# Patient Record
Sex: FEMALE | Race: WHITE | NOT HISPANIC OR LATINO | URBAN - METROPOLITAN AREA
[De-identification: names, ages, dates, MRNs, and addresses within clinical notes are randomized per-mention and may not be internally consistent; named-entity substitution may affect disease eponyms.]

---

## 2018-10-19 ENCOUNTER — INPATIENT (INPATIENT)
Facility: HOSPITAL | Age: 83
LOS: 6 days | Discharge: NURSING, REHAB OR LONG-TERM CARE | DRG: 394 | End: 2018-10-26
Attending: SURGERY | Admitting: SURGERY
Payer: MEDICARE

## 2018-10-19 VITALS
OXYGEN SATURATION: 99 % | TEMPERATURE: 98 F | DIASTOLIC BLOOD PRESSURE: 81 MMHG | RESPIRATION RATE: 16 BRPM | HEART RATE: 109 BPM | SYSTOLIC BLOOD PRESSURE: 145 MMHG

## 2018-10-19 DIAGNOSIS — Z98.890 OTHER SPECIFIED POSTPROCEDURAL STATES: Chronic | ICD-10-CM

## 2018-10-19 LAB
ALBUMIN SERPL ELPH-MCNC: 2.6 G/DL — LOW (ref 3.4–5)
ALP SERPL-CCNC: 111 U/L — SIGNIFICANT CHANGE UP (ref 40–120)
ALT FLD-CCNC: 18 U/L — SIGNIFICANT CHANGE UP (ref 12–42)
ANION GAP SERPL CALC-SCNC: 11 MMOL/L — SIGNIFICANT CHANGE UP (ref 9–16)
APPEARANCE UR: CLEAR — SIGNIFICANT CHANGE UP
AST SERPL-CCNC: 18 U/L — SIGNIFICANT CHANGE UP (ref 15–37)
BILIRUB SERPL-MCNC: 0.4 MG/DL — SIGNIFICANT CHANGE UP (ref 0.2–1.2)
BILIRUB UR-MCNC: ABNORMAL
BUN SERPL-MCNC: 39 MG/DL — HIGH (ref 7–23)
CALCIUM SERPL-MCNC: 9.6 MG/DL — SIGNIFICANT CHANGE UP (ref 8.5–10.5)
CHLORIDE SERPL-SCNC: 93 MMOL/L — LOW (ref 96–108)
CO2 SERPL-SCNC: 29 MMOL/L — SIGNIFICANT CHANGE UP (ref 22–31)
COLOR SPEC: YELLOW — SIGNIFICANT CHANGE UP
CREAT SERPL-MCNC: 1.73 MG/DL — HIGH (ref 0.5–1.3)
DIFF PNL FLD: NEGATIVE — SIGNIFICANT CHANGE UP
GLUCOSE SERPL-MCNC: 265 MG/DL — HIGH (ref 70–99)
GLUCOSE UR QL: NEGATIVE — SIGNIFICANT CHANGE UP
HCT VFR BLD CALC: 43.7 % — SIGNIFICANT CHANGE UP (ref 34.5–45)
HGB BLD-MCNC: 14.7 G/DL — SIGNIFICANT CHANGE UP (ref 11.5–15.5)
KETONES UR-MCNC: NEGATIVE — SIGNIFICANT CHANGE UP
LACTATE SERPL-SCNC: 1.9 MMOL/L — SIGNIFICANT CHANGE UP (ref 0.4–2)
LEUKOCYTE ESTERASE UR-ACNC: NEGATIVE — SIGNIFICANT CHANGE UP
LIDOCAIN IGE QN: 97 U/L — SIGNIFICANT CHANGE UP (ref 73–393)
LYMPHOCYTES # BLD AUTO: 3 % — LOW (ref 13–44)
MCHC RBC-ENTMCNC: 31.7 PG — SIGNIFICANT CHANGE UP (ref 27–34)
MCHC RBC-ENTMCNC: 33.6 G/DL — SIGNIFICANT CHANGE UP (ref 32–36)
MCV RBC AUTO: 94.2 FL — SIGNIFICANT CHANGE UP (ref 80–100)
MONOCYTES NFR BLD AUTO: 3 % — SIGNIFICANT CHANGE UP (ref 2–14)
NEUTROPHILS NFR BLD AUTO: 92 % — HIGH (ref 43–77)
NITRITE UR-MCNC: NEGATIVE — SIGNIFICANT CHANGE UP
NT-PROBNP SERPL-SCNC: 4535 PG/ML — HIGH
PCO2 BLDV: 38 MMHG — LOW (ref 41–51)
PH BLDV: 7.51 — HIGH (ref 7.32–7.43)
PH UR: 5 — SIGNIFICANT CHANGE UP (ref 5–8)
PLATELET # BLD AUTO: 706 K/UL — HIGH (ref 150–400)
PO2 BLDV: 85 MMHG — HIGH (ref 35–40)
POTASSIUM SERPL-MCNC: 4.2 MMOL/L — SIGNIFICANT CHANGE UP (ref 3.5–5.3)
POTASSIUM SERPL-SCNC: 4.2 MMOL/L — SIGNIFICANT CHANGE UP (ref 3.5–5.3)
PROT SERPL-MCNC: 8.2 G/DL — SIGNIFICANT CHANGE UP (ref 6.4–8.2)
PROT UR-MCNC: ABNORMAL MG/DL
RBC # BLD: 4.64 M/UL — SIGNIFICANT CHANGE UP (ref 3.8–5.2)
RBC # FLD: 11.8 % — SIGNIFICANT CHANGE UP (ref 10.3–14.5)
SAO2 % BLDV: 97 % — SIGNIFICANT CHANGE UP
SODIUM SERPL-SCNC: 133 MMOL/L — SIGNIFICANT CHANGE UP (ref 132–145)
SP GR SPEC: >=1.03 — SIGNIFICANT CHANGE UP (ref 1–1.03)
TROPONIN I SERPL-MCNC: <0.017 NG/ML — LOW (ref 0.02–0.06)
UROBILINOGEN FLD QL: 0.2 E.U./DL — SIGNIFICANT CHANGE UP
WBC # BLD: 28.7 K/UL — HIGH (ref 3.8–10.5)
WBC # FLD AUTO: 28.7 K/UL — HIGH (ref 3.8–10.5)

## 2018-10-19 PROCEDURE — 74176 CT ABD & PELVIS W/O CONTRAST: CPT | Mod: 26

## 2018-10-19 PROCEDURE — 43753 TX GASTRO INTUB W/ASP: CPT

## 2018-10-19 PROCEDURE — 93010 ELECTROCARDIOGRAM REPORT: CPT | Mod: 59

## 2018-10-19 PROCEDURE — 71045 X-RAY EXAM CHEST 1 VIEW: CPT | Mod: 26

## 2018-10-19 PROCEDURE — 71250 CT THORAX DX C-: CPT | Mod: 26

## 2018-10-19 PROCEDURE — 71045 X-RAY EXAM CHEST 1 VIEW: CPT | Mod: 26,77

## 2018-10-19 PROCEDURE — 99285 EMERGENCY DEPT VISIT HI MDM: CPT | Mod: 25

## 2018-10-19 RX ORDER — SODIUM CHLORIDE 9 MG/ML
1000 INJECTION INTRAMUSCULAR; INTRAVENOUS; SUBCUTANEOUS
Qty: 0 | Refills: 0 | Status: DISCONTINUED | OUTPATIENT
Start: 2018-10-19 | End: 2018-10-25

## 2018-10-19 RX ORDER — KETOROLAC TROMETHAMINE 30 MG/ML
30 SYRINGE (ML) INJECTION ONCE
Qty: 0 | Refills: 0 | Status: DISCONTINUED | OUTPATIENT
Start: 2018-10-19 | End: 2018-10-19

## 2018-10-19 RX ORDER — SODIUM CHLORIDE 9 MG/ML
1000 INJECTION INTRAMUSCULAR; INTRAVENOUS; SUBCUTANEOUS ONCE
Qty: 0 | Refills: 0 | Status: COMPLETED | OUTPATIENT
Start: 2018-10-19 | End: 2018-10-19

## 2018-10-19 RX ORDER — PIPERACILLIN AND TAZOBACTAM 4; .5 G/20ML; G/20ML
2.25 INJECTION, POWDER, LYOPHILIZED, FOR SOLUTION INTRAVENOUS EVERY 6 HOURS
Qty: 0 | Refills: 0 | Status: DISCONTINUED | OUTPATIENT
Start: 2018-10-19 | End: 2018-10-26

## 2018-10-19 RX ORDER — ACETAMINOPHEN 500 MG
650 TABLET ORAL ONCE
Qty: 0 | Refills: 0 | Status: COMPLETED | OUTPATIENT
Start: 2018-10-19 | End: 2018-10-19

## 2018-10-19 RX ORDER — VANCOMYCIN HCL 1 G
1000 VIAL (EA) INTRAVENOUS ONCE
Qty: 0 | Refills: 0 | Status: COMPLETED | OUTPATIENT
Start: 2018-10-19 | End: 2018-10-19

## 2018-10-19 RX ORDER — PIPERACILLIN AND TAZOBACTAM 4; .5 G/20ML; G/20ML
3.38 INJECTION, POWDER, LYOPHILIZED, FOR SOLUTION INTRAVENOUS ONCE
Qty: 0 | Refills: 0 | Status: COMPLETED | OUTPATIENT
Start: 2018-10-19 | End: 2018-10-19

## 2018-10-19 RX ORDER — ONDANSETRON 8 MG/1
4 TABLET, FILM COATED ORAL ONCE
Qty: 0 | Refills: 0 | Status: COMPLETED | OUTPATIENT
Start: 2018-10-19 | End: 2018-10-19

## 2018-10-19 RX ORDER — IPRATROPIUM/ALBUTEROL SULFATE 18-103MCG
3 AEROSOL WITH ADAPTER (GRAM) INHALATION ONCE
Qty: 0 | Refills: 0 | Status: COMPLETED | OUTPATIENT
Start: 2018-10-19 | End: 2018-10-19

## 2018-10-19 RX ADMIN — Medication 30 MILLIGRAM(S): at 12:17

## 2018-10-19 RX ADMIN — PIPERACILLIN AND TAZOBACTAM 3.38 GRAM(S): 4; .5 INJECTION, POWDER, LYOPHILIZED, FOR SOLUTION INTRAVENOUS at 12:40

## 2018-10-19 RX ADMIN — ONDANSETRON 4 MILLIGRAM(S): 8 TABLET, FILM COATED ORAL at 11:04

## 2018-10-19 RX ADMIN — SODIUM CHLORIDE 1000 MILLILITER(S): 9 INJECTION INTRAMUSCULAR; INTRAVENOUS; SUBCUTANEOUS at 12:17

## 2018-10-19 RX ADMIN — Medication 650 MILLIGRAM(S): at 11:04

## 2018-10-19 RX ADMIN — PIPERACILLIN AND TAZOBACTAM 200 GRAM(S): 4; .5 INJECTION, POWDER, LYOPHILIZED, FOR SOLUTION INTRAVENOUS at 11:35

## 2018-10-19 RX ADMIN — Medication 250 MILLIGRAM(S): at 13:02

## 2018-10-19 RX ADMIN — Medication 3 MILLILITER(S): at 10:43

## 2018-10-19 RX ADMIN — Medication 30 MILLIGRAM(S): at 11:35

## 2018-10-19 RX ADMIN — SODIUM CHLORIDE 2000 MILLILITER(S): 9 INJECTION INTRAMUSCULAR; INTRAVENOUS; SUBCUTANEOUS at 10:51

## 2018-10-19 RX ADMIN — Medication 650 MILLIGRAM(S): at 10:53

## 2018-10-19 RX ADMIN — PIPERACILLIN AND TAZOBACTAM 200 GRAM(S): 4; .5 INJECTION, POWDER, LYOPHILIZED, FOR SOLUTION INTRAVENOUS at 21:46

## 2018-10-19 NOTE — ED PROVIDER NOTE - OBJECTIVE STATEMENT
85 yo F with no known PMHx, lives alone at home, BIBA for cough, body aches, fever x 10d.  Pt reports having cough productive of clear sputum with associated body aches and decreased energy/appetite in the past 10 days.  Noted fever of 102 last night.  Had 3 episodes of NBNB emesis as well with associated chest pressure sensation since yesterday night.  Denies sore throat, SOB, palpitations, wheezing, abdominal pain, D/C, change in urinary/bowel function, dysuria, hematuria, flank pain, rash, HA, and dizziness.  No recent travel or sick contact noted. 87 yo F with no known PMHx, lives alone at home, BIBA for cough, body aches, fever x 10d.  Pt reports having cough productive of clear sputum with associated body aches and decreased energy/appetite in the past 10 days.  Noted fever of 102 last night.  Had 3 episodes of NBNB emesis as well with associated chest pressure sensation and abdominal bloating since yesterday night.  Denies sore throat, SOB, palpitations, wheezing, abdominal pain, D/C, change in urinary/bowel function, dysuria, hematuria, flank pain, rash, HA, and dizziness.  No recent travel or sick contact noted.

## 2018-10-19 NOTE — H&P ADULT - NSHPSOCIALHISTORY_GEN_ALL_CORE
Drinks 2 glasses of scotch/day  30 pack year smoking history, quit 20 years ago  Denies recreational drugs  Sexually active with men, no barrier contraception  Lives in apartment

## 2018-10-19 NOTE — ED PROVIDER NOTE - PHYSICAL EXAMINATION
Vital Signs - nursing notes reviewed and confirmed  Gen - WDWN, NAD, comfortable and non-toxic appearing, speaking in full sentences   Skin - warm, dry, intact  HEENT - AT/NC, PERRL, EOMI, no conjunctival injection, moist oral mucosa, TM intact b/l with good cone of lights, o/p clear with no erythema, edema, or exudate, uvula midline, airway patent, neck supple and NT, FROM, no JVD or carotid bruits b/l, no palpable nodes  CV - S1S2, R/R/R  Resp - respiration non-labored, diminished bs b/l with no focal r/r/w, no tripoding or accessory muscle   GI - NABS, soft, ND, NT, no rebound or guarding, no CVAT b/l   MS - w/w/p, no c/c/e, calves supple and NT, distal pulses symmetric b/l, brisk cap refills, +SILT  Neuro - AxOx3, no focal neuro deficits, CN II-XII grossly intact, cerebellar function intact, negative pronator drift, negative nystagmus, ambulatory without gait disturbance Vital Signs - nursing notes reviewed and confirmed  Gen - WDWN, NAD, comfortable and non-toxic appearing, speaking in full sentences   Skin - warm, dry, intact  HEENT - AT/NC, PERRL, EOMI, no conjunctival injection, moist oral mucosa, TM intact b/l with good cone of lights, o/p clear with no erythema, edema, or exudate, uvula midline, airway patent, neck supple and NT, FROM, no JVD or carotid bruits b/l, no palpable nodes  CV - S1S2, R/R/R  Resp - respiration non-labored, diminished bs b/l with no focal r/r/w, no tripoding or accessory muscle   GI - NABS, soft, slightly protuberant, minimal tenderness in the periumbilical and RLQ region, no rebound or guarding, no CVAT b/l   MS - w/w/p, no c/c/e, calves supple and NT, distal pulses symmetric b/l, brisk cap refills, +SILT  Neuro - AxOx3, no focal neuro deficits, ambulatory without gait disturbance

## 2018-10-19 NOTE — ED PROVIDER NOTE - ATTENDING CONTRIBUTION TO CARE
Pt evaluated for fever/sepsis work up, fever at home, nausea, vomiting several times, feels weak and tired. on exam lungs CTA, heart RRR, abd soft, mildly tender in lower abd. plan: sepsis work up, cultures, broad spectrum abxs/ Pt evaluated for fever/sepsis work up, fever at home, nausea, vomiting several times, feels weak and tired. on exam lungs CTA, heart RRR, abd soft, mildly tender in lower abd. plan: sepsis work up, cultures, broad spectrum abxs/    CT - sbo probably due to mass. Admit to Sx. NGT placed by PA.

## 2018-10-19 NOTE — ED PROVIDER NOTE - PROGRESS NOTE DETAILS
NGT readjusted and advanced further after CXR.  started on LWS.  1500cc of brownish gastric content output noted GYN paged 2 times ([phone]) with no call back so far. Will consult GYN/ONC when pt gets to [facility]

## 2018-10-19 NOTE — ED PROVIDER NOTE - RADIOLOGY FINDINGS
2021              [address] [place] [id number]         To Whom It May Concern,    I certify that Homero Katherine  79. Is under my medical care and may not return to work until 21.  If you h
6/3/2021      To Whom It May Concern:    Tamara Cooper is currently under my medical care . She  may return to work  on 6/8/21. Activity is restricted as follows: n/a    If you require additional information please contact our office.       Sincere
reviewed by me

## 2018-10-19 NOTE — H&P ADULT - NSHPLABSRESULTS_GEN_ALL_CORE
Labs:                        14.7   28.7  )-----------( 706      ( 19 Oct 2018 10:52 )             43.7     10-19    133  |  93<L>  |  39<H>  ----------------------------<  265<H>  4.2   |  29  |  1.73<H>    Ca    9.6      19 Oct 2018 10:52    TPro  8.2  /  Alb  2.6<L>  /  TBili  0.4  /  DBili  x   /  AST  18  /  ALT  18  /  AlkPhos  111  10-19      Urinalysis Basic - ( 19 Oct 2018 14:10 )    Color: Yellow / Appearance: Clear / SG: >=1.030 / pH: x  Gluc: x / Ketone: NEGATIVE  / Bili: Small / Urobili: 0.2 E.U./dL   Blood: x / Protein: Trace mg/dL / Nitrite: NEGATIVE   Leuk Esterase: NEGATIVE / RBC: x / WBC Many /HPF   Sq Epi: x / Non Sq Epi: 5-10 /HPF / Bacteria: Many /HPF      CAPILLARY BLOOD GLUCOSE        LIVER FUNCTIONS - ( 19 Oct 2018 10:52 )  Alb: 2.6 g/dL / Pro: 8.2 g/dL / ALK PHOS: 111 U/L / ALT: 18 U/L / AST: 18 U/L / GGT: x             Radiology & Additional Studies:    CT A/P w/o contrast  IMPRESSION:  1.  Large heterogeneous multilocular masslike structure with internal gas   density within the vesicouterine pouch, with involvement of the adjacent   small bowel. There is resultant proximal small bowel obstruction and a   small amount of regional extraluminal gas density. Findings may   represents a neoplastic process versus an abscess. If there are no   contraindications, repeat CT or MR imaging of the pelvis with IV contrast.    2.  Distended stomach with fluid column throughout the esophagus, most   indicative of reflux.    3.  Intrathoracic findings are indicative of bilateral small airways   disease. An inflammatory etiology or possible aspiration is also   considered. No focal airspace consolidation or pleural effusions.    4.  Extensive atherosclerotic coronary artery disease.

## 2018-10-19 NOTE — ED PROVIDER NOTE - CARE PLAN
Principal Discharge DX:	Intraabdominal mass  Secondary Diagnosis:	SBO (small bowel obstruction)  Secondary Diagnosis:	Abscess

## 2018-10-19 NOTE — H&P ADULT - NSHPPHYSICALEXAM_GEN_ALL_CORE
Physical Exam:  General: NAD, NGT in place  Pulmonary: Nonlabored breathing, no respiratory distress  Cardiovascular: No heaves/thrills  Abdominal: Soft, distended, tympanitic, nontender in all quadrants  Extremities: WWP, no clubbing/cyanosis/edema  Neuro: AAO x3, no focal deficits

## 2018-10-19 NOTE — ED PROVIDER NOTE - DIAGNOSTIC INTERPRETATION
Xray (wet reads) interpreted by LG KIRKPATRICK   CXR - Cardiac silhouette, aortic knob, mediastinal and hilar contours appear wnl, no acute consolidation, infiltrate, effusion, or PTX. No bony abnormalities noted

## 2018-10-19 NOTE — H&P ADULT - ASSESSMENT
A/P: 86F presents with partial SBO 2/2 to possible uterine mass invading small bowel    - NPO/IVF  - NGT LIWS  - Serial abdominal exams  - Zosyn  - OYOVANA/SCDs  - Gyn consult A/P: 86F presents with partial SBO 2/2 to possible uterine mass invading small bowel    - NPO/IVF  - NGT LIWS  - Serial abdominal exams  - Zosyn  - OYOVANA/SCDs  - Gyn consult  - Case discussed with chief resident and attending

## 2018-10-19 NOTE — ED PROVIDER NOTE - MEDICAL DECISION MAKING DETAILS
pt p/w 10d of cough and generalized malaise and decreased appetite, noted fever in the past 2d with N/V x 3 episodes last night, 2 episodes in the ED, labs remarkable for leukocytosis to 28K, thrombocytosis, elevated Cr of 1.74, CXR with no acute consolidation noted, dry scans performed for chest and A/P due to elevated cr and concerns for intra-abd/chest infectious process, empirically covered with dose of IV vanco/zosyn, CT with large heterogeneous multilocular masslike structure with internal gas   density within the vesicouterine pouch, with involvement of the adjacent small bowel. There is resultant proximal small bowel obstruction and a small amount of regional extraluminal gas density. +b/l airway dz vs aspiration PNA (cough with N/V), NGT placed at bedside with 1L of brown gastric content output, case discussed with ACS Dr. Hartley, will admit to ACS tele/stepdown for further management

## 2018-10-19 NOTE — H&P ADULT - HISTORY OF PRESENT ILLNESS
86F presents with NB/NB vomiting x3 last night, accompanied by lightheadedness.  The patient felt subjectively worse this morning, but did not describe any specific symptoms.  She continued to pass flatus and bowel movements this morning.  She denies any similar prior episodes, as well as any headache, change in vision, diarrhea, chest pain, shortness of breath, or palpatations.  Of note, she endorses an unintentional 20lb weight loss over the last year.    In the [facility] ED, she was afebrile and hemodynamically stable before transfer to [facility].

## 2018-10-20 LAB
ANION GAP SERPL CALC-SCNC: 14 MMOL/L — SIGNIFICANT CHANGE UP (ref 5–17)
BUN SERPL-MCNC: 41 MG/DL — HIGH (ref 7–23)
CALCIUM SERPL-MCNC: 8.2 MG/DL — LOW (ref 8.4–10.5)
CHLORIDE SERPL-SCNC: 96 MMOL/L — SIGNIFICANT CHANGE UP (ref 96–108)
CO2 SERPL-SCNC: 33 MMOL/L — HIGH (ref 22–31)
CREAT SERPL-MCNC: 1.93 MG/DL — HIGH (ref 0.5–1.3)
CULTURE RESULTS: SIGNIFICANT CHANGE UP
GLUCOSE SERPL-MCNC: 126 MG/DL — HIGH (ref 70–99)
HCT VFR BLD CALC: 37.6 % — SIGNIFICANT CHANGE UP (ref 34.5–45)
HGB BLD-MCNC: 12.1 G/DL — SIGNIFICANT CHANGE UP (ref 11.5–15.5)
MAGNESIUM SERPL-MCNC: 2.4 MG/DL — SIGNIFICANT CHANGE UP (ref 1.6–2.6)
MCHC RBC-ENTMCNC: 31.2 PG — SIGNIFICANT CHANGE UP (ref 27–34)
MCHC RBC-ENTMCNC: 32.2 G/DL — SIGNIFICANT CHANGE UP (ref 32–36)
MCV RBC AUTO: 96.9 FL — SIGNIFICANT CHANGE UP (ref 80–100)
PHOSPHATE SERPL-MCNC: 5.5 MG/DL — HIGH (ref 2.5–4.5)
PLATELET # BLD AUTO: 601 K/UL — HIGH (ref 150–400)
POTASSIUM SERPL-MCNC: 3.5 MMOL/L — SIGNIFICANT CHANGE UP (ref 3.5–5.3)
POTASSIUM SERPL-SCNC: 3.5 MMOL/L — SIGNIFICANT CHANGE UP (ref 3.5–5.3)
RBC # BLD: 3.88 M/UL — SIGNIFICANT CHANGE UP (ref 3.8–5.2)
RBC # FLD: 12.3 % — SIGNIFICANT CHANGE UP (ref 10.3–16.9)
SODIUM SERPL-SCNC: 143 MMOL/L — SIGNIFICANT CHANGE UP (ref 135–145)
SPECIMEN SOURCE: SIGNIFICANT CHANGE UP
WBC # BLD: 16.1 K/UL — HIGH (ref 3.8–10.5)
WBC # FLD AUTO: 16.1 K/UL — HIGH (ref 3.8–10.5)

## 2018-10-20 PROCEDURE — 99223 1ST HOSP IP/OBS HIGH 75: CPT

## 2018-10-20 PROCEDURE — 76830 TRANSVAGINAL US NON-OB: CPT | Mod: 26

## 2018-10-20 RX ORDER — BENZOCAINE AND MENTHOL 5; 1 G/100ML; G/100ML
1 LIQUID ORAL
Qty: 0 | Refills: 0 | Status: DISCONTINUED | OUTPATIENT
Start: 2018-10-20 | End: 2018-10-26

## 2018-10-20 RX ORDER — SODIUM CHLORIDE 9 MG/ML
1000 INJECTION INTRAMUSCULAR; INTRAVENOUS; SUBCUTANEOUS ONCE
Qty: 0 | Refills: 0 | Status: COMPLETED | OUTPATIENT
Start: 2018-10-20 | End: 2018-10-20

## 2018-10-20 RX ORDER — PANTOPRAZOLE SODIUM 20 MG/1
40 TABLET, DELAYED RELEASE ORAL DAILY
Qty: 0 | Refills: 0 | Status: DISCONTINUED | OUTPATIENT
Start: 2018-10-20 | End: 2018-10-26

## 2018-10-20 RX ORDER — POTASSIUM CHLORIDE 20 MEQ
10 PACKET (EA) ORAL
Qty: 0 | Refills: 0 | Status: COMPLETED | OUTPATIENT
Start: 2018-10-20 | End: 2018-10-20

## 2018-10-20 RX ORDER — POTASSIUM CHLORIDE 20 MEQ
20 PACKET (EA) ORAL
Qty: 0 | Refills: 0 | Status: DISCONTINUED | OUTPATIENT
Start: 2018-10-20 | End: 2018-10-20

## 2018-10-20 RX ADMIN — SODIUM CHLORIDE 4000 MILLILITER(S): 9 INJECTION INTRAMUSCULAR; INTRAVENOUS; SUBCUTANEOUS at 05:58

## 2018-10-20 RX ADMIN — PANTOPRAZOLE SODIUM 40 MILLIGRAM(S): 20 TABLET, DELAYED RELEASE ORAL at 09:10

## 2018-10-20 RX ADMIN — BENZOCAINE AND MENTHOL 1 LOZENGE: 5; 1 LIQUID ORAL at 17:46

## 2018-10-20 RX ADMIN — PIPERACILLIN AND TAZOBACTAM 200 GRAM(S): 4; .5 INJECTION, POWDER, LYOPHILIZED, FOR SOLUTION INTRAVENOUS at 03:16

## 2018-10-20 RX ADMIN — BENZOCAINE AND MENTHOL 1 LOZENGE: 5; 1 LIQUID ORAL at 12:32

## 2018-10-20 RX ADMIN — Medication 100 MILLIEQUIVALENT(S): at 22:48

## 2018-10-20 RX ADMIN — PIPERACILLIN AND TAZOBACTAM 200 GRAM(S): 4; .5 INJECTION, POWDER, LYOPHILIZED, FOR SOLUTION INTRAVENOUS at 15:24

## 2018-10-20 RX ADMIN — PIPERACILLIN AND TAZOBACTAM 200 GRAM(S): 4; .5 INJECTION, POWDER, LYOPHILIZED, FOR SOLUTION INTRAVENOUS at 09:09

## 2018-10-20 RX ADMIN — PIPERACILLIN AND TAZOBACTAM 200 GRAM(S): 4; .5 INJECTION, POWDER, LYOPHILIZED, FOR SOLUTION INTRAVENOUS at 20:51

## 2018-10-20 RX ADMIN — Medication 100 MILLIEQUIVALENT(S): at 21:31

## 2018-10-20 NOTE — PROGRESS NOTE ADULT - ASSESSMENT
86F presents with partial SBO 2/2 to possible uterine mass invading small bowel    - NPO   - IVF  - NGT LIWS  - Serial abdominal exams  - Zosyn  - OOBA/YAELs  - F/U gyn recs

## 2018-10-20 NOTE — PROGRESS NOTE ADULT - SUBJECTIVE AND OBJECTIVE BOX
S: Patient examined bedside. States she is feeling better. States pain is improving. Admits to passing flatus and BMs.     O:     Vital Signs Last 24 Hrs  T(C): 36.3 (20 Oct 2018 09:37), Max: 36.9 (19 Oct 2018 21:08)  T(F): 97.4 (20 Oct 2018 09:37), Max: 98.5 (19 Oct 2018 21:08)  HR: 76 (20 Oct 2018 09:16) (76 - 97)  BP: 134/58 (20 Oct 2018 09:16) (115/65 - 159/70)  BP(mean): 84 (20 Oct 2018 09:16) (83 - 102)  RR: 18 (20 Oct 2018 09:16) (16 - 18)  SpO2: 95% (20 Oct 2018 09:16) (94% - 97%)  I&O's Summary    19 Oct 2018 07:01  -  20 Oct 2018 07:00  --------------------------------------------------------  IN: 1300 mL / OUT: 1700 mL / NET: -400 mL    20 Oct 2018 07:01  -  20 Oct 2018 11:41  --------------------------------------------------------  IN: 0 mL / OUT: 100 mL / NET: -100 mL    General: NAD, NGT in place  Pulmonary: Nonlabored breathing, no respiratory distress  Abdominal: Soft, mildly distended, nontender  Extremities: WWP, no clubbing/cyanosis/edema  Neuro: AAO x3, no focal deficits                        14.7   28.7  )-----------( 706      ( 19 Oct 2018 10:52 )             43.7   10-19    133  |  93<L>  |  39<H>  ----------------------------<  265<H>  4.2   |  29  |  1.73<H>    Ca    9.6      19 Oct 2018 10:52    TPro  8.2  /  Alb  2.6<L>  /  TBili  0.4  /  DBili  x   /  AST  18  /  ALT  18  /  AlkPhos  111  10-19  LIVER FUNCTIONS - ( 19 Oct 2018 10:52 )  Alb: 2.6 g/dL / Pro: 8.2 g/dL / ALK PHOS: 111 U/L / ALT: 18 U/L / AST: 18 U/L / GGT: x         CARDIAC MARKERS ( 19 Oct 2018 10:52 )  <0.017 ng/mL / x     / x     / x     / x

## 2018-10-20 NOTE — PROGRESS NOTE ADULT - ASSESSMENT
87yo F with SBO secondary to a pelvic mass vs abscess, unclear origin on imaging non contrast CT.  - bimanual exam revealed enlarged mobile uterus that could be consistent w/ fibroids, however patient denies any history of fibroids and in setting of possible vaginal bleeding, must rule out uterine mass   -TVUS performed, f/u results   - initially recommended CT w/ contrast, however given elevated Creatine plan to hold off with contrast at this time  - if TVUS results do not provide clearer etiology of mass than will consider MRI pelvis   - recommend tumor markers, including , C 19-9, CEA   - GYN ONC will continue to follow  - contacted CT technology services to help with uploading CT images

## 2018-10-20 NOTE — CONSULT NOTE ADULT - ASSESSMENT
87yo F with SBO secondary to a pelvic abscess, unclear origin on imaging given non contrast CT.  Recommend CT w/ contrast and TVUS. Patient informed of this. Deferring internal exam for now. Discussed with patient. D/w Dr. Hernandez. GYN ONC will follow up results. 87yo F with SBO secondary to a pelvic abscess, unclear origin on imaging given non contrast CT. Ultrasound imaging reviewed in detail. 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 Etiology still unclear. Pelvic exam with no firm masses. Patient nontender, which is unexpected for diagnosis of bowel fistula/perforation and "pelvic abscess." Of note, patient does report vaginal spotting recently, which may be indicative of a uterine etiology.    Request pelvis MRI to better visualize the mass and surrounding structures. Tumor markers requested yesterday. Will follow up results.     Gyn oncology will continue to follow. 85yo F with SBO secondary to a pelvic abscess, unclear origin on imaging given non contrast CT. Ultrasound imaging reviewed in detail. 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 Etiology still unclear. Pelvic exam with no firm masses. 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  normal.    Request pelvis MRI to better visualize the mass and surrounding structures.     Gyn oncology will continue to follow.

## 2018-10-20 NOTE — CONSULT NOTE ADULT - SUBJECTIVE AND OBJECTIVE BOX
87yo F presented to [facility] on Friday morning with an episode of vomiting. She was previously healthy and feeling well. She was transferred to [facility] after a diagnosis of SBO. She has an NGT and is on IV Zosyn. CT w/o contrast was done which showed a non-specific pelvic mass unsure if of pelvic origin therefore GYN consulted. The patient was seen at the bedside. She has no complaints. She reports being healthy before today. No significant medical or surgical history. Para 0. Menopause age 50 no PMB. Reports no history of abnormal pap smears. Lives alone on [address] [place] [address]. Meds: ASA. NKDA.    Vital Signs Last 24 Hrs  T(C): 35.9 (20 Oct 2018 01:25), Max: 36.9 (19 Oct 2018 21:08)  T(F): 96.6 (20 Oct 2018 01:25), Max: 98.5 (19 Oct 2018 21:08)  HR: 86 (20 Oct 2018 00:26) (85 - 109)  BP: 130/60 (20 Oct 2018 00:26) (115/65 - 174/77)  BP(mean): 87 (20 Oct 2018 00:26) (87 - 102)  RR: 18 (20 Oct 2018 00:26) (16 - 18)  SpO2: 94% (20 Oct 2018 00:26) (94% - 99%)    GEN: NAD, NGT in place  ABD: soft, non tender, appears distended/bloated (pt reports this is normal)  EXT: non tender  : deferred    MEDICATIONS  (STANDING):  piperacillin/tazobactam IVPB. 2.25 Gram(s) IV Intermittent every 6 hours  sodium chloride 0.9%. 1000 milliLiter(s) (100 mL/Hr) IV Continuous <Continuous>    Complete Blood Count + Automated Diff (10.19.18 @ 10:52)    WBC Count: 28.7: CBC REPEATED. K/uL    RBC Count: 4.64 M/uL    Hemoglobin: 14.7 g/dL    Hematocrit: 43.7 %    Mean Cell Volume: 94.2 fL    10-19    133  |  93<L>  |  39<H>  ----------------------------<  265<H>  4.2   |  29  |  1.73<H>    Ca    9.6      19 Oct 2018 10:52    TPro  8.2  /  Alb  2.6<L>  /  TBili  0.4  /  DBili  x   /  AST  18  /  ALT  18  /  AlkPhos  111  10-19      EXAM:  CT CHEST                        EXAM:  CT ABDOMEN AND PELVIS                           PROCEDURE DATE:  10/19/2018          INTERPRETATION:    EXAM: CT of the CHEST, ABDOMEN and PELVIS without IV contrast dated   10/19/2018 12:29 PM    INDICATION: 86-year-old female with nausea and vomiting.    TECHNIQUE: CT of the chest, abdomen and pelvis was performed without the   administration of oral and intravenous contrast material. Axial,   sagittal, and coronal images were produced and reviewed.    PRIOR STUDIES: No prior exams are available for comparison at this time.    FINDINGS:   CHEST:  The heart is normal in size.  No pericardial effusion is seen. Extensive   atherosclerotic coronary artery calcifications are noted. There is also   extensive mural arthroscopic disease throughout the descending thoracic   aorta. 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  No   mediastinal, hilar or axillary lymphadenopathy is seen. Visualized   thyroid gland has an unremarkable nonenhanced appearance.    Evaluation of the pulmonary parenchyma demonstrates scattered vague   centrilobular groundglass opacities throughout the left upper and   bilateral lower lobes, greater on the left; and small cluster of distal   tree-in-bud nodularity in the peripheral right upper lobe indicative of   small airways disease. No focal airspace consolidation, pleural effusion   or pneumothorax is seen. No parenchymal mass is identified. Central   airways are patent bilaterally.    ABDOMEN/PELVIS:  There is a 1.6 x 1.2 cm adenoma in the left gland. The liver, spleen,   gallbladder, pancreas, right adrenal gland and both kidneys have a   grossly unremarkable nonenhanced appearance.    Extensive atherosclerotic mural disease throughout the aortobiiliac   system, without aneurysmal dilatation. No retroperitoneal mass or   lymphadenopathy.    Enteric contrast was not administered, limiting complete evaluation of   the gastrointestinal tract. Marked distention and fluid of the stomach   with alarge column of nearly the entire esophagus. There is a long   segment of distended jejunum, extending to the left adnexal region, with   unremarkable upper caliber change at the level of the multilocular pelvic   mass, described below. The distal small bowel is decompressed. There are   small additional locules of gas in the right adnexal region. There is   extensive colonic diverticulosis. The visualized appendix is   unremarkable. No large amount of free fluid in the abdomen and pelvis.    There is an approximate 9 x 8 x 4 cm heterogeneous multilocular masslike   structure with internal gas density situated within the vesicouterine   pouch, with loss of normal soft tissue planes between the adjacent uterus   and urinary bladder; and involvement of the distal portion of the jejunum   in the left adnexal region. Partially calcified fibroids in the   visualized uterus are noted.    Evaluation of the osseous structures demonstrates views with   demineralization with a a mild chronic appearing inferior to the   compression deformity of L1, with mild retropulsion. No aggressive   osseous lesions throughout the visualized bones.       IMPRESSION:  1.  Large heterogeneous multilocular masslike structure with internal gas   density within the vesicouterine pouch, with involvement of the adjacent   small bowel. There is resultant proximal small bowel obstruction and a   small amount of regional extraluminal gas density. Findings may   represents a neoplastic process versus an abscess. Ifthere are no   contraindications, repeat CT or MR imaging of the pelvis with IV contrast.    2.  Distended stomach with fluid column throughout the esophagus, most   indicative of reflux.    3.  Intrathoracic findings are indicative of bilateral small airways   disease. An inflammatory etiology or possible aspiration is also   considered. No focal airspace consolidation or pleural effusions.    4.  Extensive atherosclerotic coronary artery disease. 85yo F presented to [facility] on Friday morning with an episode of vomiting. She was previously healthy and feeling well. She was transferred to [facility] after a diagnosis of SBO. She has an NGT and is on IV Zosyn. CT w/o contrast was done which showed a non-specific pelvic mass of unclear etiology. The patient was seen at the bedside. She has no complaints. She reports being healthy before today. No significant medical or surgical history. Para 0. Menopause age 50 no PMB. Reports no history of abnormal pap smears. Lives alone on [address] [place] [address]. Meds: ASA. NKDA.    Vital Signs Last 24 Hrs  T(C): 35.9 (20 Oct 2018 01:25), Max: 36.9 (19 Oct 2018 21:08)  T(F): 96.6 (20 Oct 2018 01:25), Max: 98.5 (19 Oct 2018 21:08)  HR: 86 (20 Oct 2018 00:26) (85 - 109)  BP: 130/60 (20 Oct 2018 00:26) (115/65 - 174/77)  BP(mean): 87 (20 Oct 2018 00:26) (87 - 102)  RR: 18 (20 Oct 2018 00:26) (16 - 18)  SpO2: 94% (20 Oct 2018 00:26) (94% - 99%)    GEN: NAD, NGT in place  ABD: soft, non tender, appears distended/bloated (pt reports this is normal)  EXT: non tender  : normal external genitalia, normal cervix. uterus anteverted, with firm lower uterine segment consistent with uterine fibroid. Unable to individually identify ovaries. Pelvic fluid appreciated, however no firm masses noted. No nodularity on rectovaginal exam.     MEDICATIONS  (STANDING):  piperacillin/tazobactam IVPB. 2.25 Gram(s) IV Intermittent every 6 hours  sodium chloride 0.9%. 1000 milliLiter(s) (100 mL/Hr) IV Continuous <Continuous>    Complete Blood Count + Automated Diff (10.19.18 @ 10:52)    WBC Count: 28.7: CBC REPEATED. K/uL    RBC Count: 4.64 M/uL    Hemoglobin: 14.7 g/dL    Hematocrit: 43.7 %    Mean Cell Volume: 94.2 fL    10-19    133  |  93<L>  |  39<H>  ----------------------------<  265<H>  4.2   |  29  |  1.73<H>    Ca    9.6      19 Oct 2018 10:52    TPro  8.2  /  Alb  2.6<L>  /  TBili  0.4  /  DBili  x   /  AST  18  /  ALT  18  /  AlkPhos  111  10-19      EXAM:  CT CHEST                        EXAM:  CT ABDOMEN AND PELVIS                           PROCEDURE DATE:  10/19/2018          INTERPRETATION:    EXAM: CT of the CHEST, ABDOMEN and PELVIS without IV contrast dated   10/19/2018 12:29 PM    INDICATION: 86-year-old female with nausea and vomiting.    TECHNIQUE: CT of the chest, abdomen and pelvis was performed without the   administration of oral and intravenous contrast material. Axial,   sagittal, and coronal images were produced and reviewed.    PRIOR STUDIES: No prior exams are available for comparison at this time.    FINDINGS:   CHEST:  The heart is normal in size.  No pericardial effusion is seen. Extensive   atherosclerotic coronary artery calcifications are noted. There is also   extensive mural arthroscopic disease throughout the descending thoracic   aorta. 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  No   mediastinal, hilar or axillary lymphadenopathy is seen. Visualized   thyroid gland has an unremarkable nonenhanced appearance.    Evaluation of the pulmonary parenchyma demonstrates scattered vague   centrilobular groundglass opacities throughout the left upper and   bilateral lower lobes, greater on the left; and small cluster of distal   tree-in-bud nodularity in the peripheral right upper lobe indicative of   small airways disease. No focal airspace consolidation, pleural effusion   or pneumothorax is seen. No parenchymal mass is identified. Central   airways are patent bilaterally.    ABDOMEN/PELVIS:  There is a 1.6 x 1.2 cm adenoma in the left gland. The liver, spleen,   gallbladder, pancreas, right adrenal gland and both kidneys have a   grossly unremarkable nonenhanced appearance.    Extensive atherosclerotic mural disease throughout the aortobiiliac   system, without aneurysmal dilatation. No retroperitoneal mass or   lymphadenopathy.    Enteric contrast was not administered, limiting complete evaluation of   the gastrointestinal tract. Marked distention and fluid of the stomach   with alarge column of nearly the entire esophagus. There is a long   segment of distended jejunum, extending to the left adnexal region, with   unremarkable upper caliber change at the level of the multilocular pelvic   mass, described below. The distal small bowel is decompressed. There are   small additional locules of gas in the right adnexal region. There is   extensive colonic diverticulosis. The visualized appendix is   unremarkable. No large amount of free fluid in the abdomen and pelvis.    There is an approximate 9 x 8 x 4 cm heterogeneous multilocular masslike   structure with internal gas density situated within the vesicouterine   pouch, with loss of normal soft tissue planes between the adjacent uterus   and urinary bladder; and involvement of the distal portion of the jejunum   in the left adnexal region. Partially calcified fibroids in the   visualized uterus are noted.    Evaluation of the osseous structures demonstrates views with   demineralization with a a mild chronic appearing inferior to the   compression deformity of L1, with mild retropulsion. No aggressive   osseous lesions throughout the visualized bones.       IMPRESSION:  1.  Large heterogeneous multilocular masslike structure with internal gas   density within the vesicouterine pouch, with involvement of the adjacent   small bowel. There is resultant proximal small bowel obstruction and a   small amount of regional extraluminal gas density. Findings may   represents a neoplastic process versus an abscess. Ifthere are no   contraindications, repeat CT or MR imaging of the pelvis with IV contrast.    2.  Distended stomach with fluid column throughout the esophagus, most   indicative of reflux.    3.  Intrathoracic findings are indicative of bilateral small airways   disease. An inflammatory etiology or possible aspiration is also   considered. No focal airspace consolidation or pleural effusions.    4.  Extensive atherosclerotic coronary artery disease.

## 2018-10-20 NOTE — PROGRESS NOTE ADULT - SUBJECTIVE AND OBJECTIVE BOX
GYN Progress Note    Patient seen in ultrasound room. Evaluated with Dr. Ruiz and Dr. Monteiro. Patients history reviewed. States that she has noticed some vaginal spotting when she wipes ocassionally after urinating. Denies any blood in urine or toilet or in BM. States she is feeling better than on admission and that nausea has resolved. Reports she is passing a very small amount of flatus.  Denies any pain at this time. Denies CP, palpitations, SOB, fever, chills, vomiting.    Vital Signs Last 24 Hrs  T(C): 36.3 (20 Oct 2018 09:37), Max: 36.9 (19 Oct 2018 21:08)  T(F): 97.4 (20 Oct 2018 09:37), Max: 98.5 (19 Oct 2018 21:08)  HR: 80 (20 Oct 2018 12:36) (76 - 92)  BP: 129/59 (20 Oct 2018 12:36) (125/58 - 151/71)  BP(mean): 85 (20 Oct 2018 12:36) (83 - 102)  RR: 18 (20 Oct 2018 12:36) (17 - 18)  SpO2: 94% (20 Oct 2018 12:36) (94% - 95%)    Physical Exam:  Gen: No Acute Distress, resting comfortable in bed, frail cachectic elderly women   Pulm: Normal work of breathing, no wheezes/rhonchi/rales   GI: soft, appropriately tender, distended, normoactive BS, no rebound, no guarding  : performed by Dr. Ruiz enlarged mobile uterus, no adnexal masses, no nodularity,  rectovaginal exam WNL    I&O's Summary    19 Oct 2018 07:01  -  20 Oct 2018 07:00  --------------------------------------------------------  IN: 1300 mL / OUT: 1700 mL / NET: -400 mL    20 Oct 2018 07:01  -  20 Oct 2018 15:43  --------------------------------------------------------  IN: 0 mL / OUT: 100 mL / NET: -100 mL      MEDICATIONS  (STANDING):  pantoprazole  Injectable 40 milliGRAM(s) IV Push daily  piperacillin/tazobactam IVPB. 2.25 Gram(s) IV Intermittent every 6 hours  sodium chloride 0.9%. 1000 milliLiter(s) (100 mL/Hr) IV Continuous <Continuous>    MEDICATIONS  (PRN):  benzocaine 15 mG/menthol 3.6 mG Lozenge 1 Lozenge Oral four times a day PRN Sore Throat      LABS:                        12.1   16.1  )-----------( 601      ( 20 Oct 2018 11:26 )             37.6     10-20    143  |  96  |  41<H>  ----------------------------<  126<H>  3.5   |  33<H>  |  1.93<H>    Ca    8.2<L>      20 Oct 2018 11:26  Phos  5.5     10-20  Mg     2.4     10-20    TPro  8.2  /  Alb  2.6<L>  /  TBili  0.4  /  DBili  x   /  AST  18  /  ALT  18  /  AlkPhos  111  10-19      Urinalysis Basic - ( 19 Oct 2018 14:10 )    Color: Yellow / Appearance: Clear / SG: >=1.030 / pH: x  Gluc: x / Ketone: NEGATIVE  / Bili: Small / Urobili: 0.2 E.U./dL   Blood: x / Protein: Trace mg/dL / Nitrite: NEGATIVE   Leuk Esterase: NEGATIVE / RBC: x / WBC Many /HPF   Sq Epi: x / Non Sq Epi: 5-10 /HPF / Bacteria: Many /HPF GYN Progress Note    Patient seen in ultrasound room. Evaluated with Dr. Ruiz and Dr. Monteiro. Patients history reviewed. States that she has noticed some vaginal spotting when she wipes ocassionally after urinating. Denies any blood in urine or toilet or in BM. States she is feeling better than on admission and that nausea has resolved. Reports she is passing a very small amount of flatus.  Denies any pain at this time. Denies CP, palpitations, SOB, fever, chills, vomiting.    Vital Signs Last 24 Hrs  T(C): 36.3 (20 Oct 2018 09:37), Max: 36.9 (19 Oct 2018 21:08)  T(F): 97.4 (20 Oct 2018 09:37), Max: 98.5 (19 Oct 2018 21:08)  HR: 80 (20 Oct 2018 12:36) (76 - 92)  BP: 129/59 (20 Oct 2018 12:36) (125/58 - 151/71)  BP(mean): 85 (20 Oct 2018 12:36) (83 - 102)  RR: 18 (20 Oct 2018 12:36) (17 - 18)  SpO2: 94% (20 Oct 2018 12:36) (94% - 95%)    Physical Exam:  Gen: No Acute Distress, resting comfortable in bed, frail cachectic elderly women   Pulm: Normal work of breathing, no wheezes/rhonchi/rales   GI: soft, nontender, moderately distended, normoactive BS, no rebound, no guarding  : performed by Dr. Ruiz enlarged mobile uterus, no adnexal masses, no nodularity, rectovaginal exam WNL    I&O's Summary    19 Oct 2018 07:01  -  20 Oct 2018 07:00  --------------------------------------------------------  IN: 1300 mL / OUT: 1700 mL / NET: -400 mL    20 Oct 2018 07:01  -  20 Oct 2018 15:43  --------------------------------------------------------  IN: 0 mL / OUT: 100 mL / NET: -100 mL      MEDICATIONS  (STANDING):  pantoprazole  Injectable 40 milliGRAM(s) IV Push daily  piperacillin/tazobactam IVPB. 2.25 Gram(s) IV Intermittent every 6 hours  sodium chloride 0.9%. 1000 milliLiter(s) (100 mL/Hr) IV Continuous <Continuous>    MEDICATIONS  (PRN):  benzocaine 15 mG/menthol 3.6 mG Lozenge 1 Lozenge Oral four times a day PRN Sore Throat      LABS:                        12.1   16.1  )-----------( 601      ( 20 Oct 2018 11:26 )             37.6     10-20    143  |  96  |  41<H>  ----------------------------<  126<H>  3.5   |  33<H>  |  1.93<H>    Ca    8.2<L>      20 Oct 2018 11:26  Phos  5.5     10-20  Mg     2.4     10-20    TPro  8.2  /  Alb  2.6<L>  /  TBili  0.4  /  DBili  x   /  AST  18  /  ALT  18  /  AlkPhos  111  10-19      Urinalysis Basic - ( 19 Oct 2018 14:10 )    Color: Yellow / Appearance: Clear / SG: >=1.030 / pH: x  Gluc: x / Ketone: NEGATIVE  / Bili: Small / Urobili: 0.2 E.U./dL   Blood: x / Protein: Trace mg/dL / Nitrite: NEGATIVE   Leuk Esterase: NEGATIVE / RBC: x / WBC Many /HPF   Sq Epi: x / Non Sq Epi: 5-10 /HPF / Bacteria: Many /HPF

## 2018-10-21 LAB
ANION GAP SERPL CALC-SCNC: 12 MMOL/L — SIGNIFICANT CHANGE UP (ref 5–17)
BUN SERPL-MCNC: 33 MG/DL — HIGH (ref 7–23)
CALCIUM SERPL-MCNC: 8.2 MG/DL — LOW (ref 8.4–10.5)
CANCER AG125 SERPL-ACNC: 37 U/ML — HIGH
CEA SERPL-MCNC: 1.4 NG/ML — SIGNIFICANT CHANGE UP (ref 0–3.8)
CHLORIDE SERPL-SCNC: 104 MMOL/L — SIGNIFICANT CHANGE UP (ref 96–108)
CO2 SERPL-SCNC: 28 MMOL/L — SIGNIFICANT CHANGE UP (ref 22–31)
CREAT SERPL-MCNC: 1.3 MG/DL — SIGNIFICANT CHANGE UP (ref 0.5–1.3)
GLUCOSE SERPL-MCNC: 86 MG/DL — SIGNIFICANT CHANGE UP (ref 70–99)
HCT VFR BLD CALC: 35.9 % — SIGNIFICANT CHANGE UP (ref 34.5–45)
HGB BLD-MCNC: 11.2 G/DL — LOW (ref 11.5–15.5)
MAGNESIUM SERPL-MCNC: 2.4 MG/DL — SIGNIFICANT CHANGE UP (ref 1.6–2.6)
MCHC RBC-ENTMCNC: 30.9 PG — SIGNIFICANT CHANGE UP (ref 27–34)
MCHC RBC-ENTMCNC: 31.2 G/DL — LOW (ref 32–36)
MCV RBC AUTO: 99.2 FL — SIGNIFICANT CHANGE UP (ref 80–100)
PHOSPHATE SERPL-MCNC: 2.6 MG/DL — SIGNIFICANT CHANGE UP (ref 2.5–4.5)
PLATELET # BLD AUTO: 521 K/UL — HIGH (ref 150–400)
POTASSIUM SERPL-MCNC: 3.7 MMOL/L — SIGNIFICANT CHANGE UP (ref 3.5–5.3)
POTASSIUM SERPL-SCNC: 3.7 MMOL/L — SIGNIFICANT CHANGE UP (ref 3.5–5.3)
RBC # BLD: 3.62 M/UL — LOW (ref 3.8–5.2)
RBC # FLD: 12.8 % — SIGNIFICANT CHANGE UP (ref 10.3–16.9)
SODIUM SERPL-SCNC: 144 MMOL/L — SIGNIFICANT CHANGE UP (ref 135–145)
WBC # BLD: 13.6 K/UL — HIGH (ref 3.8–10.5)
WBC # FLD AUTO: 13.6 K/UL — HIGH (ref 3.8–10.5)

## 2018-10-21 RX ORDER — POTASSIUM CHLORIDE 20 MEQ
40 PACKET (EA) ORAL ONCE
Qty: 0 | Refills: 0 | Status: COMPLETED | OUTPATIENT
Start: 2018-10-21 | End: 2018-10-21

## 2018-10-21 RX ORDER — POTASSIUM PHOSPHATE, MONOBASIC POTASSIUM PHOSPHATE, DIBASIC 236; 224 MG/ML; MG/ML
15 INJECTION, SOLUTION INTRAVENOUS ONCE
Qty: 0 | Refills: 0 | Status: COMPLETED | OUTPATIENT
Start: 2018-10-21 | End: 2018-10-21

## 2018-10-21 RX ADMIN — PIPERACILLIN AND TAZOBACTAM 200 GRAM(S): 4; .5 INJECTION, POWDER, LYOPHILIZED, FOR SOLUTION INTRAVENOUS at 09:11

## 2018-10-21 RX ADMIN — Medication 40 MILLIEQUIVALENT(S): at 18:16

## 2018-10-21 RX ADMIN — Medication 100 MILLIEQUIVALENT(S): at 00:16

## 2018-10-21 RX ADMIN — PIPERACILLIN AND TAZOBACTAM 200 GRAM(S): 4; .5 INJECTION, POWDER, LYOPHILIZED, FOR SOLUTION INTRAVENOUS at 21:46

## 2018-10-21 RX ADMIN — PIPERACILLIN AND TAZOBACTAM 200 GRAM(S): 4; .5 INJECTION, POWDER, LYOPHILIZED, FOR SOLUTION INTRAVENOUS at 02:18

## 2018-10-21 RX ADMIN — PANTOPRAZOLE SODIUM 40 MILLIGRAM(S): 20 TABLET, DELAYED RELEASE ORAL at 11:17

## 2018-10-21 RX ADMIN — PIPERACILLIN AND TAZOBACTAM 200 GRAM(S): 4; .5 INJECTION, POWDER, LYOPHILIZED, FOR SOLUTION INTRAVENOUS at 14:58

## 2018-10-21 RX ADMIN — POTASSIUM PHOSPHATE, MONOBASIC POTASSIUM PHOSPHATE, DIBASIC 62.5 MILLIMOLE(S): 236; 224 INJECTION, SOLUTION INTRAVENOUS at 18:16

## 2018-10-21 NOTE — PROGRESS NOTE ADULT - SUBJECTIVE AND OBJECTIVE BOX
SUBJECTIVE: No acute events overnight. +flatus/BM. Denies n/v.     Vital Signs Last 24 Hrs  T(C): 36.4 (21 Oct 2018 05:52), Max: 36.6 (20 Oct 2018 22:54)  T(F): 97.6 (21 Oct 2018 05:52), Max: 97.9 (20 Oct 2018 22:54)  HR: 80 (21 Oct 2018 08:30) (74 - 84)  BP: 134/63 (21 Oct 2018 08:30) (96/53 - 148/64)  BP(mean): 90 (21 Oct 2018 08:30) (71 - 92)  RR: 18 (21 Oct 2018 08:30) (16 - 18)  SpO2: 95% (21 Oct 2018 08:30) (94% - 96%)    General: NAD, resting comfortably in bed  Pulm: Nonlabored breathing, no respiratory distress  Abd: soft, NT/ND.    LABS:                        11.2   13.6  )-----------( 521      ( 21 Oct 2018 07:55 )             35.9     10-21    144  |  104  |  33<H>  ----------------------------<  86  3.7   |  28  |  1.30    Ca    8.2<L>      21 Oct 2018 07:55  Phos  2.6     10-21  Mg     2.4     10-21    TPro  8.2  /  Alb  2.6<L>  /  TBili  0.4  /  DBili  x   /  AST  18  /  ALT  18  /  AlkPhos  111  10-19

## 2018-10-21 NOTE — PROGRESS NOTE ADULT - SUBJECTIVE AND OBJECTIVE BOX
GYN Progress Note    Pt seen and examined at bedside. Pt report feeling much better today and has had her NG tube removed.  She denies any nausea, vomiting.  Has been tolerating  sips and chips.      T(F): 98.2 (10-21-18 @ 14:08), Max: 98.2 (10-21-18 @ 14:08)  HR: 76 (10-21-18 @ 11:17) (74 - 84)  BP: 139/63 (10-21-18 @ 11:17) (96/53 - 148/64)  RR: 16 (10-21-18 @ 11:17) (16 - 18)  SpO2: 95% (10-21-18 @ 11:17) (94% - 96%)  Wt(kg): --  I&O's Summary    20 Oct 2018 07:01  -  21 Oct 2018 07:00  --------------------------------------------------------  IN: 2500 mL / OUT: 1350 mL / NET: 1150 mL    21 Oct 2018 07:01  -  21 Oct 2018 15:23  --------------------------------------------------------  IN: 900 mL / OUT: 300 mL / NET: 600 mL        MEDICATIONS  (STANDING):  pantoprazole  Injectable 40 milliGRAM(s) IV Push daily  piperacillin/tazobactam IVPB. 2.25 Gram(s) IV Intermittent every 6 hours  sodium chloride 0.9%. 1000 milliLiter(s) (100 mL/Hr) IV Continuous <Continuous>    MEDICATIONS  (PRN):  benzocaine 15 mG/menthol 3.6 mG Lozenge 1 Lozenge Oral four times a day PRN Sore Throat      Physical Exam:  Constitutional: NAD  Pulmonary: clear to auscultation bilaterally   Cardiovascular: Regular rate and rhythm   Abdomen: soft, moderately distended, nontender to palpation, no rebound or guarding  Extremities: no lower extremity edema or calve tenderness. SCDs in place     LABS:                        11.2   13.6  )-----------( 521      ( 21 Oct 2018 07:55 )             35.9     10-21    144  |  104  |  33<H>  ----------------------------<  86  3.7   |  28  |  1.30    Ca    8.2<L>      21 Oct 2018 07:55  Phos  2.6     10-21  Mg     2.4     10-21            RADIOLOGY & ADDITIONAL TESTS: GYN Progress Note    Pt seen and examined at bedside. Pt report feeling much better today and has had her NG tube removed.  She denies any nausea, vomiting.  Has been tolerating  sips and chips.      T(F): 98.2 (10-21-18 @ 14:08), Max: 98.2 (10-21-18 @ 14:08)  HR: 76 (10-21-18 @ 11:17) (74 - 84)  BP: 139/63 (10-21-18 @ 11:17) (96/53 - 148/64)  RR: 16 (10-21-18 @ 11:17) (16 - 18)  SpO2: 95% (10-21-18 @ 11:17) (94% - 96%)  Wt(kg): --  I&O's Summary    20 Oct 2018 07:01  -  21 Oct 2018 07:00  --------------------------------------------------------  IN: 2500 mL / OUT: 1350 mL / NET: 1150 mL    21 Oct 2018 07:01  -  21 Oct 2018 15:23  --------------------------------------------------------  IN: 900 mL / OUT: 300 mL / NET: 600 mL        MEDICATIONS  (STANDING):  pantoprazole  Injectable 40 milliGRAM(s) IV Push daily  piperacillin/tazobactam IVPB. 2.25 Gram(s) IV Intermittent every 6 hours  sodium chloride 0.9%. 1000 milliLiter(s) (100 mL/Hr) IV Continuous <Continuous>    MEDICATIONS  (PRN):  benzocaine 15 mG/menthol 3.6 mG Lozenge 1 Lozenge Oral four times a day PRN Sore Throat      Physical Exam:  Constitutional: NAD  Pulmonary: clear to auscultation bilaterally   Cardiovascular: Regular rate and rhythm   Abdomen: soft, moderately distended, nontender to palpation, no rebound or guarding, hyperactive BS      LABS:                        11.2   13.6  )-----------( 521      ( 21 Oct 2018 07:55 )             35.9     10-21    144  |  104  |  33<H>  ----------------------------<  86  3.7   |  28  |  1.30    Ca    8.2<L>      21 Oct 2018 07:55  Phos  2.6     10-21  Mg     2.4     10-21            RADIOLOGY & ADDITIONAL TESTS:

## 2018-10-21 NOTE — PROGRESS NOTE ADULT - ASSESSMENT
86F presents with partial SBO 2/2 to possible uterine mass invading small bowel    - NPO   - IVF  - NGT LIWS  - Serial abdominal exams  - Zosyn  - DARINEL/SCDs  - Gyn consult

## 2018-10-21 NOTE — PROGRESS NOTE ADULT - ASSESSMENT
85yo F with SBO secondary to a pelvic mass vs abscess, unclear origin on imaging non contrast CT.  -TVUS performed -- per attending radiologist, unclear etiology of pelvic/abdominal mass -- recommending MRI to further evaluate  -  elevated, CEA wnl;  pending  - GYN ONC will continue to follow

## 2018-10-22 LAB
ANION GAP SERPL CALC-SCNC: 17 MMOL/L — SIGNIFICANT CHANGE UP (ref 5–17)
BUN SERPL-MCNC: 21 MG/DL — SIGNIFICANT CHANGE UP (ref 7–23)
CALCIUM SERPL-MCNC: 8 MG/DL — LOW (ref 8.4–10.5)
CANCER AG19-9 SERPL-ACNC: 15.4 U/ML — SIGNIFICANT CHANGE UP
CHLORIDE SERPL-SCNC: 103 MMOL/L — SIGNIFICANT CHANGE UP (ref 96–108)
CO2 SERPL-SCNC: 22 MMOL/L — SIGNIFICANT CHANGE UP (ref 22–31)
CREAT SERPL-MCNC: 0.99 MG/DL — SIGNIFICANT CHANGE UP (ref 0.5–1.3)
GLUCOSE SERPL-MCNC: 70 MG/DL — SIGNIFICANT CHANGE UP (ref 70–99)
HCT VFR BLD CALC: 37.1 % — SIGNIFICANT CHANGE UP (ref 34.5–45)
HGB BLD-MCNC: 11.7 G/DL — SIGNIFICANT CHANGE UP (ref 11.5–15.5)
MAGNESIUM SERPL-MCNC: 2.3 MG/DL — SIGNIFICANT CHANGE UP (ref 1.6–2.6)
MCHC RBC-ENTMCNC: 31.5 G/DL — LOW (ref 32–36)
MCHC RBC-ENTMCNC: 31.5 PG — SIGNIFICANT CHANGE UP (ref 27–34)
MCV RBC AUTO: 100 FL — SIGNIFICANT CHANGE UP (ref 80–100)
PHOSPHATE SERPL-MCNC: 2.1 MG/DL — LOW (ref 2.5–4.5)
PLATELET # BLD AUTO: 565 K/UL — HIGH (ref 150–400)
POTASSIUM SERPL-MCNC: 3.9 MMOL/L — SIGNIFICANT CHANGE UP (ref 3.5–5.3)
POTASSIUM SERPL-SCNC: 3.9 MMOL/L — SIGNIFICANT CHANGE UP (ref 3.5–5.3)
RBC # BLD: 3.71 M/UL — LOW (ref 3.8–5.2)
RBC # FLD: 12.6 % — SIGNIFICANT CHANGE UP (ref 10.3–16.9)
SODIUM SERPL-SCNC: 142 MMOL/L — SIGNIFICANT CHANGE UP (ref 135–145)
WBC # BLD: 13.6 K/UL — HIGH (ref 3.8–10.5)
WBC # FLD AUTO: 13.6 K/UL — HIGH (ref 3.8–10.5)

## 2018-10-22 RX ORDER — POTASSIUM PHOSPHATE, MONOBASIC POTASSIUM PHOSPHATE, DIBASIC 236; 224 MG/ML; MG/ML
15 INJECTION, SOLUTION INTRAVENOUS ONCE
Qty: 0 | Refills: 0 | Status: COMPLETED | OUTPATIENT
Start: 2018-10-22 | End: 2018-10-22

## 2018-10-22 RX ADMIN — SODIUM CHLORIDE 100 MILLILITER(S): 9 INJECTION INTRAMUSCULAR; INTRAVENOUS; SUBCUTANEOUS at 20:32

## 2018-10-22 RX ADMIN — PIPERACILLIN AND TAZOBACTAM 200 GRAM(S): 4; .5 INJECTION, POWDER, LYOPHILIZED, FOR SOLUTION INTRAVENOUS at 03:32

## 2018-10-22 RX ADMIN — PIPERACILLIN AND TAZOBACTAM 200 GRAM(S): 4; .5 INJECTION, POWDER, LYOPHILIZED, FOR SOLUTION INTRAVENOUS at 09:34

## 2018-10-22 RX ADMIN — PIPERACILLIN AND TAZOBACTAM 200 GRAM(S): 4; .5 INJECTION, POWDER, LYOPHILIZED, FOR SOLUTION INTRAVENOUS at 20:32

## 2018-10-22 RX ADMIN — POTASSIUM PHOSPHATE, MONOBASIC POTASSIUM PHOSPHATE, DIBASIC 62.5 MILLIMOLE(S): 236; 224 INJECTION, SOLUTION INTRAVENOUS at 12:44

## 2018-10-22 RX ADMIN — PIPERACILLIN AND TAZOBACTAM 200 GRAM(S): 4; .5 INJECTION, POWDER, LYOPHILIZED, FOR SOLUTION INTRAVENOUS at 15:55

## 2018-10-22 RX ADMIN — PANTOPRAZOLE SODIUM 40 MILLIGRAM(S): 20 TABLET, DELAYED RELEASE ORAL at 09:34

## 2018-10-22 NOTE — PROGRESS NOTE ADULT - SUBJECTIVE AND OBJECTIVE BOX
24 hr events:  O/N: PIETRO, MRI not done  10/21: dc NGT, npo sips/chips, gyn refused transfer, mri ordered    SUBJECTIVE:  No acute complaints. Tolerating sips/chips. +F/+BM. -N/V.    Vital Signs Last 24 Hrs  T(C): 36.1 (22 Oct 2018 04:44), Max: 36.8 (21 Oct 2018 14:08)  T(F): 97 (22 Oct 2018 04:44), Max: 98.2 (21 Oct 2018 14:08)  HR: 70 (22 Oct 2018 04:04) (64 - 80)  BP: 126/58 (22 Oct 2018 04:04) (123/60 - 164/69)  BP(mean): 84 (22 Oct 2018 04:04) (84 - 111)  RR: 16 (22 Oct 2018 04:04) (16 - 18)  SpO2: 96% (22 Oct 2018 04:04) (95% - 97%)    Physical Exam:  General: NAD  Pulmonary: Nonlabored breathing, no respiratory distress  Abdominal: soft, NT/ND  Neuro: A/O x3    I&O's Summary  21 Oct 2018 07:01  -  22 Oct 2018 07:00  --------------------------------------------------------  IN: 2587.5 mL / OUT: 550 mL / NET: 2037.5 mL    LABS:                   11.7   13.6  )-----------( 565      ( 22 Oct 2018 06:08 )             37.1     10-22  142  |  103  |  21  ----------------------------<  70  3.9   |  22  |  0.99    Ca    8.0<L>      22 Oct 2018 06:08  Phos  2.1     10-22  Mg     2.3     10-22

## 2018-10-22 NOTE — PROGRESS NOTE ADULT - SUBJECTIVE AND OBJECTIVE BOX
GYN Progress Note    Patient seen at bedside.  Patient denies pain, endorses some nausea, no episodes of emesis. No flatus today.  Patient OOB to the chair, NPO. Patient states that overall patient feels unwell.  Patient also states that she had noted rectal bleeding when she wiped, denied hematuria of vaginal spotting.     Vital Signs Last 24 Hrs  T(C): 36.2 (22 Oct 2018 13:53), Max: 36.6 (22 Oct 2018 09:46)  T(F): 97.2 (22 Oct 2018 13:53), Max: 97.9 (22 Oct 2018 09:46)  HR: 82 (22 Oct 2018 12:42) (64 - 82)  BP: 137/63 (22 Oct 2018 12:42) (123/60 - 164/69)  BP(mean): 91 (22 Oct 2018 12:42) (84 - 111)  RR: 19 (22 Oct 2018 12:42) (16 - 19)  SpO2: 96% (22 Oct 2018 12:42) (95% - 97%)    Physical Exam:  Gen: No Acute Distress  Pulm: Normal work of breathing  GI: soft, nontender, very nondistended, hypoactive BS, no rebound, no guarding.  Ext: SCDs in place, wnl    I&O's Summary    21 Oct 2018 07:01  -  22 Oct 2018 07:00  --------------------------------------------------------  IN: 2587.5 mL / OUT: 550 mL / NET: 2037.5 mL    22 Oct 2018 07:01  -  22 Oct 2018 15:06  --------------------------------------------------------  IN: 420 mL / OUT: 101 mL / NET: 319 mL      MEDICATIONS  (STANDING):  pantoprazole  Injectable 40 milliGRAM(s) IV Push daily  piperacillin/tazobactam IVPB. 2.25 Gram(s) IV Intermittent every 6 hours  sodium chloride 0.9%. 1000 milliLiter(s) (100 mL/Hr) IV Continuous <Continuous>    MEDICATIONS  (PRN):  benzocaine 15 mG/menthol 3.6 mG Lozenge 1 Lozenge Oral four times a day PRN Sore Throat      LABS:                        11.7   13.6  )-----------( 565      ( 22 Oct 2018 06:08 )             37.1     10-22    142  |  103  |  21  ----------------------------<  70  3.9   |  22  |  0.99    Ca    8.0<L>      22 Oct 2018 06:08  Phos  2.1     10-22  Mg     2.3     10-22

## 2018-10-22 NOTE — PROGRESS NOTE ADULT - ASSESSMENT
86F presents with partial SBO 2/2 to possible uterine mass invading small bowel    - CLD, heplock if tolerating clears  - Serial abdominal exams  - MRI today, f/u results  - Zosyn  - OOBA/Catracho  - Consults: Gyn, appreciate recs

## 2018-10-22 NOTE — PROGRESS NOTE ADULT - ASSESSMENT
87yo F with SBO secondary to a pelvic mass vs abscess, unclear origin GYN vs GI on non contrast CT.  Pending Pelvic MRI to finalize our recommendation.      -TVUS performed -- per attending radiologist, unclear etiology of pelvic/abdominal mass -- recommending pelvic MRI to further evaluate  -  > 35, but given SBO and possible infectious abdominal process, this is unsurprising.  - GYN ONC will continue to follow

## 2018-10-23 ENCOUNTER — TRANSCRIPTION ENCOUNTER (OUTPATIENT)
Age: 83
End: 2018-10-23

## 2018-10-23 LAB
ANION GAP SERPL CALC-SCNC: 17 MMOL/L — SIGNIFICANT CHANGE UP (ref 5–17)
BUN SERPL-MCNC: 10 MG/DL — SIGNIFICANT CHANGE UP (ref 7–23)
CALCIUM SERPL-MCNC: 7.5 MG/DL — LOW (ref 8.4–10.5)
CHLORIDE SERPL-SCNC: 102 MMOL/L — SIGNIFICANT CHANGE UP (ref 96–108)
CO2 SERPL-SCNC: 21 MMOL/L — LOW (ref 22–31)
CREAT SERPL-MCNC: 0.76 MG/DL — SIGNIFICANT CHANGE UP (ref 0.5–1.3)
GLUCOSE SERPL-MCNC: 116 MG/DL — HIGH (ref 70–99)
HCT VFR BLD CALC: 37.8 % — SIGNIFICANT CHANGE UP (ref 34.5–45)
HGB BLD-MCNC: 12.1 G/DL — SIGNIFICANT CHANGE UP (ref 11.5–15.5)
MAGNESIUM SERPL-MCNC: 2.1 MG/DL — SIGNIFICANT CHANGE UP (ref 1.6–2.6)
MCHC RBC-ENTMCNC: 31.2 PG — SIGNIFICANT CHANGE UP (ref 27–34)
MCHC RBC-ENTMCNC: 32 G/DL — SIGNIFICANT CHANGE UP (ref 32–36)
MCV RBC AUTO: 97.4 FL — SIGNIFICANT CHANGE UP (ref 80–100)
PHOSPHATE SERPL-MCNC: 1.4 MG/DL — LOW (ref 2.5–4.5)
PLATELET # BLD AUTO: 616 K/UL — HIGH (ref 150–400)
POTASSIUM SERPL-MCNC: 3.3 MMOL/L — LOW (ref 3.5–5.3)
POTASSIUM SERPL-SCNC: 3.3 MMOL/L — LOW (ref 3.5–5.3)
RBC # BLD: 3.88 M/UL — SIGNIFICANT CHANGE UP (ref 3.8–5.2)
RBC # FLD: 12.5 % — SIGNIFICANT CHANGE UP (ref 10.3–16.9)
SODIUM SERPL-SCNC: 140 MMOL/L — SIGNIFICANT CHANGE UP (ref 135–145)
WBC # BLD: 15 K/UL — HIGH (ref 3.8–10.5)
WBC # FLD AUTO: 15 K/UL — HIGH (ref 3.8–10.5)

## 2018-10-23 PROCEDURE — 99231 SBSQ HOSP IP/OBS SF/LOW 25: CPT

## 2018-10-23 PROCEDURE — 72196 MRI PELVIS W/DYE: CPT | Mod: 26

## 2018-10-23 RX ORDER — IOHEXOL 300 MG/ML
30 INJECTION, SOLUTION INTRAVENOUS ONCE
Qty: 0 | Refills: 0 | Status: COMPLETED | OUTPATIENT
Start: 2018-10-23 | End: 2018-10-23

## 2018-10-23 RX ORDER — SENNA PLUS 8.6 MG/1
2 TABLET ORAL AT BEDTIME
Qty: 0 | Refills: 0 | Status: DISCONTINUED | OUTPATIENT
Start: 2018-10-23 | End: 2018-10-26

## 2018-10-23 RX ORDER — POTASSIUM CHLORIDE 20 MEQ
40 PACKET (EA) ORAL ONCE
Qty: 0 | Refills: 0 | Status: COMPLETED | OUTPATIENT
Start: 2018-10-23 | End: 2018-10-23

## 2018-10-23 RX ORDER — POTASSIUM PHOSPHATE, MONOBASIC POTASSIUM PHOSPHATE, DIBASIC 236; 224 MG/ML; MG/ML
30 INJECTION, SOLUTION INTRAVENOUS ONCE
Qty: 0 | Refills: 0 | Status: COMPLETED | OUTPATIENT
Start: 2018-10-23 | End: 2018-10-23

## 2018-10-23 RX ORDER — LABETALOL HCL 100 MG
10 TABLET ORAL ONCE
Qty: 0 | Refills: 0 | Status: COMPLETED | OUTPATIENT
Start: 2018-10-23 | End: 2018-10-23

## 2018-10-23 RX ORDER — DOCUSATE SODIUM 100 MG
100 CAPSULE ORAL
Qty: 0 | Refills: 0 | Status: DISCONTINUED | OUTPATIENT
Start: 2018-10-23 | End: 2018-10-26

## 2018-10-23 RX ADMIN — PIPERACILLIN AND TAZOBACTAM 200 GRAM(S): 4; .5 INJECTION, POWDER, LYOPHILIZED, FOR SOLUTION INTRAVENOUS at 03:36

## 2018-10-23 RX ADMIN — IOHEXOL 30 MILLILITER(S): 300 INJECTION, SOLUTION INTRAVENOUS at 18:44

## 2018-10-23 RX ADMIN — Medication 10 MILLIGRAM(S): at 18:44

## 2018-10-23 RX ADMIN — PIPERACILLIN AND TAZOBACTAM 200 GRAM(S): 4; .5 INJECTION, POWDER, LYOPHILIZED, FOR SOLUTION INTRAVENOUS at 11:13

## 2018-10-23 RX ADMIN — POTASSIUM PHOSPHATE, MONOBASIC POTASSIUM PHOSPHATE, DIBASIC 85 MILLIMOLE(S): 236; 224 INJECTION, SOLUTION INTRAVENOUS at 13:44

## 2018-10-23 RX ADMIN — SODIUM CHLORIDE 100 MILLILITER(S): 9 INJECTION INTRAMUSCULAR; INTRAVENOUS; SUBCUTANEOUS at 21:13

## 2018-10-23 RX ADMIN — PANTOPRAZOLE SODIUM 40 MILLIGRAM(S): 20 TABLET, DELAYED RELEASE ORAL at 11:13

## 2018-10-23 RX ADMIN — PIPERACILLIN AND TAZOBACTAM 200 GRAM(S): 4; .5 INJECTION, POWDER, LYOPHILIZED, FOR SOLUTION INTRAVENOUS at 22:13

## 2018-10-23 RX ADMIN — Medication 40 MILLIEQUIVALENT(S): at 11:13

## 2018-10-23 RX ADMIN — PIPERACILLIN AND TAZOBACTAM 200 GRAM(S): 4; .5 INJECTION, POWDER, LYOPHILIZED, FOR SOLUTION INTRAVENOUS at 18:13

## 2018-10-23 NOTE — DISCHARGE NOTE ADULT - PATIENT PORTAL LINK FT
You can access the [facility] Patient Portal, offered by [facility], by registering with the following website: [email or web address]

## 2018-10-23 NOTE — PROGRESS NOTE ADULT - SUBJECTIVE AND OBJECTIVE BOX
24 hr events:  O/N: Tolerating CLD (<50% tray), -F/+BM, ambulating. Blood cx x2 and urine cx from 10/19 and 10/20 NGTD  10/22: Advanced to CLD, tolerating diet,     SUBJECTIVE:  No acute complaints. Pain improving. -F/+BM. Tolerating CLD, but did not have much.    Vital Signs Last 24 Hrs  T(C): 37.1 (23 Oct 2018 04:30), Max: 37.1 (23 Oct 2018 04:30)  T(F): 98.7 (23 Oct 2018 04:30), Max: 98.7 (23 Oct 2018 04:30)  HR: 72 (23 Oct 2018 04:53) (72 - 82)  BP: 138/63 (23 Oct 2018 04:53) (137/63 - 160/71)  BP(mean): 91 (23 Oct 2018 04:53) (91 - 102)  RR: 18 (23 Oct 2018 04:53) (18 - 19)  SpO2: 95% (23 Oct 2018 04:53) (95% - 98%)    Physical Exam:  General: NAD  Pulmonary: Nonlabored breathing, no respiratory distress  Abdominal: soft, NT/ND  Neuro: A/O x3    I&O's Summary  22 Oct 2018 07:01  -  23 Oct 2018 07:00  --------------------------------------------------------  IN: 1720 mL / OUT: 301 mL / NET: 1419 mL    LABS:                 11.7   13.6  )-----------( 565      ( 22 Oct 2018 06:08 )             37.1     10-22  142  |  103  |  21  ----------------------------<  70  3.9   |  22  |  0.99    Ca    8.0<L>      22 Oct 2018 06:08  Phos  2.1     10-22  Mg     2.3     10-22

## 2018-10-23 NOTE — DISCHARGE NOTE ADULT - CARE PLAN
Principal Discharge DX:	Pelvic mass  Goal:	Diagnosis and Recovery  Assessment and plan of treatment:	Please follow up with Dr. Loving next week. Call her office to schedule an appointment:  [phone]. Principal Discharge DX:	Pelvic mass  Goal:	Diagnosis and Recovery  Assessment and plan of treatment:	Please follow up with Dr. Loving next week. Call her office to schedule an appointment:  [phone]. At follow up appointment you will discuss scheduling a repeat colonoscopy.      Please follow up with your primary care provider within the next 2 weeks regarding your hospital stay and elevated blood pressure  Goal:	Medications at discharge  Assessment and plan of treatment:	You are being sent home with prescriptions for 2 new medications.   You are being sent home with a prescription for antibiotics (Augmentin 1 tablet by mouth twice a day), please take antibiotics for 10 days as prescribed.    You are being sent home with a prescription for amlodipine 2.5mg by mouth once a day. This is a blood pressure medication. It is very important that you follow up with your doctor regarding you high blood pressure Principal Discharge DX:	Pelvic mass  Goal:	Diagnosis and Recovery  Assessment and plan of treatment:	Please follow up with Dr. Loving next week. Call her office to schedule an appointment:  [phone]. At follow up appointment you will discuss scheduling a repeat colonoscopy.      Please follow up with your primary care provider within the next 2 weeks regarding your hospital stay and elevated blood pressure  Goal:	Medications at discharge  Assessment and plan of treatment:	You are being sent to rehab with prescriptions for 2 new medications.   You are being sent to rehab with a prescription for antibiotics (Augmentin 1 tablet by mouth twice a day), please take antibiotics for 10 days as prescribed.    You are being sent to rehab with a prescription for amlodipine 2.5mg by mouth once a day. This is a blood pressure medication. It is very important that you follow up with your doctor regarding you high blood pressure

## 2018-10-23 NOTE — PROGRESS NOTE ADULT - SUBJECTIVE AND OBJECTIVE BOX
Dr. Ruiz at bedside.  Images and labs reviewed with the patient.  Given history of rectal bleeding and pelvic MRI demonstrating suspected colonic diverticulitis/abscess involving the L adnexa, we defer management to the primary team.  As the patient has findings of a thickened endometrium on MRI, patient can follow up outpatient with Dr. Ruiz for an endometrial biopsy, particularly if the patient notes vaginal bleeding, which would be abnormal in a postmenopausal woman.  If conservative management fails and the patient requires surgery, please let gyn oncology know as we can facilitate concurrent biopsy during the procedure and assist to remove adnexa as necessary.  Gyn signing off at this time, please reconsult as necessary.

## 2018-10-23 NOTE — DISCHARGE NOTE ADULT - PLAN OF CARE
Diagnosis and Recovery Please follow up with Dr. Loving next week. Call her office to schedule an appointment:  [phone]. Please follow up with Dr. Loving next week. Call her office to schedule an appointment:  [phone]. At follow up appointment you will discuss scheduling a repeat colonoscopy.      Please follow up with your primary care provider within the next 2 weeks regarding your hospital stay and elevated blood pressure Medications at discharge You are being sent home with prescriptions for 2 new medications.   You are being sent home with a prescription for antibiotics (Augmentin 1 tablet by mouth twice a day), please take antibiotics for 10 days as prescribed.    You are being sent home with a prescription for amlodipine 2.5mg by mouth once a day. This is a blood pressure medication. It is very important that you follow up with your doctor regarding you high blood pressure You are being sent to rehab with prescriptions for 2 new medications.   You are being sent to rehab with a prescription for antibiotics (Augmentin 1 tablet by mouth twice a day), please take antibiotics for 10 days as prescribed.    You are being sent to rehab with a prescription for amlodipine 2.5mg by mouth once a day. This is a blood pressure medication. It is very important that you follow up with your doctor regarding you high blood pressure

## 2018-10-23 NOTE — DISCHARGE NOTE ADULT - HOSPITAL COURSE
Ms. Triana is a 86 F presented on the night of 10/19/18 with NB/NB vomiting x3 the prior night, accompanied by lightheadedness.  The patient felt subjectively worse the morning prior to presentation, but did not describe any specific symptoms.  She continued to pass flatus and bowel movement.  She denied any similar prior episodes, as well as any headache, change in vision, diarrhea, chest pain, shortness of breath, or palpitations.  Of note, she endorsed an unintentional 20lb weight loss over the last year. In the [facility] ED, she was afebrile and hemodynamically stable before transfer to [facility]. She was admitted for partial malignant SBO, An NGT was placed with 600 output. CT scan showed a large heterogeneous multilocular masslike structure with internal gas density within the vesicouterine pouch, with involvement of the adjacent small bowel. There is resultant proximal small bowel obstruction and a small amount of regional extraluminal gas density. Findings may represents a neoplastic process versus an abscess. Transvaginal ultrasound was performed and showed Normal uterus or adnexa were not visualized. Pelvic mass with possible fistulous communication to the bowel? Gynecological neoplasm. Her bowel obstruction resolved and on 10/21/18 her NGT was removed. Her bowel function returned and her diet was advanced. On             O/N: Tolerating CLD (<50% tray), -F/+BM, ambulating. Blood cx x2 and urine cx from 10/19 and 10/20 NGTD  10/22: Advanced to CLD, tolerating diet,   O/N: PIETRO, MRI not done  10/21: dc NGT, npo sips/chips, gyn refused transfer, mri ordered  O/N: NGT output 150, + minial flatus, + BM X2  10/20: started on PPI,  output, small amount of flatus, - BM  1L bolus for 50cc UOP at [facility], 1,200 Ms. Triana is a 86 F presented on the night of 10/19/18 with NB/NB vomiting x3 the prior night, accompanied by lightheadedness.  The patient felt subjectively worse the morning prior to presentation, but did not describe any specific symptoms.  She continued to pass flatus and bowel movement.  She denied any similar prior episodes, as well as any headache, change in vision, diarrhea, chest pain, shortness of breath, or palpitations.  Of note, she endorsed an unintentional 20lb weight loss over the last year. In the [facility] ED, she was afebrile and hemodynamically stable before transfer to [facility]. She was admitted for partial malignant SBO, An NGT was placed with 600 output. CT scan showed a large heterogeneous multilocular masslike structure with internal gas density within the vesicouterine pouch, with involvement of the adjacent small bowel. There is resultant proximal small bowel obstruction and a small amount of regional extraluminal gas density. Findings may represents a neoplastic process versus an abscess. Transvaginal ultrasound was performed and showed Normal uterus or adnexa were not visualized. Pelvic mass with possible fistulous communication to the bowel? Gynecological neoplasm. Her bowel obstruction resolved and on 10/21/18 her NGT was removed. Her bowel function returned and her diet was advanced. On 10/23/18 MRI of the pelvis was performed. Ms. Triana is a 86 F presented on the night of 10/19/18 with NB/NB vomiting x3 the prior night, accompanied by lightheadedness.  The patient felt subjectively worse the morning prior to presentation, but did not describe any specific symptoms.  She continued to pass flatus and bowel movement.  She denied any similar prior episodes, as well as any headache, change in vision, diarrhea, chest pain, shortness of breath, or palpitations.  Of note, she endorsed an unintentional 20lb weight loss over the last year. In the [facility] ED, she was afebrile and hemodynamically stable before transfer to [facility]. She was admitted for partial malignant SBO, An NGT was placed with 600 output. CT scan showed a large heterogeneous multilocular masslike structure with internal gas density within the vesicouterine pouch, with involvement of the adjacent small bowel. There is resultant proximal small bowel obstruction and a small amount of regional extraluminal gas density. Findings may represents a neoplastic process versus an abscess. Transvaginal ultrasound was performed and showed Normal uterus or adnexa were not visualized. Pelvic mass with possible fistulous communication to the bowel? Gynecological neoplasm. Her bowel obstruction resolved and on 10/21/18 her NGT was removed. Her bowel function returned and her diet was advanced. On 10/23/18 MRI of the pelvis was performed who showed diverticulitis with contained perforation. The patient was advanced to a clear liquid diet which was tolerated and the patient continued to have bowel function. The patient was then advanced to a low fiber diet which was tolerated. On day of discharge that patient is being sent home on Augmentin for 10 days and a new prescription for Amlodipine 2.5 mg daily. The patient is to follow up with Dr. Loving in 1-2 weeks and to follow up with her PCP

## 2018-10-23 NOTE — DISCHARGE NOTE ADULT - CARE PROVIDER_API CALL
Jaja Loving), Surgery; Surgical Oncology  [address]  [address]  [place] [address]  Phone: [phone]  Fax: [phone]

## 2018-10-23 NOTE — PROGRESS NOTE ADULT - ASSESSMENT
86F presents with partial SBO 2/2 to possible uterine mass invading small bowel    - CLD   - IVF, heplock once tolerating clears  - Serial abdominal exams  - Zosyn  - OYOVANA/SCDs  - Gyn consult, appreciate recs  - f/u MRI 86F presents with partial SBO 2/2 to possible uterine mass invading small bowel    - CLD   - IVF, heplock once tolerating clears  - Serial abdominal exams  - Zosyn  - DARINEL/SCDs  - Gyn consult, appreciate recs  - f/u MRI, CT triple phase, abdomen, pelvis

## 2018-10-23 NOTE — DISCHARGE NOTE ADULT - MEDICATION SUMMARY - MEDICATIONS TO TAKE
I will START or STAY ON the medications listed below when I get home from the hospital:  None I will START or STAY ON the medications listed below when I get home from the hospital:    amLODIPine 2.5 mg oral tablet  -- 1 tab(s) by mouth once a day   -- It is very important that you take or use this exactly as directed.  Do not skip doses or discontinue unless directed by your doctor.  Some non-prescription drugs may aggravate your condition.  Read all labels carefully.  If a warning appears, check with your doctor before taking.    -- Indication: For hypertension    amoxicillin-clavulanate 875 mg-125 mg oral tablet  -- 1 tab(s) by mouth 2 times a day   -- Finish all this medication unless otherwise directed by prescriber.  Take with food or milk.    -- Indication: For antibiotics

## 2018-10-24 LAB
ANION GAP SERPL CALC-SCNC: 13 MMOL/L — SIGNIFICANT CHANGE UP (ref 5–17)
BUN SERPL-MCNC: 5 MG/DL — LOW (ref 7–23)
CALCIUM SERPL-MCNC: 7.6 MG/DL — LOW (ref 8.4–10.5)
CHLORIDE SERPL-SCNC: 105 MMOL/L — SIGNIFICANT CHANGE UP (ref 96–108)
CO2 SERPL-SCNC: 20 MMOL/L — LOW (ref 22–31)
CREAT SERPL-MCNC: 0.71 MG/DL — SIGNIFICANT CHANGE UP (ref 0.5–1.3)
CULTURE RESULTS: SIGNIFICANT CHANGE UP
CULTURE RESULTS: SIGNIFICANT CHANGE UP
GLUCOSE SERPL-MCNC: 107 MG/DL — HIGH (ref 70–99)
HCT VFR BLD CALC: 35.7 % — SIGNIFICANT CHANGE UP (ref 34.5–45)
HGB BLD-MCNC: 11.5 G/DL — SIGNIFICANT CHANGE UP (ref 11.5–15.5)
MAGNESIUM SERPL-MCNC: 1.9 MG/DL — SIGNIFICANT CHANGE UP (ref 1.6–2.6)
MCHC RBC-ENTMCNC: 30.9 PG — SIGNIFICANT CHANGE UP (ref 27–34)
MCHC RBC-ENTMCNC: 32.2 G/DL — SIGNIFICANT CHANGE UP (ref 32–36)
MCV RBC AUTO: 96 FL — SIGNIFICANT CHANGE UP (ref 80–100)
PHOSPHATE SERPL-MCNC: 2 MG/DL — LOW (ref 2.5–4.5)
PLATELET # BLD AUTO: 506 K/UL — HIGH (ref 150–400)
POTASSIUM SERPL-MCNC: 3.3 MMOL/L — LOW (ref 3.5–5.3)
POTASSIUM SERPL-SCNC: 3.3 MMOL/L — LOW (ref 3.5–5.3)
RBC # BLD: 3.72 M/UL — LOW (ref 3.8–5.2)
RBC # FLD: 12.8 % — SIGNIFICANT CHANGE UP (ref 10.3–16.9)
SODIUM SERPL-SCNC: 138 MMOL/L — SIGNIFICANT CHANGE UP (ref 135–145)
SPECIMEN SOURCE: SIGNIFICANT CHANGE UP
SPECIMEN SOURCE: SIGNIFICANT CHANGE UP
WBC # BLD: 10.7 K/UL — HIGH (ref 3.8–10.5)
WBC # FLD AUTO: 10.7 K/UL — HIGH (ref 3.8–10.5)

## 2018-10-24 PROCEDURE — 99223 1ST HOSP IP/OBS HIGH 75: CPT | Mod: GC

## 2018-10-24 RX ORDER — AMLODIPINE BESYLATE 2.5 MG/1
2.5 TABLET ORAL DAILY
Qty: 0 | Refills: 0 | Status: DISCONTINUED | OUTPATIENT
Start: 2018-10-24 | End: 2018-10-26

## 2018-10-24 RX ORDER — POTASSIUM PHOSPHATE, MONOBASIC POTASSIUM PHOSPHATE, DIBASIC 236; 224 MG/ML; MG/ML
21 INJECTION, SOLUTION INTRAVENOUS ONCE
Qty: 0 | Refills: 0 | Status: COMPLETED | OUTPATIENT
Start: 2018-10-24 | End: 2018-10-24

## 2018-10-24 RX ADMIN — PIPERACILLIN AND TAZOBACTAM 200 GRAM(S): 4; .5 INJECTION, POWDER, LYOPHILIZED, FOR SOLUTION INTRAVENOUS at 04:51

## 2018-10-24 RX ADMIN — POTASSIUM PHOSPHATE, MONOBASIC POTASSIUM PHOSPHATE, DIBASIC 64.25 MILLIMOLE(S): 236; 224 INJECTION, SOLUTION INTRAVENOUS at 11:34

## 2018-10-24 RX ADMIN — PANTOPRAZOLE SODIUM 40 MILLIGRAM(S): 20 TABLET, DELAYED RELEASE ORAL at 11:09

## 2018-10-24 RX ADMIN — PIPERACILLIN AND TAZOBACTAM 200 GRAM(S): 4; .5 INJECTION, POWDER, LYOPHILIZED, FOR SOLUTION INTRAVENOUS at 10:22

## 2018-10-24 RX ADMIN — AMLODIPINE BESYLATE 2.5 MILLIGRAM(S): 2.5 TABLET ORAL at 12:22

## 2018-10-24 RX ADMIN — PIPERACILLIN AND TAZOBACTAM 200 GRAM(S): 4; .5 INJECTION, POWDER, LYOPHILIZED, FOR SOLUTION INTRAVENOUS at 18:13

## 2018-10-24 NOTE — PROGRESS NOTE ADULT - SUBJECTIVE AND OBJECTIVE BOX
SUBJECTIVE: Patient seen and examined bedside. Patient reports that her pain is controlled. Patient is having bowel movements.    piperacillin/tazobactam IVPB. 2.25 Gram(s) IV Intermittent every 6 hours      Vital Signs Last 24 Hrs  T(C): 37.4 (24 Oct 2018 05:05), Max: 37.4 (24 Oct 2018 05:05)  T(F): 99.3 (24 Oct 2018 05:05), Max: 99.3 (24 Oct 2018 05:05)  HR: 78 (24 Oct 2018 03:40) (72 - 84)  BP: 148/74 (24 Oct 2018 03:40) (133/82 - 197/84)  BP(mean): 105 (24 Oct 2018 03:40) (87 - 120)  RR: 20 (24 Oct 2018 03:40) (17 - 20)  SpO2: 96% (24 Oct 2018 03:40) (96% - 99%)  I&O's Detail    23 Oct 2018 07:01  -  24 Oct 2018 07:00  --------------------------------------------------------  IN:    IV PiggyBack: 100 mL    sodium chloride 0.9%.: 1300 mL  Total IN: 1400 mL    OUT:    Voided: 1650 mL  Total OUT: 1650 mL    Total NET: -250 mL          General: NAD, resting comfortably in bed  C/V: NSR  Pulm: Nonlabored breathing, no respiratory distress  Abd: soft, minimally distended, non-tender  Extrem: WWP, no edema, SCDs in place        LABS:                        11.5   10.7  )-----------( 506      ( 24 Oct 2018 07:31 )             35.7     10-24    138  |  105  |  5<L>  ----------------------------<  107<H>  3.3<L>   |  20<L>  |  0.71    Ca    7.6<L>      24 Oct 2018 07:31  Phos  2.0     10-24  Mg     1.9     10-24            RADIOLOGY & ADDITIONAL STUDIES:

## 2018-10-24 NOTE — PHYSICAL THERAPY INITIAL EVALUATION ADULT - ADDITIONAL COMMENTS
Patient reports that she lives in an elevator building with no steps to enter. Lives alone. Uses a Rollator for community ambulation and no assistive device in the apartment. Reports that she goes outside at least 1x daily.

## 2018-10-24 NOTE — PHYSICAL THERAPY INITIAL EVALUATION ADULT - CRITERIA FOR SKILLED THERAPEUTIC INTERVENTIONS
rehab potential/anticipated discharge recommendation/impairments found/therapy frequency/anticipated equipment needs at discharge

## 2018-10-24 NOTE — DIETITIAN INITIAL EVALUATION ADULT. - ENERGY NEEDS
Height: 5'2" Weight: 120 lbs,  lbs,  lbs+/-10%, %-109%, BMI 21.9,   UBW used to calculate EER as per pt's current body weight >120% of IBW  Estimated nutrient needs based on [facility] SOC for maintenance in older adults.

## 2018-10-24 NOTE — PROGRESS NOTE ADULT - ASSESSMENT
86F presents with partial SBO 2/2 to possible uterine mass invading small bowel    - NPO  - IVF  - Serial abdominal exams  - Zosyn  - DARINEL/SCDs  - Gyn consult

## 2018-10-24 NOTE — CONSULT NOTE ADULT - SUBJECTIVE AND OBJECTIVE BOX
HPI: 85 yo F w/ PMHx of likely HTN (not on antihypertensives) presented to [facility] pm 10/19/18 with NB/NB vomiting x3 and abdominal pain.  Pt also endorsed an unintentional 20lb weight loss over the last year. She was admitted for partial malignant SBO after a CT scan showed a large heterogeneous multilocular masslike structure with internal gas density within the vesicouterine pouch, with involvement of the adjacent small bowel. Subsequently, a transvaginal ultrasound was performed and showed a pelvic mass with possible fistulous communication to the bowel or gynecological neoplasm. Her bowel obstruction resolved and on 10/21/18. Her bowel function returned and her diet was advanced. Pt has remained HD stable.  Internal medicine was consulted as pt had  asymptomatic HTN w/ SBP in 160's-190's.  Pt currently reports feeling well.  Denies HA, changes in vision, or motor/sensory deficits.  Pt thinks that she may have had HTN in the past which did not require medication.  Last BM was yesterday.      12 point ROS otherwise negative.     PMHx: As above  PSHx: Denies  Meds: ASA 81 mg  Allergies: NKDA  Family Hx: Denies  Social: Lives by her self, previously worked in [employer] industry; smoked 2 ppd x 15 yrs - quit 25-30 yrs ago; denies illicit drug use, drinks 2-3 oz of scotch every night - no hx of withdrawal    [OBJECTIVE]:    Vital Signs:  T(F): 97.9 (10-24-18 @ 13:52), Max: 99.3 (10-24-18 @ 05:05)  HR: 86 (10-24-18 @ 09:10) (72 - 86)  BP: 170/76 (10-24-18 @ 09:10) (133/82 - 197/84)  BP(mean): 109 (10-24-18 @ 09:10) (87 - 120)  RR: 18 (10-24-18 @ 09:02) (17 - 20)  SpO2: 96% (10-24-18 @ 09:02) (96% - 98%)  Wt(kg): --  CVP(cm H2O): --      10-23 @ 07:01  -  10-24 @ 07:00  --------------------------------------------------------  IN: 1400 mL / OUT: 1650 mL / NET: -250 mL    10-24 @ 07:01  -  10-24 @ 15:16  --------------------------------------------------------  IN: 0 mL / OUT: 303 mL / NET: -303 mL      CAPILLARY BLOOD GLUCOSE          Physical Exam:    General: NAD, comfortable, pleasant  HEENT: PERRL/ EOMI, no scleral icterus, no ptosis, MMM, no JVD w/ negative hepatojugular reflex, no thyromegaly  Respiratory: Trace bibasilar ralesl, no wheezes or rhonchi  Cardiovascular: Regular, +S1 + S2  Abdomen: Soft, NTND, normoactive bowel sounds, no rebound, no guarding, no suprapubic tenderness  Extremities: No cyanosis, no clubbing, no edema, pulses equal, no calf tenderness  Skin: No rashes  Lymphatic: No cervical/supraclavicular LAD  Neurological: AO x 3, CN II-XII grossly intact, follows commands, moves all extremities    Medications:  MEDICATIONS  (STANDING):  amLODIPine   Tablet 2.5 milliGRAM(s) Oral daily  docusate sodium 100 milliGRAM(s) Oral two times a day  pantoprazole  Injectable 40 milliGRAM(s) IV Push daily  piperacillin/tazobactam IVPB. 2.25 Gram(s) IV Intermittent every 6 hours  senna 2 Tablet(s) Oral at bedtime  sodium chloride 0.9%. 1000 milliLiter(s) (50 mL/Hr) IV Continuous <Continuous>    MEDICATIONS  (PRN):  benzocaine 15 mG/menthol 3.6 mG Lozenge 1 Lozenge Oral four times a day PRN Sore Throat      Allergies    No Known Allergies    Intolerances        Labs:                        11.5   10.7  )-----------( 506      ( 24 Oct 2018 07:31 )             35.7     10-24    138  |  105  |  5<L>  ----------------------------<  107<H>  3.3<L>   |  20<L>  |  0.71    Ca    7.6<L>      24 Oct 2018 07:31  Phos  2.0     10-24  Mg     1.9     10-24      Radiology and other tests:    MR pelvis:     IMPRESSION:    9.1 cm multilobular pelvic abscess in the vesicouterine pouch   demonstrating possible fistulous connection to the distal jejunum and   sigmoid colon.     5.4 x 4.8 cm multiloculated complex collection in the left adnexa   contiguous with the sigmoid colon.    Abnormally thickened endometrium. Unclear if coexistent primary neoplasm   or secondary to endometritis.    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 Secondary   small    Small bowel obstruction secondary to pelvic inflammatory process.      EKG: NSR - no LVH or left axis deviation

## 2018-10-24 NOTE — CONSULT NOTE ADULT - ASSESSMENT
87 yo F w/ PMHx of likely HTN (not on antihypertensives) admitted for partial SBO 2/2 suspected malignant process.     #HTN: Likely iatrogenic as pt has been on standing fluids since admission, has crackles on exam. No evidence of end organ damage. Pt asymptomatic.   - Will start Norvasc 2.5 mg daily and monitor  - Can use IV 5 mg labetalol PRN for persistent SBP > 170's  - Can decrease NS from 100 to 50 cc/hr as pt tolerating PO    #Partial SBO 2/2 uterine mass.  Gynecology saw pt - recommend outpt f/u.   mildly elevated. Has been on zosyn since admission for suspected intrabdominal process as pt meeting SIRS criteria on presentation w/ leukocytosis and tachycardia.  Leukocytosis downtrending.  No fevers. Blood and Urine cx NGTD.  - As per primary    #Hypophosphatemia and Hypokalemia:   - S/p repletion  - Repeat in AM    #Thrombocytosis:   - Continue to monitor    DVT prophylaxis: SCDs

## 2018-10-24 NOTE — DIETITIAN INITIAL EVALUATION ADULT. - OTHER INFO
86 y.o M from home with no significant PMHx. Pt admitted for SBO. Pt cooks at home, eats 2 meals daily, endorses weight loss of 10 lbs x 1 year,  lbs. Weights recorded 120 lbs? Denies any recent change in appetite, NKFA. Pt is currently tolerating CLD well, drinks ginger ale and juices. Denies N/V/C or pain. +Loose BMs 10/23. (Colace, Senna). +Flatus. Skin intact. Lytes being repleted. CLD, ADAT. RD will f/u per protocol. Nutrition edu provided to pt on generally healthy eating to maintain weight and prevent weight loss if any.

## 2018-10-25 LAB
ANION GAP SERPL CALC-SCNC: 14 MMOL/L — SIGNIFICANT CHANGE UP (ref 5–17)
APTT BLD: 26.6 SEC — LOW (ref 27.5–37.4)
BUN SERPL-MCNC: 4 MG/DL — LOW (ref 7–23)
CALCIUM SERPL-MCNC: 7.9 MG/DL — LOW (ref 8.4–10.5)
CHLORIDE SERPL-SCNC: 107 MMOL/L — SIGNIFICANT CHANGE UP (ref 96–108)
CO2 SERPL-SCNC: 21 MMOL/L — LOW (ref 22–31)
CREAT SERPL-MCNC: 0.64 MG/DL — SIGNIFICANT CHANGE UP (ref 0.5–1.3)
GLUCOSE SERPL-MCNC: 95 MG/DL — SIGNIFICANT CHANGE UP (ref 70–99)
HCT VFR BLD CALC: 35.7 % — SIGNIFICANT CHANGE UP (ref 34.5–45)
HGB BLD-MCNC: 11.8 G/DL — SIGNIFICANT CHANGE UP (ref 11.5–15.5)
INR BLD: 1.25 — HIGH (ref 0.88–1.16)
MAGNESIUM SERPL-MCNC: 1.9 MG/DL — SIGNIFICANT CHANGE UP (ref 1.6–2.6)
MCHC RBC-ENTMCNC: 31.6 PG — SIGNIFICANT CHANGE UP (ref 27–34)
MCHC RBC-ENTMCNC: 33.1 G/DL — SIGNIFICANT CHANGE UP (ref 32–36)
MCV RBC AUTO: 95.5 FL — SIGNIFICANT CHANGE UP (ref 80–100)
PHOSPHATE SERPL-MCNC: 2.2 MG/DL — LOW (ref 2.5–4.5)
PLATELET # BLD AUTO: 556 K/UL — HIGH (ref 150–400)
POTASSIUM SERPL-MCNC: 3.2 MMOL/L — LOW (ref 3.5–5.3)
POTASSIUM SERPL-SCNC: 3.2 MMOL/L — LOW (ref 3.5–5.3)
PROTHROM AB SERPL-ACNC: 13.9 SEC — HIGH (ref 9.8–12.7)
RBC # BLD: 3.74 M/UL — LOW (ref 3.8–5.2)
RBC # FLD: 12.6 % — SIGNIFICANT CHANGE UP (ref 10.3–16.9)
SODIUM SERPL-SCNC: 142 MMOL/L — SIGNIFICANT CHANGE UP (ref 135–145)
WBC # BLD: 8.9 K/UL — SIGNIFICANT CHANGE UP (ref 3.8–10.5)
WBC # FLD AUTO: 8.9 K/UL — SIGNIFICANT CHANGE UP (ref 3.8–10.5)

## 2018-10-25 PROCEDURE — 99233 SBSQ HOSP IP/OBS HIGH 50: CPT | Mod: GC

## 2018-10-25 RX ORDER — SCOPALAMINE 1 MG/3D
1 PATCH, EXTENDED RELEASE TRANSDERMAL
Qty: 0 | Refills: 0 | Status: DISCONTINUED | OUTPATIENT
Start: 2018-10-25 | End: 2018-10-25

## 2018-10-25 RX ORDER — ACETAMINOPHEN 500 MG
1000 TABLET ORAL ONCE
Qty: 0 | Refills: 0 | Status: DISCONTINUED | OUTPATIENT
Start: 2018-10-25 | End: 2018-10-26

## 2018-10-25 RX ORDER — POTASSIUM CHLORIDE 20 MEQ
40 PACKET (EA) ORAL ONCE
Qty: 0 | Refills: 0 | Status: COMPLETED | OUTPATIENT
Start: 2018-10-25 | End: 2018-10-25

## 2018-10-25 RX ORDER — HEPARIN SODIUM 5000 [USP'U]/ML
5000 INJECTION INTRAVENOUS; SUBCUTANEOUS EVERY 8 HOURS
Qty: 0 | Refills: 0 | Status: DISCONTINUED | OUTPATIENT
Start: 2018-10-25 | End: 2018-10-26

## 2018-10-25 RX ORDER — METOCLOPRAMIDE HCL 10 MG
10 TABLET ORAL EVERY 6 HOURS
Qty: 0 | Refills: 0 | Status: DISCONTINUED | OUTPATIENT
Start: 2018-10-25 | End: 2018-10-25

## 2018-10-25 RX ORDER — SODIUM CHLORIDE 9 MG/ML
1000 INJECTION, SOLUTION INTRAVENOUS
Qty: 0 | Refills: 0 | Status: DISCONTINUED | OUTPATIENT
Start: 2018-10-25 | End: 2018-10-26

## 2018-10-25 RX ORDER — POTASSIUM PHOSPHATE, MONOBASIC POTASSIUM PHOSPHATE, DIBASIC 236; 224 MG/ML; MG/ML
15 INJECTION, SOLUTION INTRAVENOUS ONCE
Qty: 0 | Refills: 0 | Status: COMPLETED | OUTPATIENT
Start: 2018-10-25 | End: 2018-10-25

## 2018-10-25 RX ORDER — IPRATROPIUM/ALBUTEROL SULFATE 18-103MCG
3 AEROSOL WITH ADAPTER (GRAM) INHALATION ONCE
Qty: 0 | Refills: 0 | Status: COMPLETED | OUTPATIENT
Start: 2018-10-25 | End: 2018-10-25

## 2018-10-25 RX ADMIN — AMLODIPINE BESYLATE 2.5 MILLIGRAM(S): 2.5 TABLET ORAL at 06:25

## 2018-10-25 RX ADMIN — PANTOPRAZOLE SODIUM 40 MILLIGRAM(S): 20 TABLET, DELAYED RELEASE ORAL at 11:00

## 2018-10-25 RX ADMIN — Medication 3 MILLILITER(S): at 14:53

## 2018-10-25 RX ADMIN — PIPERACILLIN AND TAZOBACTAM 200 GRAM(S): 4; .5 INJECTION, POWDER, LYOPHILIZED, FOR SOLUTION INTRAVENOUS at 00:21

## 2018-10-25 RX ADMIN — HEPARIN SODIUM 5000 UNIT(S): 5000 INJECTION INTRAVENOUS; SUBCUTANEOUS at 13:00

## 2018-10-25 RX ADMIN — POTASSIUM PHOSPHATE, MONOBASIC POTASSIUM PHOSPHATE, DIBASIC 62.5 MILLIMOLE(S): 236; 224 INJECTION, SOLUTION INTRAVENOUS at 12:57

## 2018-10-25 RX ADMIN — PIPERACILLIN AND TAZOBACTAM 200 GRAM(S): 4; .5 INJECTION, POWDER, LYOPHILIZED, FOR SOLUTION INTRAVENOUS at 23:49

## 2018-10-25 RX ADMIN — SODIUM CHLORIDE 50 MILLILITER(S): 9 INJECTION, SOLUTION INTRAVENOUS at 23:50

## 2018-10-25 RX ADMIN — Medication 40 MILLIEQUIVALENT(S): at 12:53

## 2018-10-25 RX ADMIN — PIPERACILLIN AND TAZOBACTAM 200 GRAM(S): 4; .5 INJECTION, POWDER, LYOPHILIZED, FOR SOLUTION INTRAVENOUS at 06:25

## 2018-10-25 RX ADMIN — PIPERACILLIN AND TAZOBACTAM 200 GRAM(S): 4; .5 INJECTION, POWDER, LYOPHILIZED, FOR SOLUTION INTRAVENOUS at 17:57

## 2018-10-25 RX ADMIN — HEPARIN SODIUM 5000 UNIT(S): 5000 INJECTION INTRAVENOUS; SUBCUTANEOUS at 21:08

## 2018-10-25 RX ADMIN — PIPERACILLIN AND TAZOBACTAM 200 GRAM(S): 4; .5 INJECTION, POWDER, LYOPHILIZED, FOR SOLUTION INTRAVENOUS at 11:00

## 2018-10-25 RX ADMIN — Medication 100 MILLIGRAM(S): at 06:25

## 2018-10-25 RX ADMIN — SENNA PLUS 2 TABLET(S): 8.6 TABLET ORAL at 21:08

## 2018-10-25 RX ADMIN — SODIUM CHLORIDE 50 MILLILITER(S): 9 INJECTION, SOLUTION INTRAVENOUS at 10:59

## 2018-10-25 RX ADMIN — Medication 100 MILLIGRAM(S): at 17:57

## 2018-10-25 NOTE — PROGRESS NOTE ADULT - SUBJECTIVE AND OBJECTIVE BOX
Overnight Events: PIETRO.     Subjective: Patient seen and examined at bedside.  Pt has no complaints this morning, states that she overall feels better.  Had BM this morning, reports some pink stains when wiping, no melena.  Denies HA or CP.     12 point ROS otherwise negative.     [OBJECTIVE]:    Vital Signs:  T(F): 98.8 (10-25-18 @ 09:10), Max: 99 (10-24-18 @ 21:53)  HR: 76 (10-25-18 @ 08:09) (72 - 82)  BP: 164/75 (10-25-18 @ 08:09) (149/65 - 179/77)  BP(mean): 108 (10-25-18 @ 08:09) (94 - 111)  RR: 18 (10-25-18 @ 08:09) (16 - 18)  SpO2: 98% (10-25-18 @ 08:09) (95% - 98%)  Wt(kg): --  CVP(cm H2O): --      10-24 @ 07:01  -  10-25 @ 07:00  --------------------------------------------------------  IN: 600 mL / OUT: 2153 mL / NET: -1553 mL    10-25 @ 07:01  -  10-25 @ 11:31  --------------------------------------------------------  IN: 50 mL / OUT: 200 mL / NET: -150 mL    Physical Exam:  General: NAD, comfortable, pleasant  HEENT: PERRL/ EOMI, no scleral icterus, no ptosis, MMM, no JVD w/ negative hepatojugular reflex, no thyromegaly  Respiratory: Bibasilar rales, no wheezes or rhonchi  Cardiovascular: Regular, +S1 + S2  Abdomen: Soft, NTND, normoactive bowel sounds, no rebound, no guarding, no suprapubic tenderness  Extremities: No cyanosis, no clubbing, no edema, pulses equal, no calf tenderness  Skin: No rashes  Lymphatic: No cervical/supraclavicular LAD  Neurological: AO x 3, CN II-XII grossly intact, follows commands, moves all extremities    Medications:  MEDICATIONS  (STANDING):  acetaminophen  IVPB .. 1000 milliGRAM(s) IV Intermittent once  amLODIPine   Tablet 2.5 milliGRAM(s) Oral daily  dextrose 5% + sodium chloride 0.45%. 1000 milliLiter(s) (50 mL/Hr) IV Continuous <Continuous>  docusate sodium 100 milliGRAM(s) Oral two times a day  heparin  Injectable 5000 Unit(s) SubCutaneous every 8 hours  pantoprazole  Injectable 40 milliGRAM(s) IV Push daily  piperacillin/tazobactam IVPB. 2.25 Gram(s) IV Intermittent every 6 hours  potassium chloride    Tablet ER 40 milliEquivalent(s) Oral once  senna 2 Tablet(s) Oral at bedtime    MEDICATIONS  (PRN):  benzocaine 15 mG/menthol 3.6 mG Lozenge 1 Lozenge Oral four times a day PRN Sore Throat      Allergies    No Known Allergies    Intolerances        Labs:                        11.8   8.9   )-----------( 556      ( 25 Oct 2018 05:38 )             35.7     10-25    142  |  107  |  4<L>  ----------------------------<  95  3.2<L>   |  21<L>  |  0.64    Ca    7.9<L>      25 Oct 2018 05:38  Phos  2.2     10-25  Mg     1.9     10-25      PT/INR - ( 25 Oct 2018 05:38 )   PT: 13.9 sec;   INR: 1.25          PTT - ( 25 Oct 2018 05:38 )  PTT:26.6 sec

## 2018-10-25 NOTE — PROGRESS NOTE ADULT - ATTENDING COMMENTS
C/w amlodipine 2.5mg for HTN-will need to be continued upon discharge, rest as above C/w amlodipine 2.5mg for HTN-will need to be continued upon discharge  Can dc IVF's if tolerating PO  Rest as above

## 2018-10-25 NOTE — PROGRESS NOTE ADULT - ASSESSMENT
86F presents with partial SBO 2/2 to possible uterine mass invading small bowel    - LFD/IVF  - Serial abdominal exams  - Zosyn  - DARINEL/SCDs  - Gyn consult, appreciate recs  - Dispo: Home w/ Home PT, as per PT recs 86F presents with partial SBO 2/2 to possible uterine mass invading small bowel    - LRD, maintenance fluids  - Serial abdominal exams  - Zosyn  - OOBA/SCDs  - Gyn consult, appreciate recs  - Dispo: Home w/ Home PT, as per PT recs

## 2018-10-25 NOTE — PROVIDER CONTACT NOTE (OTHER) - ASSESSMENT
pt denies feeling SOB. pt v/s 96.9F 82 pulse 144/78 BP  17 Resp 99% room air. pt has audible expiratory wheeze.

## 2018-10-25 NOTE — PROGRESS NOTE ADULT - ASSESSMENT
85 yo F w/ PMHx of likely HTN (not on antihypertensives) admitted for partial SBO 2/2 suspected malignant process.     #HTN: Likely iatrogenic as pt has been on standing fluids since admission, has crackles on exam. No evidence of end organ damage. Pt asymptomatic.   - C/w Norvasc 2.5 mg daily and monitor, no need to increase dose for now  - Can use IV 5 mg labetalol PRN for persistent SBP > 170's    #Partial SBO 2/2 uterine mass.  Gynecology saw pt - recommend outpt f/u.   mildly elevated. Has been on zosyn since admission for suspected intrabdominal process as pt meeting SIRS criteria on presentation w/ leukocytosis and tachycardia.  Leukocytosis downtrending.  No fevers. Blood and Urine cx NGTD.  - As per primary    #Hypophosphatemia and Hypokalemia:   - S/p repletion  - Repeat in AM    #Thrombocytosis:   - Continue to monitor    DVT prophylaxis: SCDs

## 2018-10-25 NOTE — PROGRESS NOTE ADULT - SUBJECTIVE AND OBJECTIVE BOX
24 hr events:  O/N: Tolerating CLD, -F/+BM, ambulating. LFD in AM  10/24: no IR per Dr. Loving, advanced to CLD, medicine consult for HTN; started Amlodipine 2.5mg qd as per Medicine; decreased fluids; advanced to LRD    SUBJECTIVE:  No acute complaints. Denies pain. -F/+BM. Has been OOB.     Physical Exam:  General: NAD  Pulmonary: Nonlabored breathing, no respiratory distress  Abdominal: soft, NT/ND  Neuro: A/O x3    I&O's Summary  24 Oct 2018 07:01  -  25 Oct 2018 07:00  --------------------------------------------------------  IN: 500 mL / OUT: 2153 mL / NET: -1653 mL    LABS:                   11.8   8.9   )-----------( 556      ( 25 Oct 2018 05:38 )             35.7     10-25  142  |  107  |  4<L>  ----------------------------<  95  3.2<L>   |  21<L>  |  0.64    Ca    7.9<L>      25 Oct 2018 05:38  Phos  2.2     10-25  Mg     1.9     10-25    PT/INR - ( 25 Oct 2018 05:38 )   PT: 13.9 sec;   INR: 1.25     PTT - ( 25 Oct 2018 05:38 )  PTT:26.6 sec

## 2018-10-26 VITALS
HEART RATE: 86 BPM | DIASTOLIC BLOOD PRESSURE: 73 MMHG | RESPIRATION RATE: 17 BRPM | SYSTOLIC BLOOD PRESSURE: 137 MMHG | TEMPERATURE: 97 F | OXYGEN SATURATION: 97 %

## 2018-10-26 LAB
ANION GAP SERPL CALC-SCNC: 11 MMOL/L — SIGNIFICANT CHANGE UP (ref 5–17)
BUN SERPL-MCNC: 4 MG/DL — LOW (ref 7–23)
CALCIUM SERPL-MCNC: 7.8 MG/DL — LOW (ref 8.4–10.5)
CHLORIDE SERPL-SCNC: 106 MMOL/L — SIGNIFICANT CHANGE UP (ref 96–108)
CO2 SERPL-SCNC: 23 MMOL/L — SIGNIFICANT CHANGE UP (ref 22–31)
CREAT SERPL-MCNC: 0.72 MG/DL — SIGNIFICANT CHANGE UP (ref 0.5–1.3)
GLUCOSE SERPL-MCNC: 116 MG/DL — HIGH (ref 70–99)
HCT VFR BLD CALC: 37.7 % — SIGNIFICANT CHANGE UP (ref 34.5–45)
HGB BLD-MCNC: 12.6 G/DL — SIGNIFICANT CHANGE UP (ref 11.5–15.5)
MAGNESIUM SERPL-MCNC: 1.9 MG/DL — SIGNIFICANT CHANGE UP (ref 1.6–2.6)
MCHC RBC-ENTMCNC: 32.2 PG — SIGNIFICANT CHANGE UP (ref 27–34)
MCHC RBC-ENTMCNC: 33.4 G/DL — SIGNIFICANT CHANGE UP (ref 32–36)
MCV RBC AUTO: 96.4 FL — SIGNIFICANT CHANGE UP (ref 80–100)
PHOSPHATE SERPL-MCNC: 2.2 MG/DL — LOW (ref 2.5–4.5)
PLATELET # BLD AUTO: 557 K/UL — HIGH (ref 150–400)
POTASSIUM SERPL-MCNC: 3.4 MMOL/L — LOW (ref 3.5–5.3)
POTASSIUM SERPL-SCNC: 3.4 MMOL/L — LOW (ref 3.5–5.3)
RBC # BLD: 3.91 M/UL — SIGNIFICANT CHANGE UP (ref 3.8–5.2)
RBC # FLD: 13.1 % — SIGNIFICANT CHANGE UP (ref 10.3–16.9)
SODIUM SERPL-SCNC: 140 MMOL/L — SIGNIFICANT CHANGE UP (ref 135–145)
WBC # BLD: 12.3 K/UL — HIGH (ref 3.8–10.5)
WBC # FLD AUTO: 12.3 K/UL — HIGH (ref 3.8–10.5)

## 2018-10-26 PROCEDURE — 99233 SBSQ HOSP IP/OBS HIGH 50: CPT | Mod: GC

## 2018-10-26 RX ORDER — IPRATROPIUM/ALBUTEROL SULFATE 18-103MCG
3 AEROSOL WITH ADAPTER (GRAM) INHALATION EVERY 6 HOURS
Qty: 0 | Refills: 0 | Status: DISCONTINUED | OUTPATIENT
Start: 2018-10-26 | End: 2018-10-26

## 2018-10-26 RX ORDER — POTASSIUM PHOSPHATE, MONOBASIC POTASSIUM PHOSPHATE, DIBASIC 236; 224 MG/ML; MG/ML
15 INJECTION, SOLUTION INTRAVENOUS ONCE
Qty: 0 | Refills: 0 | Status: COMPLETED | OUTPATIENT
Start: 2018-10-26 | End: 2018-10-26

## 2018-10-26 RX ORDER — PANTOPRAZOLE SODIUM 20 MG/1
40 TABLET, DELAYED RELEASE ORAL DAILY
Qty: 0 | Refills: 0 | Status: DISCONTINUED | OUTPATIENT
Start: 2018-10-26 | End: 2018-10-26

## 2018-10-26 RX ORDER — POTASSIUM CHLORIDE 20 MEQ
20 PACKET (EA) ORAL ONCE
Qty: 0 | Refills: 0 | Status: COMPLETED | OUTPATIENT
Start: 2018-10-26 | End: 2018-10-26

## 2018-10-26 RX ORDER — AMLODIPINE BESYLATE 2.5 MG/1
1 TABLET ORAL
Qty: 30 | Refills: 0
Start: 2018-10-26 | End: 2018-11-24

## 2018-10-26 RX ADMIN — HEPARIN SODIUM 5000 UNIT(S): 5000 INJECTION INTRAVENOUS; SUBCUTANEOUS at 05:27

## 2018-10-26 RX ADMIN — AMLODIPINE BESYLATE 2.5 MILLIGRAM(S): 2.5 TABLET ORAL at 05:27

## 2018-10-26 RX ADMIN — HEPARIN SODIUM 5000 UNIT(S): 5000 INJECTION INTRAVENOUS; SUBCUTANEOUS at 13:59

## 2018-10-26 RX ADMIN — PIPERACILLIN AND TAZOBACTAM 200 GRAM(S): 4; .5 INJECTION, POWDER, LYOPHILIZED, FOR SOLUTION INTRAVENOUS at 05:27

## 2018-10-26 RX ADMIN — POTASSIUM PHOSPHATE, MONOBASIC POTASSIUM PHOSPHATE, DIBASIC 62.5 MILLIMOLE(S): 236; 224 INJECTION, SOLUTION INTRAVENOUS at 10:22

## 2018-10-26 RX ADMIN — PANTOPRAZOLE SODIUM 40 MILLIGRAM(S): 20 TABLET, DELAYED RELEASE ORAL at 12:38

## 2018-10-26 RX ADMIN — Medication 100 MILLIGRAM(S): at 05:27

## 2018-10-26 RX ADMIN — Medication 3 MILLILITER(S): at 05:27

## 2018-10-26 RX ADMIN — PIPERACILLIN AND TAZOBACTAM 200 GRAM(S): 4; .5 INJECTION, POWDER, LYOPHILIZED, FOR SOLUTION INTRAVENOUS at 12:35

## 2018-10-26 RX ADMIN — Medication 20 MILLIEQUIVALENT(S): at 08:03

## 2018-10-26 NOTE — PROGRESS NOTE ADULT - SUBJECTIVE AND OBJECTIVE BOX
Overnight Events: PIETRO.     Subjective: Patient seen and examined at bedside. Pt has no complaints.  Had BM this morning, denies melena/hematochezia.  No fevers, chills, abdominal pain, nausea.  Pt worried about ambulating by herself, would like to be re-evaluated by PT.     12 point ROS otherwise negative.     [OBJECTIVE]:    Vital Signs:  T(F): 97.5 (10-26-18 @ 08:37), Max: 98 (10-25-18 @ 17:09)  HR: 87 (10-26-18 @ 08:37) (77 - 87)  BP: 156/82 (10-26-18 @ 08:37) (138/73 - 156/82)  BP(mean): --  RR: 16 (10-26-18 @ 08:37) (16 - 17)  SpO2: 97% (10-26-18 @ 08:37) (96% - 99%)  Wt(kg): --  CVP(cm H2O): --      10-25 @ 07:01  -  10-26 @ 07:00  --------------------------------------------------------  IN: 873 mL / OUT: 1150 mL / NET: -277 mL    10-26 @ 07:01  -  10-26 @ 11:20  --------------------------------------------------------  IN: 465 mL / OUT: 300 mL / NET: 165 mL    Physical Exam:  General: NAD, comfortable, pleasant  HEENT: PERRL/ EOMI, no scleral icterus, no ptosis, MMM, no JVD w/ negative hepatojugular reflex, no thyromegaly  Respiratory: no rales, no wheezes or rhonchi; no respiratory distress  Cardiovascular: Regular, +S1 + S2  Abdomen: Soft, NTND, normoactive bowel sounds, no rebound, no guarding, no suprapubic tenderness  Extremities: No cyanosis, no clubbing, no edema, pulses equal, no calf tenderness  Skin: No rashes  Lymphatic: No cervical/supraclavicular LAD  Neurological: AO x 3, CN II-XII grossly intact, follows commands, moves all extremities    Medications:  MEDICATIONS  (STANDING):  acetaminophen  IVPB .. 1000 milliGRAM(s) IV Intermittent once  amLODIPine   Tablet 2.5 milliGRAM(s) Oral daily  dextrose 5% + sodium chloride 0.45%. 1000 milliLiter(s) (50 mL/Hr) IV Continuous <Continuous>  docusate sodium 100 milliGRAM(s) Oral two times a day  heparin  Injectable 5000 Unit(s) SubCutaneous every 8 hours  pantoprazole  Injectable 40 milliGRAM(s) IV Push daily  piperacillin/tazobactam IVPB. 2.25 Gram(s) IV Intermittent every 6 hours  senna 2 Tablet(s) Oral at bedtime    MEDICATIONS  (PRN):  ALBUTerol/ipratropium for Nebulization 3 milliLiter(s) Nebulizer every 6 hours PRN Wheezing  benzocaine 15 mG/menthol 3.6 mG Lozenge 1 Lozenge Oral four times a day PRN Sore Throat      Allergies    No Known Allergies    Intolerances        Labs:                        12.6   12.3  )-----------( 557      ( 26 Oct 2018 06:06 )             37.7     10-26    140  |  106  |  4<L>  ----------------------------<  116<H>  3.4<L>   |  23  |  0.72    Ca    7.8<L>      26 Oct 2018 06:06  Phos  2.2     10-26  Mg     1.9     10-26      PT/INR - ( 25 Oct 2018 05:38 )   PT: 13.9 sec;   INR: 1.25          PTT - ( 25 Oct 2018 05:38 )  PTT:26.6 sec

## 2018-10-26 NOTE — PROGRESS NOTE ADULT - SUBJECTIVE AND OBJECTIVE BOX
24 hr events:  O/N: Tolerating LFD, -F/+BM, ambulating. Duonebs PRN  10/25: Started HSQ, advanced to LRD and tolerating, fluids to D5 1/2NS, SDU. Duonebs x1.    SUBJECTIVE:  No acute complaints. Denies pain. -F/+BM. Tolerating diet. Ambulating.    Vital Signs Last 24 Hrs  T(C): 36.4 (26 Oct 2018 08:37), Max: 37.1 (25 Oct 2018 09:10)  T(F): 97.5 (26 Oct 2018 08:37), Max: 98.8 (25 Oct 2018 09:10)  HR: 87 (26 Oct 2018 08:37) (77 - 87)  BP: 156/82 (26 Oct 2018 08:37) (138/73 - 156/82)  BP(mean): --  RR: 16 (26 Oct 2018 08:37) (16 - 17)  SpO2: 97% (26 Oct 2018 08:37) (96% - 99%)    Physical Exam:  General: NAD  Pulmonary: Nonlabored breathing, no respiratory distress  Abdominal: soft, NT/ND  Neuro: A/O x3    I&O's Summary  25 Oct 2018 07:01  -  26 Oct 2018 07:00  --------------------------------------------------------  IN: 873 mL / OUT: 1150 mL / NET: -277 mL    LABS:                    12.6   12.3  )-----------( 557      ( 26 Oct 2018 06:06 )             37.7     10-26  140  |  106  |  4<L>  ----------------------------<  116<H>  3.4<L>   |  23  |  0.72    Ca    7.8<L>      26 Oct 2018 06:06  Phos  2.2     10-26  Mg     1.9     10-26      PT/INR - ( 25 Oct 2018 05:38 )   PT: 13.9 sec;   INR: 1.25     PTT - ( 25 Oct 2018 05:38 )  PTT:26.6 sec

## 2018-10-26 NOTE — PROGRESS NOTE ADULT - ASSESSMENT
87 yo F w/ PMHx of likely HTN (not on antihypertensives) admitted for partial SBO 2/2 suspected malignant process.     #HTN: Likely iatrogenic as pt has been on standing fluids since admission, has crackles on exam. No evidence of end organ damage. Pt asymptomatic. SBP in 140's-150's.   - C/w Norvasc 2.5 mg daily and monitor, no need to increase dose for now  - Can use IV 5 mg labetalol PRN for persistent SBP > 170's    #Partial SBO 2/2 uterine mass.  Gynecology saw pt - recommend outpt f/u.   mildly elevated. Has been on zosyn since admission for suspected intrabdominal process as pt meeting SIRS criteria on presentation w/ leukocytosis and tachycardia.  Leukocytosis downtrending.  No fevers. Blood and Urine cx NGTD.  - As per primary    #Leukocytosis: No fevers, pt already on abx.   - Continue to monitor    #Hypophosphatemia and Hypokalemia:   - S/p repletion  - Repeat in AM    #Thrombocytosis:   - Continue to monitor    DVT prophylaxis: SCDs 87 yo F w/ PMHx of likely HTN (not on antihypertensives) admitted for partial SBO 2/2 suspected malignant process.     #HTN: Likely iatrogenic as pt has been on standing fluids since admission, has crackles on exam. No evidence of end organ damage. Pt asymptomatic. SBP in 140's-150's.   - C/w Norvasc 2.5 mg daily and monitor, no need to increase dose for now  - Can use IV 5 mg labetalol PRN for persistent SBP > 170's    #Partial SBO 2/2 uterine mass.  Gynecology saw pt - recommend outpt f/u.   mildly elevated. Has been on zosyn since admission for suspected intrabdominal process as pt meeting SIRS criteria on presentation w/ leukocytosis and tachycardia.  Leukocytosis downtrending.  No fevers. Blood and Urine cx NGTD.  - As per primary    #Leukocytosis: No fevers, pt already on abx.   - Continue to monitor    #Hypophosphatemia and Hypokalemia:   - S/p repletion  - Repeat in AM    #Thrombocytosis:   - Continue to monitor    DVT prophylaxis: SCDs    PT re-evaluation

## 2018-10-26 NOTE — PROGRESS NOTE ADULT - ASSESSMENT
86F presents with partial SBO 2/2 to diverticulitis    - LFD  - IVF  - Serial abdominal exams  - Zosyn  - OYOVANA/SCDs  - Gyn consult  - Dietician consult  - Dispo: PT recs Home w/ Home PT

## 2018-11-01 DIAGNOSIS — E83.39 OTHER DISORDERS OF PHOSPHORUS METABOLISM: ICD-10-CM

## 2018-11-01 DIAGNOSIS — K63.2 FISTULA OF INTESTINE: ICD-10-CM

## 2018-11-01 DIAGNOSIS — K57.40 DIVERTICULITIS OF BOTH SMALL AND LARGE INTESTINE WITH PERFORATION AND ABSCESS WITHOUT BLEEDING: ICD-10-CM

## 2018-11-01 DIAGNOSIS — I10 ESSENTIAL (PRIMARY) HYPERTENSION: ICD-10-CM

## 2018-11-01 DIAGNOSIS — Z87.891 PERSONAL HISTORY OF NICOTINE DEPENDENCE: ICD-10-CM

## 2018-11-01 DIAGNOSIS — E87.6 HYPOKALEMIA: ICD-10-CM

## 2018-11-01 DIAGNOSIS — E44.0 MODERATE PROTEIN-CALORIE MALNUTRITION: ICD-10-CM

## 2018-11-01 DIAGNOSIS — D47.3 ESSENTIAL (HEMORRHAGIC) THROMBOCYTHEMIA: ICD-10-CM

## 2018-11-01 DIAGNOSIS — N73.9 FEMALE PELVIC INFLAMMATORY DISEASE, UNSPECIFIED: ICD-10-CM

## 2018-11-01 DIAGNOSIS — K56.600 PARTIAL INTESTINAL OBSTRUCTION, UNSPECIFIED AS TO CAUSE: ICD-10-CM

## 2018-11-01 DIAGNOSIS — K56.609 UNSPECIFIED INTESTINAL OBSTRUCTION, UNSPECIFIED AS TO PARTIAL VERSUS COMPLETE OBSTRUCTION: ICD-10-CM

## 2018-11-11 PROCEDURE — 87086 URINE CULTURE/COLONY COUNT: CPT

## 2018-11-11 PROCEDURE — 72196 MRI PELVIS W/DYE: CPT

## 2018-11-11 PROCEDURE — 76830 TRANSVAGINAL US NON-OB: CPT

## 2018-11-11 PROCEDURE — 82803 BLOOD GASES ANY COMBINATION: CPT

## 2018-11-11 PROCEDURE — 83605 ASSAY OF LACTIC ACID: CPT

## 2018-11-11 PROCEDURE — 74176 CT ABD & PELVIS W/O CONTRAST: CPT

## 2018-11-11 PROCEDURE — 84100 ASSAY OF PHOSPHORUS: CPT

## 2018-11-11 PROCEDURE — 83735 ASSAY OF MAGNESIUM: CPT

## 2018-11-11 PROCEDURE — 83690 ASSAY OF LIPASE: CPT

## 2018-11-11 PROCEDURE — 43753 TX GASTRO INTUB W/ASP: CPT

## 2018-11-11 PROCEDURE — A9585: CPT

## 2018-11-11 PROCEDURE — 84484 ASSAY OF TROPONIN QUANT: CPT

## 2018-11-11 PROCEDURE — 71250 CT THORAX DX C-: CPT

## 2018-11-11 PROCEDURE — 81001 URINALYSIS AUTO W/SCOPE: CPT

## 2018-11-11 PROCEDURE — 82378 CARCINOEMBRYONIC ANTIGEN: CPT

## 2018-11-11 PROCEDURE — 85730 THROMBOPLASTIN TIME PARTIAL: CPT

## 2018-11-11 PROCEDURE — 86301 IMMUNOASSAY TUMOR CA 19-9: CPT

## 2018-11-11 PROCEDURE — 97161 PT EVAL LOW COMPLEX 20 MIN: CPT

## 2018-11-11 PROCEDURE — 86304 IMMUNOASSAY TUMOR CA 125: CPT

## 2018-11-11 PROCEDURE — 85025 COMPLETE CBC W/AUTO DIFF WBC: CPT

## 2018-11-11 PROCEDURE — 87040 BLOOD CULTURE FOR BACTERIA: CPT

## 2018-11-11 PROCEDURE — 94640 AIRWAY INHALATION TREATMENT: CPT

## 2018-11-11 PROCEDURE — 80053 COMPREHEN METABOLIC PANEL: CPT

## 2018-11-11 PROCEDURE — 83880 ASSAY OF NATRIURETIC PEPTIDE: CPT

## 2018-11-11 PROCEDURE — 85027 COMPLETE CBC AUTOMATED: CPT

## 2018-11-11 PROCEDURE — 71045 X-RAY EXAM CHEST 1 VIEW: CPT

## 2018-11-11 PROCEDURE — 93005 ELECTROCARDIOGRAM TRACING: CPT | Mod: XU

## 2018-11-11 PROCEDURE — 97116 GAIT TRAINING THERAPY: CPT

## 2018-11-11 PROCEDURE — 80048 BASIC METABOLIC PNL TOTAL CA: CPT

## 2018-11-11 PROCEDURE — 85610 PROTHROMBIN TIME: CPT

## 2018-11-11 PROCEDURE — 96375 TX/PRO/DX INJ NEW DRUG ADDON: CPT | Mod: XU

## 2018-11-11 PROCEDURE — 36415 COLL VENOUS BLD VENIPUNCTURE: CPT

## 2018-11-11 PROCEDURE — 99285 EMERGENCY DEPT VISIT HI MDM: CPT | Mod: 25

## 2018-11-11 PROCEDURE — 96361 HYDRATE IV INFUSION ADD-ON: CPT

## 2018-11-11 PROCEDURE — 96365 THER/PROPH/DIAG IV INF INIT: CPT | Mod: XU

## 2018-12-07 NOTE — PATIENT PROFILE ADULT - INFLUENZA IMMUNIZATION DATE (APPROXIMATE)
Alena Farrell, palliative care nurse practitioner from [facility] in [facility] is calling regarding patient's care plan with Dr. Cally Deshpande. Alena Farrell states that she would like to know if pain management is in patient's care plan because patient and her family are calling asking for help with pain management here at the clinic in [facility]. Please call Alena Farrell to discuss. 09-Oct-2018

## 2018-12-21 NOTE — DISCHARGE NOTE ADULT - NSFTFSTATUSABRD_GEN_ALL_CORE
Addended by: KERRI CM on: 12/21/2018 01:13 PM     Modules accepted: Orders    
PATIENT NEEDS ASSISTANCE TO LEAVE RESIDENCE:

## 2019-06-30 ENCOUNTER — EMERGENCY (EMERGENCY)
Facility: HOSPITAL | Age: 84
LOS: 1 days | Discharge: HOME | End: 2019-06-30
Attending: EMERGENCY MEDICINE | Admitting: EMERGENCY MEDICINE
Payer: MEDICARE

## 2019-06-30 VITALS
RESPIRATION RATE: 16 BRPM | DIASTOLIC BLOOD PRESSURE: 65 MMHG | SYSTOLIC BLOOD PRESSURE: 144 MMHG | OXYGEN SATURATION: 97 % | TEMPERATURE: 98 F | HEART RATE: 65 BPM

## 2019-06-30 VITALS
RESPIRATION RATE: 18 BRPM | HEART RATE: 76 BPM | DIASTOLIC BLOOD PRESSURE: 69 MMHG | SYSTOLIC BLOOD PRESSURE: 129 MMHG | OXYGEN SATURATION: 95 % | TEMPERATURE: 97 F

## 2019-06-30 DIAGNOSIS — M54.5 LOW BACK PAIN: ICD-10-CM

## 2019-06-30 DIAGNOSIS — Z98.890 OTHER SPECIFIED POSTPROCEDURAL STATES: Chronic | ICD-10-CM

## 2019-06-30 LAB
ALBUMIN SERPL ELPH-MCNC: 3 G/DL — LOW (ref 3.4–5)
ALP SERPL-CCNC: 82 U/L — SIGNIFICANT CHANGE UP (ref 40–120)
ALT FLD-CCNC: 14 U/L — SIGNIFICANT CHANGE UP (ref 12–42)
ANION GAP SERPL CALC-SCNC: 8 MMOL/L — LOW (ref 9–16)
APPEARANCE UR: CLEAR — SIGNIFICANT CHANGE UP
AST SERPL-CCNC: 15 U/L — SIGNIFICANT CHANGE UP (ref 15–37)
BASOPHILS NFR BLD AUTO: 0.2 % — SIGNIFICANT CHANGE UP (ref 0–2)
BILIRUB SERPL-MCNC: 0.4 MG/DL — SIGNIFICANT CHANGE UP (ref 0.2–1.2)
BILIRUB UR-MCNC: NEGATIVE — SIGNIFICANT CHANGE UP
BUN SERPL-MCNC: 23 MG/DL — SIGNIFICANT CHANGE UP (ref 7–23)
CALCIUM SERPL-MCNC: 8.9 MG/DL — SIGNIFICANT CHANGE UP (ref 8.5–10.5)
CHLORIDE SERPL-SCNC: 108 MMOL/L — SIGNIFICANT CHANGE UP (ref 96–108)
CO2 SERPL-SCNC: 29 MMOL/L — SIGNIFICANT CHANGE UP (ref 22–31)
COLOR SPEC: YELLOW — SIGNIFICANT CHANGE UP
CREAT SERPL-MCNC: 0.98 MG/DL — SIGNIFICANT CHANGE UP (ref 0.5–1.3)
DIFF PNL FLD: NEGATIVE — SIGNIFICANT CHANGE UP
EOSINOPHIL NFR BLD AUTO: 1.5 % — SIGNIFICANT CHANGE UP (ref 0–6)
GLUCOSE SERPL-MCNC: 109 MG/DL — HIGH (ref 70–99)
GLUCOSE UR QL: NEGATIVE — SIGNIFICANT CHANGE UP
HCT VFR BLD CALC: 42.8 % — SIGNIFICANT CHANGE UP (ref 34.5–45)
HGB BLD-MCNC: 14.2 G/DL — SIGNIFICANT CHANGE UP (ref 11.5–15.5)
IMM GRANULOCYTES NFR BLD AUTO: 0.7 % — SIGNIFICANT CHANGE UP (ref 0–1.5)
KETONES UR-MCNC: ABNORMAL MG/DL
LEUKOCYTE ESTERASE UR-ACNC: NEGATIVE — SIGNIFICANT CHANGE UP
LYMPHOCYTES # BLD AUTO: 21.4 % — SIGNIFICANT CHANGE UP (ref 13–44)
MCHC RBC-ENTMCNC: 32.9 PG — SIGNIFICANT CHANGE UP (ref 27–34)
MCHC RBC-ENTMCNC: 33.2 G/DL — SIGNIFICANT CHANGE UP (ref 32–36)
MCV RBC AUTO: 99.3 FL — SIGNIFICANT CHANGE UP (ref 80–100)
MONOCYTES NFR BLD AUTO: 7.2 % — SIGNIFICANT CHANGE UP (ref 2–14)
NEUTROPHILS NFR BLD AUTO: 69 % — SIGNIFICANT CHANGE UP (ref 43–77)
NITRITE UR-MCNC: NEGATIVE — SIGNIFICANT CHANGE UP
PH UR: 6 — SIGNIFICANT CHANGE UP (ref 5–8)
PLATELET # BLD AUTO: 316 K/UL — SIGNIFICANT CHANGE UP (ref 150–400)
POTASSIUM SERPL-MCNC: 3.8 MMOL/L — SIGNIFICANT CHANGE UP (ref 3.5–5.3)
POTASSIUM SERPL-SCNC: 3.8 MMOL/L — SIGNIFICANT CHANGE UP (ref 3.5–5.3)
PROT SERPL-MCNC: 6.7 G/DL — SIGNIFICANT CHANGE UP (ref 6.4–8.2)
PROT UR-MCNC: NEGATIVE MG/DL — SIGNIFICANT CHANGE UP
RBC # BLD: 4.31 M/UL — SIGNIFICANT CHANGE UP (ref 3.8–5.2)
RBC # FLD: 11.6 % — SIGNIFICANT CHANGE UP (ref 10.3–14.5)
SODIUM SERPL-SCNC: 145 MMOL/L — SIGNIFICANT CHANGE UP (ref 132–145)
SP GR SPEC: 1.02 — SIGNIFICANT CHANGE UP (ref 1–1.03)
UROBILINOGEN FLD QL: 1 E.U./DL — SIGNIFICANT CHANGE UP
WBC # BLD: 8.4 K/UL — SIGNIFICANT CHANGE UP (ref 3.8–10.5)
WBC # FLD AUTO: 8.4 K/UL — SIGNIFICANT CHANGE UP (ref 3.8–10.5)

## 2019-06-30 PROCEDURE — 99284 EMERGENCY DEPT VISIT MOD MDM: CPT

## 2019-06-30 RX ORDER — KETOROLAC TROMETHAMINE 30 MG/ML
15 SYRINGE (ML) INJECTION ONCE
Refills: 0 | Status: DISCONTINUED | OUTPATIENT
Start: 2019-06-30 | End: 2019-06-30

## 2019-06-30 RX ORDER — IBUPROFEN 200 MG
1 TABLET ORAL
Qty: 20 | Refills: 0
Start: 2019-06-30 | End: 2019-07-04

## 2019-06-30 RX ADMIN — Medication 15 MILLIGRAM(S): at 13:08

## 2019-06-30 NOTE — ED PROVIDER NOTE - OBJECTIVE STATEMENT
88 yo woman presents with non progressive lower back pain x 2 days. no trauma or known injury. pt using walker to ambulate.     no weakness or numbness to lower extremities. no change in sensation to groin area. no bowel bladder incontinence.   no cp no sob no abd pain. no fever

## 2019-06-30 NOTE — ED PROVIDER NOTE - CLINICAL SUMMARY MEDICAL DECISION MAKING FREE TEXT BOX
The patient's symptoms progressively improved throughout the ED stay.  ED evaluation and management discussed with the patient and family (if available) in detail.  Close PMD and/or specialist follow up encouraged.  Strict ED return instructions discussed in detail for any worsening or new symptoms. The patient was given the opportunity to ask any questions about their discharge diagnosis and discharge instructions.  The patient verbalized understanding of these instructions and need to return to the ED for any worsening of illness or for any concern. The patient understands Emergency Department diagnosis is a preliminary diagnosis often based on limited information and that the patient must adhere to the follow-up plan as discussed. The patient tolerated PO fluids.  At the time of discharge from the Emergency Department, the patient is alert with fluent appropriate speech and ambulatory without difficulty.  A medical screening examination was performed and no emergency medical condition was identified.     uncomplicated lower musculoskeletal back pain

## 2019-06-30 NOTE — ED PROVIDER NOTE - NSFOLLOWUPINSTRUCTIONS_ED_ALL_ED_FT
ibuprofen for pain     take all other medications as directed    follow up with your doctor as needed return to the ER if your symptoms worsen or for any other concern

## 2019-06-30 NOTE — ED PROVIDER NOTE - MUSCULOSKELETAL, MLM
No cervical, thoracic ot lumbar spine tenderness to percussion or palpation. Flexion/extension of spine without pain.  + right lower soft tissue tenderness no erythema no rash no swelling

## 2019-08-19 ENCOUNTER — EMERGENCY (EMERGENCY)
Facility: HOSPITAL | Age: 84
LOS: 1 days | Discharge: HOME | End: 2019-08-19
Attending: EMERGENCY MEDICINE | Admitting: EMERGENCY MEDICINE
Payer: MEDICARE

## 2019-08-19 VITALS
SYSTOLIC BLOOD PRESSURE: 126 MMHG | HEART RATE: 89 BPM | OXYGEN SATURATION: 95 % | WEIGHT: 100.09 LBS | DIASTOLIC BLOOD PRESSURE: 69 MMHG | RESPIRATION RATE: 16 BRPM | TEMPERATURE: 98 F

## 2019-08-19 VITALS
DIASTOLIC BLOOD PRESSURE: 69 MMHG | HEART RATE: 84 BPM | SYSTOLIC BLOOD PRESSURE: 116 MMHG | OXYGEN SATURATION: 99 % | RESPIRATION RATE: 17 BRPM

## 2019-08-19 DIAGNOSIS — Z98.890 OTHER SPECIFIED POSTPROCEDURAL STATES: Chronic | ICD-10-CM

## 2019-08-19 LAB
ALBUMIN SERPL ELPH-MCNC: 3.5 G/DL — SIGNIFICANT CHANGE UP (ref 3.4–5)
ALP SERPL-CCNC: 95 U/L — SIGNIFICANT CHANGE UP (ref 40–120)
ALT FLD-CCNC: 17 U/L — SIGNIFICANT CHANGE UP (ref 12–42)
ANION GAP SERPL CALC-SCNC: 12 MMOL/L — SIGNIFICANT CHANGE UP (ref 9–16)
APPEARANCE UR: CLEAR — SIGNIFICANT CHANGE UP
AST SERPL-CCNC: 33 U/L — SIGNIFICANT CHANGE UP (ref 15–37)
BASOPHILS NFR BLD AUTO: 0.2 % — SIGNIFICANT CHANGE UP (ref 0–2)
BILIRUB SERPL-MCNC: 0.9 MG/DL — SIGNIFICANT CHANGE UP (ref 0.2–1.2)
BILIRUB UR-MCNC: NEGATIVE — SIGNIFICANT CHANGE UP
BUN SERPL-MCNC: 25 MG/DL — HIGH (ref 7–23)
CALCIUM SERPL-MCNC: 9.4 MG/DL — SIGNIFICANT CHANGE UP (ref 8.5–10.5)
CHLORIDE SERPL-SCNC: 105 MMOL/L — SIGNIFICANT CHANGE UP (ref 96–108)
CK MB BLD-MCNC: 1.8 % — SIGNIFICANT CHANGE UP
CK MB CFR SERPL CALC: 6.8 NG/ML — HIGH (ref 0.5–3.6)
CO2 SERPL-SCNC: 26 MMOL/L — SIGNIFICANT CHANGE UP (ref 22–31)
COLOR SPEC: YELLOW — SIGNIFICANT CHANGE UP
CREAT SERPL-MCNC: 1 MG/DL — SIGNIFICANT CHANGE UP (ref 0.5–1.3)
DIFF PNL FLD: ABNORMAL
EOSINOPHIL NFR BLD AUTO: 0.1 % — SIGNIFICANT CHANGE UP (ref 0–6)
GLUCOSE SERPL-MCNC: 117 MG/DL — HIGH (ref 70–99)
GLUCOSE UR QL: NEGATIVE — SIGNIFICANT CHANGE UP
HCT VFR BLD CALC: 46.5 % — HIGH (ref 34.5–45)
HGB BLD-MCNC: 15.7 G/DL — HIGH (ref 11.5–15.5)
IMM GRANULOCYTES NFR BLD AUTO: 0.4 % — SIGNIFICANT CHANGE UP (ref 0–1.5)
KETONES UR-MCNC: 40 MG/DL
LEUKOCYTE ESTERASE UR-ACNC: NEGATIVE — SIGNIFICANT CHANGE UP
LYMPHOCYTES # BLD AUTO: 9.6 % — LOW (ref 13–44)
MCHC RBC-ENTMCNC: 32 PG — SIGNIFICANT CHANGE UP (ref 27–34)
MCHC RBC-ENTMCNC: 33.8 G/DL — SIGNIFICANT CHANGE UP (ref 32–36)
MCV RBC AUTO: 94.9 FL — SIGNIFICANT CHANGE UP (ref 80–100)
MONOCYTES NFR BLD AUTO: 5.6 % — SIGNIFICANT CHANGE UP (ref 2–14)
NEUTROPHILS NFR BLD AUTO: 84.1 % — HIGH (ref 43–77)
NITRITE UR-MCNC: NEGATIVE — SIGNIFICANT CHANGE UP
PH UR: 6.5 — SIGNIFICANT CHANGE UP (ref 5–8)
PLATELET # BLD AUTO: 386 K/UL — SIGNIFICANT CHANGE UP (ref 150–400)
POTASSIUM SERPL-MCNC: 4.4 MMOL/L — SIGNIFICANT CHANGE UP (ref 3.5–5.3)
POTASSIUM SERPL-SCNC: 4.4 MMOL/L — SIGNIFICANT CHANGE UP (ref 3.5–5.3)
PROT SERPL-MCNC: 7.7 G/DL — SIGNIFICANT CHANGE UP (ref 6.4–8.2)
PROT UR-MCNC: ABNORMAL MG/DL
RBC # BLD: 4.9 M/UL — SIGNIFICANT CHANGE UP (ref 3.8–5.2)
RBC # FLD: 11.9 % — SIGNIFICANT CHANGE UP (ref 10.3–14.5)
SODIUM SERPL-SCNC: 143 MMOL/L — SIGNIFICANT CHANGE UP (ref 132–145)
SP GR SPEC: 1.01 — SIGNIFICANT CHANGE UP (ref 1–1.03)
TROPONIN I SERPL-MCNC: 0.07 NG/ML — HIGH (ref 0.02–0.06)
UROBILINOGEN FLD QL: 0.2 E.U./DL — SIGNIFICANT CHANGE UP
WBC # BLD: 13.5 K/UL — HIGH (ref 3.8–10.5)
WBC # FLD AUTO: 13.5 K/UL — HIGH (ref 3.8–10.5)

## 2019-08-19 PROCEDURE — 71045 X-RAY EXAM CHEST 1 VIEW: CPT | Mod: 26

## 2019-08-19 PROCEDURE — 72128 CT CHEST SPINE W/O DYE: CPT | Mod: 26

## 2019-08-19 PROCEDURE — 93010 ELECTROCARDIOGRAM REPORT: CPT

## 2019-08-19 PROCEDURE — 70450 CT HEAD/BRAIN W/O DYE: CPT | Mod: 26

## 2019-08-19 PROCEDURE — 99284 EMERGENCY DEPT VISIT MOD MDM: CPT

## 2019-08-19 PROCEDURE — 72131 CT LUMBAR SPINE W/O DYE: CPT | Mod: 26

## 2019-08-19 RX ORDER — ASPIRIN/CALCIUM CARB/MAGNESIUM 324 MG
325 TABLET ORAL ONCE
Refills: 0 | Status: COMPLETED | OUTPATIENT
Start: 2019-08-19 | End: 2019-08-19

## 2019-08-19 RX ORDER — IBUPROFEN 200 MG
600 TABLET ORAL ONCE
Refills: 0 | Status: COMPLETED | OUTPATIENT
Start: 2019-08-19 | End: 2019-08-19

## 2019-08-19 RX ORDER — MORPHINE SULFATE 50 MG/1
4 CAPSULE, EXTENDED RELEASE ORAL ONCE
Refills: 0 | Status: DISCONTINUED | OUTPATIENT
Start: 2019-08-19 | End: 2019-08-19

## 2019-08-19 RX ORDER — ASPIRIN/CALCIUM CARB/MAGNESIUM 324 MG
325 TABLET ORAL ONCE
Refills: 0 | Status: DISCONTINUED | OUTPATIENT
Start: 2019-08-19 | End: 2019-08-19

## 2019-08-19 RX ORDER — IBUPROFEN 200 MG
600 TABLET ORAL ONCE
Refills: 0 | Status: DISCONTINUED | OUTPATIENT
Start: 2019-08-19 | End: 2019-08-19

## 2019-08-19 RX ADMIN — Medication 325 MILLIGRAM(S): at 15:45

## 2019-08-19 RX ADMIN — MORPHINE SULFATE 4 MILLIGRAM(S): 50 CAPSULE, EXTENDED RELEASE ORAL at 15:45

## 2019-08-19 RX ADMIN — Medication 600 MILLIGRAM(S): at 14:57

## 2019-08-19 NOTE — ED PROVIDER NOTE - CLINICAL SUMMARY MEDICAL DECISION MAKING FREE TEXT BOX
Patient PMHX chronic lower back pain uses rolling walker BIB EMS found on the floor after falling over 12 hours ago, unable to get up due to pain and weakness.  no paresthesias or focal weakness, no paresthesias.  found to have acute L3fx 25% loss of height and L4 compression fx (60% loss of height) rhabdomyelosis, elevated troponin in setting of normal ekg. will require admission/transfer

## 2019-08-19 NOTE — ED PROVIDER NOTE - PHYSICAL EXAMINATION
point spinal tenderness over T7, L3-L5, no step off no ecchymosis    skin intact ecchymosis over the L arm and L leg

## 2019-08-19 NOTE — ED PROVIDER NOTE - OBJECTIVE STATEMENT
PMHX chronic back pain uses rolling walker BIB EMS for fall. patient had lost her balance and fallen next to her bed last night at 10p. denies head trauma. was not able to get out of bed due to weakness and pain over the back. her building super found her this morning awake and alert on the floor next to her bed. admits to increased pain over the lower back, denies paresthesias or focal weakness.

## 2019-08-19 NOTE — ED ADULT NURSE NOTE - NSIMPLEMENTINTERV_GEN_ALL_ED
Implemented All Fall with Harm Risk Interventions:  [place] to call system. Call bell, personal items and telephone within reach. Instruct patient to call for assistance. Room bathroom lighting operational. Non-slip footwear when patient is off stretcher. Physically safe environment: no spills, clutter or unnecessary equipment. Stretcher in lowest position, wheels locked, appropriate side rails in place. Provide visual cue, wrist band, yellow gown, etc. Monitor gait and stability. Monitor for mental status changes and reorient to person, place, and time. Review medications for side effects contributing to fall risk. Reinforce activity limits and safety measures with patient and family. Provide visual clues: red socks.

## 2019-08-19 NOTE — ED PROVIDER NOTE - ATTENDING CONTRIBUTION TO CARE
pt S/P fall at home and laying on the ground. work up showed several levels of lumbar fractures w some retropulsion, mild rhabdo and mild leak of trop. Since there were some traumatic findings Tr to [facility] for trauma evaluation.

## 2019-08-24 DIAGNOSIS — S32.048A OTHER FRACTURE OF FOURTH LUMBAR VERTEBRA, INITIAL ENCOUNTER FOR CLOSED FRACTURE: ICD-10-CM
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
## 2019-08-24 DIAGNOSIS — M54.5 LOW BACK PAIN: ICD-10-CM

## 2019-08-24 DIAGNOSIS — T79.6XXA TRAUMATIC ISCHEMIA OF MUSCLE, INITIAL ENCOUNTER: ICD-10-CM
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
## 2019-11-06 ENCOUNTER — INPATIENT (INPATIENT)
Facility: HOSPITAL | Age: 84
LOS: 4 days | Discharge: HOME | DRG: 543 | End: 2019-11-11
Attending: STUDENT IN AN ORGANIZED HEALTH CARE EDUCATION/TRAINING PROGRAM | Admitting: STUDENT IN AN ORGANIZED HEALTH CARE EDUCATION/TRAINING PROGRAM
Payer: MEDICARE

## 2019-11-06 VITALS
WEIGHT: 95.02 LBS | HEART RATE: 81 BPM | DIASTOLIC BLOOD PRESSURE: 60 MMHG | OXYGEN SATURATION: 95 % | RESPIRATION RATE: 18 BRPM | TEMPERATURE: 98 F | HEIGHT: 60 IN | SYSTOLIC BLOOD PRESSURE: 101 MMHG

## 2019-11-06 DIAGNOSIS — Z98.890 OTHER SPECIFIED POSTPROCEDURAL STATES: Chronic | ICD-10-CM

## 2019-11-06 LAB
ALBUMIN SERPL ELPH-MCNC: 3.1 G/DL — LOW (ref 3.4–5)
ALP SERPL-CCNC: 125 U/L — HIGH (ref 40–120)
ALT FLD-CCNC: 12 U/L — SIGNIFICANT CHANGE UP (ref 12–42)
ANION GAP SERPL CALC-SCNC: 10 MMOL/L — SIGNIFICANT CHANGE UP (ref 9–16)
APPEARANCE UR: CLEAR — SIGNIFICANT CHANGE UP
AST SERPL-CCNC: 17 U/L — SIGNIFICANT CHANGE UP (ref 15–37)
BILIRUB SERPL-MCNC: 0.4 MG/DL — SIGNIFICANT CHANGE UP (ref 0.2–1.2)
BILIRUB UR-MCNC: NEGATIVE — SIGNIFICANT CHANGE UP
BUN SERPL-MCNC: 22 MG/DL — SIGNIFICANT CHANGE UP (ref 7–23)
CALCIUM SERPL-MCNC: 8.7 MG/DL — SIGNIFICANT CHANGE UP (ref 8.5–10.5)
CHLORIDE SERPL-SCNC: 104 MMOL/L — SIGNIFICANT CHANGE UP (ref 96–108)
CO2 SERPL-SCNC: 25 MMOL/L — SIGNIFICANT CHANGE UP (ref 22–31)
COLOR SPEC: YELLOW — SIGNIFICANT CHANGE UP
CREAT SERPL-MCNC: 0.83 MG/DL — SIGNIFICANT CHANGE UP (ref 0.5–1.3)
DIFF PNL FLD: NEGATIVE — SIGNIFICANT CHANGE UP
GLUCOSE SERPL-MCNC: 135 MG/DL — HIGH (ref 70–99)
GLUCOSE UR QL: NEGATIVE — SIGNIFICANT CHANGE UP
HCT VFR BLD CALC: 40.3 % — SIGNIFICANT CHANGE UP (ref 34.5–45)
HGB BLD-MCNC: 13.4 G/DL — SIGNIFICANT CHANGE UP (ref 11.5–15.5)
KETONES UR-MCNC: NEGATIVE — SIGNIFICANT CHANGE UP
LEUKOCYTE ESTERASE UR-ACNC: NEGATIVE — SIGNIFICANT CHANGE UP
MCHC RBC-ENTMCNC: 32.1 PG — SIGNIFICANT CHANGE UP (ref 27–34)
MCHC RBC-ENTMCNC: 33.3 GM/DL — SIGNIFICANT CHANGE UP (ref 32–36)
MCV RBC AUTO: 96.6 FL — SIGNIFICANT CHANGE UP (ref 80–100)
NITRITE UR-MCNC: NEGATIVE — SIGNIFICANT CHANGE UP
NRBC # BLD: 0 /100 WBCS — SIGNIFICANT CHANGE UP (ref 0–0)
PH UR: 8 — SIGNIFICANT CHANGE UP (ref 5–8)
PLATELET # BLD AUTO: 341 K/UL — SIGNIFICANT CHANGE UP (ref 150–400)
POTASSIUM SERPL-MCNC: 4 MMOL/L — SIGNIFICANT CHANGE UP (ref 3.5–5.3)
POTASSIUM SERPL-SCNC: 4 MMOL/L — SIGNIFICANT CHANGE UP (ref 3.5–5.3)
PROT SERPL-MCNC: 6.9 G/DL — SIGNIFICANT CHANGE UP (ref 6.4–8.2)
PROT UR-MCNC: NEGATIVE MG/DL — SIGNIFICANT CHANGE UP
RBC # BLD: 4.17 M/UL — SIGNIFICANT CHANGE UP (ref 3.8–5.2)
RBC # FLD: 13 % — SIGNIFICANT CHANGE UP (ref 10.3–14.5)
SODIUM SERPL-SCNC: 139 MMOL/L — SIGNIFICANT CHANGE UP (ref 132–145)
SP GR SPEC: 1.01 — SIGNIFICANT CHANGE UP (ref 1–1.03)
UROBILINOGEN FLD QL: 0.2 E.U./DL — SIGNIFICANT CHANGE UP
WBC # BLD: 10.12 K/UL — SIGNIFICANT CHANGE UP (ref 3.8–10.5)
WBC # FLD AUTO: 10.12 K/UL — SIGNIFICANT CHANGE UP (ref 3.8–10.5)

## 2019-11-06 PROCEDURE — 99285 EMERGENCY DEPT VISIT HI MDM: CPT

## 2019-11-06 PROCEDURE — 72131 CT LUMBAR SPINE W/O DYE: CPT | Mod: 26

## 2019-11-06 RX ORDER — TRAMADOL HYDROCHLORIDE 50 MG/1
25 TABLET ORAL ONCE
Refills: 0 | Status: DISCONTINUED | OUTPATIENT
Start: 2019-11-06 | End: 2019-11-06

## 2019-11-06 RX ADMIN — TRAMADOL HYDROCHLORIDE 25 MILLIGRAM(S): 50 TABLET ORAL at 13:26

## 2019-11-06 NOTE — ED PROVIDER NOTE - MUSCULOSKELETAL, MLM
Spine appears normal, range of motion is not limited, no muscle or joint tenderness +L2L3 ttp no redness/warmth

## 2019-11-06 NOTE — ED ADULT NURSE NOTE - NSIMPLEMENTINTERV_GEN_ALL_ED
Implemented All Fall with Harm Risk Interventions:  [place] to call system. Call bell, personal items and telephone within reach. Instruct patient to call for assistance. Room bathroom lighting operational. Non-slip footwear when patient is off stretcher. Physically safe environment: no spills, clutter or unnecessary equipment. Stretcher in lowest position, wheels locked, appropriate side rails in place. Provide visual cue, wrist band, yellow gown, etc. Monitor gait and stability. Monitor for mental status changes and reorient to person, place, and time. Review medications for side effects contributing to fall risk. Reinforce activity limits and safety measures with patient and family. Provide visual clues: red socks.

## 2019-11-06 NOTE — ED PROVIDER NOTE - CLINICAL SUMMARY MEDICAL DECISION MAKING FREE TEXT BOX
px nonambulatory 2' to chronic pain- labs, re CT ro acute changes, try to control pain, CM unable to transfer to rehab without a 3 day hospital admission/PT eval

## 2019-11-06 NOTE — ED PROVIDER NOTE - OBJECTIVE STATEMENT
87 F co back pain- acute on chronic back pain- no f/c no falls- hx of lumbar compression fx- s/p glue/cement injection about 3 weeks ago- went to [facility] rehab for 2 weeks - has been home for one week- essentially lives alone-  comes around once a day to check on her   today- did not fall- was able to get up and get dressed, took her about 1 hr- then sat down- and was unable to get up  no bowel/bladder incont  no f/c  back is worse w movement  taking ibu with minimal relief

## 2019-11-06 NOTE — ED ADULT NURSE NOTE - CHIEF COMPLAINT QUOTE
BIBA for mid back pain sine 7AM. denies recent injury/trauma/fall. took 400mg ibuprofen PTA without relief. pt has a walker.

## 2019-11-06 NOTE — ED ADULT TRIAGE NOTE - CHIEF COMPLAINT QUOTE
BIBA for mid back pain sine 7AM. denies recent injury/trauma/fall. took 400mg ibuprofen TA without relief. BIBA for mid back pain sine 7AM. denies recent injury/trauma/fall. took 400mg ibuprofen TA without relief. pt has a walker. BIBA for mid back pain sine 7AM. denies recent injury/trauma/fall. took 400mg ibuprofen PTA without relief. pt has a walker.

## 2019-11-07 ENCOUNTER — TRANSCRIPTION ENCOUNTER (OUTPATIENT)
Age: 84
End: 2019-11-07

## 2019-11-07 DIAGNOSIS — R63.8 OTHER SYMPTOMS AND SIGNS CONCERNING FOOD AND FLUID INTAKE: ICD-10-CM

## 2019-11-07 DIAGNOSIS — S22.080A WEDGE COMPRESSION FRACTURE OF T11-T12 VERTEBRA, INITIAL ENCOUNTER FOR CLOSED FRACTURE: ICD-10-CM

## 2019-11-07 DIAGNOSIS — Z91.89 OTHER SPECIFIED PERSONAL RISK FACTORS, NOT ELSEWHERE CLASSIFIED: ICD-10-CM

## 2019-11-07 DIAGNOSIS — I10 ESSENTIAL (PRIMARY) HYPERTENSION: ICD-10-CM

## 2019-11-07 LAB
ANION GAP SERPL CALC-SCNC: 14 MMOL/L — SIGNIFICANT CHANGE UP (ref 5–17)
BUN SERPL-MCNC: 12 MG/DL — SIGNIFICANT CHANGE UP (ref 7–23)
CALCIUM SERPL-MCNC: 8.8 MG/DL — SIGNIFICANT CHANGE UP (ref 8.4–10.5)
CHLORIDE SERPL-SCNC: 103 MMOL/L — SIGNIFICANT CHANGE UP (ref 96–108)
CO2 SERPL-SCNC: 20 MMOL/L — LOW (ref 22–31)
CREAT SERPL-MCNC: 0.67 MG/DL — SIGNIFICANT CHANGE UP (ref 0.5–1.3)
GLUCOSE SERPL-MCNC: 94 MG/DL — SIGNIFICANT CHANGE UP (ref 70–99)
HCT VFR BLD CALC: 40.1 % — SIGNIFICANT CHANGE UP (ref 34.5–45)
HGB BLD-MCNC: 12.9 G/DL — SIGNIFICANT CHANGE UP (ref 11.5–15.5)
MCHC RBC-ENTMCNC: 32.2 GM/DL — SIGNIFICANT CHANGE UP (ref 32–36)
MCHC RBC-ENTMCNC: 32.3 PG — SIGNIFICANT CHANGE UP (ref 27–34)
MCV RBC AUTO: 100.5 FL — HIGH (ref 80–100)
NRBC # BLD: 0 /100 WBCS — SIGNIFICANT CHANGE UP (ref 0–0)
PLATELET # BLD AUTO: 334 K/UL — SIGNIFICANT CHANGE UP (ref 150–400)
POTASSIUM SERPL-MCNC: 4.1 MMOL/L — SIGNIFICANT CHANGE UP (ref 3.5–5.3)
POTASSIUM SERPL-SCNC: 4.1 MMOL/L — SIGNIFICANT CHANGE UP (ref 3.5–5.3)
RBC # BLD: 3.99 M/UL — SIGNIFICANT CHANGE UP (ref 3.8–5.2)
RBC # FLD: 13 % — SIGNIFICANT CHANGE UP (ref 10.3–14.5)
SODIUM SERPL-SCNC: 137 MMOL/L — SIGNIFICANT CHANGE UP (ref 135–145)
WBC # BLD: 8.66 K/UL — SIGNIFICANT CHANGE UP (ref 3.8–10.5)
WBC # FLD AUTO: 8.66 K/UL — SIGNIFICANT CHANGE UP (ref 3.8–10.5)

## 2019-11-07 PROCEDURE — 99222 1ST HOSP IP/OBS MODERATE 55: CPT | Mod: GC,AI

## 2019-11-07 PROCEDURE — 99223 1ST HOSP IP/OBS HIGH 75: CPT

## 2019-11-07 RX ORDER — TRAMADOL HYDROCHLORIDE 50 MG/1
50 TABLET ORAL ONCE
Refills: 0 | Status: DISCONTINUED | OUTPATIENT
Start: 2019-11-07 | End: 2019-11-07

## 2019-11-07 RX ORDER — POLYETHYLENE GLYCOL 3350 17 G/17G
17 POWDER, FOR SOLUTION ORAL DAILY
Refills: 0 | Status: DISCONTINUED | OUTPATIENT
Start: 2019-11-07 | End: 2019-11-11

## 2019-11-07 RX ORDER — OXYCODONE AND ACETAMINOPHEN 5; 325 MG/1; MG/1
1 TABLET ORAL EVERY 4 HOURS
Refills: 0 | Status: DISCONTINUED | OUTPATIENT
Start: 2019-11-07 | End: 2019-11-11

## 2019-11-07 RX ORDER — ENOXAPARIN SODIUM 100 MG/ML
40 INJECTION SUBCUTANEOUS EVERY 24 HOURS
Refills: 0 | Status: DISCONTINUED | OUTPATIENT
Start: 2019-11-07 | End: 2019-11-11

## 2019-11-07 RX ORDER — SENNA PLUS 8.6 MG/1
2 TABLET ORAL AT BEDTIME
Refills: 0 | Status: DISCONTINUED | OUTPATIENT
Start: 2019-11-07 | End: 2019-11-11

## 2019-11-07 RX ORDER — ASPIRIN/CALCIUM CARB/MAGNESIUM 324 MG
81 TABLET ORAL DAILY
Refills: 0 | Status: DISCONTINUED | OUTPATIENT
Start: 2019-11-07 | End: 2019-11-11

## 2019-11-07 RX ADMIN — SENNA PLUS 2 TABLET(S): 8.6 TABLET ORAL at 21:05

## 2019-11-07 RX ADMIN — Medication 81 MILLIGRAM(S): at 11:24

## 2019-11-07 RX ADMIN — ENOXAPARIN SODIUM 40 MILLIGRAM(S): 100 INJECTION SUBCUTANEOUS at 09:33

## 2019-11-07 RX ADMIN — TRAMADOL HYDROCHLORIDE 50 MILLIGRAM(S): 50 TABLET ORAL at 01:30

## 2019-11-07 NOTE — DISCHARGE NOTE PROVIDER - HOSPITAL COURSE
88 yo F with pmh HTN and recent compression fracture s/p kyphoplasty presenting with 1 day of acute back pain in the setting of chronic back pain.        # Compression fracture: CT showed new compression fracture at T12 and L5. Pain managed accordingly with oxycodone 5 mg/acetaminophen 325 mg PO and bowel regimen . Patient followed by PT        #HTN: Likely worse in the setting of pain. Home medications held. 88 yo F with pmh HTN and recent compression fracture (L3/L4) s/p kyphoplasty presenting with 1 day of acute back pain in the setting of chronic back pain.        #Compression fracture: CT with new compression fracture at T12 and L5. Pain well-managed by oxycodone 5 mg/acetaminophen 325 mg PO with bowel regimen. Patient seen and evaluated by physical therapy. Patient seen and evaluated by neurosurgery spine service.         #HTN: Patient not on home anti-hypertensives. Likely worse in the setting of pain. Resumed _____.        New medications upon discharge: 88 yo F with pmh HTN and recent compression fracture (L3/L4) s/p kyphoplasty presenting with 1 day of acute back pain in the setting of chronic back pain.        #Compression fracture: CT with new compression fracture at T12 and L5. Pain well-managed by oxycodone 5 mg/acetaminophen 325 mg PO with bowel regimen. Patient seen and evaluated by neurosurgery spine service with recommendations for use of TLSO brace. Patient seen and evaluated by physical therapy and occupational therapy, with recommendations for ______.        #HTN: Patient not on home anti-hypertensives. Likely worse in the setting of pain. Started lisinopril 10mg qd. Patient to follow with PCP in outpatient setting. 86 yo F with pmh HTN and recent compression fracture (L3/L4) s/p kyphoplasty presenting with 1 day of acute back pain in the setting of chronic back pain.        #Compression fracture: CT with new compression fracture at T12 and L5. Pain well-managed by oxycodone 5 mg/acetaminophen 325 mg PO with bowel regimen. Patient seen and evaluated by neurosurgery spine service with recommendations for use of TLSO brace. Patient seen and evaluated by physical therapy and occupational therapy.        #HTN: Patient not on home anti-hypertensives, however with documented history of HTN. Likely worse on this admission in setting of pain. Started lisinopril 10mg qd. Patient to follow with PCP in outpatient setting. 88 yo F with pmh HTN and recent compression fracture (L3/L4) s/p kyphoplasty presenting with 1 day of acute back pain in the setting of chronic back pain.        #Compression fracture: CT with new compression fracture at T12 and L5. Pain well-managed by oxycodone 5 mg/acetaminophen 325 mg PO with bowel regimen. Patient seen and evaluated by neurosurgery spine service with recommendations for use of TLSO brace. Patient seen and evaluated by physical therapy and occupational therapy. Recommendations for continued therapy in [facility] setting.        #HTN: Patient not on home anti-hypertensives, however with documented history of HTN. Likely worse on this admission in setting of pain. Started lisinopril 10mg qd. Patient to follow with PCP in outpatient setting.

## 2019-11-07 NOTE — DIETITIAN INITIAL EVALUATION ADULT. - PROBLEM SELECTOR PLAN 5
1) PCP Contacted on Admission: N --> Dr. Moss [phone]  2) Date of Contact with PCP:  3) PCP Contacted at Discharge: (Y/N)  4) Summary of Handoff Given to PCP:   5) Post-Discharge Appointment Date and Location:

## 2019-11-07 NOTE — DIETITIAN INITIAL EVALUATION ADULT. - OTHER INFO
Pt seen for initial assessment. Pt seen . Pt is on _ diet with _ PO intake. Reports _ appetite PTA, . NKFA. GI. Not reporting pain at this time. RD to follow up per protocol. Pt seen for initial assessment. Pt seen resting in bed, awake, alert. Pt is on DASH/TLC diet with 50% consumption of meal tray this AM for breakfast. Reports decreased appetite x few weeks PTA, however was with good appetite prior to this period, no dietary restrictions, consumes Ensure Enlive x1/day PTA (350kcal and 20gms protein, would like to continue during hospital stay). NKFA. No apparent N/V/C/D with last BM unclear, continue to monitor, bowel regimen per team discretion. Not reporting pain at this time. Denies difficulty chewing/swallowing. RD to follow up per protocol. Pt seen for initial assessment. 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 Skin: no edema, intact. Pt seen resting in bed, awake, alert. Pt is on DASH/TLC diet with 50% consumption of meal tray this AM for breakfast. Reports decreased appetite x few weeks PTA, however was with good appetite prior to this period, no dietary restrictions, consumes Ensure Enlive x1/day PTA (350kcal and 20gms protein, would like to continue during hospital stay). NKFA. No apparent N/V/C/D with last BM unclear, continue to monitor, bowel regimen per team discretion. Not reporting pain at this time. Denies difficulty chewing/swallowing. Wt hx unclear, pt reports old wt of 110lbs, however is unable to recall when she was last 110lbs. RD to follow up per protocol.

## 2019-11-07 NOTE — H&P ADULT - ASSESSMENT
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

## 2019-11-07 NOTE — DISCHARGE NOTE PROVIDER - NSDCCPCAREPLAN_GEN_ALL_CORE_FT
PRINCIPAL DISCHARGE DIAGNOSIS  Diagnosis: Compression fracture of L1 vertebra, initial encounter  Assessment and Plan of Treatment: Based on imaging findings and clinical presentation, you were diagnosed with compression fractures of the L5 and T12 vertebrae. A compression fracture occurs when part of a vertebra, or bone in the spine, collapses. Compression fractures may cause symptoms including back pain and numbness/weakness in the arms and legs. You were seen and evaluated by our neurosurgical spine service. Surgery was not recommended, as the recommended treatment for uncomplicated compression fractures is often non-surgical supportive care with pain management. The most common cause of weakening is osteoporosis, a condition that causes weak and brittle bones. Osteoporosis is most common in elderly women, and it is for this reason that elderly women are the group most affected by compression fracture. Please follow-up with your primary care provider in the outpatient setting for further evaluation. Please also continue to take calcium/vitamin D supplements as discussed. PRINCIPAL DISCHARGE DIAGNOSIS  Diagnosis: Spinal compression fracture  Assessment and Plan of Treatment: Based on imaging findings and clinical presentation, you were diagnosed with compression fractures of the L5 and T12 vertebrae. A compression fracture occurs when part of a vertebra, or bone in the spine, collapses. Compression fractures may cause symptoms including back pain and numbness/weakness in the arms and legs. You were seen and evaluated by our neurosurgical spine service. Surgery was not recommended, as the recommended treatment for uncomplicated compression fractures is often non-surgical supportive care with pain management. The most common cause of weakening is osteoporosis, a condition that causes weak and brittle bones. Osteoporosis is most common in elderly women, and it is for this reason that elderly women are the group most affected by compression fracture. Please follow-up with your primary care provider in the outpatient setting for further evaluation. Please also continue to take calcium/vitamin D supplements as discussed.      SECONDARY DISCHARGE DIAGNOSES  Diagnosis: Hypertension  Assessment and Plan of Treatment: You have a reported history of hypertension. Please follow-up with your primary care provider in the outpatient setting.

## 2019-11-07 NOTE — DISCHARGE NOTE PROVIDER - NSDCCAREPROVSEEN_GEN_ALL_CORE_FT
Gordo, JoegAdama Duke, Aida Kramer Gordo, iang-[facility] SARITA Silva, Lynne Vidal, Adama Ortiz, Aida Kramer

## 2019-11-07 NOTE — DIETITIAN INITIAL EVALUATION ADULT. - ADD RECOMMEND
Encouraged PO intake, encouraged consumption of protein rich, energy dense foods, encouraged consumption of ONS in between meals, encouraged small frequent meals when appetite is lacking.

## 2019-11-07 NOTE — H&P ADULT - NSHPPHYSICALEXAM_GEN_ALL_CORE
Vital Signs Last 12 Hrs  T(F): 98.4 (11-07-19 @ 02:30), Max: 98.7 (11-06-19 @ 17:57)  HR: 82 (11-07-19 @ 02:30) (80 - 87)  BP: 177/77 (11-07-19 @ 02:30) (158/73 - 182/76)  BP(mean): --  RR: 17 (11-07-19 @ 02:30) (16 - 18)  SpO2: 93% (11-07-19 @ 02:30) (93% - 96%)    PHYSICAL EXAM:  Constitutional: Elderly female lying flat in bed due to back pain, NAD.  HEENT: NC/AT, PERRLA, EOMI, no conjunctival pallor or scleral icterus, MMM  Neck: Supple, no JVD  Respiratory: CTA B/L. No w/r/r.   Cardiovascular: RRR, normal S1 and S2, no m/r/g.   Gastrointestinal: +BS, soft NTND, no guarding or rebound tenderness, no palpable masses   Extremities: wwp; no cyanosis, clubbing or edema.   MSK: Central back tenderness with pain exacerbated by movement  Vascular: Pulses equal and strong throughout.   Neurological: AAOx3, no CN deficits, strength and sensation intact throughout.   Skin: No gross skin abnormalities or rashes

## 2019-11-07 NOTE — H&P ADULT - HISTORY OF PRESENT ILLNESS
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  Patient reports that she had kyphoplasty about 3 weeks ago for prior compression fractures but noted worsening of back pain today to the point where she could stand up due to pain.  She describes her pain at admission as 10/10 low back pain worst on the L that does not radiate. She denies any fall, bowel or bladder incontinence, lower extremity weakness.  She was recently at [facility] rehab for 2 weeks s/p kyphoplasty but has not followed up with physicians in the interim.     In the ED, VS were TMax: 98.7, HR 80 - 87, /73 - 182/76, RR: 16 - 18, SpO2: 93% - 96%.    Labs largely wnl with elevated alk phos to 125.  CT lumbar spine demonstrated new compression fractures at T12, L5.  She was given Tramadol and admitted for further workup

## 2019-11-07 NOTE — DISCHARGE NOTE PROVIDER - CARE PROVIDER_API CALL
--,   Phone: (   )    -  Fax: (   )    -  Follow Up Time: 1,   Phone: (   )    -  Fax: (   )    -  Follow Up Time: Isa,   Phone: [phone]  Fax: (   )    -  Established Patient  Follow Up Time:

## 2019-11-07 NOTE — H&P ADULT - ATTENDING COMMENTS
Pt seen and examined by me at bedside earlier in AM. Agree with above with additions,   ortho-spine for evaluation of new compression fx. no neuro deficit.

## 2019-11-07 NOTE — DIETITIAN INITIAL EVALUATION ADULT. - ENERGY NEEDS
Ht: 5'2", Wt: 42.7kg, IBW: 50kg, 85.4%IBW.  Needs calculated based on [facility] standards of care. Needs adjusted for age, to promote wt gain. Upper end of kcal and protein range should be used.

## 2019-11-07 NOTE — H&P ADULT - PROBLEM SELECTOR PLAN 2
Patient hypertensive on admission with pressures ranging from normotensive to 180s  - likely in the setting of pain, pain control as above  - patient not on any antihypertensives at home although was started on Amlodipine 2.5mg on previous admission in 2018.  Can consider if HTN persists after pain control

## 2019-11-07 NOTE — H&P ADULT - PROBLEM SELECTOR PLAN 1
Patient with severe low back pain found to have compression fracture of T12 and L5.  Previous fracture of L3, L4 s/p kyphoplasty of L2, L3, L4 3 weeks ago.  No signs and sxs of spinal cord compression on exam  - PT evaluation  - pain control with Percocet PRN  - Bowel regimen while on pain medications

## 2019-11-07 NOTE — H&P ADULT - PROBLEM SELECTOR PLAN 4
F: none  E: replete electrolytes K< 4, Mg <2  N: DASH/TLC diet  DVT PPx: Lovenox  Code status: Full Code

## 2019-11-07 NOTE — DIETITIAN INITIAL EVALUATION ADULT. - DIET TYPE
Recommend liberalize diet to regular (remove DASH) given advanced age. Recommend add Ensure Enlive x1/day (350kcal and 20gms protein)- pt amenable. Recommend liberalize diet to regular (remove DASH) given advanced age. Recommend add Ensure Enlive x1/day (350kcal and 20gms protein)- pt amenable. paged team.

## 2019-11-07 NOTE — H&P ADULT - PROBLEM SELECTOR PLAN 5
1) PCP Contacted on Admission: N --> Dr. Moss [phone]  2) Date of Contact with PCP:  3) PCP Contacted at Discharge: (Y/N)  4) Summary of Handoff Given to PCP:   5) Post-Discharge Appointment Date and Location:

## 2019-11-07 NOTE — H&P ADULT - NSHPLABSRESULTS_GEN_ALL_CORE
LABS:                         13.4   10.12 )-----------( 341      ( 2019 13:28 )             40.3         139  |  104  |  22  ----------------------------<  135<H>  4.0   |  25  |  0.83    Ca    8.7      2019 13:28    TPro  6.9  /  Alb  3.1<L>  /  TBili  0.4  /  DBili  x   /  AST  17  /  ALT  12  /  AlkPhos  125<H>        Urinalysis Basic - ( 2019 14:59 )    Color: Yellow / Appearance: Clear / S.010 / pH: x  Gluc: x / Ketone: NEGATIVE  / Bili: NEGATIVE / Urobili: 0.2 E.U./dL   Blood: x / Protein: NEGATIVE mg/dL / Nitrite: NEGATIVE   Leuk Esterase: NEGATIVE / RBC: x / WBC x   Sq Epi: x / Non Sq Epi: x / Bacteria: x                RADIOLOGY, EKG & ADDITIONAL TESTS:   < from: CT Lumbar Spine No Cont (19 @ 14:19) >    IMPRESSION: New compression fracture deformities involving T12 and L5.   Patient status post interval kyphoplasty at L2, L3 and L4. Degenerative   disc disease as described above.    < end of copied text >

## 2019-11-07 NOTE — DISCHARGE NOTE PROVIDER - PROVIDER TOKENS
FREE:[LAST:[--],PHONE:[(   )    -],FAX:[(   )    -]] FREE:[LAST:[1],PHONE:[(   )    -],FAX:[(   )    -]] FREE:[LAST:[Isa],PHONE:[phone]],FAX:[(   )    -],ESTABLISHEDPATIENT:[T]]

## 2019-11-07 NOTE — CONSULT NOTE ADULT - SUBJECTIVE AND OBJECTIVE BOX
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  Patient reports that she had kyphoplasty about 3 weeks ago for prior compression fractures but noted worsening of back pain today to the point where she could stand up due to pain.  She describes her pain at admission as 10/10 low back pain worst on the L that does not radiate. She denies any fall, bowel or bladder incontinence, lower extremity weakness.  She was recently at [facility] rehab for 2 weeks s/p kyphoplasty but has not followed up with physicians in the interim.     In the ED, VS were TMax: 98.7, HR 80 - 87, /73 - 182/76, RR: 16 - 18, SpO2: 93% - 96%.    Labs largely wnl with elevated alk phos to 125.  CT lumbar spine demonstrated new compression fractures at T12, L5.  She was given Tramadol and admitted for further workup (2019 03:54)    OVERNIGHT EVENTS:  Vital Signs Last 24 Hrs  T(C): 36.7 (2019 15:44), Max: 37.1 (2019 17:57)  T(F): 98.1 (2019 15:44), Max: 98.7 (2019 17:57)  HR: 75 (2019 15:44) (71 - 87)  BP: 135/75 (2019 15:44) (127/70 - 182/76)  BP(mean): --  RR: 18 (2019 15:44) (16 - 18)  SpO2: 94% (2019 15:44) (93% - 96%)    I&O's Summary    2019 07:01  -  2019 07:00  --------------------------------------------------------  IN: 0 mL / OUT: 1 mL / NET: -1 mL        PHYSICAL EXAM:  Neurological:  A&OX3 Cranial nerves intact  CAI  c/o mid pain to lower back pain    Cardiovascular:RRR  Respiratory:Lungs CTAB  Gastrointestinal:  +BS  Abdomen round soft  No difficulty moving bowels  Genitourinary:  Voiding without complicatins  Extremities:  warm and dry    DIET:  Regular  LABS:                        12.9   8.66  )-----------( 334      ( 2019 06:41 )             40.1         137  |  103  |  12  ----------------------------<  94  4.1   |  20<L>  |  0.67    Ca    8.8      2019 06:41    TPro  6.9  /  Alb  3.1<L>  /  TBili  0.4  /  DBili  x   /  AST  17  /  ALT  12  /  AlkPhos  125<H>        Urinalysis Basic - ( 2019 14:59 )    Color: Yellow / Appearance: Clear / S.010 / pH: x  Gluc: x / Ketone: NEGATIVE  / Bili: NEGATIVE / Urobili: 0.2 E.U./dL   Blood: x / Protein: NEGATIVE mg/dL / Nitrite: NEGATIVE   Leuk Esterase: NEGATIVE / RBC: x / WBC x   Sq Epi: x / Non Sq Epi: x / Bacteria: x      CAPILLARY BLOOD GLUCOSE      Drug Levels: [] N/A    CSF Analysis: [] N/A      Allergies    No Known Allergies    Intolerances      MEDICATIONS:  Antibiotics:    Neuro:  oxycodone    5 mG/acetaminophen 325 mG 1 Tablet(s) Oral every 4 hours PRN    Anticoagulation:  aspirin  chewable 81 milliGRAM(s) Oral daily  enoxaparin Injectable 40 milliGRAM(s) SubCutaneous every 24 hours    OTHER:  polyethylene glycol 3350 17 Gram(s) Oral daily PRN  senna 2 Tablet(s) Oral at bedtime    IVF:  calcium carbonate 1250 mG  + Vitamin D (OsCal 500 + D) 1 Tablet(s) Oral daily    CULTURES:    RADIOLOGY & ADDITIONAL TESTS:      ASSESSMENT:  87y Female   < from: CT Lumbar Spine No Cont (19 @ 14:19) >  IMPRESSION: New compression fracture deformities involving T12 and L5.   Patient status post interval kyphoplasty at L2, L3 and L4. Degenerative   disc disease as described above.            PLAN:  NEURO:    Monitor neuro status  OT/PT  TLSO Brace  Upright T-L films  Pain Managment  Continue current medical regime as per Primary team    DispO: Discussed with attending         DVT PROPHYLAXIS:  [x] Venodynes                                [] Heparin/Lovenox    FALL RISK:  [] Low Risk                                    [] Impulsive  Assessment:  Present when checked    []  GCS  E   V  M     Heart Failure: []Acute, [] acute on chronic , []chronic  Heart Failure:  [] Diastolic (HFpEF), [] Systolic (HFrEF), []Combined (HFpEF and HFrEF), [] RHF, [] Pulm HTN, [] Other    [] PAULINA, [] ATN, [] AIN, [] other  [] CKD1, [] CKD2, [] CKD 3, [] CKD 4, [] CKD 5, []ESRD    Encephalopathy: [] Metabolic, [] Hepatic, [] toxic, [] Neurological, [] Other    Abnormal Nurtitional Status: [] malnurtition (see nutrition note), [ ]underweight: BMI < 19, [] morbid obesity: BMI >40, [] Cachexia    [] Sepsis  [] hypovolemic shock,[] cardiogenic shock, [] hemorrhagic shock, [] neuogenic shock  [] Acute Respiratory Failure  []Cerebral edema, [] Brain compression/ herniation,   [] Functional quadriplegia  [] Acute blood loss anemia

## 2019-11-07 NOTE — CHART NOTE - NSCHARTNOTEFT_GEN_A_CORE
Upon Nutritional Assessment by the Registered Dietitian your patient was determined to meet criteria / has evidence of the following diagnosis/diagnoses:          [ ]  Mild Protein Calorie Malnutrition        [ ]  Moderate Protein Calorie Malnutrition        [ ] Severe Protein Calorie Malnutrition        [ ] Unspecified Protein Calorie Malnutrition        [ x ] Underweight / BMI <19        [ ] Morbid Obesity / BMI > 40      Findings as based on:  •  Comprehensive nutrition assessment and consultation  BMI 17.2kg/m2     Treatment:    Recommend liberalize diet to regular (remove DASH) given advanced age.   Recommend add Ensure Enlive x1/day (350kcal and 20gms protein)- pt amenable.  Diet edu: Encouraged PO intake, encouraged consumption of protein rich, energy dense foods, encouraged consumption of ONS in between meals, encouraged small frequent meals when appetite is lacking.        PROVIDER Section:     By signing this assessment you are acknowledging and agree with the diagnosis/diagnoses assigned by the Registered Dietitian    Comments:

## 2019-11-07 NOTE — H&P ADULT - PROBLEM SELECTOR PLAN 3
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  Patient reports that she does not recall this on admission  - collateral in AM as mets could potentially be cause of repeat compression fractures

## 2019-11-07 NOTE — H&P ADULT - NSHPSOCIALHISTORY_GEN_ALL_CORE
Lives in apartment and reports she has visiting nurse who comes 3 hours per day.  Walks with rollator.  Former smoker 1.5ppd for 10 years quit 20 years ago. Drinks 2 glasses scotch per day has never withdrawn with periods of discontinuing.

## 2019-11-07 NOTE — DISCHARGE NOTE PROVIDER - NSDCMRMEDTOKEN_GEN_ALL_CORE_FT
aspirin 81 mg oral tablet, chewable: 1 tab(s) orally once a day   mg oral tablet: 1 tab(s) orally every 6 hours aspirin 81 mg oral tablet, chewable: 1 tab(s) orally once a day  calcium-vitamin D 500 mg-200 intl units oral tablet: 1 tab(s) orally once a day   mg oral tablet: 1 tab(s) orally every 6 hours   lisinopril 10 mg oral tablet: 1 tab(s) orally once a day  oxycodone-acetaminophen 5 mg-325 mg oral tablet: 1 tab(s) orally every 4 hours, As needed, Severe Pain (7 - 10) aspirin 81 mg oral tablet, chewable: 1 tab(s) orally once a day  calcium-vitamin D 500 mg-200 intl units oral tablet: 1 tab(s) orally once a day   mg oral tablet: 1 tab(s) orally every 6 hours   lisinopril 10 mg oral tablet: 1 tab(s) orally once a day  oxycodone-acetaminophen 5 mg-325 mg oral tablet: 1 tab(s) orally every 4 hours, As needed, Severe Pain (7 - 10)  TLSO brace with shoulder straps: Apply topically to affected area once a day

## 2019-11-08 LAB
ALBUMIN SERPL ELPH-MCNC: 3.7 G/DL — SIGNIFICANT CHANGE UP (ref 3.3–5)
ALP SERPL-CCNC: 103 U/L — SIGNIFICANT CHANGE UP (ref 40–120)
ALT FLD-CCNC: 8 U/L — LOW (ref 10–45)
ANION GAP SERPL CALC-SCNC: 9 MMOL/L — SIGNIFICANT CHANGE UP (ref 5–17)
AST SERPL-CCNC: 14 U/L — SIGNIFICANT CHANGE UP (ref 10–40)
BILIRUB SERPL-MCNC: 0.4 MG/DL — SIGNIFICANT CHANGE UP (ref 0.2–1.2)
BUN SERPL-MCNC: 10 MG/DL — SIGNIFICANT CHANGE UP (ref 7–23)
CALCIUM SERPL-MCNC: 9.2 MG/DL — SIGNIFICANT CHANGE UP (ref 8.4–10.5)
CHLORIDE SERPL-SCNC: 103 MMOL/L — SIGNIFICANT CHANGE UP (ref 96–108)
CO2 SERPL-SCNC: 27 MMOL/L — SIGNIFICANT CHANGE UP (ref 22–31)
CREAT SERPL-MCNC: 0.85 MG/DL — SIGNIFICANT CHANGE UP (ref 0.5–1.3)
GLUCOSE SERPL-MCNC: 96 MG/DL — SIGNIFICANT CHANGE UP (ref 70–99)
HCT VFR BLD CALC: 43.4 % — SIGNIFICANT CHANGE UP (ref 34.5–45)
HGB BLD-MCNC: 13.8 G/DL — SIGNIFICANT CHANGE UP (ref 11.5–15.5)
MCHC RBC-ENTMCNC: 31.8 GM/DL — LOW (ref 32–36)
MCHC RBC-ENTMCNC: 32.5 PG — SIGNIFICANT CHANGE UP (ref 27–34)
MCV RBC AUTO: 102.1 FL — HIGH (ref 80–100)
NRBC # BLD: 0 /100 WBCS — SIGNIFICANT CHANGE UP (ref 0–0)
PLATELET # BLD AUTO: 351 K/UL — SIGNIFICANT CHANGE UP (ref 150–400)
POTASSIUM SERPL-MCNC: 4.8 MMOL/L — SIGNIFICANT CHANGE UP (ref 3.5–5.3)
POTASSIUM SERPL-SCNC: 4.8 MMOL/L — SIGNIFICANT CHANGE UP (ref 3.5–5.3)
PROT SERPL-MCNC: 6.5 G/DL — SIGNIFICANT CHANGE UP (ref 6–8.3)
RBC # BLD: 4.25 M/UL — SIGNIFICANT CHANGE UP (ref 3.8–5.2)
RBC # FLD: 12.7 % — SIGNIFICANT CHANGE UP (ref 10.3–14.5)
SODIUM SERPL-SCNC: 139 MMOL/L — SIGNIFICANT CHANGE UP (ref 135–145)
WBC # BLD: 6.28 K/UL — SIGNIFICANT CHANGE UP (ref 3.8–10.5)
WBC # FLD AUTO: 6.28 K/UL — SIGNIFICANT CHANGE UP (ref 3.8–10.5)

## 2019-11-08 PROCEDURE — 99283 EMERGENCY DEPT VISIT LOW MDM: CPT

## 2019-11-08 PROCEDURE — 72082 X-RAY EXAM ENTIRE SPI 2/3 VW: CPT | Mod: 26

## 2019-11-08 RX ORDER — LISINOPRIL 2.5 MG/1
10 TABLET ORAL DAILY
Refills: 0 | Status: DISCONTINUED | OUTPATIENT
Start: 2019-11-08 | End: 2019-11-08

## 2019-11-08 RX ORDER — LISINOPRIL 2.5 MG/1
10 TABLET ORAL DAILY
Refills: 0 | Status: DISCONTINUED | OUTPATIENT
Start: 2019-11-08 | End: 2019-11-11

## 2019-11-08 RX ORDER — LISINOPRIL 2.5 MG/1
1 TABLET ORAL
Qty: 0 | Refills: 0 | DISCHARGE
Start: 2019-11-08

## 2019-11-08 RX ADMIN — OXYCODONE AND ACETAMINOPHEN 1 TABLET(S): 5; 325 TABLET ORAL at 14:38

## 2019-11-08 RX ADMIN — OXYCODONE AND ACETAMINOPHEN 1 TABLET(S): 5; 325 TABLET ORAL at 15:08

## 2019-11-08 RX ADMIN — POLYETHYLENE GLYCOL 3350 17 GRAM(S): 17 POWDER, FOR SOLUTION ORAL at 17:37

## 2019-11-08 RX ADMIN — OXYCODONE AND ACETAMINOPHEN 1 TABLET(S): 5; 325 TABLET ORAL at 09:57

## 2019-11-08 RX ADMIN — ENOXAPARIN SODIUM 40 MILLIGRAM(S): 100 INJECTION SUBCUTANEOUS at 09:24

## 2019-11-08 RX ADMIN — LISINOPRIL 10 MILLIGRAM(S): 2.5 TABLET ORAL at 09:24

## 2019-11-08 RX ADMIN — Medication 1 TABLET(S): at 11:02

## 2019-11-08 RX ADMIN — SENNA PLUS 2 TABLET(S): 8.6 TABLET ORAL at 22:00

## 2019-11-08 RX ADMIN — OXYCODONE AND ACETAMINOPHEN 1 TABLET(S): 5; 325 TABLET ORAL at 19:23

## 2019-11-08 RX ADMIN — Medication 81 MILLIGRAM(S): at 11:02

## 2019-11-08 RX ADMIN — OXYCODONE AND ACETAMINOPHEN 1 TABLET(S): 5; 325 TABLET ORAL at 18:53

## 2019-11-08 RX ADMIN — OXYCODONE AND ACETAMINOPHEN 1 TABLET(S): 5; 325 TABLET ORAL at 09:27

## 2019-11-08 NOTE — PROGRESS NOTE ADULT - PROBLEM SELECTOR PLAN 3
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 Patient reports that she does not recall this on admission

## 2019-11-08 NOTE — PROGRESS NOTE ADULT - ASSESSMENT
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  Pain improving; however, pt does not ask for Percocet, likely contributing to her poorly controlled pain at present.    #Acute T12/L5 Compression Fracture  -- c/w Percocet PRN  -- c/w bowel regimen   -- patient requires frequent teaching on how to use PRN  -- t/b with Spine, RE: x-rays from this AM AND obtaining TSLO --> PLEASE PRIORITIZE THIS!  -- needs PT eval once brace is obtained    #HTN  -- Lisinopril re-started today     DVT PPx - Lovenox  Dispo - likely ALIS but needs brace before she can work with PT and SHANI initiated for ALIS    Lynne Silva  [phone]

## 2019-11-08 NOTE — PROGRESS NOTE ADULT - PROBLEM SELECTOR PLAN 5
1) PCP Contacted on Admission: N --> Dr. Moss [phone]  2) Date of Contact with PCP:  3) PCP Contacted at Discharge: (Y/N)  4) Summary of Handoff Given to PCP:   5) Post-Discharge Appointment Date and Location:

## 2019-11-08 NOTE — PROGRESS NOTE ADULT - PROBLEM SELECTOR PLAN 1
Patient with severe low back pain found to have compression fracture of T12 and L5. Previous fracture of L3, L4 s/p kyphoplasty of L2, L3, L4 3 weeks ago.  No signs and sxs of spinal cord compression on exam.  -per NSG recs- TLSO brace ordered  -PT evaluation  -OT evaluation  -pain control with Percocet PRN  -Bowel regimen while on pain medications

## 2019-11-08 NOTE — PROGRESS NOTE ADULT - ASSESSMENT
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

## 2019-11-08 NOTE — PROGRESS NOTE ADULT - SUBJECTIVE AND OBJECTIVE BOX
INTERVAL EVENTS:  -- NAEO  -- Spine recommending a TSLO brace; awaiting PT    SUBJECTIVE:  -- c/o pain but slept well; notes pain is midline and mid-back  -- denies changes in LE mobility and sensation; notes "my legs aren't the problem"  -- no change in urinary or bowel habits    MEDICATIONS:  MEDICATIONS  (STANDING):  aspirin  chewable 81 milliGRAM(s) Oral daily  calcium carbonate 1250 mG  + Vitamin D (OsCal 500 + D) 1 Tablet(s) Oral daily  enoxaparin Injectable 40 milliGRAM(s) SubCutaneous every 24 hours  lisinopril 10 milliGRAM(s) Oral daily  senna 2 Tablet(s) Oral at bedtime    MEDICATIONS  (PRN):  oxycodone    5 mG/acetaminophen 325 mG 1 Tablet(s) Oral every 4 hours PRN Severe Pain (7 - 10)  polyethylene glycol 3350 17 Gram(s) Oral daily PRN Constipation    Allergies: No Known Allergies    OBJECTIVE:  Vital Signs Last 24 Hrs  T(C): 36.8 (2019 08:30), Max: 36.9 (2019 05:04)  T(F): 98.3 (2019 08:30), Max: 98.5 (2019 05:04)  HR: 80 (2019 12:15) (75 - 80)  BP: 111/72 (2019 12:15) (111/72 - 183/74)  BP(mean): --  RR: 17 (2019 08:30) (17 - 18)  SpO2: 96% (2019 08:30) (94% - 96%)  I&O's Summary    2019 07:01  -  2019 07:00  --------------------------------------------------------  IN: 0 mL / OUT: 4 mL / NET: -4 mL    PHYSICAL EXAM:  Gen: NAD, lying at 15 deg  HEENT: NCAT, MMM, clear OP  CV: RRR, no m/g/r appreciated  Pulm: CTA B, no w/r/r; no increase in WOB  Abd: normoactive BS, soft, NTND  Ext: WWP, 2+ pulses x4; no c/e/e  Neuro: AOx3, nonfocal; BLE 5/5 and with intact sensation  Psych: pleasant, conversant and appropriate    LABS:                        13.8   6.28  )-----------( 351      ( 2019 08:14 )             43.4         139  |  103  |  10  ----------------------------<  96  4.8   |  27  |  0.85    Ca    9.2      2019 06:22    TPro  6.5  /  Alb  3.7  /  TBili  0.4  /  DBili  x   /  AST  14  /  ALT  8<L>  /  AlkPhos  103      LIVER FUNCTIONS - ( 2019 06:22 )  Alb: 3.7 g/dL / Pro: 6.5 g/dL / ALK PHOS: 103 U/L / ALT: 8 U/L / AST: 14 U/L / GGT: x           Urinalysis Basic - ( 2019 14:59 )  Color: Yellow / Appearance: Clear / S.010 / pH: x  Gluc: x / Ketone: NEGATIVE  / Bili: NEGATIVE / Urobili: 0.2 E.U./dL   Blood: x / Protein: NEGATIVE mg/dL / Nitrite: NEGATIVE   Leuk Esterase: NEGATIVE / RBC: x / WBC x   Sq Epi: x / Non Sq Epi: x / Bacteria: x    MICRODATA:  -- No new labs.    RADIOLOGY/OTHER STUDIES:  -- Thoracolumbar X-rays  Mild compression deformity of T12 appears slightly worse since 2019.   Stable moderate compression deformities of L1 and L5.   L2-L4 vertebral augmentation with stable moderate vertebral compression   deformities.    If symptoms persist, correlate with neurologic evaluation to determine if   further evaluation with MRI is warranted, if there are no   contraindications.

## 2019-11-08 NOTE — PROGRESS NOTE ADULT - SUBJECTIVE AND OBJECTIVE BOX
SUBJECTIVE / INTERVAL HPI: Patient seen and examined at bedside. C/O 8/10 back pain this AM which is improved with pain meds. Denies LE pain or weakness.    VITAL SIGNS:  Vital Signs Last 24 Hrs  T(C): 36.9 (2019 05:04), Max: 37 (2019 09:07)  T(F): 98.5 (2019 05:04), Max: 98.6 (2019 09:07)  HR: 79 (2019 05:04) (75 - 79)  BP: 147/79 (2019 05:04) (135/75 - 160/75)  BP(mean): --  RR: 18 (2019 05:04) (18 - 18)  SpO2: 96% (2019 05:04) (94% - 96%)    19 @ 07:01  -  19 @ 07:00  --------------------------------------------------------  IN: 0 mL / OUT: 4 mL / NET: -4 mL        PHYSICAL EXAM:    Constitutional: Elderly female lying flat in bed due to back pain, NAD.  HEENT: NC/AT, PERRLA, EOMI, no conjunctival pallor or scleral icterus, MMM  Neck: Supple, no JVD  Respiratory: CTA B/L. No w/r/r.   Cardiovascular: RRR, normal S1 and S2, no m/r/g.   Gastrointestinal: +BS, soft NTND, no guarding or rebound tenderness, no palpable masses   Extremities: wwp; no cyanosis, clubbing or edema.   MSK: Central back tenderness with pain exacerbated by movement. negative straight leg test.  Vascular: Pulses equal and strong throughout.   Neurological: AAOx3, no CN deficits, strength and sensation intact throughout.   Skin: No gross skin abnormalities or rashes    MEDICATIONS:  MEDICATIONS  (STANDING):  aspirin  chewable 81 milliGRAM(s) Oral daily  calcium carbonate 1250 mG  + Vitamin D (OsCal 500 + D) 1 Tablet(s) Oral daily  enoxaparin Injectable 40 milliGRAM(s) SubCutaneous every 24 hours  senna 2 Tablet(s) Oral at bedtime    MEDICATIONS  (PRN):  oxycodone    5 mG/acetaminophen 325 mG 1 Tablet(s) Oral every 4 hours PRN Severe Pain (7 - 10)  polyethylene glycol 3350 17 Gram(s) Oral daily PRN Constipation      ALLERGIES:  Allergies    No Known Allergies    Intolerances        LABS:                        13.8   6.28  )-----------( 351      ( 2019 08:14 )             43.4         139  |  103  |  10  ----------------------------<  96  4.8   |  27  |  0.85    Ca    9.2      2019 06:22    TPro  6.5  /  Alb  3.7  /  TBili  0.4  /  DBili  x   /  AST  14  /  ALT  8<L>  /  AlkPhos  103  1108      Urinalysis Basic - ( 2019 14:59 )    Color: Yellow / Appearance: Clear / S.010 / pH: x  Gluc: x / Ketone: NEGATIVE  / Bili: NEGATIVE / Urobili: 0.2 E.U./dL   Blood: x / Protein: NEGATIVE mg/dL / Nitrite: NEGATIVE   Leuk Esterase: NEGATIVE / RBC: x / WBC x   Sq Epi: x / Non Sq Epi: x / Bacteria: x      CAPILLARY BLOOD GLUCOSE            RADIOLOGY & ADDITIONAL TESTS: Reviewed.    ASSESSMENT:    PLAN: SUBJECTIVE / INTERVAL HPI: Patient seen and examined at bedside. C/O 8/10 back pain this AM which is improved with pain meds. Denies LE pain or weakness.    VITAL SIGNS:  Vital Signs Last 24 Hrs  T(C): 36.9 (2019 05:04), Max: 37 (2019 09:07)  T(F): 98.5 (2019 05:04), Max: 98.6 (2019 09:07)  HR: 79 (2019 05:04) (75 - 79)  BP: 147/79 (2019 05:04) (135/75 - 160/75)  BP(mean): --  RR: 18 (2019 05:04) (18 - 18)  SpO2: 96% (2019 05:04) (94% - 96%)    19 @ 07:01  -  19 @ 07:00  --------------------------------------------------------  IN: 0 mL / OUT: 4 mL / NET: -4 mL        PHYSICAL EXAM:    Constitutional: Elderly female lying flat in bed due to back pain, NAD.  HEENT: NC/AT, PERRLA, EOMI, no conjunctival pallor or scleral icterus, MMM  Neck: Supple, no JVD  Respiratory: CTA B/L. No w/r/r.   Cardiovascular: RRR, normal S1 and S2, no m/r/g.   Gastrointestinal: +BS, soft NTND, no guarding or rebound tenderness, no palpable masses   Extremities: wwp; no cyanosis, clubbing or edema.   MSK: Central back tenderness with pain exacerbated by movement. negative straight leg test.  Vascular: Pulses equal and strong throughout.   Neurological: AAOx3, no CN deficits, strength and sensation intact throughout.   Skin: No gross skin abnormalities or rashes    MEDICATIONS:  MEDICATIONS  (STANDING):  aspirin  chewable 81 milliGRAM(s) Oral daily  calcium carbonate 1250 mG  + Vitamin D (OsCal 500 + D) 1 Tablet(s) Oral daily  enoxaparin Injectable 40 milliGRAM(s) SubCutaneous every 24 hours  senna 2 Tablet(s) Oral at bedtime    MEDICATIONS  (PRN):  oxycodone    5 mG/acetaminophen 325 mG 1 Tablet(s) Oral every 4 hours PRN Severe Pain (7 - 10)  polyethylene glycol 3350 17 Gram(s) Oral daily PRN Constipation      ALLERGIES:  Allergies    No Known Allergies    Intolerances        LABS:                        13.8   6.28  )-----------( 351      ( 2019 08:14 )             43.4         139  |  103  |  10  ----------------------------<  96  4.8   |  27  |  0.85    Ca    9.2      2019 06:22    TPro  6.5  /  Alb  3.7  /  TBili  0.4  /  DBili  x   /  AST  14  /  ALT  8<L>  /  AlkPhos  103        Urinalysis Basic - ( 2019 14:59 )    Color: Yellow / Appearance: Clear / S.010 / pH: x  Gluc: x / Ketone: NEGATIVE  / Bili: NEGATIVE / Urobili: 0.2 E.U./dL   Blood: x / Protein: NEGATIVE mg/dL / Nitrite: NEGATIVE   Leuk Esterase: NEGATIVE / RBC: x / WBC x   Sq Epi: x / Non Sq Epi: x / Bacteria: x      CAPILLARY BLOOD GLUCOSE            RADIOLOGY & ADDITIONAL TESTS: Reviewed.    < from: Xray Spine Entire Thoracolumbar 2 or 3 Views (19 @ 10:06) >  EXAM:  XR SPINE ENTIRE THOR LUM 2-3V                          PROCEDURE DATE:  2019          INTERPRETATION:  CLINICAL INFORMATION: T12 and L5 compression fractures   with history of L3-L4 compression fractures status post kyphoplasty.    TECHNIQUE: AP and lateral views of the thoracic and lumbar spine.    COMPARISON: Lumbar spine CT 2019, CT thoracic and lumbar spine   2019.    FINDINGS:  Generalized osteopenia.  Lumbar dextrocurvature.  Mild compression deformity of T12 appears slightly worse since 2019.  Moderate anterior wedge compression deformity of L1 appears intervally   unchanged.  L2-L4 vertebral augmentation cement with stable moderate compression   deformities.  Moderate L5 compression deformity is intervally unchanged.  No new compression deformities in the remainder of the thoracic spine.  Extensive aortoiliac atherosclerotic calcifications.  The remaining visualized soft tissues are within normal limits.      IMPRESSION:  Mild compression deformity ofT12 appears slightly worse since 2019.   Stable moderate compression deformities of L1 and L5.   L2-L4 vertebral augmentation with stable moderate vertebral compression   deformities.        ASSESSMENT:    PLAN:

## 2019-11-09 PROCEDURE — 99231 SBSQ HOSP IP/OBS SF/LOW 25: CPT

## 2019-11-09 RX ADMIN — OXYCODONE AND ACETAMINOPHEN 1 TABLET(S): 5; 325 TABLET ORAL at 16:07

## 2019-11-09 RX ADMIN — Medication 1 TABLET(S): at 12:44

## 2019-11-09 RX ADMIN — LISINOPRIL 10 MILLIGRAM(S): 2.5 TABLET ORAL at 06:15

## 2019-11-09 RX ADMIN — Medication 81 MILLIGRAM(S): at 12:44

## 2019-11-09 RX ADMIN — ENOXAPARIN SODIUM 40 MILLIGRAM(S): 100 INJECTION SUBCUTANEOUS at 09:44

## 2019-11-09 RX ADMIN — POLYETHYLENE GLYCOL 3350 17 GRAM(S): 17 POWDER, FOR SOLUTION ORAL at 16:07

## 2019-11-09 RX ADMIN — OXYCODONE AND ACETAMINOPHEN 1 TABLET(S): 5; 325 TABLET ORAL at 17:00

## 2019-11-09 RX ADMIN — SENNA PLUS 2 TABLET(S): 8.6 TABLET ORAL at 21:44

## 2019-11-09 NOTE — PROGRESS NOTE ADULT - ASSESSMENT
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 Now waiting PT prior to [facility].    #Acute T12/L5 Compression Fracture  -- c/w Percocet PRN  -- c/w bowel regimen   -- patient requires frequent teaching on how to use PRN  -- needs PT eval    #HTN  -- c/w Lisinopril      DVT PPx - Lovenox  Dispo - likely ALIS    Lynne Silva  [phone]

## 2019-11-09 NOTE — PROGRESS NOTE ADULT - SUBJECTIVE AND OBJECTIVE BOX
to follow consult note: NEUROSURGERY:  TLSO brace at bedside.  Patient admits that back pain improving.  T&L spine xr performed without TLSO brace: IMPRESSION:  Mild compression deformity ofT12 appears slightly worse since 11/6/2019.   Stable moderate compression deformities of L1 and L5.   L2-L4 vertebral augmentation with stable moderate vertebral compression   deformities.  Exam is stable No focal motor deficit. 5//5x 4  Plan: No neurosurgical intervention  TLSO brace for OOB.  Patient should follow up with surgeon who  performed kyphoplasty.  Case discussed with Dr. Dorian RAY.

## 2019-11-09 NOTE — PROGRESS NOTE ADULT - SUBJECTIVE AND OBJECTIVE BOX
INTERVAL EVENTS:  -- NAEO  -- brace delivered; now awaiting PT    SUBJECTIVE:  -- c/o improving LBP with Percocet  -- good appetite; no BM but passing flatus  -- states no changes in bowel or urinary habits  -- no changed in LE strength or sensation    MEDICATIONS:  MEDICATIONS  (STANDING):  aspirin  chewable 81 milliGRAM(s) Oral daily  calcium carbonate 1250 mG  + Vitamin D (OsCal 500 + D) 1 Tablet(s) Oral daily  enoxaparin Injectable 40 milliGRAM(s) SubCutaneous every 24 hours  lisinopril 10 milliGRAM(s) Oral daily  senna 2 Tablet(s) Oral at bedtime    MEDICATIONS  (PRN):  oxycodone    5 mG/acetaminophen 325 mG 1 Tablet(s) Oral every 4 hours PRN Severe Pain (7 - 10)  polyethylene glycol 3350 17 Gram(s) Oral daily PRN Constipation    Allergies: No Known Allergies    OBJECTIVE:  Vital Signs Last 24 Hrs  T(C): 36.5 (09 Nov 2019 09:26), Max: 36.9 (08 Nov 2019 15:40)  T(F): 97.7 (09 Nov 2019 09:26), Max: 98.4 (08 Nov 2019 15:40)  HR: 97 (09 Nov 2019 09:26) (76 - 97)  BP: 124/72 (09 Nov 2019 09:26) (97/58 - 131/62)  BP(mean): --  RR: 18 (09 Nov 2019 09:26) (18 - 20)  SpO2: 95% (09 Nov 2019 09:26) (95% - 96%)  I&O's Summary    PHYSICAL EXAM:  Gen: NAD, lying at 15 deg  HEENT: NCAT, MMM, clear OP  CV: RRR, no m/g/r appreciated  Pulm: CTA B, no w/r/r; no increase in WOB  Abd: normoactive BS, soft, NTND  Ext: WWP, 2+ pulses x4; no c/e/e  Neuro: AOx3, nonfocal; BLE 5/5 and with intact sensation  Psych: pleasant, conversant and appropriate    LABS:                        13.8   6.28  )-----------( 351      ( 08 Nov 2019 08:14 )             43.4     11-08    139  |  103  |  10  ----------------------------<  96  4.8   |  27  |  0.85    Ca    9.2      08 Nov 2019 06:22    TPro  6.5  /  Alb  3.7  /  TBili  0.4  /  DBili  x   /  AST  14  /  ALT  8<L>  /  AlkPhos  103  11-08    MICRODATA:  -- No new data.    RADIOLOGY/OTHER STUDIES:  -- No new imaging.

## 2019-11-10 PROCEDURE — 99231 SBSQ HOSP IP/OBS SF/LOW 25: CPT

## 2019-11-10 RX ADMIN — OXYCODONE AND ACETAMINOPHEN 1 TABLET(S): 5; 325 TABLET ORAL at 10:22

## 2019-11-10 RX ADMIN — LISINOPRIL 10 MILLIGRAM(S): 2.5 TABLET ORAL at 06:32

## 2019-11-10 RX ADMIN — ENOXAPARIN SODIUM 40 MILLIGRAM(S): 100 INJECTION SUBCUTANEOUS at 10:22

## 2019-11-10 RX ADMIN — Medication 1 TABLET(S): at 13:05

## 2019-11-10 RX ADMIN — OXYCODONE AND ACETAMINOPHEN 1 TABLET(S): 5; 325 TABLET ORAL at 11:10

## 2019-11-10 RX ADMIN — Medication 81 MILLIGRAM(S): at 13:05

## 2019-11-10 NOTE — PROGRESS NOTE ADULT - REASON FOR ADMISSION
Back pain 2/2 compression fracture

## 2019-11-10 NOTE — PHYSICAL THERAPY INITIAL EVALUATION ADULT - LEVEL OF INDEPENDENCE: SIT/SUPINE, REHAB EVAL
moderate assist (50% patients effort)/maximum assist (25% patients effort)/at BLE to sidelying; modA at trunk to supinee

## 2019-11-10 NOTE — PHYSICAL THERAPY INITIAL EVALUATION ADULT - PLANNED THERAPY INTERVENTIONS, PT EVAL
balance training/gait training/postural re-education/bed mobility training/neuromuscular re-education/transfer training

## 2019-11-10 NOTE — PROGRESS NOTE ADULT - PROBLEM SELECTOR PLAN 1
Patient with severe low back pain found to have compression fracture of T12 and L5. Previous fracture of L3, L4 s/p kyphoplasty of L2, L3, L4 3 weeks ago.  No signs and sxs of spinal cord compression on exam.  -per NSG recs- TLSO brace ordered; at bedside  -PT evaluation  -OT evaluation  -pain control with Percocet PRN  -Bowel regimen while on pain medications

## 2019-11-10 NOTE — PROGRESS NOTE ADULT - SUBJECTIVE AND OBJECTIVE BOX
OVERNIGHT EVENTS: No acute events overnight.  SUBJECTIVE: Patient seen and examined at the bedside. Patient endorses pain is well-managed on current regimen. No acute complaints at this time. 12-point ROS reviewed and is negative.  TLSO brace at bedside; patient eager to work with PT today.    Vital Signs Last 12 Hrs  T(F): 98 (11-10-19 @ 08:43), Max: 98 (11-09-19 @ 21:27)  HR: 86 (11-10-19 @ 08:43) (80 - 90)  BP: 132/74 (11-10-19 @ 08:43) (121/69 - 132/74)  BP(mean): --  RR: 18 (11-10-19 @ 08:43) (17 - 19)  SpO2: 94% (11-10-19 @ 08:43) (94% - 96%)      PHYSICAL EXAM:  Constitutional: NAD, AAOx3  HEENT: NC/AT, PERRLA, EOMI, no conjunctival pallor or scleral icterus, MMM  Neck: Supple, no JVD  Respiratory: CTA B/L. No w/r/r.   Cardiovascular: RRR, normal S1 and S2, no m/r/g.   Gastrointestinal: +BS, soft NTND, no guarding or rebound tenderness, no palpable masses   Extremities: wwp; no cyanosis, clubbing or edema.   MSK: midline back pain exacerbated by movement  Vascular: 2+ radial/dp pulses b/l  Neurological: AAOx3, no CN deficits, strength and sensation intact throughout.     LABS:      RADIOLOGY & ADDITIONAL TESTS: Reviewed    MEDICATIONS  (STANDING):  aspirin  chewable 81 milliGRAM(s) Oral daily  calcium carbonate 1250 mG  + Vitamin D (OsCal 500 + D) 1 Tablet(s) Oral daily  enoxaparin Injectable 40 milliGRAM(s) SubCutaneous every 24 hours  lisinopril 10 milliGRAM(s) Oral daily  senna 2 Tablet(s) Oral at bedtime    MEDICATIONS  (PRN):  oxycodone    5 mG/acetaminophen 325 mG 1 Tablet(s) Oral every 4 hours PRN Severe Pain (7 - 10)  polyethylene glycol 3350 17 Gram(s) Oral daily PRN Constipation

## 2019-11-10 NOTE — PROGRESS NOTE ADULT - PROBLEM SELECTOR PLAN 5
1) PCP Contacted on Admission: N --> Dr. Moss [phone]  2) Date of Contact with PCP:  3) PCP Contacted at Discharge: (Y/N)  4) Summary of Handoff Given to PCP:   5) Post-Discharge Appointment Date and Location:

## 2019-11-10 NOTE — PHYSICAL THERAPY INITIAL EVALUATION ADULT - PERTINENT HX OF CURRENT PROBLEM, REHAB EVAL
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  Previous fracture of L3, L4 s/p kyphoplasty of L2, L3, L4 3 weeks ago.  No signs and sxs of spinal cord compression on exam.

## 2019-11-11 ENCOUNTER — TRANSCRIPTION ENCOUNTER (OUTPATIENT)
Age: 84
End: 2019-11-11

## 2019-11-11 VITALS
DIASTOLIC BLOOD PRESSURE: 70 MMHG | RESPIRATION RATE: 13 BRPM | TEMPERATURE: 98 F | HEART RATE: 76 BPM | SYSTOLIC BLOOD PRESSURE: 116 MMHG | OXYGEN SATURATION: 98 %

## 2019-11-11 PROCEDURE — 99285 EMERGENCY DEPT VISIT HI MDM: CPT

## 2019-11-11 PROCEDURE — 97162 PT EVAL MOD COMPLEX 30 MIN: CPT

## 2019-11-11 PROCEDURE — 99239 HOSP IP/OBS DSCHRG MGMT >30: CPT

## 2019-11-11 PROCEDURE — 81003 URINALYSIS AUTO W/O SCOPE: CPT

## 2019-11-11 PROCEDURE — 85027 COMPLETE CBC AUTOMATED: CPT

## 2019-11-11 PROCEDURE — 72082 X-RAY EXAM ENTIRE SPI 2/3 VW: CPT

## 2019-11-11 PROCEDURE — 80053 COMPREHEN METABOLIC PANEL: CPT

## 2019-11-11 PROCEDURE — 36415 COLL VENOUS BLD VENIPUNCTURE: CPT

## 2019-11-11 PROCEDURE — 97161 PT EVAL LOW COMPLEX 20 MIN: CPT

## 2019-11-11 PROCEDURE — 80048 BASIC METABOLIC PNL TOTAL CA: CPT

## 2019-11-11 PROCEDURE — 72131 CT LUMBAR SPINE W/O DYE: CPT

## 2019-11-11 RX ADMIN — OXYCODONE AND ACETAMINOPHEN 1 TABLET(S): 5; 325 TABLET ORAL at 07:24

## 2019-11-11 RX ADMIN — LISINOPRIL 10 MILLIGRAM(S): 2.5 TABLET ORAL at 06:36

## 2019-11-11 RX ADMIN — OXYCODONE AND ACETAMINOPHEN 1 TABLET(S): 5; 325 TABLET ORAL at 06:56

## 2019-11-11 RX ADMIN — ENOXAPARIN SODIUM 40 MILLIGRAM(S): 100 INJECTION SUBCUTANEOUS at 10:34

## 2019-11-11 RX ADMIN — Medication 81 MILLIGRAM(S): at 10:34

## 2019-11-11 RX ADMIN — Medication 1 TABLET(S): at 10:34

## 2019-11-11 NOTE — OCCUPATIONAL THERAPY INITIAL EVALUATION ADULT - IMPAIRED TRANSFERS: TOILET, REHAB EVAL
impaired coordination/decreased strength/impaired balance/impaired postural control/decreased flexibility

## 2019-11-11 NOTE — OCCUPATIONAL THERAPY INITIAL EVALUATION ADULT - MD ORDER
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  Patient reports that she had kyphoplasty about 3 weeks ago for prior compression fractures but noted worsening of back pain.

## 2019-11-11 NOTE — OCCUPATIONAL THERAPY INITIAL EVALUATION ADULT - GENERAL OBSERVATIONS, REHAB EVAL
Pt is right hand dominant. Pt's ANT Lopez aware of intent to eval/tx; Cleared Pt. Pt received in semi martin - +heplock, bed alarm. Pt agreeable to OT.

## 2019-11-11 NOTE — OCCUPATIONAL THERAPY INITIAL EVALUATION ADULT - IMPAIRMENTS CONTRIBUTING IMPAIRED BED MOBILITY, REHAB EVAL
impaired coordination/impaired balance/impaired postural control/decreased flexibility/decreased strength

## 2019-11-11 NOTE — OCCUPATIONAL THERAPY INITIAL EVALUATION ADULT - PLANNED THERAPY INTERVENTIONS, OT EVAL
strengthening/ADL retraining/balance training/bed mobility training/fine motor coordination training/motor coordination training/transfer training/neuromuscular re-education/orthotic fitting/training

## 2019-11-11 NOTE — CHART NOTE - NSCHARTNOTEFT_GEN_A_CORE
Admitting Diagnosis:   Patient is a 87y old  Female who presents with a chief complaint of Back pain 2/2 compression fracture (10 Nov 2019 09:05)      PAST MEDICAL & SURGICAL HISTORY:  SBO (small bowel obstruction)  S/P wrist surgery      Current Nutrition Order: regular, Ensure Enlive x1/day (350kcal and 20gms protein)     PO Intake: Good (%) [   ]  Fair (50-75%) [  x ] Poor (<25%) [   ]    GI Issues: Denies N/V/C/D with last BM 11/11    Pain: Not reporting pain at this time     Skin Integrity: No edema, no pressure injury    Labs:         CAPILLARY BLOOD GLUCOSE          Medications:  MEDICATIONS  (STANDING):  aspirin  chewable 81 milliGRAM(s) Oral daily  calcium carbonate 1250 mG  + Vitamin D (OsCal 500 + D) 1 Tablet(s) Oral daily  enoxaparin Injectable 40 milliGRAM(s) SubCutaneous every 24 hours  lisinopril 10 milliGRAM(s) Oral daily  senna 2 Tablet(s) Oral at bedtime    MEDICATIONS  (PRN):  oxycodone    5 mG/acetaminophen 325 mG 1 Tablet(s) Oral every 4 hours PRN Severe Pain (7 - 10)  polyethylene glycol 3350 17 Gram(s) Oral daily PRN Constipation      Weight:  Admit wt- 42.7kg    Weight Change: No new wts to assess    Nutrition Focused Physical Exam: Completed [ x, 11/7  ]  Not Pertinent [   ] - age appropriate findings    Estimated energy needs:   Ht: 5'2", Wt: 42.7kg, IBW: 50kg, 85.4%IBW.  Needs calculated based on [facility] standards of care. Needs adjusted for age, to promote wt gain. Upper end of kcal and protein range should be used.  20-25kcal/kg= 854-1067kcal  1.0-1.2gms pro/kg= 42-52gms pro  30-35ml/kg= 1281-1494ml water    Subjective:   Pt seen for nutrition follow up. 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 Pt seen resting in bed, sleeping but easily aroused. Pt is on regular diet, reporting improved PO intake (previously reported 50% consumption of meal tray), also consuming 100% of Ensure Enlive (requesting to increase to BID). Diet education provided. Nutrition recs below. RD to follow up per protocol.    Previous Nutrition Diagnosis:  Inadequate Oral Intake RT lack of appetite AEB pt reports, consuming 50% of meal trays.    Active [ x  ]  Resolved [   ] - improved    Goal:  Pt to consume >75% EER consistently    Recommendations:  1. Recommend c/w regular diet  2. Recommend increase Ensure Enlive to BID (700kcal and 40gms protein)- per pt request  3. Pain and bowel regimen per team  4. Diet edu provided    Education: Reinforced previous diet education: Encouraged PO intake, encouraged consumption of protein rich, energy dense foods, encouraged consumption of ONS in between meals, encouraged small frequent meals when appetite is lacking.    Risk Level: High [   ] Moderate [ x  ] Low [   ]

## 2019-11-11 NOTE — DISCHARGE NOTE NURSING/CASE MANAGEMENT/SOCIAL WORK - PATIENT PORTAL LINK FT
You can access the FollowMyHealth Patient Portal offered by [facility] by registering at the following website: [email or web address]. By joining [facility]’s FollowMyHealth portal, you will also be able to view your health information using other applications (apps) compatible with our system.

## 2019-11-11 NOTE — OCCUPATIONAL THERAPY INITIAL EVALUATION ADULT - MODIFIED CLINICAL TEST OF SENSORY INTEGRATION IN BALANCE TEST
Pt performed simulated household mobility/safety activities - room<>hallway ~28ftx2 with RW and Min A w/ cues for  - +decreased endurance intermittent standing rest periods given

## 2019-11-11 NOTE — OCCUPATIONAL THERAPY INITIAL EVALUATION ADULT - LEVEL OF INDEPENDENCE:TOILET, OT EVAL
To perform clothing management and arden-care hygiene at toilet level/maximum assist (25% patients effort)

## 2019-11-11 NOTE — OCCUPATIONAL THERAPY INITIAL EVALUATION ADULT - ADDITIONAL COMMENTS
Pt lives alone in apt complex with elevator and ramp access. Pt reports that she was independent in her ADLs and IADLs prior to admission. Pt also reports that she has a rollator for household and outdoor mobility. Only used [facility] services when she has a need/not feeling well.

## 2019-11-13 DIAGNOSIS — Z87.891 PERSONAL HISTORY OF NICOTINE DEPENDENCE: ICD-10-CM

## 2019-11-13 DIAGNOSIS — M48.56XA COLLAPSED VERTEBRA, NOT ELSEWHERE CLASSIFIED, LUMBAR REGION, INITIAL ENCOUNTER FOR FRACTURE: ICD-10-CM

## 2019-11-13 DIAGNOSIS — M48.54XA COLLAPSED VERTEBRA, NOT ELSEWHERE CLASSIFIED, THORACIC REGION, INITIAL ENCOUNTER FOR FRACTURE: ICD-10-CM

## 2019-11-13 DIAGNOSIS — R63.6 UNDERWEIGHT: ICD-10-CM

## 2019-11-13 DIAGNOSIS — I10 ESSENTIAL (PRIMARY) HYPERTENSION: ICD-10-CM

## 2019-11-13 DIAGNOSIS — Z98.890 OTHER SPECIFIED POSTPROCEDURAL STATES: ICD-10-CM

## 2019-11-13 DIAGNOSIS — Z87.19 PERSONAL HISTORY OF OTHER DISEASES OF THE DIGESTIVE SYSTEM: ICD-10-CM

## 2020-05-10 ENCOUNTER — EMERGENCY (EMERGENCY)
Facility: HOSPITAL | Age: 85
LOS: 1 days | Discharge: HOME | End: 2020-05-10
Attending: EMERGENCY MEDICINE | Admitting: EMERGENCY MEDICINE
Payer: MEDICARE

## 2020-05-10 VITALS
HEIGHT: 61.5 IN | TEMPERATURE: 98 F | WEIGHT: 89.95 LBS | OXYGEN SATURATION: 99 % | SYSTOLIC BLOOD PRESSURE: 154 MMHG | RESPIRATION RATE: 16 BRPM | DIASTOLIC BLOOD PRESSURE: 80 MMHG | HEART RATE: 86 BPM

## 2020-05-10 DIAGNOSIS — S09.90XA UNSPECIFIED INJURY OF HEAD, INITIAL ENCOUNTER: ICD-10-CM
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
## 2020-05-10 DIAGNOSIS — Z98.890 OTHER SPECIFIED POSTPROCEDURAL STATES: Chronic | ICD-10-CM
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
## 2020-05-10 DIAGNOSIS — S00.03XA CONTUSION OF SCALP, INITIAL ENCOUNTER: ICD-10-CM
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
## 2020-05-10 DIAGNOSIS — S60.512A ABRASION OF LEFT HAND, INITIAL ENCOUNTER: ICD-10-CM

## 2020-05-10 DIAGNOSIS — M25.552 PAIN IN LEFT HIP: ICD-10-CM
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
## 2020-05-10 PROBLEM — K56.609 UNSPECIFIED INTESTINAL OBSTRUCTION, UNSPECIFIED AS TO PARTIAL VERSUS COMPLETE OBSTRUCTION: Chronic | Status: ACTIVE | Noted: 2019-11-07

## 2020-05-10 PROCEDURE — 72192 CT PELVIS W/O DYE: CPT | Mod: 26

## 2020-05-10 PROCEDURE — 72131 CT LUMBAR SPINE W/O DYE: CPT | Mod: 26

## 2020-05-10 PROCEDURE — 70450 CT HEAD/BRAIN W/O DYE: CPT | Mod: 26

## 2020-05-10 PROCEDURE — 99284 EMERGENCY DEPT VISIT MOD MDM: CPT

## 2020-05-10 RX ORDER — BACITRACIN ZINC 500 UNIT/G
1 OINTMENT IN PACKET (EA) TOPICAL ONCE
Refills: 0 | Status: COMPLETED | OUTPATIENT
Start: 2020-05-10 | End: 2020-05-10

## 2020-05-10 RX ORDER — ACETAMINOPHEN 500 MG
650 TABLET ORAL ONCE
Refills: 0 | Status: COMPLETED | OUTPATIENT
Start: 2020-05-10 | End: 2020-05-10

## 2020-05-10 RX ORDER — IBUPROFEN 200 MG
600 TABLET ORAL ONCE
Refills: 0 | Status: COMPLETED | OUTPATIENT
Start: 2020-05-10 | End: 2020-05-10

## 2020-05-10 RX ADMIN — Medication 650 MILLIGRAM(S): at 15:22

## 2020-05-10 RX ADMIN — Medication 600 MILLIGRAM(S): at 15:22

## 2020-05-10 RX ADMIN — Medication 1 APPLICATION(S): at 13:45

## 2020-05-10 NOTE — ED PROVIDER NOTE - OBJECTIVE STATEMENT
86 y/o F, fully functional, with no reported PMHx or daily medication use, presents to the ED via EMS s/p fall. Pt reports she ambulates with her walker at baseline. Today, she had a large number of bags hanging from her walker as she was exiting the elevator in her building. Pt states the wheel got stuck while exiting and she subsequently fell backwards with head strike against the floor and an abrasion to the L hand. Pt denies having any LOC at the time and is able to remember all the events that occurred. After falling, she was able to crawl to the neighbors door who called EMS to bring her to the ED. On interview, Pt endorses a mild HA and mild L hip pain. She denies visual symptoms, N/V, focal numbness, weakness and tingling. Of note, Tetanus is up to date. Not requiring pain medications at this time.

## 2020-05-10 NOTE — ED PROVIDER NOTE - CLINICAL SUMMARY MEDICAL DECISION MAKING FREE TEXT BOX
86 y/o F presenting with a mild HA and mild L hip pain s/p fall. On exam, Pt has an occipital hematoma that is without bleeding and pain to the L hip with ROM testing. Pt declining offer for pain medications at this time. Will treat wound with Bacitracin. Plan for CT imaging of the head,  L-spine and pelvis. Will reassess afterwards. 86 y/o F presenting with a mild HA and mild L hip pain s/p fall. On exam, Pt has an occipital hematoma that is without bleeding and pain to the L hip with ROM testing. Pt declining offer for pain medications at this time. Will treat wound with Bacitracin. Plan for CT imaging of the head,  L-spine and pelvis.     No acute findings on imaging, pain well controlled, neuro intact. d/c home.

## 2020-05-10 NOTE — ED ADULT NURSE NOTE - OBJECTIVE STATEMENT
pt brought in by ems s/p fall while exiting the elevator. pt complains of left arm and left pelvic pain s/p fall. hit head, denies blood thinner use. pt is alert and oriented x4. normally ambulates with a walker. noted with abrasion to left hand. bacitracin applied.

## 2020-05-10 NOTE — ED ADULT TRIAGE NOTE - CHIEF COMPLAINT QUOTE
BIBA from home s/p trip and fall while getting off the elevator with walker. +hit head; with hematoma to posterior scalp and mild HA, no LOC, no blood thinners, no neck pain , also c/o diffuse lower back pain "but that always hurts" + CAI.

## 2020-05-10 NOTE — ED PROVIDER NOTE - NSFOLLOWUPINSTRUCTIONS_ED_ALL_ED_FT
Log Out.    [employer] CareNotes®     :  [facility]             HEAD INJURY - AfterCare(R) Instructions(ER/ED)     Head Injury    WHAT YOU NEED TO KNOW:    A head injury can include your scalp, face, skull, or brain and range from mild to severe. Effects can appear immediately after the injury or develop later. The effects may last a short time or be permanent. Healthcare providers may want to check your recovery over time. Treatment may change as you recover or develop new health problems from the head injury.    DISCHARGE INSTRUCTIONS:    Call your local emergency number (911 in the [place]), or have someone else call if:     You cannot be woken.      You have a seizure.      You stop responding to others or you faint.      You have blurry or double vision.      Your speech becomes slurred or confused.      You have arm or leg weakness, loss of feeling, or new problems with coordination.      Your pupils are larger than usual, or one pupil is a different size than the other.      You have blood or clear fluid coming out of your ears or nose.    Return to the emergency department if:     You have repeated or forceful vomiting.      You feel confused.      Your headache gets worse or becomes severe.      You or someone caring for you notices that you are harder to wake than usual.    Call your doctor if:     Your symptoms last longer than 6 weeks after the injury.      You have questions or concerns about your condition or care.    Medicines:     Acetaminophen decreases pain and fever. It is available without a doctor's order. Ask how much to take and how often to take it. Follow directions. Read the labels of all other medicines you are using to see if they also contain acetaminophen, or ask your doctor or pharmacist. Acetaminophen can cause liver damage if not taken correctly. Do not use more than 4 grams (4,000 milligrams) total of acetaminophen in one day.       Take your medicine as directed. Contact your healthcare provider if you think your medicine is not helping or if you have side effects. Tell him or her if you are allergic to any medicine. Keep a list of the medicines, vitamins, and herbs you take. Include the amounts, and when and why you take them. Bring the list or the pill bottles to follow-up visits. Carry your medicine list with you in case of an emergency.    Self-care:     Rest or do quiet activities. Limit your time watching TV, using the computer, or doing tasks that require a lot of thinking. Slowly return to your normal activities as directed. Do not play sports or do activities that may cause you to get hit in the head. Ask your healthcare provider when you can return to sports.      Apply ice on your head for 15 to 20 minutes every hour or as directed. Use an ice pack, or put crushed ice in a plastic bag. Cover it with a towel before you apply it to your skin. Ice helps prevent tissue damage and decreases swelling and pain.      Have someone stay with you for 24 hours , or as directed. This person can monitor you for problems and call for help if needed. When you are awake, the person should ask you a few questions every few hours to see if you are thinking clearly. An example is to ask your name or address.    Prevent another head injury:     Wear a helmet that fits properly. Do this when you play sports, or ride a bike, scooter, or skateboard. Helmets help decrease your risk for a serious head injury. Talk to your healthcare provider about other ways you can protect yourself if you play sports.      Wear your seatbelt every time you are in a car. This helps lower your risk for a head injury if you are in a car accident.    Follow up with your doctor as directed: Write down your questions so you remember to ask them during your visits.

## 2020-05-10 NOTE — ED PROVIDER NOTE - PHYSICAL EXAMINATION
VITAL SIGNS: I have reviewed nursing notes and confirm.  CONSTITUTIONAL: Well-developed; well-nourished; in no acute distress.  SKIN: Abrasion to the dorsum of the L hand.   HEAD: Occipital hematoma with no bleeding or skeletal deformity.   EYES: PERRL, EOM intact; conjunctiva and sclera clear.  ENT: No nasal discharge; airway clear.  NECK: Supple; non tender.  CARD: S1, S2 normal; no murmurs, gallops, or rubs. Regular rate and rhythm.  RESP: Unlabored. No wheezes, rales or rhonchi.  ABD: soft; non-distended; non-tender  EXT: Pain to the L hip with ROM testing. No shortening or rotation of the leg.   LYMPH: No acute cervical adenopathy.  NEURO: Alert, oriented. Grossly unremarkable.  PSYCH: Cooperative, appropriate.

## 2020-05-10 NOTE — ED PROVIDER NOTE - PATIENT PORTAL LINK FT
You can access the FollowMyHealth Patient Portal offered by [facility] by registering at the following website: [email or web address]. By joining [employer]’s FollowMyHealth portal, you will also be able to view your health information using other applications (apps) compatible with our system.

## 2020-09-28 ENCOUNTER — INPATIENT (INPATIENT)
Facility: HOSPITAL | Age: 85
LOS: 1 days | Discharge: HOME HEALTH | DRG: 602 | End: 2020-09-30
Attending: HOSPITALIST | Admitting: INTERNAL MEDICINE
Payer: MEDICARE

## 2020-09-28 VITALS
HEART RATE: 84 BPM | WEIGHT: 89.95 LBS | TEMPERATURE: 98 F | RESPIRATION RATE: 18 BRPM | OXYGEN SATURATION: 98 % | SYSTOLIC BLOOD PRESSURE: 159 MMHG | DIASTOLIC BLOOD PRESSURE: 81 MMHG | HEIGHT: 61.5 IN

## 2020-09-28 DIAGNOSIS — L03.115 CELLULITIS OF RIGHT LOWER LIMB: ICD-10-CM

## 2020-09-28 DIAGNOSIS — R63.8 OTHER SYMPTOMS AND SIGNS CONCERNING FOOD AND FLUID INTAKE: ICD-10-CM

## 2020-09-28 DIAGNOSIS — Z86.79 PERSONAL HISTORY OF OTHER DISEASES OF THE CIRCULATORY SYSTEM: ICD-10-CM

## 2020-09-28 DIAGNOSIS — Z87.19 PERSONAL HISTORY OF OTHER DISEASES OF THE DIGESTIVE SYSTEM: ICD-10-CM

## 2020-09-28 DIAGNOSIS — Z29.9 ENCOUNTER FOR PROPHYLACTIC MEASURES, UNSPECIFIED: ICD-10-CM

## 2020-09-28 DIAGNOSIS — M54.5 LOW BACK PAIN: ICD-10-CM

## 2020-09-28 DIAGNOSIS — Z98.890 OTHER SPECIFIED POSTPROCEDURAL STATES: Chronic | ICD-10-CM

## 2020-09-28 DIAGNOSIS — D64.9 ANEMIA, UNSPECIFIED: ICD-10-CM

## 2020-09-28 LAB
ALBUMIN SERPL ELPH-MCNC: 3.2 G/DL — LOW (ref 3.3–5)
ALBUMIN SERPL ELPH-MCNC: 3.5 G/DL — SIGNIFICANT CHANGE UP (ref 3.3–5)
ALP SERPL-CCNC: 75 U/L — SIGNIFICANT CHANGE UP (ref 40–120)
ALP SERPL-CCNC: SIGNIFICANT CHANGE UP U/L (ref 40–120)
ALT FLD-CCNC: <5 U/L — LOW (ref 10–45)
ALT FLD-CCNC: SIGNIFICANT CHANGE UP U/L (ref 10–45)
ANION GAP SERPL CALC-SCNC: 12 MMOL/L — SIGNIFICANT CHANGE UP (ref 5–17)
ANION GAP SERPL CALC-SCNC: 12 MMOL/L — SIGNIFICANT CHANGE UP (ref 5–17)
AST SERPL-CCNC: 11 U/L — SIGNIFICANT CHANGE UP (ref 10–40)
AST SERPL-CCNC: SIGNIFICANT CHANGE UP U/L (ref 10–40)
BASOPHILS # BLD AUTO: 0.03 K/UL — SIGNIFICANT CHANGE UP (ref 0–0.2)
BASOPHILS NFR BLD AUTO: 0.3 % — SIGNIFICANT CHANGE UP (ref 0–2)
BILIRUB SERPL-MCNC: 0.2 MG/DL — SIGNIFICANT CHANGE UP (ref 0.2–1.2)
BILIRUB SERPL-MCNC: 0.3 MG/DL — SIGNIFICANT CHANGE UP (ref 0.2–1.2)
BUN SERPL-MCNC: 23 MG/DL — SIGNIFICANT CHANGE UP (ref 7–23)
BUN SERPL-MCNC: 23 MG/DL — SIGNIFICANT CHANGE UP (ref 7–23)
CALCIUM SERPL-MCNC: 9 MG/DL — SIGNIFICANT CHANGE UP (ref 8.4–10.5)
CALCIUM SERPL-MCNC: 9.1 MG/DL — SIGNIFICANT CHANGE UP (ref 8.4–10.5)
CHLORIDE SERPL-SCNC: 102 MMOL/L — SIGNIFICANT CHANGE UP (ref 96–108)
CHLORIDE SERPL-SCNC: 103 MMOL/L — SIGNIFICANT CHANGE UP (ref 96–108)
CO2 SERPL-SCNC: 25 MMOL/L — SIGNIFICANT CHANGE UP (ref 22–31)
CO2 SERPL-SCNC: 25 MMOL/L — SIGNIFICANT CHANGE UP (ref 22–31)
CREAT SERPL-MCNC: 0.96 MG/DL — SIGNIFICANT CHANGE UP (ref 0.5–1.3)
CREAT SERPL-MCNC: 0.98 MG/DL — SIGNIFICANT CHANGE UP (ref 0.5–1.3)
EOSINOPHIL # BLD AUTO: 0.07 K/UL — SIGNIFICANT CHANGE UP (ref 0–0.5)
EOSINOPHIL NFR BLD AUTO: 0.7 % — SIGNIFICANT CHANGE UP (ref 0–6)
GLUCOSE SERPL-MCNC: 93 MG/DL — SIGNIFICANT CHANGE UP (ref 70–99)
GLUCOSE SERPL-MCNC: 96 MG/DL — SIGNIFICANT CHANGE UP (ref 70–99)
GRAM STN FLD: SIGNIFICANT CHANGE UP
HCT VFR BLD CALC: 36.2 % — SIGNIFICANT CHANGE UP (ref 34.5–45)
HGB BLD-MCNC: 11.3 G/DL — LOW (ref 11.5–15.5)
IMM GRANULOCYTES NFR BLD AUTO: 0.4 % — SIGNIFICANT CHANGE UP (ref 0–1.5)
LYMPHOCYTES # BLD AUTO: 3.06 K/UL — SIGNIFICANT CHANGE UP (ref 1–3.3)
LYMPHOCYTES # BLD AUTO: 30.8 % — SIGNIFICANT CHANGE UP (ref 13–44)
MCHC RBC-ENTMCNC: 30.5 PG — SIGNIFICANT CHANGE UP (ref 27–34)
MCHC RBC-ENTMCNC: 31.2 GM/DL — LOW (ref 32–36)
MCV RBC AUTO: 97.8 FL — SIGNIFICANT CHANGE UP (ref 80–100)
MONOCYTES # BLD AUTO: 0.68 K/UL — SIGNIFICANT CHANGE UP (ref 0–0.9)
MONOCYTES NFR BLD AUTO: 6.9 % — SIGNIFICANT CHANGE UP (ref 2–14)
NEUTROPHILS # BLD AUTO: 6.04 K/UL — SIGNIFICANT CHANGE UP (ref 1.8–7.4)
NEUTROPHILS NFR BLD AUTO: 60.9 % — SIGNIFICANT CHANGE UP (ref 43–77)
NRBC # BLD: 0 /100 WBCS — SIGNIFICANT CHANGE UP (ref 0–0)
PLATELET # BLD AUTO: 576 K/UL — HIGH (ref 150–400)
POTASSIUM SERPL-MCNC: 4 MMOL/L — SIGNIFICANT CHANGE UP (ref 3.5–5.3)
POTASSIUM SERPL-MCNC: SIGNIFICANT CHANGE UP MMOL/L (ref 3.5–5.3)
POTASSIUM SERPL-SCNC: 4 MMOL/L — SIGNIFICANT CHANGE UP (ref 3.5–5.3)
POTASSIUM SERPL-SCNC: SIGNIFICANT CHANGE UP MMOL/L (ref 3.5–5.3)
PROT SERPL-MCNC: 6.9 G/DL — SIGNIFICANT CHANGE UP (ref 6–8.3)
PROT SERPL-MCNC: 7.4 G/DL — SIGNIFICANT CHANGE UP (ref 6–8.3)
RBC # BLD: 3.7 M/UL — LOW (ref 3.8–5.2)
RBC # FLD: 13.2 % — SIGNIFICANT CHANGE UP (ref 10.3–14.5)
SARS-COV-2 RNA SPEC QL NAA+PROBE: SIGNIFICANT CHANGE UP
SODIUM SERPL-SCNC: 139 MMOL/L — SIGNIFICANT CHANGE UP (ref 135–145)
SODIUM SERPL-SCNC: 140 MMOL/L — SIGNIFICANT CHANGE UP (ref 135–145)
SPECIMEN SOURCE: SIGNIFICANT CHANGE UP
WBC # BLD: 9.92 K/UL — SIGNIFICANT CHANGE UP (ref 3.8–10.5)
WBC # FLD AUTO: 9.92 K/UL — SIGNIFICANT CHANGE UP (ref 3.8–10.5)

## 2020-09-28 PROCEDURE — 99285 EMERGENCY DEPT VISIT HI MDM: CPT | Mod: CS

## 2020-09-28 PROCEDURE — 99222 1ST HOSP IP/OBS MODERATE 55: CPT | Mod: GC

## 2020-09-28 RX ORDER — VANCOMYCIN HCL 1 G
750 VIAL (EA) INTRAVENOUS EVERY 12 HOURS
Refills: 0 | Status: DISCONTINUED | OUTPATIENT
Start: 2020-09-28 | End: 2020-09-29

## 2020-09-28 RX ORDER — ACETAMINOPHEN 500 MG
650 TABLET ORAL EVERY 6 HOURS
Refills: 0 | Status: DISCONTINUED | OUTPATIENT
Start: 2020-09-28 | End: 2020-09-30

## 2020-09-28 RX ORDER — TETANUS TOXOID, REDUCED DIPHTHERIA TOXOID AND ACELLULAR PERTUSSIS VACCINE, ADSORBED 5; 2.5; 8; 8; 2.5 [IU]/.5ML; [IU]/.5ML; UG/.5ML; UG/.5ML; UG/.5ML
0.5 SUSPENSION INTRAMUSCULAR ONCE
Refills: 0 | Status: COMPLETED | OUTPATIENT
Start: 2020-09-28 | End: 2020-09-28

## 2020-09-28 RX ORDER — AMPICILLIN SODIUM AND SULBACTAM SODIUM 250; 125 MG/ML; MG/ML
3 INJECTION, POWDER, FOR SUSPENSION INTRAMUSCULAR; INTRAVENOUS EVERY 6 HOURS
Refills: 0 | Status: DISCONTINUED | OUTPATIENT
Start: 2020-09-28 | End: 2020-09-29

## 2020-09-28 RX ORDER — AMPICILLIN SODIUM AND SULBACTAM SODIUM 250; 125 MG/ML; MG/ML
3 INJECTION, POWDER, FOR SUSPENSION INTRAMUSCULAR; INTRAVENOUS EVERY 6 HOURS
Refills: 0 | Status: DISCONTINUED | OUTPATIENT
Start: 2020-09-28 | End: 2020-09-28

## 2020-09-28 RX ORDER — ASPIRIN/CALCIUM CARB/MAGNESIUM 324 MG
1 TABLET ORAL
Qty: 0 | Refills: 0 | DISCHARGE

## 2020-09-28 RX ORDER — AMPICILLIN SODIUM AND SULBACTAM SODIUM 250; 125 MG/ML; MG/ML
3 INJECTION, POWDER, FOR SUSPENSION INTRAMUSCULAR; INTRAVENOUS ONCE
Refills: 0 | Status: COMPLETED | OUTPATIENT
Start: 2020-09-28 | End: 2020-09-28

## 2020-09-28 RX ORDER — ENOXAPARIN SODIUM 100 MG/ML
30 INJECTION SUBCUTANEOUS EVERY 24 HOURS
Refills: 0 | Status: DISCONTINUED | OUTPATIENT
Start: 2020-09-28 | End: 2020-09-30

## 2020-09-28 RX ORDER — SODIUM CHLORIDE 9 MG/ML
1000 INJECTION INTRAMUSCULAR; INTRAVENOUS; SUBCUTANEOUS ONCE
Refills: 0 | Status: COMPLETED | OUTPATIENT
Start: 2020-09-28 | End: 2020-09-28

## 2020-09-28 RX ADMIN — AMPICILLIN SODIUM AND SULBACTAM SODIUM 200 GRAM(S): 250; 125 INJECTION, POWDER, FOR SUSPENSION INTRAMUSCULAR; INTRAVENOUS at 13:41

## 2020-09-28 RX ADMIN — TETANUS TOXOID, REDUCED DIPHTHERIA TOXOID AND ACELLULAR PERTUSSIS VACCINE, ADSORBED 0.5 MILLILITER(S): 5; 2.5; 8; 8; 2.5 SUSPENSION INTRAMUSCULAR at 13:41

## 2020-09-28 RX ADMIN — SODIUM CHLORIDE 16.67 MILLILITER(S): 9 INJECTION INTRAMUSCULAR; INTRAVENOUS; SUBCUTANEOUS at 15:24

## 2020-09-28 RX ADMIN — AMPICILLIN SODIUM AND SULBACTAM SODIUM 200 GRAM(S): 250; 125 INJECTION, POWDER, FOR SUSPENSION INTRAMUSCULAR; INTRAVENOUS at 19:36

## 2020-09-28 RX ADMIN — Medication 250 MILLIGRAM(S): at 18:19

## 2020-09-28 RX ADMIN — ENOXAPARIN SODIUM 30 MILLIGRAM(S): 100 INJECTION SUBCUTANEOUS at 18:20

## 2020-09-28 RX ADMIN — Medication 650 MILLIGRAM(S): at 23:02

## 2020-09-28 RX ADMIN — Medication 1 TABLET(S): at 19:00

## 2020-09-28 NOTE — H&P ADULT - ASSESSMENT
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 Patient states that 10 days ago, patient cat woke patient up from bed and pounced on patient's left medial ankle. S/p unasyn in ED, but will continue treatment for moderate purulent SSTI w/ vancomycin

## 2020-09-28 NOTE — H&P ADULT - NSHPSOCIALHISTORY_GEN_ALL_CORE
Prior smoker from 20s to 50s, 1 pack per day. Drinks 1 glass of scotch a night, denies illicit drug use.

## 2020-09-28 NOTE — H&P ADULT - PROBLEM SELECTOR PLAN 4
prior history of SBO due to suspected gyn malignancy, patient passing BM  - continue to monitor clinically history of HTN, was on lisinopril 10 mg daily  - continue to monitor clinically

## 2020-09-28 NOTE — ED PROVIDER NOTE - OBJECTIVE STATEMENT
89 y/o F PMHx prior SBO presents to the ED c/o L lower leg redness, swelling and open wound s/p cat scratch x 10 D ago. Pt says her house cat who is UTD w/ vaccines and does not stray outdoors scratched her skin when playing around. In the last 2 D wound worsened. Denies cleaning the wound w/ any antiseptic solutions, fevers, chills, CP, SOB, abdominal pain, nausea, vomiting. Pt says it hurts more with walking. 89 y/o F PMHx prior HTN, SBO presents to the ED c/o L lower leg redness, swelling and open wound s/p cat scratch x 10 D ago. Pt says her house cat who is UTD w/ vaccines and does not stray outdoors scratched her skin when playing around. In the last 2 D wound worsened. Denies cleaning the wound w/ any antiseptic solutions, fevers, chills, CP, SOB, abdominal pain, nausea, vomiting. Pt says it hurts more with walking.

## 2020-09-28 NOTE — H&P ADULT - PROBLEM SELECTOR PLAN 5
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

## 2020-09-28 NOTE — ED ADULT NURSE NOTE - OBJECTIVE STATEMENT
Patient is an 87yo female reporting wound to left lower extremity s/p cat bite 10 days ago. Patient states she owns the cat. Denies fevers, chills. Unknown last tetanus.

## 2020-09-28 NOTE — H&P ADULT - NSHPLABSRESULTS_GEN_ALL_CORE
LABS:                         11.3   9.92  )-----------( 576      ( 28 Sep 2020 12:56 )             36.2     09-28    140  |  103  |  23  ----------------------------<  96  4.0   |  25  |  0.96    Ca    9.0      28 Sep 2020 13:46    TPro  6.9  /  Alb  3.2<L>  /  TBili  0.2  /  DBili  x   /  AST  11  /  ALT  <5<L>  /  AlkPhos  75  09-28      RADIOLOGY, EKG & ADDITIONAL TESTS:   No radiologic studies

## 2020-09-28 NOTE — ED PROVIDER NOTE - CHPI ED SYMPTOMS NEG
no vomiting/Denies cleaning the wound w/ any antiseptic solutions, fevers, chills, CP, SOB, abdominal pain, nausea,

## 2020-09-28 NOTE — H&P ADULT - NSHPPHYSICALEXAM_GEN_ALL_CORE
VITAL SIGNS:  T(C): 36.4 (09-28-20 @ 15:44), Max: 36.7 (09-28-20 @ 15:20)  T(F): 97.6 (09-28-20 @ 15:44), Max: 98 (09-28-20 @ 15:20)  HR: 81 (09-28-20 @ 15:44) (79 - 84)  BP: 177/78 (09-28-20 @ 15:44) (158/78 - 177/78)  BP(mean): --  RR: 16 (09-28-20 @ 15:44) (16 - 18)  SpO2: 99% (09-28-20 @ 15:44) (98% - 99%)  Wt(kg): --    PHYSICAL EXAM:    Constitutional: WDWN, sitting in chair, NAD  Head: Nc/At  Eyes: PERRL, EOMI, clear conjunctiva  ENT: no nasal discharge; MMM  Neck: supple; no JVD  Respiratory: CTA b/l, no wheezes, no crackles  Cardiac: +S1/S2, +RRR, no murmurs  Gastrointestinal: soft, non-tender, non-distended, normoactive bowel sounds x4  Extremities: WWP, no clubbing or cyanosis, L medial ankle 3x3 cm annular purulent lesion, not draining but surface appears purulent, with surrounding erythema and edema, ~7-10 cm from wound, from mid shin to ankle. tender to touch.  Vascular: 2+ radial and R DP pulse, L DP is diminished  Dermatologic: skin warm, dry and intact, wound as noted above  Neurologic: AAOx3, CNII-XII grossly intact, no focal deficits, 5/5 strength b/l UE and LE, walks w/ walker

## 2020-09-28 NOTE — H&P ADULT - PROBLEM SELECTOR PLAN 3
history of HTN, was on lisinopril 10 mg daily  - continue to monitor clinically pt w/ normocytic anemia, no s/s, no complaints, no melena, BRBPR, concern for hemolysis, underproduction  - collateral on last colonoscopy, though pt is outside screening age  - f/u iron studies, ferritin, folate, B12, and retic

## 2020-09-28 NOTE — ED PROVIDER NOTE - PHYSICAL EXAMINATION
VITAL SIGNS: I have reviewed nursing notes and confirm.  CONSTITUTIONAL: Well-developed; well-nourished; in no acute distress.   SKIN:  warm and dry, no acute rash.   HEAD:  normocephalic, atraumatic.  EYES: EOM intact; conjunctiva and sclera clear.  ENT: No nasal discharge; airway clear.   NECK: Supple; non tender.  CARD: S1, S2 normal; no murmurs, gallops, or rubs. Regular rate and rhythm.   RESP:  Clear to auscultation b/l, no wheezes, rales or rhonchi.  ABD: Normal bowel sounds; soft; non-distended; non-tender; no guarding/ rebound.  EXT: LLE w/ 3cm open wound w/ yellow purulent d/c w/ surrounding cellulitis that extends over her foot. Not circumferential. Distal pulses intact. No signs of lymphangitis. No calf pain.   NEURO: Alert, oriented, grossly unremarkable  PSYCH: Cooperative, mood and affect appropriate.

## 2020-09-28 NOTE — H&P ADULT - PROBLEM SELECTOR PLAN 1
from cat scratch. patient states cat has been vaccinated, no signs/symptoms concerning for rabies as cat "playfully pounced" onto patient's leg as part of morning routine to wake pt up. s/p tetanus vaccine. from cat scratch. patient states cat has been vaccinated, no signs/symptoms concerning for rabies as cat "playfully pounced" onto patient's leg as part of morning routine to wake pt up. s/p tetanus vaccine.  - c/w abx with ampicillin (high suspicion for pasteurella infection)  - c/w abx with vancomycin (broad coverage, ineffective for pasteurella)  - monitor SSTI site, demarcated by pen  - f/u wound culture  - f/u blood cultures x2 from cat scratch. patient states cat has been vaccinated, no signs/symptoms concerning for rabies as cat "playfully pounced" onto patient's leg as part of morning routine to wake pt up. s/p tetanus vaccine.  - c/w abx with ampicillin-sulbactam 3g IV q6h (high suspicion for pasteurella infection)  - start vancomycin 750 mg IV q12h for 3 doses, trough prior to 4th dose (broad coverage, ineffective for pasteurella)  - monitor SSTI site, demarcated by pen  - f/u wound culture  - f/u blood cultures x2  - f/u CT left ankle for concern for abscess, tenosynovitis

## 2020-09-28 NOTE — PATIENT PROFILE ADULT - NSTRANSFERBELONGINGSRESP_GEN_A_NUR
yes Discharge Medication Information for Patients and Families Pocket Guide/Vaccinations/[place]  Immunization Record/Back To Sleep Handout/Guide to Postpartum Care/Shaken Baby Prevention Handout/[facility]  Screening Program

## 2020-09-28 NOTE — H&P ADULT - HISTORY OF PRESENT ILLNESS
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 Patient states that 10 days ago, patient cat woke patient up from bed and pounced on patient's medial ankle. 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 Patient states that 10 days ago, patient cat woke patient up from bed and pounced on patient's left medial ankle. The wound was initially bloody but over the course of the week, patient noticed pus-like drainage, swelling, difficulty putting on her shoe, and worsening pain during ambulation. Patient normally walks w/ walker and mobility has been limited secondary to pain. Otherwise, patient denies any other complaints. ROS is positive for chronic lower back pain. Patient denies any fever, chills, n/v/, abd pain, chest pain, SOB, lightheadedness/dizziness.    ED vitals: T 97.9, HR 84, /81, RR 18, O2 sat 98% on Room air  ED labs: Hb 11.3, Plt 576, Alb 3.2  ED studies: none  ED course: unsasyn 3g IV, adacell vaccine, and 1L NS bolus

## 2020-09-28 NOTE — PATIENT PROFILE ADULT - STATED REASON FOR ADMISSION
pt's cat scratched her on her left inner ankle 10 days ago and ankle edematous and redden and pain. pt came to ER

## 2020-09-28 NOTE — H&P ADULT - ATTENDING COMMENTS
88 year old woman with left leg wound after being scratched by her cat 10 days ago  # Purulent Cellulitis  # Probable Tenosynovitis  Has pain behind her ankle with passive extension of her ankle.   Has pain to palpation of achilles tendon  Range of motion is preserved; Reasonable to manage medically with abx for now; Empiric coverage for tenosynovitis, pastuerella;  Consult ortho If no improvement in symptoms on abx, or if imaging indicates need for debridement.    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

## 2020-09-28 NOTE — ED PROVIDER NOTE - CLINICAL SUMMARY MEDICAL DECISION MAKING FREE TEXT BOX
Patient admitted for left lower leg cellulitis with open wound s/p cat scratch. Afebrile with normal labs. Cultures done and IV abx started in ED. Td up dated. Admitted for IV abx.

## 2020-09-28 NOTE — ED ADULT NURSE NOTE - INTERVENTIONS DEFINITIONS
Provide visual cue, wrist band, yellow gown, etc./Monitor gait and stability/Physically safe environment: no spills, clutter or unnecessary equipment/Monitor for mental status changes and reorient to person, place, and time/Instruct patient to call for assistance/Stretcher in lowest position, wheels locked, appropriate side rails in place

## 2020-09-28 NOTE — CHART NOTE - NSCHARTNOTEFT_GEN_A_CORE
Confidential Drug Utilization Report  Search Terms: malou rogers, 06/25/1932Search Date: 09/28/2020 15:08:55 PM  Searching on behalf of: Myself  The Drug Utilization Report below displays all of the controlled substance prescriptions, if any, that your patient has filled in the last twelve months. The information displayed on this report is compiled from pharmacy submissions to the Department, and accurately reflects the information as submitted by the pharmacies.    This report was requested by: Veda Rodriguez | Reference #: [id number]    Others' Prescriptions  Patient Name: Malou RogersBirth Date: 06/25/1932  Address: [address] [place] [id number]: Female  Rx Written	Rx Dispensed	Drug	Quantity	Days Supply	Prescriber Name	Payment Method	Dispenser  10/20/2019	10/20/2019	tramadol hcl 50 mg tablet	56	14	Sonia Shaw	[place]	[facility]  * - Drugs marked with an asterisk are compound drugs. If the compound drug is made up of more than one controlled substance, then each controlled substance will be a separate row in the table.

## 2020-09-28 NOTE — H&P ADULT - PROBLEM SELECTOR PLAN 2
patient w/ history of chronic lower back pain from prior L3, L4 compression fracture  - c/w tylenol BID PRN for pain control  - physical therapy patient w/ history of chronic lower back pain from prior L3, L4 compression fracture  - c/w tylenol PRN for pain control  - physical therapy

## 2020-09-28 NOTE — ED PROVIDER NOTE - ATTENDING CONTRIBUTION TO CARE
89yo F hx of sbo, here for eval for L lower leg cellulitis after cat scratch 10 days ago. worsening in last 2 days. house cat, vaccinated, was playing and accidentally scratched it. hadnt really done any wound care at home, but yesterday  noted it was getting swollen. lives alone but very functional. Area marked, no streaking. tdap updated, iv abx given, culture sent. DC home in NAD with strict return precautions given.

## 2020-09-28 NOTE — H&P ADULT - PROBLEM SELECTOR PLAN 6
Ppx: SCDs, lovenox  D: RMF  FULL CODE F: no IVF  E: K>4 Mg>2, monitor and replete as needed  N: regular diet

## 2020-09-29 ENCOUNTER — TRANSCRIPTION ENCOUNTER (OUTPATIENT)
Age: 85
End: 2020-09-29

## 2020-09-29 DIAGNOSIS — L03.116 CELLULITIS OF LEFT LOWER LIMB: ICD-10-CM

## 2020-09-29 LAB
ANION GAP SERPL CALC-SCNC: 10 MMOL/L — SIGNIFICANT CHANGE UP (ref 5–17)
BUN SERPL-MCNC: 15 MG/DL — SIGNIFICANT CHANGE UP (ref 7–23)
CALCIUM SERPL-MCNC: 8.5 MG/DL — SIGNIFICANT CHANGE UP (ref 8.4–10.5)
CHLORIDE SERPL-SCNC: 107 MMOL/L — SIGNIFICANT CHANGE UP (ref 96–108)
CO2 SERPL-SCNC: 24 MMOL/L — SIGNIFICANT CHANGE UP (ref 22–31)
CREAT SERPL-MCNC: 0.86 MG/DL — SIGNIFICANT CHANGE UP (ref 0.5–1.3)
FERRITIN SERPL-MCNC: 76 NG/ML — SIGNIFICANT CHANGE UP (ref 15–150)
FOLATE SERPL-MCNC: 16.2 NG/ML — SIGNIFICANT CHANGE UP
GLUCOSE SERPL-MCNC: 90 MG/DL — SIGNIFICANT CHANGE UP (ref 70–99)
HCT VFR BLD CALC: 33 % — LOW (ref 34.5–45)
HGB BLD-MCNC: 10.5 G/DL — LOW (ref 11.5–15.5)
IRON SATN MFR SERPL: 30 % — SIGNIFICANT CHANGE UP (ref 14–50)
IRON SATN MFR SERPL: 61 UG/DL — SIGNIFICANT CHANGE UP (ref 30–160)
MAGNESIUM SERPL-MCNC: 2.2 MG/DL — SIGNIFICANT CHANGE UP (ref 1.6–2.6)
MCHC RBC-ENTMCNC: 31 PG — SIGNIFICANT CHANGE UP (ref 27–34)
MCHC RBC-ENTMCNC: 31.8 GM/DL — LOW (ref 32–36)
MCV RBC AUTO: 97.3 FL — SIGNIFICANT CHANGE UP (ref 80–100)
NRBC # BLD: 0 /100 WBCS — SIGNIFICANT CHANGE UP (ref 0–0)
PLATELET # BLD AUTO: 511 K/UL — HIGH (ref 150–400)
POTASSIUM SERPL-MCNC: 3.3 MMOL/L — LOW (ref 3.5–5.3)
POTASSIUM SERPL-SCNC: 3.3 MMOL/L — LOW (ref 3.5–5.3)
RBC # BLD: 3.39 M/UL — LOW (ref 3.8–5.2)
RBC # BLD: 3.39 M/UL — LOW (ref 3.8–5.2)
RBC # FLD: 13.2 % — SIGNIFICANT CHANGE UP (ref 10.3–14.5)
RETICS #: 55.3 K/UL — SIGNIFICANT CHANGE UP (ref 25–125)
RETICS/RBC NFR: 1.6 % — SIGNIFICANT CHANGE UP (ref 0.5–2.5)
SODIUM SERPL-SCNC: 141 MMOL/L — SIGNIFICANT CHANGE UP (ref 135–145)
TIBC SERPL-MCNC: 204 UG/DL — LOW (ref 220–430)
UIBC SERPL-MCNC: 143 UG/DL — SIGNIFICANT CHANGE UP (ref 110–370)
VIT B12 SERPL-MCNC: 509 PG/ML — SIGNIFICANT CHANGE UP (ref 232–1245)
WBC # BLD: 7.88 K/UL — SIGNIFICANT CHANGE UP (ref 3.8–10.5)
WBC # FLD AUTO: 7.88 K/UL — SIGNIFICANT CHANGE UP (ref 3.8–10.5)

## 2020-09-29 PROCEDURE — 99233 SBSQ HOSP IP/OBS HIGH 50: CPT | Mod: GC

## 2020-09-29 PROCEDURE — 73700 CT LOWER EXTREMITY W/O DYE: CPT | Mod: 26,LT

## 2020-09-29 RX ORDER — POTASSIUM CHLORIDE 20 MEQ
40 PACKET (EA) ORAL EVERY 4 HOURS
Refills: 0 | Status: COMPLETED | OUTPATIENT
Start: 2020-09-29 | End: 2020-09-29

## 2020-09-29 RX ORDER — AMLODIPINE BESYLATE 2.5 MG/1
5 TABLET ORAL EVERY 24 HOURS
Refills: 0 | Status: DISCONTINUED | OUTPATIENT
Start: 2020-09-29 | End: 2020-09-30

## 2020-09-29 RX ORDER — AMLODIPINE BESYLATE 2.5 MG/1
1 TABLET ORAL
Qty: 30 | Refills: 0
Start: 2020-09-29 | End: 2020-10-28

## 2020-09-29 RX ORDER — ACETAMINOPHEN 500 MG
2 TABLET ORAL
Qty: 0 | Refills: 0 | DISCHARGE
Start: 2020-09-29

## 2020-09-29 RX ADMIN — Medication 650 MILLIGRAM(S): at 07:36

## 2020-09-29 RX ADMIN — ENOXAPARIN SODIUM 30 MILLIGRAM(S): 100 INJECTION SUBCUTANEOUS at 17:46

## 2020-09-29 RX ADMIN — Medication 1 TABLET(S): at 12:32

## 2020-09-29 RX ADMIN — AMPICILLIN SODIUM AND SULBACTAM SODIUM 200 GRAM(S): 250; 125 INJECTION, POWDER, FOR SUSPENSION INTRAMUSCULAR; INTRAVENOUS at 02:38

## 2020-09-29 RX ADMIN — Medication 1 TABLET(S): at 14:54

## 2020-09-29 RX ADMIN — Medication 40 MILLIEQUIVALENT(S): at 10:04

## 2020-09-29 RX ADMIN — AMLODIPINE BESYLATE 5 MILLIGRAM(S): 2.5 TABLET ORAL at 14:54

## 2020-09-29 RX ADMIN — Medication 40 MILLIEQUIVALENT(S): at 12:32

## 2020-09-29 RX ADMIN — Medication 250 MILLIGRAM(S): at 06:51

## 2020-09-29 RX ADMIN — AMPICILLIN SODIUM AND SULBACTAM SODIUM 200 GRAM(S): 250; 125 INJECTION, POWDER, FOR SUSPENSION INTRAMUSCULAR; INTRAVENOUS at 08:24

## 2020-09-29 NOTE — DISCHARGE NOTE PROVIDER - CARE PROVIDER_API CALL
ROSALBA LOVELL  [id number]  [address] [place] [id number]  Phone: [phone]  Fax: ([phone]  Follow Up Time:

## 2020-09-29 NOTE — ADVANCED PRACTICE NURSE CONSULT - REASON FOR CONSULT
[facility] nurse consult to assess left lower extremity wound. 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 Patient stated her cat pounced on patient's left medial ankle. The wound was initially bloody but over the course of the week, patient noticed pus-like drainage, swelling, difficulty putting on her shoe, and worsening pain during ambulation.

## 2020-09-29 NOTE — PROGRESS NOTE ADULT - PROBLEM SELECTOR PLAN 5
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

## 2020-09-29 NOTE — PHYSICAL THERAPY INITIAL EVALUATION ADULT - ADDITIONAL COMMENTS
Pt reports living alone in an elevator access apartment with ramp to enter. Pt reports being independent with functional mobility using a rollator. Pt is able to perform most ADL's independently however has a cleaning service that comes once every two weeks, and has meals on wheels.

## 2020-09-29 NOTE — DIETITIAN INITIAL EVALUATION ADULT. - PROBLEM SELECTOR PLAN 5
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

## 2020-09-29 NOTE — PROGRESS NOTE ADULT - PROBLEM SELECTOR PLAN 1
from cat scratch. patient states cat has been vaccinated, no signs/symptoms concerning for rabies as cat "playfully pounced" onto patient's leg as part of morning routine to wake pt up. s/p tetanus vaccine.   -C/w with Augmentin 500mg bid for 7 days

## 2020-09-29 NOTE — DISCHARGE NOTE PROVIDER - NSDCFUADDINST_GEN_ALL_CORE_FT
Left medial malleolus (ankle) wound - cleanse with normal saline, applied Xeroform gauze, 2x3 gauze, wrap with garfield every other day.

## 2020-09-29 NOTE — PROGRESS NOTE ADULT - PROBLEM SELECTOR PLAN 3
pt w/ normocytic anemia, no s/s, no complaints, no melena, BRBPR, concern for hemolysis, underproduction  - collateral on last colonoscopy, though pt is outside screening age  - f/u iron studies, ferritin, folate, B12, and retic wnl

## 2020-09-29 NOTE — ADVANCED PRACTICE NURSE CONSULT - RECOMMEDATIONS
Left medial malleolus wound - cleanse with normal saline, applied Xeroform gauze, 2x3 gauze, wrap with garfield every other day.   WOCN discussed assessment and recommendations with the patient, ANT Griffiths and house staff Dr Armenta.

## 2020-09-29 NOTE — CHART NOTE - TREATMENT: THE FOLLOWING DIET HAS BEEN RECOMMENDED
Diet, Regular (09-28-20 @ 16:55) [Active]    1. Continue regular diet, encourage intake   2. Add Ensure Enlive BID (each 350kcal/20gpro)   3. Trend wts 2-3x per week   4. Monitor lytes and replete   5. RD to remain available prn

## 2020-09-29 NOTE — DIETITIAN INITIAL EVALUATION ADULT. - PROBLEM SELECTOR PLAN 1
from cat scratch. patient states cat has been vaccinated, no signs/symptoms concerning for rabies as cat "playfully pounced" onto patient's leg as part of morning routine to wake pt up. s/p tetanus vaccine.

## 2020-09-29 NOTE — DIETITIAN INITIAL EVALUATION ADULT. - OTHER INFO
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 Patient states that 10 days ago, patient cat woke patient up from bed and pounced on patient's left medial ankle. S/p unasyn in ED, but will continue treatment for moderate purulent SSTI w/ vancomycin. Pt seen in room, reports fair/poor PO intake PTA, reports eating ~2meals/day PTA, likes foods such as sandwiches and pastries. Pt currently on regular diet, reports eating tuna fish, vanilla pudding and milk for lunch. Pt report UBW 110lbs 6 months ago, reports wt loss to ABW 90lbs (reflects 20lb/18% significant wt loss). Note per chart review pt wt 94lbs in Nov 2019. Per visual assessment, pt appears thin mild temporal muscle loss noted. 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 Note BMI 16.7. Pt reports liking Ensure. RD encouraged optimal PO intake, 3meals/day. Per flowsheet, GI wdl last BM 9/28. 2+edema to left ankle. No pain noted. Skin with cellulitis to left malleolus. Please see recs below. Will continue to follow per RD protocol.

## 2020-09-29 NOTE — DISCHARGE NOTE PROVIDER - HOSPITAL COURSE
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   Problem List/Main Diagnoses (system-based):   Cellulitis of left foot, Chronic lower back pain, Normocytic anemia, hypertension  Inpatient treatment course: Patient was started on vancomycin and ampicillin/sulbactam in the ED and continued in [facility].   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 Patient is medically optimized and ready for discharge to home.     Problem List/Main Diagnoses (system-based):     Cellulitis of left foot.   From cat scratch, patient states cat has been vaccinated, no signs/symptoms concerning for rabies as cat "playfully pounced" onto patient's leg as part of morning routine to wake patient up. S/p tetanus vaccine. Given a dose of Unasyn 3mg, transitioned to Augmentin 875mg BID for 7 day course.      Chronic lower back pain.  Patient w/ history of chronic lower back pain from prior L3, L4 compression fracture. She was given tylenol PRN for pain control    Normocytic anemia.   Patient w/ normocytic anemia, no s/s, no complaints, no melena, BRBPR, concern for hemolysis, underproduction. Iron studies, ferritin, folate, B12 wnl. Patient will follow up with PCP as outpatient for further work up.      Hypertension  History of HTN, she was previously on lisinopril 10 mg daily. Started her on Norvasc 5mg daily.      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 Patient is medically optimized and ready for discharge to home.     Problem List/Main Diagnoses (system-based):     Cellulitis of left foot.   From cat scratch, patient states cat has been vaccinated, no signs/symptoms concerning for rabies as cat "playfully pounced" onto patient's leg as part of morning routine to wake patient up. S/p tetanus vaccine. Given a dose of Unasyn 3mg, transitioned to Augmentin 500mg BID for 7 day course.      Chronic lower back pain.  Patient w/ history of chronic lower back pain from prior L3, L4 compression fracture. She was given tylenol PRN for pain control    Normocytic anemia.   Patient w/ normocytic anemia, no s/s, no complaints, no melena, BRBPR, concern for hemolysis, underproduction. Iron studies, ferritin, folate, B12 wnl. Patient will follow up with PCP as outpatient for further work up.      Hypertension  History of HTN, she was previously on lisinopril 10 mg daily. Started her on Norvasc 5mg daily.      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 Patient is medically optimized and ready for discharge to home.     Problem List/Main Diagnoses (system-based):     Cellulitis of left foot.   From cat scratch, patient states cat has been vaccinated, no signs/symptoms concerning for rabies as cat "playfully pounced" onto patient's leg as part of morning routine to wake patient up. S/p tetanus vaccine. Given a dose of Unasyn 3mg, transitioned to Augmentin 500mg BID for 7 day course. Wound care was consulted, home wound dressing changes include cleaning with saline, Xeroform dressing, and Kerlix wrap every other day.     Chronic lower back pain.  Patient w/ history of chronic lower back pain from prior L3, L4 compression fracture. She was given tylenol PRN for pain control    Normocytic anemia.   Patient w/ normocytic anemia, no s/s, no complaints, no melena, BRBPR, concern for hemolysis, underproduction. Iron studies, ferritin, folate, B12 wnl. Patient will follow up with PCP as outpatient for further work up.      Hypertension  History of HTN, she was previously on lisinopril 10 mg daily. Started her on Norvasc 5mg daily.      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 Patient is medically optimized and ready for discharge to home.     Problem List/Main Diagnoses (system-based):     Cellulitis of left foot.   From cat scratch, patient states cat has been vaccinated, no signs/symptoms concerning for rabies as cat "playfully pounced" onto patient's leg as part of morning routine to wake patient up. S/p tetanus vaccine. Given a dose of Unasyn 3mg, transitioned to Augmentin 500mg BID for 7 day course. Completed one day before discharge. Upon discharge, patient was switched to doxycycline oral 100 mg BID for 8 day course. Wound care was consulted, home wound dressing changes include cleaning with saline, Xeroform dressing, and Kerlix wrap every other day.     Chronic lower back pain.  Patient w/ history of chronic lower back pain from prior L3, L4 compression fracture. She was given Tylenol PRN for pain control. Physical therapy evaluation stated full independence with ADLs/iADLs and suggests no rehab potential.     Normocytic anemia.   Patient w/ normocytic anemia, no s/s, no complaints, no melena, BRBPR, concern for hemolysis, underproduction. Iron studies, ferritin, folate, B12 wnl. Patient will follow up with PCP as outpatient for further work up.      Hypertension  History of HTN, she was previously on lisinopril 10 mg daily. Started her on Norvasc 5mg daily.      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 Patient is medically optimized and ready for discharge to home.     Problem List/Main Diagnoses (system-based):     Cellulitis of left foot.   From cat scratch, patient states cat has been vaccinated, no signs/symptoms concerning for rabies as cat "playfully pounced" onto patient's leg as part of morning routine to wake patient up. S/p tetanus vaccine. Given a dose of Unasyn 3mg, transitioned to Augmentin 500mg BID for 7 day course. Completed one day before discharge. Upon discharge, patient was switched to doxycycline oral 100 mg BID and ciprofloxacin for 8 day course. Wound care was consulted, home wound dressing changes include cleaning with saline, Xeroform dressing, and Kerlix wrap every other day.     Chronic lower back pain.  Patient w/ history of chronic lower back pain from prior L3, L4 compression fracture. She was given Tylenol PRN for pain control. Physical therapy evaluation stated full independence with ADLs/iADLs and suggests no rehab potential.     Normocytic anemia.   Patient w/ normocytic anemia, no s/s, no complaints, no melena, BRBPR, concern for hemolysis, underproduction. Iron studies, ferritin, folate, B12 wnl. Patient will follow up with PCP as outpatient for further work up.      Hypertension  History of HTN, she was previously on lisinopril 10 mg daily. Started her on Norvasc 5mg daily.      Inpatient treatment course: See as above     New medications: Doxycycline 100 mg BID 8 days, Ciprofloxacin 500mg BID, Norvasc 5mg daily    Labs to be followed outpatient: None  Exam to be followed outpatient: None

## 2020-09-29 NOTE — DISCHARGE NOTE NURSING/CASE MANAGEMENT/SOCIAL WORK - NSSCNAMETXT_GEN_ALL_CORE
[facility] at home Please call agency upon arrival home between 10am and 3pm to confirm time of Nurse visit. Bedside RN please provide extra wound care supplies for first home visit.

## 2020-09-29 NOTE — DIETITIAN INITIAL EVALUATION ADULT. - ENERGY NEEDS
Height: 62" Weight: 90lbs, IBW 110lbs+/-10%, %IBW 81%, BMI 16.7,  ABW used for calculations as pt between % of IBW, adjusted for malnutrition, age

## 2020-09-29 NOTE — PROGRESS NOTE ADULT - NUTRITIONAL ASSESSMENT
This patient has been assessed with a concern for Malnutrition and has been determined to have a diagnosis/diagnoses of Severe protein-calorie malnutrition and Underweight/BMI < 19.    This patient is being managed with:   Diet Regular-  Supplement Feeding Modality:  Oral  Ensure Enlive Cans or Servings Per Day:  1       Frequency:  Two Times a day  Entered: Sep 29 2020  3:39PM    Diet Regular-  Entered: Sep 28 2020  4:54PM    The following pending diet order is being considered for treatment of Severe protein-calorie malnutrition and Underweight/BMI < 19:null

## 2020-09-29 NOTE — PROGRESS NOTE ADULT - PROBLEM SELECTOR PLAN 6
F: None  E: K>4 Mg>2, monitor and replete as needed  N: regular diet Ppx: SCDs, lovenox  D: Home tomorrow   FULL CODE

## 2020-09-29 NOTE — PROGRESS NOTE ADULT - SUBJECTIVE AND OBJECTIVE BOX
OVERNIGHT EVENTS: PIETRO    SUBJECTIVE / INTERVAL HPI: Patient seen and examined at bedside. Patient reports pain at the wound site. Otherwise reminder of ROS negative.      VITAL SIGNS:  Vital Signs Last 24 Hrs  T(C): 36.6 (29 Sep 2020 15:47), Max: 36.9 (29 Sep 2020 05:38)  T(F): 97.9 (29 Sep 2020 15:47), Max: 98.4 (29 Sep 2020 05:38)  HR: 79 (29 Sep 2020 15:47) (71 - 85)  BP: 146/75 (29 Sep 2020 15:47) (135/69 - 174/72)  BP(mean): --  RR: 18 (29 Sep 2020 15:47) (18 - 18)  SpO2: 96% (29 Sep 2020 15:47) (95% - 96%)    PHYSICAL EXAM:    General: WDWN  HEENT: NC/AT; PERRL, anicteric sclera; MMM  Neck: supple  Cardiovascular: +S1/S2; RRR  Respiratory: CTA B/L; no W/R/R  Gastrointestinal: soft, NT/ND; +BSx4  Extremities:WWP, no clubbing or cyanosis, left medial ankle lesion, not draining but surface appears dark red, with surrounding erythema and edema, tender and warm to touch  Vascular: 2+ radial, DP/PT pulses B/L  Neurological: AAOx3; no focal deficits    MEDICATIONS:  MEDICATIONS  (STANDING):  amLODIPine   Tablet 5 milliGRAM(s) Oral every 24 hours  amoxicillin  500 milliGRAM(s)/clavulanate 1 Tablet(s) Oral every 12 hours  calcium carbonate 1250 mG  + Vitamin D (OsCal 500 + D) 1 Tablet(s) Oral daily  enoxaparin Injectable 30 milliGRAM(s) SubCutaneous every 24 hours    MEDICATIONS  (PRN):  acetaminophen   Tablet .. 650 milliGRAM(s) Oral every 6 hours PRN Temp greater or equal to 38C (100.4F), Moderate Pain (4 - 6)      ALLERGIES:  Allergies    No Known Allergies    Intolerances        LABS:                        10.5   7.88  )-----------( 511      ( 29 Sep 2020 06:34 )             33.0     09-29    141  |  107  |  15  ----------------------------<  90  3.3<L>   |  24  |  0.86    Ca    8.5      29 Sep 2020 06:34  Mg     2.2     09-29    TPro  6.9  /  Alb  3.2<L>  /  TBili  0.2  /  DBili  x   /  AST  11  /  ALT  <5<L>  /  AlkPhos  75  09-28        CAPILLARY BLOOD GLUCOSE          RADIOLOGY & ADDITIONAL TESTS: Reviewed.    ASSESSMENT:    PLAN:

## 2020-09-29 NOTE — DIETITIAN INITIAL EVALUATION ADULT. - PROBLEM SELECTOR PLAN 2
patient w/ history of chronic lower back pain from prior L3, L4 compression fracture  - c/w tylenol PRN for pain control  - physical therapy

## 2020-09-29 NOTE — DISCHARGE NOTE PROVIDER - NSDCCPCAREPLAN_GEN_ALL_CORE_FT
PRINCIPAL DISCHARGE DIAGNOSIS  Diagnosis: Cellulitis  Assessment and Plan of Treatment: Cellulitis is an inflammatory infection of your skin and tissue. It causes redness, swelling, and pain. It can happen from bacteria that enters the skin when there is a break in the skin, or new bacteria introduced by something else. Your wound is most likely due to your cat scratch which compromised your skin barrier. During your hospital stay, you received intravenous antibiotics that kill the bacteria to get rid of the infection. We will be giving you oral antibiotics for you to take after you leave the hospital. If the wound looks worse in redness, pain, and swelling, contact your primary care doctor.      SECONDARY DISCHARGE DIAGNOSES  Diagnosis: Cat scratch  Assessment and Plan of Treatment: Cat scratch disease is an infection caused by a type of bacteria that is found in many cats. These bacteria don't make the cat sick. But you can get the disease if you are scratched or bitten by an infected cat or flea. You can also get it if an infected cat licks your eyes, mouth, or an open wound on your skin. Young cats are more likely than older cats to infect you. The infection causes redness, swelling, and small round bumps near the bite or scratch. It also causes swollen "lymph nodes." Lymph nodes are bean-shaped organs found all over the body. They make and store cells that fight infections. You were given a tetanus shot in the hospital. Tetanus is a serious, potentially life-threatening infection that can be transmitted by an animal or human bite.     PRINCIPAL DISCHARGE DIAGNOSIS  Diagnosis: Cellulitis  Assessment and Plan of Treatment: Cellulitis is an inflammatory infection of your skin and tissue. It causes redness, swelling, and pain. It can happen from bacteria that enters the skin when there is a break in the skin, or new bacteria introduced by something else. Your wound is most likely due to your cat scratch which compromised your skin barrier. During your hospital stay, you received intravenous antibiotics that kill the bacteria to get rid of the infection. We will be giving you oral antibiotics for you to take after you leave the hospital. Please take the medication twice a day for a total of 7day. If the wound looks worse in redness, pain, and swelling, contact your primary care doctor.      SECONDARY DISCHARGE DIAGNOSES  Diagnosis: Cat scratch  Assessment and Plan of Treatment: Cat scratch disease is an infection caused by a type of bacteria that is found in many cats. These bacteria don't make the cat sick. But you can get the disease if you are scratched or bitten by an infected cat or flea. You can also get it if an infected cat licks your eyes, mouth, or an open wound on your skin. Young cats are more likely than older cats to infect you. The infection causes redness, swelling, and small round bumps near the bite or scratch. It also causes swollen "lymph nodes." Lymph nodes are bean-shaped organs found all over the body. They make and store cells that fight infections. You were given a tetanus shot in the hospital. Tetanus is a serious, potentially life-threatening infection that can be transmitted by an animal or human bite.     PRINCIPAL DISCHARGE DIAGNOSIS  Diagnosis: Cellulitis  Assessment and Plan of Treatment: Cellulitis is an inflammatory infection of your skin and tissue. It causes redness, swelling, and pain. It can happen from bacteria that enters the skin when there is a break in the skin, or new bacteria introduced by something else. Your wound is most likely due to your cat scratch which compromised your skin barrier. During your hospital stay, you received intravenous antibiotics that kill the bacteria to get rid of the infection. We will be giving you oral antibiotics for you to take after you leave the hospital. Please take the medication twice a day for a total of 7day. If the wound looks worse in redness, pain, and swelling, contact your primary care doctor.      SECONDARY DISCHARGE DIAGNOSES  Diagnosis: HTN (hypertension)  Assessment and Plan of Treatment: Hypertension is the medical term for high blood pressure. Blood pressure refers to the pressure that blood applies to the inner walls of the arteries. Arteries carry blood from the heart to other organs and parts of the body. Untreated high blood pressure increases the strain on the heart and arteries, eventually causing organ damage. High blood pressure increases the risk of heart failure, heart attack (myocardial infarction), stroke, and kidney failure. High blood pressure does not usually cause any symptoms. Treatment of hypertension usually begins with lifestyle changes. Making these lifestyle changes involves little or no risk. Recommended changes often include reducing the amount of salt in your diet, losing weight if you are overweight or obese, avoiding drinking too much alcohol, stopping smoking and exercising at least 30 minutes per day most days of the week. If you are prescribed medication for your hypertension it is important to take these as prescribed to prevent the possible complications of uncontrolled hypertension. We started you on a blood pressure medication called Norvasc. Please take 1 tab everyday and follow up with your primary care provider to further manage your high blood presssure.    Diagnosis: Cat scratch  Assessment and Plan of Treatment: Cat scratch disease is an infection caused by a type of bacteria that is found in many cats. These bacteria don't make the cat sick. But you can get the disease if you are scratched or bitten by an infected cat or flea. You can also get it if an infected cat licks your eyes, mouth, or an open wound on your skin. Young cats are more likely than older cats to infect you. The infection causes redness, swelling, and small round bumps near the bite or scratch. It also causes swollen "lymph nodes." Lymph nodes are bean-shaped organs found all over the body. They make and store cells that fight infections. You were given a tetanus shot in the hospital. Tetanus is a serious, potentially life-threatening infection that can be transmitted by an animal or human bite.     PRINCIPAL DISCHARGE DIAGNOSIS  Diagnosis: Cellulitis  Assessment and Plan of Treatment: Cellulitis is an inflammatory infection of your skin and tissue. It causes redness, swelling, and pain. It can happen from bacteria that enters the skin when there is a break in the skin, or new bacteria introduced by something else. Your wound is most likely due to your cat scratch which compromised your skin barrier. During your hospital stay, you received intravenous antibiotics that kill the bacteria to get rid of the infection. We will be giving you oral antibiotics for you to take after you leave the hospital. Please take the medication twice a day for a total of 7day. We will also be giving you a specific bandage for you to dress your wound with. The wound is to be cleaned with saline before applying the bandage, and then it should be wrapped with the gauze bandage roll. This dressing process is to be done every other day. If the wound looks worse in redness, pain, and swelling, contact your primary care doctor.      SECONDARY DISCHARGE DIAGNOSES  Diagnosis: HTN (hypertension)  Assessment and Plan of Treatment: Hypertension is the medical term for high blood pressure. Blood pressure refers to the pressure that blood applies to the inner walls of the arteries. Arteries carry blood from the heart to other organs and parts of the body. Untreated high blood pressure increases the strain on the heart and arteries, eventually causing organ damage. High blood pressure increases the risk of heart failure, heart attack (myocardial infarction), stroke, and kidney failure. High blood pressure does not usually cause any symptoms. Treatment of hypertension usually begins with lifestyle changes. Making these lifestyle changes involves little or no risk. Recommended changes often include reducing the amount of salt in your diet, losing weight if you are overweight or obese, avoiding drinking too much alcohol, stopping smoking and exercising at least 30 minutes per day most days of the week. If you are prescribed medication for your hypertension it is important to take these as prescribed to prevent the possible complications of uncontrolled hypertension. We started you on a blood pressure medication called Norvasc. Please take 1 tab everyday and follow up with your primary care provider to further manage your high blood presssure.    Diagnosis: Cat scratch  Assessment and Plan of Treatment: Cat scratch disease is an infection caused by a type of bacteria that is found in many cats. These bacteria don't make the cat sick. But you can get the disease if you are scratched or bitten by an infected cat or flea. You can also get it if an infected cat licks your eyes, mouth, or an open wound on your skin. Young cats are more likely than older cats to infect you. The infection causes redness, swelling, and small round bumps near the bite or scratch. It also causes swollen "lymph nodes." Lymph nodes are bean-shaped organs found all over the body. They make and store cells that fight infections. You were given a tetanus shot in the hospital. Tetanus is a serious, potentially life-threatening infection that can be transmitted by an animal or human bite.     PRINCIPAL DISCHARGE DIAGNOSIS  Diagnosis: Cellulitis  Assessment and Plan of Treatment: Cellulitis is an inflammatory infection of your skin and tissue. It causes redness, swelling, and pain. It can happen from bacteria that enters the skin when there is a break in the skin, or new bacteria introduced by something else. Your wound is most likely due to your cat scratch which compromised your skin barrier. During your hospital stay, you received intravenous antibiotics that kill the bacteria to get rid of the infection. We will be giving you oral antibiotics for you to take after you leave the hospital. Please take the medication twice a day for a total of 8 days. We will also be giving you a specific bandage for you to dress your wound with. The wound is to be cleaned with saline before applying the bandage, and then it should be wrapped with the gauze bandage roll. This dressing process is to be done every other day. If the wound looks worse in redness, pain, and swelling, contact your primary care doctor.      SECONDARY DISCHARGE DIAGNOSES  Diagnosis: HTN (hypertension)  Assessment and Plan of Treatment: Hypertension is the medical term for high blood pressure. Blood pressure refers to the pressure that blood applies to the inner walls of the arteries. Arteries carry blood from the heart to other organs and parts of the body. Untreated high blood pressure increases the strain on the heart and arteries, eventually causing organ damage. High blood pressure increases the risk of heart failure, heart attack (myocardial infarction), stroke, and kidney failure. High blood pressure does not usually cause any symptoms. Treatment of hypertension usually begins with lifestyle changes. Making these lifestyle changes involves little or no risk. Recommended changes often include reducing the amount of salt in your diet, losing weight if you are overweight or obese, avoiding drinking too much alcohol, stopping smoking and exercising at least 30 minutes per day most days of the week. If you are prescribed medication for your hypertension it is important to take these as prescribed to prevent the possible complications of uncontrolled hypertension. We started you on a blood pressure medication called Norvasc. Please take 1 tab everyday and follow up with your primary care provider to further manage your high blood presssure.    Diagnosis: Cat scratch  Assessment and Plan of Treatment: Cat scratch disease is an infection caused by a type of bacteria that is found in many cats. These bacteria don't make the cat sick. But you can get the disease if you are scratched or bitten by an infected cat or flea. You can also get it if an infected cat licks your eyes, mouth, or an open wound on your skin. Young cats are more likely than older cats to infect you. The infection causes redness, swelling, and small round bumps near the bite or scratch. It also causes swollen "lymph nodes." Lymph nodes are bean-shaped organs found all over the body. They make and store cells that fight infections. You were given a tetanus shot in the hospital. Tetanus is a serious, potentially life-threatening infection that can be transmitted by an animal or human bite.     PRINCIPAL DISCHARGE DIAGNOSIS  Diagnosis: Cellulitis  Assessment and Plan of Treatment: Cellulitis is an inflammatory infection of your skin and tissue. It causes redness, swelling, and pain. It can happen from bacteria that enters the skin when there is a break in the skin, or new bacteria introduced by something else. Your wound is most likely due to your cat scratch which compromised your skin barrier. During your hospital stay, you received intravenous antibiotics that kill the bacteria to get rid of the infection. We will be giving you oral antibiotics for you to take after you leave the hospital. Please take the medication twice a day for a total of 8 days. We will also be giving you a specific bandage for you to dress your wound with. The wound is to be cleaned with saline before applying the bandage, and then it should be wrapped with the gauze bandage roll. This dressing process is to be done every other day. If the wound looks worse in redness, pain, and swelling, contact your primary care doctor.  Please take the following antibiotic:  Take doxycycline by mouth 2 times a day for 8 days      SECONDARY DISCHARGE DIAGNOSES  Diagnosis: HTN (hypertension)  Assessment and Plan of Treatment: Hypertension is the medical term for high blood pressure. Blood pressure refers to the pressure that blood applies to the inner walls of the arteries. Arteries carry blood from the heart to other organs and parts of the body. Untreated high blood pressure increases the strain on the heart and arteries, eventually causing organ damage. High blood pressure increases the risk of heart failure, heart attack (myocardial infarction), stroke, and kidney failure. High blood pressure does not usually cause any symptoms. Treatment of hypertension usually begins with lifestyle changes. Making these lifestyle changes involves little or no risk. Recommended changes often include reducing the amount of salt in your diet, losing weight if you are overweight or obese, avoiding drinking too much alcohol, stopping smoking and exercising at least 30 minutes per day most days of the week. If you are prescribed medication for your hypertension it is important to take these as prescribed to prevent the possible complications of uncontrolled hypertension. We started you on a blood pressure medication called Norvasc. Please take 1 tab everyday and follow up with your primary care provider to further manage your high blood presssure.    Diagnosis: Cat scratch  Assessment and Plan of Treatment: Cat scratch disease is an infection caused by a type of bacteria that is found in many cats. These bacteria don't make the cat sick. But you can get the disease if you are scratched or bitten by an infected cat or flea. You can also get it if an infected cat licks your eyes, mouth, or an open wound on your skin. Young cats are more likely than older cats to infect you. The infection causes redness, swelling, and small round bumps near the bite or scratch. It also causes swollen "lymph nodes." Lymph nodes are bean-shaped organs found all over the body. They make and store cells that fight infections. You were given a tetanus shot in the hospital. Tetanus is a serious, potentially life-threatening infection that can be transmitted by an animal or human bite.     PRINCIPAL DISCHARGE DIAGNOSIS  Diagnosis: Cellulitis  Assessment and Plan of Treatment: Cellulitis is an inflammatory infection of your skin and tissue. It causes redness, swelling, and pain. It can happen from bacteria that enters the skin when there is a break in the skin, or new bacteria introduced by something else. Your wound is most likely due to your cat scratch which compromised your skin barrier. During your hospital stay, you received intravenous antibiotics that kill the bacteria to get rid of the infection. We will be giving you oral antibiotics for you to take after you leave the hospital. Please take the medication twice a day for a total of 8 days. We will also be giving you a specific bandage for you to dress your wound with. The wound is to be cleaned with saline before applying the bandage, and then it should be wrapped with the gauze bandage roll. This dressing process is to be done every other day. If the wound looks worse in redness, pain, and swelling, contact your primary care doctor.  Please take the following antibiotic:  Take doxycycline and ciprofloxacin by mouth 2 times a day for 8 days      SECONDARY DISCHARGE DIAGNOSES  Diagnosis: HTN (hypertension)  Assessment and Plan of Treatment: Hypertension is the medical term for high blood pressure. Blood pressure refers to the pressure that blood applies to the inner walls of the arteries. Arteries carry blood from the heart to other organs and parts of the body. Untreated high blood pressure increases the strain on the heart and arteries, eventually causing organ damage. High blood pressure increases the risk of heart failure, heart attack (myocardial infarction), stroke, and kidney failure. High blood pressure does not usually cause any symptoms. Treatment of hypertension usually begins with lifestyle changes. Making these lifestyle changes involves little or no risk. Recommended changes often include reducing the amount of salt in your diet, losing weight if you are overweight or obese, avoiding drinking too much alcohol, stopping smoking and exercising at least 30 minutes per day most days of the week. If you are prescribed medication for your hypertension it is important to take these as prescribed to prevent the possible complications of uncontrolled hypertension. We started you on a blood pressure medication called Norvasc. Please take 1 tab everyday and follow up with your primary care provider to further manage your high blood presssure.    Diagnosis: Cat scratch  Assessment and Plan of Treatment: Cat scratch disease is an infection caused by a type of bacteria that is found in many cats. These bacteria don't make the cat sick. But you can get the disease if you are scratched or bitten by an infected cat or flea. You can also get it if an infected cat licks your eyes, mouth, or an open wound on your skin. Young cats are more likely than older cats to infect you. The infection causes redness, swelling, and small round bumps near the bite or scratch. It also causes swollen "lymph nodes." Lymph nodes are bean-shaped organs found all over the body. They make and store cells that fight infections. You were given a tetanus shot in the hospital. Tetanus is a serious, potentially life-threatening infection that can be transmitted by an animal or human bite.

## 2020-09-29 NOTE — DISCHARGE NOTE PROVIDER - NSDCFUADDAPPT_GEN_ALL_CORE_FT
Please follow up with Dr. Moss as outpatient. Please follow up with your appointment with Dr. Moss as outpatient on 10/12/20 at 11:00am Please follow up with your appointment with Dr. Moss as outpatient next Monday on 10/5/20 at 11:00am

## 2020-09-29 NOTE — ADVANCED PRACTICE NURSE CONSULT - ASSESSMENT
The patient presented with a wound on left medial malleolus with soft, slightly fluctuant soft brown scab measuring 2 cm x 2 cm. Wound painful on palpation. Periwound with resolving erythema. WOCN cleansed wound with normal saline, applied Xeroform gauze, 2x3 gauze, wrapped with garfield. WOCN instructed the patient in the same. The patient verbalized understanding but is unable to reach wound to change the dressing. Provided the patient with wound care supplies pending ordering by home care nurse.

## 2020-09-29 NOTE — DISCHARGE NOTE NURSING/CASE MANAGEMENT/SOCIAL WORK - PATIENT PORTAL LINK FT
You can access the FollowMyHealth Patient Portal offered by [facility] by registering at the following website: [email or web address]. By joining [facility]’s FollowMyHealth portal, you will also be able to view your health information using other applications (apps) compatible with our system.

## 2020-09-29 NOTE — DISCHARGE NOTE PROVIDER - NSDCMRMEDTOKEN_GEN_ALL_CORE_FT
acetaminophen 325 mg oral tablet: 2 tab(s) orally every 6 hours, As needed, Temp greater or equal to 38C (100.4F), Moderate Pain (4 - 6)  calcium-vitamin D 500 mg-200 intl units oral tablet: 1 tab(s) orally once a day   amLODIPine 5 mg oral tablet: 1 tab(s) orally every 24 hours  amoxicillin-clavulanate 875 mg-125 mg oral tablet: 1 tab(s) orally every 12 hours  calcium-vitamin D 500 mg-200 intl units oral tablet: 1 tab(s) orally once a day   amLODIPine 5 mg oral tablet: 1 tab(s) orally every 24 hours  amoxicillin-clavulanate 500 mg-125 mg oral tablet: 1 tab(s) orally 2 times a day   calcium-vitamin D 500 mg-200 intl units oral tablet: 1 tab(s) orally once a day   amLODIPine 5 mg oral tablet: 1 tab(s) orally every 24 hours  calcium-vitamin D 500 mg-200 intl units oral tablet: 1 tab(s) orally once a day  doxycycline hyclate 100 mg oral capsule: 1 cap(s) by mouth 2 times a day   amLODIPine 5 mg oral tablet: 1 tab(s) orally every 24 hours  calcium-vitamin D 500 mg-200 intl units oral tablet: 1 tab(s) orally once a day  Cipro 500 mg oral tablet: 1 tab(s) orally 2 times a day   doxycycline hyclate 100 mg oral capsule: 1 cap(s) by mouth 2 times a day

## 2020-09-29 NOTE — DIETITIAN INITIAL EVALUATION ADULT. - ADD RECOMMEND
1. Continue regular diet, encourage intake 2. Add Ensure Enlive BID (each 350kcal/20gpro) 3. Trend wts 2-3x per week 4. Monitor lytes and replete 5. RD to remain available prn

## 2020-09-29 NOTE — DIETITIAN INITIAL EVALUATION ADULT. - PROBLEM SELECTOR PLAN 3
pt w/ normocytic anemia, no s/s, no complaints, no melena, BRBPR, concern for hemolysis, underproduction  - collateral on last colonoscopy, though pt is outside screening age  - f/u iron studies, ferritin, folate, B12, and retic

## 2020-09-29 NOTE — PHYSICAL THERAPY INITIAL EVALUATION ADULT - PERTINENT HX OF CURRENT PROBLEM, REHAB EVAL
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 Patient states that 10 days ago, patient cat woke patient up from bed and pounced on patient's left medial ankle.

## 2020-09-29 NOTE — PHYSICAL THERAPY INITIAL EVALUATION ADULT - GENERAL OBSERVATIONS, REHAB EVAL
Pt found semi supine in bed in NAD, +hep lock, +R ankle wound C/D/I, agreeable to PT Eval and cleared by RN Jessica.

## 2020-09-30 VITALS
TEMPERATURE: 97 F | HEART RATE: 83 BPM | OXYGEN SATURATION: 96 % | DIASTOLIC BLOOD PRESSURE: 71 MMHG | RESPIRATION RATE: 17 BRPM | SYSTOLIC BLOOD PRESSURE: 173 MMHG

## 2020-09-30 LAB
ANION GAP SERPL CALC-SCNC: 11 MMOL/L — SIGNIFICANT CHANGE UP (ref 5–17)
BUN SERPL-MCNC: 12 MG/DL — SIGNIFICANT CHANGE UP (ref 7–23)
CALCIUM SERPL-MCNC: 8.7 MG/DL — SIGNIFICANT CHANGE UP (ref 8.4–10.5)
CHLORIDE SERPL-SCNC: 106 MMOL/L — SIGNIFICANT CHANGE UP (ref 96–108)
CO2 SERPL-SCNC: 24 MMOL/L — SIGNIFICANT CHANGE UP (ref 22–31)
CREAT SERPL-MCNC: 0.9 MG/DL — SIGNIFICANT CHANGE UP (ref 0.5–1.3)
GLUCOSE SERPL-MCNC: 95 MG/DL — SIGNIFICANT CHANGE UP (ref 70–99)
HCT VFR BLD CALC: 34.6 % — SIGNIFICANT CHANGE UP (ref 34.5–45)
HGB BLD-MCNC: 11.1 G/DL — LOW (ref 11.5–15.5)
MAGNESIUM SERPL-MCNC: 2 MG/DL — SIGNIFICANT CHANGE UP (ref 1.6–2.6)
MCHC RBC-ENTMCNC: 30.7 PG — SIGNIFICANT CHANGE UP (ref 27–34)
MCHC RBC-ENTMCNC: 32.1 GM/DL — SIGNIFICANT CHANGE UP (ref 32–36)
MCV RBC AUTO: 95.8 FL — SIGNIFICANT CHANGE UP (ref 80–100)
NRBC # BLD: 0 /100 WBCS — SIGNIFICANT CHANGE UP (ref 0–0)
PHOSPHATE SERPL-MCNC: 3 MG/DL — SIGNIFICANT CHANGE UP (ref 2.5–4.5)
PLATELET # BLD AUTO: 478 K/UL — HIGH (ref 150–400)
POTASSIUM SERPL-MCNC: 3.8 MMOL/L — SIGNIFICANT CHANGE UP (ref 3.5–5.3)
POTASSIUM SERPL-SCNC: 3.8 MMOL/L — SIGNIFICANT CHANGE UP (ref 3.5–5.3)
RBC # BLD: 3.61 M/UL — LOW (ref 3.8–5.2)
RBC # FLD: 13.1 % — SIGNIFICANT CHANGE UP (ref 10.3–14.5)
SODIUM SERPL-SCNC: 141 MMOL/L — SIGNIFICANT CHANGE UP (ref 135–145)
WBC # BLD: 8.65 K/UL — SIGNIFICANT CHANGE UP (ref 3.8–10.5)
WBC # FLD AUTO: 8.65 K/UL — SIGNIFICANT CHANGE UP (ref 3.8–10.5)

## 2020-09-30 PROCEDURE — 85045 AUTOMATED RETICULOCYTE COUNT: CPT

## 2020-09-30 PROCEDURE — 84100 ASSAY OF PHOSPHORUS: CPT

## 2020-09-30 PROCEDURE — 87070 CULTURE OTHR SPECIMN AEROBIC: CPT

## 2020-09-30 PROCEDURE — 99239 HOSP IP/OBS DSCHRG MGMT >30: CPT

## 2020-09-30 PROCEDURE — 97161 PT EVAL LOW COMPLEX 20 MIN: CPT

## 2020-09-30 PROCEDURE — 83550 IRON BINDING TEST: CPT

## 2020-09-30 PROCEDURE — 85025 COMPLETE CBC W/AUTO DIFF WBC: CPT

## 2020-09-30 PROCEDURE — 99285 EMERGENCY DEPT VISIT HI MDM: CPT | Mod: 25

## 2020-09-30 PROCEDURE — 85027 COMPLETE CBC AUTOMATED: CPT

## 2020-09-30 PROCEDURE — U0003: CPT

## 2020-09-30 PROCEDURE — 73700 CT LOWER EXTREMITY W/O DYE: CPT

## 2020-09-30 PROCEDURE — 87040 BLOOD CULTURE FOR BACTERIA: CPT

## 2020-09-30 PROCEDURE — 82746 ASSAY OF FOLIC ACID SERUM: CPT

## 2020-09-30 PROCEDURE — 90471 IMMUNIZATION ADMIN: CPT

## 2020-09-30 PROCEDURE — 80048 BASIC METABOLIC PNL TOTAL CA: CPT

## 2020-09-30 PROCEDURE — 82728 ASSAY OF FERRITIN: CPT

## 2020-09-30 PROCEDURE — 82607 VITAMIN B-12: CPT

## 2020-09-30 PROCEDURE — 90715 TDAP VACCINE 7 YRS/> IM: CPT

## 2020-09-30 PROCEDURE — 36415 COLL VENOUS BLD VENIPUNCTURE: CPT

## 2020-09-30 PROCEDURE — 80053 COMPREHEN METABOLIC PANEL: CPT

## 2020-09-30 PROCEDURE — 96374 THER/PROPH/DIAG INJ IV PUSH: CPT

## 2020-09-30 PROCEDURE — 83735 ASSAY OF MAGNESIUM: CPT

## 2020-09-30 PROCEDURE — 87186 SC STD MICRODIL/AGAR DIL: CPT

## 2020-09-30 PROCEDURE — 83540 ASSAY OF IRON: CPT

## 2020-09-30 RX ORDER — MOXIFLOXACIN HYDROCHLORIDE TABLETS, 400 MG 400 MG/1
1 TABLET, FILM COATED ORAL
Qty: 16 | Refills: 0
Start: 2020-09-30 | End: 2020-10-07

## 2020-09-30 RX ADMIN — Medication 1 TABLET(S): at 10:18

## 2020-09-30 RX ADMIN — Medication 1 TABLET(S): at 12:07

## 2020-10-01 LAB
-  AMPICILLIN/SULBACTAM: SIGNIFICANT CHANGE UP
-  AMPICILLIN: SIGNIFICANT CHANGE UP
-  AZTREONAM: SIGNIFICANT CHANGE UP
-  CEFAZOLIN: SIGNIFICANT CHANGE UP
-  CEFEPIME: SIGNIFICANT CHANGE UP
-  CEFTRIAXONE: SIGNIFICANT CHANGE UP
-  CIPROFLOXACIN: SIGNIFICANT CHANGE UP
-  CIPROFLOXACIN: SIGNIFICANT CHANGE UP
-  GENTAMICIN: SIGNIFICANT CHANGE UP
-  GENTAMICIN: SIGNIFICANT CHANGE UP
-  MEROPENEM: SIGNIFICANT CHANGE UP
-  MEROPENEM: SIGNIFICANT CHANGE UP
-  PIPERACILLIN/TAZOBACTAM: SIGNIFICANT CHANGE UP
-  PIPERACILLIN/TAZOBACTAM: SIGNIFICANT CHANGE UP
-  TOBRAMYCIN: SIGNIFICANT CHANGE UP
-  TOBRAMYCIN: SIGNIFICANT CHANGE UP
-  TRIMETHOPRIM/SULFAMETHOXAZOLE: SIGNIFICANT CHANGE UP
CULTURE RESULTS: SIGNIFICANT CHANGE UP
METHOD TYPE: SIGNIFICANT CHANGE UP
METHOD TYPE: SIGNIFICANT CHANGE UP
ORGANISM # SPEC MICROSCOPIC CNT: SIGNIFICANT CHANGE UP
SPECIMEN SOURCE: SIGNIFICANT CHANGE UP

## 2020-10-03 LAB
CULTURE RESULTS: SIGNIFICANT CHANGE UP
CULTURE RESULTS: SIGNIFICANT CHANGE UP
SPECIMEN SOURCE: SIGNIFICANT CHANGE UP
SPECIMEN SOURCE: SIGNIFICANT CHANGE UP

## 2020-10-13 DIAGNOSIS — A28.1 CAT-SCRATCH DISEASE: ICD-10-CM

## 2020-10-13 DIAGNOSIS — Z79.82 LONG TERM (CURRENT) USE OF ASPIRIN: ICD-10-CM

## 2020-10-13 DIAGNOSIS — D64.9 ANEMIA, UNSPECIFIED: ICD-10-CM

## 2020-10-13 DIAGNOSIS — L03.116 CELLULITIS OF LEFT LOWER LIMB: ICD-10-CM

## 2020-10-13 DIAGNOSIS — Z87.891 PERSONAL HISTORY OF NICOTINE DEPENDENCE: ICD-10-CM

## 2020-10-13 DIAGNOSIS — G89.29 OTHER CHRONIC PAIN: ICD-10-CM

## 2020-10-13 DIAGNOSIS — M54.5 LOW BACK PAIN: ICD-10-CM

## 2020-10-13 DIAGNOSIS — I10 ESSENTIAL (PRIMARY) HYPERTENSION: ICD-10-CM

## 2020-10-13 DIAGNOSIS — S90.812A ABRASION, LEFT FOOT, INITIAL ENCOUNTER: ICD-10-CM

## 2020-10-13 DIAGNOSIS — M65.862 OTHER SYNOVITIS AND TENOSYNOVITIS, LEFT LOWER LEG: ICD-10-CM
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
## 2020-10-13 DIAGNOSIS — E43 UNSPECIFIED SEVERE PROTEIN-CALORIE MALNUTRITION: ICD-10-CM

## 2021-03-28 VITALS
DIASTOLIC BLOOD PRESSURE: 101 MMHG | HEART RATE: 90 BPM | WEIGHT: 89.95 LBS | HEIGHT: 61.5 IN | SYSTOLIC BLOOD PRESSURE: 191 MMHG | RESPIRATION RATE: 18 BRPM | TEMPERATURE: 98 F | OXYGEN SATURATION: 97 %

## 2021-03-28 LAB
ALBUMIN SERPL ELPH-MCNC: 3.3 G/DL — LOW (ref 3.4–5)
ALP SERPL-CCNC: 83 U/L — SIGNIFICANT CHANGE UP (ref 40–120)
ALT FLD-CCNC: 13 U/L — SIGNIFICANT CHANGE UP (ref 12–42)
ANION GAP SERPL CALC-SCNC: 18 MMOL/L — HIGH (ref 9–16)
APPEARANCE UR: CLEAR — SIGNIFICANT CHANGE UP
APTT BLD: 31.8 SEC — SIGNIFICANT CHANGE UP (ref 27.5–35.5)
AST SERPL-CCNC: 16 U/L — SIGNIFICANT CHANGE UP (ref 15–37)
BACTERIA # UR AUTO: PRESENT /HPF
BASOPHILS # BLD AUTO: 0.07 K/UL — SIGNIFICANT CHANGE UP (ref 0–0.2)
BASOPHILS NFR BLD AUTO: 0.4 % — SIGNIFICANT CHANGE UP (ref 0–2)
BILIRUB SERPL-MCNC: 0.2 MG/DL — SIGNIFICANT CHANGE UP (ref 0.2–1.2)
BILIRUB UR-MCNC: NEGATIVE — SIGNIFICANT CHANGE UP
BUN SERPL-MCNC: 21 MG/DL — SIGNIFICANT CHANGE UP (ref 7–23)
CALCIUM SERPL-MCNC: 9.4 MG/DL — SIGNIFICANT CHANGE UP (ref 8.5–10.5)
CHLORIDE SERPL-SCNC: 98 MMOL/L — SIGNIFICANT CHANGE UP (ref 96–108)
CO2 SERPL-SCNC: 24 MMOL/L — SIGNIFICANT CHANGE UP (ref 22–31)
COLOR SPEC: YELLOW — SIGNIFICANT CHANGE UP
COMMENT - URINE: SIGNIFICANT CHANGE UP
CREAT SERPL-MCNC: 0.84 MG/DL — SIGNIFICANT CHANGE UP (ref 0.5–1.3)
DIFF PNL FLD: ABNORMAL
EOSINOPHIL # BLD AUTO: 0.09 K/UL — SIGNIFICANT CHANGE UP (ref 0–0.5)
EOSINOPHIL NFR BLD AUTO: 0.5 % — SIGNIFICANT CHANGE UP (ref 0–6)
EPI CELLS # UR: SIGNIFICANT CHANGE UP /HPF (ref 0–5)
ETHANOL SERPL-MCNC: <3 MG/DL — SIGNIFICANT CHANGE UP
GLUCOSE BLDC GLUCOMTR-MCNC: 103 MG/DL — HIGH (ref 70–99)
GLUCOSE SERPL-MCNC: 134 MG/DL — HIGH (ref 70–99)
GLUCOSE UR QL: NEGATIVE — SIGNIFICANT CHANGE UP
HCG UR QL: NEGATIVE — SIGNIFICANT CHANGE UP
HCT VFR BLD CALC: 42.9 % — SIGNIFICANT CHANGE UP (ref 34.5–45)
HGB BLD-MCNC: 14.1 G/DL — SIGNIFICANT CHANGE UP (ref 11.5–15.5)
IMM GRANULOCYTES NFR BLD AUTO: 0.7 % — SIGNIFICANT CHANGE UP (ref 0–1.5)
INR BLD: 0.93 — SIGNIFICANT CHANGE UP (ref 0.88–1.16)
KETONES UR-MCNC: ABNORMAL MG/DL
LACTATE SERPL-SCNC: 0.8 MMOL/L — SIGNIFICANT CHANGE UP (ref 0.4–2)
LEUKOCYTE ESTERASE UR-ACNC: ABNORMAL
LIDOCAIN IGE QN: 139 U/L — SIGNIFICANT CHANGE UP (ref 73–393)
LYMPHOCYTES # BLD AUTO: 1.83 K/UL — SIGNIFICANT CHANGE UP (ref 1–3.3)
LYMPHOCYTES # BLD AUTO: 11 % — LOW (ref 13–44)
MAGNESIUM SERPL-MCNC: 2 MG/DL — SIGNIFICANT CHANGE UP (ref 1.6–2.6)
MCHC RBC-ENTMCNC: 32.9 GM/DL — SIGNIFICANT CHANGE UP (ref 32–36)
MCHC RBC-ENTMCNC: 33.2 PG — SIGNIFICANT CHANGE UP (ref 27–34)
MCV RBC AUTO: 100.9 FL — HIGH (ref 80–100)
MONOCYTES # BLD AUTO: 0.43 K/UL — SIGNIFICANT CHANGE UP (ref 0–0.9)
MONOCYTES NFR BLD AUTO: 2.6 % — SIGNIFICANT CHANGE UP (ref 2–14)
NEUTROPHILS # BLD AUTO: 14.12 K/UL — HIGH (ref 1.8–7.4)
NEUTROPHILS NFR BLD AUTO: 84.8 % — HIGH (ref 43–77)
NITRITE UR-MCNC: NEGATIVE — SIGNIFICANT CHANGE UP
NRBC # BLD: 0 /100 WBCS — SIGNIFICANT CHANGE UP (ref 0–0)
NT-PROBNP SERPL-SCNC: 2035 PG/ML — HIGH
OSMOLALITY SERPL: 294 MOSMOL/KG — SIGNIFICANT CHANGE UP (ref 280–301)
PH UR: 6.5 — SIGNIFICANT CHANGE UP (ref 5–8)
PLATELET # BLD AUTO: 447 K/UL — HIGH (ref 150–400)
POTASSIUM SERPL-MCNC: 4 MMOL/L — SIGNIFICANT CHANGE UP (ref 3.5–5.3)
POTASSIUM SERPL-SCNC: 4 MMOL/L — SIGNIFICANT CHANGE UP (ref 3.5–5.3)
PROT SERPL-MCNC: 7.6 G/DL — SIGNIFICANT CHANGE UP (ref 6.4–8.2)
PROT UR-MCNC: NEGATIVE MG/DL — SIGNIFICANT CHANGE UP
PROTHROM AB SERPL-ACNC: 11.2 SEC — SIGNIFICANT CHANGE UP (ref 10.6–13.6)
RBC # BLD: 4.25 M/UL — SIGNIFICANT CHANGE UP (ref 3.8–5.2)
RBC # FLD: 11.9 % — SIGNIFICANT CHANGE UP (ref 10.3–14.5)
RBC CASTS # UR COMP ASSIST: ABNORMAL /HPF
SARS-COV-2 RNA SPEC QL NAA+PROBE: SIGNIFICANT CHANGE UP
SODIUM SERPL-SCNC: 140 MMOL/L — SIGNIFICANT CHANGE UP (ref 132–145)
SP GR SPEC: 1.01 — SIGNIFICANT CHANGE UP (ref 1–1.03)
TROPONIN I SERPL-MCNC: 0.03 NG/ML — SIGNIFICANT CHANGE UP (ref 0.02–0.06)
TROPONIN I SERPL-MCNC: 0.08 NG/ML — HIGH (ref 0.02–0.06)
UROBILINOGEN FLD QL: 0.2 E.U./DL — SIGNIFICANT CHANGE UP
WBC # BLD: 16.65 K/UL — HIGH (ref 3.8–10.5)
WBC # FLD AUTO: 16.65 K/UL — HIGH (ref 3.8–10.5)
WBC UR QL: > 10 /HPF

## 2021-03-28 PROCEDURE — 99285 EMERGENCY DEPT VISIT HI MDM: CPT | Mod: CS,25

## 2021-03-28 PROCEDURE — 70450 CT HEAD/BRAIN W/O DYE: CPT | Mod: 26,MH

## 2021-03-28 PROCEDURE — 93010 ELECTROCARDIOGRAM REPORT: CPT

## 2021-03-28 PROCEDURE — 71045 X-RAY EXAM CHEST 1 VIEW: CPT | Mod: 26

## 2021-03-28 RX ORDER — SODIUM CHLORIDE 9 MG/ML
1000 INJECTION INTRAMUSCULAR; INTRAVENOUS; SUBCUTANEOUS ONCE
Refills: 0 | Status: COMPLETED | OUTPATIENT
Start: 2021-03-28 | End: 2021-03-28

## 2021-03-28 RX ORDER — ONDANSETRON 8 MG/1
4 TABLET, FILM COATED ORAL ONCE
Refills: 0 | Status: COMPLETED | OUTPATIENT
Start: 2021-03-28 | End: 2021-03-28

## 2021-03-28 RX ORDER — MECLIZINE HCL 12.5 MG
25 TABLET ORAL ONCE
Refills: 0 | Status: COMPLETED | OUTPATIENT
Start: 2021-03-28 | End: 2021-03-28

## 2021-03-28 RX ORDER — CEFTRIAXONE 500 MG/1
1000 INJECTION, POWDER, FOR SOLUTION INTRAMUSCULAR; INTRAVENOUS ONCE
Refills: 0 | Status: COMPLETED | OUTPATIENT
Start: 2021-03-28 | End: 2021-03-28

## 2021-03-28 RX ORDER — ACETAMINOPHEN 500 MG
650 TABLET ORAL ONCE
Refills: 0 | Status: COMPLETED | OUTPATIENT
Start: 2021-03-28 | End: 2021-03-28

## 2021-03-28 RX ADMIN — Medication 650 MILLIGRAM(S): at 17:49

## 2021-03-28 RX ADMIN — Medication 25 MILLIGRAM(S): at 17:49

## 2021-03-28 RX ADMIN — CEFTRIAXONE 100 MILLIGRAM(S): 500 INJECTION, POWDER, FOR SOLUTION INTRAMUSCULAR; INTRAVENOUS at 19:45

## 2021-03-28 RX ADMIN — ONDANSETRON 4 MILLIGRAM(S): 8 TABLET, FILM COATED ORAL at 17:49

## 2021-03-28 RX ADMIN — SODIUM CHLORIDE 1000 MILLILITER(S): 9 INJECTION INTRAMUSCULAR; INTRAVENOUS; SUBCUTANEOUS at 17:49

## 2021-03-28 NOTE — ED PROVIDER NOTE - PROGRESS NOTE DETAILS
Case was discussed with Dr. Valverde ([facility] Hospitalist) and has been accepted to [facility] for UTI with weakness and dizziness/lightheadedness. Pt consents to admission.

## 2021-03-28 NOTE — ED PROVIDER NOTE - CLINICAL SUMMARY MEDICAL DECISION MAKING FREE TEXT BOX
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

## 2021-03-28 NOTE — ED PROVIDER NOTE - ATTENDING CONTRIBUTION TO CARE
Pt is an 87yo F with a h/o HTN, chronic LBP, SBPO, possible GYN malignancy, and p/w dizziness.  Dizziness is described as light headed sensation.  Started while at rest.    ROS - Denies fever, chills, hematuria, vaginal bleeding, d/c, abdominal pain, change in bowel function, flank pain, rash, HA, dizziness, SOB, CP, palpitations, diaphoresis, cough, and malaise.  PE - agree with PA exam as above.   A/P - Dizziness in elderly female.  Worked up by day team and dx with UTI and mild trop elevation.  Signed out to night team to f/u serial trop.  Given +UTI we will admit to medicine for IV antibiotics and monitoring of dizziness.

## 2021-03-28 NOTE — ED ADULT NURSE REASSESSMENT NOTE - NS ED NURSE REASSESS COMMENT FT1
Bed pan removed. Repositioned Pt for comfort.
Pt resting in bed at this time. PT states that she is starting to feel better at this time.
Received Pt from previous RN lying on stretcher awake and alert, breathing without issue and NAD. IV site is patent. Awaiting dispo.
Pt requested a bedpan, call bell at the bedside. Awaiting call.

## 2021-03-28 NOTE — ED PROVIDER NOTE - CHPI ED SYMPTOMS NEG
no fever/no nausea/no numbness/no pain/no tingling/no vomiting/no weakness/no chills/no decreased eating/drinking

## 2021-03-28 NOTE — ED PROVIDER NOTE - CARE PLAN
Principal Discharge DX:	UTI (urinary tract infection)  Secondary Diagnosis:	Generalized weakness  Secondary Diagnosis:	Dizziness

## 2021-03-28 NOTE — ED PROVIDER NOTE - OBJECTIVE STATEMENT
89 yo F with past medical history of hypertension, prior SBO 2/2 suspected gyn malignancy, chronic back pain (2/2 prior compression L3 L4 fractures), cellulitis now here with lightheadedness from home. Patient states she was sitting at home watching the TV and began to feel lightheaded. Patient was able to stand up and walk around. Patient then told friend to call ambulance. Asymptomatic in the ED. Denies fever, chills, hematuria, vaginal bleeding, d/c, abdominal pain, change in bowel function, flank pain, rash, HA, dizziness, SOB, CP, palpitations, diaphoresis, cough, and malaise.  Patient states the symptoms lasted for about an hour. Denies syncope or exertional component. Appears well NAD stable.

## 2021-03-29 ENCOUNTER — INPATIENT (INPATIENT)
Facility: HOSPITAL | Age: 86
LOS: 8 days | Discharge: NURSING, REHAB OR LONG-TERM CARE | DRG: 854 | End: 2021-04-07
Attending: STUDENT IN AN ORGANIZED HEALTH CARE EDUCATION/TRAINING PROGRAM | Admitting: STUDENT IN AN ORGANIZED HEALTH CARE EDUCATION/TRAINING PROGRAM
Payer: MEDICARE

## 2021-03-29 DIAGNOSIS — K56.609 UNSPECIFIED INTESTINAL OBSTRUCTION, UNSPECIFIED AS TO PARTIAL VERSUS COMPLETE OBSTRUCTION: ICD-10-CM

## 2021-03-29 DIAGNOSIS — Z51.5 ENCOUNTER FOR PALLIATIVE CARE: ICD-10-CM

## 2021-03-29 DIAGNOSIS — Z98.890 OTHER SPECIFIED POSTPROCEDURAL STATES: Chronic | ICD-10-CM

## 2021-03-29 DIAGNOSIS — M54.9 DORSALGIA, UNSPECIFIED: ICD-10-CM

## 2021-03-29 DIAGNOSIS — N39.0 URINARY TRACT INFECTION, SITE NOT SPECIFIED: ICD-10-CM

## 2021-03-29 DIAGNOSIS — A41.9 SEPSIS, UNSPECIFIED ORGANISM: ICD-10-CM

## 2021-03-29 DIAGNOSIS — I10 ESSENTIAL (PRIMARY) HYPERTENSION: ICD-10-CM

## 2021-03-29 DIAGNOSIS — D72.829 ELEVATED WHITE BLOOD CELL COUNT, UNSPECIFIED: ICD-10-CM

## 2021-03-29 DIAGNOSIS — Z29.9 ENCOUNTER FOR PROPHYLACTIC MEASURES, UNSPECIFIED: ICD-10-CM

## 2021-03-29 LAB
ALBUMIN SERPL ELPH-MCNC: 3.1 G/DL — LOW (ref 3.3–5)
ALP SERPL-CCNC: 69 U/L — SIGNIFICANT CHANGE UP (ref 40–120)
ALT FLD-CCNC: 7 U/L — LOW (ref 10–45)
ANION GAP SERPL CALC-SCNC: 8 MMOL/L — SIGNIFICANT CHANGE UP (ref 5–17)
AST SERPL-CCNC: 16 U/L — SIGNIFICANT CHANGE UP (ref 10–40)
BASOPHILS # BLD AUTO: 0.03 K/UL — SIGNIFICANT CHANGE UP (ref 0–0.2)
BASOPHILS NFR BLD AUTO: 0.4 % — SIGNIFICANT CHANGE UP (ref 0–2)
BILIRUB SERPL-MCNC: 0.3 MG/DL — SIGNIFICANT CHANGE UP (ref 0.2–1.2)
BUN SERPL-MCNC: 15 MG/DL — SIGNIFICANT CHANGE UP (ref 7–23)
CALCIUM SERPL-MCNC: 8.6 MG/DL — SIGNIFICANT CHANGE UP (ref 8.4–10.5)
CHLORIDE SERPL-SCNC: 109 MMOL/L — HIGH (ref 96–108)
CO2 SERPL-SCNC: 25 MMOL/L — SIGNIFICANT CHANGE UP (ref 22–31)
CREAT SERPL-MCNC: 0.77 MG/DL — SIGNIFICANT CHANGE UP (ref 0.5–1.3)
CULTURE RESULTS: SIGNIFICANT CHANGE UP
EOSINOPHIL # BLD AUTO: 0.19 K/UL — SIGNIFICANT CHANGE UP (ref 0–0.5)
EOSINOPHIL NFR BLD AUTO: 2.3 % — SIGNIFICANT CHANGE UP (ref 0–6)
GLUCOSE SERPL-MCNC: 103 MG/DL — HIGH (ref 70–99)
HCT VFR BLD CALC: 40.9 % — SIGNIFICANT CHANGE UP (ref 34.5–45)
HGB BLD-MCNC: 12.5 G/DL — SIGNIFICANT CHANGE UP (ref 11.5–15.5)
IMM GRANULOCYTES NFR BLD AUTO: 0.4 % — SIGNIFICANT CHANGE UP (ref 0–1.5)
LYMPHOCYTES # BLD AUTO: 2.85 K/UL — SIGNIFICANT CHANGE UP (ref 1–3.3)
LYMPHOCYTES # BLD AUTO: 35 % — SIGNIFICANT CHANGE UP (ref 13–44)
MAGNESIUM SERPL-MCNC: 2.1 MG/DL — SIGNIFICANT CHANGE UP (ref 1.6–2.6)
MCHC RBC-ENTMCNC: 30.6 GM/DL — LOW (ref 32–36)
MCHC RBC-ENTMCNC: 31.9 PG — SIGNIFICANT CHANGE UP (ref 27–34)
MCV RBC AUTO: 104.3 FL — HIGH (ref 80–100)
MONOCYTES # BLD AUTO: 0.49 K/UL — SIGNIFICANT CHANGE UP (ref 0–0.9)
MONOCYTES NFR BLD AUTO: 6 % — SIGNIFICANT CHANGE UP (ref 2–14)
NEUTROPHILS # BLD AUTO: 4.55 K/UL — SIGNIFICANT CHANGE UP (ref 1.8–7.4)
NEUTROPHILS NFR BLD AUTO: 55.9 % — SIGNIFICANT CHANGE UP (ref 43–77)
NRBC # BLD: 0 /100 WBCS — SIGNIFICANT CHANGE UP (ref 0–0)
PHOSPHATE SERPL-MCNC: 2.8 MG/DL — SIGNIFICANT CHANGE UP (ref 2.5–4.5)
PLATELET # BLD AUTO: 417 K/UL — HIGH (ref 150–400)
POTASSIUM SERPL-MCNC: 4.4 MMOL/L — SIGNIFICANT CHANGE UP (ref 3.5–5.3)
POTASSIUM SERPL-SCNC: 4.4 MMOL/L — SIGNIFICANT CHANGE UP (ref 3.5–5.3)
PROT SERPL-MCNC: 6.5 G/DL — SIGNIFICANT CHANGE UP (ref 6–8.3)
RBC # BLD: 3.92 M/UL — SIGNIFICANT CHANGE UP (ref 3.8–5.2)
RBC # FLD: 11.9 % — SIGNIFICANT CHANGE UP (ref 10.3–14.5)
SODIUM SERPL-SCNC: 142 MMOL/L — SIGNIFICANT CHANGE UP (ref 135–145)
SPECIMEN SOURCE: SIGNIFICANT CHANGE UP
TROPONIN I SERPL-MCNC: 0.05 NG/ML — SIGNIFICANT CHANGE UP (ref 0.02–0.06)
WBC # BLD: 8.14 K/UL — SIGNIFICANT CHANGE UP (ref 3.8–10.5)
WBC # FLD AUTO: 8.14 K/UL — SIGNIFICANT CHANGE UP (ref 3.8–10.5)

## 2021-03-29 PROCEDURE — 99223 1ST HOSP IP/OBS HIGH 75: CPT | Mod: GC,AI

## 2021-03-29 PROCEDURE — 70551 MRI BRAIN STEM W/O DYE: CPT | Mod: 26

## 2021-03-29 PROCEDURE — 70498 CT ANGIOGRAPHY NECK: CPT | Mod: 26

## 2021-03-29 PROCEDURE — 70496 CT ANGIOGRAPHY HEAD: CPT | Mod: 26

## 2021-03-29 RX ORDER — ACETAMINOPHEN 500 MG
650 TABLET ORAL EVERY 6 HOURS
Refills: 0 | Status: DISCONTINUED | OUTPATIENT
Start: 2021-03-29 | End: 2021-04-05

## 2021-03-29 RX ORDER — ATORVASTATIN CALCIUM 80 MG/1
80 TABLET, FILM COATED ORAL AT BEDTIME
Refills: 0 | Status: DISCONTINUED | OUTPATIENT
Start: 2021-03-29 | End: 2021-04-05

## 2021-03-29 RX ORDER — LISINOPRIL 2.5 MG/1
10 TABLET ORAL DAILY
Refills: 0 | Status: DISCONTINUED | OUTPATIENT
Start: 2021-03-29 | End: 2021-04-04

## 2021-03-29 RX ORDER — ATORVASTATIN CALCIUM 80 MG/1
80 TABLET, FILM COATED ORAL AT BEDTIME
Refills: 0 | Status: DISCONTINUED | OUTPATIENT
Start: 2021-03-29 | End: 2021-03-29

## 2021-03-29 RX ORDER — CEFTRIAXONE 500 MG/1
1000 INJECTION, POWDER, FOR SOLUTION INTRAMUSCULAR; INTRAVENOUS EVERY 24 HOURS
Refills: 0 | Status: DISCONTINUED | OUTPATIENT
Start: 2021-03-29 | End: 2021-04-01

## 2021-03-29 RX ORDER — ATORVASTATIN CALCIUM 80 MG/1
40 TABLET, FILM COATED ORAL AT BEDTIME
Refills: 0 | Status: DISCONTINUED | OUTPATIENT
Start: 2021-03-29 | End: 2021-03-29

## 2021-03-29 RX ORDER — ASPIRIN/CALCIUM CARB/MAGNESIUM 324 MG
81 TABLET ORAL DAILY
Refills: 0 | Status: DISCONTINUED | OUTPATIENT
Start: 2021-03-29 | End: 2021-04-05

## 2021-03-29 RX ORDER — ENOXAPARIN SODIUM 100 MG/ML
40 INJECTION SUBCUTANEOUS EVERY 24 HOURS
Refills: 0 | Status: DISCONTINUED | OUTPATIENT
Start: 2021-03-29 | End: 2021-03-29

## 2021-03-29 RX ORDER — ENOXAPARIN SODIUM 100 MG/ML
30 INJECTION SUBCUTANEOUS EVERY 24 HOURS
Refills: 0 | Status: DISCONTINUED | OUTPATIENT
Start: 2021-03-29 | End: 2021-03-31

## 2021-03-29 RX ORDER — CLOPIDOGREL BISULFATE 75 MG/1
75 TABLET, FILM COATED ORAL DAILY
Refills: 0 | Status: DISCONTINUED | OUTPATIENT
Start: 2021-03-29 | End: 2021-04-05

## 2021-03-29 RX ADMIN — Medication 1 TABLET(S): at 11:32

## 2021-03-29 RX ADMIN — CEFTRIAXONE 100 MILLIGRAM(S): 500 INJECTION, POWDER, FOR SOLUTION INTRAMUSCULAR; INTRAVENOUS at 19:33

## 2021-03-29 RX ADMIN — CLOPIDOGREL BISULFATE 75 MILLIGRAM(S): 75 TABLET, FILM COATED ORAL at 20:20

## 2021-03-29 RX ADMIN — Medication 81 MILLIGRAM(S): at 20:20

## 2021-03-29 RX ADMIN — ATORVASTATIN CALCIUM 80 MILLIGRAM(S): 80 TABLET, FILM COATED ORAL at 21:22

## 2021-03-29 RX ADMIN — ENOXAPARIN SODIUM 30 MILLIGRAM(S): 100 INJECTION SUBCUTANEOUS at 10:33

## 2021-03-29 RX ADMIN — LISINOPRIL 10 MILLIGRAM(S): 2.5 TABLET ORAL at 11:32

## 2021-03-29 NOTE — PHYSICAL THERAPY INITIAL EVALUATION ADULT - ADDITIONAL COMMENTS
Pt lives alone, denied BERNARDO, and uses a rollator in PLOF. She reports having a cleaning lady to assist with home but otherwise accomplishes functional tasks independently.

## 2021-03-29 NOTE — H&P ADULT - PROBLEM SELECTOR PLAN 3
patient w/ history of chronic lower back pain from prior L3, L4 compression fracture  - c/w tylenol PRN for pain control  - PT consult patient w/ history of chronic lower back pain from prior L3, L4 compression fracture. Takes tylenol every day at 8a and 4pm.  - c/w tylenol PRN for pain control  - PT consult Likely in setting of UTI.   - mgmt as above Met sepsis criteria on admission. Likely in setting of UT as CXR clear and no other sources of infection readily identifiable.  - mgmt as above

## 2021-03-29 NOTE — H&P ADULT - NSHPSOCIALHISTORY_GEN_ALL_CORE
Lives alone. Has a cleaning lady that comes weekly. Uses walker at home and outside. Denies tobacco or recreational drug use. Drinks 1 drink per night.

## 2021-03-29 NOTE — H&P ADULT - HISTORY OF PRESENT ILLNESS
88 y.o, F PMH HTN, prior SBO 2/2 suspected gyn malignancy, chronic back pain (2/2 prior compression L3 L4 fractures), cellulitis BIBEMS from home with chief complaint of lightheadedness which started ___.  Patient states she was sitting at home watching the TV and began to feel lightheaded. Patient was able to stand up and walk around. Patient then told friend to call ambulance. Asymptomatic in the ED. Denies fever, chills, hematuria, vaginal bleeding, d/c, abdominal pain, change in bowel function, flank pain, rash, HA, dizziness, SOB, CP, palpitations, diaphoresis, cough, and malaise. Patient states the symptoms lasted for about an hour. Denies syncope or exertional component. Appears well NAD stable    In the ED   vitals T 98.7, HR 90, /101-> 146/66, O2 sat 97% on RA  labs WBC 16.65 (neutrophil predominance 84.8%), BUN/Cr 18/0.84, albumin 3.3, BNP 2035, trop negative   Ua positive: trace ketones, moderate leukocyte esterase, trace blood, >10 wbc, 5-10 rbc, + bacteria  blood alcohol <3   ekg    Imaging    CXR negative - No pneumothorax, No opacities, No free air.     CT head  IMPRESSION:  No acute intracranial hemorrhage or transcortical infarction.    Medications given  Tylenol, ctx 1g, meclizine, Zofran, 1 L NS   88 y.o, F PMH HTN, prior SBO (2018), chronic back pain (2/2 prior compression L3 L4 fractures), cellulitis BIBEMS from home with chief complaint 1 hour of blurry vision which started on afternoon of admission.  Patient states she was lying in bed reading a book when she began to feel "dizzy" - no room spinning just that blurry vision and lightheadedness. Patient was able to stand up and walk around but felt unsteady- uses a walker at all times. Patient called next door neighbor/ friend who told her to call ambulance. Per patient, symptoms resolved by the time she got to the ED.  Denies headache, f/c, chest pain, palpitations, SOB, cough, abdominal pain, N/V, diarrhea/constipation, hematuria, vaginal bleeding, d/c, abdominal pain, flank pain, dysura, hematuria, change in urinary frequency. Received both COVID vaccines. ugh, and malaise.     In the ED   vitals T 98.7, HR 90, /101-> 146/66, O2 sat 97% on RA  labs WBC 16.65 (neutrophil predominance 84.8%), BUN/Cr 18/0.84, albumin 3.3, BNP 2035, trop negative   Ua positive: trace ketones, moderate leukocyte esterase, trace blood, >10 wbc, 5-10 rbc, + bacteria  blood alcohol <3   ekg    Imaging    CXR negative - No pneumothorax, No opacities, No free air.     CT head  IMPRESSION:  No acute intracranial hemorrhage or transcortical infarction.    Medications given  Tylenol, ctx 1g, meclizine, Zofran, 1 L NS   88 y.o, F PMH HTN, prior SBO (2018), chronic back pain (2/2 prior compression L3 L4 fractures), cellulitis BIBEMS from home with chief complaint 1 hour of blurry vision which started on afternoon of admission.  Patient states she was lying in bed reading a book when she began to feel "dizzy" - no room spinning just that blurry vision and lightheadedness. Patient was able to stand up and walk around but felt unsteady- uses a walker at all times. Patient called next door neighbor/ friend who told her to call ambulance. Per patient, symptoms resolved by the time she got to the ED.  Denies headache, f/c, chest pain, palpitations, SOB, cough, abdominal pain, N/V, diarrhea/constipation, hematuria, vaginal bleeding, d/c, abdominal pain, flank pain, dysura, hematuria, change in urinary frequency. No bladder/bowel incontinence. Received both COVID vaccines.     In the ED   vitals T 98.7, HR 90, /101-> 146/66, O2 sat 97% on RA  labs WBC 16.65 (neutrophil predominance 84.8%), BUN/Cr 18/0.84, albumin 3.3, BNP 2035, trop negative   Ua positive: trace ketones, moderate leukocyte esterase, trace blood, >10 wbc, 5-10 rbc, + bacteria  blood alcohol <3   ekg    Imaging  CXR negative - No pneumothorax, No opacities, No free air.     CT head  IMPRESSION:  No acute intracranial hemorrhage or transcortical infarction.    Medications given  Tylenol, ctx 1g, meclizine, Zofran, 1 L NS   88 y.o, F PMH HTN, prior SBO (2018), chronic back pain (2/2 prior compression L3 L4 fractures), cellulitis BIBEMS from home with chief complaint 1 hour of blurry vision which started on afternoon of admission.  Patient states she was lying in bed reading a book when she began to feel "dizzy" - no room spinning just that blurry vision and lightheadedness. Patient was able to stand up and walk around but felt unsteady- uses a walker at all times. Patient called next door neighbor/ friend who told her to call ambulance. Per patient, symptoms resolved by the time she got to the ED.  Denies headache, f/c, chest pain, palpitations, SOB, cough, abdominal pain, N/V, diarrhea/constipation, hematuria, vaginal bleeding, d/c, abdominal pain, flank pain, dysura, hematuria, change in urinary frequency. No bladder/bowel incontinence. Received both COVID vaccines.     In the ED   vitals T 98.7, HR 90, /101-> 146/66, O2 sat 97% on RA  labs WBC 16.65 (neutrophil predominance 84.8%), BUN/Cr 18/0.84, albumin 3.3, BNP 2035, trop negative   Ua positive: trace ketones, moderate leukocyte esterase, trace blood, >10 wbc, 5-10 rbc, + bacteria  blood alcohol <3   ekg NSR, no acute ischemic changes    Imaging  CXR negative - No pneumothorax, No opacities, No free air.     CT head  IMPRESSION:  No acute intracranial hemorrhage or transcortical infarction.    Medications given  Tylenol, ctx 1g, meclizine, Zofran, 1 L NS   88 y.o, F PMH HTN, prior SBO (2018), chronic back pain (2/2 prior compression L3 L4 fractures), cellulitis BIBEMS from home with chief complaint 1 hour of blurry vision which started on afternoon of admission.  Patient states she was lying in bed reading a book when she began to feel "dizzy" - no room spinning just that blurry vision and lightheadedness. Patient was able to stand up and walk around but felt unsteady- uses a walker at all times. Patient called next door neighbor/ friend who told her to call ambulance. Per patient, symptoms resolved by the time she got to the ED.  Denies headache, f/c, chest pain, palpitations, SOB, cough, abdominal pain, N/V, flank pain, diarrhea/constipation, hematuria, vaginal bleeding, dysuria, hematuria, change in urinary frequency. No bladder/bowel incontinence. Received both COVID vaccines.     In the ED   vitals T 98.7, HR 90, /101-> 146/66, O2 sat 97% on RA  labs WBC 16.65 (neutrophil predominance 84.8%), BUN/Cr 18/0.84, albumin 3.3, BNP 2035, trop negative   Ua positive: trace ketones, moderate leukocyte esterase, trace blood, >10 wbc, 5-10 rbc, + bacteria  blood alcohol <3   ekg NSR, no acute ischemic changes    Imaging  CXR negative - No pneumothorax, No opacities, No free air.     CT head  IMPRESSION:  No acute intracranial hemorrhage or transcortical infarction.    Medications given  Tylenol, ctx 1g, meclizine, Zofran, 1 L NS   88 y.o, F PMH HTN, prior SBO (2018), chronic back pain (2/2 prior compression L3 L4 fractures), cellulitis BIBEMS from home with chief complaint 1 hour of blurry vision/lightheadedness which started on afternoon of admission.  Patient states she was lying in bed reading a book when she began to feel "dizzy" - no room spinning just that blurry vision and lightheadedness. Patient was able to stand up and walk around but felt unsteady- uses a walker at all times. Patient called next door neighbor/ friend who told her to call ambulance. Per patient, symptoms resolved by the time she got to the ED. Denies prior episodes. Denies headache, f/c, chest pain, palpitations, SOB, cough, abdominal pain, N/V, flank pain, diarrhea/constipation, hematuria, vaginal bleeding, dysuria, hematuria, change in urinary frequency. No bladder/bowel incontinence. Received both COVID vaccines.     In the ED   vitals T 98.7, HR 90, /101-> 146/66, O2 sat 97% on RA  labs WBC 16.65 (neutrophil predominance 84.8%), BUN/Cr 18/0.84, albumin 3.3, BNP 2035, trop negative   Ua positive: trace ketones, moderate leukocyte esterase, trace blood, >10 wbc, 5-10 rbc, + bacteria  blood alcohol <3   ekg NSR, no acute ischemic changes    Imaging  CXR negative - No pneumothorax, No opacities, No free air.     CT head  IMPRESSION:  No acute intracranial hemorrhage or transcortical infarction.    Medications given  Tylenol, ctx 1g, meclizine, Zofran, 1 L NS

## 2021-03-29 NOTE — PHYSICAL THERAPY INITIAL EVALUATION ADULT - GAIT DEVIATIONS NOTED, PT EVAL
Pt appeared steady and did not exhibit any LOB. Pt reported feeling steady and is walking similar to PLOF.

## 2021-03-29 NOTE — DIETITIAN INITIAL EVALUATION ADULT. - PROBLEM SELECTOR PLAN 8
Per patient, she has not had GOC discussion with family or friends.  -Consider palliative care consult to discuss GOC

## 2021-03-29 NOTE — DIETITIAN INITIAL EVALUATION ADULT. - PROBLEM SELECTOR PLAN 3
Met sepsis criteria on admission. Likely in setting of UT as CXR clear and no other sources of infection readily identifiable.  - mgmt as above

## 2021-03-29 NOTE — H&P ADULT - PROBLEM SELECTOR PLAN 2
Met sepsis criteria on admission. Likely in setting of UT as CXR clear and no other sources of infection readily identifiable.  - mgmt as above Presented with lightheadedness and blurry vision x1 hour which resolved by the time she got to the emergency room. CTH negative. LÓPEZ level wnl. No FND on exam. Sx likely in setting of UTI. s/p ctx in the ED.  - c/w ctx (3/28- )  - f/u U cx  - f/u blood cx  - on contact for esbl ecoli found in cat scratch wound in sept 2020

## 2021-03-29 NOTE — H&P ADULT - ASSESSMENT
88 y.o, F PMH HTN, prior SBO (2018), chronic back pain (2/2 prior compression L3 L4 fractures), cellulitis BIBEMS from home with chief complaint 1 hour of blurry vision which started on afternoon of admission found to have sepsis 2/2 UTI.  88 y.o, F PMH HTN, prior SBO (2018), chronic back pain (2/2 prior compression L3 L4 fractures), cellulitis BIBEMS from home with chief complaint 1 hour of blurry vision/lightheadedness which started on afternoon of admission found to have sepsis 2/2 UTI, and possible TIA (no acute intracranial hemorrhage or infarct).

## 2021-03-29 NOTE — DIETITIAN INITIAL EVALUATION ADULT. - OTHER INFO
89y/o female with history of HTN, SBO(2018),chronic back pain Prior compression of L3L4 fractures, Cellulitis admitted with UTI. Patient eating about 60%.Denied N/V/D/C .BM 3/29,Skin intact PU wise .Per weight history UBW("Years ago"):110lbs, Present weight:90lbs,Weight  Sept 2020:90lbs.November 2019:94lbs.86% of IBW ,BMI:16.7.NFPE noted mild wasting in clavicle region and loss of fat and muscle in arms(some age appropriate). patient identified at moderate malnutrition due to suspected weight loss/fair po intake and NFPE findings. Patient requesting Ensure Enlive"I love Ensure" RD encouraged nutrient dense sources.

## 2021-03-29 NOTE — PHYSICAL THERAPY INITIAL EVALUATION ADULT - PERTINENT HX OF CURRENT PROBLEM, REHAB EVAL
88 y.o, F PMH HTN, prior SBO (2018), chronic back pain (2/2 prior compression L3 L4 fractures), cellulitis BIBEMS from home with chief complaint 1 hour of blurry vision/lightheadedness which started on afternoon of admission.  Patient states she was lying in bed reading a book when she began to feel "dizzy" - no room spinning just that blurry vision and lightheadedness. Patient was able to stand up and walk around but felt unsteady- uses a walker at all times. See H&P for full HPI.

## 2021-03-29 NOTE — H&P ADULT - PROBLEM SELECTOR PLAN 7
F: tolerating PO, no IVF  E: replete K<4, Mg<2  N: Dash/TLC    VTE Prophylaxis: Lovenox 40 Q24H  GI: not needed  C: Full Code  D: RMF F: tolerating PO, no IVF  E: replete K<4, Mg<2  N: Dash/TLC    VTE Prophylaxis: Lovenox 30 Q24H  GI: not needed  C: Full Code  D: RMF Per patient, she has not had GOC discussion with family or friends.  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.  SBO resolved and gyn recommend outpt f/u.  - continue to monitor clinically.

## 2021-03-29 NOTE — H&P ADULT - PROBLEM SELECTOR PLAN 1
Presented with lightheadedness and dizziness. CTH negative. LÓPEZ level wnl. No FND on exam. Sx likely in setting of UTI. s/p ctx in the ED.  - c/w ctx  - f/u U cx  - f/u blood cx Presented with lightheadedness and blurry vision x1 hour which resolved by the time she got to the emergency room. CTH negative. LÓPEZ level wnl. No FND on exam. Sx likely in setting of UTI. s/p ctx in the ED.  - c/w ctx  - f/u U cx  - f/u blood cx  - on contact for esbl ecoli found in cat scratch wound in sept 2020 Presented with lightheadedness and blurry vision x1 hour which resolved by the time she got to the emergency room. CTH negative. LÓPEZ level wnl. No FND on exam. Sx likely in setting of UTI. s/p ctx in the ED.  - c/w ctx (3/28- )  - f/u U cx  - f/u blood cx  - on contact for esbl ecoli found in cat scratch wound in sept 2020 Patient with sudden onset of sx at rest that resolved after 1 hour. No acute intracranial hemorrhage or infarct seen on CT.  No trigger for sx, occurred without exertion. Hypertensive urgency on arrival to ED.   - f/u CTA head and neck  - f/u echo  - stroke team consult Patient with sudden onset of sx at rest that resolved after 1 hour. No acute intracranial hemorrhage or infarct seen on CT.  No trigger for sx, occurred without exertion. Elevated BP on arrival to ED. Sx resolution in ED. Concern for possible TIA.  - f/u CTA head and neck  - f/u echo  - stroke team consulted, appreciate recs

## 2021-03-29 NOTE — H&P ADULT - NSHPPHYSICALEXAM_GEN_ALL_CORE
PHYSICAL EXAM:  GENERAL: NAD, speaks in full sentences, no signs of respiratory distress  HEAD:  Atraumatic, Normocephalic  EYES: EOMI, PERRLA, conjunctiva and sclera clear  NECK: Supple, No JVD  CHEST/LUNG: Clear to auscultation bilaterally; No wheeze; No crackles; No accessory muscles used  HEART: Regular rate and rhythm; No murmurs;   ABDOMEN: Soft, Nontender, Nondistended; Bowel sounds present; No guarding  EXTREMITIES:  2+ Peripheral Pulses, No cyanosis or edema  PSYCH: AAOx3  NEUROLOGY: non-focal  SKIN: No rashes or lesions PHYSICAL EXAM:  GENERAL: NAD, speaks in full sentences, no signs of respiratory distress  HEAD:  Atraumatic, Normocephalic  EYES: conjunctiva and sclera clear  NECK: Supple, No JVD  CHEST/LUNG: Clear to auscultation bilaterally; No wheeze; No crackles; No accessory muscles used  HEART: Regular rate and rhythm; No murmurs;   ABDOMEN: Soft, Nontender, slightly distended; Bowel sounds present; No guarding; no suprapubic tenderness  EXTREMITIES:  2+ Peripheral Pulses, No cyanosis or edema  PSYCH: AAOx3  NEUROLOGY: non-focal  SKIN: No rashes or lesions

## 2021-03-29 NOTE — H&P ADULT - PROBLEM SELECTOR PLAN 5
Prior history of SBO due to suspected gyn malignancy (evaluted in 2018).  - continue to monitor clinically. Prior history of SBO. Evaluated for suspected gyn malignancy causing SBO in 2018 at [facility].   - continue to monitor clinically. Prior history of SBO 2/2 uterine mass, was evaluated for suspected gyn malignancy causing SBO in 2018 at [facility].  SBO resolved and gyn recommend outpt f/u.  - continue to monitor clinically. history of HTN, was on lisinopril 10 mg daily. No longer taking antihypertensive medications  - continue to monitor clinically. patient w/ history of chronic lower back pain from prior L3, L4 compression fracture. Takes tylenol every day at 8a and 4pm.  - c/w tylenol PRN for pain control  - PT consult

## 2021-03-29 NOTE — H&P ADULT - NSHPLABSRESULTS_GEN_ALL_CORE
LABS:                        14.1   16.65 )-----------( 447      ( 28 Mar 2021 18:00 )             42.9         140  |  98  |  21  ----------------------------<  134<H>  4.0   |  24  |  0.84    Ca    9.4      28 Mar 2021 18:00  Mg     2.0         TPro  7.6  /  Alb  3.3<L>  /  TBili  0.2  /  DBili  x   /  AST  16  /  ALT  13  /  AlkPhos  83        PT/INR - ( 28 Mar 2021 18:00 )   PT: 11.2 sec;   INR: 0.93          PTT - ( 28 Mar 2021 18:00 )  PTT:31.8 sec  CAPILLARY BLOOD GLUCOSE      POCT Blood Glucose.: 103 mg/dL (28 Mar 2021 16:48)      CARDIAC MARKERS ( 29 Mar 2021 02:42 )  0.051 ng/mL / x     / x     / x     / x      CARDIAC MARKERS ( 28 Mar 2021 22:51 )  0.079 ng/mL / x     / x     / x     / x      CARDIAC MARKERS ( 28 Mar 2021 18:00 )  0.027 ng/mL / x     / x     / x     / x              Urinalysis Basic - ( 28 Mar 2021 18:52 )    Color: Yellow / Appearance: Clear / S.015 / pH: x  Gluc: x / Ketone: Trace mg/dL  / Bili: NEGATIVE / Urobili: 0.2 E.U./dL   Blood: x / Protein: NEGATIVE mg/dL / Nitrite: NEGATIVE   Leuk Esterase: Moderate / RBC: 5-10 /HPF / WBC > 10 /HPF   Sq Epi: x / Non Sq Epi: 0-5 /HPF / Bacteria: Present /HPF          LIVER FUNCTIONS - ( 28 Mar 2021 18:00 )  Alb: 3.3 g/dL / Pro: 7.6 g/dL / ALK PHOS: 83 U/L / ALT: 13 U/L / AST: 16 U/L / GGT: x                     I & O Summary:    21 @ 07:01  -  21 @ 07:00  --------------------------------------------------------  IN: 0 mL / OUT: 230 mL / NET: -230 mL        Microbiology:        RADIOLOGY, EKG AND ADDITIONAL TESTS: Reviewed.

## 2021-03-29 NOTE — DIETITIAN INITIAL EVALUATION ADULT. - PROBLEM SELECTOR PLAN 7
Prior history of SBO 2/2 uterine mass, was evaluated for suspected gyn malignancy causing SBO in 2018 at [facility].  SBO resolved and gyn recommend outpt f/u.  - continue to monitor clinically.

## 2021-03-29 NOTE — H&P ADULT - NSICDXPASTMEDICALHX_GEN_ALL_CORE_FT
PAST MEDICAL HISTORY:  Chronic back pain     HTN (hypertension)     SBO (small bowel obstruction)

## 2021-03-29 NOTE — CONSULT NOTE ADULT - ASSESSMENT
1)Secondary stroke prevention  -CTA head and neck   -ASA 81mg PO daily  -Atorvastatin 40mg PO daily    2) Stroke risk factors  - Obtain A1C, LDL  - Monitor HTN- follow up with outpt cardiology     DVT prophylaxis   -Lovenox SQ and SCDs Pt is a 89 yo F w/ PMH HTN (not on antihypertensives), prior SBO secondary to suspected gyn malignancy, chronic back pain, and cellulitis, BIBEMS to [facility] for lightheadedness and b/l blurry vision. Pt was transferred to [facility] for further management and treatment. Pt admitted to 7 uris for further management and treatment of UTI. Pt states she was sitting down reading yesterday afternoon around 2 PM when she noticed blurry vision that she described as white spots throughout her vision and lightheadedness. Pt is unsure how long these symptoms lasted for but when she arrived to the ED she was asymptomatic. Pt states she also had associated nausea and vomiting, having 4 episodes of vomiting between home and ED arrival. Pt states she has an opthalmology appointment at the end of May and has never experienced anything like this before. Pt denies acute onset of numbness, weakness, tingling, slurred speech, blurry vision, double vision, dizziness, headache, SOB, chest pain, fever, or chills.     1)Secondary stroke prevention  -CTA w/ contrast head and neck  -ASA 81mg PO daily  -Atorvastatin 40mg PO daily    2) Stroke risk factors  - Obtain A1C, LDL, TSH,   - Monitor HTN- follow up with outpt cardiology     DVT prophylaxis   -Lovenox SQ and SCDs Pt is a 87 yo F w/ PMH HTN (not on antihypertensives), prior SBO secondary to suspected gyn malignancy, chronic back pain, and cellulitis, BIBEMS to [facility] for lightheadedness and b/l blurry vision which has now resolved. Pt admitted to 7 uris for further management and treatment of UTI. DDx for blurry vision is TIA vs hypertensive urgency.     1)Secondary stroke prevention for chronic lacunar stroke.   -Recommend obtaining CTA w/ contrast head and neck  Recommend starting ASA 81mg PO daily  -Recommend starting Atorvastatin 40mg PO daily    2) Stroke risk factors  - Obtain A1C, LDL, TSH,   - Monitor HTN- follow up with outpt cardiology     DVT prophylaxis   -Lovenox SQ and SCDs Pt is a 89 yo F w/ PMH HTN (not on antihypertensives), prior SBO secondary to suspected gyn malignancy, chronic back pain, and cellulitis, BIBEMS to [facility] for lightheadedness and b/l blurry vision which has now resolved. Pt admitted to 7 uris for further management and treatment of UTI. DDx for blurry vision is TIA vs malignant hypertension.     1)Secondary stroke prevention   -Recommend starting ASA 81mg PO daily and Plavix 75mg PO daily  -Recommend starting Atorvastatin 80mg PO daily  -Recommend obtaining CTA w/ contrast head and neck  -Recommend MRI head w/o contrast   -Recommend TTE     2) Stroke risk factors  - Obtain A1C, LDL, TSH,   - Monitor HTN- follow up with outpt cardiology     DVT prophylaxis   -Lovenox SQ and SCDs

## 2021-03-29 NOTE — H&P ADULT - PROBLEM SELECTOR PLAN 8
F: tolerating PO, no IVF  E: replete K<4, Mg<2  N: Dash/TLC    VTE Prophylaxis: Lovenox 30 Q24H  GI: not needed  C: Full Code  D: RMF Per patient, she has not had GOC discussion with family or friends.  -Consider palliative care consult to discuss GOC

## 2021-03-29 NOTE — DIETITIAN INITIAL EVALUATION ADULT. - PHYSCIAL ASSESSMENT
Per physical assessment: Muscle Wasting- Temporal [   ]  Clavicle/Pectoral [ x  ]  Shoulder/Deltoid [   ]  Scapula [  x ]  Interosseous [   ]  Quadriceps [x   ]  Gastrocnemius [   ]; Fat Wasting- Orbital [   ]  Buccal [   ]  Triceps [ x  ]  Rib [   ]--> Suspect moderate [PCM] 2/2 to physical assessment, [poor intake], and [wt loss]; please see malnutrition chart note./debilitated

## 2021-03-29 NOTE — H&P ADULT - PROBLEM SELECTOR PLAN 9
F: tolerating PO, no IVF  E: replete K<4, Mg<2  N: Dash/TLC    VTE Prophylaxis: Lovenox 30 Q24H  GI: not needed  C: Full Code  D: RMF

## 2021-03-29 NOTE — H&P ADULT - PROBLEM SELECTOR PLAN 4
history of HTN, was on lisinopril 10 mg daily  - continue to monitor clinically. history of HTN, was on lisinopril 10 mg daily. No longer taking antihypertensive medications  - continue to monitor clinically. patient w/ history of chronic lower back pain from prior L3, L4 compression fracture. Takes tylenol every day at 8a and 4pm.  - c/w tylenol PRN for pain control  - PT consult Likely in setting of UTI.   - mgmt as above Likely in setting of UTI or possible TIA.   - mgmt as above

## 2021-03-29 NOTE — DIETITIAN INITIAL EVALUATION ADULT. - PROBLEM SELECTOR PLAN 6
history of HTN, was on lisinopril 10 mg daily. No longer taking antihypertensive medications. However, patient hypertensive in ED and on floors.   - restart lisinopril 10 mg daily

## 2021-03-29 NOTE — DIETITIAN INITIAL EVALUATION ADULT. - PROBLEM SELECTOR PLAN 1
Patient with sudden onset of sx at rest that resolved after 1 hour. No acute intracranial hemorrhage or infarct seen on CT.  No trigger for sx, occurred without exertion. Elevated BP on arrival to ED. Sx resolution in ED. Concern for possible TIA.  - f/u CTA head and neck  - f/u echo  - stroke team consulted, appreciate recs

## 2021-03-29 NOTE — DIETITIAN INITIAL EVALUATION ADULT. - PROBLEM SELECTOR PLAN 2
Presented with lightheadedness and blurry vision x1 hour which resolved by the time she got to the emergency room. CTH negative. LÓPEZ level wnl. No FND on exam. Sx likely in setting of UTI. s/p ctx in the ED.  - c/w ctx (3/28- )  - f/u U cx  - f/u blood cx  - on contact for esbl ecoli found in cat scratch wound in sept 2020

## 2021-03-29 NOTE — CONSULT NOTE ADULT - SUBJECTIVE AND OBJECTIVE BOX
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 Pt was transferred to [facility] for further management and treatment. Pt states she was sitting down reading when she noticed blurry vision that she described as white spots throughout her vision. Pt is not sure how long these symptoms lasted for but when she arrived to the ED she was asymptomatic. Pt states she also had associated nausea and vomiting, having 4 episodes of vomiting between home and ED arrival. Pt states she has an opthalmology appointment at the end of May and has never experienced anything like this before. Pt denies acute onset of numbness, weakness, tingling, slurred speech, blurry vision, double vision, dizziness, headache, SOB, chest pain.     PAST MEDICAL & SURGICAL HISTORY:  Chronic back pain    HTN (hypertension)    SBO (small bowel obstruction)    S/P wrist surgery        FAMILY HISTORY:      SOCIAL HISTORY: Denies tobacco use       ROS:  As above    MEDICATIONS  (STANDING):  calcium carbonate 1250 mG  + Vitamin D (OsCal 500 + D) 1 Tablet(s) Oral daily  cefTRIAXone   IVPB 1000 milliGRAM(s) IV Intermittent every 24 hours  enoxaparin Injectable 30 milliGRAM(s) SubCutaneous every 24 hours  lisinopril 10 milliGRAM(s) Oral daily    MEDICATIONS  (PRN):  acetaminophen   Tablet .. 650 milliGRAM(s) Oral every 6 hours PRN Temp greater or equal to 38C (100.4F), Mild Pain (1 - 3), Moderate Pain (4 - 6)      Allergies    No Known Allergies    Intolerances        Vital Signs Last 24 Hrs  T(C): 36.2 (29 Mar 2021 12:28), Max: 37.1 (29 Mar 2021 05:39)  T(F): 97.2 (29 Mar 2021 12:28), Max: 98.7 (29 Mar 2021 05:39)  HR: 82 (29 Mar 2021 12:28) (75 - 90)  BP: 154/78 (29 Mar 2021 12:28) (122/62 - 191/101)  BP(mean): --  RR: 17 (29 Mar 2021 12:28) (17 - 18)  SpO2: 97% (29 Mar 2021 12:28) (96% - 98%)    Physical exam:  General: No acute distress, awake and alert    Neurologic:  -Mental status: Awake, alert, oriented to person, place, and time. Speech is fluent with intact naming, repetition, and comprehension, no dysarthria. Recent and remote memory intact. Follows commands. Attention/concentration intact. Fund of knowledge appropriate.  -Cranial nerves:   II: Visual fields are full to confrontation.  III, IV, VI: Extraocular movements are intact without nystagmus. Pupils equally round and reactive to light  V:  Facial sensation V1-V3 equal and intact   VII: Face is symmetric with normal eye closure and smile  VIII: Hearing is bilaterally intact to finger rub  XI: Head turning and shoulder shrug are intact.  Motor: Normal bulk and tone. No pronator drift. Strength bilateral upper extremity 5/5, bilateral lower extremities 5/5.  Sensation: Intact to light touch bilaterally. No neglect or extinction on double simultaneous testing.  Coordination: No dysmetria on finger-to-nose     NIHSS: 0    LABS:                        12.5   8.14  )-----------( 417      ( 29 Mar 2021 12:54 )             40.9     03-    142  |  109<H>  |  15  ----------------------------<  103<H>  4.4   |  25  |  0.77    Ca    8.6      29 Mar 2021 12:54  Phos  2.8       Mg     2.1         TPro  6.5  /  Alb  3.1<L>  /  TBili  0.3  /  DBili  x   /  AST  16  /  ALT  7<L>  /  AlkPhos  69  03-29    PT/INR - ( 28 Mar 2021 18:00 )   PT: 11.2 sec;   INR: 0.93          PTT - ( 28 Mar 2021 18:00 )  PTT:31.8 sec  Urinalysis Basic - ( 28 Mar 2021 18:52 )    Color: Yellow / Appearance: Clear / S.015 / pH: x  Gluc: x / Ketone: Trace mg/dL  / Bili: NEGATIVE / Urobili: 0.2 E.U./dL   Blood: x / Protein: NEGATIVE mg/dL / Nitrite: NEGATIVE   Leuk Esterase: Moderate / RBC: 5-10 /HPF / WBC > 10 /HPF   Sq Epi: x / Non Sq Epi: 0-5 /HPF / Bacteria: Present /HPF        RADIOLOGY & ADDITIONAL TESTS:  < from: CT Head No Cont (21 @ 18:08) >  IMPRESSION:    No acute intracranial hemorrhage or transcortical infarction.  Unchanged left internal capsule lacunar infarct.       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 Pt was transferred to [facility] for further management and treatment. Pt states she was sitting down reading when she noticed blurry vision that she described as white spots throughout her vision. Pt is unsure how long these symptoms lasted for but when she arrived to the ED she was asymptomatic. Pt states she also had associated nausea and vomiting, having 4 episodes of vomiting between home and ED arrival. Pt states she has an opthalmology appointment at the end of May and has never experienced anything like this before. Pt was admitted to [facility] for medical management and treatment of UTI. Pt denies acute onset of numbness, weakness, tingling, slurred speech, blurry vision, double vision, dizziness, headache, SOB, chest pain.     PAST MEDICAL & SURGICAL HISTORY:  Chronic back pain    HTN (hypertension)    SBO (small bowel obstruction)    S/P wrist surgery        FAMILY HISTORY:      SOCIAL HISTORY: Denies tobacco use       ROS:  As above    MEDICATIONS  (STANDING):  calcium carbonate 1250 mG  + Vitamin D (OsCal 500 + D) 1 Tablet(s) Oral daily  cefTRIAXone   IVPB 1000 milliGRAM(s) IV Intermittent every 24 hours  enoxaparin Injectable 30 milliGRAM(s) SubCutaneous every 24 hours  lisinopril 10 milliGRAM(s) Oral daily    MEDICATIONS  (PRN):  acetaminophen   Tablet .. 650 milliGRAM(s) Oral every 6 hours PRN Temp greater or equal to 38C (100.4F), Mild Pain (1 - 3), Moderate Pain (4 - 6)      Allergies    No Known Allergies    Intolerances        Vital Signs Last 24 Hrs  T(C): 36.2 (29 Mar 2021 12:28), Max: 37.1 (29 Mar 2021 05:39)  T(F): 97.2 (29 Mar 2021 12:28), Max: 98.7 (29 Mar 2021 05:39)  HR: 82 (29 Mar 2021 12:28) (75 - 90)  BP: 154/78 (29 Mar 2021 12:28) (122/62 - 191/101)  BP(mean): --  RR: 17 (29 Mar 2021 12:28) (17 - 18)  SpO2: 97% (29 Mar 2021 12:28) (96% - 98%)    Physical exam:  General: No acute distress, awake and alert    Neurologic:  -Mental status: Awake, alert, oriented to person, place, and time. Speech is fluent with intact naming, repetition, and comprehension, no dysarthria. Recent and remote memory intact. Follows commands. Attention/concentration intact. Fund of knowledge appropriate.  -Cranial nerves:   II: Visual fields are full to confrontation.  III, IV, VI: Extraocular movements are intact without nystagmus. Pupils equally round and reactive to light  V:  Facial sensation V1-V3 equal and intact   VII: Face is symmetric with normal eye closure and smile  VIII: Hearing is bilaterally intact to finger rub  XI: Head turning and shoulder shrug are intact.  Motor: Normal bulk and tone. No pronator drift. Strength bilateral upper extremity 5/5, bilateral lower extremities 5/5.  Sensation: Intact to light touch bilaterally. No neglect or extinction on double simultaneous testing.  Coordination: No dysmetria on finger-to-nose     NIHSS: 0    LABS:                        12.5   8.14  )-----------( 417      ( 29 Mar 2021 12:54 )             40.9     -    142  |  109<H>  |  15  ----------------------------<  103<H>  4.4   |  25  |  0.77    Ca    8.6      29 Mar 2021 12:54  Phos  2.8       Mg     2.1         TPro  6.5  /  Alb  3.1<L>  /  TBili  0.3  /  DBili  x   /  AST  16  /  ALT  7<L>  /  AlkPhos  69  03-29    PT/INR - ( 28 Mar 2021 18:00 )   PT: 11.2 sec;   INR: 0.93          PTT - ( 28 Mar 2021 18:00 )  PTT:31.8 sec  Urinalysis Basic - ( 28 Mar 2021 18:52 )    Color: Yellow / Appearance: Clear / S.015 / pH: x  Gluc: x / Ketone: Trace mg/dL  / Bili: NEGATIVE / Urobili: 0.2 E.U./dL   Blood: x / Protein: NEGATIVE mg/dL / Nitrite: NEGATIVE   Leuk Esterase: Moderate / RBC: 5-10 /HPF / WBC > 10 /HPF   Sq Epi: x / Non Sq Epi: 0-5 /HPF / Bacteria: Present /HPF        RADIOLOGY & ADDITIONAL TESTS:  < from: CT Head No Cont (21 @ 18:08) >  IMPRESSION:    No acute intracranial hemorrhage or transcortical infarction.  Unchanged left internal capsule lacunar infarct.

## 2021-03-29 NOTE — DIETITIAN INITIAL EVALUATION ADULT. - ORAL INTAKE PTA/DIET HISTORY
Per patient, she is able to shop and cook for self. Claims she eats oatmeal .sandwiches ,soup, chicken, mashed potatoes.?% consumed daily.Question consistency of po intake

## 2021-03-29 NOTE — H&P ADULT - PROBLEM SELECTOR PLAN 6
Consider palliative care consult to discuss GOC. Per patient, she has not had GOC discussion with family or friends.  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.  SBO resolved and gyn recommend outpt f/u.  - continue to monitor clinically. history of HTN, was on lisinopril 10 mg daily. No longer taking antihypertensive medications  - continue to monitor clinically. history of HTN, was on lisinopril 10 mg daily. No longer taking antihypertensive medications. However, patient hypertensive in ED and on floors.   - restart lisinopril 10 mg daily

## 2021-03-30 LAB
A1C WITH ESTIMATED AVERAGE GLUCOSE RESULT: 5 % — SIGNIFICANT CHANGE UP (ref 4–5.6)
ANION GAP SERPL CALC-SCNC: 11 MMOL/L — SIGNIFICANT CHANGE UP (ref 5–17)
BUN SERPL-MCNC: 14 MG/DL — SIGNIFICANT CHANGE UP (ref 7–23)
CALCIUM SERPL-MCNC: 8.6 MG/DL — SIGNIFICANT CHANGE UP (ref 8.4–10.5)
CHLORIDE SERPL-SCNC: 102 MMOL/L — SIGNIFICANT CHANGE UP (ref 96–108)
CO2 SERPL-SCNC: 29 MMOL/L — SIGNIFICANT CHANGE UP (ref 22–31)
COVID-19 SPIKE DOMAIN AB INTERP: POSITIVE
COVID-19 SPIKE DOMAIN ANTIBODY RESULT: >250 U/ML — HIGH
CREAT SERPL-MCNC: 0.83 MG/DL — SIGNIFICANT CHANGE UP (ref 0.5–1.3)
ESTIMATED AVERAGE GLUCOSE: 97 MG/DL — SIGNIFICANT CHANGE UP (ref 68–114)
GLUCOSE SERPL-MCNC: 141 MG/DL — HIGH (ref 70–99)
HCT VFR BLD CALC: 39.5 % — SIGNIFICANT CHANGE UP (ref 34.5–45)
HGB BLD-MCNC: 12.6 G/DL — SIGNIFICANT CHANGE UP (ref 11.5–15.5)
LDLC SERPL DIRECT ASSAY-MCNC: 91 MG/DL — SIGNIFICANT CHANGE UP
MAGNESIUM SERPL-MCNC: 2.1 MG/DL — SIGNIFICANT CHANGE UP (ref 1.6–2.6)
MCHC RBC-ENTMCNC: 31.9 GM/DL — LOW (ref 32–36)
MCHC RBC-ENTMCNC: 32.2 PG — SIGNIFICANT CHANGE UP (ref 27–34)
MCV RBC AUTO: 101 FL — HIGH (ref 80–100)
NRBC # BLD: 0 /100 WBCS — SIGNIFICANT CHANGE UP (ref 0–0)
PHOSPHATE SERPL-MCNC: 2.4 MG/DL — LOW (ref 2.5–4.5)
PLATELET # BLD AUTO: 392 K/UL — SIGNIFICANT CHANGE UP (ref 150–400)
POTASSIUM SERPL-MCNC: 3.1 MMOL/L — LOW (ref 3.5–5.3)
POTASSIUM SERPL-SCNC: 3.1 MMOL/L — LOW (ref 3.5–5.3)
RBC # BLD: 3.91 M/UL — SIGNIFICANT CHANGE UP (ref 3.8–5.2)
RBC # FLD: 11.9 % — SIGNIFICANT CHANGE UP (ref 10.3–14.5)
SARS-COV-2 IGG+IGM SERPL QL IA: >250 U/ML — HIGH
SARS-COV-2 IGG+IGM SERPL QL IA: POSITIVE
SODIUM SERPL-SCNC: 142 MMOL/L — SIGNIFICANT CHANGE UP (ref 135–145)
WBC # BLD: 7.87 K/UL — SIGNIFICANT CHANGE UP (ref 3.8–10.5)
WBC # FLD AUTO: 7.87 K/UL — SIGNIFICANT CHANGE UP (ref 3.8–10.5)

## 2021-03-30 PROCEDURE — 99233 SBSQ HOSP IP/OBS HIGH 50: CPT | Mod: GC

## 2021-03-30 PROCEDURE — 93880 EXTRACRANIAL BILAT STUDY: CPT | Mod: 26

## 2021-03-30 PROCEDURE — 93306 TTE W/DOPPLER COMPLETE: CPT | Mod: 26

## 2021-03-30 RX ORDER — SODIUM,POTASSIUM PHOSPHATES 278-250MG
1 POWDER IN PACKET (EA) ORAL ONCE
Refills: 0 | Status: COMPLETED | OUTPATIENT
Start: 2021-03-30 | End: 2021-03-30

## 2021-03-30 RX ORDER — POTASSIUM CHLORIDE 20 MEQ
40 PACKET (EA) ORAL ONCE
Refills: 0 | Status: COMPLETED | OUTPATIENT
Start: 2021-03-30 | End: 2021-03-30

## 2021-03-30 RX ORDER — POTASSIUM CHLORIDE 20 MEQ
40 PACKET (EA) ORAL EVERY 4 HOURS
Refills: 0 | Status: DISCONTINUED | OUTPATIENT
Start: 2021-03-30 | End: 2021-03-30

## 2021-03-30 RX ADMIN — Medication 40 MILLIEQUIVALENT(S): at 13:50

## 2021-03-30 RX ADMIN — CEFTRIAXONE 100 MILLIGRAM(S): 500 INJECTION, POWDER, FOR SOLUTION INTRAMUSCULAR; INTRAVENOUS at 19:34

## 2021-03-30 RX ADMIN — Medication 40 MILLIEQUIVALENT(S): at 18:03

## 2021-03-30 RX ADMIN — ATORVASTATIN CALCIUM 80 MILLIGRAM(S): 80 TABLET, FILM COATED ORAL at 21:47

## 2021-03-30 RX ADMIN — Medication 1 PACKET(S): at 18:03

## 2021-03-30 RX ADMIN — CLOPIDOGREL BISULFATE 75 MILLIGRAM(S): 75 TABLET, FILM COATED ORAL at 11:29

## 2021-03-30 RX ADMIN — Medication 1 TABLET(S): at 11:29

## 2021-03-30 RX ADMIN — ENOXAPARIN SODIUM 30 MILLIGRAM(S): 100 INJECTION SUBCUTANEOUS at 08:42

## 2021-03-30 RX ADMIN — Medication 81 MILLIGRAM(S): at 11:28

## 2021-03-30 RX ADMIN — LISINOPRIL 10 MILLIGRAM(S): 2.5 TABLET ORAL at 06:13

## 2021-03-30 NOTE — PROGRESS NOTE ADULT - PROBLEM SELECTOR PLAN 1
Patient with sudden onset of sx at rest that resolved after 1 hour. No acute intracranial hemorrhage or infarct seen on CT.  No trigger for sx, occurred without exertion. Elevated BP on arrival to ED. Sx resolution in ED. Concern for possible TIA.  - f/u CTA head and neck  - f/u echo  - stroke team consulted, appreciate recs Patient with sudden onset of sx at rest that resolved after 1 hour. No acute intracranial hemorrhage or infarct seen on CT.  No trigger for sx, occurred without exertion. Elevated BP on arrival to ED. Sx resolution in ED. Concern for possible TIA. 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 CTA showed extensive atherosclerotic disease.  -CTA findings: mod stenosis at origin of L common carotid, sev stenosis at L carotid bifurcation, L internal carotid origin at 73%. Milder stenosis of R common carotid, and at origin of R internal carotid 48%. Moderate narrowing of L subclavian w/ calcific stenosis at the nondominant L vertebral origin.   - consult vascular team on CTA results  - f/u echo  - c/w ASA 81mg PO and Plavix 75 mg PO   - c/w Atorvastatin 80 mg PO  - stroke team consulted, appreciate recs  - update patient's PCP after input from vascular Patient with sudden onset of sx at rest that resolved after 1 hour. No acute intracranial hemorrhage or infarct seen on CT.  No trigger for sx, occurred without exertion. Elevated BP on arrival to ED. Sx resolution in ED. Concern for possible TIA. 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 CTA showed extensive atherosclerotic disease, mod stenosis at origin of L common carotid, sev stenosis at L carotid bifurcation, L internal carotid origin at 73%. Milder stenosis of R common carotid, and at origin of R internal carotid 48%. Moderate narrowing of L subclavian w/ calcific stenosis at the nondominant L vertebral origin.   - consult vascular team on CTA results  - f/u echo w/ bubble  - c/w ASA 81mg PO and Plavix 75 mg PO   - c/w Atorvastatin 80 mg PO  - stroke team consulted, appreciate recs  - update patient's PCP after input from vascular

## 2021-03-30 NOTE — CONSULT NOTE ADULT - SUBJECTIVE AND OBJECTIVE BOX
Patient is a 88y old  Female who presents with a chief complaint of uti (30 Mar 2021 09:58)       HPI:  88 y.o, F PMH HTN, prior SBO (2018), chronic back pain (2/2 prior compression L3 L4 fractures), cellulitis BIBEMS from home with chief complaint 1 hour of blurry vision/lightheadedness which started on afternoon of admission.  Patient states she was lying in bed reading a book when she began to feel "dizzy" - no room spinning just that blurry vision and lightheadedness. Patient was able to stand up and walk around but felt unsteady- uses a walker at all times. Patient called next door neighbor/ friend who told her to call ambulance. Per patient, symptoms resolved by the time she got to the ED. Denies prior episodes. Denies headache, f/c, chest pain, palpitations, SOB, cough, abdominal pain, N/V, flank pain, diarrhea/constipation, hematuria, vaginal bleeding, dysuria, hematuria, change in urinary frequency. No bladder/bowel incontinence. Received both COVID vaccines.     In the ED   vitals T 98.7, HR 90, /101-> 146/66, O2 sat 97% on RA  labs WBC 16.65 (neutrophil predominance 84.8%), BUN/Cr 18/0.84, albumin 3.3, BNP 2035, trop negative   Ua positive: trace ketones, moderate leukocyte esterase, trace blood, >10 wbc, 5-10 rbc, + bacteria  blood alcohol <3   ekg NSR, no acute ischemic changes    Imaging  CXR negative - No pneumothorax, No opacities, No free air.     CT head  IMPRESSION:  No acute intracranial hemorrhage or transcortical infarction.    Medications given  Tylenol, ctx 1g, meclizine, Zofran, 1 L NS   (29 Mar 2021 07:15)      PAST MEDICAL & SURGICAL HISTORY:  SBO (small bowel obstruction)    HTN (hypertension)    Chronic back pain    S/P wrist surgery        MEDICATIONS  (STANDING):  aspirin  chewable 81 milliGRAM(s) Oral daily  atorvastatin 80 milliGRAM(s) Oral at bedtime  calcium carbonate 1250 mG  + Vitamin D (OsCal 500 + D) 1 Tablet(s) Oral daily  cefTRIAXone   IVPB 1000 milliGRAM(s) IV Intermittent every 24 hours  clopidogrel Tablet 75 milliGRAM(s) Oral daily  enoxaparin Injectable 30 milliGRAM(s) SubCutaneous every 24 hours  lisinopril 10 milliGRAM(s) Oral daily    MEDICATIONS  (PRN):  acetaminophen   Tablet .. 650 milliGRAM(s) Oral every 6 hours PRN Temp greater or equal to 38C (100.4F), Mild Pain (1 - 3), Moderate Pain (4 - 6)      FAMILY HISTORY:      CBC Full  -  ( 30 Mar 2021 08:03 )  WBC Count : 7.87 K/uL  RBC Count : 3.91 M/uL  Hemoglobin : 12.6 g/dL  Hematocrit : 39.5 %  Platelet Count - Automated : 392 K/uL  Mean Cell Volume : 101.0 fl  Mean Cell Hemoglobin : 32.2 pg  Mean Cell Hemoglobin Concentration : 31.9 gm/dL  Auto Neutrophil # : x  Auto Lymphocyte # : x  Auto Monocyte # : x  Auto Eosinophil # : x  Auto Basophil # : x  Auto Neutrophil % : x  Auto Lymphocyte % : x  Auto Monocyte % : x  Auto Eosinophil % : x  Auto Basophil % : x          142  |  109<H>  |  15  ----------------------------<  103<H>  4.4   |  25  |  0.77    Ca    8.6      29 Mar 2021 12:54  Phos  2.8       Mg     2.1         TPro  6.5  /  Alb  3.1<L>  /  TBili  0.3  /  DBili  x   /  AST  16  /  ALT  7<L>  /  AlkPhos  69        Urinalysis Basic - ( 28 Mar 2021 18:52 )    Color: Yellow / Appearance: Clear / S.015 / pH: x  Gluc: x / Ketone: Trace mg/dL  / Bili: NEGATIVE / Urobili: 0.2 E.U./dL   Blood: x / Protein: NEGATIVE mg/dL / Nitrite: NEGATIVE   Leuk Esterase: Moderate / RBC: 5-10 /HPF / WBC > 10 /HPF   Sq Epi: x / Non Sq Epi: 0-5 /HPF / Bacteria: Present /HPF          Radiology:    < from: Xray Chest 1 View AP/PA (21 @ 16:47) >  EXAM:  XR CHEST AP OR PA 1V                           PROCEDURE DATE:  2021          INTERPRETATION:  Clinical history/reason for exam: Dizziness.    Frontal chest.    COMPARISON: 2019.    Findings/  impression: Hyperinflation. Heart size within normal limits, thoracic aortic and coronary artery calcification. Hiatal hernia. No acute focal opacity. Stable bony structures.        < from: CT Head No Cont (21 @ 18:08) >  EXAM:  CT BRAIN                           PROCEDURE DATE:  2021          INTERPRETATION:  PROCEDURE: CT head without intravenous contrast    INDICATION: Dizziness.    TECHNIQUE: Multiple axial images were obtained at 5 mm intervals from the skull base to the vertex. Sagittal and coronal reformatted images were obtained from the axial data set. The images were reviewed in brain and bone windows.    COMPARISON: CT head from 5/10/2020.    FINDINGS:  VENTRICLES AND SULCI: Age-related parenchymal volume loss. No hydrocephalus.  INTRA-AXIAL: No intracranial mass, acute hemorrhage, or midline shift is present. The gray-white differentiation is preserved without acute transcortical infarction. Unchanged left internal capsule lacunar infarct. Patchy periventricular and subcortical white matter hypodensities consistent with microvascular ischemic change.  EXTRA-AXIAL: No extra-axial fluid collection is present.  VISUALIZED SINUSES: Opacification of a right anterior ethmoidal air cell. The remainder of the paranasal sinuses are predominantly clear.  VISUALIZED MASTOIDS: Clear.  CALVARIUM:  Normal.  MISCELLANEOUS: Absent bilateral ocular lenses consistent with prior cataract surgery. Bilateral TMJ arthropathy.    IMPRESSION:    No acute intracranial hemorrhage or transcortical infarction.        < from: CT Angio Head w/ IV Cont (21 @ 17:00) >  EXAM:  CT ANGIO NECK (W)AW IC                          EXAM:  CT ANGIO BRAIN (W)AW IC                          PROCEDURE DATE:  2021          INTERPRETATION:  PROCEDURE: CTA brain with intravenous contrast.    INDICATION: Dizziness and blurryvision, TIA    TECHNIQUE: Multiple axial thin section were obtained through the [place] [facility] [place] following the intravenous bolus injection of 80 cc Optiray-350. MIP series are provided.    COMPARISON: No prior vascular imaging of the head; head CT from 2019 is reviewed.    FINDINGS: The internal carotid arteries at the skull base and intracranial compartment are patent and grossly symmetric in caliber with moderate atherosclerotic calcified plaque but no significant stenosis. There is a 4 mm long by 3 mm wide medially projecting aneurysm from the right supraclinoid internal carotid artery. In retrospect, this is visible as a calcified outpouching on prior head CT of roughly similar size. The internal carotid arteries are patent up to the termini. The right anterior cerebral artery A1 segment is widely patent and the left A1 segment is hypoplastic. Both A2 segments appear widely patent. The middle cerebral arteries also appear patent at their first and second order branches without point of occlusion or high-grade stenosis.    The dominant right vertebral artery is patent intracranially, as is the right posterior inferior cerebellar artery. However, the left vertebral artery appears to terminate as PICA, with small contrast filling distally that may reflect a site of high-grade stenosis of the distal V4 segment post-PICA. The basilar artery is patent without high-grade stenosis. The posterior cerebral arteries appear patent with relative hypoplasia of the right P1 segment. The posterior cerebral artery P2 segments appear patent without point of occlusion or high-grade stenosis, but with mild-moderate stenosis bilaterally.    IMPRESSION:    The left vertebral artery intracranially appears to terminate as the posterior inferior cerebellar artery, with likely high-grade stenosis of its more distal V4 segment. However, the dominant right vertebral artery is widely patent intracranially, as is the basilar artery. Mild-moderate stenosis involves both P2 segments.    No large vessel occlusion or hemodynamically significant stenosis of the intracranial anterior circulation.    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    --------------------------    PROCEDURE: CTA neck with intravenous contrast.    INDICATION: Dizziness and blurry vision, TIA    TECHNIQUE: Multiple axial thin section were obtained through the neck following the intravenous bolus injection of Optiray-350 as above. MIP series are provided.    COMPARISON: No prior vascular imaging of the neck    FINDINGS:    The aortic arch is normal size but shows extensive atherosclerotic plaque, heavily calcified of the descending aorta with mixed calcified and noncalcified plaque seen at the great vessel origins. The left common carotid artery at its origin measures a minimum luminal diameter of 3.7 mm, compared to a distal reference lumen of 7.4 mm calculated as a 50% stenosis. There is lesser stenosis of the left subclavian and right common carotid artery origins, however there is extensive mixed density plaque of the left subclavian artery with multifocal moderate stenosis along its course at the thoracic inlet. As a variant of normal, there is an aberrant right subclavian artery with mild calcific stenosis at its origin, less than 50%.    The origin of the right vertebral artery is widely patent, and the right vertebral artery is widely patent in the neck without occlusion, stenosis or evidence of dissection. The left vertebral artery is smaller in caliber with moderate if not severe stenosis at its origin from the calcified left subclavian artery. Otherwise, the left vertebral artery appears widely patent and filled with contrast throughout the neck with only minimal calcified plaque at its distal V2 and V3 segments.    As mentioned above, there is approximately 50% stenosis at the origin of the left commoncarotid artery. There is multifocal atherosclerotic plaque of the left common carotid artery, greatest at the bifurcation where there is moderate-severe stenosis. There is severe stenosis at the origin of the left internal carotid artery, with minimum luminal diameter of 1.2 mm, compared to a distal reference lumen of 4.5 mm, calculated as a 73% stenosis. Otherwise is the left cervical vertebral artery is tortuous in its course but widely patent up to the skull base where there is calcified plaque but no significant narrowing.    The right common carotid artery arises directly from the aortic arch without significant stenosis. There is multifocal atherosclerotic plaque of the right common carotid artery but without high-grade or as significant stenosis as on the left side. There is coarse calcified plaque at the right carotid bifurcation. At its origin, the right internal carotid artery shows minimal luminal diameter of 2.4 mm, reference to a distal lumen of 4.6 cm, calculated as a 48% stenosis. Otherwise, the right internal carotid artery in the neck is widely patent up to the skull base with minimal calcified plaque just below the skull base. No evidence of carotid dissection on either side.    IMPRESSION:    Extensive calcific atherosclerotic disease.    There is moderate stenosis at the origin of the left common carotid artery, and more severe stenosis at the left carotid bifurcation, and left internal carotid artery origin calculated at 73%.    There is milder stenosis of theright common carotid artery, and at the origin of the right internal carotid artery calculated at 48%.    There is moderate narrowing of the left subclavian artery with calcific stenosis at the nondominant left vertebral artery origin. The left vertebral artery appears otherwise patent in the neck. The dominant right vertebral artery is widely patent in the neck. 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        < from: MR Head No Cont (21 @ 19:11) >  EXAM:  MR BRAIN                          PROCEDURE DATE:  2021          INTERPRETATION:  PROCEDURE: MRI Brain without contrast    INDICATION: TIA    TECHNIQUE: Sagittal T1 3D TFE, axial T1, T2 GRASE, FLAIR, diffusion, and T2 FFE images of thebrain were obtained.    COMPARISON: CT head 3/28/2020    FINDINGS: The MRI examination of the brain demonstrates age appropriate volume loss. There is no midline shift or extra-axial collection. The FLAIR/T2-weighted images demonstrates extensive confluent foci of signal abnormality within the periventricular white matter which which is nonspecific and may represent the sequela of small vessel ischemic disease in this patient. There are additional punctate foci within the jacinta. The diffusion-weighted images demonstrate no acute ischemia. The T2 and T2 FFE images demonstrate symmetric hypointense involving the basal ganglia and dentate nucleus bilaterally which may be secondary to mineralization. No calcifications are seen on the CT.    Evaluation of the flow-voids demonstrates a 3.3 mm outpouching projecting medially from the right supraclinoid ICA compatible with a aneurysm (series 601 image 14).    The lenses are absent bilaterally which may be secondary to prior cataract surgery. Thereis minimum mucosal thickening within scattered ethmoid air cells bilaterally. The rest of the visualized paranasal sinuses are free of mucosal disease. The mastoid air cells are well-aerated.    IMPRESSION: No acute ischemia. Age appropriate volume loss with extensive small vessel ischemic disease. 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          Vital Signs Last 24 Hrs  T(C): 36.6 (30 Mar 2021 06:41), Max: 36.6 (29 Mar 2021 20:41)  T(F): 97.8 (30 Mar 2021 06:41), Max: 97.8 (29 Mar 2021 20:41)  HR: 77 (30 Mar 2021 06:41) (77 - 82)  BP: 109/54 (30 Mar 2021 06:41) (109/54 - 154/78)  BP(mean): --  RR: 18 (30 Mar 2021 06:41) (17 - 18)  SpO2: 96% (30 Mar 2021 06:41) (95% - 97%)    REVIEW OF SYSTEMS: as per HPI          Physical Exam:  frail 87 yo  woman lying in semi Velasco's position, no acute complaints    Head: normocephalic, atraumatic    Eyes: PERRLA, EOMI, no nystagmus, sclera anicteric    ENT: nasal discharge, uvula midline, no oropharyngeal erythema/exudate    Neck: supple, negative JVD, negative carotid bruits, no thyromegaly    Chest: CTA bilaterally, neg wheeze/ rhonchi/ rales/ crackles/ egophany    Cardiovascular: regular rate and rhythm, neg murmurs/rubs/gallops    Abdomen: soft, non distended, non tender to palpation in all 4 quadrants, negative rebound/guarding, normal bowel sounds    Extremities: WWP, neg cyanosis/clubbing/edema, negative calf tenderness to palpation, negative Selvin's sign    Musculoskeletal:    Skin:      :     Neurologic Exam:    Alert and oriented to person, place, date/year, speech fluent w/o dysarthria, follows commands, recent and remote memory intact, repetition intact, comprehension intact,  attention/concentration intact, fund of knowledge appropriate    Cranial Nerves:     II:                         pupils equal, round and reactive to light, visual fields intact   III/ IV/VI:             extraocular movements intact, neg nystagmus, neg ptosis  V:                        facial sensation intact, V1-3 normal  VII:                      face symmetric, no droop, normal eye closure and smile  VIII:                     hearing intact to finger rub bilaterally  IX and X:             no hoarseness, gag intact, palate/ uvula rise symmetrically  XI:                       SCM/ trapezius strength intact bilateral  XII:                      no tongue deviation    Motor Exam:    Right UE:            no focal weakness > 4/5                             pronator drift: neg    Left UE:             no focal weakness > 4/5                             pronator drift: neg      Right LE:            no focal weakness > 4/5    Left LE:              no focal weakness > 4/5               Sensation:        Intact to light touch x 4 extremities                           No neglect or extinction on double simultaneous testing.                       DTR:                  biceps/brachioradialis: equal bilaterally                                                     patella/ankle: equal bilaterally                                                      neg clonus                           neg Babinski                        Finger to Nose:  neg dysmetria bilaterally           Heel to shin: wnl bilaterally           Rapid Alternating movements:  neg dysdiadochokinesia bilaterally          R:            L:     Joint Position Sense: wnl bilaterally          R:           L:     Romberg:  not tested    Tandem Walking:  not tested    Gait:  not tested        PM&R Impression:    1) r/o TIA/ UTI  2) deconditioned  3) no focal weakness    Plan: cleared to begin rehab    1) Physical therapy focusing on therapeutic exercises, bed mobility/transfer out of bed evaluation, progressive ambulation with assistive devices prn.    2) Anticipated Disposition Plan/Recs:   d/c home

## 2021-03-30 NOTE — PROGRESS NOTE ADULT - ASSESSMENT
88 y.o, F PMH HTN, prior SBO (2018), chronic back pain (2/2 prior compression L3 L4 fractures), cellulitis BIBEMS from home with chief complaint 1 hour of blurry vision/lightheadedness which started on afternoon of admission found to have sepsis 2/2 UTI, and possible TIA (no acute intracranial hemorrhage or infarct).   88 y.o, F PMH HTN, prior SBO (2018), chronic back pain (2/2 prior compression L3 L4 fractures), cellulitis BIBEMS from home with chief complaint 1 hour of blurry vision/lightheadedness which started on afternoon of admission found to have sepsis 2/2 UTI, and possible TIA (no acute intracranial hemorrhage or infarct). 88F PMH HTN, prior SBO (2018), chronic back pain (2/2 prior compression L3 L4 fractures), cellulitis BIBEMS from home with chief complaint 1 hour of blurry vision/lightheadedness, vomiting  which started on afternoon of admission found to have sepsis 2/2 UTI, and possible TIA (no acute intracranial hemorrhage or infarct).

## 2021-03-30 NOTE — CONSULT NOTE ADULT - ASSESSMENT
per Internal Medicine    	  88 y.o, F PMH HTN, prior SBO (2018), chronic back pain (2/2 prior compression L3 L4 fractures), cellulitis BIBEMS from home with chief complaint 1 hour of blurry vision/lightheadedness which started on afternoon of admission found to have sepsis 2/2 UTI, and possible TIA (no acute intracranial hemorrhage or infarct).      Nutritional Assessment:  · Nutritional Assessment	This patient has been assessed with a concern for Malnutrition and has been determined to have a diagnosis/diagnoses of Moderate protein-calorie malnutrition and Underweight (BMI < 19).    This patient is being managed with:   Diet DASH/TLC-  Sodium & Cholesterol Restricted  Entered: Mar 29 2021  5:55AM    Problem/Plan - 1:  ·  Problem: R/O TIA (transient ischemic attack).  Plan: Patient with sudden onset of sx at rest that resolved after 1 hour. No acute intracranial hemorrhage or infarct seen on CT.  No trigger for sx, occurred without exertion. Elevated BP on arrival to ED. Sx resolution in ED. Concern for possible TIA.  - f/u CTA head and neck  - f/u echo  - stroke team consulted, appreciate recs.     Problem/Plan - 2:  ·  Problem: UTI (urinary tract infection).  Plan: Presented with lightheadedness and blurry vision x1 hour which resolved by the time she got to the emergency room. CTH negative. LÓPEZ level wnl. No FND on exam. Sx likely in setting of UTI. s/p ctx in the ED.  - c/w ctx (3/28- )  - f/u U cx  - f/u blood cx  - on contact for esbl ecoli found in cat scratch wound in sept 2020.     Problem/Plan - 3:  ·  Problem: Sepsis.  Plan: Met sepsis criteria on admission. Likely in setting of UT as CXR clear and no other sources of infection readily identifiable.  - mgmt as above.     Problem/Plan - 4:  ·  Problem: Leukocytosis.  Plan: Likely in setting of UTI or possible TIA.   - mgmt as above.     Problem/Plan - 5:  ·  Problem: Chronic back pain.  Plan: patient w/ history of chronic lower back pain from prior L3, L4 compression fracture. Takes tylenol every day at 8a and 4pm.  - c/w tylenol PRN for pain control  - PT consult.     Problem/Plan - 6:  Problem: HTN (hypertension). Plan: history of HTN, was on lisinopril 10 mg daily. No longer taking antihypertensive medications. However, patient hypertensive in ED and on floors.   - restart lisinopril 10 mg daily.    Problem/Plan - 7:  ·  Problem: SBO (small bowel obstruction).  Plan: Prior history of SBO 2/2 uterine mass, was evaluated for suspected gyn malignancy causing SBO in 2018 at [facility].  SBO resolved and gyn recommend outpt f/u.  - continue to monitor clinically.     Problem/Plan - 8:  ·  Problem: Palliative care encounter.  Plan: Per patient, she has not had GOC discussion with family or friends.  -Consider palliative care consult to discuss GOC.     Problem/Plan - 9:  ·  Problem: Preventive measure.  Plan: F: tolerating PO, no IVF  E: replete K<4, Mg<2  N: Dash/TLC    VTE Prophylaxis: Lovenox 30 Q24H  GI: not needed  C: Full Code  D: RMF.

## 2021-03-30 NOTE — PROGRESS NOTE ADULT - PROBLEM SELECTOR PLAN 9
F: tolerating PO, no IVF  E: replete K<4, Mg<2  N: Dash/TLC    VTE Prophylaxis: Lovenox 30 Q24H  GI: not needed  C: Full Code  D: RMF F: tolerating PO, no IVF  E: replete K<4, Mg<2  N: Dash/TLC  VTE Prophylaxis: Lovenox 30 Q24H  GI: not needed  C: Full Code  D: RMF

## 2021-03-30 NOTE — CONSULT NOTE ADULT - SUBJECTIVE AND OBJECTIVE BOX
SURGERY CONSULT  ==============================================================================================================  HPI: 88y Female  HPI:  88 y.o, F PMH HTN, prior SBO (2018), chronic back pain (2/2 prior compression L3 L4 fractures), cellulitis BIBEMS from home with chief complaint 1 hour of blurry vision/lightheadedness which started on afternoon of admission.  Patient states she was lying in bed reading a book when she began to feel "dizzy" - no room spinning just that blurry vision and lightheadedness. Patient was able to stand up and walk around but felt unsteady- uses a walker at all times. Patient called next door neighbor/ friend who told her to call ambulance. Per patient, symptoms resolved by the time she got to the ED. Denies prior episodes. Denies headache, f/c, chest pain, palpitations, SOB, cough, abdominal pain, N/V, flank pain, diarrhea/constipation, hematuria, vaginal bleeding, dysuria, hematuria, change in urinary frequency. No bladder/bowel incontinence. Received both COVID vaccines.     In the ED   vitals T 98.7, HR 90, /101-> 146/66, O2 sat 97% on RA  labs WBC 16.65 (neutrophil predominance 84.8%), BUN/Cr 18/0.84, albumin 3.3, BNP 2035, trop negative   Ua positive: trace ketones, moderate leukocyte esterase, trace blood, >10 wbc, 5-10 rbc, + bacteria  blood alcohol <3   ekg NSR, no acute ischemic changes    Imaging  CXR negative - No pneumothorax, No opacities, No free air.     CT head  IMPRESSION:  No acute intracranial hemorrhage or transcortical infarction.    Medications given  Tylenol, ctx 1g, meclizine, Zofran, 1 L NS   (29 Mar 2021 07:15)      PAST MEDICAL & SURGICAL HISTORY:  SBO (small bowel obstruction)    HTN (hypertension)    Chronic back pain    S/P wrist surgery      Home Meds: Home Medications:  Tylenol 325 mg oral capsule: 1 cap(s) orally 2 times a day (29 Mar 2021 08:54)    Allergies: Allergies    No Known Allergies    Intolerances      Soc:   Advanced Directives: Presumed Full Code     CURRENT MEDICATIONS:   --------------------------------------------------------------------------------------  Neurologic Medications  acetaminophen   Tablet .. 650 milliGRAM(s) Oral every 6 hours PRN Temp greater or equal to 38C (100.4F), Mild Pain (1 - 3), Moderate Pain (4 - 6)    Respiratory Medications    Cardiovascular Medications  lisinopril 10 milliGRAM(s) Oral daily    Gastrointestinal Medications  calcium carbonate 1250 mG  + Vitamin D (OsCal 500 + D) 1 Tablet(s) Oral daily  potassium chloride   Powder 40 milliEquivalent(s) Oral every 4 hours  potassium phosphate / sodium phosphate Powder (PHOS-NaK) 1 Packet(s) Oral once    Genitourinary Medications    Hematologic/Oncologic Medications  aspirin  chewable 81 milliGRAM(s) Oral daily  clopidogrel Tablet 75 milliGRAM(s) Oral daily  enoxaparin Injectable 30 milliGRAM(s) SubCutaneous every 24 hours    Antimicrobial/Immunologic Medications  cefTRIAXone   IVPB 1000 milliGRAM(s) IV Intermittent every 24 hours    Endocrine/Metabolic Medications  atorvastatin 80 milliGRAM(s) Oral at bedtime    Topical/Other Medications    --------------------------------------------------------------------------------------    VITAL SIGNS, INS/OUTS (last 24 hours):  --------------------------------------------------------------------------------------  ICU Vital Signs Last 24 Hrs  T(C): 37.1 (30 Mar 2021 12:29), Max: 37.1 (30 Mar 2021 12:29)  T(F): 98.7 (30 Mar 2021 12:29), Max: 98.7 (30 Mar 2021 12:29)  HR: 79 (30 Mar 2021 12:) (77 - 80)  BP: 118/73 (30 Mar 2021 12:) (109/54 - 145/71)  BP(mean): --  ABP: --  ABP(mean): --  RR: 17 (30 Mar 2021 12:) (17 - 18)  SpO2: 98% (30 Mar 2021 12:29) (95% - 98%)    I&O's Summary    --------------------------------------------------------------------------------------    EXAM:  General/Neuro  GCS:   Exam: Normal, NAD, alert, oriented x 3, no focal deficits. PERRLA  ***    Respiratory  Exam: Lungs clear to auscultation, Normal expansion/effort.  ***    Cardiovascular  Exam: S1, S2.  Regular rate and rhythm.  Peripheral edema  ***  Cardiac Rhythm: Normal Sinus Rhythm    GI  Exam: Abdomen soft, Non-tender, Non-distended.    Wound:   ***    Extremities  Exam: Extremities warm, pink, well-perfused.      Derm:  Exam: Good skin turgor, no skin breakdown.      LABS  --------------------------------------------------------------------------------------  Labs:  CAPILLARY BLOOD GLUCOSE                              12.6   7.87  )-----------( 392      ( 30 Mar 2021 08:03 )             39.5         -    142  |  102  |  14  ----------------------------<  141<H>  3.1<L>   |  29  |  0.83      Calcium, Total Serum: 8.6 mg/dL (21 @ 11:17)      LFTs:             6.5  | 0.3  | 16       ------------------[69      ( 29 Mar 2021 12:54 )  3.1  | x    | 7           Lipase:x      Amylase:x         Lactate, Blood: 0.8 mmoL/L (21 @ 18:00)      Coags:     11.2   ----< 0.93    ( 28 Mar 2021 18:00 )     31.8        CARDIAC MARKERS ( 29 Mar 2021 02:42 )  0.051 ng/mL / x     / x     / x     / x      CARDIAC MARKERS ( 28 Mar 2021 22:51 )  0.079 ng/mL / x     / x     / x     / x      CARDIAC MARKERS ( 28 Mar 2021 18:00 )  0.027 ng/mL / x     / x     / x     / x          Serum Pro-Brain Natriuretic Peptide: 2035 pg/mL (21 @ 18:00)      Urinalysis Basic - ( 28 Mar 2021 18:52 )    Color: Yellow / Appearance: Clear / S.015 / pH: x  Gluc: x / Ketone: Trace mg/dL  / Bili: NEGATIVE / Urobili: 0.2 E.U./dL   Blood: x / Protein: NEGATIVE mg/dL / Nitrite: NEGATIVE   Leuk Esterase: Moderate / RBC: 5-10 /HPF / WBC > 10 /HPF   Sq Epi: x / Non Sq Epi: 0-5 /HPF / Bacteria: Present /HPF        Culture - Blood (collected 29 Mar 2021 10:05)  Source: .Blood Blood-Peripheral  Preliminary Report (30 Mar 2021 11:01):    No growth to date.    Culture - Blood (collected 29 Mar 2021 10:05)  Source: .Blood Blood-Peripheral  Preliminary Report (30 Mar 2021 11:01):    No growth to date.    Culture - Urine (collected 29 Mar 2021 00:51)  Source: .Urine Clean Catch (Midstream)  Final Report (29 Mar 2021 20:31):    >=3 organisms. Probable collection contamination.        --------------------------------------------------------------------------------------    OTHER LABS    IMAGING RESULTS      EXAM: MR BRAIN    PROCEDURE DATE: 2021        INTERPRETATION: PROCEDURE: MRI Brain without contrast    INDICATION: TIA    TECHNIQUE: Sagittal T1 3D TFE, axial T1, T2 GRASE, FLAIR, diffusion, and T2 FFE images of the brain were obtained.    COMPARISON: CT head 3/28/2020    FINDINGS: The MRI examination of the brain demonstrates age appropriate volume loss. There is no midline shift or extra-axial collection. The FLAIR/T2-weighted images demonstrates extensive confluent foci of signal abnormality within the periventricular white matter which which is nonspecific and may represent the sequela of small vessel ischemic disease in this patient. There are additional punctate foci within the jacinta. The diffusion-weighted images demonstrate no acute ischemia. The T2 and T2 FFE images demonstrate symmetric hypointense involving the basal ganglia and dentate nucleus bilaterally which may be secondary to mineralization. No calcifications are seen on the CT.    Evaluation of the flow-voids demonstrates a 3.3 mm outpouching projecting medially from the right supraclinoid ICA compatible with a aneurysm (series 601 image 14).    The lenses are absent bilaterally which may be secondary to prior cataract surgery. There is minimum mucosal thickening within scattered ethmoid air cells bilaterally. The rest of the visualized paranasal sinuses are free of mucosal disease. The mastoid air cells are well-aerated.    IMPRESSION: No acute ischemia. Age appropriate volume loss with extensive small vessel ischemic disease. 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          Thank you for the opportunity to participate in the care of this patient.      SOPHIA LE MD; Attending Radiologist  This document has been electronically signed. Mar 29 2021 8:17PM          EXAM: CT ANGIO NECK (W)AW IC    EXAM: CT ANGIO BRAIN (W)AW IC    PROCEDURE DATE: 2021        INTERPRETATION: PROCEDURE: CTA brain with intravenous contrast.    INDICATION: Dizziness and blurry vision, TIA    TECHNIQUE: Multiple axial thin section were obtained through the [place] [facility] [place] following the intravenous bolus injection of 80 cc Optiray-350. MIP series are provided.    COMPARISON: No prior vascular imaging of the head; head CT from 2019 is reviewed.      IMPRESSION:    The left vertebral artery intracranially appears to terminate as the posterior inferior cerebellar artery, with likely high-grade stenosis of its more distal V4 segment. However, the dominant right vertebral artery is widely patent intracranially, as is the basilar artery. Mild-moderate stenosis involves both P2 segments.    No large vessel occlusion or hemodynamically significant stenosis of the intracranial anterior circulation.    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    --------------------------    PROCEDURE: CTA neck with intravenous contrast.    INDICATION: Dizziness and blurry vision, TIA    TECHNIQUE: Multiple axial thin section were obtained through the neck following the intravenous bolus injection of Optiray-350 as above. MIP series are provided.    COMPARISON: No prior vascular imaging of the neck    IMPRESSION:    Extensive calcific atherosclerotic disease.    There is moderate stenosis at the origin of the left common carotid artery, and more severe stenosis at the left carotid bifurcation, and left internal carotid artery origin calculated at 73%.    There is milder stenosis of the right common carotid artery, and at the origin of the right internal carotid artery calculated at 48%.    There is moderate narrowing of the left subclavian artery with calcific stenosis at the nondominant left vertebral artery origin. The left vertebral artery appears otherwise patent in the neck. The dominant right vertebral artery is widely patent in the neck. 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          Thank you for the opportunity to participate in the care of this patient.      GHULAM VILCHIS MD; Attending Radiologist  This document has been electronically signed. Mar 29 2021 5:45PM          EXAM: CT ANGIO NECK (W)AW IC    EXAM: CT ANGIO BRAIN (W)AW IC    PROCEDURE DATE: 2021        INTERPRETATION: PROCEDURE: CTA brain with intravenous contrast.    INDICATION: Dizziness and blurry vision, TIA    TECHNIQUE: Multiple axial thin section were obtained through the [place] [facility] [place] following the intravenous bolus injection of 80 cc Optiray-350. MIP series are provided.    COMPARISON: No prior vascular imaging of the head; head CT from 2019 is reviewed.    IMPRESSION:    The left vertebral artery intracranially appears to terminate as the posterior inferior cerebellar artery, with likely high-grade stenosis of its more distal V4 segment. However, the dominant right vertebral artery is widely patent intracranially, as is the basilar artery. Mild-moderate stenosis involves both P2 segments.    No large vessel occlusion or hemodynamically significant stenosis of the intracranial anterior circulation.    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    --------------------------    PROCEDURE: CTA neck with intravenous contrast.    INDICATION: Dizziness and blurry vision, TIA    TECHNIQUE: Multiple axial thin section were obtained through the neck following the intravenous bolus injection of Optiray-350 as above. MIP series are provided.    COMPARISON: No prior vascular imaging of the neck    IMPRESSION:    Extensive calcific atherosclerotic disease.    There is moderate stenosis at the origin of the left common carotid artery, and more severe stenosis at the left carotid bifurcation, and left internal carotid artery origin calculated at 73%.    There is milder stenosis of the right common carotid artery, and at the origin of the right internal carotid artery calculated at 48%.    There is moderate narrowing of the left subclavian artery with calcific stenosis at the nondominant left vertebral artery origin. The left vertebral artery appears otherwise patent in the neck. The dominant right vertebral artery is widely patent in the neck. 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          Thank you for the opportunity to participate in the care of this patient.      GHULAM VILCHIS MD; Attending Radiologist  This document has been electronically signed. Mar 29 2021 5:45PM     SURGERY CONSULT  ==============================================================================================================  HPI: 88y Female  HPI:  88 y.o, F PMH HTN, prior SBO (2018), chronic back pain (2/2 prior compression L3 L4 fractures), cellulitis BIBEMS from home with chief complaint 1 hour of blurry vision/lightheadedness which started on afternoon of admission.  Patient states she was lying in bed reading a book when she began to feel "dizzy" - no room spinning just that blurry vision and lightheadedness. Patient was able to stand up and walk around but felt unsteady- uses a walker at all times. Patient called next door neighbor/ friend who told her to call ambulance. Per patient, symptoms resolved by the time she got to the ED. Denies prior episodes. Denies headache, f/c, chest pain, palpitations, SOB, cough, abdominal pain, N/V, flank pain, diarrhea/constipation, hematuria, vaginal bleeding, dysuria, hematuria, change in urinary frequency. No bladder/bowel incontinence. Received both COVID vaccines.     In the ED   vitals T 98.7, HR 90, /101-> 146/66, O2 sat 97% on RA  labs WBC 16.65 (neutrophil predominance 84.8%), BUN/Cr 18/0.84, albumin 3.3, BNP 5, trop negative   Ua positive: trace ketones, moderate leukocyte esterase, trace blood, >10 wbc, 5-10 rbc, + bacteria  blood alcohol <3   ekg NSR, no acute ischemic changes    Vascular surgery consulted as patient found to have severe stenosis >70% of L carotid artery to assess indication for CEA.    Imaging  CXR negative - No pneumothorax, No opacities, No free air.     CT head  IMPRESSION:  No acute intracranial hemorrhage or transcortical infarction.    Medications given  Tylenol, ctx 1g, meclizine, Zofran, 1 L NS   (29 Mar 2021 07:15)      PAST MEDICAL & SURGICAL HISTORY:  SBO (small bowel obstruction)    HTN (hypertension)    Chronic back pain    S/P wrist surgery      Home Meds: Home Medications:  Tylenol 325 mg oral capsule: 1 cap(s) orally 2 times a day (29 Mar 2021 08:54)    Allergies: Allergies    No Known Allergies    Intolerances      Soc:   Advanced Directives: Presumed Full Code     CURRENT MEDICATIONS:   --------------------------------------------------------------------------------------  Neurologic Medications  acetaminophen   Tablet .. 650 milliGRAM(s) Oral every 6 hours PRN Temp greater or equal to 38C (100.4F), Mild Pain (1 - 3), Moderate Pain (4 - 6)    Respiratory Medications    Cardiovascular Medications  lisinopril 10 milliGRAM(s) Oral daily    Gastrointestinal Medications  calcium carbonate 1250 mG  + Vitamin D (OsCal 500 + D) 1 Tablet(s) Oral daily  potassium chloride   Powder 40 milliEquivalent(s) Oral every 4 hours  potassium phosphate / sodium phosphate Powder (PHOS-NaK) 1 Packet(s) Oral once    Genitourinary Medications    Hematologic/Oncologic Medications  aspirin  chewable 81 milliGRAM(s) Oral daily  clopidogrel Tablet 75 milliGRAM(s) Oral daily  enoxaparin Injectable 30 milliGRAM(s) SubCutaneous every 24 hours    Antimicrobial/Immunologic Medications  cefTRIAXone   IVPB 1000 milliGRAM(s) IV Intermittent every 24 hours    Endocrine/Metabolic Medications  atorvastatin 80 milliGRAM(s) Oral at bedtime    Topical/Other Medications    --------------------------------------------------------------------------------------    VITAL SIGNS, INS/OUTS (last 24 hours):  --------------------------------------------------------------------------------------  ICU Vital Signs Last 24 Hrs  T(C): 37.1 (30 Mar 2021 12:29), Max: 37.1 (30 Mar 2021 12:29)  T(F): 98.7 (30 Mar 2021 12:29), Max: 98.7 (30 Mar 2021 12:29)  HR: 79 (30 Mar 2021 12:29) (77 - 80)  BP: 118/73 (30 Mar 2021 12:29) (109/54 - 145/71)  BP(mean): --  ABP: --  ABP(mean): --  RR: 17 (30 Mar 2021 12:29) (17 - 18)  SpO2: 98% (30 Mar 2021 12:29) (95% - 98%)    I&O's Summary    --------------------------------------------------------------------------------------    Physical Exam:  General: NAD  Pulmonary: Nonlabored breathing, no respiratory distress  Cardiovascular: NSR  Neck: Audible bruit in L carotid area  Abdominal: soft, NT/ND  Extremities: WWP, normal strength 5/5 in all 4 extremities, palpable radial pulses +2 bilaterally    LABS  --------------------------------------------------------------------------------------  Labs:  CAPILLARY BLOOD GLUCOSE                              12.6   7.87  )-----------( 392      ( 30 Mar 2021 08:03 )             39.5         03-30    142  |  102  |  14  ----------------------------<  141<H>  3.1<L>   |  29  |  0.83      Calcium, Total Serum: 8.6 mg/dL (21 @ 11:17)      LFTs:             6.5  | 0.3  | 16       ------------------[69      ( 29 Mar 2021 12:54 )  3.1  | x    | 7           Lipase:x      Amylase:x         Lactate, Blood: 0.8 mmoL/L (21 @ 18:00)      Coags:     11.2   ----< 0.93    ( 28 Mar 2021 18:00 )     31.8        CARDIAC MARKERS ( 29 Mar 2021 02:42 )  0.051 ng/mL / x     / x     / x     / x      CARDIAC MARKERS ( 28 Mar 2021 22:51 )  0.079 ng/mL / x     / x     / x     / x      CARDIAC MARKERS ( 28 Mar 2021 18:00 )  0.027 ng/mL / x     / x     / x     / x          Serum Pro-Brain Natriuretic Peptide: 2035 pg/mL (21 @ 18:00)      Urinalysis Basic - ( 28 Mar 2021 18:52 )    Color: Yellow / Appearance: Clear / S.015 / pH: x  Gluc: x / Ketone: Trace mg/dL  / Bili: NEGATIVE / Urobili: 0.2 E.U./dL   Blood: x / Protein: NEGATIVE mg/dL / Nitrite: NEGATIVE   Leuk Esterase: Moderate / RBC: 5-10 /HPF / WBC > 10 /HPF   Sq Epi: x / Non Sq Epi: 0-5 /HPF / Bacteria: Present /HPF        Culture - Blood (collected 29 Mar 2021 10:05)  Source: .Blood Blood-Peripheral  Preliminary Report (30 Mar 2021 11:01):    No growth to date.    Culture - Blood (collected 29 Mar 2021 10:05)  Source: .Blood Blood-Peripheral  Preliminary Report (30 Mar 2021 11:01):    No growth to date.    Culture - Urine (collected 29 Mar 2021 00:51)  Source: .Urine Clean Catch (Midstream)  Final Report (29 Mar 2021 20:31):    >=3 organisms. Probable collection contamination.        --------------------------------------------------------------------------------------    OTHER LABS    IMAGING RESULTS      EXAM: MR BRAIN    PROCEDURE DATE: 2021        INTERPRETATION: PROCEDURE: MRI Brain without contrast    INDICATION: TIA    TECHNIQUE: Sagittal T1 3D TFE, axial T1, T2 GRASE, FLAIR, diffusion, and T2 FFE images of the brain were obtained.    COMPARISON: CT head 3/28/2020    FINDINGS: The MRI examination of the brain demonstrates age appropriate volume loss. There is no midline shift or extra-axial collection. The FLAIR/T2-weighted images demonstrates extensive confluent foci of signal abnormality within the periventricular white matter which which is nonspecific and may represent the sequela of small vessel ischemic disease in this patient. There are additional punctate foci within the jacinta. The diffusion-weighted images demonstrate no acute ischemia. The T2 and T2 FFE images demonstrate symmetric hypointense involving the basal ganglia and dentate nucleus bilaterally which may be secondary to mineralization. No calcifications are seen on the CT.    Evaluation of the flow-voids demonstrates a 3.3 mm outpouching projecting medially from the right supraclinoid ICA compatible with a aneurysm (series 601 image 14).    The lenses are absent bilaterally which may be secondary to prior cataract surgery. There is minimum mucosal thickening within scattered ethmoid air cells bilaterally. The rest of the visualized paranasal sinuses are free of mucosal disease. The mastoid air cells are well-aerated.    IMPRESSION: No acute ischemia. Age appropriate volume loss with extensive small vessel ischemic disease. 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          Thank you for the opportunity to participate in the care of this patient.      SOPHIA LE MD; Attending Radiologist  This document has been electronically signed. Mar 29 2021 8:17PM          EXAM: CT ANGIO NECK (W)AW IC    EXAM: CT ANGIO BRAIN (W)AW IC    PROCEDURE DATE: 2021        INTERPRETATION: PROCEDURE: CTA brain with intravenous contrast.    INDICATION: Dizziness and blurry vision, TIA    TECHNIQUE: Multiple axial thin section were obtained through the [place] [facility] [place] following the intravenous bolus injection of 80 cc Optiray-350. MIP series are provided.    COMPARISON: No prior vascular imaging of the head; head CT from 2019 is reviewed.      IMPRESSION:    The left vertebral artery intracranially appears to terminate as the posterior inferior cerebellar artery, with likely high-grade stenosis of its more distal V4 segment. However, the dominant right vertebral artery is widely patent intracranially, as is the basilar artery. Mild-moderate stenosis involves both P2 segments.    No large vessel occlusion or hemodynamically significant stenosis of the intracranial anterior circulation.    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    --------------------------    PROCEDURE: CTA neck with intravenous contrast.    INDICATION: Dizziness and blurry vision, TIA    TECHNIQUE: Multiple axial thin section were obtained through the neck following the intravenous bolus injection of Optiray-350 as above. MIP series are provided.    COMPARISON: No prior vascular imaging of the neck    IMPRESSION:    Extensive calcific atherosclerotic disease.    There is moderate stenosis at the origin of the left common carotid artery, and more severe stenosis at the left carotid bifurcation, and left internal carotid artery origin calculated at 73%.    There is milder stenosis of the right common carotid artery, and at the origin of the right internal carotid artery calculated at 48%.    There is moderate narrowing of the left subclavian artery with calcific stenosis at the nondominant left vertebral artery origin. The left vertebral artery appears otherwise patent in the neck. The dominant right vertebral artery is widely patent in the neck. 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          Thank you for the opportunity to participate in the care of this patient.      GHULAM VILCHIS MD; Attending Radiologist  This document has been electronically signed. Mar 29 2021 5:45PM          EXAM: CT ANGIO NECK (W)AW IC    EXAM: CT ANGIO BRAIN (W)AW IC    PROCEDURE DATE: 2021        INTERPRETATION: PROCEDURE: CTA brain with intravenous contrast.    INDICATION: Dizziness and blurry vision, TIA    TECHNIQUE: Multiple axial thin section were obtained through the [place] [facility] [place] following the intravenous bolus injection of 80 cc Optiray-350. MIP series are provided.    COMPARISON: No prior vascular imaging of the head; head CT from 2019 is reviewed.    IMPRESSION:    The left vertebral artery intracranially appears to terminate as the posterior inferior cerebellar artery, with likely high-grade stenosis of its more distal V4 segment. However, the dominant right vertebral artery is widely patent intracranially, as is the basilar artery. Mild-moderate stenosis involves both P2 segments.    No large vessel occlusion or hemodynamically significant stenosis of the intracranial anterior circulation.    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    --------------------------    PROCEDURE: CTA neck with intravenous contrast.    INDICATION: Dizziness and blurry vision, TIA    TECHNIQUE: Multiple axial thin section were obtained through the neck following the intravenous bolus injection of Optiray-350 as above. MIP series are provided.    COMPARISON: No prior vascular imaging of the neck    IMPRESSION:    Extensive calcific atherosclerotic disease.    There is moderate stenosis at the origin of the left common carotid artery, and more severe stenosis at the left carotid bifurcation, and left internal carotid artery origin calculated at 73%.    There is milder stenosis of the right common carotid artery, and at the origin of the right internal carotid artery calculated at 48%.    There is moderate narrowing of the left subclavian artery with calcific stenosis at the nondominant left vertebral artery origin. The left vertebral artery appears otherwise patent in the neck. The dominant right vertebral artery is widely patent in the neck. 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          Thank you for the opportunity to participate in the care of this patient.      GHULAM VILCHIS MD; Attending Radiologist  This document has been electronically signed. Mar 29 2021 5:45PM

## 2021-03-30 NOTE — PROGRESS NOTE ADULT - PROBLEM SELECTOR PLAN 3
Met sepsis criteria on admission. Likely in setting of UT as CXR clear and no other sources of infection readily identifiable.  - mgmt as above Met sepsis criteria on admission. Likely in setting of UTI as CXR clear and no other sources of infection readily identifiable.  - mgmt as above

## 2021-03-30 NOTE — CONSULT NOTE ADULT - ASSESSMENT
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 Pt admitted to 7 uris for further management and treatment of UTI. DDx for blurry vision is TIA vs malignant hypertension. HCT and MR head negative. CTA showed likely high grade stenosis of left vertebral artery V4 segment and mild-moderate stenosis of basilar artery involving both P2 segments. CTA shows moderate stenosis at the origin of the LCA w/ more severe stenosis at the left carotid bifurcation 73% and milder stenosis of the RCA at the origin 48%. There is moderate narrowing of the left subclavian artery with calcified stenosis at the nondominant left vertebral artery origin.     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 for lightheadedness and b/l blurry vision which has now resolved. DDx for blurry vision is TIA vs malignant hypertension. HCT and MR head negative. CTA showed likely high grade stenosis of left vertebral artery V4 segment and mild-moderate stenosis of basilar artery involving both P2 segments. CTA shows moderate stenosis at the origin of the LCA w/ severe stenosis at the left carotid bifurcation 73% and milder stenosis of the RCA at the origin 48%. There is moderate narrowing of the left subclavian artery with calcified stenosis at the nondominant left vertebral artery origin. 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 The patient's symptoms of blurriness in both eyes and transient generalized weakness is more in line with vertebrobasilar insufficiency possibly 2/2 subclavian steal syndrome than Amaurosis fugax which presents with unilateral transient vision loss (curtain being pulled in front of the eye).     Plan  - B/L carotid duplex to assess peak systolic velocities  - Echo  - Cont ASA, Plavis and statin  - Vasc Surg will continue to follow  - Plan discussed with chief and attending

## 2021-03-30 NOTE — PROGRESS NOTE ADULT - PROBLEM SELECTOR PLAN 2
Presented with lightheadedness and blurry vision x1 hour which resolved by the time she got to the emergency room. CTH negative. LÓPEZ level wnl. No FND on exam. Sx likely in setting of UTI. s/p ctx in the ED.  - c/w ctx (3/28- )  - f/u U cx  - f/u blood cx  - on contact for esbl ecoli found in cat scratch wound in sept 2020 Presented with lightheadedness and blurry vision x1 hour which resolved by the time she got to the emergency room. CTH negative. LÓPEZ level wnl. No FND on exam. Sx likely in setting of UTI. s/p ctx in the ED.  - c/w ceftriaxone (3/28- )  - f/u urine cultures  - f/u blood cultures  - on contact for esbl ecoli found in cat scratch wound in sept 2020 Presented with lightheadedness and blurry vision x1 hour which resolved by the time she got to the emergency room. CTH negative. LÓPEZ level wnl. No FND on exam. Ceftriaxone started 3/28.  - c/w ceftriaxone (3/28- )  - f/u urine cultures  - f/u blood cultures  - on contact for esbl ecoli found in cat scratch wound in sept 2020

## 2021-03-30 NOTE — PROGRESS NOTE ADULT - PROBLEM SELECTOR PLAN 7
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  SBO resolved and gyn recommend outpt f/u.  - continue to monitor clinically. 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  SBO resolved and gyn recommended outpt f/u.  - continue to monitor clinically.

## 2021-03-30 NOTE — PROGRESS NOTE ADULT - NUTRITIONAL ASSESSMENT
This patient has been assessed with a concern for Malnutrition and has been determined to have a diagnosis/diagnoses of Moderate protein-calorie malnutrition and Underweight (BMI < 19).    This patient is being managed with:   Diet DASH/TLC-  Sodium & Cholesterol Restricted  Entered: Mar 29 2021  5:55AM     This patient has been assessed with a concern for Malnutrition and has been determined to have a diagnosis/diagnoses of Moderate protein-calorie malnutrition and Underweight (BMI < 19).    This patient is being managed with:   Diet DASH/TLC-  Sodium & Cholesterol Restricted  Entered: Mar 29 2021  5:55AM

## 2021-03-30 NOTE — PROGRESS NOTE ADULT - SUBJECTIVE AND OBJECTIVE BOX
INTERVAL HPI/OVERNIGHT EVENTS:  O/N:  This morning: Patient was seen and examined at bedside. No complaints at this time.     VITAL SIGNS:  T(F): 97.8 (21 @ 06:41)  HR: 77 (21 @ 06:41)  BP: 109/54 (21 @ 06:41)  RR: 18 (21 @ 06:41)  SpO2: 96% (21 @ 06:41)  Wt(kg): --    PHYSICAL EXAM:        MEDICATIONS  (STANDING):  aspirin  chewable 81 milliGRAM(s) Oral daily  atorvastatin 80 milliGRAM(s) Oral at bedtime  calcium carbonate 1250 mG  + Vitamin D (OsCal 500 + D) 1 Tablet(s) Oral daily  cefTRIAXone   IVPB 1000 milliGRAM(s) IV Intermittent every 24 hours  clopidogrel Tablet 75 milliGRAM(s) Oral daily  enoxaparin Injectable 30 milliGRAM(s) SubCutaneous every 24 hours  lisinopril 10 milliGRAM(s) Oral daily    MEDICATIONS  (PRN):  acetaminophen   Tablet .. 650 milliGRAM(s) Oral every 6 hours PRN Temp greater or equal to 38C (100.4F), Mild Pain (1 - 3), Moderate Pain (4 - 6)      Allergies    No Known Allergies    Intolerances        LABS:                        12.5   8.14  )-----------( 417      ( 29 Mar 2021 12:54 )             40.9         142  |  109<H>  |  15  ----------------------------<  103<H>  4.4   |  25  |  0.77    Ca    8.6      29 Mar 2021 12:54  Phos  2.8       Mg     2.1         TPro  6.5  /  Alb  3.1<L>  /  TBili  0.3  /  DBili  x   /  AST  16  /  ALT  7<L>  /  AlkPhos  69      PT/INR - ( 28 Mar 2021 18:00 )   PT: 11.2 sec;   INR: 0.93          PTT - ( 28 Mar 2021 18:00 )  PTT:31.8 sec  Urinalysis Basic - ( 28 Mar 2021 18:52 )    Color: Yellow / Appearance: Clear / S.015 / pH: x  Gluc: x / Ketone: Trace mg/dL  / Bili: NEGATIVE / Urobili: 0.2 E.U./dL   Blood: x / Protein: NEGATIVE mg/dL / Nitrite: NEGATIVE   Leuk Esterase: Moderate / RBC: 5-10 /HPF / WBC > 10 /HPF   Sq Epi: x / Non Sq Epi: 0-5 /HPF / Bacteria: Present /HPF              RADIOLOGY & ADDITIONAL TESTS:  Reviewed INTERVAL HPI/OVERNIGHT EVENTS:  O/N: No overnight events.  This morning: Patient still complaining of intermittent lightheadedness and blurry vision. When she stands she feels unbalanced and weak in her legs but not lightheaded.  She denies headache, SOB, CP, abd pain, n/v/d/c, dysuria.  VITAL SIGNS:  T(F): 97.8 (21 @ 06:41)  HR: 77 (21 @ 06:41)  BP: 109/54 (21 @ 06:41)  RR: 18 (21 @ 06:41)  SpO2: 96% (21 @ 06:41)  Wt(kg): --    PHYSICAL EXAM:  GENERAL: NAD, sitting comfortably in bed  HEAD:  Atraumatic, Normocephalic  EYES: conjunctiva and sclera clear  CHEST/LUNG: Clear to auscultation bilaterally; No wheezes; No crackles; No accessory muscles used  HEART: Regular rate and rhythm; No murmurs;   ABDOMEN: Soft, Nontender, slightly distended; Bowel sounds present; No guarding; no suprapubic tenderness  EXTREMITIES:  2+ Peripheral Pulses, No cyanosis or edema  PSYCH: AAOx3  NEUROLOGY: CN's II-XII intact. Strength 5/5 in all extremities. Sensation to light touch intact bilaterally.  SKIN: No rashes or lesions    MEDICATIONS  (STANDING):  aspirin  chewable 81 milliGRAM(s) Oral daily  atorvastatin 80 milliGRAM(s) Oral at bedtime  calcium carbonate 1250 mG  + Vitamin D (OsCal 500 + D) 1 Tablet(s) Oral daily  cefTRIAXone   IVPB 1000 milliGRAM(s) IV Intermittent every 24 hours  clopidogrel Tablet 75 milliGRAM(s) Oral daily  enoxaparin Injectable 30 milliGRAM(s) SubCutaneous every 24 hours  lisinopril 10 milliGRAM(s) Oral daily    MEDICATIONS  (PRN):  acetaminophen   Tablet .. 650 milliGRAM(s) Oral every 6 hours PRN Temp greater or equal to 38C (100.4F), Mild Pain (1 - 3), Moderate Pain (4 - 6)      Allergies    No Known Allergies    Intolerances        LABS:                        12.5   8.14  )-----------( 417      ( 29 Mar 2021 12:54 )             40.9         142  |  109<H>  |  15  ----------------------------<  103<H>  4.4   |  25  |  0.77    Ca    8.6      29 Mar 2021 12:54  Phos  2.8       Mg     2.1         TPro  6.5  /  Alb  3.1<L>  /  TBili  0.3  /  DBili  x   /  AST  16  /  ALT  7<L>  /  AlkPhos  69      PT/INR - ( 28 Mar 2021 18:00 )   PT: 11.2 sec;   INR: 0.93          PTT - ( 28 Mar 2021 18:00 )  PTT:31.8 sec  Urinalysis Basic - ( 28 Mar 2021 18:52 )    Color: Yellow / Appearance: Clear / S.015 / pH: x  Gluc: x / Ketone: Trace mg/dL  / Bili: NEGATIVE / Urobili: 0.2 E.U./dL   Blood: x / Protein: NEGATIVE mg/dL / Nitrite: NEGATIVE   Leuk Esterase: Moderate / RBC: 5-10 /HPF / WBC > 10 /HPF   Sq Epi: x / Non Sq Epi: 0-5 /HPF / Bacteria: Present /HPF              RADIOLOGY & ADDITIONAL TESTS:  MRI w/o contrast (3/29/21)  IMPRESSION: No acute ischemia. Age appropriate volume loss with extensive small vessel ischemic disease. 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    CTA Neck and Brain w/ IV contrast (3/29/21)  Extensive calcific atherosclerotic disease.    There is moderate stenosis at the origin of the left common carotid artery, and more severe stenosis at the left carotid bifurcation, and left internal carotid artery origin calculated at 73%.    There is milder stenosis of the right common carotid artery, and at the origin of the right internal carotid artery calculated at 48%.    There is moderate narrowing of the left subclavian artery with calcific stenosis at the nondominant left vertebral artery origin. The left vertebral artery appears otherwise patent in the neck. The dominant right vertebral artery is widely patent in the neck. 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       INTERVAL HPI/OVERNIGHT EVENTS:  O/N: No overnight events.  This morning: Patient still complaining of intermittent lightheadedness and blurry vision. When she stands she feels unbalanced and weak in her legs but not lightheaded.  She denies headache, SOB, CP, abd pain, n/v/d/c, dysuria. Says she's had no further vomiting since her episode prior to admission.    VITAL SIGNS:  T(F): 97.8 (21 @ 06:41)  HR: 77 (21 @ 06:41)  BP: 109/54 (21 @ 06:41)  RR: 18 (21 @ 06:41)  SpO2: 96% (21 @ 06:41)  Wt(kg): --    PHYSICAL EXAM:  GENERAL: NAD, sitting comfortably in bed  HEAD:  Atraumatic, Normocephalic  EYES: conjunctiva and sclera clear  CHEST/LUNG: Clear to auscultation bilaterally; No wheezes; No crackles; No accessory muscles used  HEART: Regular rate and rhythm; No murmurs; no carotid bruits appreciated on auscultation  ABDOMEN: Soft, Nontender, slightly distended; Bowel sounds present; No guarding; no suprapubic tenderness  EXTREMITIES:  2+ Peripheral Pulses, No cyanosis or edema  PSYCH: AAOx3  NEUROLOGY: CN's II-XII intact. Strength 5/5 in all extremities. Sensation to light touch intact bilaterally.  SKIN: No rashes or lesions    MEDICATIONS  (STANDING):  aspirin  chewable 81 milliGRAM(s) Oral daily  atorvastatin 80 milliGRAM(s) Oral at bedtime  calcium carbonate 1250 mG  + Vitamin D (OsCal 500 + D) 1 Tablet(s) Oral daily  cefTRIAXone   IVPB 1000 milliGRAM(s) IV Intermittent every 24 hours  clopidogrel Tablet 75 milliGRAM(s) Oral daily  enoxaparin Injectable 30 milliGRAM(s) SubCutaneous every 24 hours  lisinopril 10 milliGRAM(s) Oral daily    MEDICATIONS  (PRN):  acetaminophen   Tablet .. 650 milliGRAM(s) Oral every 6 hours PRN Temp greater or equal to 38C (100.4F), Mild Pain (1 - 3), Moderate Pain (4 - 6)      Allergies    No Known Allergies    Intolerances        LABS:                        12.5   8.14  )-----------( 417      ( 29 Mar 2021 12:54 )             40.9         142  |  109<H>  |  15  ----------------------------<  103<H>  4.4   |  25  |  0.77    Ca    8.6      29 Mar 2021 12:54  Phos  2.8       Mg     2.1         TPro  6.5  /  Alb  3.1<L>  /  TBili  0.3  /  DBili  x   /  AST  16  /  ALT  7<L>  /  AlkPhos  69      PT/INR - ( 28 Mar 2021 18:00 )   PT: 11.2 sec;   INR: 0.93          PTT - ( 28 Mar 2021 18:00 )  PTT:31.8 sec  Urinalysis Basic - ( 28 Mar 2021 18:52 )    Color: Yellow / Appearance: Clear / S.015 / pH: x  Gluc: x / Ketone: Trace mg/dL  / Bili: NEGATIVE / Urobili: 0.2 E.U./dL   Blood: x / Protein: NEGATIVE mg/dL / Nitrite: NEGATIVE   Leuk Esterase: Moderate / RBC: 5-10 /HPF / WBC > 10 /HPF   Sq Epi: x / Non Sq Epi: 0-5 /HPF / Bacteria: Present /HPF              RADIOLOGY & ADDITIONAL TESTS:  MRI w/o contrast (3/29/21)  IMPRESSION: No acute ischemia. Age appropriate volume loss with extensive small vessel ischemic disease. 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    CTA Neck and Brain w/ IV contrast (3/29/21)  Extensive calcific atherosclerotic disease.    There is moderate stenosis at the origin of the left common carotid artery, and more severe stenosis at the left carotid bifurcation, and left internal carotid artery origin calculated at 73%.    There is milder stenosis of the right common carotid artery, and at the origin of the right internal carotid artery calculated at 48%.    There is moderate narrowing of the left subclavian artery with calcific stenosis at the nondominant left vertebral artery origin. The left vertebral artery appears otherwise patent in the neck. The dominant right vertebral artery is widely patent in the neck. 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

## 2021-03-30 NOTE — PROGRESS NOTE ADULT - SUBJECTIVE AND OBJECTIVE BOX
Neurology Stroke Progress Note    INTERVAL HPI/OVERNIGHT EVENTS: No acute events overnight. Pt does not currently have any complaints and states she is doing well. She is reading in bed and stated she did not know about her b/l ICA stenosis.       MEDICATIONS  (STANDING):  aspirin  chewable 81 milliGRAM(s) Oral daily  atorvastatin 80 milliGRAM(s) Oral at bedtime  calcium carbonate 1250 mG  + Vitamin D (OsCal 500 + D) 1 Tablet(s) Oral daily  cefTRIAXone   IVPB 1000 milliGRAM(s) IV Intermittent every 24 hours  clopidogrel Tablet 75 milliGRAM(s) Oral daily  enoxaparin Injectable 30 milliGRAM(s) SubCutaneous every 24 hours  lisinopril 10 milliGRAM(s) Oral daily    MEDICATIONS  (PRN):  acetaminophen   Tablet .. 650 milliGRAM(s) Oral every 6 hours PRN Temp greater or equal to 38C (100.4F), Mild Pain (1 - 3), Moderate Pain (4 - 6)      Allergies    No Known Allergies    Intolerances        ROS: As per HPI, otherwise negative    Vital Signs Last 24 Hrs  T(C): 36.6 (30 Mar 2021 06:41), Max: 36.6 (29 Mar 2021 20:41)  T(F): 97.8 (30 Mar 2021 06:41), Max: 97.8 (29 Mar 2021 20:41)  HR: 77 (30 Mar 2021 06:41) (77 - 82)  BP: 109/54 (30 Mar 2021 06:41) (109/54 - 154/78)  BP(mean): --  RR: 18 (30 Mar 2021 06:41) (17 - 18)  SpO2: 96% (30 Mar 2021 06:41) (95% - 97%)    Physical exam:  General: awake and alert, sitting comfortably, no acute distress    Neurologic:  Mental status: awake, alert, oriented to person, place, and time. Speech is fluent, able to name objects. Follows commands. Attention/concentration intact. No dysarthria, no aphasia.  Cranial nerves:   II: visual fields are full to confrontation. pupils equally round and reactive to light,   III, IV, VI: EOMI without nystagmus  V:  V1-V3 sensation intact,   VII: no facial droop, facie is symmetric with normal eye closure and smile  VIII: hearing is intact to finger rub  Motor: Normal bulk and tone, strength 5/5 in b/l UE and LE,  strength 5/5. No pronator drift  Sensation: intact to light touch. No neglect.  Coordination: No dysmetria on finger-to-nose       LABS:                        12.6   7.87  )-----------( 392      ( 30 Mar 2021 08:03 )             39.5     -    142  |  109<H>  |  15  ----------------------------<  103<H>  4.4   |  25  |  0.77    Ca    8.6      29 Mar 2021 12:54  Phos  2.8       Mg     2.1         TPro  6.5  /  Alb  3.1<L>  /  TBili  0.3  /  DBili  x   /  AST  16  /  ALT  7<L>  /  AlkPhos  69      PT/INR - ( 28 Mar 2021 18:00 )   PT: 11.2 sec;   INR: 0.93          PTT - ( 28 Mar 2021 18:00 )  PTT:31.8 sec  Urinalysis Basic - ( 28 Mar 2021 18:52 )    Color: Yellow / Appearance: Clear / S.015 / pH: x  Gluc: x / Ketone: Trace mg/dL  / Bili: NEGATIVE / Urobili: 0.2 E.U./dL   Blood: x / Protein: NEGATIVE mg/dL / Nitrite: NEGATIVE   Leuk Esterase: Moderate / RBC: 5-10 /HPF / WBC > 10 /HPF   Sq Epi: x / Non Sq Epi: 0-5 /HPF / Bacteria: Present /HPF        RADIOLOGY & ADDITIONAL TESTS:  < from: CT AngioBrain w/ IV Cont (21 @ 17:00) >  IMPRESSION:    The left vertebral artery intracranially appears to terminate as the posterior inferior cerebellar artery, with likely high-grade stenosis of its more distal V4 segment. However, the dominant right vertebral artery is widely patent intracranially, as is the basilar artery. Mild-moderate stenosis involves both P2 segments.    No large vessel occlusion or hemodynamically significant stenosis of the intracranial anterior circulation.    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      < from: CT Angio Neck w/ IV Cont (21 @ 17:00) >  IMPRESSION:    Extensive calcific atherosclerotic disease.    There is moderate stenosis at the origin of the left common carotid artery, and more severe stenosis at the left carotid bifurcation, and left internal carotid artery origin calculated at 73%.    There is milder stenosis of the right common carotid artery, and at the origin of the right internal carotid artery calculated at 48%.    There is moderate narrowing of the left subclavian artery with calcific stenosis at the nondominant left vertebral artery origin. The left vertebral artery appears otherwise patent in the neck. The dominant right vertebral artery is widely patent in the neck. 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      < end of copied text >      < from: MR Head No Cont (21 @ 19:11) >  IMPRESSION: No acute ischemia. Age appropriate volume loss with extensive small vessel ischemic disease. 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      < end of copied text >

## 2021-03-30 NOTE — PROGRESS NOTE ADULT - PROBLEM SELECTOR PLAN 5
patient w/ history of chronic lower back pain from prior L3, L4 compression fracture. Takes tylenol every day at 8a and 4pm.  - c/w tylenol PRN for pain control  - PT consult patient w/ history of chronic lower back pain from prior L3, L4 compression fracture. Takes tylenol every day at 8a and 4pm. PT consulted, ok'ed for home, no PT needs  - c/w tylenol PRN for pain control

## 2021-03-30 NOTE — PROGRESS NOTE ADULT - ASSESSMENT
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 Pt admitted to 7 uris for further management and treatment of UTI. DDx for blurry vision is TIA vs malignant hypertension. HCT and MR head negative. CTA showed likely high grade stenosis of left vertebral artery V4 segment and mild-moderate stenosis of basilar artery involving both P2 segments. CTA shows moderate stenosis at the origin of the LCA w/ more severe stenosis at the left carotid bifurcation 73% and milder stenosis of the RCA at the origin 48%. There is moderate narrowing of the left subclavian artery with calcified stenosis at the nondominant left vertebral artery origin.       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 Pt admitted to 7 uris for further management and treatment of UTI. DDx for blurry vision is TIA vs malignant hypertension. HCT and MR head negative. CTA showed likely high grade stenosis of left vertebral artery V4 segment and mild-moderate stenosis of basilar artery involving both P2 segments. CTA shows moderate stenosis at the origin of the LCA w/ more severe stenosis at the left carotid bifurcation 73% and milder stenosis of the RCA at the origin 48%. There is moderate narrowing of the left subclavian artery with calcified stenosis at the nondominant left vertebral artery origin.       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 Pt admitted to 7 uris for further management and treatment of UTI. DDx for blurry vision is TIA vs malignant hypertension. HCT and MR head negative. CTA showed likely high grade stenosis of left vertebral artery V4 segment and mild-moderate stenosis of basilar artery involving both P2 segments. CTA shows moderate stenosis at the origin of the LCA w/ more severe stenosis at the left carotid bifurcation 73% and milder stenosis of the RCA at the origin 48%. There is moderate narrowing of the left subclavian artery with calcified stenosis at the nondominant left vertebral artery origin.       1)Secondary stroke prevention --  -CTA head and neck findings: Need further investigation to determine whether episode of blurry vision was binocular vs monocular and is related to left ICA stenosis. Will investigate further.   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 Pt admitted to 7 uris for further management and treatment of UTI. DDx for blurry vision is TIA vs malignant hypertension. HCT and MR head negative. CTA showed likely high grade stenosis of left vertebral artery V4 segment and mild-moderate stenosis of basilar artery involving both P2 segments. CTA shows moderate stenosis at the origin of the LCA w/ more severe stenosis at the left carotid bifurcation 73% and milder stenosis of the RCA at the origin 48%. There is moderate narrowing of the left subclavian artery with calcified stenosis at the nondominant left vertebral artery origin.   Need further investigation to determine whether episode of blurry vision was binocular vs monocular to determine if secondary to left ICA stenosis. Unclear at this time if left carotid is symptomatic. Will investigate further.    1)Secondary stroke prevention --  -Continue ASA 81mg PO and Plavix 75 mg PO   -Continue Atorvastatin 80 mg PO   -Follow up ECHO results     2) Stroke risk factors  -Continue monitoring HTN and follow up outpt cardio     DVT prophylaxis   Lovenox SQ and SCDs

## 2021-03-31 DIAGNOSIS — G45.9 TRANSIENT CEREBRAL ISCHEMIC ATTACK, UNSPECIFIED: ICD-10-CM

## 2021-03-31 DIAGNOSIS — I25.9 CHRONIC ISCHEMIC HEART DISEASE, UNSPECIFIED: ICD-10-CM

## 2021-03-31 LAB
ANION GAP SERPL CALC-SCNC: 11 MMOL/L — SIGNIFICANT CHANGE UP (ref 5–17)
BUN SERPL-MCNC: 14 MG/DL — SIGNIFICANT CHANGE UP (ref 7–23)
CALCIUM SERPL-MCNC: 8.5 MG/DL — SIGNIFICANT CHANGE UP (ref 8.4–10.5)
CHLORIDE SERPL-SCNC: 104 MMOL/L — SIGNIFICANT CHANGE UP (ref 96–108)
CO2 SERPL-SCNC: 24 MMOL/L — SIGNIFICANT CHANGE UP (ref 22–31)
CREAT SERPL-MCNC: 0.88 MG/DL — SIGNIFICANT CHANGE UP (ref 0.5–1.3)
GLUCOSE SERPL-MCNC: 104 MG/DL — HIGH (ref 70–99)
HCT VFR BLD CALC: 42.7 % — SIGNIFICANT CHANGE UP (ref 34.5–45)
HGB BLD-MCNC: 13.6 G/DL — SIGNIFICANT CHANGE UP (ref 11.5–15.5)
MCHC RBC-ENTMCNC: 31.9 GM/DL — LOW (ref 32–36)
MCHC RBC-ENTMCNC: 32.5 PG — SIGNIFICANT CHANGE UP (ref 27–34)
MCV RBC AUTO: 101.9 FL — HIGH (ref 80–100)
NRBC # BLD: 0 /100 WBCS — SIGNIFICANT CHANGE UP (ref 0–0)
PHOSPHATE SERPL-MCNC: 3.1 MG/DL — SIGNIFICANT CHANGE UP (ref 2.5–4.5)
PLATELET # BLD AUTO: 440 K/UL — HIGH (ref 150–400)
POTASSIUM SERPL-MCNC: 4.2 MMOL/L — SIGNIFICANT CHANGE UP (ref 3.5–5.3)
POTASSIUM SERPL-SCNC: 4.2 MMOL/L — SIGNIFICANT CHANGE UP (ref 3.5–5.3)
RBC # BLD: 4.19 M/UL — SIGNIFICANT CHANGE UP (ref 3.8–5.2)
RBC # FLD: 11.8 % — SIGNIFICANT CHANGE UP (ref 10.3–14.5)
SODIUM SERPL-SCNC: 139 MMOL/L — SIGNIFICANT CHANGE UP (ref 135–145)
TSH SERPL-MCNC: 1.31 UIU/ML — SIGNIFICANT CHANGE UP (ref 0.35–4.94)
WBC # BLD: 9.1 K/UL — SIGNIFICANT CHANGE UP (ref 3.8–10.5)
WBC # FLD AUTO: 9.1 K/UL — SIGNIFICANT CHANGE UP (ref 3.8–10.5)

## 2021-03-31 PROCEDURE — 93016 CV STRESS TEST SUPVJ ONLY: CPT

## 2021-03-31 PROCEDURE — 99221 1ST HOSP IP/OBS SF/LOW 40: CPT

## 2021-03-31 PROCEDURE — 93018 CV STRESS TEST I&R ONLY: CPT

## 2021-03-31 PROCEDURE — 78452 HT MUSCLE IMAGE SPECT MULT: CPT | Mod: 26

## 2021-03-31 PROCEDURE — 99233 SBSQ HOSP IP/OBS HIGH 50: CPT | Mod: GC

## 2021-03-31 RX ORDER — CLOPIDOGREL BISULFATE 75 MG/1
75 TABLET, FILM COATED ORAL DAILY
Refills: 0 | Status: DISCONTINUED | OUTPATIENT
Start: 2021-03-31 | End: 2021-03-31

## 2021-03-31 RX ORDER — CLOPIDOGREL BISULFATE 75 MG/1
300 TABLET, FILM COATED ORAL ONCE
Refills: 0 | Status: COMPLETED | OUTPATIENT
Start: 2021-03-31 | End: 2021-03-31

## 2021-03-31 RX ORDER — ASPIRIN/CALCIUM CARB/MAGNESIUM 324 MG
81 TABLET ORAL ONCE
Refills: 0 | Status: COMPLETED | OUTPATIENT
Start: 2021-03-31 | End: 2021-03-31

## 2021-03-31 RX ORDER — CLOPIDOGREL BISULFATE 75 MG/1
75 TABLET, FILM COATED ORAL ONCE
Refills: 0 | Status: COMPLETED | OUTPATIENT
Start: 2021-03-31 | End: 2021-03-31

## 2021-03-31 RX ADMIN — ATORVASTATIN CALCIUM 80 MILLIGRAM(S): 80 TABLET, FILM COATED ORAL at 21:41

## 2021-03-31 RX ADMIN — CEFTRIAXONE 100 MILLIGRAM(S): 500 INJECTION, POWDER, FOR SOLUTION INTRAMUSCULAR; INTRAVENOUS at 19:45

## 2021-03-31 RX ADMIN — Medication 1 TABLET(S): at 15:54

## 2021-03-31 RX ADMIN — CLOPIDOGREL BISULFATE 75 MILLIGRAM(S): 75 TABLET, FILM COATED ORAL at 15:54

## 2021-03-31 RX ADMIN — CLOPIDOGREL BISULFATE 75 MILLIGRAM(S): 75 TABLET, FILM COATED ORAL at 18:24

## 2021-03-31 RX ADMIN — Medication 81 MILLIGRAM(S): at 18:24

## 2021-03-31 RX ADMIN — CLOPIDOGREL BISULFATE 300 MILLIGRAM(S): 75 TABLET, FILM COATED ORAL at 18:24

## 2021-03-31 RX ADMIN — LISINOPRIL 10 MILLIGRAM(S): 2.5 TABLET ORAL at 05:46

## 2021-03-31 RX ADMIN — Medication 81 MILLIGRAM(S): at 15:54

## 2021-03-31 NOTE — PROGRESS NOTE ADULT - SUBJECTIVE AND OBJECTIVE BOX
Pt seen and examined at bedside this AM. No acute events overnight. Pt notes visual changes are stable in frequency. Most recent episode was this AM lasting 30 minutes. Denies any new pain/weakness/numbness.    cefTRIAXone   IVPB 1000  aspirin  chewable 81  cefTRIAXone   IVPB 1000  clopidogrel Tablet 75  enoxaparin Injectable 30  lisinopril 10    Vital Signs Last 24 Hrs  T(C): 37 (31 Mar 2021 05:54), Max: 37.1 (30 Mar 2021 12:29)  T(F): 98.6 (31 Mar 2021 05:54), Max: 98.7 (30 Mar 2021 12:29)  HR: 88 (31 Mar 2021 05:54) (79 - 89)  BP: 164/71 (31 Mar 2021 05:54) (118/73 - 164/71)  BP(mean): --  RR: 18 (31 Mar 2021 05:54) (17 - 18)  SpO2: 96% (31 Mar 2021 05:54) (96% - 98%)  I&O's Summary    Physical Exam:  General: NAD  Pulmonary: Nonlabored breathing, no respiratory distress  Cardiovascular: NSR  Neck: Audible bruit in L carotid area  Abdominal: soft, NT/ND  Extremities: WWP,, palpable radial pulses +2 bilaterally  Neuro: CN II-XII grossly; intact normal strength 5/5 in all 4 extremities      LABS:                        13.6   9.10  )-----------( 440      ( 31 Mar 2021 06:08 )             42.7     03-31    139  |  104  |  14  ----------------------------<  104<H>  4.2   |  24  |  0.88    Ca    8.5      31 Mar 2021 06:08  Phos  3.1     03-31  Mg     2.1     03-30    TPro  6.5  /  Alb  3.1<L>  /  TBili  0.3  /  DBili  x   /  AST  16  /  ALT  7<L>  /  AlkPhos  69  03-29

## 2021-03-31 NOTE — PROGRESS NOTE ADULT - PROBLEM SELECTOR PLAN 8
Per patient, she has not had GOC discussion with family or friends.  -Consider palliative care consult to discuss GOC Per patient, she has not had GOC discussion with family or friends.  - Consider palliative care consult to discuss GOC Prior history of SBO 2/2 uterine mass, was evaluated for suspected gyn malignancy causing SBO in 2018 at [facility].  SBO resolved and gyn recommended outpt f/u.  - Patient does not remember if she followed up. Spoke with PCP who has no information on this and thinks unlikely she did.  - reach out again to office where she was supposed to have her follow-up appt  - goals of care discussion with patient  - consider f/u appt on discharge with GI or GYNE

## 2021-03-31 NOTE — PROGRESS NOTE ADULT - ASSESSMENT
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 DDx for blurry vision is TIA vs malignant hypertension. HCT and MR head negative. CTA showed likely high grade stenosis of left vertebral artery V4 segment and mild-moderate stenosis of basilar artery involving both P2 segments. CTA shows moderate stenosis at the origin of the LCA w/ severe stenosis at the left carotid bifurcation 73% and milder stenosis of the RCA at the origin 48%. There is moderate narrowing of the left subclavian artery with calcified stenosis at the nondominant left vertebral artery origin. 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 The patient's symptoms of blurriness in both eyes and transient generalized weakness is more in line with vertebrobasilar insufficiency possibly 2/2 subclavian steal syndrome than Amaurosis fugax which presents with unilateral transient vision loss (curtain being pulled in front of the eye).   Plan  - Carotid duplex showing severe > 70% stenosis of L ICA with PSV > 400  - Pt will require a L Carotid Endarterectomy with Dr. Weir, pending cardiac clearance/workup  - Echo showing mild AS, mild LVH, and inferior wall hypokinesis  - Plan for Lexiscan Stress test today with Cardiology for eval  - Cont ASA, Plavix and statin  - Vasc Surg will continue to follow  - Plan discussed with chief and attending    [id number]

## 2021-03-31 NOTE — PROGRESS NOTE ADULT - PROBLEM SELECTOR PLAN 1
Patient with sudden onset of sx at rest that resolved after 1 hour. No acute intracranial hemorrhage or infarct seen on CT.  No trigger for sx, occurred without exertion. Elevated BP on arrival to ED. Sx resolution in ED. Concern for possible TIA. MRI showed age appropriate volume loss with extensive small vessel ischemic disease. CTA showed extensive atherosclerotic disease.  -Carotid duplex 3/30 showing severe > 70% stenosis of L ICA with PSV > 400  - Echo w/ bubbles 3/30 showing mild AS, mild LVH, and inferior wall hypokinesis. Also showed late bubbles (difficult to determine intracardiac shunt vs pulmonary AV malformation).  - Pt will require a L Carotid Endarterectomy with Dr. Weir, pending cardiac clearance/workup  -Pt NPO today  -Lexiscan Stress test today with Cardiology for eval  - c/w ASA 81mg PO and Plavix 75 mg PO   - c/w Atorvastatin 80 mg PO  - stroke team consulted, appreciate recs  - vasc surg following  - update healthcare proxy on surgery Patient with sudden onset of sx at rest that resolved after 1 hour. No acute intracranial hemorrhage or infarct seen on CT.  No trigger for sx, occurred without exertion. Elevated BP on arrival to ED. Sx resolution in ED. Concern for possible TIA. MRI showed age appropriate volume loss with extensive small vessel ischemic disease. CTA showed extensive atherosclerotic disease.  -Carotid duplex 3/30 showing severe > 70% stenosis of L ICA with PSV > 400  - Echo w/ bubbles 3/30 showing mild AS, mild LVH, and inferior wall hypokinesis. Also showed late bubbles (difficult to determine intracardiac shunt vs pulmonary AV malformation).  - Pt will require a L Carotid Endarterectomy with Dr. Weir, pending cardiac clearance/workup  -Lexiscan Stress test today with Cardiology for eval  - c/w ASA 81mg PO and Plavix 75 mg PO   - c/w Atorvastatin 80 mg PO  - stroke team consulted, appreciate recs  - vasc surg following  - update healthcare proxy on surgery Patient with sudden onset of sx at rest that resolved after 1 hour. No acute intracranial hemorrhage or infarct seen on CT.  No trigger for sx, occurred without exertion. Elevated BP on arrival to ED. Sx resolution in ED. MRI showed age appropriate volume loss with extensive small vessel ischemic disease. CTA showed extensive atherosclerotic disease. Carotid duplex 3/30 showing severe > 70% stenosis of L ICA with PSV > 400.  - Echo w/ bubbles 3/30 showing mild AS, mild LVH, and inferior wall hypokinesis. Also showed late bubbles (difficult to determine intracardiac shunt vs pulmonary AV malformation).  - Plan for L Carotid Endarterectomy with Dr. Weir, pending cardiac clearance/workup  - Lexiscan Stress test today with Cardiology consultation  - c/w ASA 81mg PO and Plavix 75 mg PO   - c/w Atorvastatin 80 mg PO  - stroke team consulted, appreciate recs  - vasc surg following  - update healthcare proxy on surgery Inferior wall motion abnormalities and abnormal nuclear stress per cardiology. Likely chronic and unclear if symptomatic at all, but needs to be addressed prior to planned CEA.  - NPO midnight for planned cath 4/1  - completed 324mg ASA load and 600mg Plavix load, to continue tomorrow

## 2021-03-31 NOTE — PROGRESS NOTE ADULT - PROBLEM SELECTOR PLAN 2
Presented with lightheadedness and blurry vision x1 hour which resolved by the time she got to the emergency room. CTH negative. LÓPEZ level wnl. No FND on exam. Ceftriaxone started 3/28.  - c/w ceftriaxone (3/28- )  - urine cultures >3 organisms, probable contamination  - no blood culture growth to date in preliminary results  - on contact for esbl ecoli found in cat scratch wound in sept 2020 No urinary symptoms on admission but fall may be secondary to UTI. UA positive on admission, cultures contaminated. Bl Cx NGTD.  - c/w ceftriaxone (3/28- )  - on contact for esbl ecoli found in cat scratch wound in sept 2020 Patient with sudden onset of sx at rest that resolved after 1 hour. No acute intracranial hemorrhage or infarct seen on CT.  No trigger for sx, occurred without exertion. Elevated BP on arrival to ED. Sx resolution in ED. MRI showed age appropriate volume loss with extensive small vessel ischemic disease. CTA showed extensive atherosclerotic disease. Carotid duplex 3/30 showing severe > 70% stenosis of L ICA with PSV > 400.  - Echo w/ bubbles 3/30 showing mild AS, mild LVH, and inferior wall hypokinesis. Also showed late bubbles (difficult to determine intracardiac shunt vs pulmonary AV malformation).  - Plan for L Carotid Endarterectomy with Dr. Weir, pending cardiac clearance/workup  - Lexiscan Stress test today with Cardiology consultation  - c/w ASA 81mg PO and Plavix 75 mg PO   - c/w Atorvastatin 80 mg PO  - stroke team consulted, appreciate recs  - vasc surg following  - update healthcare proxy on surgery

## 2021-03-31 NOTE — PROGRESS NOTE ADULT - PROBLEM SELECTOR PLAN 3
Met sepsis criteria on admission. Likely in setting of UTI as CXR clear and no other sources of infection readily identifiable.  - mgmt as above No urinary symptoms on admission but fall may be secondary to UTI. UA positive on admission, cultures contaminated. Bl Cx NGTD.  - c/w ceftriaxone (3/28- 3/31)  - on contact for esbl ecoli found in cat scratch wound in sept 2020

## 2021-03-31 NOTE — PROGRESS NOTE ADULT - ASSESSMENT
88F PMH HTN, prior SBO (2018), chronic back pain (2/2 prior compression L3 L4 fractures), cellulitis BIBEMS from home with chief complaint 1 hour of blurry vision/lightheadedness, vomiting  which started on afternoon of admission found to have sepsis 2/2 UTI, and possible TIA (no acute intracranial hemorrhage or infarct) found to have >70% L ICA stenosis. 88F PMH HTN, prior SBO (2018), chronic back pain (2/2 prior compression L3 L4 fractures), cellulitis BIBEMS from home with chief complaint 1 hour of blurry vision/lightheadedness, vomiting which started on afternoon of admission found to have sepsis 2/2 UTI, and possible TIA found to have >70% L ICA stenosis.

## 2021-03-31 NOTE — CONSULT NOTE ADULT - ASSESSMENT
88 F w/ hx of HTN admitted to medical service after blurry vision and lightheadedness likely due to TIA vs severe stenosis of left internal carotid artery. Cardiology consulted for pre-operative assessment.    #Pre-Operative Assessment: Left CEA (date to be determined) for symptomatic high grade carotid artery stenosis. ECG non-ischemic, no active chest pain. TTE w/ EF 55% +regional wall motion changes. Functional capacity limited; ambulates w/ walker.   - To further risk stratify patient, will obtain Pharmacological NM stress test   - Can continue Lisinopril 10mg daily and Lipitor 80 qhs. Hold the day of surgery   - ASA/Plavix per neurology/vascular     Discussed w/ primary team

## 2021-03-31 NOTE — PROGRESS NOTE ADULT - PROBLEM SELECTOR PLAN 9
F: tolerating PO, no IVF  E: replete K<4, Mg<2  N: Dash/TLC  VTE Prophylaxis: Lovenox 30 Q24H  GI: not needed  C: Full Code  D: RMF Per patient, she has not had GOC discussion with family or friends.  - Consider palliative care consult to discuss GOC

## 2021-03-31 NOTE — PROGRESS NOTE ADULT - PROBLEM SELECTOR PLAN 6
history of HTN, was on lisinopril 10 mg daily. No longer taking antihypertensive medications. However, patient hypertensive in ED and on floors.   - restart lisinopril 10 mg daily history of HTN, was on lisinopril 10 mg daily. No longer taking antihypertensive medications. However, patient hypertensive in ED and on floors.   - c/w lisinopril 10 mg daily, consider doubling if still hypertensive patient w/ history of chronic lower back pain from prior L3, L4 compression fracture. Takes tylenol every day at 8a and 4pm. PT consulted, ok'ed for home, no PT needs  - c/w tylenol PRN for pain control

## 2021-03-31 NOTE — PROGRESS NOTE ADULT - SUBJECTIVE AND OBJECTIVE BOX
OVERNIGHT EVENTS: None    SUBJECTIVE / INTERVAL HPI: Patient feels well. Reports an episode of seeing white spots this morning similar to previous episodes. Denies HA, weakness, lightheadedness, CP, SOB, n/v/d/c, dysuria.    VITAL SIGNS:  Vital Signs Last 24 Hrs  T(C): 37 (31 Mar 2021 05:54), Max: 37.1 (30 Mar 2021 12:29)  T(F): 98.6 (31 Mar 2021 05:54), Max: 98.7 (30 Mar 2021 12:29)  HR: 88 (31 Mar 2021 05:54) (79 - 89)  BP: 164/71 (31 Mar 2021 05:54) (118/73 - 164/71)  BP(mean): --  RR: 18 (31 Mar 2021 05:54) (17 - 18)  SpO2: 96% (31 Mar 2021 05:54) (96% - 98%)  I&O's Summary      PHYSICAL EXAM:    PHYSICAL EXAM:  GENERAL: NAD, sitting comfortably in bed  HEAD:  Atraumatic, Normocephalic  EYES: conjunctiva and sclera clear  CHEST/LUNG: Clear to auscultation bilaterally; No wheezes; No crackles; No accessory muscles used  HEART: Regular rate and rhythm; No murmurs; bruit in L carotid area  ABDOMEN: Soft, diffusely tender, slightly distended; Bowel sounds present; No guarding; no suprapubic tenderness  EXTREMITIES:  2+ Peripheral Pulses, No cyanosis or edema  PSYCH: AAOx3  NEUROLOGY: CN's II-XII intact. Strength 5/5 in all extremities.  SKIN: No rashes or lesions      MEDICATIONS:  MEDICATIONS  (STANDING):  aspirin  chewable 81 milliGRAM(s) Oral daily  atorvastatin 80 milliGRAM(s) Oral at bedtime  calcium carbonate 1250 mG  + Vitamin D (OsCal 500 + D) 1 Tablet(s) Oral daily  cefTRIAXone   IVPB 1000 milliGRAM(s) IV Intermittent every 24 hours  clopidogrel Tablet 75 milliGRAM(s) Oral daily  lisinopril 10 milliGRAM(s) Oral daily    MEDICATIONS  (PRN):  acetaminophen   Tablet .. 650 milliGRAM(s) Oral every 6 hours PRN Temp greater or equal to 38C (100.4F), Mild Pain (1 - 3), Moderate Pain (4 - 6)      ALLERGIES:  Allergies    No Known Allergies    Intolerances        LABS:                        13.6   9.10  )-----------( 440      ( 31 Mar 2021 06:08 )             42.7     03-31    139  |  104  |  14  ----------------------------<  104<H>  4.2   |  24  |  0.88    Ca    8.5      31 Mar 2021 06:08  Phos  3.1     03-31  Mg     2.1     03-30    TPro  6.5  /  Alb  3.1<L>  /  TBili  0.3  /  DBili  x   /  AST  16  /  ALT  7<L>  /  AlkPhos  69  03-29        CAPILLARY BLOOD GLUCOSE          RADIOLOGY & ADDITIONAL TESTS: Reviewed.     OVERNIGHT EVENTS: None    SUBJECTIVE / INTERVAL HPI: Patient feels well. Reports an episode of seeing white spots this morning similar to previous episodes. Denies HA, weakness, lightheadedness, CP, SOB, n/v/d/c, dysuria.    VITAL SIGNS:  Vital Signs Last 24 Hrs  T(C): 37 (31 Mar 2021 05:54), Max: 37.1 (30 Mar 2021 12:29)  T(F): 98.6 (31 Mar 2021 05:54), Max: 98.7 (30 Mar 2021 12:29)  HR: 88 (31 Mar 2021 05:54) (79 - 89)  BP: 164/71 (31 Mar 2021 05:54) (118/73 - 164/71)  BP(mean): --  RR: 18 (31 Mar 2021 05:54) (17 - 18)  SpO2: 96% (31 Mar 2021 05:54) (96% - 98%)  I&O's Summary      PHYSICAL EXAM:    PHYSICAL EXAM:  GENERAL: NAD, sitting comfortably in bed  HEAD:  Atraumatic, Normocephalic  EYES: conjunctiva and sclera clear  CHEST/LUNG: Clear to auscultation bilaterally; No wheezes; No crackles; No accessory muscles used  HEART: Regular rate and rhythm; No murmurs; bruit in L carotid area  ABDOMEN: Soft, diffusely tender, slightly distended; Bowel sounds present; No guarding; no suprapubic tenderness  EXTREMITIES:  2+ Peripheral Pulses DP b/l, No cyanosis or edema  NEUROLOGY: AAOx3, CN's II-XII intact. Strength 5/5 in all extremities.  SKIN: No rashes or lesions on extremities, face      MEDICATIONS:  MEDICATIONS  (STANDING):  aspirin  chewable 81 milliGRAM(s) Oral daily  atorvastatin 80 milliGRAM(s) Oral at bedtime  calcium carbonate 1250 mG  + Vitamin D (OsCal 500 + D) 1 Tablet(s) Oral daily  cefTRIAXone   IVPB 1000 milliGRAM(s) IV Intermittent every 24 hours  clopidogrel Tablet 75 milliGRAM(s) Oral daily  lisinopril 10 milliGRAM(s) Oral daily    MEDICATIONS  (PRN):  acetaminophen   Tablet .. 650 milliGRAM(s) Oral every 6 hours PRN Temp greater or equal to 38C (100.4F), Mild Pain (1 - 3), Moderate Pain (4 - 6)      ALLERGIES:  Allergies    No Known Allergies    Intolerances        LABS:                        13.6   9.10  )-----------( 440      ( 31 Mar 2021 06:08 )             42.7     03-31    139  |  104  |  14  ----------------------------<  104<H>  4.2   |  24  |  0.88    Ca    8.5      31 Mar 2021 06:08  Phos  3.1     03-31  Mg     2.1     03-30    TPro  6.5  /  Alb  3.1<L>  /  TBili  0.3  /  DBili  x   /  AST  16  /  ALT  7<L>  /  AlkPhos  69  03-29        CAPILLARY BLOOD GLUCOSE          RADIOLOGY & ADDITIONAL TESTS: Reviewed.

## 2021-03-31 NOTE — PROGRESS NOTE ADULT - ASSESSMENT
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 Pt admitted to 7 uris for further management and treatment of UTI. DDx for blurry vision is TIA vs malignant hypertension. HCT and MR head negative. CTA showed likely high grade stenosis of left vertebral artery V4 segment and mild-moderate stenosis of basilar artery involving both P2 segments. CTA shows moderate stenosis at the origin of the LCA w/ more severe stenosis at the left carotid bifurcation 73% and milder stenosis of the RCA at the origin 48%. There is moderate narrowing of the left subclavian artery with calcified stenosis at the nondominant left vertebral artery origin.   Further investigation warranted to determine whether blurry vision was binocular vs monocular to determine if secondary to left ICA stenosis. Unclear at this time if left carotid is symptomatic. Further  Need further investigation to determine whether episode of blurry vision was binocular vs monocular to determine if secondary to left ICA stenosis. Unclear at this time if left carotid is symptomatic. Will investigate further.    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 Pt admitted to 7 uris for further management and treatment of UTI. DDx for blurry vision is TIA vs malignant hypertension. HCT and MR head negative. CTA showed likely high grade stenosis of left vertebral artery V4 segment and mild-moderate stenosis of basilar artery involving both P2 segments. CTA shows moderate stenosis at the origin of the LCA w/ more severe stenosis at the left carotid bifurcation 73% and milder stenosis of the RCA at the origin 48%. There is moderate narrowing of the left subclavian artery with calcified stenosis at the nondominant left vertebral artery origin. TTE reveals late bubbles but is difficult to determine intracardiac shunt vs pulmonary AV malformation, LV EF 55-60%.   Further investigation warranted to determine whether blurry vision was binocular vs monocular to determine if secondary to left ICA stenosis. Unclear at this time if left carotid is symptomatic. Further  Need further investigation to determine whether episode of blurry vision was binocular vs monocular to determine if secondary to left ICA stenosis. Unclear at this time if left carotid is symptomatic. Will investigate further.    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 Pt admitted to 7 uris for further management and treatment of UTI. DDx for blurry vision is TIA vs malignant hypertension. HCT and MR head negative. CTA showed likely high grade stenosis of left vertebral artery V4 segment and mild-moderate stenosis of basilar artery involving both P2 segments. CTA shows moderate stenosis at the origin of the LCA w/ more severe stenosis at the left carotid bifurcation 73% and milder stenosis of the RCA at the origin 48%. There is moderate narrowing of the left subclavian artery with calcified stenosis at the nondominant left vertebral artery origin. TTE reveals late bubbles but is difficult to determine intracardiac shunt vs pulmonary AV malformation, LV EF 55-60%.   Unclear whether visual disturbance are binocular vs monocular to determine if secondary to left ICA stenosis, patient endorses that it is binocular, however, has not tested individual eyes. 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    1)Secondary stroke prevention   -Continue ASA 81mg PO and Plavix 75 mg PO   -Continue Atorvastatin 80 mg PO     2) Stroke risk factors  -Continue monitoring HTN and follow up outpt cardio     3) DVT prophylaxis   - Lovenox SQ and SCDs

## 2021-03-31 NOTE — PROGRESS NOTE ADULT - PROBLEM SELECTOR PLAN 4
Likely in setting of UTI or possible TIA.   - mgmt as above Met sepsis criteria on admission. Likely in setting of UTI as CXR clear and no other sources of infection readily identifiable.  - mgmt as above Met sepsis criteria on admission (2/4 SIRS criteria --  per EMS documentation, WBC 16K,  with likely source of infection[UTI]). Likely 2/2 UTI as CXR clear and no other sources of infection readily identifiable.  - mgmt as above

## 2021-03-31 NOTE — PROGRESS NOTE ADULT - PROBLEM SELECTOR PLAN 5
patient w/ history of chronic lower back pain from prior L3, L4 compression fracture. Takes tylenol every day at 8a and 4pm. PT consulted, ok'ed for home, no PT needs  - c/w tylenol PRN for pain control Likely in setting of UTI or possible TIA.   - mgmt as above

## 2021-03-31 NOTE — PROGRESS NOTE ADULT - PROBLEM SELECTOR PLAN 7
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  SBO resolved and gyn recommended outpt f/u.  - Patient does not remember if she followed up. Spoke with PCP who has no information on this and thinks unlikely she did.  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  SBO resolved and gyn recommended outpt f/u.  - Patient does not remember if she followed up. Spoke with PCP who has no information on this and thinks unlikely she did.  - reach out again to office where she was supposed to have her follow-up appt  - goals of care discussion with patient  - consider f/u appt on discharge with GI or GYNE history of HTN, was on lisinopril 10 mg daily. No longer taking antihypertensive medications. However, patient hypertensive in ED and on floors.   - c/w lisinopril 10 mg daily, consider doubling if still hypertensive

## 2021-03-31 NOTE — PROGRESS NOTE ADULT - ATTENDING COMMENTS
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

## 2021-03-31 NOTE — PROGRESS NOTE ADULT - SUBJECTIVE AND OBJECTIVE BOX
Neurology Stroke Progress Note    INTERVAL HPI/OVERNIGHT EVENTS:  No acute overnight events. Patient seen and examined. Patient is laying comfortably in bed, in no acute distress. Upon further clarification of patient's blurry vision, patient has been experiencing this since Sunday, and the episodes happen a couple of times in a day and last maximum 30 minutes. Patient also states that her vision is not blurry, but that she sees patterns in her vision. She describes the patterns as white dots, squiggly lines, and a pattern of a window screen. These episodes are not associated with headache, neck pain, eye pain, or dizziness. Patient has not tested each eye individually by covering each eye during episodes. Patient was advised to test her vision and to notify nurse.     MEDICATIONS  (STANDING):  aspirin  chewable 81 milliGRAM(s) Oral daily  atorvastatin 80 milliGRAM(s) Oral at bedtime  calcium carbonate 1250 mG  + Vitamin D (OsCal 500 + D) 1 Tablet(s) Oral daily  cefTRIAXone   IVPB 1000 milliGRAM(s) IV Intermittent every 24 hours  clopidogrel Tablet 75 milliGRAM(s) Oral daily  lisinopril 10 milliGRAM(s) Oral daily    MEDICATIONS  (PRN):  acetaminophen   Tablet .. 650 milliGRAM(s) Oral every 6 hours PRN Temp greater or equal to 38C (100.4F), Mild Pain (1 - 3), Moderate Pain (4 - 6)      Allergies    No Known Allergies    Intolerances        Vital Signs Last 24 Hrs  T(C): 37 (31 Mar 2021 05:54), Max: 37.1 (30 Mar 2021 12:29)  T(F): 98.6 (31 Mar 2021 05:54), Max: 98.7 (30 Mar 2021 12:29)  HR: 88 (31 Mar 2021 05:54) (79 - 89)  BP: 164/71 (31 Mar 2021 05:54) (118/73 - 164/71)  BP(mean): --  RR: 18 (31 Mar 2021 05:54) (17 - 18)  SpO2: 96% (31 Mar 2021 05:54) (96% - 98%)    Physical exam:  General: No acute distress, awake and alert  Cardiovascular: Regular rate and rhythm, no murmurs, rubs, or gallops. No bruits  Pulmonary: Anterior breath sounds clear bilaterally, no crackles or wheezing. No use of accessory muscles  Extremities: Radial and DP pulses +2, no edema    Neurologic:  -Mental status: Awake, alert, oriented to person, place, and time. Speech is fluent with intact naming, repetition, and comprehension, no dysarthria. Recent and remote memory intact. Follows commands. Attention/concentration intact. Fund of knowledge appropriate.  -Cranial nerves:   II: Visual fields are full to confrontation.  III, IV, VI: Extraocular movements are intact without nystagmus. Pupils equally round and reactive to light  V:  Facial sensation V1-V3 equal and intact   VII: Face is symmetric with normal eye closure and smile  VIII: Hearing is bilaterally intact to finger rub  IX, X: Uvula is midline and soft palate rises symmetrically  XI: Head turning and shoulder shrug are intact.  XII: Tongue protrudes midline  Motor: Normal bulk and tone. No pronator drift. Strength bilateral upper extremity 5/5, bilateral lower extremities 5/5.  Rapid alternating movements intact and symmetric  Sensation: Intact to light touch bilaterally. No neglect or extinction on double simultaneous testing.  Coordination: No dysmetria on finger-to-nose and heel-to-shin bilaterally  Reflexes: Downgoing toes bilaterally   Gait: Narrow gait and steady    LABS:                        13.6   9.10  )-----------( 440      ( 31 Mar 2021 06:08 )             42.7     03-31    139  |  104  |  14  ----------------------------<  104<H>  4.2   |  24  |  0.88    Ca    8.5      31 Mar 2021 06:08  Phos  3.1     03-31  Mg     2.1     03-30    TPro  6.5  /  Alb  3.1<L>  /  TBili  0.3  /  DBili  x   /  AST  16  /  ALT  7<L>  /  AlkPhos  69  03-29          RADIOLOGY & ADDITIONAL TESTS:     Neurology Stroke Progress Note    INTERVAL HPI/OVERNIGHT EVENTS:  No acute overnight events. Patient seen and examined. Patient is laying comfortably in bed, in no acute distress. Upon further clarification of patient's blurry vision, patient has been experiencing this since Sunday, and the episodes happen a couple of times in a day and last maximum 30 minutes. Patient also states that her vision is not blurry, but that she sees patterns in her vision. She describes the patterns as white dots, squiggly lines, and a pattern of a window screen. These episodes are not associated with headache, neck pain, eye pain, or dizziness. Patient has not tested each eye individually by covering each eye during episodes. Patient was advised to test her vision and to notify nurse when another episode occurs. Patient denies headache, nausea, vomiting, and dizziness.     MEDICATIONS  (STANDING):  aspirin  chewable 81 milliGRAM(s) Oral daily  atorvastatin 80 milliGRAM(s) Oral at bedtime  calcium carbonate 1250 mG  + Vitamin D (OsCal 500 + D) 1 Tablet(s) Oral daily  cefTRIAXone   IVPB 1000 milliGRAM(s) IV Intermittent every 24 hours  clopidogrel Tablet 75 milliGRAM(s) Oral daily  lisinopril 10 milliGRAM(s) Oral daily    MEDICATIONS  (PRN):  acetaminophen   Tablet .. 650 milliGRAM(s) Oral every 6 hours PRN Temp greater or equal to 38C (100.4F), Mild Pain (1 - 3), Moderate Pain (4 - 6)      Allergies    No Known Allergies    Intolerances    Vital Signs Last 24 Hrs  T(C): 37 (31 Mar 2021 05:54), Max: 37.1 (30 Mar 2021 12:29)  T(F): 98.6 (31 Mar 2021 05:54), Max: 98.7 (30 Mar 2021 12:29)  HR: 88 (31 Mar 2021 05:54) (79 - 89)  BP: 164/71 (31 Mar 2021 05:54) (118/73 - 164/71)  BP(mean): --  RR: 18 (31 Mar 2021 05:54) (17 - 18)  SpO2: 96% (31 Mar 2021 05:54) (96% - 98%)    Physical exam:  General: No acute distress, awake and alert    Neurologic:  -Mental status: Awake, alert, oriented to person, place, and time. Speech is fluent with intact naming, repetition, and comprehension, no dysarthria. Follows commands. Attention/concentration intact. Fund of knowledge appropriate.  -Cranial nerves:   II: Visual fields are full to confrontation.  III, IV, VI: Extraocular movements are intact without nystagmus. Pupils equally round and reactive to light  V:  Facial sensation V1-V3 equal and intact   VII: Face is symmetric with normal eye closure and smile  VIII: Hearing grossly intact  XII: Tongue protrudes midline    Motor: Normal bulk and tone. No pronator drift. Strength bilateral upper extremity 5/5, bilateral lower extremities 5/5.  Sensation: Intact to light touch bilaterally. No neglect or extinction on double simultaneous testing.  Coordination: No dysmetria on finger-to-nose bilaterally    LABS:                        13.6   9.10  )-----------( 440      ( 31 Mar 2021 06:08 )             42.7     03-31    139  |  104  |  14  ----------------------------<  104<H>  4.2   |  24  |  0.88    Ca    8.5      31 Mar 2021 06:08  Phos  3.1     03-31  Mg     2.1     03-30    TPro  6.5  /  Alb  3.1<L>  /  TBili  0.3  /  DBili  x   /  AST  16  /  ALT  7<L>  /  AlkPhos  69  03-29          RADIOLOGY & ADDITIONAL TESTS:    < from: Echocardiogram w/ Bubble and Doppler (03.30.21 @ 15:10) >  CONCLUSIONS:     1. Injection with agitated saline reveals late bubbles (after 3 heart beats) - difficult to determine intracardiac shunt vs pulmonary AV malformation.   2. Mild aortic stenosis.   3. No other significant valvular disease.   4. The right ventricle is normal in size. Right ventricular systolic function is normal.   5. The left ventricle cavity size is normal. There is mild concentric left ventricular hypertrophy. Left ventricular ejection fraction is 55-60%. There is basal inferolateral and basal inferior wall hypokinesis.        < from: CT Angio Neck w/ IV Cont (03.29.21 @ 17:00) >  IMPRESSION:  The left vertebral artery intracranially appears to terminate as the posterior inferior cerebellar artery, with likely high-grade stenosis of its more distal V4 segment. However, the dominant right vertebral artery is widely patent intracranially, as is the basilar artery. Mild-moderate stenosis involves both P2 segments.  No large vessel occlusion or hemodynamically significant stenosis of the intracranial anterior circulation.  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      < from: CT Angio Neck w/ IV Cont (03.29.21 @ 17:00) >  IMPRESSION:  Extensive calcific atherosclerotic disease.  There is moderate stenosis at the origin of the left common carotid artery, and more severe stenosis at the left carotid bifurcation, and left internal carotid artery origin calculated at 73%.  There is milder stenosis of theright common carotid artery, and at the origin of the right internal carotid artery calculated at 48%.  There is moderate narrowing of the left subclavian artery with calcific stenosis at the nondominant left vertebral artery origin. The left vertebral artery appears otherwise patent in the neck. The dominant right vertebral artery is widely patent in the neck. 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

## 2021-03-31 NOTE — PROGRESS NOTE ADULT - NUTRITIONAL ASSESSMENT
This patient has been assessed with a concern for Malnutrition and has been determined to have a diagnosis/diagnoses of Moderate protein-calorie malnutrition and Underweight (BMI < 19).    This patient is being managed with:   Diet DASH/TLC-  Sodium & Cholesterol Restricted  Entered: Mar 29 2021  5:55AM

## 2021-03-31 NOTE — CONSULT NOTE ADULT - ATTENDING COMMENTS
Initial attending contact date 3/31/21 . See fellow note written above for details. I reviewed the fellow documentation. I have personally seen and examined this patient. I reviewed vitals, labs, medications, cardiac studies, and additional imaging. I agree with the above fellow's findings and plans as written above with the following additions/statements.    -88 F with HTN admitted with dizziness/bilateral blurry vision found to have left vertebral stenosis v4 segment and LICA > 70%  -Pt planned for L CEA  -EKG NSR with nonspecific ST changes  -ECHO with EF 55-60 with basal inferolateral and basal inferior hypokinesis  -Pt denies anginal equivalents but with limited functional capacity  -Given limited functional capacity and wall motion abn on ECHO, plan for lexiscan NST today prior to risk stratifying prior to CEA  -Cont ASA/statin/plavix
10-Dec-2017

## 2021-03-31 NOTE — CONSULT NOTE ADULT - SUBJECTIVE AND OBJECTIVE BOX
HPI:  88 F w/ hx of HTN admitted to medical service after blurry vision and lightheadedness likely due to TIA vs severe stenosis of left internal carotid artery. Cardiology consulted for pre-operative assessment. Patient states she was lying in bed reading a book when she began to feel "dizzy" - no room spinning just that blurry vision and lightheadedness. Patient was able to stand up and walk around but felt unsteady- uses a walker at all times. Her functional capacity is < 3 blocks, ambulates with walker. Never with chest pain, SOB, LE swelling, or exertional symptoms.       CARDIAC DIAGNOSTIC TESTING:      ECG: NSR, no ischemic changes     ECHO FINDINGS:  Echocardiogram w/ Bubble and Doppler 03.30.21  CONCLUSIONS:   1. Injection with agitated saline reveals late bubbles (after 3 heart beats) - difficult to determine intracardiac shunt vs pulmonary AV malformation.   2. Mild aortic stenosis.   3. No other significant valvular disease.   4. The right ventricle is normal in size. Right ventricular systolic function is normal.   5. The left ventricle cavity size is normal. There is mild concentric left ventricular hypertrophy. Left ventricular ejection fraction is 55-60%. There is basal inferolateral and basal inferior wall hypokinesis.   6. No prior echo is available for comparison.    STRESS FINDINGS: PENDING       PAST MEDICAL & SURGICAL HISTORY:  SBO (small bowel obstruction)    HTN (hypertension)    Chronic back pain    S/P wrist surgery        HOME MEDICATIONS  T(C): 37 (03-31-21 @ 05:54), Max: 37 (03-31-21 @ 05:54)  HR: 88 (03-31-21 @ 05:54) (88 - 89)  BP: 164/71 (03-31-21 @ 05:54) (156/85 - 164/71)  RR: 18 (03-31-21 @ 05:54) (17 - 18)  SpO2: 96% (03-31-21 @ 05:54) (96% - 97%)    MEDICATIONS  (STANDING):  aspirin  chewable 81 milliGRAM(s) Oral daily  atorvastatin 80 milliGRAM(s) Oral at bedtime  calcium carbonate 1250 mG  + Vitamin D (OsCal 500 + D) 1 Tablet(s) Oral daily  cefTRIAXone   IVPB 1000 milliGRAM(s) IV Intermittent every 24 hours  clopidogrel Tablet 75 milliGRAM(s) Oral daily  lisinopril 10 milliGRAM(s) Oral daily    MEDICATIONS  (PRN):  acetaminophen   Tablet .. 650 milliGRAM(s) Oral every 6 hours PRN Temp greater or equal to 38C (100.4F), Mild Pain (1 - 3), Moderate Pain (4 - 6)      FAMILY HISTORY:      SOCIAL HISTORY:  - Smoking: former  - Alcohol: no  - Recreational drug use: no   - Other: no    REVIEW OF SYSTEMS:  CONSTITUTIONAL: No fever, weight loss, or fatigue  EYES: No eye pain, visual disturbances, or discharge  ENMT:  No difficulty hearing, tinnitus, vertigo; No sinus or throat pain  NECK: No pain or stiffness  RESPIRATORY: No cough, wheezing, chills or hemoptysis; No Shortness of Breath  CARDIOVASCULAR: No chest pain, palpitations, passing out, dizziness, or leg swelling  GASTROINTESTINAL: No abdominal or epigastric pain. No nausea, vomiting, or hematemesis; No diarrhea or constipation. No melena or hematochezia.  GENITOURINARY: No dysuria, frequency, hematuria, or incontinence  NEUROLOGICAL: No headaches, memory loss, loss of strength, numbness, or tremors  SKIN: No itching, burning, rashes, or lesions   LYMPH Nodes: No enlarged glands  ENDOCRINE: No heat or cold intolerance; No hair loss  MUSCULOSKELETAL: No joint pain or swelling; No muscle, back, or extremity pain  PSYCHIATRIC: No depression, anxiety, mood swings, or difficulty sleeping  HEME/LYMPH: No easy bruising, or bleeding gums  ALLERY AND IMMUNOLOGIC: No hives or eczema    Vitals past 24 Hours: T(C): 37 (03-31-21 @ 05:54), Max: 37 (03-31-21 @ 05:54)  HR: 88 (03-31-21 @ 05:54) (88 - 89)  BP: 164/71 (03-31-21 @ 05:54) (156/85 - 164/71)  RR: 18 (03-31-21 @ 05:54) (17 - 18)  SpO2: 96% (03-31-21 @ 05:54) (96% - 97%)	    PHYSICAL EXAM:  GEN: No acute respiratory distress, appears stated age	  HEENT: Anicteric sclera, PERRL, EOMI, no oropharyngeal erythema or discharge, trachea midline  Lymphatic: No cervical lymphadenopathy  Cardiovascular: S1 S2, No JVD, No murmurs, PMI  Respiratory: Lungs clear to auscultation, no rrw  Gastrointestinal:  BS+, soft, non distended, non tender, no HSM  Skin: No rashes, No ecchymoses, No cyanosis  Neurologic: Non-focal, AAO x 3, strength grossly normal in all extremeties  Extremities: Normal range of motion, No clubbing, cyanosis or edema  Vascular: Radial 2+, DP 2+      I&O's Detail      LABS:                        13.6   9.10  )-----------( 440      ( 31 Mar 2021 06:08 )             42.7     03-31    139  |  104  |  14  ----------------------------<  104<H>  4.2   |  24  |  0.88    Ca    8.5      31 Mar 2021 06:08  Phos  3.1     03-31  Mg     2.1     03-30    TPro  6.5  /  Alb  3.1<L>  /  TBili  0.3  /  DBili  x   /  AST  16  /  ALT  7<L>  /  AlkPhos  69  03-29            I&O's Summary      RADIOLOGY & ADDITIONAL STUDIES:

## 2021-04-01 PROCEDURE — 99233 SBSQ HOSP IP/OBS HIGH 50: CPT

## 2021-04-01 PROCEDURE — 71045 X-RAY EXAM CHEST 1 VIEW: CPT | Mod: 26

## 2021-04-01 PROCEDURE — 93458 L HRT ARTERY/VENTRICLE ANGIO: CPT | Mod: 26

## 2021-04-01 PROCEDURE — 93010 ELECTROCARDIOGRAM REPORT: CPT

## 2021-04-01 PROCEDURE — 99233 SBSQ HOSP IP/OBS HIGH 50: CPT | Mod: GC

## 2021-04-01 RX ORDER — SODIUM CHLORIDE 9 MG/ML
1000 INJECTION INTRAMUSCULAR; INTRAVENOUS; SUBCUTANEOUS
Refills: 0 | Status: DISCONTINUED | OUTPATIENT
Start: 2021-04-01 | End: 2021-04-02

## 2021-04-01 RX ORDER — SODIUM CHLORIDE 9 MG/ML
1000 INJECTION INTRAMUSCULAR; INTRAVENOUS; SUBCUTANEOUS
Refills: 0 | Status: DISCONTINUED | OUTPATIENT
Start: 2021-04-01 | End: 2021-04-01

## 2021-04-01 RX ADMIN — Medication 1 TABLET(S): at 11:25

## 2021-04-01 RX ADMIN — ATORVASTATIN CALCIUM 80 MILLIGRAM(S): 80 TABLET, FILM COATED ORAL at 23:21

## 2021-04-01 RX ADMIN — LISINOPRIL 10 MILLIGRAM(S): 2.5 TABLET ORAL at 05:31

## 2021-04-01 RX ADMIN — Medication 81 MILLIGRAM(S): at 11:25

## 2021-04-01 RX ADMIN — CLOPIDOGREL BISULFATE 75 MILLIGRAM(S): 75 TABLET, FILM COATED ORAL at 11:25

## 2021-04-01 NOTE — PROGRESS NOTE ADULT - SUBJECTIVE AND OBJECTIVE BOX
88F PMH HTN, prior SBO (2018), chronic back pain (2/2 prior compression L3 L4 fractures), cellulitis BIBEMS from home with chief complaint 1 hour of blurry vision/lightheadedness, found to have sepsis 2/2 UTI, occular migraines, and incidental coronary ischemia admitted for TIA rule out and heart catheterization.    Patient presented 3/28 after experiencing sudden lightheadedness, blurry vision at home lasting roughly 1 hour. Stroke team consulted, no FND noted on exam, NIHSS 0; started empirically on ASA, plavix, atorvastatin. 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 Carotid dopplers with L>R stenosis not deemed to be culprit lesion but clinically significant occlusion. Vascular surgery consulted, recommended cardiac evaluation. Pharmacologic stress test performed showing inferior ischemia concordant with ischemia on EKG so decision was made to send patient for cardiac catheterization prior to planned future CEA. ASA and Plavix load (324mg and 600mg) completed 3/31/21 taking into account cumulative dosing.   HOSPITAL COURSE  88F PMH HTN, prior SBO (2018), chronic back pain (2/2 prior compression L3 L4 fractures), cellulitis BIBEMS from home with chief complaint 1 hour of blurry vision/lightheadedness, found to have sepsis 2/2 UTI, occular migraines, and incidental coronary ischemia admitted for TIA rule out and heart catheterization.    Patient presented 3/28 after experiencing sudden lightheadedness, blurry vision at home lasting roughly 1 hour. Stroke team consulted, no FND noted on exam, NIHSS 0; started empirically on ASA, plavix, atorvastatin. 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 Carotid dopplers with L>R stenosis not deemed to be culprit lesion but clinically significant occlusion. Vascular surgery consulted, recommended cardiac evaluation. Pharmacologic stress test performed showing inferior ischemia concordant with ischemia on EKG so decision was made to send patient for cardiac catheterization prior to planned future CEA. ASA and Plavix load (324mg and 600mg) completed 3/31/21 taking into account cumulative dosing.      INTERVAL HPI/OVERNIGHT EVENTS:  Patient was seen and examined at bedside. As per nurse and patient, no o/n events, patient resting comfortably. No complaints at this time, had episode of floaters yesterday for a few minutes. Patient denies: fever, chills, dizziness, weakness, HA, Changes in vision, CP, palpitations, SOB, cough, N/V/D/C, dysuria, LE edema. ROS otherwise negative.    VITAL SIGNS:  T(F): 98.4 (04-01-21 @ 06:23)  HR: 94 (04-01-21 @ 06:23)  BP: 149/74 (04-01-21 @ 06:23)  RR: 17 (04-01-21 @ 06:23)  SpO2: 93% (04-01-21 @ 06:23)  Wt(kg): --        PHYSICAL EXAM:    Constitutional: WDWN resting comfortably in bed; NAD  HEENT: NC/AT, PER, anicteric sclera, no nasal discharge; uvula midline, no oropharyngeal erythema or exudates; MMM  Respiratory: CTA B/L; no W/R/R, no retractions  Cardiac: +S1/S2; RRR; no M/R/G; b/l carotid bruits appreciated R louder than L  Gastrointestinal: soft, NT/ND; no rebound or guarding  Extremities: WWP, no clubbing or cyanosis; no peripheral edema  Vascular: 2+ radial, DP/PT pulses B/L  Dermatologic: skin warm, dry and intact; no rashes, wounds, or scars  Neurologic: AAOx3; CNII-XII grossly intact; no focal deficits  Psychiatric: affect and characteristics of appearance, verbalizations, behaviors are appropriate    MEDICATIONS  (STANDING):  aspirin  chewable 81 milliGRAM(s) Oral daily  atorvastatin 80 milliGRAM(s) Oral at bedtime  calcium carbonate 1250 mG  + Vitamin D (OsCal 500 + D) 1 Tablet(s) Oral daily  cefTRIAXone   IVPB 1000 milliGRAM(s) IV Intermittent every 24 hours  clopidogrel Tablet 75 milliGRAM(s) Oral daily  lisinopril 10 milliGRAM(s) Oral daily    MEDICATIONS  (PRN):  acetaminophen   Tablet .. 650 milliGRAM(s) Oral every 6 hours PRN Temp greater or equal to 38C (100.4F), Mild Pain (1 - 3), Moderate Pain (4 - 6)      Allergies    No Known Allergies    Intolerances        LABS:                        12.8   9.63  )-----------( 391      ( 01 Apr 2021 08:02 )             39.9     04-01    136  |  102  |  14  ----------------------------<  102<H>  4.2   |  25  |  0.92    Ca    8.6      01 Apr 2021 08:02  Phos  2.5     04-01  Mg     2.0     04-01    TPro  5.7<L>  /  Alb  2.7<L>  /  TBili  0.3  /  DBili  x   /  AST  14  /  ALT  7<L>  /  AlkPhos  58  04-01    PT/INR - ( 01 Apr 2021 08:02 )   PT: 11.6 sec;   INR: 0.97          PTT - ( 01 Apr 2021 08:02 )  PTT:28.0 sec      RADIOLOGY & ADDITIONAL TESTS:  Reviewed

## 2021-04-01 NOTE — PROGRESS NOTE ADULT - ATTENDING COMMENTS
Initial attending contact date 3/31/21 . See fellow note written above for details. I reviewed the fellow documentation. I have personally seen and examined this patient. I reviewed vitals, labs, medications, cardiac studies, and additional imaging. I agree with the above fellow's findings and plans as written above with the following additions/statements.    -88 F with HTN admitted with dizziness/bilateral blurry vision found to have left vertebral stenosis v4 segment and LICA > 70%  -Pt planned for L CEA  -EKG NSR with nonspecific ST changes  -ECHO with EF 55-60 with basal inferolateral and basal inferior hypokinesis  -Pt denies anginal equivalents but with limited functional capacity  -NST with reversible inferolateral ischemia  -Plan for cath today  -Cont ASA/statin/plavix, ok to cont on DAPT per vascular

## 2021-04-01 NOTE — PROGRESS NOTE ADULT - ASSESSMENT
88 F w/ hx of HTN admitted to medical service after blurry vision and lightheadedness likely due to TIA vs severe stenosis of left internal carotid artery. Cardiology consulted for pre-operative assessment.    #Pre-Operative Assessment: Left CEA (date to be determined) for symptomatic high grade carotid artery stenosis. ECG non-ischemic, no active chest pain. TTE w/ EF 55% +regional wall motion changes. Functional capacity limited; ambulates w/ walker. Pharmacological NM stress w/ small, mild reversible defect at basal inferior wall, which correlates w/ TTE.   - s/p ASA/Plavix load; plan for C today to r/o obstructive coronary disease  - If patient gets a PCI, vascular will plan for CEA as outpatient   - Can continue Lisinopril 10mg daily and Lipitor 80 qhs.     Discussed w/ primary team

## 2021-04-01 NOTE — PROGRESS NOTE ADULT - PROBLEM SELECTOR PLAN 9
Per patient, she has not had GOC discussion with family or friends.  - Consider palliative care consult to discuss GOC

## 2021-04-01 NOTE — PROGRESS NOTE ADULT - PROBLEM SELECTOR PLAN 3
No urinary symptoms on admission but fall may be secondary to UTI. UA positive on admission, cultures contaminated. Bl Cx NGTD.  - c/w ceftriaxone (3/28- 3/31)  - on contact for esbl ecoli found in cat scratch wound in sept 2020 No urinary symptoms on admission but fall may be secondary to UTI. UA positive on admission, cultures contaminated. Bl Cx NGTD.  - s/p ceftriaxone (3/28- 3/31)  - on contact for esbl ecoli found in cat scratch wound in sept 2020

## 2021-04-01 NOTE — PROGRESS NOTE ADULT - ATTENDING COMMENTS
88 year old woman who presented with chief complaint of 1 hour of blurry vision, lightheadedness + vomiting,   Found to have   # Sepsis 2/2 UTI - Treated with abx  # >70% L ICA Stenosis  positive Nuclear stress test yesterday -> Getting Left heart cath today  Plan re: CEA, inpatient vs. outpatient, pending results of left heart cath;

## 2021-04-01 NOTE — PROGRESS NOTE ADULT - PROBLEM SELECTOR PLAN 2
Patient with sudden onset of sx at rest that resolved after 1 hour. No acute intracranial hemorrhage or infarct seen on CT.  No trigger for sx, occurred without exertion. Elevated BP on arrival to ED. Sx resolution in ED. MRI showed age appropriate volume loss with extensive small vessel ischemic disease. CTA showed extensive atherosclerotic disease. Carotid duplex 3/30 showing severe > 70% stenosis of L ICA with PSV > 400.  - Echo w/ bubbles 3/30 showing mild AS, mild LVH, and inferior wall hypokinesis. Also showed late bubbles (difficult to determine intracardiac shunt vs pulmonary AV malformation).  - Plan for L Carotid Endarterectomy with Dr. Weir, pending cardiac clearance/workup  - Lexiscan Stress test today with Cardiology consultation  - c/w ASA 81mg PO and Plavix 75 mg PO   - c/w Atorvastatin 80 mg PO  - stroke team consulted, appreciate recs  - vasc surg following  - update healthcare proxy on surgery Patient with sudden onset of sx at rest that resolved after 1 hour. No acute intracranial hemorrhage or infarct seen on CT.  No trigger for sx, occurred without exertion. Elevated BP on arrival to ED. Sx resolution in ED. MRI showed age appropriate volume loss with extensive small vessel ischemic disease. CTA showed extensive atherosclerotic disease. Carotid duplex 3/30 showing severe > 70% stenosis of L ICA with PSV > 400.  - Echo w/ bubbles 3/30 showing mild AS, mild LVH, and inferior wall hypokinesis. Also showed late bubbles (difficult to determine intracardiac shunt vs pulmonary AV malformation).  - Plan for L Carotid Endarterectomy with Dr. Weir, pending cardiac clearance/workup. Nuclear stress with inferior ischemia.  - c/w ASA 81mg PO and Plavix 75 mg PO   - c/w Atorvastatin 80 mg PO  - stroke team consulted, appreciate recs  - vasc surg following  - update healthcare proxy on surgery

## 2021-04-01 NOTE — PROGRESS NOTE ADULT - PROBLEM SELECTOR PLAN 7
history of HTN, was on lisinopril 10 mg daily. No longer taking antihypertensive medications. However, patient hypertensive in ED and on floors.   - c/w lisinopril 10 mg daily, consider doubling if still hypertensive

## 2021-04-01 NOTE — PROGRESS NOTE ADULT - PROBLEM SELECTOR PLAN 4
Met sepsis criteria on admission (2/4 SIRS criteria --  per EMS documentation, WBC 16K,  with likely source of infection[UTI]). Likely 2/2 UTI as CXR clear and no other sources of infection readily identifiable.  - mgmt as above

## 2021-04-01 NOTE — PROGRESS NOTE ADULT - PROBLEM SELECTOR PLAN 8
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  SBO resolved and gyn recommended outpt f/u.  - Patient does not remember if she followed up. Spoke with PCP who has no information on this and thinks unlikely she did.  - reach out again to office where she was supposed to have her follow-up appt  - goals of care discussion with patient  - consider f/u appt on discharge with GI or [facility]

## 2021-04-01 NOTE — PROGRESS NOTE ADULT - PROBLEM SELECTOR PLAN 1
Inferior wall motion abnormalities and abnormal nuclear stress per cardiology. Likely chronic and unclear if symptomatic at all, but needs to be addressed prior to planned CEA.  - NPO midnight for planned cath 4/1  - completed 324mg ASA load and 600mg Plavix load, to continue tomorrow Inferior wall motion abnormalities and abnormal nuclear stress per cardiology. Likely chronic and unclear if symptomatic at all, but needs to be addressed prior to planned CEA.  - cath planned 4/1  - completed 324mg ASA load and 600mg Plavix load 3/31, to continue per cards

## 2021-04-01 NOTE — PROGRESS NOTE ADULT - NUTRITIONAL ASSESSMENT
This patient has been assessed with a concern for Malnutrition and has been determined to have a diagnosis/diagnoses of Moderate protein-calorie malnutrition and Underweight (BMI < 19).    This patient is being managed with:   Diet NPO-  NPO for Procedure/Test     NPO Start Date: 01-Apr-2021   NPO Start Time: 05:00  Except Medications  Entered: Apr 1 2021  5:07AM    Diet DASH/TLC-  Sodium & Cholesterol Restricted  Entered: Mar 29 2021  5:55AM

## 2021-04-01 NOTE — PROGRESS NOTE ADULT - PROBLEM SELECTOR PLAN 6
patient w/ history of chronic lower back pain from prior L3, L4 compression fracture. Takes tylenol every day at 8a and 4pm. PT consulted, ok'ed for home, no PT needs  - c/w tylenol PRN for pain control

## 2021-04-01 NOTE — PROGRESS NOTE ADULT - ASSESSMENT
88F PMH HTN, prior SBO (2018), chronic back pain (2/2 prior compression L3 L4 fractures), cellulitis BIBEMS from home with chief complaint 1 hour of blurry vision/lightheadedness, vomiting which started on afternoon of admission found to have sepsis 2/2 UTI, and possible TIA found to have >70% L ICA stenosis.

## 2021-04-01 NOTE — PROGRESS NOTE ADULT - SUBJECTIVE AND OBJECTIVE BOX
Interventional Cardiology PA Precath Note      HPI: 88 Female, PMHx HTN, prior SBO (2018), chronic back pain (2/2 prior compression L3 L4 fractures), cellulitis BIBEMS  on 3/28  c/o blurry vision/lightheadedness, Stroke Code called, team evaluated, no FND noted on exam, NIHSS 0; started empirically on ASA, plavix, atorvastatin. 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 Carotid dopplers with L>R stenosis not deemed to be culprit lesion but clinically significant occlusion. Neuro and Vascular surgery on board.  Pt found to have sepsis 2/2 UTI, occular migraines, and incidental coronary ischemia admitted for TIA rule out and heart catheterization.  Cardiology consulted with subsequent TTE 3/31 revealing EF 55%, + regional WMA.  NST 3/31 revealing mild ischemia in the basal inferior and basal inferolateral walls.  Pt seen today at bedside, found NAD, HD stable. VS: /74, HR 93, RR 18, Temp98.0, O2Sat 93% RA. Labs significant for stable H/H, normal WBC/Plts, K+ 4.2, BUN/Cr (14/0.92), Glu 102, Mg 2.5, INR 0.97. Pt currently Pt denies active CP, SOB, palpitations, diaphoresis, orthopnea, PND, dizziness, syncope, abdominal pain/discomfort, LE swelling or any other complaints at this time.  ASA and Plavix load (324mg and 600mg) completed 3/31/21.   In light of the pt's recent abnormal finding on NST, pt is recommended for left heart catheterization with possible intervention prior to CEA with Dr. Laird.     Radiology:  < from: Echocardiogram w/ Bubble and Doppler (03.30.21 @ 15:10) >: Injection with agitated saline reveals late bubbles (after 3 heart beats) - difficult to determine intracardiac shunt vs pulmonary AV malformation.     -Mild aortic stenosis.   No other significant valvular disease.     -The right ventricle is normal in size. Right ventricular systolic function is normal.    -The left ventricle cavity size is normal. There is mild concentric left ventricular hypertrophy. Left ventricular ejection fraction is 55-60%. -There is basal inferolateral and basal inferior wall hypokinesis.   No prior echo is available for comparison.  < end of copied text >    < from: Pharm Thallium Stress (Lexiscan) -LEXMIB (03.31.21 @ 14:42) >  Myocardial perfusion imaging is abnormal. There is a small area of mild ischemia in the basal inferior and basal inferolateral walls. Overall left ventricular systolic function is normal without regional wall motion abnormalities. The EKG stress test is normal.  < end of copied text >    ALL: NKDA, NKFA. Denies shellfish/Contrast dye allergy.    MEDS:   acetaminophen   Tablet .. 650 milliGRAM(s) Oral every 6 hours PRN  aspirin  chewable 81 milliGRAM(s) Oral daily  atorvastatin 80 milliGRAM(s) Oral at bedtime  calcium carbonate 1250 mG  + Vitamin D (OsCal 500 + D) 1 Tablet(s) Oral daily  cefTRIAXone   IVPB 1000 milliGRAM(s) IV Intermittent every 24 hours  clopidogrel Tablet 75 milliGRAM(s) Oral daily  lisinopril 10 milliGRAM(s) Oral daily    T(C): 36.9 (04-01-21 @ 06:23), Max: 36.9 (04-01-21 @ 06:23)  HR: 94 (04-01-21 @ 06:23) (84 - 95)  BP: 149/74 (04-01-21 @ 06:23) (131/74 - 149/74)  RR: 17 (04-01-21 @ 06:23) (17 - 18)  SpO2: 93% (04-01-21 @ 06:23) (93% - 96%)  Wt(kg): --      HEENT: NCAT, EOMI, PERRLA  NECK: No JVD, No carotid bruits B/L, +2 Carotid pulses B/L  PULM:  CTA B/L No W/R/R  CARD: RRR, +S1, +S2, No M/R/G  ABD: ND, +BS, NT, no masses  EXT: Warm, pedal edema yes/no, pitting/non-pitting  RECTAL: Guaiac negative/positive   NEURO: A & O x 3, no focal neurologic deficits  PULSES:	B	    R	      FEM         	                                            DP                          PT  Right		                                                  Yes/No     Bruit	    Left		                                                  Yes/No     Bruit                            12.8   9.63  )-----------( 391      ( 01 Apr 2021 08:02 )             39.9     04-01    136  |  102  |  14  ----------------------------<  102<H>  4.2   |  25  |  0.92    Ca    8.6      01 Apr 2021 08:02  Phos  2.5     04-01  Mg     2.0     04-01    TPro  5.7<L>  /  Alb  2.7<L>  /  TBili  0.3  /  DBili  x   /  AST  14  /  ALT  7<L>  /  AlkPhos  58  04-01          EKG:	  				  ASA:  III  Mallampati:  II    A/P:  88 F w/ hx of HTN admitted to medical service after blurry vision and lightheadedness likely due to TIA vs severe stenosis of left internal carotid artery cardiology, admitted for TIA r/o, found to have sepsis 2/2 UTI, occular migraines, and incidental coronary ischemia and now presents for recommended cardiac cath prior to CEA with Dr. Laird.     Sedation Plan:  Moderate      Patient Is Suitable Candidate For Sedation: Yes       Risks & benefits of procedure and sedation and risks and benefits for the alternative therapy have been explained to the patient in layman’s terms including but not limited to: allergic reaction, bleeding, infection, arrhythmia, respiratory compromise, renal and vascular compromise, limb damage, MI, CVA, emergent CABG/Vascular Surgery and death. Informed consent obtained and in chart.       Interventional Cardiology PA Precath Note      HPI: 88 Female, PMHx HTN, prior SBO (2018), chronic back pain (2/2 prior compression L3 L4 fractures), cellulitis BIBEMS  on 3/28  c/o blurry vision/lightheadedness, Stroke Code called, team evaluated, no FND noted on exam, NIHSS 0; started empirically on ASA, plavix, atorvastatin. 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 Carotid dopplers with L>R stenosis not deemed to be culprit lesion but clinically significant occlusion. Neuro and Vascular surgery on board.  Pt found to have sepsis 2/2 UTI, occular migraines, and incidental coronary ischemia admitted for TIA rule out and heart catheterization.  Cardiology consulted with subsequent TTE 3/31 revealing EF 55%, + regional WMA.  NST 3/31 revealing mild ischemia in the basal inferior and basal inferolateral walls.  Pt seen today at bedside, found NAD, HD stable. VS: /74, HR 93, RR 18, Temp98.0, O2Sat 93% RA. Labs significant for stable H/H, normal WBC/Plts, K+ 4.2, BUN/Cr (14/0.92), Glu 102, Mg 2.5, INR 0.97. Pt endorsed chronic dry cough, afebrile, noted with positive Covid Ab, currently denies active CP, SOB, palpitations, diaphoresis, orthopnea, PND, dizziness, syncope, abdominal pain/discomfort, LE swelling or any other complaints at this time.  ASA and Plavix load (324mg and 600mg) completed 3/31/21.   In light of the pt's recent abnormal finding on NST, pt is recommended for left heart catheterization with possible intervention prior to CEA with Dr. Laird.     Radiology:  < from: Echocardiogram w/ Bubble and Doppler (03.30.21 @ 15:10) >: Injection with agitated saline reveals late bubbles (after 3 heart beats) - difficult to determine intracardiac shunt vs pulmonary AV malformation.     -Mild aortic stenosis.   No other significant valvular disease.     -The right ventricle is normal in size. Right ventricular systolic function is normal.    -The left ventricle cavity size is normal. There is mild concentric left ventricular hypertrophy. Left ventricular ejection fraction is 55-60%. -There is basal inferolateral and basal inferior wall hypokinesis.   No prior echo is available for comparison.  < end of copied text >    < from: Pharm Thallium Stress (Lexiscan) -LEXMIB (03.31.21 @ 14:42) >  Myocardial perfusion imaging is abnormal. There is a small area of mild ischemia in the basal inferior and basal inferolateral walls. Overall left ventricular systolic function is normal without regional wall motion abnormalities. The EKG stress test is normal.  < end of copied text >    ALL: NKDA, NKFA. Denies shellfish/Contrast dye allergy.    MEDS:   acetaminophen   Tablet .. 650 milliGRAM(s) Oral every 6 hours PRN  aspirin  chewable 81 milliGRAM(s) Oral daily  atorvastatin 80 milliGRAM(s) Oral at bedtime  calcium carbonate 1250 mG  + Vitamin D (OsCal 500 + D) 1 Tablet(s) Oral daily  cefTRIAXone   IVPB 1000 milliGRAM(s) IV Intermittent every 24 hours  clopidogrel Tablet 75 milliGRAM(s) Oral daily  lisinopril 10 milliGRAM(s) Oral daily    T(C): 36.9 (04-01-21 @ 06:23), Max: 36.9 (04-01-21 @ 06:23)  HR: 94 (04-01-21 @ 06:23) (84 - 95)  BP: 149/74 (04-01-21 @ 06:23) (131/74 - 149/74)  RR: 17 (04-01-21 @ 06:23) (17 - 18)  SpO2: 93% (04-01-21 @ 06:23) (93% - 96%)  Wt(kg): --    GEN:  NAD, sitting up comfortably in bed  NECK: No JVD, No carotid bruits B/L, +2 Carotid pulses B/L  PULM:  CTA B/L, no RRW B/L  CARD: RRR, +S1, +S2, No M/R/G  ABD: ND, +BS, NT, no masses  EXT: Warm, no LE Edema B/L  NEURO: A & O x 3, no focal neurologic deficits  PULSES:	B	    R	      FEM         	                                            DP                          PT  Right		               2+           2+            No  Bruit	                    +1                           +1              Left		               2+           2+            No  Bruit                                +1                           +1                        12.8   9.63  )-----------( 391      ( 01 Apr 2021 08:02 )             39.9     04-01    136  |  102  |  14  ----------------------------<  102<H>  4.2   |  25  |  0.92    Ca    8.6      01 Apr 2021 08:02  Phos  2.5     04-01  Mg     2.0     04-01    TPro  5.7<L>  /  Alb  2.7<L>  /  TBili  0.3  /  DBili  x   /  AST  14  /  ALT  7<L>  /  AlkPhos  58  04-01          EKG:	  				  ASA:  III  Mallampati:  II    A/P:  88 F w/ hx of HTN admitted to medical service after blurry vision and lightheadedness likely due to TIA vs severe stenosis of left internal carotid artery cardiology, admitted for TIA r/o, found to have sepsis 2/2 UTI, occular migraines, and incidental coronary ischemia and now presents for recommended cardiac cath prior to CEA with Dr. Laird.     Sedation Plan:  Moderate      Patient Is Suitable Candidate For Sedation: Yes       Risks & benefits of procedure and sedation and risks and benefits for the alternative therapy have been explained to the patient in layman’s terms including but not limited to: allergic reaction, bleeding, infection, arrhythmia, respiratory compromise, renal and vascular compromise, limb damage, MI, CVA, emergent CABG/Vascular Surgery and death. Informed consent obtained and in chart.       Interventional Cardiology PA Precath Note      HPI: 88 Female, PMHx HTN, prior SBO (2018), chronic back pain (2/2 prior compression L3 L4 fractures), cellulitis BIBEMS  on 3/28  c/o blurry vision/lightheadedness, Stroke Code called, team evaluated, no FND noted on exam, NIHSS 0; started empirically on ASA, plavix, atorvastatin. 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 Carotid dopplers with L>R stenosis not deemed to be culprit lesion but clinically significant occlusion. Neuro and Vascular surgery on board.  Pt found to have sepsis 2/2 UTI, occular migraines, and incidental coronary ischemia admitted for TIA rule out and heart catheterization.  Cardiology consulted with subsequent TTE 3/31 revealing EF 55%, + regional WMA.  NST 3/31 revealing mild ischemia in the basal inferior and basal inferolateral walls.  Pt seen today at bedside, found NAD, HD stable. VS: /74, HR 93, RR 18, Temp98.0, O2Sat 93% RA. Labs significant for stable H/H, normal WBC/Plts, K+ 4.2, BUN/Cr (14/0.92), Glu 102, Mg 2.5, INR 0.97. Pt endorsed chronic dry cough, afebrile, noted with positive Covid Ab, currently denies active CP, SOB, palpitations, diaphoresis, orthopnea, PND, dizziness, syncope, abdominal pain/discomfort, LE swelling or any other complaints at this time.  ASA and Plavix load (324mg and 600mg) completed 3/31/21.   In light of the pt's recent abnormal finding on NST, pt is recommended for left heart catheterization with possible intervention prior to CEA with Dr. Laird.     Radiology:  < from: Echocardiogram w/ Bubble and Doppler (03.30.21 @ 15:10) >: Injection with agitated saline reveals late bubbles (after 3 heart beats) - difficult to determine intracardiac shunt vs pulmonary AV malformation.     -Mild aortic stenosis.   No other significant valvular disease.     -The right ventricle is normal in size. Right ventricular systolic function is normal.    -The left ventricle cavity size is normal. There is mild concentric left ventricular hypertrophy. Left ventricular ejection fraction is 55-60%. -There is basal inferolateral and basal inferior wall hypokinesis.   No prior echo is available for comparison.  < end of copied text >    < from: Pharm Thallium Stress (Lexiscan) -LEXMIB (03.31.21 @ 14:42) >  Myocardial perfusion imaging is abnormal. There is a small area of mild ischemia in the basal inferior and basal inferolateral walls. Overall left ventricular systolic function is normal without regional wall motion abnormalities. The EKG stress test is normal.  < end of copied text >    ALL: NKDA, NKFA. Denies shellfish/Contrast dye allergy.    MEDS:   acetaminophen   Tablet .. 650 milliGRAM(s) Oral every 6 hours PRN  aspirin  chewable 81 milliGRAM(s) Oral daily  atorvastatin 80 milliGRAM(s) Oral at bedtime  calcium carbonate 1250 mG  + Vitamin D (OsCal 500 + D) 1 Tablet(s) Oral daily  cefTRIAXone   IVPB 1000 milliGRAM(s) IV Intermittent every 24 hours  clopidogrel Tablet 75 milliGRAM(s) Oral daily  lisinopril 10 milliGRAM(s) Oral daily    T(C): 36.9 (04-01-21 @ 06:23), Max: 36.9 (04-01-21 @ 06:23)  HR: 94 (04-01-21 @ 06:23) (84 - 95)  BP: 149/74 (04-01-21 @ 06:23) (131/74 - 149/74)  RR: 17 (04-01-21 @ 06:23) (17 - 18)  SpO2: 93% (04-01-21 @ 06:23) (93% - 96%)  Wt(kg): --    GEN:  NAD, sitting up comfortably in bed  NECK: No JVD, No carotid bruits B/L, +2 Carotid pulses B/L  PULM:  CTA B/L, no RRW B/L  CARD: RRR, +S1, +S2, No M/R/G  ABD: ND, +BS, NT, no masses  EXT: Warm, no LE Edema B/L  NEURO: A & O x 3, no focal neurologic deficits  PULSES:	B	    R	      FEM         	                                            DP                          PT  Right		               2+           2+            No  Bruit	                    +1                           +1              Left		               2+           2+            No  Bruit                                +1                           +1                        12.8   9.63  )-----------( 391      ( 01 Apr 2021 08:02 )             39.9     04-01    136  |  102  |  14  ----------------------------<  102<H>  4.2   |  25  |  0.92    Ca    8.6      01 Apr 2021 08:02  Phos  2.5     04-01  Mg     2.0     04-01    TPro  5.7<L>  /  Alb  2.7<L>  /  TBili  0.3  /  DBili  x   /  AST  14  /  ALT  7<L>  /  AlkPhos  58  04-01    EKG:	NSR @84 bpm, TWI in anterolateral leads, changed from admit EKG showing no ST or T wave changes  				  ASA:  III  Mallampati:  II    A/P:  88 F w/ hx of HTN admitted to medical service after blurry vision and lightheadedness likely due to TIA vs severe stenosis of left internal carotid artery cardiology, admitted for TIA r/o, found to have sepsis 2/2 UTI, occular migraines, and incidental coronary ischemia and now presents for recommended cardiac cath prior to CEA with Dr. Laird.     Sedation Plan:  Moderate      Patient Is Suitable Candidate For Sedation: Yes       Risks & benefits of procedure and sedation and risks and benefits for the alternative therapy have been explained to the patient in layman’s terms including but not limited to: allergic reaction, bleeding, infection, arrhythmia, respiratory compromise, renal and vascular compromise, limb damage, MI, CVA, emergent CABG/Vascular Surgery and death. Informed consent obtained and in chart.

## 2021-04-01 NOTE — PROGRESS NOTE ADULT - SUBJECTIVE AND OBJECTIVE BOX
INTERVAL HPI/OVERNIGHT EVENTS: ). Completed DAPT load per cards. Per Neuro not having TIAs      SUBJECTIVE: Examined at the bedside this am laying comfortably in bed. Denies headaches or loss of vision. No acute complaints    aspirin  chewable 81 milliGRAM(s) Oral daily  cefTRIAXone   IVPB 1000 milliGRAM(s) IV Intermittent every 24 hours  clopidogrel Tablet 75 milliGRAM(s) Oral daily  lisinopril 10 milliGRAM(s) Oral daily      Vital Signs Last 24 Hrs  T(C): 36.9 (01 Apr 2021 06:23), Max: 36.9 (01 Apr 2021 06:23)  T(F): 98.4 (01 Apr 2021 06:23), Max: 98.4 (01 Apr 2021 06:23)  HR: 94 (01 Apr 2021 06:23) (84 - 95)  BP: 149/74 (01 Apr 2021 06:23) (131/74 - 149/74)  BP(mean): --  RR: 17 (01 Apr 2021 06:23) (17 - 18)  SpO2: 93% (01 Apr 2021 06:23) (93% - 96%)  I&O's Detail      Physical Exam  General: NAD, resting comfortably in bed  HEENT: neck soft and suple  C/V: NSR  Pulm: Nonlabored breathing, no respiratory distress  Abd: soft, non-tender, non-distended.  Extrem: WWP, no edema,  Neuro: A/O x 3, CNs II-XII grossly intact, no focal deficits, 5/5 strength upper extremities  Pulses: 2+ radial pulses bilaterally    LABS:                        12.8   9.63  )-----------( 391      ( 01 Apr 2021 08:02 )             39.9     04-01    136  |  102  |  14  ----------------------------<  102<H>  4.2   |  25  |  0.92    Ca    8.6      01 Apr 2021 08:02  Phos  2.5     04-01  Mg     2.0     04-01    TPro  5.7<L>  /  Alb  2.7<L>  /  TBili  0.3  /  DBili  x   /  AST  14  /  ALT  7<L>  /  AlkPhos  58  04-01    PT/INR - ( 01 Apr 2021 08:02 )   PT: 11.6 sec;   INR: 0.97          PTT - ( 01 Apr 2021 08:02 )  PTT:28.0 sec      RADIOLOGY & ADDITIONAL STUDIES:  < from: US Duplex Carotid Arteries Complete, Bilateral (03.30.21 @ 17:18) >  IMPRESSION:  1.  Left internal carotid artery greater than 70% stenosis greatest in proximal and midportion due to large calcified plaque better demonstrated on CTA neck March 29, 2021.  2.  Right internal carotid artery approximately 50-69% stenosis noting limited evaluation of the proximal right ICA secondary to posterior acoustic shadowing from calcified plaque,better assessed on recent CTA neck.    < end of copied text >

## 2021-04-01 NOTE — PROGRESS NOTE ADULT - ASSESSMENT
88 y.o. F PMH HTN, prior SBO (2018), chronic back pain (2/2 prior compression L3 L4 fractures), cellulitis admitted for sepsis 2/2 UTI. Vascular consulted for L ICA stenosis > 70%, asymptomatic. Clinically stable pending cardiac evaluation for CEA.    Cont Asa, Plavix, Statin  Will follow up Cardiac Cath   Plan for CEA following Cath  Rest of care per primary team.    88 y.o. F PMH HTN, prior SBO (2018), chronic back pain (2/2 prior compression L3 L4 fractures), cellulitis admitted for sepsis 2/2 UTI. Vascular consulted for L ICA stenosis > 70%, asymptomatic. Clinically stable pending cardiac evaluation for CEA.    Cont Asa, Plavix, Statin  Will follow up Cardiac Cath   Plan for CEA inpatient vs outpatient following Cath  Rest of care per primary team.

## 2021-04-01 NOTE — PROGRESS NOTE ADULT - SUBJECTIVE AND OBJECTIVE BOX
Subjective:  -    PAST MEDICAL & SURGICAL HISTORY:  SBO (small bowel obstruction)    HTN (hypertension)    Chronic back pain    S/P wrist surgery        MEDICATIONS  (STANDING):  aspirin  chewable 81 milliGRAM(s) Oral daily  atorvastatin 80 milliGRAM(s) Oral at bedtime  calcium carbonate 1250 mG  + Vitamin D (OsCal 500 + D) 1 Tablet(s) Oral daily  cefTRIAXone   IVPB 1000 milliGRAM(s) IV Intermittent every 24 hours  clopidogrel Tablet 75 milliGRAM(s) Oral daily  lisinopril 10 milliGRAM(s) Oral daily      Vital Signs Last 24 Hrs  T(C): 36.9 (01 Apr 2021 06:23), Max: 36.9 (01 Apr 2021 06:23)  T(F): 98.4 (01 Apr 2021 06:23), Max: 98.4 (01 Apr 2021 06:23)  HR: 94 (01 Apr 2021 06:23) (84 - 95)  BP: 149/74 (01 Apr 2021 06:23) (131/74 - 149/74)  BP(mean): --  RR: 17 (01 Apr 2021 06:23) (17 - 18)  SpO2: 93% (01 Apr 2021 06:23) (93% - 96%)    Daily     Daily     Physical Exam:   GEN: NAD, AAOx3  HEENT: MMM, no icterus  CV: S1 S2 RRR, no MRG  Lung: CTAB  Abd: soft NT ND +BS  Ext: no c/c/e  Neuro: no focal neuro deficit      LABS:                        12.8   9.63  )-----------( 391      ( 01 Apr 2021 08:02 )             39.9     04-01    136  |  102  |  14  ----------------------------<  102<H>  4.2   |  25  |  0.92    Ca    8.6      01 Apr 2021 08:02  Phos  2.5     04-01  Mg     2.0     04-01    TPro  5.7<L>  /  Alb  2.7<L>  /  TBili  0.3  /  DBili  x   /  AST  14  /  ALT  7<L>  /  AlkPhos  58  04-01        PT/INR - ( 01 Apr 2021 08:02 )   PT: 11.6 sec;   INR: 0.97          PTT - ( 01 Apr 2021 08:02 )  PTT:28.0 sec    I&O's Detail          RADIOLOGY & ADDITIONAL STUDIES:

## 2021-04-02 DIAGNOSIS — Z71.89 OTHER SPECIFIED COUNSELING: ICD-10-CM

## 2021-04-02 LAB
ALBUMIN SERPL ELPH-MCNC: 2.8 G/DL — LOW (ref 3.3–5)
ALP SERPL-CCNC: 54 U/L — SIGNIFICANT CHANGE UP (ref 40–120)
ALT FLD-CCNC: 8 U/L — LOW (ref 10–45)
ANION GAP SERPL CALC-SCNC: 15 MMOL/L — SIGNIFICANT CHANGE UP (ref 5–17)
APTT BLD: 29.3 SEC — SIGNIFICANT CHANGE UP (ref 27.5–35.5)
AST SERPL-CCNC: 15 U/L — SIGNIFICANT CHANGE UP (ref 10–40)
BILIRUB SERPL-MCNC: 0.4 MG/DL — SIGNIFICANT CHANGE UP (ref 0.2–1.2)
BUN SERPL-MCNC: 14 MG/DL — SIGNIFICANT CHANGE UP (ref 7–23)
CALCIUM SERPL-MCNC: 8.4 MG/DL — SIGNIFICANT CHANGE UP (ref 8.4–10.5)
CHLORIDE SERPL-SCNC: 103 MMOL/L — SIGNIFICANT CHANGE UP (ref 96–108)
CO2 SERPL-SCNC: 21 MMOL/L — LOW (ref 22–31)
CREAT SERPL-MCNC: 0.86 MG/DL — SIGNIFICANT CHANGE UP (ref 0.5–1.3)
GLUCOSE SERPL-MCNC: 78 MG/DL — SIGNIFICANT CHANGE UP (ref 70–99)
HCT VFR BLD CALC: 38.8 % — SIGNIFICANT CHANGE UP (ref 34.5–45)
HGB BLD-MCNC: 12.2 G/DL — SIGNIFICANT CHANGE UP (ref 11.5–15.5)
INR BLD: 1.04 — SIGNIFICANT CHANGE UP (ref 0.88–1.16)
MAGNESIUM SERPL-MCNC: 1.9 MG/DL — SIGNIFICANT CHANGE UP (ref 1.6–2.6)
MCHC RBC-ENTMCNC: 31.4 GM/DL — LOW (ref 32–36)
MCHC RBC-ENTMCNC: 32.3 PG — SIGNIFICANT CHANGE UP (ref 27–34)
MCV RBC AUTO: 102.6 FL — HIGH (ref 80–100)
NRBC # BLD: 0 /100 WBCS — SIGNIFICANT CHANGE UP (ref 0–0)
PHOSPHATE SERPL-MCNC: 3.6 MG/DL — SIGNIFICANT CHANGE UP (ref 2.5–4.5)
PLATELET # BLD AUTO: 398 K/UL — SIGNIFICANT CHANGE UP (ref 150–400)
POTASSIUM SERPL-MCNC: 3.8 MMOL/L — SIGNIFICANT CHANGE UP (ref 3.5–5.3)
POTASSIUM SERPL-SCNC: 3.8 MMOL/L — SIGNIFICANT CHANGE UP (ref 3.5–5.3)
PROT SERPL-MCNC: 5.7 G/DL — LOW (ref 6–8.3)
PROTHROM AB SERPL-ACNC: 12.4 SEC — SIGNIFICANT CHANGE UP (ref 10.6–13.6)
RBC # BLD: 3.78 M/UL — LOW (ref 3.8–5.2)
RBC # FLD: 11.8 % — SIGNIFICANT CHANGE UP (ref 10.3–14.5)
SODIUM SERPL-SCNC: 139 MMOL/L — SIGNIFICANT CHANGE UP (ref 135–145)
WBC # BLD: 12.27 K/UL — HIGH (ref 3.8–10.5)
WBC # FLD AUTO: 12.27 K/UL — HIGH (ref 3.8–10.5)

## 2021-04-02 PROCEDURE — 99233 SBSQ HOSP IP/OBS HIGH 50: CPT

## 2021-04-02 PROCEDURE — 93926 LOWER EXTREMITY STUDY: CPT | Mod: 26,RT

## 2021-04-02 PROCEDURE — 99233 SBSQ HOSP IP/OBS HIGH 50: CPT | Mod: GC

## 2021-04-02 RX ORDER — HEPARIN SODIUM 5000 [USP'U]/ML
5000 INJECTION INTRAVENOUS; SUBCUTANEOUS EVERY 12 HOURS
Refills: 0 | Status: DISCONTINUED | OUTPATIENT
Start: 2021-04-02 | End: 2021-04-02

## 2021-04-02 RX ADMIN — CLOPIDOGREL BISULFATE 75 MILLIGRAM(S): 75 TABLET, FILM COATED ORAL at 12:01

## 2021-04-02 RX ADMIN — ATORVASTATIN CALCIUM 80 MILLIGRAM(S): 80 TABLET, FILM COATED ORAL at 21:43

## 2021-04-02 RX ADMIN — LISINOPRIL 10 MILLIGRAM(S): 2.5 TABLET ORAL at 06:41

## 2021-04-02 RX ADMIN — Medication 1 TABLET(S): at 12:01

## 2021-04-02 RX ADMIN — Medication 81 MILLIGRAM(S): at 12:01

## 2021-04-02 NOTE — PROGRESS NOTE ADULT - ATTENDING COMMENTS
88 year old woman who presented with chief complaint of 1 hour of blurry vision, lightheadedness + vomiting,   Per today's neurology note, patient's description of event more consistent with ocular migraine than with TIA.  During workup for episode of blurry vision, lightheadedness and vomiting, was found to have:     # >70% L ICA Stenosis  Underwent nuclear stress test with cardiology as part of pre-op eval for possible carotid endarderectomy   positive Nuclear stress test on 3/31 -> Underwent Left heart cath on 4/1 which showed occluded RCA and Left Cx with 80% occluded p+mLAD.   Planned for carotid endarterectomy with vascular surgery service on Monday.   Defer Pre-op cardiac risk assessment to cardiology consult service  Stable for transfer to vascular surgery floor.     Hospital Course also complicated by   # Sepsis 2/2 UTI (present on admission)   Met at least 2/4 SIRS criteria + infectious source  - Treated with Ceftriaxone 88 year old woman who presented with chief complaint of 1 hour of blurry vision, lightheadedness + vomiting,   Per today's neurology note, patient's description of event more consistent with ocular migraine than with TIA.  During workup for episode of blurry vision, lightheadedness and vomiting, was found to have:     # >70% L ICA Stenosis  Underwent nuclear stress test with cardiology as part of pre-op eval for possible carotid endarderectomy   positive Nuclear stress test on 3/31 -> Underwent Left heart cath on 4/1 which showed occluded RCA and Left Cx with 80% occluded p+mLAD.   Planned for carotid endarterectomy with vascular surgery service on Monday.   Defer Pre-op cardiac risk assessment to cardiology consult service  Defer judgment re: relative risk vs. benefit of CEA vs non-intervention to vascular surgery and cardiology   Stable for transfer to vascular surgery floor.     Hospital Course also complicated by   # Sepsis 2/2 UTI (present on admission)   Met at least 2/4 SIRS criteria + infectious source  - Treated with Ceftriaxone

## 2021-04-02 NOTE — CHART NOTE - NSCHARTNOTEFT_GEN_A_CORE
Patient Kong did NOT have a TIA during this admission, likely ocular migraine.     Given carotid stenosis patient should have neurology follow up after discharge, can call [phone] option 4 for an outpatient appointment.    No further neurologic workup necessary. Please reconsult with further questions.

## 2021-04-02 NOTE — PROGRESS NOTE ADULT - PROBLEM SELECTOR PLAN 2
Patient with sudden onset of sx at rest that resolved after 1 hour. No acute intracranial hemorrhage or infarct seen on CT.  No trigger for sx, occurred without exertion. Elevated BP on arrival to ED. Sx resolution in ED. MRI showed age appropriate volume loss with extensive small vessel ischemic disease. CTA showed extensive atherosclerotic disease. Carotid duplex 3/30 showing severe > 70% stenosis of L ICA with PSV > 400.  - Echo w/ bubbles 3/30 showing mild AS, mild LVH, and inferior wall hypokinesis. Also showed late bubbles (difficult to determine intracardiac shunt vs pulmonary AV malformation).  - Plan for L Carotid Endarterectomy with Dr. Weir, pending cardiac clearance/workup. Nuclear stress with inferior ischemia.  - c/w ASA 81mg PO and Plavix 75 mg PO   - c/w Atorvastatin 80 mg PO  - stroke team consulted, appreciate recs  - vasc surg following  - update healthcare proxy on surgery

## 2021-04-02 NOTE — PROGRESS NOTE ADULT - ATTENDING COMMENTS
Initial attending contact date 3/31/21 . See fellow note written above for details. I reviewed the fellow documentation. I have personally seen and examined this patient. I reviewed vitals, labs, medications, cardiac studies, and additional imaging. I agree with the above fellow's findings and plans as written above with the following additions/statements.    -88 F with HTN admitted with dizziness/bilateral blurry vision found to have left vertebral stenosis v4 segment and LICA > 70%  -Pt planned for L CEA  -EKG NSR with nonspecific ST changes  -ECHO with EF 55-60 with basal inferolateral and basal inferior hypokinesis  -Pt denies anginal equivalents but with limited functional capacity  -NST with reversible inferolateral ischemia  -s/p cath 4/1: occluded RCA and Cx with diffuse 80% mLAD  -Pt with stable CAD without signs or symptoms of ongoing, unstable coronary ischemia. EF also preserved 55-60%. Therefore pt considered intermediate risk for intermediate risk procedure (CEA). Can proceed on current meds. Will only opt for further invasive treatment of CAD including PCI LAD if evidence for significant coronary ischemia post-operatively  -Cont ASA/statin/plavix, add toprol 25 mg qd post -op  -Will fu post op

## 2021-04-02 NOTE — PROGRESS NOTE ADULT - SUBJECTIVE AND OBJECTIVE BOX
Subjective:  Patient seen and examined. No chest pain or SOB.   s/p LHC, right femoral access     PAST MEDICAL & SURGICAL HISTORY:  SBO (small bowel obstruction)    HTN (hypertension)    Chronic back pain    S/P wrist surgery        MEDICATIONS  (STANDING):  aspirin  chewable 81 milliGRAM(s) Oral daily  atorvastatin 80 milliGRAM(s) Oral at bedtime  calcium carbonate 1250 mG  + Vitamin D (OsCal 500 + D) 1 Tablet(s) Oral daily  clopidogrel Tablet 75 milliGRAM(s) Oral daily  lisinopril 10 milliGRAM(s) Oral daily      Vital Signs Last 24 Hrs  T(C): 36.8 (02 Apr 2021 12:22), Max: 37.1 (01 Apr 2021 21:55)  T(F): 98.2 (02 Apr 2021 12:22), Max: 98.7 (01 Apr 2021 21:55)  HR: 79 (02 Apr 2021 12:22) (79 - 90)  BP: 129/74 (02 Apr 2021 12:22) (123/63 - 133/72)  BP(mean): --  RR: 16 (02 Apr 2021 12:22) (16 - 17)  SpO2: 98% (02 Apr 2021 12:22) (96% - 98%)    Daily     Daily     Physical Exam:   GEN: NAD, AAOx3  HEENT: MMM, no icterus  CV: S1 S2 RRR, no MRG  Lung: CTAB  Abd: soft NT ND +BS  Groin: soft, no hematoma   Ext: no c/c/e  Neuro: no focal neuro deficit      LABS:                        12.2   12.27 )-----------( 398      ( 02 Apr 2021 09:07 )             38.8     04-02    139  |  103  |  14  ----------------------------<  78  3.8   |  21<L>  |  0.86    Ca    8.4      02 Apr 2021 09:07  Phos  3.6     04-02  Mg     1.9     04-02    TPro  5.7<L>  /  Alb  2.8<L>  /  TBili  0.4  /  DBili  x   /  AST  15  /  ALT  8<L>  /  AlkPhos  54  04-02        PT/INR - ( 02 Apr 2021 09:07 )   PT: 12.4 sec;   INR: 1.04          PTT - ( 02 Apr 2021 09:07 )  PTT:29.3 sec    I&O's Detail          RADIOLOGY & ADDITIONAL STUDIES:

## 2021-04-02 NOTE — PROGRESS NOTE ADULT - SUBJECTIVE AND OBJECTIVE BOX
TRANSFER TO VASCULAR SURGERY    HOSPITAL COURSE:  88F PMH HTN, prior SBO (2018), chronic back pain (2/2 prior compression L3 L4 fractures), cellulitis BIBEMS from home with chief complaint 1 hour of blurry vision/lightheadedness, found to have sepsis 2/2 UTI, occular migraines, and incidental coronary ischemia admitted for TIA rule out and heart catheterization.    Patient presented 3/28 after experiencing sudden lightheadedness, blurry vision at home lasting roughly 1 hour. Stroke team consulted, no FND noted on exam, NIHSS 0; started empirically on ASA, plavix, atorvastatin. 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 Carotid dopplers with L>R stenosis not deemed to be culprit lesion but clinically significant occlusion. Vascular surgery consulted, recommended cardiac evaluation. Pharmacologic stress test performed showing inferior ischemia concordant with ischemia on EKG so decision was made to send patient for cardiac catheterization prior to planned future CEA. ASA and Plavix load (324mg and 600mg) completed 3/31/21 taking into account cumulative dosing.  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  Per interventional cardiology:  "s/p dx cardiac cath on 4/1/21: 3 vessel CAD; recommended for medical management  Findings: Left dominant, LM normal, pLCx 100% , p+mLAD 80% (severely calcified), mRCA 100% (small), EDP 4. R Groin sheath manually pulled/compressed->2 hours bedrest (no AC used)"    INTERVAL HPI/OVERNIGHT EVENTS:  Patient was seen and examined at bedside. As per nurse and patient, no o/n events, patient resting comfortably. Groin check x2 overnight with small mildly tender R inguinal hematoma. No complaints at this time. Patient denies: fever, chills, dizziness, weakness, HA, Changes in vision, CP, palpitations, SOB, cough, N/V/D/C, dysuria, LE edema. ROS otherwise negative.    VITAL SIGNS:  T(F): 97.6 (04-02-21 @ 06:13)  HR: 88 (04-02-21 @ 06:13)  BP: 123/63 (04-02-21 @ 06:13)  RR: 17 (04-02-21 @ 06:13)  SpO2: 96% (04-02-21 @ 06:13)  Wt(kg): --        PHYSICAL EXAM:    Constitutional: WDWN resting comfortably in bed; NAD  HEENT: NC/AT, PER, anicteric sclera, no nasal discharge; uvula midline, no oropharyngeal erythema or exudates; MMM  Respiratory: CTA B/L; no W/R/R, no retractions  Cardiac: +S1/S2; RRR; no M/R/G  Gastrointestinal: soft, slight diffuse abdominal tenderness; no rebound or guarding  Back: spine midline, no bony tenderness or step-offs; no CVAT B/L  Extremities: WWP, no clubbing or cyanosis; no peripheral edema  Musculoskeletal: NROM x4; no joint swelling, tenderness or erythema  Vascular: 2+ radial, DP/PT pulses B/L; 2+ femoral pulses with ~3cm nontender hematoma superior to R inguinal ligament  Dermatologic: skin warm, dry and intact; no rashes, wounds, or scars  Neurologic: AAOx3; CNII-XII grossly intact; no focal deficits  Psychiatric: affect and characteristics of appearance, verbalizations, behaviors are appropriate    MEDICATIONS  (STANDING):  aspirin  chewable 81 milliGRAM(s) Oral daily  atorvastatin 80 milliGRAM(s) Oral at bedtime  calcium carbonate 1250 mG  + Vitamin D (OsCal 500 + D) 1 Tablet(s) Oral daily  clopidogrel Tablet 75 milliGRAM(s) Oral daily  lisinopril 10 milliGRAM(s) Oral daily    MEDICATIONS  (PRN):  acetaminophen   Tablet .. 650 milliGRAM(s) Oral every 6 hours PRN Temp greater or equal to 38C (100.4F), Mild Pain (1 - 3), Moderate Pain (4 - 6)      Allergies    No Known Allergies    Intolerances        LABS:                        12.2   12.27 )-----------( 398      ( 02 Apr 2021 09:07 )             38.8     04-02    139  |  103  |  14  ----------------------------<  78  3.8   |  21<L>  |  0.86    Ca    8.4      02 Apr 2021 09:07  Phos  3.6     04-02  Mg     1.9     04-02    TPro  5.7<L>  /  Alb  2.8<L>  /  TBili  0.4  /  DBili  x   /  AST  15  /  ALT  8<L>  /  AlkPhos  54  04-02    PT/INR - ( 02 Apr 2021 09:07 )   PT: 12.4 sec;   INR: 1.04          PTT - ( 02 Apr 2021 09:07 )  PTT:29.3 sec      RADIOLOGY & ADDITIONAL TESTS:  Reviewed

## 2021-04-02 NOTE — PROGRESS NOTE ADULT - PROBLEM SELECTOR PLAN 3
No urinary symptoms on admission but fall may be secondary to UTI. UA positive on admission, cultures contaminated. Bl Cx NGTD.  - s/p ceftriaxone (3/28- 3/31)  - on contact for esbl ecoli found in cat scratch wound in sept 2020

## 2021-04-02 NOTE — PROGRESS NOTE ADULT - ASSESSMENT
88F PMH HTN, prior SBO (2018), chronic back pain (2/2 prior compression L3 L4 fractures), cellulitis BIBEMS from home with chief complaint 1 hour of blurry vision/lightheadedness, vomiting which started on afternoon of admission found to have sepsis 2/2 UTI, and possible TIA found to have >70% L ICA stenosis and triple vessel coronary disease.

## 2021-04-02 NOTE — PROGRESS NOTE ADULT - SUBJECTIVE AND OBJECTIVE BOX
INTERVAL HPI/OVERNIGHT EVENTS: Cardiac cath performed reveals-: Left dominant, LM normal, pLCx 100% , p+mLAD 80% (severely calcified), mRCA 100% (small), EDP 4.      SUBJECTIVE: Examined at the bedside. States continues to have headaches, unchanged. Denies weakness or paresthesias of the upper and lower extremities. No acute complaints       aspirin  chewable 81 milliGRAM(s) Oral daily  clopidogrel Tablet 75 milliGRAM(s) Oral daily  lisinopril 10 milliGRAM(s) Oral daily      Vital Signs Last 24 Hrs  T(C): 36.4 (02 Apr 2021 06:13), Max: 37.1 (01 Apr 2021 21:55)  T(F): 97.6 (02 Apr 2021 06:13), Max: 98.7 (01 Apr 2021 21:55)  HR: 88 (02 Apr 2021 06:13) (85 - 90)  BP: 123/63 (02 Apr 2021 06:13) (123/63 - 145/70)  BP(mean): --  RR: 17 (02 Apr 2021 06:13) (16 - 17)  SpO2: 96% (02 Apr 2021 06:13) (95% - 97%)  I&O's Detail      Physical Exam  General: NAD, resting comfortably in bed  HEENT: neck soft and supple  C/V: NSR  Pulm: Nonlabored breathing, no respiratory distress  Abd: soft, non-tender, non-distended.  Extrem: WWP, no edema,  Neuro: A/O x 3, CNs II-XII grossly intact, no focal deficits, 5/5 strength upper and extremities  Pulses: 2+ radial pulses bilaterally    LABS:                        12.2   12.27 )-----------( 398      ( 02 Apr 2021 09:07 )             38.8     04-02    139  |  103  |  14  ----------------------------<  78  3.8   |  21<L>  |  0.86    Ca    8.4      02 Apr 2021 09:07  Phos  3.6     04-02  Mg     1.9     04-02    TPro  5.7<L>  /  Alb  2.8<L>  /  TBili  0.4  /  DBili  x   /  AST  15  /  ALT  8<L>  /  AlkPhos  54  04-02    PT/INR - ( 02 Apr 2021 09:07 )   PT: 12.4 sec;   INR: 1.04          PTT - ( 02 Apr 2021 09:07 )  PTT:29.3 sec      RADIOLOGY & ADDITIONAL STUDIES:

## 2021-04-02 NOTE — PROGRESS NOTE ADULT - ASSESSMENT
88 F w/ hx of HTN admitted to medical service after blurry vision and lightheadedness likely due to TIA vs severe stenosis of left internal carotid artery. Cardiology consulted for pre-operative assessment.    #Pre-Operative Assessment: Left CEA (likely Monday) for moderate carotid artery stenosis. ECG non-ischemic, no active chest pain. TTE w/ EF 55% +regional wall motion changes. Functional capacity limited; ambulates w/ walker. Pharmacological NM stress w/ small, mild reversible defect at basal inferior wall, which correlates w/ TTE. s/p [facility] 04/01, which revealed 3VCAD (100%  LCx, 80% prox and mid-LAD, 100% mRCA (small, non-dominant). Plan is to medically manage with ASA/Plavix/Statin/BetaBlocker as she is without active chest pain with a non-ischemic ECG.  #Stable Coronary Artery Disease: 3VCAD per cath (above)  - c/w ASA/Plavix/Statin  - would start Lopressor 12.5 BID post-operatively if hemodynamically appropriate   - Would plan for PCI or MIDCAB only if evidence of active ischemia post-operatively     She is considered Intermediate Risk for an Intermediate Risk surgery  No further cardiac testing necessary prior to surgery.   No cardiac contraindications for surgery.    Discussed w/ interventional cardiology and vascular surgery

## 2021-04-02 NOTE — PROGRESS NOTE ADULT - PROBLEM SELECTOR PLAN 1
Inferior wall motion abnormalities and abnormal nuclear stress per cardiology. Likely chronic and unclear if symptomatic at all, but needs to be addressed prior to planned CEA.  - completed 324mg ASA load and 600mg Plavix load 3/31, to continue per cards    [facility] Findings: Left dominant, LM normal, pLCx 100% , p+mLAD 80% (severely calcified), mRCA 100% (small), EDP 4. R Groin sheath manually pulled/compressed->2 hours bedrest (no AC used)

## 2021-04-02 NOTE — PROGRESS NOTE ADULT - PROBLEM SELECTOR PLAN 9
Per patient, she has not had GOC discussion with family or friends.  - Consider palliative care consult to discuss GOC GOC discussed on 4.2.2021.   Patient would like to be FULL CODE.   MOLST form signed, placed in paper chart

## 2021-04-02 NOTE — PROGRESS NOTE ADULT - PROBLEM SELECTOR PLAN 8
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  SBO resolved and gyn recommended outpt f/u.  - Patient does not remember if she followed up. Spoke with PCP who has no information on this and thinks unlikely she did.  - reach out again to office where she was supposed to have her follow-up appt  - goals of care discussion with patient  - consider f/u appt on discharge with GI or GYNE

## 2021-04-02 NOTE — PROGRESS NOTE ADULT - ASSESSMENT
88 y.o. F PMH HTN, prior SBO (2018), chronic back pain (2/2 prior compression L3 L4 fractures), cellulitis admitted for sepsis 2/2 UTI. Vascular consulted for L ICA stenosis > 70%, asymptomatic. Clinically stable pending cardiac evaluation for CEA. Cardiac cath reveals tripple vessel disease.    Cont Asa, Plavix, Statin  Recommend cardiology f/o concerning management of triple vessel disease  Transfer to Vascular surgery   OR planned for Monday  Planned discussed w/ attending and chief resident

## 2021-04-03 DIAGNOSIS — I65.22 OCCLUSION AND STENOSIS OF LEFT CAROTID ARTERY: ICD-10-CM

## 2021-04-03 DIAGNOSIS — I10 ESSENTIAL (PRIMARY) HYPERTENSION: ICD-10-CM

## 2021-04-03 DIAGNOSIS — I25.10 ATHEROSCLEROTIC HEART DISEASE OF NATIVE CORONARY ARTERY WITHOUT ANGINA PECTORIS: ICD-10-CM

## 2021-04-03 DIAGNOSIS — E44.0 MODERATE PROTEIN-CALORIE MALNUTRITION: ICD-10-CM

## 2021-04-03 DIAGNOSIS — R21 RASH AND OTHER NONSPECIFIC SKIN ERUPTION: ICD-10-CM

## 2021-04-03 DIAGNOSIS — N30.00 ACUTE CYSTITIS WITHOUT HEMATURIA: ICD-10-CM

## 2021-04-03 LAB
ANION GAP SERPL CALC-SCNC: 13 MMOL/L — SIGNIFICANT CHANGE UP (ref 5–17)
BUN SERPL-MCNC: 18 MG/DL — SIGNIFICANT CHANGE UP (ref 7–23)
CALCIUM SERPL-MCNC: 8.2 MG/DL — LOW (ref 8.4–10.5)
CHLORIDE SERPL-SCNC: 98 MMOL/L — SIGNIFICANT CHANGE UP (ref 96–108)
CO2 SERPL-SCNC: 25 MMOL/L — SIGNIFICANT CHANGE UP (ref 22–31)
CREAT SERPL-MCNC: 0.92 MG/DL — SIGNIFICANT CHANGE UP (ref 0.5–1.3)
CULTURE RESULTS: SIGNIFICANT CHANGE UP
CULTURE RESULTS: SIGNIFICANT CHANGE UP
GLUCOSE SERPL-MCNC: 106 MG/DL — HIGH (ref 70–99)
HCT VFR BLD CALC: 35.9 % — SIGNIFICANT CHANGE UP (ref 34.5–45)
HGB BLD-MCNC: 11.5 G/DL — SIGNIFICANT CHANGE UP (ref 11.5–15.5)
MAGNESIUM SERPL-MCNC: 1.9 MG/DL — SIGNIFICANT CHANGE UP (ref 1.6–2.6)
MCHC RBC-ENTMCNC: 32 GM/DL — SIGNIFICANT CHANGE UP (ref 32–36)
MCHC RBC-ENTMCNC: 32 PG — SIGNIFICANT CHANGE UP (ref 27–34)
MCV RBC AUTO: 100 FL — SIGNIFICANT CHANGE UP (ref 80–100)
NRBC # BLD: 0 /100 WBCS — SIGNIFICANT CHANGE UP (ref 0–0)
PHOSPHATE SERPL-MCNC: 2.5 MG/DL — SIGNIFICANT CHANGE UP (ref 2.5–4.5)
PLATELET # BLD AUTO: 416 K/UL — HIGH (ref 150–400)
POTASSIUM SERPL-MCNC: 3.9 MMOL/L — SIGNIFICANT CHANGE UP (ref 3.5–5.3)
POTASSIUM SERPL-SCNC: 3.9 MMOL/L — SIGNIFICANT CHANGE UP (ref 3.5–5.3)
RBC # BLD: 3.59 M/UL — LOW (ref 3.8–5.2)
RBC # FLD: 11.9 % — SIGNIFICANT CHANGE UP (ref 10.3–14.5)
SODIUM SERPL-SCNC: 136 MMOL/L — SIGNIFICANT CHANGE UP (ref 135–145)
SPECIMEN SOURCE: SIGNIFICANT CHANGE UP
SPECIMEN SOURCE: SIGNIFICANT CHANGE UP
WBC # BLD: 11.45 K/UL — HIGH (ref 3.8–10.5)
WBC # FLD AUTO: 11.45 K/UL — HIGH (ref 3.8–10.5)

## 2021-04-03 PROCEDURE — 99233 SBSQ HOSP IP/OBS HIGH 50: CPT

## 2021-04-03 RX ORDER — POTASSIUM PHOSPHATE, MONOBASIC POTASSIUM PHOSPHATE, DIBASIC 236; 224 MG/ML; MG/ML
15 INJECTION, SOLUTION INTRAVENOUS ONCE
Refills: 0 | Status: COMPLETED | OUTPATIENT
Start: 2021-04-03 | End: 2021-04-03

## 2021-04-03 RX ORDER — HEPARIN SODIUM 5000 [USP'U]/ML
5000 INJECTION INTRAVENOUS; SUBCUTANEOUS EVERY 12 HOURS
Refills: 0 | Status: DISCONTINUED | OUTPATIENT
Start: 2021-04-03 | End: 2021-04-04

## 2021-04-03 RX ORDER — MAGNESIUM SULFATE 500 MG/ML
1 VIAL (ML) INJECTION ONCE
Refills: 0 | Status: COMPLETED | OUTPATIENT
Start: 2021-04-03 | End: 2021-04-03

## 2021-04-03 RX ADMIN — Medication 1 TABLET(S): at 12:17

## 2021-04-03 RX ADMIN — Medication 81 MILLIGRAM(S): at 12:17

## 2021-04-03 RX ADMIN — Medication 100 GRAM(S): at 09:49

## 2021-04-03 RX ADMIN — LISINOPRIL 10 MILLIGRAM(S): 2.5 TABLET ORAL at 06:00

## 2021-04-03 RX ADMIN — HEPARIN SODIUM 5000 UNIT(S): 5000 INJECTION INTRAVENOUS; SUBCUTANEOUS at 18:25

## 2021-04-03 RX ADMIN — ATORVASTATIN CALCIUM 80 MILLIGRAM(S): 80 TABLET, FILM COATED ORAL at 22:11

## 2021-04-03 RX ADMIN — CLOPIDOGREL BISULFATE 75 MILLIGRAM(S): 75 TABLET, FILM COATED ORAL at 12:17

## 2021-04-03 RX ADMIN — POTASSIUM PHOSPHATE, MONOBASIC POTASSIUM PHOSPHATE, DIBASIC 62.5 MILLIMOLE(S): 236; 224 INJECTION, SOLUTION INTRAVENOUS at 11:18

## 2021-04-03 NOTE — PROGRESS NOTE ADULT - SUBJECTIVE AND OBJECTIVE BOX
O/N: PIETRO, VSS duplex done no pseudo                          88 y.o. F PMH HTN, prior SBO (2018), chronic back pain (2/2 prior compression L3 L4 fractures), cellulitis admitted for sepsis 2/2 UTI. Vascular consulted for L ICA stenosis > 70%, asymptomatic. Clinically stable pending cardiac evaluation for CEA. Cardiac cath reveals tripple vessel disease.    Cont Asa, Plavix, Statin  Recommend cardiology f/uconcerning management of triple vessel disease  OR planned for Monday O/N: PIETRO, VSS duplex done no pseudoaneurysm of R groin     SUBJECTIVE: Patient seen and examined bedside by vascular team this AM. Reports that she is feeling well. Denies any further symptoms of vision changes, speech slurring, arm or hand weakness. Denies any lower extremity groin pain, states that R groin pain has been getting better.     aspirin  chewable 81 milliGRAM(s) Oral daily  clopidogrel Tablet 75 milliGRAM(s) Oral daily  heparin   Injectable 5000 Unit(s) SubCutaneous every 12 hours  lisinopril 10 milliGRAM(s) Oral daily      Vital Signs Last 24 Hrs  T(C): 36.6 (03 Apr 2021 06:22), Max: 37.1 (02 Apr 2021 22:15)  T(F): 97.9 (03 Apr 2021 06:22), Max: 98.8 (02 Apr 2021 22:15)  HR: 85 (03 Apr 2021 05:57) (79 - 97)  BP: 145/65 (03 Apr 2021 05:57) (126/82 - 163/72)  BP(mean): --  RR: 16 (03 Apr 2021 05:57) (16 - 18)  SpO2: 94% (03 Apr 2021 05:57) (94% - 98%)  I&O's Detail    02 Apr 2021 07:01  -  03 Apr 2021 07:00  --------------------------------------------------------  IN:    Oral Fluid: 180 mL  Total IN: 180 mL    OUT:    Voided (mL): 50 mL  Total OUT: 50 mL    Total NET: 130 mL          Physical Exam:  General: No acute distress, resting comfortably in bed  C/V: normal sinus rhythm  Pulm: Nonlabored breathing, no respiratory distress  Abd: soft, non-tender, non-distended.   Extrem: warm and well perfused, no edema. R groin site without any hematoma; area is soft, nontender and without any swelling present.     LABS:                        11.5   11.45 )-----------( 416      ( 03 Apr 2021 06:19 )             35.9     04-03    136  |  98  |  18  ----------------------------<  106<H>  3.9   |  25  |  0.92    Ca    8.2<L>      03 Apr 2021 06:19  Phos  2.5     04-03  Mg     1.9     04-03    TPro  5.7<L>  /  Alb  2.8<L>  /  TBili  0.4  /  DBili  x   /  AST  15  /  ALT  8<L>  /  AlkPhos  54  04-02    PT/INR - ( 02 Apr 2021 09:07 )   PT: 12.4 sec;   INR: 1.04          PTT - ( 02 Apr 2021 09:07 )  PTT:29.3 sec      Assessment  88 y.o. F PMH HTN, prior SBO (2018), chronic back pain (2/2 prior compression L3 L4 fractures), cellulitis admitted for sepsis 2/2 UTI. Vascular consulted for L ICA stenosis > 70%, asymptomatic. Cardiac cath demonstrating 3 vessel disease with postop duplex demonstrating no pseudoaneurysm.Now clinically stable pending CEA this Monday.    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 did not have TIA this admission; more likely ocular migraine  -Planned for L CEA for OVI on Monday 4/5 with Dr. 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

## 2021-04-03 NOTE — PROGRESS NOTE ADULT - SUBJECTIVE AND OBJECTIVE BOX
Patient is a 88y old  Female who presents with a chief complaint of uti (03 Apr 2021 06:34)      INTERVAL HPI/OVERNIGHT EVENTS:    Pt. seen and examined earlier today  Pt. feels well; says the rash on her back is resolving; not pruritic  Pt. denies HA, vision changes, lightheadedness, paresthesia     Review of Systems: 12 point review of systems otherwise negative    MEDICATIONS  (STANDING):  aspirin  chewable 81 milliGRAM(s) Oral daily  atorvastatin 80 milliGRAM(s) Oral at bedtime  calcium carbonate 1250 mG  + Vitamin D (OsCal 500 + D) 1 Tablet(s) Oral daily  clopidogrel Tablet 75 milliGRAM(s) Oral daily  heparin   Injectable 5000 Unit(s) SubCutaneous every 12 hours  lisinopril 10 milliGRAM(s) Oral daily    MEDICATIONS  (PRN):  acetaminophen   Tablet .. 650 milliGRAM(s) Oral every 6 hours PRN Temp greater or equal to 38C (100.4F), Mild Pain (1 - 3), Moderate Pain (4 - 6)      Allergies    No Known Allergies    Intolerances          Vital Signs Last 24 Hrs  T(C): 36.4 (03 Apr 2021 12:49), Max: 37.1 (02 Apr 2021 22:15)  T(F): 97.6 (03 Apr 2021 12:49), Max: 98.8 (02 Apr 2021 22:15)  HR: 80 (03 Apr 2021 14:59) (80 - 97)  BP: 119/58 (03 Apr 2021 14:59) (115/58 - 163/72)  BP(mean): --  RR: 18 (03 Apr 2021 14:59) (16 - 18)  SpO2: 97% (03 Apr 2021 14:59) (94% - 98%)  CAPILLARY BLOOD GLUCOSE          04-02 @ 07:01  -  04-03 @ 07:00  --------------------------------------------------------  IN: 180 mL / OUT: 50 mL / NET: 130 mL    04-03 @ 07:01  -  04-03 @ 17:26  --------------------------------------------------------  IN: 530 mL / OUT: 0 mL / NET: 530 mL        Physical Exam:  (earlier today)  Daily     Daily   General:  well-appearing in NAD  HEENT:  MMM  CV:  RRR, no JVD  Lungs:  CTA B/L  Abdomen:  soft NT ND  Skin:  faint, confluent macular rash on back  Neuro:  AAOx3    LABS:                        11.5   11.45 )-----------( 416      ( 03 Apr 2021 06:19 )             35.9     04-03    136  |  98  |  18  ----------------------------<  106<H>  3.9   |  25  |  0.92    Ca    8.2<L>      03 Apr 2021 06:19  Phos  2.5     04-03  Mg     1.9     04-03    TPro  5.7<L>  /  Alb  2.8<L>  /  TBili  0.4  /  DBili  x   /  AST  15  /  ALT  8<L>  /  AlkPhos  54  04-02    PT/INR - ( 02 Apr 2021 09:07 )   PT: 12.4 sec;   INR: 1.04          PTT - ( 02 Apr 2021 09:07 )  PTT:29.3 sec

## 2021-04-04 ENCOUNTER — TRANSCRIPTION ENCOUNTER (OUTPATIENT)
Age: 86
End: 2021-04-04

## 2021-04-04 LAB
ANION GAP SERPL CALC-SCNC: 6 MMOL/L — SIGNIFICANT CHANGE UP (ref 5–17)
BLD GP AB SCN SERPL QL: NEGATIVE — SIGNIFICANT CHANGE UP
BUN SERPL-MCNC: 13 MG/DL — SIGNIFICANT CHANGE UP (ref 7–23)
CALCIUM SERPL-MCNC: 7.9 MG/DL — LOW (ref 8.4–10.5)
CHLORIDE SERPL-SCNC: 106 MMOL/L — SIGNIFICANT CHANGE UP (ref 96–108)
CO2 SERPL-SCNC: 26 MMOL/L — SIGNIFICANT CHANGE UP (ref 22–31)
CREAT SERPL-MCNC: 0.83 MG/DL — SIGNIFICANT CHANGE UP (ref 0.5–1.3)
GLUCOSE SERPL-MCNC: 101 MG/DL — HIGH (ref 70–99)
HCT VFR BLD CALC: 36.3 % — SIGNIFICANT CHANGE UP (ref 34.5–45)
HGB BLD-MCNC: 11.5 G/DL — SIGNIFICANT CHANGE UP (ref 11.5–15.5)
MAGNESIUM SERPL-MCNC: 2 MG/DL — SIGNIFICANT CHANGE UP (ref 1.6–2.6)
MCHC RBC-ENTMCNC: 31.7 GM/DL — LOW (ref 32–36)
MCHC RBC-ENTMCNC: 32 PG — SIGNIFICANT CHANGE UP (ref 27–34)
MCV RBC AUTO: 101.1 FL — HIGH (ref 80–100)
NRBC # BLD: 0 /100 WBCS — SIGNIFICANT CHANGE UP (ref 0–0)
PHOSPHATE SERPL-MCNC: 2.7 MG/DL — SIGNIFICANT CHANGE UP (ref 2.5–4.5)
PLATELET # BLD AUTO: 437 K/UL — HIGH (ref 150–400)
POTASSIUM SERPL-MCNC: 3.8 MMOL/L — SIGNIFICANT CHANGE UP (ref 3.5–5.3)
POTASSIUM SERPL-SCNC: 3.8 MMOL/L — SIGNIFICANT CHANGE UP (ref 3.5–5.3)
RBC # BLD: 3.59 M/UL — LOW (ref 3.8–5.2)
RBC # FLD: 11.8 % — SIGNIFICANT CHANGE UP (ref 10.3–14.5)
RH IG SCN BLD-IMP: POSITIVE — SIGNIFICANT CHANGE UP
SARS-COV-2 RNA SPEC QL NAA+PROBE: SIGNIFICANT CHANGE UP
SODIUM SERPL-SCNC: 138 MMOL/L — SIGNIFICANT CHANGE UP (ref 135–145)
WBC # BLD: 10.32 K/UL — SIGNIFICANT CHANGE UP (ref 3.8–10.5)
WBC # FLD AUTO: 10.32 K/UL — SIGNIFICANT CHANGE UP (ref 3.8–10.5)

## 2021-04-04 PROCEDURE — 99233 SBSQ HOSP IP/OBS HIGH 50: CPT | Mod: GC

## 2021-04-04 RX ORDER — POTASSIUM CHLORIDE 20 MEQ
20 PACKET (EA) ORAL ONCE
Refills: 0 | Status: COMPLETED | OUTPATIENT
Start: 2021-04-04 | End: 2021-04-04

## 2021-04-04 RX ORDER — HEPARIN SODIUM 5000 [USP'U]/ML
5000 INJECTION INTRAVENOUS; SUBCUTANEOUS ONCE
Refills: 0 | Status: COMPLETED | OUTPATIENT
Start: 2021-04-04 | End: 2021-04-04

## 2021-04-04 RX ORDER — SODIUM CHLORIDE 9 MG/ML
1000 INJECTION, SOLUTION INTRAVENOUS
Refills: 0 | Status: DISCONTINUED | OUTPATIENT
Start: 2021-04-05 | End: 2021-04-05

## 2021-04-04 RX ADMIN — HEPARIN SODIUM 5000 UNIT(S): 5000 INJECTION INTRAVENOUS; SUBCUTANEOUS at 06:14

## 2021-04-04 RX ADMIN — HEPARIN SODIUM 5000 UNIT(S): 5000 INJECTION INTRAVENOUS; SUBCUTANEOUS at 17:19

## 2021-04-04 RX ADMIN — ATORVASTATIN CALCIUM 80 MILLIGRAM(S): 80 TABLET, FILM COATED ORAL at 22:09

## 2021-04-04 RX ADMIN — Medication 20 MILLIEQUIVALENT(S): at 09:27

## 2021-04-04 RX ADMIN — CLOPIDOGREL BISULFATE 75 MILLIGRAM(S): 75 TABLET, FILM COATED ORAL at 11:44

## 2021-04-04 RX ADMIN — Medication 81 MILLIGRAM(S): at 11:45

## 2021-04-04 RX ADMIN — LISINOPRIL 10 MILLIGRAM(S): 2.5 TABLET ORAL at 06:15

## 2021-04-04 RX ADMIN — Medication 1 TABLET(S): at 11:45

## 2021-04-04 NOTE — PROGRESS NOTE ADULT - SUBJECTIVE AND OBJECTIVE BOX
Patient is a 88y old  Female who presents with a chief complaint of uti (04 Apr 2021 06:53)      INTERVAL HPI/OVERNIGHT EVENTS:    Pt. seen and examined this morning  Pt. eating breakfast; felt well  Pt. denies F/C, CP, SOB, HA, vision changes    Review of Systems: 12 point review of systems otherwise negative    MEDICATIONS  (STANDING):  aspirin  chewable 81 milliGRAM(s) Oral daily  atorvastatin 80 milliGRAM(s) Oral at bedtime  calcium carbonate 1250 mG  + Vitamin D (OsCal 500 + D) 1 Tablet(s) Oral daily  clopidogrel Tablet 75 milliGRAM(s) Oral daily  heparin   Injectable 5000 Unit(s) SubCutaneous every 12 hours  lisinopril 10 milliGRAM(s) Oral daily    MEDICATIONS  (PRN):  acetaminophen   Tablet .. 650 milliGRAM(s) Oral every 6 hours PRN Temp greater or equal to 38C (100.4F), Mild Pain (1 - 3), Moderate Pain (4 - 6)      Allergies    No Known Allergies    Intolerances          Vital Signs Last 24 Hrs  T(C): 37.3 (04 Apr 2021 13:01), Max: 37.3 (04 Apr 2021 13:01)  T(F): 99.1 (04 Apr 2021 13:01), Max: 99.1 (04 Apr 2021 13:01)  HR: 80 (04 Apr 2021 14:08) (80 - 88)  BP: 119/59 (04 Apr 2021 14:08) (119/59 - 164/80)  BP(mean): --  RR: 18 (04 Apr 2021 14:08) (17 - 18)  SpO2: 96% (04 Apr 2021 14:08) (95% - 96%)  CAPILLARY BLOOD GLUCOSE          04-03 @ 07:01  -  04-04 @ 07:00  --------------------------------------------------------  IN: 790 mL / OUT: 400 mL / NET: 390 mL    04-04 @ 07:01  -  04-04 @ 15:30  --------------------------------------------------------  IN: 120 mL / OUT: 300 mL / NET: -180 mL        Physical Exam:  (earlier today)  Daily     Daily   General:  well-appearing in NAD  HEENT:  MMM  CV:  RRR, no JVD  Lungs:  CTA B/L  Abdomen:  soft NT ND  Skin:  resolving confluent macular rash on back  Neuro:  AAOx3    LABS:                        11.5   10.32 )-----------( 437      ( 04 Apr 2021 06:49 )             36.3     04-04    138  |  106  |  13  ----------------------------<  101<H>  3.8   |  26  |  0.83    Ca    7.9<L>      04 Apr 2021 06:49  Phos  2.7     04-04  Mg     2.0     04-04

## 2021-04-04 NOTE — PROGRESS NOTE ADULT - SUBJECTIVE AND OBJECTIVE BOX
O/N: PIETRO, VSS.    Assessment  88 y.o. F PMH HTN, prior SBO (2018), chronic back pain (2/2 prior compression L3 L4 fractures), cellulitis admitted for sepsis 2/2 UTI. Vascular consulted for L ICA stenosis > 70%, asymptomatic. Cardiac cath demonstrating 3 vessel disease with postop duplex demonstrating no pseudoaneurysm. Now clinically stable pending CEA this Monday.    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 did not have TIA this admission; more likely ocular migraine  -Planned for L CEA for OVI on Monday 4/5 with Dr. 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    Subjective: Seen and evaluated on regional floor. Resting comfortably in bed. Tolerating breakfast without nausea or vomiting. Denies any right groin pain. Questions answered regarding procedure planned for 4/5.     aspirin  chewable 81  clopidogrel Tablet 75  heparin   Injectable 5000  lisinopril 10    Allergies    No Known Allergies    Intolerances    Vital Signs Last 24 Hrs  T(C): 36.3 (04 Apr 2021 10:01), Max: 37.2 (04 Apr 2021 05:13)  T(F): 97.4 (04 Apr 2021 10:01), Max: 99 (04 Apr 2021 05:13)  HR: 84 (04 Apr 2021 06:09) (80 - 88)  BP: 131/59 (04 Apr 2021 06:09) (119/58 - 164/80)  BP(mean): --  RR: 17 (04 Apr 2021 06:09) (17 - 18)  SpO2: 96% (04 Apr 2021 06:09) (95% - 97%)  I&O's Summary    03 Apr 2021 07:01  -  04 Apr 2021 07:00  --------------------------------------------------------  IN: 790 mL / OUT: 400 mL / NET: 390 mL    04 Apr 2021 07:01  -  04 Apr 2021 10:11  --------------------------------------------------------  IN: 120 mL / OUT: 300 mL / NET: -180 mL      Physical Exam:  General: No acute distress, resting comfortably in bed  C/V: normal sinus rhythm  Pulm: Nonlabored breathing, no respiratory distress  Abd: soft, non-tender, non-distended.   Extrem: warm and well perfused, no edema. R groin site without any hematoma; area is soft, nontender and without any swelling present.       LABS:                        11.5   10.32 )-----------( 437      ( 04 Apr 2021 06:49 )             36.3     04-04    138  |  106  |  13  ----------------------------<  101<H>  3.8   |  26  |  0.83    Ca    7.9<L>      04 Apr 2021 06:49  Phos  2.7     04-04  Mg     2.0     04-04          Radiology and Additional Studies:    Assessment  88 y.o. F PMH HTN, prior SBO (2018), chronic back pain (2/2 prior compression L3 L4 fractures), cellulitis admitted for sepsis 2/2 UTI. Vascular consulted for L ICA stenosis > 70%, asymptomatic. Cardiac cath demonstrating 3 vessel disease with postop duplex demonstrating no pseudoaneurysm. Now clinically stable pending CEA this Monday 4/5.    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 did not have TIA this admission; more likely ocular migraine  -Planned for L CEA for OVI on Monday 4/5 with Dr. 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

## 2021-04-04 NOTE — PROGRESS NOTE ADULT - NUTRITIONAL ASSESSMENT
This patient has been assessed with a concern for Malnutrition and has been determined to have a diagnosis/diagnoses of Moderate protein-calorie malnutrition and Underweight (BMI < 19).    This patient is being managed with:   Diet NPO after Midnight-     NPO Start Date: 04-Apr-2021   NPO Start Time: 23:59  Entered: Apr 4 2021  8:00AM    Diet DASH/TLC-  Sodium & Cholesterol Restricted  Entered: Mar 29 2021  5:55AM

## 2021-04-04 NOTE — PROGRESS NOTE ADULT - SUBJECTIVE AND OBJECTIVE BOX
Preop Dx: Left ICA stenosis  Surgeon: Dr. Weir  Procedure: Left cardio endarterectomy     Vital Signs Last 24 Hrs  T(C): 37.3 (04 Apr 2021 13:01), Max: 37.3 (04 Apr 2021 13:01)  T(F): 99.1 (04 Apr 2021 13:01), Max: 99.1 (04 Apr 2021 13:01)  HR: 80 (04 Apr 2021 14:08) (80 - 88)  BP: 119/59 (04 Apr 2021 14:08) (119/59 - 164/80)  BP(mean): --  RR: 18 (04 Apr 2021 14:08) (17 - 18)  SpO2: 96% (04 Apr 2021 14:08) (95% - 96%)                        11.5   10.32 )-----------( 437      ( 04 Apr 2021 06:49 )             36.3     04-04    138  |  106  |  13  ----------------------------<  101<H>  3.8   |  26  |  0.83    Ca    7.9<L>      04 Apr 2021 06:49  Phos  2.7     04-04  Mg     2.0     04-04      EKG: Normal sinus with ST segment changes  CXR:  4/1/21 Vascular calcification thoracic aorta and vascular calcification bilateral carotid arteries. Lungs clear with no infiltrate pleural effusion or pneumothorax. No acute bone abnormality.  Type and Screen: 4/3/21    Plan:  - OR 04/05/21 with Dr. Weir for left carotid endarterectomy   - NPO after midnight except meds  - IVF while NPO  - Consent done and in chart  - Medically cleared for OR Preop Dx: Left ICA stenosis  Surgeon: Dr. Weir  Procedure: Left carotid endarterectomy     Vital Signs Last 24 Hrs  T(C): 37.3 (04 Apr 2021 13:01), Max: 37.3 (04 Apr 2021 13:01)  T(F): 99.1 (04 Apr 2021 13:01), Max: 99.1 (04 Apr 2021 13:01)  HR: 80 (04 Apr 2021 14:08) (80 - 88)  BP: 119/59 (04 Apr 2021 14:08) (119/59 - 164/80)  BP(mean): --  RR: 18 (04 Apr 2021 14:08) (17 - 18)  SpO2: 96% (04 Apr 2021 14:08) (95% - 96%)                        11.5   10.32 )-----------( 437      ( 04 Apr 2021 06:49 )             36.3     04-04    138  |  106  |  13  ----------------------------<  101<H>  3.8   |  26  |  0.83    Ca    7.9<L>      04 Apr 2021 06:49  Phos  2.7     04-04  Mg     2.0     04-04      EKG: Normal sinus with ST segment changes  CXR:  4/1/21 Vascular calcification thoracic aorta and vascular calcification bilateral carotid arteries. Lungs clear with no infiltrate pleural effusion or pneumothorax. No acute bone abnormality.  Type and Screen: 4/3/21    Plan:  - OR 04/05/21 with Dr. Weir for left carotid endarterectomy   - NPO after midnight except meds  - IVF while NPO  - Consent done and in chart  - Medically cleared for OR

## 2021-04-05 ENCOUNTER — RESULT REVIEW (OUTPATIENT)
Age: 86
End: 2021-04-05

## 2021-04-05 LAB
ANION GAP SERPL CALC-SCNC: 8 MMOL/L — SIGNIFICANT CHANGE UP (ref 5–17)
ANION GAP SERPL CALC-SCNC: 8 MMOL/L — SIGNIFICANT CHANGE UP (ref 5–17)
APTT BLD: 28.1 SEC — SIGNIFICANT CHANGE UP (ref 27.5–35.5)
APTT BLD: 28.8 SEC — SIGNIFICANT CHANGE UP (ref 27.5–35.5)
BUN SERPL-MCNC: 10 MG/DL — SIGNIFICANT CHANGE UP (ref 7–23)
BUN SERPL-MCNC: 11 MG/DL — SIGNIFICANT CHANGE UP (ref 7–23)
CALCIUM SERPL-MCNC: 7.8 MG/DL — LOW (ref 8.4–10.5)
CALCIUM SERPL-MCNC: 8.1 MG/DL — LOW (ref 8.4–10.5)
CHLORIDE SERPL-SCNC: 106 MMOL/L — SIGNIFICANT CHANGE UP (ref 96–108)
CHLORIDE SERPL-SCNC: 107 MMOL/L — SIGNIFICANT CHANGE UP (ref 96–108)
CO2 SERPL-SCNC: 24 MMOL/L — SIGNIFICANT CHANGE UP (ref 22–31)
CO2 SERPL-SCNC: 26 MMOL/L — SIGNIFICANT CHANGE UP (ref 22–31)
CREAT SERPL-MCNC: 0.7 MG/DL — SIGNIFICANT CHANGE UP (ref 0.5–1.3)
CREAT SERPL-MCNC: 0.74 MG/DL — SIGNIFICANT CHANGE UP (ref 0.5–1.3)
GLUCOSE SERPL-MCNC: 101 MG/DL — HIGH (ref 70–99)
GLUCOSE SERPL-MCNC: 93 MG/DL — SIGNIFICANT CHANGE UP (ref 70–99)
HCT VFR BLD CALC: 32.5 % — LOW (ref 34.5–45)
HCT VFR BLD CALC: 34.7 % — SIGNIFICANT CHANGE UP (ref 34.5–45)
HGB BLD-MCNC: 10.2 G/DL — LOW (ref 11.5–15.5)
HGB BLD-MCNC: 10.9 G/DL — LOW (ref 11.5–15.5)
INR BLD: 1.02 — SIGNIFICANT CHANGE UP (ref 0.88–1.16)
INR BLD: 1.11 — SIGNIFICANT CHANGE UP (ref 0.88–1.16)
MAGNESIUM SERPL-MCNC: 1.9 MG/DL — SIGNIFICANT CHANGE UP (ref 1.6–2.6)
MAGNESIUM SERPL-MCNC: 2 MG/DL — SIGNIFICANT CHANGE UP (ref 1.6–2.6)
MCHC RBC-ENTMCNC: 31.4 GM/DL — LOW (ref 32–36)
MCHC RBC-ENTMCNC: 31.4 GM/DL — LOW (ref 32–36)
MCHC RBC-ENTMCNC: 31.8 PG — SIGNIFICANT CHANGE UP (ref 27–34)
MCHC RBC-ENTMCNC: 32.2 PG — SIGNIFICANT CHANGE UP (ref 27–34)
MCV RBC AUTO: 101.2 FL — HIGH (ref 80–100)
MCV RBC AUTO: 102.4 FL — HIGH (ref 80–100)
NRBC # BLD: 0 /100 WBCS — SIGNIFICANT CHANGE UP (ref 0–0)
NRBC # BLD: 0 /100 WBCS — SIGNIFICANT CHANGE UP (ref 0–0)
PHOSPHATE SERPL-MCNC: 2.6 MG/DL — SIGNIFICANT CHANGE UP (ref 2.5–4.5)
PHOSPHATE SERPL-MCNC: 3.1 MG/DL — SIGNIFICANT CHANGE UP (ref 2.5–4.5)
PLATELET # BLD AUTO: 411 K/UL — HIGH (ref 150–400)
PLATELET # BLD AUTO: 439 K/UL — HIGH (ref 150–400)
POTASSIUM SERPL-MCNC: 3.6 MMOL/L — SIGNIFICANT CHANGE UP (ref 3.5–5.3)
POTASSIUM SERPL-MCNC: 3.8 MMOL/L — SIGNIFICANT CHANGE UP (ref 3.5–5.3)
POTASSIUM SERPL-SCNC: 3.6 MMOL/L — SIGNIFICANT CHANGE UP (ref 3.5–5.3)
POTASSIUM SERPL-SCNC: 3.8 MMOL/L — SIGNIFICANT CHANGE UP (ref 3.5–5.3)
PROTHROM AB SERPL-ACNC: 12.2 SEC — SIGNIFICANT CHANGE UP (ref 10.6–13.6)
PROTHROM AB SERPL-ACNC: 13.3 SEC — SIGNIFICANT CHANGE UP (ref 10.6–13.6)
RBC # BLD: 3.21 M/UL — LOW (ref 3.8–5.2)
RBC # BLD: 3.39 M/UL — LOW (ref 3.8–5.2)
RBC # FLD: 11.6 % — SIGNIFICANT CHANGE UP (ref 10.3–14.5)
RBC # FLD: 11.8 % — SIGNIFICANT CHANGE UP (ref 10.3–14.5)
SODIUM SERPL-SCNC: 139 MMOL/L — SIGNIFICANT CHANGE UP (ref 135–145)
SODIUM SERPL-SCNC: 140 MMOL/L — SIGNIFICANT CHANGE UP (ref 135–145)
WBC # BLD: 10.81 K/UL — HIGH (ref 3.8–10.5)
WBC # BLD: 7.45 K/UL — SIGNIFICANT CHANGE UP (ref 3.8–10.5)
WBC # FLD AUTO: 10.81 K/UL — HIGH (ref 3.8–10.5)
WBC # FLD AUTO: 7.45 K/UL — SIGNIFICANT CHANGE UP (ref 3.8–10.5)

## 2021-04-05 PROCEDURE — 35301 RECHANNELING OF ARTERY: CPT | Mod: 82,GC

## 2021-04-05 PROCEDURE — 88304 TISSUE EXAM BY PATHOLOGIST: CPT | Mod: 26

## 2021-04-05 PROCEDURE — 99233 SBSQ HOSP IP/OBS HIGH 50: CPT | Mod: GC

## 2021-04-05 PROCEDURE — 99232 SBSQ HOSP IP/OBS MODERATE 35: CPT

## 2021-04-05 PROCEDURE — 35301 RECHANNELING OF ARTERY: CPT | Mod: GC

## 2021-04-05 PROCEDURE — 88311 DECALCIFY TISSUE: CPT | Mod: 26

## 2021-04-05 PROCEDURE — 99233 SBSQ HOSP IP/OBS HIGH 50: CPT

## 2021-04-05 RX ORDER — ASPIRIN/CALCIUM CARB/MAGNESIUM 324 MG
81 TABLET ORAL EVERY 24 HOURS
Refills: 0 | Status: DISCONTINUED | OUTPATIENT
Start: 2021-04-06 | End: 2021-04-07

## 2021-04-05 RX ORDER — CEFAZOLIN SODIUM 1 G
2000 VIAL (EA) INJECTION EVERY 8 HOURS
Refills: 0 | Status: COMPLETED | OUTPATIENT
Start: 2021-04-05 | End: 2021-04-06

## 2021-04-05 RX ORDER — CLOPIDOGREL BISULFATE 75 MG/1
75 TABLET, FILM COATED ORAL DAILY
Refills: 0 | Status: DISCONTINUED | OUTPATIENT
Start: 2021-04-05 | End: 2021-04-05

## 2021-04-05 RX ORDER — CEFAZOLIN SODIUM 1 G
2000 VIAL (EA) INJECTION EVERY 8 HOURS
Refills: 0 | Status: DISCONTINUED | OUTPATIENT
Start: 2021-04-05 | End: 2021-04-05

## 2021-04-05 RX ORDER — POTASSIUM PHOSPHATE, MONOBASIC POTASSIUM PHOSPHATE, DIBASIC 236; 224 MG/ML; MG/ML
15 INJECTION, SOLUTION INTRAVENOUS ONCE
Refills: 0 | Status: DISCONTINUED | OUTPATIENT
Start: 2021-04-05 | End: 2021-04-05

## 2021-04-05 RX ORDER — ASPIRIN/CALCIUM CARB/MAGNESIUM 324 MG
81 TABLET ORAL DAILY
Refills: 0 | Status: DISCONTINUED | OUTPATIENT
Start: 2021-04-05 | End: 2021-04-05

## 2021-04-05 RX ORDER — CLOPIDOGREL BISULFATE 75 MG/1
75 TABLET, FILM COATED ORAL EVERY 24 HOURS
Refills: 0 | Status: DISCONTINUED | OUTPATIENT
Start: 2021-04-06 | End: 2021-04-07

## 2021-04-05 RX ORDER — SODIUM CHLORIDE 9 MG/ML
500 INJECTION, SOLUTION INTRAVENOUS ONCE
Refills: 0 | Status: COMPLETED | OUTPATIENT
Start: 2021-04-05 | End: 2021-04-05

## 2021-04-05 RX ORDER — ATORVASTATIN CALCIUM 80 MG/1
80 TABLET, FILM COATED ORAL AT BEDTIME
Refills: 0 | Status: DISCONTINUED | OUTPATIENT
Start: 2021-04-05 | End: 2021-04-07

## 2021-04-05 RX ORDER — CHLORHEXIDINE GLUCONATE 213 G/1000ML
1 SOLUTION TOPICAL
Refills: 0 | Status: DISCONTINUED | OUTPATIENT
Start: 2021-04-05 | End: 2021-04-07

## 2021-04-05 RX ORDER — ACETAMINOPHEN 500 MG
650 TABLET ORAL EVERY 6 HOURS
Refills: 0 | Status: DISCONTINUED | OUTPATIENT
Start: 2021-04-05 | End: 2021-04-07

## 2021-04-05 RX ORDER — ONDANSETRON 8 MG/1
4 TABLET, FILM COATED ORAL EVERY 6 HOURS
Refills: 0 | Status: DISCONTINUED | OUTPATIENT
Start: 2021-04-05 | End: 2021-04-05

## 2021-04-05 RX ORDER — SODIUM CHLORIDE 9 MG/ML
1000 INJECTION, SOLUTION INTRAVENOUS
Refills: 0 | Status: DISCONTINUED | OUTPATIENT
Start: 2021-04-05 | End: 2021-04-05

## 2021-04-05 RX ADMIN — SODIUM CHLORIDE 40 MILLILITER(S): 9 INJECTION, SOLUTION INTRAVENOUS at 00:21

## 2021-04-05 RX ADMIN — Medication 650 MILLIGRAM(S): at 17:15

## 2021-04-05 RX ADMIN — Medication 81 MILLIGRAM(S): at 08:45

## 2021-04-05 RX ADMIN — Medication 100 MILLIGRAM(S): at 16:45

## 2021-04-05 RX ADMIN — Medication 650 MILLIGRAM(S): at 16:45

## 2021-04-05 RX ADMIN — SODIUM CHLORIDE 2000 MILLILITER(S): 9 INJECTION, SOLUTION INTRAVENOUS at 17:47

## 2021-04-05 RX ADMIN — SODIUM CHLORIDE 50 MILLILITER(S): 9 INJECTION, SOLUTION INTRAVENOUS at 16:46

## 2021-04-05 RX ADMIN — CLOPIDOGREL BISULFATE 75 MILLIGRAM(S): 75 TABLET, FILM COATED ORAL at 08:45

## 2021-04-05 RX ADMIN — SODIUM CHLORIDE 1500 MILLILITER(S): 9 INJECTION, SOLUTION INTRAVENOUS at 13:30

## 2021-04-05 NOTE — PROGRESS NOTE ADULT - ASSESSMENT
88 F with HTN admitted with dizziness/bilateral blurry vision found to have left vertebral stenosis v4 segment and LICA > 70%  -s/p L CEA today.  -Complaining of mild dizziness post op in setting of hypotension and sedation  -Post op EKG NSR with ST changes inferolaterally  (unchanged from baseline EKG)  -BP improved with IVF 500cc. Baseline -130/50-60  -s/p cath 4/1: occluded RCA and Cx with diffuse 80% mLAD  -Restart ASA/plavix per surgery, cont atorva   -Pending BP in am, would start low dose metoprolol 12.5 mg po qd given known CAD  -Will continue to follow

## 2021-04-05 NOTE — CONSULT NOTE ADULT - SUBJECTIVE AND OBJECTIVE BOX
88 y.o. F PMH HTN, prior SBO (2018), chronic back pain (2/2 prior compression L3 L4 fractures), cellulitis BIBEMS from home with chief complaint 1 hour of blurry vision/lightheadedness which started on afternoon of admission found to have sepsis 2/2 UTI, and possible TIA (no acute intracranial hemorrhage or infarct.)    Meds: tylenol prn    4/5: L CEA. , IVF 1000  Allergies    No Known Allergies    Intolerances        Vitals: Vital Signs Last 24 Hrs  T(C): 35.6 (05 Apr 2021 14:48), Max: 37.4 (04 Apr 2021 22:11)  T(F): 96 (05 Apr 2021 14:48), Max: 99.4 (04 Apr 2021 22:11)  HR: 76 (05 Apr 2021 14:15) (76 - 85)  BP: 107/53 (05 Apr 2021 14:00) (107/53 - 154/66)  BP(mean): 75 (05 Apr 2021 14:00) (75 - 76)  RR: 16 (05 Apr 2021 14:00) (16 - 20)  SpO2: 98% (05 Apr 2021 14:15) (95% - 98%)  CAPILLARY BLOOD GLUCOSE          Labs:                         10.2   10.81 )-----------( 439      ( 05 Apr 2021 14:03 )             32.5   04-05    139  |  107  |  10  ----------------------------<  101<H>  3.8   |  24  |  0.70    Ca    7.8<L>      05 Apr 2021 14:03  Phos  3.1     04-05  Mg     1.9     04-05    PT/INR - ( 05 Apr 2021 14:03 )   PT: 13.3 sec;   INR: 1.11          PTT - ( 05 Apr 2021 14:03 )  PTT:28.8 sec    Exam:  Neuro: A&Ox3, NAD, grossly neuro intact, R sided mouth droop with smile and at rest  HEENT: PERRL < EOMI, MMM  Neck: Supple  CV: RRR, no MRGs  Pulm: CTAB, no wheezes, rales, or rhonchi  Abd: BS (+), Soft, NT, ND  : Novak in place  Ext: No edema peripherally or centrally  Vasc: 2+ pulses - radial and PT  MSK: no joint swelling  Skin: no rash  Psych: affect appropriate

## 2021-04-05 NOTE — BRIEF OPERATIVE NOTE - OPERATION/FINDINGS
Intraoperative EEG monitoring. Incision made parallel to anterior border of left SCM. Dissection of platysma and retraction of SCM to visualize the carotid sheath. Hypoglossal nerve and vagal nerve visualized. ICA, CCA, ECA clamped without EEG changes. Endarterectomy performed and bovine patched sewed in place with running 6-0 prolene. Fibrillar, surgicel and flowseal utilized to achieve hemostasis. Wound copiously irrigated and incision closed with Vicryl and monocryl suture.

## 2021-04-05 NOTE — CONSULT NOTE ADULT - CONSULT REASON
Pre-Operative Assessment
L carotid artery stenosis
Rehab evaluation
post CEA for neuro and airway checks
rule out TIA

## 2021-04-05 NOTE — PROGRESS NOTE ADULT - SUBJECTIVE AND OBJECTIVE BOX
24hr Events:  O/N: NPO/IVF for OR, VSS  4/4: Lytes repleted. NPO after MN. SQH held. 2U pRBC placed on hold.           Assessment/Plan;  88 y.o. F PMH HTN, prior SBO (2018), chronic back pain (2/2 prior compression L3 L4 fractures), cellulitis admitted for sepsis 2/2 UTI. Vascular consulted for L ICA stenosis > 70%, asymptomatic. Cardiac cath demonstrating 3 vessel disease with postop duplex demonstrating no pseudoaneurysm. Now clinically stable pending CEA this Monday 4/5.    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 did not have TIA this admission; more likely ocular migraine  -OR today for L CEA  with Dr. 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 NPO after MN. SQH held. 2U pRBC placed on hold.     Subjective: Denies headache, blurred vision, bilateral upper or lower extremity weakness or numbness     ROS:   Denies Headache, blurred vision, Chest Pain, SOB, Abdominal pain, nausea or vomiting     Social   aspirin  chewable 81  clopidogrel Tablet 75      Allergies    No Known Allergies    Intolerances        Vital Signs Last 24 Hrs  T(C): 36.6 (05 Apr 2021 06:55), Max: 37.4 (04 Apr 2021 22:11)  T(F): 97.9 (05 Apr 2021 06:55), Max: 99.4 (04 Apr 2021 22:11)  HR: 76 (05 Apr 2021 05:56) (76 - 85)  BP: 118/56 (05 Apr 2021 05:56) (118/56 - 154/66)  BP(mean): --  RR: 16 (05 Apr 2021 05:56) (16 - 18)  SpO2: 95% (05 Apr 2021 05:56) (95% - 96%)  I&O's Summary    04 Apr 2021 07:01  -  05 Apr 2021 07:00  --------------------------------------------------------  IN: 480 mL / OUT: 1250 mL / NET: -770 mL        Physical Exam:  General: NAD   CN 2-12 grossly intact   Pulmonary: b/l breath sounds   Cardiovascular:s1s2 RRR  Abdominal: soft   Rt groin soft with slight ecchymosis but no pulsatile mass   Extremities:  bilateral upper and lower extremity motor intact     LABS:                        10.9   7.45  )-----------( 411      ( 05 Apr 2021 06:33 )             34.7     04-05    140  |  106  |  11  ----------------------------<  93  3.6   |  26  |  0.74    Ca    8.1<L>      05 Apr 2021 06:33  Phos  2.6     04-05  Mg     2.0     04-05      PT/INR - ( 05 Apr 2021 06:33 )   PT: 12.2 sec;   INR: 1.02          PTT - ( 05 Apr 2021 06:33 )  PTT:28.1 sec        ---------------------------------------------------------------------------  PLEASE CHECK WHEN PRESENT:     [  ]Heart Failure     [  ] Acute     [  ] Acute on Chronic     [  ] Chronic  -------------------------------------------------------------------     [  ]Diastolic [HFpEF]     [  ]Systolic [HFrEF]     [  ]Combined [HFpEF & HFrEF]  .................................................................................     [  ]Other:     [ ] Pulmonary Hypertension     [ ] Afib     [ ] Hypertensive Heart Disease  -------------------------------------------------------------------  [ ] Respiratory failure  [ ] Acute cor pulmonale  [ ] Asthma/COPD Exacerbation  [ ] Pleural effusion  [ ] Aspiration pneumonia  [ ] Obstructive Sleep Apnea  -------------------------------------------------------------------  [  ]PAULINA     [  ]ATN     [  ]Reneal Medullary Necrosis     [  ]Renal Cortical Necrosis     [  ]Other Pathological Lesions:    [  ]CKD 1  [  ]CKD 2  [  ]CKD 3  [  ]CKD 4  [  ]CKD 5  [  ]Other  -------------------------------------------------------------------  [  ]Diabetes  [  ] Diabetic PVD Ulcer  [  ] Neuropathic ulcer to DM  [  ] Diabetes with Nephropathy  [  ] Osteomyelitis due to diabetes  [  ] Hyperglycemia   [  ]hypoglycemia   --------------------------------------------------------------------  [x  ]Malnutrition: See Nutrition Note  [  ]Cachexia  [  ]Other:   [  ]Supplement Ordered:  [  ]Morbid Obesity (BMI >=40]  ---------------------------------------------------------------------  [x ] Sepsis/severe sepsis/septic shock  [ ] Noninfectious SIRS  [x ] UTI  [ ] Pneumonia  -----------------------------------------------------------------------  [ ] Acidosis/alkalosis  [ ] Fluid overload  [ ] Hypokalemia  [ ] Hyperkalemia  [ ] Hypomagnesemia  [ ] Hypophosphatemia  [ ] Hyperphosphatemia  ------------------------------------------------------------------------  [ ] Acute blood loss anemia  [ ] Post op blood loss anemia  [ ] Iron deficiency anemia  [ ] Anemia due to chronic disease  [ ] Hypercoagulable state  [ ] Thrombocytopenia  ----------------------------------------------------------------------  [ ] Cerebral infarction  [ ] Transient ischemia attack  [ ] Encephalopathy - Toxic or Metabolic          Assessment/Plan;  88 y.o. F PMH HTN, prior SBO (2018), chronic back pain (2/2 prior compression L3 L4 fractures), cellulitis admitted for sepsis 2/2 UTI. Vascular consulted for L ICA stenosis > 70%, asymptomatic. Cardiac cath demonstrating 3 vessel disease with postop duplex demonstrating no pseudoaneurysm. Now clinically stable pending CEA this Monday 4/5.    Plan  #Cardiac  -Cath 4/1 demonstrating 3 vessel CAD; recommended for medical management  -Concern for R groin pseudoaneurysm s/p cardiac cath; 4/1 DUS demonstrated no pseudoaneurysm  -ASA/Plavix/Statin   -Lisinopril for BP control    #Vascular  -Per stroke consult, pt. did not have TIA this admission; more likely ocular migraine  -OR today for L CEA  with Dr. 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

## 2021-04-05 NOTE — PROGRESS NOTE ADULT - SUBJECTIVE AND OBJECTIVE BOX
Patient is a 88y old  Female who presents with a chief complaint of uti (05 Apr 2021 16:23)      HPI:  88 y.o, F PMH HTN, prior SBO (2018), chronic back pain (2/2 prior compression L3 L4 fractures), cellulitis BIBEMS from home with chief complaint 1 hour of blurry vision/lightheadedness which started on afternoon of admission.  Patient states she was lying in bed reading a book when she began to feel "dizzy" - no room spinning just that blurry vision and lightheadedness. Patient was able to stand up and walk around but felt unsteady- uses a walker at all times. Patient called next door neighbor/ friend who told her to call ambulance. Per patient, symptoms resolved by the time she got to the ED. Denies prior episodes. Denies headache, f/c, chest pain, palpitations, SOB, cough, abdominal pain, N/V, flank pain, diarrhea/constipation, hematuria, vaginal bleeding, dysuria, hematuria, change in urinary frequency. No bladder/bowel incontinence. Received both COVID vaccines.     In the ED   vitals T 98.7, HR 90, /101-> 146/66, O2 sat 97% on RA  labs WBC 16.65 (neutrophil predominance 84.8%), BUN/Cr 18/0.84, albumin 3.3, BNP 2035, trop negative   Ua positive: trace ketones, moderate leukocyte esterase, trace blood, >10 wbc, 5-10 rbc, + bacteria  blood alcohol <3   ekg NSR, no acute ischemic changes    Imaging  CXR negative - No pneumothorax, No opacities, No free air.     CT head  IMPRESSION:  No acute intracranial hemorrhage or transcortical infarction.    Medications given  Tylenol, ctx 1g, meclizine, Zofran, 1 L NS   (29 Mar 2021 07:15)      PAST MEDICAL & SURGICAL HISTORY:  SBO (small bowel obstruction)    HTN (hypertension)    Chronic back pain    S/P wrist surgery        SOCIAL HISTORY:  - Smoking:  - Alcohol:  - Recreational drug use:  - Other:    FAMILY HISTORY:      Allergies    No Known Allergies    Intolerances        MEDICATIONS  (STANDING):  atorvastatin 80 milliGRAM(s) Oral at bedtime  ceFAZolin   IVPB 2000 milliGRAM(s) IV Intermittent every 8 hours  chlorhexidine 4% Liquid 1 Application(s) Topical <User Schedule>  lactated ringers. 1000 milliLiter(s) (50 mL/Hr) IV Continuous <Continuous>    MEDICATIONS  (PRN):  acetaminophen    Suspension .. 650 milliGRAM(s) Oral every 6 hours PRN Moderate Pain (4 - 6)      REVIEW OF SYSTEMS:  12 point ROS negative except for that stated in the HPI    PHYSICAL EXAM:  Vitals past 24 Hours: T(C): 35.6 (04-05-21 @ 14:48), Max: 37.4 (04-04-21 @ 22:11)  HR: 74 (04-05-21 @ 16:00) (74 - 85)  BP: 122/54 (04-05-21 @ 15:00) (107/53 - 154/66)  RR: 22 (04-05-21 @ 16:00) (16 - 22)  SpO2: 99% (04-05-21 @ 16:00) (95% - 99%)	    Daily Height in cm: 156.2 (05 Apr 2021 04:56)    Daily     GEN: Alert and awake, no acute distress  HEENT: Moist mucous membranes  Neck: No JVD  Cardiovascular: Regular rate and rhythm, +S1 S2. No murmurs, rubs, or gallops appreciated  Respiratory: Lungs clear to auscultation bilaterally  Gastrointestinal:  Soft, non-tender, non-distended, normoactive bowel sounds  Skin: No rashes, No ecchymoses, No cyanosis  Neurologic: Non-focal, alert and oriented x3.   Extremities: No clubbing, cyanosis or edema. Warm, well-perfused extremities.   Vascular: Radial and dorsalis pedis pulses 2+ bilaterally    I&O's Detail    04 Apr 2021 07:01  -  05 Apr 2021 07:00  --------------------------------------------------------  IN:    Oral Fluid: 480 mL  Total IN: 480 mL    OUT:    Voided (mL): 1250 mL  Total OUT: 1250 mL    Total NET: -770 mL            LABS:                        10.2   10.81 )-----------( 439      ( 05 Apr 2021 14:03 )             32.5     04-05    139  |  107  |  10  ----------------------------<  101<H>  3.8   |  24  |  0.70    Ca    7.8<L>      05 Apr 2021 14:03  Phos  3.1     04-05  Mg     1.9     04-05          PT/INR - ( 05 Apr 2021 14:03 )   PT: 13.3 sec;   INR: 1.11          PTT - ( 05 Apr 2021 14:03 )  PTT:28.8 sec    I&O's Summary    04 Apr 2021 07:01  -  05 Apr 2021 07:00  --------------------------------------------------------  IN: 480 mL / OUT: 1250 mL / NET: -770 mL        CARDIAC DIAGNOSTIC TESTING:    ECG:    TELEMETRY:    ECHO:      RADIOLOGY & ADDITIONAL STUDIES:      ASSESSMENT/PLAN:   S: Complaining of dizziness post-op and feeling cold while IVF running. No SOB, CP.       PAST MEDICAL & SURGICAL HISTORY:  SBO (small bowel obstruction)    HTN (hypertension)    Chronic back pain    S/P wrist surgery        SOCIAL HISTORY:  - Smoking:  - Alcohol:  - Recreational drug use:  - Other:    FAMILY HISTORY:      Allergies    No Known Allergies    Intolerances        MEDICATIONS  (STANDING):  atorvastatin 80 milliGRAM(s) Oral at bedtime  ceFAZolin   IVPB 2000 milliGRAM(s) IV Intermittent every 8 hours  chlorhexidine 4% Liquid 1 Application(s) Topical <User Schedule>  lactated ringers. 1000 milliLiter(s) (50 mL/Hr) IV Continuous <Continuous>    MEDICATIONS  (PRN):  acetaminophen    Suspension .. 650 milliGRAM(s) Oral every 6 hours PRN Moderate Pain (4 - 6)      REVIEW OF SYSTEMS:  12 point ROS negative except for that stated in the HPI    PHYSICAL EXAM:  Vitals past 24 Hours: T(C): 35.6 (04-05-21 @ 14:48), Max: 37.4 (04-04-21 @ 22:11)  HR: 74 (04-05-21 @ 16:00) (74 - 85)  BP: 122/54 (04-05-21 @ 15:00) (107/53 - 154/66)  RR: 22 (04-05-21 @ 16:00) (16 - 22)  SpO2: 99% (04-05-21 @ 16:00) (95% - 99%)	    Daily Height in cm: 156.2 (05 Apr 2021 04:56)    Daily     GEN: Alert and awake, no acute distress  HEENT: Moist mucous membranes  Neck: No JVD  Cardiovascular: Regular rate and rhythm, +S1 S2. No murmurs, rubs, or gallops appreciated  Respiratory: Lungs clear to auscultation bilaterally  Gastrointestinal:  Soft, non-tender, non-distended, normoactive bowel sounds  Skin: No rashes, No ecchymoses, No cyanosis  Neurologic: Non-focal, alert and oriented x3.   Extremities: No clubbing, cyanosis or edema. Warm, well-perfused extremities.   Vascular: Radial and dorsalis pedis pulses 2+ bilaterally    I&O's Detail    04 Apr 2021 07:01  -  05 Apr 2021 07:00  --------------------------------------------------------  IN:    Oral Fluid: 480 mL  Total IN: 480 mL    OUT:    Voided (mL): 1250 mL  Total OUT: 1250 mL    Total NET: -770 mL            LABS:                        10.2   10.81 )-----------( 439      ( 05 Apr 2021 14:03 )             32.5     04-05    139  |  107  |  10  ----------------------------<  101<H>  3.8   |  24  |  0.70    Ca    7.8<L>      05 Apr 2021 14:03  Phos  3.1     04-05  Mg     1.9     04-05          PT/INR - ( 05 Apr 2021 14:03 )   PT: 13.3 sec;   INR: 1.11          PTT - ( 05 Apr 2021 14:03 )  PTT:28.8 sec    I&O's Summary    04 Apr 2021 07:01  -  05 Apr 2021 07:00  --------------------------------------------------------  IN: 480 mL / OUT: 1250 mL / NET: -770 mL        CARDIAC DIAGNOSTIC TESTING:    ECG:  NSR with inferolateral ST changes (unchanged from baseline)  TELEMETRY:

## 2021-04-05 NOTE — CONSULT NOTE ADULT - ASSESSMENT
88 y.o. F PMH HTN, prior SBO (2018), chronic back pain (2/2 prior compression L3 L4 fractures), cellulitis admitted for sepsis 2/2 UTI. Vascular consulted for L ICA stenosis > 70%, asymptomatic. Cardiac cath demonstrating 3 vessel disease with postop duplex demonstrating no pseudoaneurysm, managed medically. Admitted to SICU s/p L CEA (4/5) for HD and neuro monitoring.    Neuro: neuro intact except for lower R facial droop, tylenol prn, NO narcotics, notify vascular surgery for headache or increased pain  CV: HD stable SBP goal > 100, use noninvasive cuff, levo ggt if needed, cards at bedside postop-postop ekg ok  Pulm: Satting well on NC  GI/FEN: LR@50, NPO, advance to CLD later this afternoon  :voids,  TOV @ 8PM  ID: Ancef 24 hours post op  Endo:  ISS  Heme: [id number], Plavix 75 mg, HSQ in AM on 4/6  PPX: SCDs  Lines: PIVs, [place]  Wounds: L neck wound with dressing  PT/OT: not ordered, strict bedrest until 4/6, head of bed elevated >30*

## 2021-04-05 NOTE — PROGRESS NOTE ADULT - NUTRITIONAL ASSESSMENT
This patient has been assessed with a concern for Malnutrition and has been determined to have a diagnosis/diagnoses of Moderate protein-calorie malnutrition and Underweight (BMI < 19).    This patient is being managed with:   Diet NPO-  Except Medications  Entered: Apr 5 2021  2:14PM

## 2021-04-05 NOTE — CHART NOTE - NSCHARTNOTEFT_GEN_A_CORE
Admitting Diagnosis:   Patient is a 88y old  Female who presents with a chief complaint of uti (05 Apr 2021 14:54)      PAST MEDICAL & SURGICAL HISTORY:  SBO (small bowel obstruction)    HTN (hypertension)    Chronic back pain    S/P wrist surgery        Current Nutrition Order:   Diet, NPO:   Except Medications (04-05-21 @ 14:14)      PO Intake: Good (%) [   ]  Fair (50-75%) [   ] Poor (<25%) [   ]  NPO    GI Issues:   BM 4/4 4/5 x1    Pain:  none per flow sheets     Skin Integrity:  Douglas 20  No edema  No pressure ulcers; SX site noted     Labs:   04-05    139  |  107  |  10  ----------------------------<  101<H>  3.8   |  24  |  0.70    Ca    7.8<L>      05 Apr 2021 14:03  Phos  3.1     04-05  Mg     1.9     04-05      CAPILLARY BLOOD GLUCOSE          Medications:  MEDICATIONS  (STANDING):  atorvastatin 80 milliGRAM(s) Oral at bedtime  ceFAZolin   IVPB 2000 milliGRAM(s) IV Intermittent every 8 hours  chlorhexidine 4% Liquid 1 Application(s) Topical <User Schedule>  lactated ringers Bolus 500 milliLiter(s) IV Bolus once  lactated ringers. 1000 milliLiter(s) (50 mL/Hr) IV Continuous <Continuous>    MEDICATIONS  (PRN):  acetaminophen    Suspension .. 650 milliGRAM(s) Oral every 6 hours PRN Moderate Pain (4 - 6)      5'1''  pounds +-10%  Weight 90 pounds, BMI 16.9, %IBW=86    Per weight history UBW ("Years ago") 110lbs  Present weight 90lbs,Weight Sept 2020 90lbs, November 2019 94lbs   ** Based on most recent EMR wt     Per physical assessment:  Muscle Wasting- Temporal [   ]  Clavicle/Pectoral [ x  ]  Shoulder/Deltoid [   ]  Scapula [  x ]  Interosseous [   ]  Quadriceps [x   ]  Gastrocnemius [   ]  Fat Wasting- Orbital [   ]  Buccal [   ]  Triceps [ x  ]  Rib [   ]  Suspect moderate [PCM] 2/2 to physical assessment, [poor intake], and [wt loss]; please see malnutrition chart note.    Estimated energy needs:   Current body wt used for energy calculations as pt falls within % IBW  Adjusted for age, malnutrition   1230-1435kcal/day 30-35kcal/kg   49-57gm/day 1.2-1.4gm/kg   1230-1435ml/day 30-35ml/kg     Subjective: 89y/o female with history of HTN, SBO (2018), chronic back pain Prior compression of L3L4 fractures, Cellulitis admitted with UTI. Vascular consulted for L ICA stenosis. S/p Cardiac cath 4/1 demonstrating 3 vessel disease with postop duplex demonstrating no pseudoaneurysm. Pt pending CEA today.     Pt visited in SICU. Seen s/p Left carotid endarterectomy + Intraoperative EEG monitoring today. NPO at this time and wanting to eat. Pt asking for soup and ensures at this time. Reports prior to NPO had been eating well/fair.   Please see Below for RD Recs. Recs made with team.     PRIOR PES: Malnutrition Moderate malnutrition Etiology R/T suspected inconsistent energy intake Signs/Symptoms AEB: 20lb(18%) weight loss over 3 years and NPFE findings.   ON GOING @ THIS TIME   Goal/Expected Outcome Meet >75% of EER with no further S/S of malnutrition.    Recommendations:  1. Diet to be advanced in 24-48hrs.  2. Adv diet: regular, ensure enlivex3/day (350kcal/20gm prot per 1 can).   3. Monitor %PO intake, Diet tolerance.  4. Labs: monitor BMP, CBC, glucose, lytes, trend renal indices, LFTs.  5. Montior Skin, Wts, GI, GOC.  6. RD to remain available for additional nutrition interventions as needed.     Education: Encouraged PO intake - reviewed protein and oral nutrition supplements. Understanding stated, provide additional motivation as needed.     Risk Level: High [  x ] Moderate [   ] Low [   ]

## 2021-04-05 NOTE — PROGRESS NOTE ADULT - SUBJECTIVE AND OBJECTIVE BOX
Vascular Surgery Post-Op Note    Procedure: Left carotid endarterectomy    Diagnosis/Indication: L ICA stenosis    Surgeon: Danitza Weir    S: Seen and evaluated in SICU. Resting comfortably in bed. Reporting feeling cold along with mild left sided supraorbital headache, present since procedure. Denies any changes in vision. Denies any dysphagia, numbness or weakness in extremities. Denies f/c, n/v, CP, or SOB. Has not had anything to eat or drink since procedure.     O:  T(C): 35.6 (04-05-21 @ 14:48), Max: 35.6 (04-05-21 @ 14:48)  T(F): 96 (04-05-21 @ 14:48), Max: 96 (04-05-21 @ 14:48)  HR: 74 (04-05-21 @ 16:00) (74 - 83)  BP: 122/54 (04-05-21 @ 15:00) (107/53 - 122/54)  RR: 22 (04-05-21 @ 16:00) (16 - 22)  SpO2: 99% (04-05-21 @ 16:00) (96% - 99%)  Wt(kg): --                        10.2   10.81 )-----------( 439      ( 05 Apr 2021 14:03 )             32.5     04-05    139  |  107  |  10  ----------------------------<  101<H>  3.8   |  24  |  0.70    Ca    7.8<L>      05 Apr 2021 14:03  Phos  3.1     04-05  Mg     1.9     04-05        Gen: NAD, resting comfortably in bed  C/V: NSR  Pulm: Nonlabored breathing, no respiratory distress. Sating 99% on 2L NC  Extrem: B/l UE extension, flexion, and  strength 5/5. WWP, no calf edema  CN II-XII: Facial sensation grossly intact. Slight right sided facial droop.       A/P: 88 y.o. F PMH HTN, prior SBO (2018), chronic back pain (2/2 prior compression L3 L4 fractures), cellulitis admitted for sepsis 2/2 UTI. Vascular consulted for L ICA stenosis > 70%, asymptomatic. Cardiac cath demonstrating 3 vessel disease with postop duplex demonstrating no pseudoaneurysm, managed medically. Admitted to SICU s/p L CEA (4/5) for HD and neuro monitoring.    Diet: CLD  IVF: 50 cc/hr  Ancef  Pain/nausea control  DVT ppx: ASA/Plavix- holding chemical PPX in setting of procedure

## 2021-04-06 LAB
ANION GAP SERPL CALC-SCNC: 12 MMOL/L — SIGNIFICANT CHANGE UP (ref 5–17)
BUN SERPL-MCNC: 11 MG/DL — SIGNIFICANT CHANGE UP (ref 7–23)
CALCIUM SERPL-MCNC: 8.2 MG/DL — LOW (ref 8.4–10.5)
CHLORIDE SERPL-SCNC: 105 MMOL/L — SIGNIFICANT CHANGE UP (ref 96–108)
CO2 SERPL-SCNC: 22 MMOL/L — SIGNIFICANT CHANGE UP (ref 22–31)
CREAT SERPL-MCNC: 0.7 MG/DL — SIGNIFICANT CHANGE UP (ref 0.5–1.3)
GLUCOSE SERPL-MCNC: 77 MG/DL — SIGNIFICANT CHANGE UP (ref 70–99)
HCT VFR BLD CALC: 33 % — LOW (ref 34.5–45)
HGB BLD-MCNC: 10.3 G/DL — LOW (ref 11.5–15.5)
MAGNESIUM SERPL-MCNC: 1.9 MG/DL — SIGNIFICANT CHANGE UP (ref 1.6–2.6)
MCHC RBC-ENTMCNC: 31.2 GM/DL — LOW (ref 32–36)
MCHC RBC-ENTMCNC: 32 PG — SIGNIFICANT CHANGE UP (ref 27–34)
MCV RBC AUTO: 102.5 FL — HIGH (ref 80–100)
NRBC # BLD: 0 /100 WBCS — SIGNIFICANT CHANGE UP (ref 0–0)
PHOSPHATE SERPL-MCNC: 3.3 MG/DL — SIGNIFICANT CHANGE UP (ref 2.5–4.5)
PLATELET # BLD AUTO: 426 K/UL — HIGH (ref 150–400)
POTASSIUM SERPL-MCNC: 4 MMOL/L — SIGNIFICANT CHANGE UP (ref 3.5–5.3)
POTASSIUM SERPL-SCNC: 4 MMOL/L — SIGNIFICANT CHANGE UP (ref 3.5–5.3)
RBC # BLD: 3.22 M/UL — LOW (ref 3.8–5.2)
RBC # FLD: 11.8 % — SIGNIFICANT CHANGE UP (ref 10.3–14.5)
SODIUM SERPL-SCNC: 139 MMOL/L — SIGNIFICANT CHANGE UP (ref 135–145)
WBC # BLD: 9.47 K/UL — SIGNIFICANT CHANGE UP (ref 3.8–10.5)
WBC # FLD AUTO: 9.47 K/UL — SIGNIFICANT CHANGE UP (ref 3.8–10.5)

## 2021-04-06 PROCEDURE — 99233 SBSQ HOSP IP/OBS HIGH 50: CPT | Mod: GC

## 2021-04-06 PROCEDURE — 99233 SBSQ HOSP IP/OBS HIGH 50: CPT

## 2021-04-06 PROCEDURE — 99232 SBSQ HOSP IP/OBS MODERATE 35: CPT | Mod: GC

## 2021-04-06 RX ORDER — HEPARIN SODIUM 5000 [USP'U]/ML
5000 INJECTION INTRAVENOUS; SUBCUTANEOUS EVERY 8 HOURS
Refills: 0 | Status: DISCONTINUED | OUTPATIENT
Start: 2021-04-06 | End: 2021-04-06

## 2021-04-06 RX ORDER — MAGNESIUM SULFATE 500 MG/ML
1 VIAL (ML) INJECTION ONCE
Refills: 0 | Status: COMPLETED | OUTPATIENT
Start: 2021-04-06 | End: 2021-04-06

## 2021-04-06 RX ORDER — METOPROLOL TARTRATE 50 MG
12.5 TABLET ORAL DAILY
Refills: 0 | Status: DISCONTINUED | OUTPATIENT
Start: 2021-04-06 | End: 2021-04-07

## 2021-04-06 RX ORDER — HEPARIN SODIUM 5000 [USP'U]/ML
5000 INJECTION INTRAVENOUS; SUBCUTANEOUS EVERY 12 HOURS
Refills: 0 | Status: DISCONTINUED | OUTPATIENT
Start: 2021-04-06 | End: 2021-04-07

## 2021-04-06 RX ADMIN — ATORVASTATIN CALCIUM 80 MILLIGRAM(S): 80 TABLET, FILM COATED ORAL at 21:26

## 2021-04-06 RX ADMIN — HEPARIN SODIUM 5000 UNIT(S): 5000 INJECTION INTRAVENOUS; SUBCUTANEOUS at 17:51

## 2021-04-06 RX ADMIN — Medication 100 MILLIGRAM(S): at 00:05

## 2021-04-06 RX ADMIN — CLOPIDOGREL BISULFATE 75 MILLIGRAM(S): 75 TABLET, FILM COATED ORAL at 07:14

## 2021-04-06 RX ADMIN — Medication 81 MILLIGRAM(S): at 07:15

## 2021-04-06 RX ADMIN — Medication 100 GRAM(S): at 07:12

## 2021-04-06 RX ADMIN — Medication 12.5 MILLIGRAM(S): at 10:35

## 2021-04-06 NOTE — PROGRESS NOTE ADULT - SUBJECTIVE AND OBJECTIVE BOX
24 hr events:    ON: ivf dc'd per vascular, SBP good  4/5: admitted in stable condition, bolused 500cc x2 (ok per Cards/Vasc) for SBP > 100, Passed TOV.     SUBJECTIVE:      MEDICATIONS  (STANDING):  aspirin  chewable 81 milliGRAM(s) Oral every 24 hours  atorvastatin 80 milliGRAM(s) Oral at bedtime  chlorhexidine 4% Liquid 1 Application(s) Topical <User Schedule>  clopidogrel Tablet 75 milliGRAM(s) Oral every 24 hours  magnesium sulfate  IVPB 1 Gram(s) IV Intermittent once    MEDICATIONS  (PRN):  acetaminophen    Suspension .. 650 milliGRAM(s) Oral every 6 hours PRN Moderate Pain (4 - 6)      Novak:	  [x ] None	[ ] Daily Novak Order Placed	   Indication:	  [ ] Strict I and O's    [ ] Obstruction     [ ] Incontinence + Stage 3 or 4 Decubitus  Central Line:  [ ] None	   [ ]  Medication / TPN Administration     Drips:     ICU Vital Signs Last 24 Hrs  T(C): 36.9 (06 Apr 2021 04:27), Max: 36.9 (06 Apr 2021 04:27)  T(F): 98.5 (06 Apr 2021 04:27), Max: 98.5 (06 Apr 2021 04:27)  HR: 74 (06 Apr 2021 05:00) (69 - 83)  BP: 146/64 (06 Apr 2021 05:00) (107/53 - 147/66)  BP(mean): 92 (06 Apr 2021 05:00) (75 - 98)  ABP: 126/34 (06 Apr 2021 05:00) (95/30 - 140/39)  ABP(mean): 69 (06 Apr 2021 05:00) (54 - 77)  RR: 23 (06 Apr 2021 05:00) (16 - 30)  SpO2: 99% (06 Apr 2021 05:00) (94% - 99%)      Physical Exam:  General: NAD  HEENT: NC/AT, EOMI, PERRLA, normal hearing, no oral lesions, neck supple w/o LAD, incisions C/d/i  Pulmonary: Nonlabored breathing, no respiratory distress, CTA-B  Cardiovascular: NSR, no murmurs  Abdominal: soft, NT/ND, +BS, no organomegaly  Extremities: WWP, 5/5 strength x 4, no clubbing/cyanosis/edema  Neuro: A/O x3, CNs II-XII grossly intact, normal motor/sensation, no focal deficits  Pulses: palpable distal pulses    Lines/tubes/drains:    Vent settings:      I&O's Summary    04 Apr 2021 07:01  -  05 Apr 2021 07:00  --------------------------------------------------------  IN: 480 mL / OUT: 1250 mL / NET: -770 mL    05 Apr 2021 07:01  -  06 Apr 2021 06:46  --------------------------------------------------------  IN: 750 mL / OUT: 200 mL / NET: 550 mL        LABS:                        10.3   9.47  )-----------( 426      ( 06 Apr 2021 05:33 )             33.0     04-06    139  |  105  |  11  ----------------------------<  77  4.0   |  22  |  0.70    Ca    8.2<L>      06 Apr 2021 05:33  Phos  3.3     04-06  Mg     1.9     04-06      PT/INR - ( 05 Apr 2021 14:03 )   PT: 13.3 sec;   INR: 1.11          PTT - ( 05 Apr 2021 14:03 )  PTT:28.8 sec    CAPILLARY BLOOD GLUCOSE            Cultures:    RADIOLOGY & ADDITIONAL STUDIES:     24 hr events:    ON: ivf dc'd per vascular, SBP good  4/5: admitted in stable condition, bolused 500cc x2 (ok per Cards/Vasc) for SBP > 100, Passed TOV.     SUBJECTIVE:    The patient complains of mild pain around incisions site, denies difficulty swallowing, fever, chills, nausea or vomiting.    MEDICATIONS  (STANDING):  aspirin  chewable 81 milliGRAM(s) Oral every 24 hours  atorvastatin 80 milliGRAM(s) Oral at bedtime  chlorhexidine 4% Liquid 1 Application(s) Topical <User Schedule>  clopidogrel Tablet 75 milliGRAM(s) Oral every 24 hours  magnesium sulfate  IVPB 1 Gram(s) IV Intermittent once    MEDICATIONS  (PRN):  acetaminophen    Suspension .. 650 milliGRAM(s) Oral every 6 hours PRN Moderate Pain (4 - 6)      Novak:	  [x ] None	[ ] Daily Novak Order Placed	   Indication:	  [ ] Strict I and O's    [ ] Obstruction     [ ] Incontinence + Stage 3 or 4 Decubitus  Central Line:  [ ] None	   [ ]  Medication / TPN Administration     Drips:     ICU Vital Signs Last 24 Hrs  T(C): 36.9 (06 Apr 2021 04:27), Max: 36.9 (06 Apr 2021 04:27)  T(F): 98.5 (06 Apr 2021 04:27), Max: 98.5 (06 Apr 2021 04:27)  HR: 74 (06 Apr 2021 05:00) (69 - 83)  BP: 146/64 (06 Apr 2021 05:00) (107/53 - 147/66)  BP(mean): 92 (06 Apr 2021 05:00) (75 - 98)  ABP: 126/34 (06 Apr 2021 05:00) (95/30 - 140/39)  ABP(mean): 69 (06 Apr 2021 05:00) (54 - 77)  RR: 23 (06 Apr 2021 05:00) (16 - 30)  SpO2: 99% (06 Apr 2021 05:00) (94% - 99%)      Physical Exam:  General: NAD  HEENT: NC/AT, EOMI, PERRLA, normal hearing, no oral lesions, neck supple w/o LAD, incision C/d/i, b/l carotids are palpable  Pulmonary: Nonlabored breathing, no respiratory distress, CTA-B  Cardiovascular: NSR, no murmurs  Abdominal: soft, NT/ND, +BS, no organomegaly  Extremities: WWP, 5/5 strength x 4, no clubbing/cyanosis/edema  Neuro: A/O x3, CNs II-XII grossly intact, normal motor/sensation, no focal deficits  Pulses: palpable distal pulses    Lines/tubes/drains:    Vent settings:      I&O's Summary    04 Apr 2021 07:01  -  05 Apr 2021 07:00  --------------------------------------------------------  IN: 480 mL / OUT: 1250 mL / NET: -770 mL    05 Apr 2021 07:01  -  06 Apr 2021 06:46  --------------------------------------------------------  IN: 750 mL / OUT: 200 mL / NET: 550 mL        LABS:                        10.3   9.47  )-----------( 426      ( 06 Apr 2021 05:33 )             33.0     04-06    139  |  105  |  11  ----------------------------<  77  4.0   |  22  |  0.70    Ca    8.2<L>      06 Apr 2021 05:33  Phos  3.3     04-06  Mg     1.9     04-06      PT/INR - ( 05 Apr 2021 14:03 )   PT: 13.3 sec;   INR: 1.11          PTT - ( 05 Apr 2021 14:03 )  PTT:28.8 sec    CAPILLARY BLOOD GLUCOSE            Cultures:    RADIOLOGY & ADDITIONAL STUDIES:     24 hr events:    ON: ivf dc'd per vascular, SBP good  4/5: admitted in stable condition, bolused 500cc x2 (ok per Cards/Vasc) for SBP > 100, Passed TOV.     SUBJECTIVE:    The patient complains of mild pain around incisions site, denies difficulty swallowing, fever, chills, nausea or vomiting.    MEDICATIONS  (STANDING):  aspirin  chewable 81 milliGRAM(s) Oral every 24 hours  atorvastatin 80 milliGRAM(s) Oral at bedtime  chlorhexidine 4% Liquid 1 Application(s) Topical <User Schedule>  clopidogrel Tablet 75 milliGRAM(s) Oral every 24 hours  magnesium sulfate  IVPB 1 Gram(s) IV Intermittent once    MEDICATIONS  (PRN):  acetaminophen    Suspension .. 650 milliGRAM(s) Oral every 6 hours PRN Moderate Pain (4 - 6)      Novak:	  [x ] None	[ ] Daily Novak Order Placed	   Indication:	  [ ] Strict I and O's    [ ] Obstruction     [ ] Incontinence + Stage 3 or 4 Decubitus  Central Line:  [ ] None	   [ ]  Medication / TPN Administration     Drips:     ICU Vital Signs Last 24 Hrs  T(C): 36.9 (06 Apr 2021 04:27), Max: 36.9 (06 Apr 2021 04:27)  T(F): 98.5 (06 Apr 2021 04:27), Max: 98.5 (06 Apr 2021 04:27)  HR: 74 (06 Apr 2021 05:00) (69 - 83)  BP: 146/64 (06 Apr 2021 05:00) (107/53 - 147/66)  BP(mean): 92 (06 Apr 2021 05:00) (75 - 98)  ABP: 126/34 (06 Apr 2021 05:00) (95/30 - 140/39)  ABP(mean): 69 (06 Apr 2021 05:00) (54 - 77)  RR: 23 (06 Apr 2021 05:00) (16 - 30)  SpO2: 99% (06 Apr 2021 05:00) (94% - 99%)      Physical Exam:  General: NAD  HEENT: NC/AT, EOMI, PERRLA, normal hearing, no oral lesions, neck supple w/o LAD, incision C/d/i, b/l carotids are palpable  Pulmonary: Nonlabored breathing, no respiratory distress, CTA-B  Cardiovascular: NSR, no murmurs  Abdominal: soft, NT/ND, +BS, no organomegaly  Extremities: WWP, 5/5 strength x 4, no clubbing/cyanosis/edema  Neuro: A/O x3, CNs II-XII grossly intact, normal motor/sensation, no focal deficits  Pulses: palpable distal pulses  Skin: no rash    Lines/tubes/drains:    Vent settings:      I&O's Summary    04 Apr 2021 07:01  -  05 Apr 2021 07:00  --------------------------------------------------------  IN: 480 mL / OUT: 1250 mL / NET: -770 mL    05 Apr 2021 07:01  -  06 Apr 2021 06:46  --------------------------------------------------------  IN: 750 mL / OUT: 200 mL / NET: 550 mL        LABS:                        10.3   9.47  )-----------( 426      ( 06 Apr 2021 05:33 )             33.0     04-06    139  |  105  |  11  ----------------------------<  77  4.0   |  22  |  0.70    Ca    8.2<L>      06 Apr 2021 05:33  Phos  3.3     04-06  Mg     1.9     04-06      PT/INR - ( 05 Apr 2021 14:03 )   PT: 13.3 sec;   INR: 1.11          PTT - ( 05 Apr 2021 14:03 )  PTT:28.8 sec    CAPILLARY BLOOD GLUCOSE            Cultures:    RADIOLOGY & ADDITIONAL STUDIES:

## 2021-04-06 NOTE — PROGRESS NOTE ADULT - SUBJECTIVE AND OBJECTIVE BOX
Subjective/Interval events:  -No events overnight  -This am patient doing well, minimal pain at surgical site, no chest pain/sob, having normal BMs, eating well  -We discussed her debility and open to ALIS to get stronger      MEDICATIONS  (STANDING):  aspirin  chewable 81 milliGRAM(s) Oral every 24 hours  atorvastatin 80 milliGRAM(s) Oral at bedtime  chlorhexidine 4% Liquid 1 Application(s) Topical <User Schedule>  clopidogrel Tablet 75 milliGRAM(s) Oral every 24 hours  heparin   Injectable 5000 Unit(s) SubCutaneous every 12 hours  metoprolol succinate ER 12.5 milliGRAM(s) Oral daily    MEDICATIONS  (PRN):  acetaminophen    Suspension .. 650 milliGRAM(s) Oral every 6 hours PRN Moderate Pain (4 - 6)      Vital Signs Last 24 Hrs  T(C): 36.8 (06 Apr 2021 10:19), Max: 36.9 (06 Apr 2021 04:27)  T(F): 98.3 (06 Apr 2021 10:19), Max: 98.5 (06 Apr 2021 04:27)  HR: 86 (06 Apr 2021 13:30) (69 - 90)  BP: 144/66 (06 Apr 2021 13:30) (111/56 - 152/65)  BP(mean): 93 (06 Apr 2021 11:35) (78 - 98)  RR: 37 (06 Apr 2021 11:35) (16 - 37)  SpO2: 98% (06 Apr 2021 13:30) (94% - 99%)    PHYSICAL EXAM:  GENERAL: pleasant, NAD  NEURO: Aox3, intact strength/sensation all 4 ext, intact CN with noted mild R lower facial weakness stable  HEENT: clear op, mmm, L carotid CEA site with dressing c/d/i   NECK:  No JVD, no lymphadenopathy  CHEST/LUNG: Clear to auscultation bilaterally; No rales, rhonchi, wheezing. Normal work of breathing, not tachypneic  HEART: Regular rate and rhythm; No murmurs, rubs, or gallops  ABDOMEN: Soft, Nontender, Nondistended. Normoactive bowel sounds  EXTREMITIES:  No sig le edema, wwp     LABS (reviewed)   -Hg stable postop 10's   -BMP wnl    ASSESSMENT AND PLAN: 88 F with HTN, prior SBO, chronic back pain 2/2 compression fractures L3/L4, admitted with acute presyncope and blurry vision found to have on workup LICA > 70% s/p CEA post op day 1, doing well without complication.     #LICA sig stenosis s/p CEA   -Post op wound care and pain control per primary team   -Completed post op abx     #TIA hx  -c/w asa/plavix    #Lower R facial droop  -low c/f embolic phenomenon stable at this time  -close neuro exams    #HTN  -On metop xl per cards  -On amlodipine at home    #Chronic low back pain  -Controlled for now on current pain regimen     #Drug rash to antibiotics  -Resolved now completed abx course    #UTI uncomplicated   -Completed abx course      #DVT px- SQH  #Diet- Dash diet   #Dispo- likely to [facility]- needs updated PT eval (straight medicare so likely can be at [facility] tomorrow if [facility] sent out and places chosen)    Patient was seen and examined by me at bedside    Greater than 35 minutes spent on total encounter; more than 50% of the visit was spent counseling and/or coordinating care by the attending physician.    Terry Acevedo MD [phone]  Subjective/Interval events:  -No events overnight  -This am patient doing well, minimal pain at surgical site, no chest pain/sob, having normal BMs, eating well  -We discussed her debility and open to ALIS to get stronger      MEDICATIONS  (STANDING):  aspirin  chewable 81 milliGRAM(s) Oral every 24 hours  atorvastatin 80 milliGRAM(s) Oral at bedtime  chlorhexidine 4% Liquid 1 Application(s) Topical <User Schedule>  clopidogrel Tablet 75 milliGRAM(s) Oral every 24 hours  heparin   Injectable 5000 Unit(s) SubCutaneous every 12 hours  metoprolol succinate ER 12.5 milliGRAM(s) Oral daily    MEDICATIONS  (PRN):  acetaminophen    Suspension .. 650 milliGRAM(s) Oral every 6 hours PRN Moderate Pain (4 - 6)      Vital Signs Last 24 Hrs  T(C): 36.8 (06 Apr 2021 10:19), Max: 36.9 (06 Apr 2021 04:27)  T(F): 98.3 (06 Apr 2021 10:19), Max: 98.5 (06 Apr 2021 04:27)  HR: 86 (06 Apr 2021 13:30) (69 - 90)  BP: 144/66 (06 Apr 2021 13:30) (111/56 - 152/65)  BP(mean): 93 (06 Apr 2021 11:35) (78 - 98)  RR: 37 (06 Apr 2021 11:35) (16 - 37)  SpO2: 98% (06 Apr 2021 13:30) (94% - 99%)    PHYSICAL EXAM:  GENERAL: pleasant, NAD  NEURO: Aox3, intact strength/sensation all 4 ext, intact CN with noted mild R lower facial weakness stable  HEENT: clear op, mmm, L carotid CEA site with dressing c/d/i   NECK:  No JVD, no lymphadenopathy  CHEST/LUNG: Clear to auscultation bilaterally; No rales, rhonchi, wheezing. Normal work of breathing, not tachypneic  HEART: Regular rate and rhythm; No murmurs, rubs, or gallops  ABDOMEN: Soft, Nontender, Nondistended. Normoactive bowel sounds  EXTREMITIES:  No sig le edema, wwp     LABS (reviewed)   -Hg stable postop 10's   -BMP wnl    ASSESSMENT AND PLAN: 88 F with HTN, prior SBO, chronic back pain 2/2 compression fractures L3/L4, admitted with acute presyncope and blurry vision found to have on workup LICA > 70% s/p CEA post op day 1, doing well without complication.     #LICA sig stenosis s/p CEA   -Post op wound care and pain control per primary team   -Completed post op abx     #Acute blood loss anemia  -Trend Hg, stable currently    #TIA hx  -c/w asa/plavix    #Lower R facial droop  -low c/f embolic phenomenon stable at this time  -close neuro exams    #HTN  -On metop xl per cards  -On amlodipine at home    #Chronic low back pain  -Controlled for now on current pain regimen     #Drug rash to antibiotics  -Resolved now completed abx course    #UTI uncomplicated   -Completed abx course      #DVT px- SQH  #Diet- Dash diet   #Dispo- likely to [facility]- needs updated PT eval (straight medicare so likely can be at [facility] tomorrow if [facility] sent out and places chosen)    Patient was seen and examined by me at bedside    Greater than 35 minutes spent on total encounter; more than 50% of the visit was spent counseling and/or coordinating care by the attending physician.    Terry Acevedo MD [id number]  Subjective/Interval events:  -No events overnight  -This am patient doing well, minimal pain at surgical site, no chest pain/sob, having normal BMs, eating well  -We discussed her debility and open to ALIS to get stronger      MEDICATIONS  (STANDING):  aspirin  chewable 81 milliGRAM(s) Oral every 24 hours  atorvastatin 80 milliGRAM(s) Oral at bedtime  chlorhexidine 4% Liquid 1 Application(s) Topical <User Schedule>  clopidogrel Tablet 75 milliGRAM(s) Oral every 24 hours  heparin   Injectable 5000 Unit(s) SubCutaneous every 12 hours  metoprolol succinate ER 12.5 milliGRAM(s) Oral daily    MEDICATIONS  (PRN):  acetaminophen    Suspension .. 650 milliGRAM(s) Oral every 6 hours PRN Moderate Pain (4 - 6)      Vital Signs Last 24 Hrs  T(C): 36.8 (06 Apr 2021 10:19), Max: 36.9 (06 Apr 2021 04:27)  T(F): 98.3 (06 Apr 2021 10:19), Max: 98.5 (06 Apr 2021 04:27)  HR: 86 (06 Apr 2021 13:30) (69 - 90)  BP: 144/66 (06 Apr 2021 13:30) (111/56 - 152/65)  BP(mean): 93 (06 Apr 2021 11:35) (78 - 98)  RR: 37 (06 Apr 2021 11:35) (16 - 37)  SpO2: 98% (06 Apr 2021 13:30) (94% - 99%)    PHYSICAL EXAM:  GENERAL: pleasant, NAD  NEURO: Aox3, intact strength/sensation all 4 ext, intact CN with noted mild R lower facial weakness stable  HEENT: clear op, mmm, L carotid CEA site with dressing c/d/i   NECK:  No JVD, no lymphadenopathy  CHEST/LUNG: Clear to auscultation bilaterally; No rales, rhonchi, wheezing. Normal work of breathing, not tachypneic  HEART: Regular rate and rhythm; No murmurs, rubs, or gallops  ABDOMEN: Soft, Nontender, Nondistended. Normoactive bowel sounds  EXTREMITIES:  No sig le edema, wwp     LABS (reviewed)   -Hg stable postop 10's   -BMP wnl    ASSESSMENT AND PLAN: 88 F with HTN, prior SBO, chronic back pain 2/2 compression fractures L3/L4, admitted with acute presyncope and blurry vision found to have on workup LICA > 70% s/p CEA post op day 1, doing well without complication.     #LICA sig stenosis s/p CEA   -Post op wound care and pain control per primary team   -Completed post op abx     #Acute blood loss anemia  -Trend Hg, stable currently    #TIA hx  -c/w asa/plavix    #CAD on cath  -C/w asa/statin/metop    #Lower R facial droop  -low c/f embolic phenomenon stable at this time  -close neuro exams    #HTN  -On metop xl per cards  -On amlodipine at home    #Chronic low back pain  -Controlled for now on current pain regimen     #Drug rash to antibiotics  -Resolved now completed abx course    #UTI uncomplicated   -Completed abx course      #DVT px- SQH  #Diet- Dash diet   #Dispo- likely to [facility]- needs updated PT eval (straight medicare so likely can be at [facility] tomorrow if [facility] sent out and places chosen)    Patient was seen and examined by me at bedside    Greater than 35 minutes spent on total encounter; more than 50% of the visit was spent counseling and/or coordinating care by the attending physician.    Terry Acevedo MD [phone]  Subjective/Interval events:  -No events overnight  -This am patient doing well, minimal pain at surgical site, no chest pain/sob, having normal BMs, eating well  -We discussed her debility and open to ALIS to get stronger      MEDICATIONS  (STANDING):  aspirin  chewable 81 milliGRAM(s) Oral every 24 hours  atorvastatin 80 milliGRAM(s) Oral at bedtime  chlorhexidine 4% Liquid 1 Application(s) Topical <User Schedule>  clopidogrel Tablet 75 milliGRAM(s) Oral every 24 hours  heparin   Injectable 5000 Unit(s) SubCutaneous every 12 hours  metoprolol succinate ER 12.5 milliGRAM(s) Oral daily    MEDICATIONS  (PRN):  acetaminophen    Suspension .. 650 milliGRAM(s) Oral every 6 hours PRN Moderate Pain (4 - 6)      Vital Signs Last 24 Hrs  T(C): 36.8 (06 Apr 2021 10:19), Max: 36.9 (06 Apr 2021 04:27)  T(F): 98.3 (06 Apr 2021 10:19), Max: 98.5 (06 Apr 2021 04:27)  HR: 86 (06 Apr 2021 13:30) (69 - 90)  BP: 144/66 (06 Apr 2021 13:30) (111/56 - 152/65)  BP(mean): 93 (06 Apr 2021 11:35) (78 - 98)  RR: 37 (06 Apr 2021 11:35) (16 - 37)  SpO2: 98% (06 Apr 2021 13:30) (94% - 99%)    PHYSICAL EXAM:  GENERAL: pleasant, NAD  NEURO: Aox3, intact strength/sensation all 4 ext, intact CN with noted mild R lower facial weakness stable  HEENT: clear op, mmm, L carotid CEA site with dressing c/d/i   NECK:  No JVD, no lymphadenopathy  CHEST/LUNG: Clear to auscultation bilaterally; No rales, rhonchi, wheezing. Normal work of breathing, not tachypneic  HEART: Regular rate and rhythm; No murmurs, rubs, or gallops  ABDOMEN: Soft, Nontender, Nondistended. Normoactive bowel sounds  EXTREMITIES:  No sig le edema, wwp     LABS (reviewed)   -Hg stable postop 10's   -BMP wnl    ASSESSMENT AND PLAN: 88 F with HTN, prior SBO, chronic back pain 2/2 compression fractures L3/L4, admitted with acute presyncope and blurry vision found to have on workup LICA > 70% s/p CEA post op day 1, doing well without complication.     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, stable EF, medical management per cards  -C/w asa/statin/metop    #Lower R facial droop  -low c/f embolic phenomenon stable at this time  -close neuro exams    #HTN  -On metop xl per cards  -On amlodipine at home    #Chronic low back pain  -Controlled for now on current pain regimen     #Drug rash to antibiotics  -Resolved now completed abx course    #UTI uncomplicated   -Completed abx course      #DVT px- SQH  #Diet- Dash diet   #Dispo- likely to [facility]- needs updated PT eval (straight medicare so likely can be at [facility] tomorrow if [facility] sent out and places chosen)    Patient was seen and examined by me at bedside    Greater than 35 minutes spent on total encounter; more than 50% of the visit was spent counseling and/or coordinating care by the attending physician.    Terry Acevedo MD [phone]

## 2021-04-06 NOTE — PROGRESS NOTE ADULT - SUBJECTIVE AND OBJECTIVE BOX
S: Feels without active complaints - no SOB, dizziness, CP    PAST MEDICAL & SURGICAL HISTORY:  SBO (small bowel obstruction)    HTN (hypertension)    Chronic back pain    S/P wrist surgery        SOCIAL HISTORY:  - Smoking:  - Alcohol:  - Recreational drug use:  - Other:    FAMILY HISTORY:      Allergies    No Known Allergies    Intolerances        MEDICATIONS  (STANDING):  aspirin  chewable 81 milliGRAM(s) Oral every 24 hours  atorvastatin 80 milliGRAM(s) Oral at bedtime  chlorhexidine 4% Liquid 1 Application(s) Topical <User Schedule>  clopidogrel Tablet 75 milliGRAM(s) Oral every 24 hours  heparin   Injectable 5000 Unit(s) SubCutaneous every 12 hours  metoprolol succinate ER 12.5 milliGRAM(s) Oral daily    MEDICATIONS  (PRN):  acetaminophen    Suspension .. 650 milliGRAM(s) Oral every 6 hours PRN Moderate Pain (4 - 6)      REVIEW OF SYSTEMS:  12 point ROS negative except for that stated in the HPI    PHYSICAL EXAM:  Vitals past 24 Hours: T(C): 37.6 (04-06-21 @ 18:00), Max: 37.6 (04-06-21 @ 18:00)  HR: 88 (04-06-21 @ 17:40) (74 - 90)  BP: 144/63 (04-06-21 @ 17:40) (114/56 - 152/65)  RR: 20 (04-06-21 @ 17:40) (19 - 37)  SpO2: 98% (04-06-21 @ 17:40) (94% - 99%)	    Daily     Daily     GEN: Alert and awake, no acute distress  HEENT: Moist mucous membranes  Neck: No JVD  Cardiovascular: Regular rate and rhythm, +S1 S2. No murmurs, rubs, or gallops appreciated  Respiratory: Lungs clear to auscultation bilaterally  Gastrointestinal:  Soft, non-tender, non-distended, normoactive bowel sounds  Skin: No rashes, No ecchymoses, No cyanosis  Neurologic: Non-focal, alert and oriented x3.   Extremities: No clubbing, cyanosis or edema. Warm, well-perfused extremities.   Vascular: Radial and dorsalis pedis pulses 2+ bilaterally    I&O's Detail    05 Apr 2021 07:01  -  06 Apr 2021 07:00  --------------------------------------------------------  IN:    IV PiggyBack: 500 mL    Lactated Ringers: 350 mL  Total IN: 850 mL    OUT:    Voided (mL): 200 mL  Total OUT: 200 mL    Total NET: 650 mL      06 Apr 2021 07:01  -  06 Apr 2021 20:01  --------------------------------------------------------  IN:    Oral Fluid: 485 mL  Total IN: 485 mL    OUT:    Voided (mL): 150 mL  Total OUT: 150 mL    Total NET: 335 mL            LABS:                        10.3   9.47  )-----------( 426      ( 06 Apr 2021 05:33 )             33.0     04-06    139  |  105  |  11  ----------------------------<  77  4.0   |  22  |  0.70    Ca    8.2<L>      06 Apr 2021 05:33  Phos  3.3     04-06  Mg     1.9     04-06          PT/INR - ( 05 Apr 2021 14:03 )   PT: 13.3 sec;   INR: 1.11          PTT - ( 05 Apr 2021 14:03 )  PTT:28.8 sec    I&O's Summary    05 Apr 2021 07:01  -  06 Apr 2021 07:00  --------------------------------------------------------  IN: 850 mL / OUT: 200 mL / NET: 650 mL    06 Apr 2021 07:01  -  06 Apr 2021 20:01  --------------------------------------------------------  IN: 485 mL / OUT: 150 mL / NET: 335 mL        CARDIAC DIAGNOSTIC TESTING:    ECG:    TELEMETRY:    ECHO:      RADIOLOGY & ADDITIONAL STUDIES:      ASSESSMENT/PLAN:

## 2021-04-06 NOTE — PROGRESS NOTE ADULT - NUTRITIONAL ASSESSMENT
This patient has been assessed with a concern for Malnutrition and has been determined to have a diagnosis/diagnoses of Moderate protein-calorie malnutrition and Underweight (BMI < 19).    This patient is being managed with:   Diet Regular-  DASH/TLC {Sodium & Cholesterol Restricted} (DASH)  Entered: Apr 5 2021  4:39PM

## 2021-04-06 NOTE — PROGRESS NOTE ADULT - ASSESSMENT
88 y.o. F PMH HTN, prior SBO (2018), chronic back pain (2/2 prior compression L3 L4 fractures), cellulitis admitted for sepsis 2/2 UTI. Vascular consulted for L ICA stenosis > 70%, asymptomatic. Cardiac cath demonstrating 3 vessel disease with postop duplex demonstrating no pseudoaneurysm, managed medically. Admitted to SICU s/p L CEA (4/5) for HD and neuro monitoring.    Neuro: neuro intact except for lower R facial droop, tylenol prn, NO narcotics, notify vascular surgery for headache or increased pain  CV: HD stable SBP goal > 100, use noninvasive cuff, levo ggt if needed, cards at bedside postop-postop ekg ok  Pulm: Satting well on NC  GI/FEN: LR@50, Dash diet  :voids  ID: Ancef 24 hours post op  Endo:  ISS  Heme: [id number], Plavix 75 mg, HSQ in AM on 4/6  PPX: SCDs  Lines: PIVs, [place]  Wounds: L neck wound with dressing  PT/OT: not ordered, strict bedrest until 4/6, head of bed elevated >30*   88 y.o. F PMH HTN, prior SBO (2018), chronic back pain (2/2 prior compression L3 L4 fractures), cellulitis admitted for sepsis 2/2 UTI. Vascular consulted for L ICA stenosis > 70%, asymptomatic. Cardiac cath demonstrating 3 vessel disease with postop duplex demonstrating no pseudoaneurysm, managed medically. Admitted to SICU s/p L CEA (4/5) for HD and neuro monitoring.    Neuro: neuro intact except for lower R facial droop, tylenol prn, NO narcotics, notify vascular surgery for headache or increased pain  CV: HD stable SBP goal > 100, use noninvasive cuff; Toprol XL 12.5  Pulm: Satting well on NC  GI/FEN: Dash diet  :voids  ID: per-op Ancef (4/6)  Endo:  ISS  Heme: [id number], Plavix 75 mg, HSQ  PPX: SCDs, HSQ   Lines: PIVs  Wounds: L neck wound with dressing  PT/OT: ordered, head of bed elevated >30*\    Dispo: SDU to tele

## 2021-04-06 NOTE — PROGRESS NOTE ADULT - SUBJECTIVE AND OBJECTIVE BOX
STATUS POST:  POD #1 s/p L CEA     SUBJECTIVE: Examined at the bedside in the am resting comfortably in bed. States I feel fair No neck pain, swelling, sob, or hoarseness. No acute complaints      aspirin  chewable 81 milliGRAM(s) Oral every 24 hours  clopidogrel Tablet 75 milliGRAM(s) Oral every 24 hours      Vital Signs Last 24 Hrs  T(C): 36.9 (06 Apr 2021 04:27), Max: 36.9 (06 Apr 2021 04:27)  T(F): 98.5 (06 Apr 2021 04:27), Max: 98.5 (06 Apr 2021 04:27)  HR: 88 (06 Apr 2021 08:00) (69 - 88)  BP: 124/73 (06 Apr 2021 08:00) (107/53 - 147/66)  BP(mean): 91 (06 Apr 2021 08:00) (75 - 98)  RR: 34 (06 Apr 2021 08:00) (16 - 34)  SpO2: 96% (06 Apr 2021 08:00) (94% - 99%)  I&O's Detail    05 Apr 2021 07:01  -  06 Apr 2021 07:00  --------------------------------------------------------  IN:    IV PiggyBack: 500 mL    Lactated Ringers: 350 mL  Total IN: 850 mL    OUT:    Voided (mL): 200 mL  Total OUT: 200 mL    Total NET: 650 mL          Physical Exam  General: NAD, resting comfortably in bed  HEENT: Neck soft and supple, no palpable pulsatile masses, dressing c/d/i  C/V: NSR  Pulm: Nonlabored breathing, no respiratory distress  Abd: soft, non-tender, non-distended.  Extrem: WWP, no edema,  Neuro: A/O x 3, CNs II-XII grossly intact, no focal deficits, normal sensation    LABS:                        10.3   9.47  )-----------( 426      ( 06 Apr 2021 05:33 )             33.0     04-06    139  |  105  |  11  ----------------------------<  77  4.0   |  22  |  0.70    Ca    8.2<L>      06 Apr 2021 05:33  Phos  3.3     04-06  Mg     1.9     04-06      PT/INR - ( 05 Apr 2021 14:03 )   PT: 13.3 sec;   INR: 1.11          PTT - ( 05 Apr 2021 14:03 )  PTT:28.8 sec      RADIOLOGY & ADDITIONAL STUDIES:

## 2021-04-06 NOTE — PROGRESS NOTE ADULT - ASSESSMENT
88 F with HTN admitted with dizziness/bilateral blurry vision found to have left vertebral stenosis v4 segment and LICA > 70%  -s/p L CEA 4/5  -With improved BP and dizziness today, BP improved post IVF  -Post op EKG NSR with ST changes inferolaterally  (unchanged from baseline EKG)  -s/p cath 4/1: occluded RCA and Cx with diffuse 80% mLAD  -Restart ASA/plavix per surgery, cont atorva   -Recommending starting  low dose metoprolol tartrate 12.5 mg po qd given known CAD and need to optimize med tx for extensive CAD  -Will continue to follow

## 2021-04-06 NOTE — PROGRESS NOTE ADULT - ASSESSMENT
88 y.o. F PMH HTN, prior SBO (2018), chronic back pain (2/2 prior compression L3 L4 fractures), cellulitis admitted for sepsis 2/2 UTI. Vascular consulted for L ICA stenosis > 70%, asymptomatic. Cardiac cath demonstrating 3 vessel disease with postop duplex demonstrating no pseudoaneurysm, managed medically. POD # 1 s/p L CEA. BP stable. Clinically stable.     Recommend OOB and ambulation  PT consult  Start Beta blocker per cards  Start SQH  Step down  Plan discussed w/ attending and chief resident

## 2021-04-07 ENCOUNTER — TRANSCRIPTION ENCOUNTER (OUTPATIENT)
Age: 86
End: 2021-04-07

## 2021-04-07 VITALS — TEMPERATURE: 98 F

## 2021-04-07 PROBLEM — I10 ESSENTIAL (PRIMARY) HYPERTENSION: Chronic | Status: ACTIVE | Noted: 2021-03-29

## 2021-04-07 PROBLEM — M54.9 DORSALGIA, UNSPECIFIED: Chronic | Status: ACTIVE | Noted: 2021-03-29

## 2021-04-07 PROCEDURE — 99233 SBSQ HOSP IP/OBS HIGH 50: CPT

## 2021-04-07 PROCEDURE — 82962 GLUCOSE BLOOD TEST: CPT

## 2021-04-07 PROCEDURE — 70450 CT HEAD/BRAIN W/O DYE: CPT

## 2021-04-07 PROCEDURE — 84443 ASSAY THYROID STIM HORMONE: CPT

## 2021-04-07 PROCEDURE — 70498 CT ANGIOGRAPHY NECK: CPT

## 2021-04-07 PROCEDURE — 88311 DECALCIFY TISSUE: CPT

## 2021-04-07 PROCEDURE — 99285 EMERGENCY DEPT VISIT HI MDM: CPT | Mod: 25

## 2021-04-07 PROCEDURE — C9399: CPT

## 2021-04-07 PROCEDURE — 84100 ASSAY OF PHOSPHORUS: CPT

## 2021-04-07 PROCEDURE — 93306 TTE W/DOPPLER COMPLETE: CPT

## 2021-04-07 PROCEDURE — C1887: CPT

## 2021-04-07 PROCEDURE — 36415 COLL VENOUS BLD VENIPUNCTURE: CPT

## 2021-04-07 PROCEDURE — 86901 BLOOD TYPING SEROLOGIC RH(D): CPT

## 2021-04-07 PROCEDURE — 83930 ASSAY OF BLOOD OSMOLALITY: CPT

## 2021-04-07 PROCEDURE — U0005: CPT

## 2021-04-07 PROCEDURE — 84484 ASSAY OF TROPONIN QUANT: CPT

## 2021-04-07 PROCEDURE — 83036 HEMOGLOBIN GLYCOSYLATED A1C: CPT

## 2021-04-07 PROCEDURE — A9500: CPT

## 2021-04-07 PROCEDURE — 81001 URINALYSIS AUTO W/SCOPE: CPT

## 2021-04-07 PROCEDURE — 80307 DRUG TEST PRSMV CHEM ANLYZR: CPT

## 2021-04-07 PROCEDURE — C1769: CPT

## 2021-04-07 PROCEDURE — 97530 THERAPEUTIC ACTIVITIES: CPT

## 2021-04-07 PROCEDURE — 88304 TISSUE EXAM BY PATHOLOGIST: CPT

## 2021-04-07 PROCEDURE — U0003: CPT

## 2021-04-07 PROCEDURE — 97116 GAIT TRAINING THERAPY: CPT

## 2021-04-07 PROCEDURE — 93880 EXTRACRANIAL BILAT STUDY: CPT

## 2021-04-07 PROCEDURE — 85025 COMPLETE CBC W/AUTO DIFF WBC: CPT

## 2021-04-07 PROCEDURE — 80048 BASIC METABOLIC PNL TOTAL CA: CPT

## 2021-04-07 PROCEDURE — 83690 ASSAY OF LIPASE: CPT

## 2021-04-07 PROCEDURE — 81025 URINE PREGNANCY TEST: CPT

## 2021-04-07 PROCEDURE — A9505: CPT

## 2021-04-07 PROCEDURE — 87086 URINE CULTURE/COLONY COUNT: CPT

## 2021-04-07 PROCEDURE — 71045 X-RAY EXAM CHEST 1 VIEW: CPT

## 2021-04-07 PROCEDURE — 85027 COMPLETE CBC AUTOMATED: CPT

## 2021-04-07 PROCEDURE — 87635 SARS-COV-2 COVID-19 AMP PRB: CPT

## 2021-04-07 PROCEDURE — C1889: CPT

## 2021-04-07 PROCEDURE — 83721 ASSAY OF BLOOD LIPOPROTEIN: CPT

## 2021-04-07 PROCEDURE — 80053 COMPREHEN METABOLIC PANEL: CPT

## 2021-04-07 PROCEDURE — 85730 THROMBOPLASTIN TIME PARTIAL: CPT

## 2021-04-07 PROCEDURE — 83735 ASSAY OF MAGNESIUM: CPT

## 2021-04-07 PROCEDURE — 93926 LOWER EXTREMITY STUDY: CPT

## 2021-04-07 PROCEDURE — 83605 ASSAY OF LACTIC ACID: CPT

## 2021-04-07 PROCEDURE — 86769 SARS-COV-2 COVID-19 ANTIBODY: CPT

## 2021-04-07 PROCEDURE — 70551 MRI BRAIN STEM W/O DYE: CPT

## 2021-04-07 PROCEDURE — 87040 BLOOD CULTURE FOR BACTERIA: CPT

## 2021-04-07 PROCEDURE — 85610 PROTHROMBIN TIME: CPT

## 2021-04-07 PROCEDURE — 83880 ASSAY OF NATRIURETIC PEPTIDE: CPT

## 2021-04-07 PROCEDURE — 86850 RBC ANTIBODY SCREEN: CPT

## 2021-04-07 PROCEDURE — 93005 ELECTROCARDIOGRAM TRACING: CPT

## 2021-04-07 PROCEDURE — C1894: CPT

## 2021-04-07 PROCEDURE — 93017 CV STRESS TEST TRACING ONLY: CPT

## 2021-04-07 PROCEDURE — 78452 HT MUSCLE IMAGE SPECT MULT: CPT

## 2021-04-07 PROCEDURE — 70496 CT ANGIOGRAPHY HEAD: CPT

## 2021-04-07 PROCEDURE — 86900 BLOOD TYPING SEROLOGIC ABO: CPT

## 2021-04-07 RX ORDER — METOPROLOL TARTRATE 50 MG
12.5 TABLET ORAL
Qty: 0 | Refills: 0 | DISCHARGE
Start: 2021-04-07

## 2021-04-07 RX ORDER — ASPIRIN/CALCIUM CARB/MAGNESIUM 324 MG
1 TABLET ORAL
Qty: 0 | Refills: 0 | DISCHARGE
Start: 2021-04-07

## 2021-04-07 RX ORDER — CLOPIDOGREL BISULFATE 75 MG/1
1 TABLET, FILM COATED ORAL
Qty: 0 | Refills: 0 | DISCHARGE
Start: 2021-04-07

## 2021-04-07 RX ORDER — ATORVASTATIN CALCIUM 80 MG/1
1 TABLET, FILM COATED ORAL
Qty: 0 | Refills: 0 | DISCHARGE
Start: 2021-04-07

## 2021-04-07 RX ADMIN — CLOPIDOGREL BISULFATE 75 MILLIGRAM(S): 75 TABLET, FILM COATED ORAL at 07:00

## 2021-04-07 RX ADMIN — Medication 81 MILLIGRAM(S): at 07:00

## 2021-04-07 RX ADMIN — HEPARIN SODIUM 5000 UNIT(S): 5000 INJECTION INTRAVENOUS; SUBCUTANEOUS at 05:24

## 2021-04-07 RX ADMIN — Medication 12.5 MILLIGRAM(S): at 05:23

## 2021-04-07 NOTE — DISCHARGE NOTE PROVIDER - HOSPITAL COURSE
88 y.o, F PMH HTN, prior SBO (2018), chronic back pain (2/2 prior compression L3 L4 fractures), cellulitis BIBEMS from home with chief complaint 1 hour of blurry vision/lightheadedness which started on afternoon of admission.  Patient states she was lying in bed reading a book when she began to feel "dizzy" - no room spinning just that blurry vision and lightheadedness. Patient was able to stand up and walk around but felt unsteady- uses a walker at all times. Patient called next door neighbor/ friend who told her to call ambulance. Per patient, symptoms resolved by the time she got to the ED. Denies prior episodes. Denies headache, f/c, chest pain, palpitations, SOB, cough, abdominal pain, N/V, flank pain, diarrhea/constipation, hematuria, vaginal bleeding, dysuria, hematuria, change in urinary frequency. No bladder/bowel incontinence. Received both COVID vaccines.    Pt admitted medicine via ER 3/29/21 with possible TIA and UTI meeting sepsis criteria on admission. She was started on ceftriaxone. Neuro consulted. Recommended ASA/Statin/CTA /MRI and TTE.  Vascular consulted for CTA revealing LICA 73% stenosis. High grade LICA stenosis confirmed on duplex scan. Neuro confirmed no TIA. Cards consulted for preop CEA. Echo showed EF 50-60% with regional wall motion abnormality.  Nuclear stress test showed small mild reversible defect that correlated with TTE.  She was taken for cardiac cath on 4/1/21. Revealing 3V CAD. Findings: Left dominant, LM normal, pLCx 100% , p+mLAD 80% (severely calcified), mRCA 100% (small).  Medical management advised. She was taken to OR 4/5/21. She underwent left CEA. She tolerated procedure well. She was transferred to SICU for advanced monitoring. She had slight right facial related to left marginal mandibular affecting left side of mouth. Otherwise no neuro deficits. Her post op course was unremarkable. She was stepped down from SICU. PT evaluation recommended [facility].    88 y.o, F PMH HTN, prior SBO (2018), chronic back pain (2/2 prior compression L3 L4 fractures), cellulitis BIBEMS from home with chief complaint 1 hour of blurry vision/lightheadedness which started on afternoon of admission.  Patient states she was lying in bed reading a book when she began to feel "dizzy" - no room spinning just that blurry vision and lightheadedness. Patient was able to stand up and walk around but felt unsteady- uses a walker at all times. Patient called next door neighbor/ friend who told her to call ambulance. Per patient, symptoms resolved by the time she got to the ED. Denies prior episodes. Denies headache, f/c, chest pain, palpitations, SOB, cough, abdominal pain, N/V, flank pain, diarrhea/constipation, hematuria, vaginal bleeding, dysuria, hematuria, change in urinary frequency. No bladder/bowel incontinence. Received both COVID vaccines.    Pt admitted medicine via ER 3/29/21 with possible TIA and UTI meeting sepsis criteria on admission. She was started on ceftriaxone. Neuro consulted. Recommended ASA/Statin/CTA /MRI and TTE.  Vascular consulted for CTA revealing LICA 73% stenosis. High grade LICA stenosis confirmed on duplex scan. Neuro confirmed no TIA. Cards consulted for preop CEA. Echo showed EF 50-60% with regional wall motion abnormality.  Nuclear stress test showed small mild reversible defect that correlated with TTE.  She was taken for cardiac cath on 4/1/21. Revealing 3V CAD. Findings: Left dominant, LM normal, pLCx 100% , p+mLAD 80% (severely calcified), mRCA 100% (small).  Medical management advised. She was taken to OR 4/5/21. She underwent left CEA. She tolerated procedure well. She was transferred to SICU for advanced monitoring. She had slight right facial related to left marginal mandibular affecting left side of mouth. Otherwise no neuro deficits. Her post op course was unremarkable. She was stepped down from SICU. PT evaluation recommended [facility].  Pt was discharged in stable condition to [facility].

## 2021-04-07 NOTE — DISCHARGE NOTE PROVIDER - NSDCCPCAREPLAN_GEN_ALL_CORE_FT
PRINCIPAL DISCHARGE DIAGNOSIS  Diagnosis: UTI (urinary tract infection)  Assessment and Plan of Treatment:       SECONDARY DISCHARGE DIAGNOSES  Diagnosis: Generalized weakness  Assessment and Plan of Treatment:     Diagnosis: Dizziness  Assessment and Plan of Treatment:     Diagnosis: Uncomplicated sepsis  Assessment and Plan of Treatment: meet sepsis criteria on admission due to UTI.    Diagnosis: Anemia due to acute blood loss  Assessment and Plan of Treatment:     Diagnosis: Triple vessel coronary artery disease  Assessment and Plan of Treatment:     Diagnosis: Hypokalemia  Assessment and Plan of Treatment:     Diagnosis: Carotid artery stenosis, asymptomatic  Assessment and Plan of Treatment:

## 2021-04-07 NOTE — PROGRESS NOTE ADULT - SUBJECTIVE AND OBJECTIVE BOX
S: Continues to feel well, without active complaints      PAST MEDICAL & SURGICAL HISTORY:  SBO (small bowel obstruction)    HTN (hypertension)    Chronic back pain    S/P wrist surgery        SOCIAL HISTORY:  - Smoking:  - Alcohol:  - Recreational drug use:  - Other:    FAMILY HISTORY:      Allergies    No Known Allergies    Intolerances        MEDICATIONS  (STANDING):  aspirin  chewable 81 milliGRAM(s) Oral every 24 hours  atorvastatin 80 milliGRAM(s) Oral at bedtime  chlorhexidine 4% Liquid 1 Application(s) Topical <User Schedule>  clopidogrel Tablet 75 milliGRAM(s) Oral every 24 hours  heparin   Injectable 5000 Unit(s) SubCutaneous every 12 hours  metoprolol succinate ER 12.5 milliGRAM(s) Oral daily    MEDICATIONS  (PRN):  acetaminophen    Suspension .. 650 milliGRAM(s) Oral every 6 hours PRN Moderate Pain (4 - 6)      REVIEW OF SYSTEMS:  12 point ROS negative except for that stated in the HPI    PHYSICAL EXAM:  Vitals past 24 Hours: T(C): 36.9 (04-07-21 @ 18:14), Max: 37.4 (04-07-21 @ 09:34)  HR: 73 (04-07-21 @ 11:22) (73 - 90)  BP: 159/72 (04-07-21 @ 17:10) (118/51 - 159/72)  RR: 18 (04-07-21 @ 17:10) (18 - 19)  SpO2: 95% (04-07-21 @ 11:22) (95% - 98%)	    Daily     Daily     GEN: Alert and awake, no acute distress  HEENT: Moist mucous membranes  Neck: No JVD  Cardiovascular: Regular rate and rhythm, +S1 S2. No murmurs, rubs, or gallops appreciated  Respiratory: Lungs clear to auscultation bilaterally  Gastrointestinal:  Soft, non-tender, non-distended, normoactive bowel sounds  Skin: No rashes, No ecchymoses, No cyanosis  Neurologic: Non-focal, alert and oriented x3.   Extremities: No clubbing, cyanosis or edema. Warm, well-perfused extremities.   Vascular: Radial and dorsalis pedis pulses 2+ bilaterally    I&O's Detail    06 Apr 2021 07:01  -  07 Apr 2021 07:00  --------------------------------------------------------  IN:    Oral Fluid: 605 mL  Total IN: 605 mL    OUT:    Voided (mL): 350 mL  Total OUT: 350 mL    Total NET: 255 mL      07 Apr 2021 07:01  -  07 Apr 2021 18:50  --------------------------------------------------------  IN:    Oral Fluid: 300 mL  Total IN: 300 mL    OUT:    Voided (mL): 250 mL  Total OUT: 250 mL    Total NET: 50 mL            LABS:                        10.3   9.47  )-----------( 426      ( 06 Apr 2021 05:33 )             33.0     04-06    139  |  105  |  11  ----------------------------<  77  4.0   |  22  |  0.70    Ca    8.2<L>      06 Apr 2021 05:33  Phos  3.3     04-06  Mg     1.9     04-06              I&O's Summary    06 Apr 2021 07:01  -  07 Apr 2021 07:00  --------------------------------------------------------  IN: 605 mL / OUT: 350 mL / NET: 255 mL    07 Apr 2021 07:01  -  07 Apr 2021 18:50  --------------------------------------------------------  IN: 300 mL / OUT: 250 mL / NET: 50 mL        CARDIAC DIAGNOSTIC TESTING:    ECG:    TELEMETRY:    ECHO:      RADIOLOGY & ADDITIONAL STUDIES:      ASSESSMENT/PLAN:   S: Continues to feel well, without active complaints      PAST MEDICAL & SURGICAL HISTORY:  SBO (small bowel obstruction)    HTN (hypertension)    Chronic back pain    S/P wrist surgery        SOCIAL HISTORY:  - Smoking:  - Alcohol:  - Recreational drug use:  - Other:    FAMILY HISTORY:      Allergies    No Known Allergies    Intolerances        MEDICATIONS  (STANDING):  aspirin  chewable 81 milliGRAM(s) Oral every 24 hours  atorvastatin 80 milliGRAM(s) Oral at bedtime  chlorhexidine 4% Liquid 1 Application(s) Topical <User Schedule>  clopidogrel Tablet 75 milliGRAM(s) Oral every 24 hours  heparin   Injectable 5000 Unit(s) SubCutaneous every 12 hours  metoprolol succinate ER 12.5 milliGRAM(s) Oral daily    MEDICATIONS  (PRN):  acetaminophen    Suspension .. 650 milliGRAM(s) Oral every 6 hours PRN Moderate Pain (4 - 6)      REVIEW OF SYSTEMS:  12 point ROS negative except for that stated in the HPI    PHYSICAL EXAM:  Vitals past 24 Hours: T(C): 36.9 (04-07-21 @ 18:14), Max: 37.4 (04-07-21 @ 09:34)  HR: 73 (04-07-21 @ 11:22) (73 - 90)  BP: 159/72 (04-07-21 @ 17:10) (118/51 - 159/72)  RR: 18 (04-07-21 @ 17:10) (18 - 19)  SpO2: 95% (04-07-21 @ 11:22) (95% - 98%)	    Daily     Daily     GEN: Alert and awake, no acute distress  HEENT: Moist mucous membranes  Neck: No JVD  Cardiovascular: Regular rate and rhythm, +S1 S2. No murmurs, rubs, or gallops appreciated  Respiratory: Lungs clear to auscultation bilaterally  Gastrointestinal:  Soft, non-tender, non-distended, normoactive bowel sounds  Skin: No rashes, No ecchymoses, No cyanosis  Neurologic: Non-focal, alert and oriented x3.   Extremities: No clubbing, cyanosis or edema. Warm, well-perfused extremities.   Vascular: Radial and dorsalis pedis pulses 2+ bilaterally    I&O's Detail    06 Apr 2021 07:01  -  07 Apr 2021 07:00  --------------------------------------------------------  IN:    Oral Fluid: 605 mL  Total IN: 605 mL    OUT:    Voided (mL): 350 mL  Total OUT: 350 mL    Total NET: 255 mL      07 Apr 2021 07:01  -  07 Apr 2021 18:50  --------------------------------------------------------  IN:    Oral Fluid: 300 mL  Total IN: 300 mL    OUT:    Voided (mL): 250 mL  Total OUT: 250 mL    Total NET: 50 mL            LABS:                        10.3   9.47  )-----------( 426      ( 06 Apr 2021 05:33 )             33.0     04-06    139  |  105  |  11  ----------------------------<  77  4.0   |  22  |  0.70    Ca    8.2<L>      06 Apr 2021 05:33  Phos  3.3     04-06  Mg     1.9     04-06              I&O's Summary    06 Apr 2021 07:01  -  07 Apr 2021 07:00  --------------------------------------------------------  IN: 605 mL / OUT: 350 mL / NET: 255 mL    07 Apr 2021 07:01  -  07 Apr 2021 18:50  --------------------------------------------------------  IN: 300 mL / OUT: 250 mL / NET: 50 mL            TELEMETRY: DANIELE

## 2021-04-07 NOTE — DISCHARGE NOTE PROVIDER - CARE PROVIDERS DIRECT ADDRESSES
[email or web address]

## 2021-04-07 NOTE — PROGRESS NOTE ADULT - ASSESSMENT
88 F with HTN admitted with dizziness/bilateral blurry vision found to have left vertebral stenosis v4 segment and LICA > 70%  -s/p L CEA 4/5  -Doing well without active complaints  -Post op EKG NSR with ST changes inferolaterally  (unchanged from baseline EKG)  -s/p cath 4/1: occluded RCA and Cx with diffuse 80% mLAD  -Restart ASA/plavix per surgery, cont atorva   -Cont metoprolol tartrate 12.5 mg po qd given known CAD and need to optimize med tx for extensive CAD  -Can fu with me as outpatient in 1-2 weeks

## 2021-04-07 NOTE — PROGRESS NOTE ADULT - ASSESSMENT
per Internal Medicine    89 yo  F with HTN, prior SBO, chronic back pain 2/2 compression fractures L3/L4, admitted with acute presyncope and blurry vision found to have on workup LICA > 70% s/p CEA post op day 2, doing well without complication.     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, stable EF, medical management per cards  -C/w asa/statin as above and metop   -Can have cards f/u on discharge     #HTN  -On metop xl per cards- BP controlled   -On amlodipine at home prior to admission     #Chronic low back pain  -Controlled for now on current pain regimen     #Drug rash to antibiotics  -Resolved now completed abx course    #UTI uncomplicated   -Completed abx course      #DVT px- SQH  #Diet- Dash diet   #Dispo- 2 person assist per nursing- PT eval today, pt amenable to [facility], straight medicaid so can be at [facility] today. I personally called PT to see patient early today. if home per PT, can be home today with home PT and home services (wound care, med recon as several new meds)

## 2021-04-07 NOTE — DISCHARGE NOTE PROVIDER - NSDCMRMEDTOKEN_GEN_ALL_CORE_FT
Tylenol 325 mg oral capsule: 1 cap(s) orally 2 times a day   aspirin 81 mg oral tablet, chewable: 1 tab(s) orally every 24 hours  atorvastatin 80 mg oral tablet: 1 tab(s) orally once a day (at bedtime)  clopidogrel 75 mg oral tablet: 1 tab(s) orally every 24 hours  metoprolol: 12.5 milligram(s) orally once a day  Tylenol 325 mg oral capsule: 1 cap(s) orally 2 times a day

## 2021-04-07 NOTE — DISCHARGE NOTE PROVIDER - CARE PROVIDER_API CALL
Danitza Weir)  LX [facility] Surgery  Vascular  [address] [place] [address]  Phone: [phone]  Fax: [phone]  Follow Up Time:     Jenny Rivera)  Cardiovascular Disease; Internal Medicine  [address] [place] [address], [address], [address]rd [place] [address]  Phone: [phone]  Fax: [phone]  Follow Up Time: 2 weeks   Danitza Weir)  [facility] [email or web address]] Surgery  Vascular  [address] [place] [address]  Phone: [phone]  Fax: [phone]  Scheduled Appointment: 04/15/2021 11:45 AM    Jenny Rivera)  Cardiovascular Disease; Internal Medicine  [address] [place] [address], [address], [address]rd [place] [address]  Phone: [phone]  Fax: [phone]  Follow Up Time: 2 weeks

## 2021-04-07 NOTE — DISCHARGE NOTE PROVIDER - NSDCFUADDINST_GEN_ALL_CORE_FT
FOLLOW UP:  Dr. Weir. Your appointment has been made for _______.   CALL OFFICE FOR CONERNS:   [phone] with any questions.  Call the office if you experience increasing pain, redness, swelling or drainage from incision sites/wounds, or temperature >101.4F. Call for persistent headache not relieved by Extra strength Tylenol.   WOUND CARE:  Clean left neck wound daily with soap and water or peroxide.  You may shower.  Allow soap and water to run over incision sites. Do not scrub. Pat dry.    ACTIVITY:  Ambulate as tolerated with assistance. Physical therapy per routine.   DIET:   Regular diet.    NEW MEDICATIONS:  Aspirin enteric coated 81mg daily.  Plavix 75mg daily  Metoprolol 12.5mg daily  Atorvastatin 80mg at bedtime.   ADDITIONAL FOLLOW UP AFTER DISCHARGE:    DISCHARGE DESTINATION:  [facility]   FOLLOW UP:  Dr. Weir. Your appointment has been made for _______.   CALL OFFICE FOR CONERNS:   [phone] with any questions.  Call the office if you experience increasing pain, redness, swelling or drainage from incision sites/wounds, or temperature >101.4F. Call for persistent headache not relieved by Extra strength Tylenol.   WOUND CARE:  Clean left neck wound daily with soap and water or peroxide.  You may shower.  Allow soap and water to run over incision sites. Do not scrub. Pat dry.    ACTIVITY:  Ambulate as tolerated with assistance. Physical therapy per routine.   DIET:   Regular diet.    NEW MEDICATIONS:  Aspirin enteric coated 81mg daily.  Plavix 75mg daily  Metoprolol 12.5mg daily  Atorvastatin 80mg at bedtime.   ADDITIONAL FOLLOW UP AFTER DISCHARGE:  Jenny Hodges cardiologist. Follow up in 2 weeks. Call office for appointment.     DISCHARGE DESTINATION:  [facility]   FOLLOW UP:  Dr. Weir. Your appointment has been made for Thursday, April 15 at 11:45am.   CALL OFFICE FOR CONERNS:   [phone] with any questions.  Call the office if you experience increasing pain, redness, swelling or drainage from incision sites/wounds, or temperature >101.4F. Call for persistent headache not relieved by Extra strength Tylenol.   WOUND CARE:  Clean left neck wound daily with soap and water or peroxide.  You may shower.  Allow soap and water to run over incision sites. Do not scrub. Pat dry.    ACTIVITY:  Ambulate as tolerated with assistance. Physical therapy per routine.   DIET:   Regular diet.    NEW MEDICATIONS:  Aspirin enteric coated 81mg daily.  Plavix 75mg daily  Metoprolol 12.5mg daily  Atorvastatin 80mg at bedtime.   ADDITIONAL FOLLOW UP AFTER DISCHARGE:  Jenny Hodges, cardiologist. Follow up in 2 weeks. Call office for appointment.     DISCHARGE DESTINATION:  [facility]

## 2021-04-07 NOTE — DISCHARGE NOTE PROVIDER - NSDCCPTREATMENT_GEN_ALL_CORE_FT
PRINCIPAL PROCEDURE  Procedure: Left carotid endarterectomy  Findings and Treatment:       SECONDARY PROCEDURE  Procedure: Complete cardiac catheterization  Findings and Treatment:

## 2021-04-07 NOTE — PROGRESS NOTE ADULT - TIME-BASED BILLING (NON-CRITICAL CARE)
Time-based billing (NON-critical care)

## 2021-04-07 NOTE — PROGRESS NOTE ADULT - SUBJECTIVE AND OBJECTIVE BOX
24hr Events:  O/N: 6pm PVR 70ml, Voided 150ml, bladder scan after about 40ml  4/6: started SQH, toprol 12.5 per cards, mandeep removed. Stepped down. Vu reg diet. PT recommends home w/o PT.        88 y.o. F PMH HTN, prior SBO (2018), chronic back pain (2/2 prior compression L3 L4 fractures), cellulitis admitted for sepsis 2/2 UTI. Vascular consulted for L ICA stenosis > 70%, asymptomatic. Cardiac cath demonstrating 3 vessel disease with postop duplex demonstrating no pseudoaneurysm, managed medically. Admitted to SICU s/p L CEA (4/5) for HD and neuro monitoring.    Neuro: neuro intact except for lower R facial droop, tylenol prn, NO narcotics  CV: HD stable SBP goal > 100, use noninvasive cuff; Toprol XL 12.5  Pulm: Satting well on NC  GI/FEN: Dash diet  :voids  ID: per-op Ancef (4/6)  Endo:  ISS  Heme: ASA81, Plavix 75 mg, HSQ  PPX: SCDs, HSQ   Lines: PIVs  Wounds: L neck wound with dressing  PT/OT: ordered, head of bed elevated >30*\     24hr Events:  O/N: 6pm PVR 70ml, Voided 150ml, bladder scan after about 40ml  4/6: started SQH, toprol 12.5 per cards, mandeep removed. Stepped down. Vu reg diet. PT recommends home w/o PT.    Subjective: Seen and evaluated at bedside. Resting comfortably in bed. Denies any f/c, CP, SOB, changes in vision, weakness in extremities, or HA.     aspirin  chewable 81  clopidogrel Tablet 75  heparin   Injectable 5000  metoprolol succinate ER 12.5      Allergies    No Known Allergies    Intolerances      Vital Signs Last 24 Hrs  T(C): 37.4 (07 Apr 2021 09:34), Max: 37.6 (06 Apr 2021 18:00)  T(F): 99.3 (07 Apr 2021 09:34), Max: 99.6 (06 Apr 2021 18:00)  HR: 73 (07 Apr 2021 11:22) (73 - 90)  BP: 123/57 (07 Apr 2021 11:22) (118/51 - 149/65)  BP(mean): 93 (06 Apr 2021 11:35) (93 - 93)  RR: 18 (07 Apr 2021 11:22) (18 - 37)  SpO2: 95% (07 Apr 2021 11:22) (95% - 98%)  I&O's Summary    06 Apr 2021 07:01  -  07 Apr 2021 07:00  --------------------------------------------------------  IN: 605 mL / OUT: 350 mL / NET: 255 mL    07 Apr 2021 07:01  -  07 Apr 2021 11:33  --------------------------------------------------------  IN: 180 mL / OUT: 0 mL / NET: 180 mL        Physical Exam  General: NAD, resting comfortably in bed  HEENT: Neck soft and supple, no palpable pulsatile masses  C/V: NSR  Pulm: Nonlabored breathing, no respiratory distress  Extrem: WWP, no edema,  Neuro: A/O x 3, CNs II-XII grossly intact, slight right sided facial droop, normal sensation  Incision: left neck vertical incision c/d/i without surrounding erythema or echymosis    LABS:                        10.3   9.47  )-----------( 426      ( 06 Apr 2021 05:33 )             33.0     04-06    139  |  105  |  11  ----------------------------<  77  4.0   |  22  |  0.70    Ca    8.2<L>      06 Apr 2021 05:33  Phos  3.3     04-06  Mg     1.9     04-06      PT/INR - ( 05 Apr 2021 14:03 )   PT: 13.3 sec;   INR: 1.11          PTT - ( 05 Apr 2021 14:03 )  PTT:28.8 sec    Radiology and Additional Studies:       24hr Events:  O/N: No acute events. 6pm PVR 70ml, Voided 150ml, bladder scan after about 40ml  4/6: started SQH, toprol 12.5 per cards, mandeep removed. Stepped down. Vu reg diet. PT recommends home w/o PT.    Subjective: Seen and evaluated at bedside. Resting comfortably in bed. Denies any f/c, CP, SOB, changes in vision, weakness in extremities, or HA.     aspirin  chewable 81  clopidogrel Tablet 75  heparin   Injectable 5000  metoprolol succinate ER 12.5      Allergies    No Known Allergies    Intolerances      Vital Signs Last 24 Hrs  T(C): 37.4 (07 Apr 2021 09:34), Max: 37.6 (06 Apr 2021 18:00)  T(F): 99.3 (07 Apr 2021 09:34), Max: 99.6 (06 Apr 2021 18:00)  HR: 73 (07 Apr 2021 11:22) (73 - 90)  BP: 123/57 (07 Apr 2021 11:22) (118/51 - 149/65)  BP(mean): 93 (06 Apr 2021 11:35) (93 - 93)  RR: 18 (07 Apr 2021 11:22) (18 - 37)  SpO2: 95% (07 Apr 2021 11:22) (95% - 98%)  I&O's Summary    06 Apr 2021 07:01  -  07 Apr 2021 07:00  --------------------------------------------------------  IN: 605 mL / OUT: 350 mL / NET: 255 mL    07 Apr 2021 07:01  -  07 Apr 2021 11:33  --------------------------------------------------------  IN: 180 mL / OUT: 0 mL / NET: 180 mL        Physical Exam  General: NAD, resting comfortably in bed  HEENT: Neck soft and supple, no palpable pulsatile masses  C/V: NSR  Pulm: Nonlabored breathing, no respiratory distress  Extrem: WWP, no edema,  Neuro: A/O x 3, CNs II-XII grossly intact, slight right sided facial droop, normal sensation  Incision: left neck vertical incision c/d/i without surrounding erythema or ecchymosis    LABS:                        10.3   9.47  )-----------( 426      ( 06 Apr 2021 05:33 )             33.0     04-06    139  |  105  |  11  ----------------------------<  77  4.0   |  22  |  0.70    Ca    8.2<L>      06 Apr 2021 05:33  Phos  3.3     04-06  Mg     1.9     04-06      PT/INR - ( 05 Apr 2021 14:03 )   PT: 13.3 sec;   INR: 1.11          PTT - ( 05 Apr 2021 14:03 )  PTT:28.8 sec    Radiology and Additional Studies:

## 2021-04-07 NOTE — DISCHARGE NOTE PROVIDER - PROVIDER TOKENS
PROVIDER:[TOKEN:[[id number]]],PROVIDER:[TOKEN:[[id number]],FOLLOWUP:[2 weeks]] PROVIDER:[TOKEN:[[id number]],SCHEDULEDAPPT:[04/15/2021],SCHEDULEDAPPTTIME:[11:45 AM]],PROVIDER:[TOKEN:[[id number]],FOLLOWUP:[2 weeks]]

## 2021-04-07 NOTE — PROGRESS NOTE ADULT - NSICDXPILOT_GEN_ALL_CORE
[place]

## 2021-04-07 NOTE — DISCHARGE NOTE NURSING/CASE MANAGEMENT/SOCIAL WORK - PATIENT PORTAL LINK FT
You can access the FollowMyHealth Patient Portal offered by [facility] by registering at the following website: [email or web address]. By joining [employer]’s FollowMyHealth portal, you will also be able to view your health information using other applications (apps) compatible with our system.

## 2021-04-07 NOTE — DISCHARGE NOTE PROVIDER - DETAILS OF MALNUTRITION DIAGNOSIS/DIAGNOSES
This patient has been assessed with a concern for Malnutrition and was treated during this hospitalization for the following Nutrition diagnosis/diagnoses:     -  03/29/2021: Moderate protein-calorie malnutrition   -  03/29/2021: Underweight (BMI < 19)

## 2021-04-07 NOTE — PROGRESS NOTE ADULT - PROVIDER SPECIALTY LIST ADULT
Cardiology
Hospitalist
Intervent Cardiology
Vascular Surgery
Cardiology
Hospitalist
Internal Medicine
Vascular Surgery
Cardiology
Neurology
Rehab Medicine
SICU
Vascular Surgery
Vascular Surgery
Neurology
Internal Medicine

## 2021-04-07 NOTE — PROGRESS NOTE ADULT - ASSESSMENT
8 y.o. F PMH HTN, prior SBO (2018), chronic back pain (2/2 prior compression L3 L4 fractures), cellulitis admitted for sepsis 2/2 UTI. Vascular consulted for L ICA stenosis > 70%, asymptomatic. Cardiac cath demonstrating 3 vessel disease with postop duplex demonstrating no pseudoaneurysm. Now s/p L CEA 4/5.     Plan  #Cardiac  -Cath 4/1 demonstrating 3 vessel CAD; recommended for medical management  -Concern for R groin pseudoaneurysm s/p cardiac cath; 4/1 DUS demonstrated no pseudoaneurysm  -ASA/Plavix/Statin  -Lisinopril for BP control    #Vascular- L ICA stenosis  -Per stroke consult, pt. 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 Will continue to monitor    #Gyn  -History of pelvic abscess with fistulization into small bowel and adnexa  -Will need GYN f/u on discharge    diet: DASH  DVT PPX: SQH  dispo: [facility] 8 y.o. F PMH HTN, prior SBO (2018), chronic back pain (2/2 prior compression L3 L4 fractures), cellulitis admitted for sepsis 2/2 UTI. Vascular consulted for L ICA stenosis > 70%, asymptomatic. Cardiac cath demonstrating 3 vessel disease with postop duplex demonstrating no pseudoaneurysm. Now s/p L CEA 4/5.     Plan  #Cardiac  -Cath 4/1 demonstrating 3 vessel CAD; recommended for medical management  -Concern for R groin pseudoaneurysm s/p cardiac cath; 4/1 DUS demonstrated no pseudoaneurysm  -ASA/Plavix/Statin  -Lisinopril for BP control    #Vascular- L ICA stenosis  -Per stroke consult, pt. did not have TIA this admission; more likely ocular migraine  - s/p L CEA 4/5   - Residual right sided droop, likely 2/2 to marginal mandibular branch manipulation of the facial nerve. 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

## 2021-04-07 NOTE — PROGRESS NOTE ADULT - TIME BILLING
88 F with HTN admitted with dizziness/bilateral blurry vision found to have left vertebral stenosis v4 segment and LICA > 70%  -s/p L CEA 4/5  -With improved BP and dizziness today, BP improved post IVF  -Post op EKG NSR with ST changes inferolaterally  (unchanged from baseline EKG)  -s/p cath 4/1: occluded RCA and Cx with diffuse 80% mLAD  -Restart ASA/plavix per surgery, cont atorva   -Recommending starting  low dose metoprolol tartrate 12.5 mg po qd given known CAD and need to optimize med tx for extensive CAD  -Will continue to follow
88 F with HTN admitted with dizziness/bilateral blurry vision found to have left vertebral stenosis v4 segment and LICA > 70%  -s/p L CEA 4/5  -Doing well without active complaints  -Post op EKG NSR with ST changes inferolaterally  (unchanged from baseline EKG)  -s/p cath 4/1: occluded RCA and Cx with diffuse 80% mLAD  -Restart ASA/plavix per surgery, cont atorva   -Cont metoprolol tartrate 12.5 mg po qd given known CAD and need to optimize med tx for extensive CAD  -Can fu with me as outpatient in 1-2 weeks
discussed with primary team
discussed with vascular team, CT surgery (Dr Mir), and interventional cardiology (Dr Laird)
discussed with primary team
will follow w/ you  case d/w Vascular resident
will follow w/ you  case d/w Vascular resident

## 2021-04-07 NOTE — PROGRESS NOTE ADULT - SUBJECTIVE AND OBJECTIVE BOX
Physical Medicine and Rehabilitation Progress Note:    Patient is a 88y old  Female who presents with a chief complaint of uti (07 Apr 2021 11:47)      HPI:  88 y.o, F PMH HTN, prior SBO (2018), chronic back pain (2/2 prior compression L3 L4 fractures), cellulitis BIBEMS from home with chief complaint 1 hour of blurry vision/lightheadedness which started on afternoon of admission.  Patient states she was lying in bed reading a book when she began to feel "dizzy" - no room spinning just that blurry vision and lightheadedness. Patient was able to stand up and walk around but felt unsteady- uses a walker at all times. Patient called next door neighbor/ friend who told her to call ambulance. Per patient, symptoms resolved by the time she got to the ED. Denies prior episodes. Denies headache, f/c, chest pain, palpitations, SOB, cough, abdominal pain, N/V, flank pain, diarrhea/constipation, hematuria, vaginal bleeding, dysuria, hematuria, change in urinary frequency. No bladder/bowel incontinence. Received both COVID vaccines.     In the ED   vitals T 98.7, HR 90, /101-> 146/66, O2 sat 97% on RA  labs WBC 16.65 (neutrophil predominance 84.8%), BUN/Cr 18/0.84, albumin 3.3, BNP 2035, trop negative   Ua positive: trace ketones, moderate leukocyte esterase, trace blood, >10 wbc, 5-10 rbc, + bacteria  blood alcohol <3   ekg NSR, no acute ischemic changes    Imaging  CXR negative - No pneumothorax, No opacities, No free air.     CT head  IMPRESSION:  No acute intracranial hemorrhage or transcortical infarction.    Medications given  Tylenol, ctx 1g, meclizine, Zofran, 1 L NS   (29 Mar 2021 07:15)                            10.3   9.47  )-----------( 426      ( 06 Apr 2021 05:33 )             33.0       04-06    139  |  105  |  11  ----------------------------<  77  4.0   |  22  |  0.70    Ca    8.2<L>      06 Apr 2021 05:33  Phos  3.3     04-06  Mg     1.9     04-06      Vital Signs Last 24 Hrs  T(C): 37.4 (07 Apr 2021 12:46), Max: 37.6 (06 Apr 2021 18:00)  T(F): 99.3 (07 Apr 2021 12:46), Max: 99.6 (06 Apr 2021 18:00)  HR: 73 (07 Apr 2021 11:22) (73 - 90)  BP: 123/57 (07 Apr 2021 11:22) (118/51 - 144/63)  BP(mean): --  RR: 18 (07 Apr 2021 11:22) (18 - 20)  SpO2: 95% (07 Apr 2021 11:22) (95% - 98%)    MEDICATIONS  (STANDING):  aspirin  chewable 81 milliGRAM(s) Oral every 24 hours  atorvastatin 80 milliGRAM(s) Oral at bedtime  chlorhexidine 4% Liquid 1 Application(s) Topical <User Schedule>  clopidogrel Tablet 75 milliGRAM(s) Oral every 24 hours  heparin   Injectable 5000 Unit(s) SubCutaneous every 12 hours  metoprolol succinate ER 12.5 milliGRAM(s) Oral daily    MEDICATIONS  (PRN):  acetaminophen    Suspension .. 650 milliGRAM(s) Oral every 6 hours PRN Moderate Pain (4 - 6)    Currently Undergoing Physical/ Occupational Therapy at bedside.    Functional Status Assessment:         Cognitive/Perceptual/Neuro  Cognitive/Neuro/Behavioral [WDL Definition: Alert; opens eyes spontaneously; arouses to voice or touch; oriented x 4; follows commands; speech spontaneous, logical; purposeful motor response; behavior appropriate to situation]: WDL    Therapeutic Interventions      Bed Mobility  Bed Mobility Training Scooting: minimum assist (75% patient effort);  verbal cues;  nonverbal cues (demo/gestures)  Bed Mobility Training Bridging: stand-by assist;  verbal cues  Bed Mobility Training Sit-to-Supine: minimum assist (75% patient effort);  verbal cues;  nonverbal cues (demo/gestures)  Bed Mobility Training Supine-to-Sit: minimum assist (75% patient effort);  verbal cues;  nonverbal cues (demo/gestures)  Bed Mobility Training Limitations: decreased ability to use arms for pushing/pulling;  decreased ability to use legs for bridging/pushing;  impaired ability to control trunk for mobility;  decreased strength;  impaired balance;  impaired postural control    Sit-Stand Transfer Training  Transfer Training Sit-to-Stand Transfer: contact guard;  minimum assist (75% patient effort);  verbal cues;  nonverbal cues (demo/gestures);  rollator   Transfer Training Stand-to-Sit Transfer: minimum assist (75% patient effort);  verbal cues;  nonverbal cues (demo/gestures);  rollator   Sit-to-Stand Transfer Training Transfer Safety Analysis: decreased weight-shifting ability;  decreased strength;  impaired balance;  impaired postural control    Gait Training  Gait Training: contact guard;  minimum assist (75% patient effort);  verbal cues;  1 person assist;  nonverbal cues (demo/gestures);  rollator ;  40 feet x2  Gait Analysis: slightly unsteady, narrow base of support, no loss of balance, limited by c/o fatigue, RPE 8/10          PM&R Impression: as above    Current Disposition Plan:    subacute rehab placement

## 2021-04-07 NOTE — PROGRESS NOTE ADULT - SUBJECTIVE AND OBJECTIVE BOX
Subjective/Interval events:  -No events overnight  -This am patient feeling well, minimal pain at CEA site on L, no n/v, having BM's    MEDICATIONS  (STANDING):  aspirin  chewable 81 milliGRAM(s) Oral every 24 hours  atorvastatin 80 milliGRAM(s) Oral at bedtime  chlorhexidine 4% Liquid 1 Application(s) Topical <User Schedule>  clopidogrel Tablet 75 milliGRAM(s) Oral every 24 hours  heparin   Injectable 5000 Unit(s) SubCutaneous every 12 hours  metoprolol succinate ER 12.5 milliGRAM(s) Oral daily    MEDICATIONS  (PRN):  acetaminophen    Suspension .. 650 milliGRAM(s) Oral every 6 hours PRN Moderate Pain (4 - 6)      Vital Signs Last 24 Hrs  T(C): 37.4 (07 Apr 2021 09:34), Max: 37.6 (06 Apr 2021 18:00)  T(F): 99.3 (07 Apr 2021 09:34), Max: 99.6 (06 Apr 2021 18:00)  HR: 75 (07 Apr 2021 08:18) (75 - 90)  BP: 139/63 (07 Apr 2021 08:18) (133/71 - 149/65)  BP(mean): 93 (06 Apr 2021 11:35) (91 - 93)  RR: 18 (07 Apr 2021 08:18) (18 - 37)  SpO2: 95% (07 Apr 2021 08:18) (95% - 98%)    PHYSICAL EXAM:  GENERAL: pleasant, NAD  NEURO: Aox3, intact strength/sensation all 4 ext, intact CN with improving mild R lower facial weakness  HEENT: clear op, mmm, L carotid CEA site c/d/i   NECK:  No JVD, no lymphadenopathy  CHEST/LUNG: Clear to auscultation bilaterally; No rales, rhonchi, wheezing. Normal work of breathing, not tachypneic  HEART: Regular rate and rhythm; No murmurs, rubs, or gallops  ABDOMEN: Soft, Nontender, Nondistended. Normoactive bowel sounds  EXTREMITIES:  No sig le edema, wwp   Skin: bruising of skin rosalio of LE noted- stable     LABS (reviewed)   -no new labs noted, fsg stable    ASSESSMENT AND PLAN: 88 F with HTN, prior SBO, chronic back pain 2/2 compression fractures L3/L4, admitted with acute presyncope and blurry vision found to have on workup LICA > 70% s/p CEA post op day 2, doing well without complication.     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 I personally called PT to see patient early today. if home per PT, can be home today with home PT and home services (wound care, med recon as several new meds)     Patient was seen and examined by me at bedside    Greater than 35 minutes spent on total encounter; more than 50% of the visit was spent counseling and/or coordinating care by the attending physician.    Terry Acevedo MD [phone]  Subjective/Interval events:  -No events overnight  -This am patient feeling well, minimal pain at CEA site on L, no n/v, having BM's    MEDICATIONS  (STANDING):  aspirin  chewable 81 milliGRAM(s) Oral every 24 hours  atorvastatin 80 milliGRAM(s) Oral at bedtime  chlorhexidine 4% Liquid 1 Application(s) Topical <User Schedule>  clopidogrel Tablet 75 milliGRAM(s) Oral every 24 hours  heparin   Injectable 5000 Unit(s) SubCutaneous every 12 hours  metoprolol succinate ER 12.5 milliGRAM(s) Oral daily    MEDICATIONS  (PRN):  acetaminophen    Suspension .. 650 milliGRAM(s) Oral every 6 hours PRN Moderate Pain (4 - 6)      Vital Signs Last 24 Hrs  T(C): 37.4 (07 Apr 2021 09:34), Max: 37.6 (06 Apr 2021 18:00)  T(F): 99.3 (07 Apr 2021 09:34), Max: 99.6 (06 Apr 2021 18:00)  HR: 75 (07 Apr 2021 08:18) (75 - 90)  BP: 139/63 (07 Apr 2021 08:18) (133/71 - 149/65)  BP(mean): 93 (06 Apr 2021 11:35) (91 - 93)  RR: 18 (07 Apr 2021 08:18) (18 - 37)  SpO2: 95% (07 Apr 2021 08:18) (95% - 98%)    PHYSICAL EXAM:  GENERAL: pleasant, NAD  NEURO: Aox3, intact strength/sensation all 4 ext, intact CN with improving mild R lower facial weakness  HEENT: clear op, mmm, L carotid CEA site c/d/i   NECK:  No JVD, no lymphadenopathy  CHEST/LUNG: Clear to auscultation bilaterally; No rales, rhonchi, wheezing. Normal work of breathing, not tachypneic  HEART: Regular rate and rhythm; No murmurs, rubs, or gallops  ABDOMEN: Soft, Nontender, Nondistended. Normoactive bowel sounds  EXTREMITIES:  No sig le edema, wwp   Skin: bruising of skin rosalio of LE noted- stable     LABS (reviewed)   -no new labs noted, fsg stable    ASSESSMENT AND PLAN: 88 F with HTN, prior SBO, chronic back pain 2/2 compression fractures L3/L4, admitted with acute presyncope and blurry vision found to have on workup LICA > 70% s/p CEA post op day 2, doing well without complication.     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, stable EF, medical management per cards  -C/w asa/statin as above and metop   -Can have cards f/u on discharge     #HTN  -On metop xl per cards- BP controlled   -On amlodipine at home prior to admission     #Chronic low back pain  -Controlled for now on current pain regimen     #Drug rash to antibiotics  -Resolved now completed abx course    #UTI uncomplicated   -Completed abx course      #DVT px- SQH  #Diet- Dash diet   #Dispo- 2 person assist per nursing- PT eval today, pt amenable to [facility], straight medicaid so can be at [facility] today. I personally called PT to see patient early today. if home per PT, can be home today with home PT and home services (wound care, med recon as several new meds)     Patient was seen and examined by me at bedside    Greater than 35 minutes spent on total encounter; more than 50% of the visit was spent counseling and/or coordinating care by the attending physician.    Terry Acevedo MD [phone]

## 2021-04-09 LAB — SURGICAL PATHOLOGY STUDY: SIGNIFICANT CHANGE UP

## 2021-04-12 DIAGNOSIS — I10 ESSENTIAL (PRIMARY) HYPERTENSION: ICD-10-CM

## 2021-04-12 DIAGNOSIS — I25.10 ATHEROSCLEROTIC HEART DISEASE OF NATIVE CORONARY ARTERY WITHOUT ANGINA PECTORIS: ICD-10-CM

## 2021-04-12 DIAGNOSIS — E44.0 MODERATE PROTEIN-CALORIE MALNUTRITION: ICD-10-CM

## 2021-04-12 DIAGNOSIS — G89.29 OTHER CHRONIC PAIN: ICD-10-CM

## 2021-04-12 DIAGNOSIS — D62 ACUTE POSTHEMORRHAGIC ANEMIA: ICD-10-CM

## 2021-04-12 DIAGNOSIS — N39.0 URINARY TRACT INFECTION, SITE NOT SPECIFIED: ICD-10-CM

## 2021-04-12 DIAGNOSIS — R53.1 WEAKNESS: ICD-10-CM

## 2021-04-12 DIAGNOSIS — I65.02 OCCLUSION AND STENOSIS OF LEFT VERTEBRAL ARTERY: ICD-10-CM
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
## 2021-04-12 DIAGNOSIS — A41.9 SEPSIS, UNSPECIFIED ORGANISM: ICD-10-CM

## 2021-04-12 DIAGNOSIS — I65.22 OCCLUSION AND STENOSIS OF LEFT CAROTID ARTERY: ICD-10-CM

## 2021-04-12 DIAGNOSIS — I16.0 HYPERTENSIVE URGENCY: ICD-10-CM

## 2021-04-12 DIAGNOSIS — I65.1 OCCLUSION AND STENOSIS OF BASILAR ARTERY: ICD-10-CM

## 2021-04-12 DIAGNOSIS — E87.6 HYPOKALEMIA: ICD-10-CM

## 2021-04-12 DIAGNOSIS — R29.810 FACIAL WEAKNESS: ICD-10-CM

## 2021-04-12 DIAGNOSIS — I70.90 UNSPECIFIED ATHEROSCLEROSIS: ICD-10-CM

## 2021-04-12 DIAGNOSIS — R11.2 NAUSEA WITH VOMITING, UNSPECIFIED: ICD-10-CM

## 2021-04-12 DIAGNOSIS — M54.9 DORSALGIA, UNSPECIFIED: ICD-10-CM

## 2021-04-12 DIAGNOSIS — R21 RASH AND OTHER NONSPECIFIC SKIN ERUPTION: ICD-10-CM

## 2021-04-12 DIAGNOSIS — Z51.5 ENCOUNTER FOR PALLIATIVE CARE: ICD-10-CM

## 2021-04-12 DIAGNOSIS — T36.95XA ADVERSE EFFECT OF UNSPECIFIED SYSTEMIC ANTIBIOTIC, INITIAL ENCOUNTER: ICD-10-CM

## 2021-04-12 PROBLEM — Z00.00 ENCOUNTER FOR PREVENTIVE HEALTH EXAMINATION: Status: ACTIVE | Noted: 2021-04-12

## 2021-04-13 ENCOUNTER — APPOINTMENT (OUTPATIENT)
Dept: HEART AND VASCULAR | Facility: CLINIC | Age: 86
End: 2021-04-13

## 2021-04-14 PROBLEM — I65.22 CAROTID STENOSIS, ASYMPTOMATIC, LEFT: Status: ACTIVE | Noted: 2021-04-14

## 2021-04-15 ENCOUNTER — APPOINTMENT (OUTPATIENT)
Dept: VASCULAR SURGERY | Facility: CLINIC | Age: 86
End: 2021-04-15
Payer: MEDICARE

## 2021-04-15 DIAGNOSIS — I65.22 OCCLUSION AND STENOSIS OF LEFT CAROTID ARTERY: ICD-10-CM

## 2021-04-15 PROCEDURE — 93880 EXTRACRANIAL BILAT STUDY: CPT | Mod: 26

## 2021-04-15 PROCEDURE — 99024 POSTOP FOLLOW-UP VISIT: CPT

## 2021-04-16 NOTE — PHYSICAL EXAM
[de-identified] : well appearing [de-identified] : left side neck incision healing well, no obvious hematoma formation.

## 2021-04-16 NOTE — DISCUSSION/SUMMARY
[FreeTextEntry1] : 88 year old female with PMH of HTN, back pain presented to the ED with blurry vision. She states she was reading a book when she began to feel dizzy (blurry vision and lightheaded) symptoms improved when she cme to ED. SHe was diagnosed with UTI and possible TIA. CTA of the neck showed left ICA with >70% stenosis. She had cardiac cath which showed 3V CAD and medical management recommended. She was cleared for left carotid endarterectomy that was done on 4/5/21. She tolerated the procedure well, no issues. currently taking statin/plavix, recommend she continue. US done today showing patent left carotid endarterectomy, right side 50-69% (159/39), will monitor with surveillance US in 3 months. will return sooner with any issues. \par

## 2021-04-16 NOTE — REASON FOR VISIT
[de-identified] : Left carotid endarterectomy [de-identified] : 4/5/21 [de-identified] : 88 year old female with PMH of HTN, back pain presented to the ED with blurry vision. She states she was reading a book when she began to feel dizzy (blurry vision and lightheaded) symptoms improved when she came to ED. She was diagnosed with UTI and possible TIA. CTA of the neck showed left iCA with >70% stenosis. She had cardiac cath which showed 3V CAD and medical management recommended. She was cleared for left carotid endarterectomy that was done on 4/5/21. \par \par She is doing well post operatively, neck is slightly sore, she is residing in an nursing home. \par

## 2021-05-13 NOTE — PATIENT PROFILE ADULT - NSPROPOAPRESSUREINJURY_GEN_A_NUR
----- Message from Kateryna Buenrostro MD sent at 5/12/2021  9:59 PM EDT -----  Please notify pt of results    Thyroid levels show that the dose needs to be increased to 88 mcg once a day. Also, vitamin D is low. Take an additional 1000 units of vitamin D daily. New Rx's sent to pharmacy. Other labs are either normal or stable and at goal.  Cholesterol looks much better on the medication. Keep up the good work!    Continue other current medications no

## 2021-06-28 ENCOUNTER — EMERGENCY (EMERGENCY)
Facility: HOSPITAL | Age: 86
LOS: 1 days | Discharge: HOME | End: 2021-06-28
Attending: EMERGENCY MEDICINE | Admitting: EMERGENCY MEDICINE
Payer: MEDICARE

## 2021-06-28 VITALS
OXYGEN SATURATION: 96 % | SYSTOLIC BLOOD PRESSURE: 135 MMHG | RESPIRATION RATE: 18 BRPM | HEART RATE: 89 BPM | DIASTOLIC BLOOD PRESSURE: 79 MMHG | TEMPERATURE: 98 F

## 2021-06-28 VITALS
OXYGEN SATURATION: 99 % | RESPIRATION RATE: 17 BRPM | WEIGHT: 89.95 LBS | SYSTOLIC BLOOD PRESSURE: 102 MMHG | HEART RATE: 90 BPM | TEMPERATURE: 98 F | HEIGHT: 61.5 IN | DIASTOLIC BLOOD PRESSURE: 56 MMHG

## 2021-06-28 DIAGNOSIS — I10 ESSENTIAL (PRIMARY) HYPERTENSION: ICD-10-CM

## 2021-06-28 DIAGNOSIS — Z79.82 LONG TERM (CURRENT) USE OF ASPIRIN: ICD-10-CM

## 2021-06-28 DIAGNOSIS — G89.29 OTHER CHRONIC PAIN: ICD-10-CM

## 2021-06-28 DIAGNOSIS — Z98.890 OTHER SPECIFIED POSTPROCEDURAL STATES: Chronic | ICD-10-CM

## 2021-06-28 DIAGNOSIS — Z87.19 PERSONAL HISTORY OF OTHER DISEASES OF THE DIGESTIVE SYSTEM: ICD-10-CM

## 2021-06-28 DIAGNOSIS — M54.2 CERVICALGIA: ICD-10-CM

## 2021-06-28 DIAGNOSIS — N39.0 URINARY TRACT INFECTION, SITE NOT SPECIFIED: ICD-10-CM

## 2021-06-28 DIAGNOSIS — M54.5 LOW BACK PAIN: ICD-10-CM

## 2021-06-28 LAB
ALBUMIN SERPL ELPH-MCNC: 2.2 G/DL — LOW (ref 3.4–5)
ALP SERPL-CCNC: 74 U/L — SIGNIFICANT CHANGE UP (ref 40–120)
ALT FLD-CCNC: 16 U/L — SIGNIFICANT CHANGE UP (ref 12–42)
ANION GAP SERPL CALC-SCNC: 11 MMOL/L — SIGNIFICANT CHANGE UP (ref 9–16)
APPEARANCE UR: CLEAR — SIGNIFICANT CHANGE UP
AST SERPL-CCNC: 18 U/L — SIGNIFICANT CHANGE UP (ref 15–37)
BACTERIA # UR AUTO: PRESENT /HPF
BASOPHILS # BLD AUTO: 0.07 K/UL — SIGNIFICANT CHANGE UP (ref 0–0.2)
BASOPHILS NFR BLD AUTO: 0.5 % — SIGNIFICANT CHANGE UP (ref 0–2)
BILIRUB SERPL-MCNC: 0.5 MG/DL — SIGNIFICANT CHANGE UP (ref 0.2–1.2)
BILIRUB UR-MCNC: NEGATIVE — SIGNIFICANT CHANGE UP
BUN SERPL-MCNC: 18 MG/DL — SIGNIFICANT CHANGE UP (ref 7–23)
CALCIUM SERPL-MCNC: 8.7 MG/DL — SIGNIFICANT CHANGE UP (ref 8.5–10.5)
CHLORIDE SERPL-SCNC: 102 MMOL/L — SIGNIFICANT CHANGE UP (ref 96–108)
CO2 SERPL-SCNC: 25 MMOL/L — SIGNIFICANT CHANGE UP (ref 22–31)
COLOR SPEC: YELLOW — SIGNIFICANT CHANGE UP
CREAT SERPL-MCNC: 0.88 MG/DL — SIGNIFICANT CHANGE UP (ref 0.5–1.3)
DIFF PNL FLD: ABNORMAL
EOSINOPHIL # BLD AUTO: 0.04 K/UL — SIGNIFICANT CHANGE UP (ref 0–0.5)
EOSINOPHIL NFR BLD AUTO: 0.3 % — SIGNIFICANT CHANGE UP (ref 0–6)
EPI CELLS # UR: SIGNIFICANT CHANGE UP /HPF (ref 0–5)
GLUCOSE SERPL-MCNC: 123 MG/DL — HIGH (ref 70–99)
GLUCOSE UR QL: NEGATIVE — SIGNIFICANT CHANGE UP
HCT VFR BLD CALC: 33.9 % — LOW (ref 34.5–45)
HGB BLD-MCNC: 10.7 G/DL — LOW (ref 11.5–15.5)
HYALINE CASTS # UR AUTO: SIGNIFICANT CHANGE UP /LPF (ref 0–2)
IMM GRANULOCYTES NFR BLD AUTO: 0.5 % — SIGNIFICANT CHANGE UP (ref 0–1.5)
KETONES UR-MCNC: NEGATIVE — SIGNIFICANT CHANGE UP
LEUKOCYTE ESTERASE UR-ACNC: ABNORMAL
LYMPHOCYTES # BLD AUTO: 1.55 K/UL — SIGNIFICANT CHANGE UP (ref 1–3.3)
LYMPHOCYTES # BLD AUTO: 11.9 % — LOW (ref 13–44)
MCHC RBC-ENTMCNC: 29.5 PG — SIGNIFICANT CHANGE UP (ref 27–34)
MCHC RBC-ENTMCNC: 31.6 GM/DL — LOW (ref 32–36)
MCV RBC AUTO: 93.4 FL — SIGNIFICANT CHANGE UP (ref 80–100)
MONOCYTES # BLD AUTO: 0.69 K/UL — SIGNIFICANT CHANGE UP (ref 0–0.9)
MONOCYTES NFR BLD AUTO: 5.3 % — SIGNIFICANT CHANGE UP (ref 2–14)
NEUTROPHILS # BLD AUTO: 10.65 K/UL — HIGH (ref 1.8–7.4)
NEUTROPHILS NFR BLD AUTO: 81.5 % — HIGH (ref 43–77)
NITRITE UR-MCNC: NEGATIVE — SIGNIFICANT CHANGE UP
NRBC # BLD: 0 /100 WBCS — SIGNIFICANT CHANGE UP (ref 0–0)
PH UR: 6 — SIGNIFICANT CHANGE UP (ref 5–8)
PLATELET # BLD AUTO: 469 K/UL — HIGH (ref 150–400)
POTASSIUM SERPL-MCNC: 3.5 MMOL/L — SIGNIFICANT CHANGE UP (ref 3.5–5.3)
POTASSIUM SERPL-SCNC: 3.5 MMOL/L — SIGNIFICANT CHANGE UP (ref 3.5–5.3)
PROT SERPL-MCNC: 6.1 G/DL — LOW (ref 6.4–8.2)
PROT UR-MCNC: ABNORMAL MG/DL
RBC # BLD: 3.63 M/UL — LOW (ref 3.8–5.2)
RBC # FLD: 15.3 % — HIGH (ref 10.3–14.5)
RBC CASTS # UR COMP ASSIST: > 10 /HPF
SODIUM SERPL-SCNC: 138 MMOL/L — SIGNIFICANT CHANGE UP (ref 132–145)
SP GR SPEC: 1.01 — SIGNIFICANT CHANGE UP (ref 1–1.03)
TROPONIN I SERPL-MCNC: <0.017 NG/ML — LOW (ref 0.02–0.06)
UROBILINOGEN FLD QL: 1 E.U./DL — SIGNIFICANT CHANGE UP
WBC # BLD: 13.06 K/UL — HIGH (ref 3.8–10.5)
WBC # FLD AUTO: 13.06 K/UL — HIGH (ref 3.8–10.5)
WBC UR QL: > 10 /HPF

## 2021-06-28 PROCEDURE — 71045 X-RAY EXAM CHEST 1 VIEW: CPT | Mod: 26

## 2021-06-28 PROCEDURE — 99285 EMERGENCY DEPT VISIT HI MDM: CPT | Mod: 25

## 2021-06-28 RX ORDER — KETOROLAC TROMETHAMINE 30 MG/ML
15 SYRINGE (ML) INJECTION ONCE
Refills: 0 | Status: DISCONTINUED | OUTPATIENT
Start: 2021-06-28 | End: 2021-06-28

## 2021-06-28 RX ORDER — CEFUROXIME AXETIL 250 MG
250 TABLET ORAL ONCE
Refills: 0 | Status: COMPLETED | OUTPATIENT
Start: 2021-06-28 | End: 2021-06-28

## 2021-06-28 RX ORDER — CEFUROXIME AXETIL 250 MG
1 TABLET ORAL
Qty: 6 | Refills: 0
Start: 2021-06-28 | End: 2021-06-30

## 2021-06-28 RX ORDER — ASPIRIN/CALCIUM CARB/MAGNESIUM 324 MG
162 TABLET ORAL ONCE
Refills: 0 | Status: COMPLETED | OUTPATIENT
Start: 2021-06-28 | End: 2021-06-28

## 2021-06-28 RX ADMIN — Medication 250 MILLIGRAM(S): at 14:29

## 2021-06-28 RX ADMIN — Medication 15 MILLIGRAM(S): at 11:56

## 2021-06-28 RX ADMIN — Medication 162 MILLIGRAM(S): at 11:56

## 2021-06-28 NOTE — ED PROVIDER NOTE - OBJECTIVE STATEMENT
88 yo F Hx obtained via chart review (HTN, prior SBO (2018), chronic back pain (2/2 prior compression L3/L4 fractures), cellulitis, w/ recent admission to [facility] for UTI and meeting sepsis criteria from 3/28/21-4/7/21 (obtained via discharge note from Dr Weir). Pt was started on ceftriaxone with cardiology and vascular consult after CTA showed L ICA stenosis > 70%, w/ L CEA on 4/5 and cardiac cath on 4/1 shows 3 vessel disease posterior Left dominant, LM normal, pLCx 100% , p+mLAD 80% (severely calcified), mRCA 100% (small); Pt was not stented, medical management advised.   Pt presents today c/o bilateral lateral neck pain and lower back pain, chronic for 6 months. Pt states "pain is the same pain as chronic pain but is not well controlled with Tylenol." Pt denies trauma, fall, injury, chest pain, SOB, jaw/shoulder/arm pain, diaphoresis, lightheadedness, dizziness, confusion, dysuria, urinary frequency, no new focal weakness, no sensory changes, no difficulty walking.

## 2021-06-28 NOTE — ED PROVIDER NOTE - NS ED ROS FT
· CONSTITUTIONAL: no fever and no chills.  · CARDIOVASCULAR: normal rate and rhythm, no chest pain and no edema.  · RESPIRATORY: no chest pain, no cough, and no shortness of breath.  · GASTROINTESTINAL: no abdominal pain, no bloating, no constipation, no diarrhea, no nausea and no vomiting.  · MUSCULOSKELETAL: no back pain, no musculoskeletal pain, no neck pain, and no weakness.  · SKIN: no abrasions, no jaundice, no lesions, no pruritis, and no rashes.  · NEURO: no loss of consciousness, no gait abnormality, no headache, no sensory deficits, and no weakness.  · PSYCHIATRIC: no known mental health issues. · CONSTITUTIONAL: no fever and no chills.  · CARDIOVASCULAR: normal rate and rhythm, no chest pain and no edema.  · RESPIRATORY: no chest pain, no cough, and no shortness of breath.  · GASTROINTESTINAL: no abdominal pain, no bloating, no constipation, no diarrhea, no nausea and no vomiting.  · MUSCULOSKELETAL: no musculoskeletal pain,  and no weakness.  · SKIN: no abrasions, no jaundice, no lesions, no pruritis, and no rashes.  · NEURO: no loss of consciousness, no gait abnormality, no headache, no sensory deficits, and no weakness.  · PSYCHIATRIC: no known mental health issues.

## 2021-06-28 NOTE — ED PROVIDER NOTE - PATIENT PORTAL LINK FT
You can access the FollowMyHealth Patient Portal offered by [facility] by registering at the following website: [email or web address]. By joining [facility]’s FollowMyHealth portal, you will also be able to view your health information using other applications (apps) compatible with our system.

## 2021-06-28 NOTE — ED PROVIDER NOTE - CLINICAL SUMMARY MEDICAL DECISION MAKING FREE TEXT BOX
pt is feeling better. @ 245pm pt gets up and out of bed. and ambulates without difficulty using her walker.   alert oriented cooperative w/ fluent and appropriate speech   nurse kaiser present.    ED evaluation and management discussed with the patient and family (if available) in detail.  Close PMD follow up encouraged.  Strict ED return instructions discussed in detail and patient given the opportunity to ask any questions about their discharge diagnosis and instructions. Patient verbalized understanding.    pt informed of uti pt is feeling better. @ 245pm pt gets up and out of bed. and ambulates without difficulty using her walker.   alert oriented cooperative w/ fluent and appropriate speech   nurse kaiser present.    ED evaluation and management discussed with the patient and family (if available) in detail.  Close PMD follow up encouraged.  Strict ED return instructions discussed in detail and patient given the opportunity to ask any questions about their discharge diagnosis and instructions. Patient verbalized understanding.    pt informed of uti    pt left ed at 745pm. throughtout ed stay pt appeared well reading 500 + page book on wilber horta = holding book up with both hands

## 2021-06-28 NOTE — ED PROVIDER NOTE - PHYSICAL EXAMINATION
VITAL SIGNS: I have reviewed nursing notes and confirm.  CONSTITUTIONAL: Well-developed; well-nourished; Pt is reading 500+page novel in bed comfortably, appears well in no acute distress   SKIN: Skin is warm and dry, no acute rash.  HEAD: Normocephalic; atraumatic.  EYES: PERRL, EOM intact; conjunctiva and sclera clear.  ENT: No nasal discharge; airway clear.  NECK: neck supple no tenderness to c-spine  CARD: S1, S2 normal; no murmurs, gallops, or rubs. Regular rate and rhythm.  RESP: No wheezes, rales or rhonchi.  ABD: Normal bowel sounds; soft; non-distended; non-tender; no hepatosplenomegaly.  MSK: No cervical, thoracic ot lumbar spine tenderness to percussion or palpation. Flexion/extension of spine without pain,  no swelling, no erythema, no crepitus. Normal ROM. No clubbing, cyanosis or edema.  NEURO: Alert, oriented.  speech fluent and appropriate. sensation to light touch intact, cranial nerves 3-12 normal. Grossly unremarkable.  PSYCH: Cooperative, appropriate. VITAL SIGNS: I have reviewed nursing notes and confirm.  CONSTITUTIONAL: Well-developed; well-nourished; Pt is reading 500+page novel in bed comfortably, appears well in no acute distress   SKIN: Skin is warm and dry, no acute rash.  HEAD: Normocephalic; atraumatic.  EYES: PERRL, EOM intact; conjunctiva and sclera clear.  ENT: No nasal discharge; airway clear.  NECK: neck supple no tenderness to c-spine  CARD: S1, S2 normal; no murmurs, gallops, or rubs. Regular rate and rhythm.  RESP: No wheezes, rales or rhonchi.  ABD: Normal bowel sounds; soft; non-distended; non-tender; no hepatosplenomegaly.  MSK: No cervical, thoracic ot lumbar spine tenderness to percussion or palpation. Flexion/extension of spine without pain,  no swelling, no erythema, no crepitus. Normal ROM. No clubbing, cyanosis or edema.  NEURO: Alert, oriented.  speech fluent and appropriate. sensation to light touch intact, cranial nerves 3-12 normal. Grossly unremarkable. gait with walker normal   PSYCH: Cooperative, appropriate.

## 2021-06-28 NOTE — ED ADULT NURSE REASSESSMENT NOTE - NS ED NURSE REASSESS COMMENT FT1
Break coverage for RN Buster. Pt resting comfortably in bed, in NAD. Will medicate for UTI, plan for dc home. Will continue to monitor closely.

## 2021-06-28 NOTE — ED ADULT NURSE NOTE - OBJECTIVE STATEMENT
Pt Is a 89y female complaining of lower back pain and neck pain x3 days. Pt states that pain to lower back and neck is sharp and 5/10. Pt states that she is still able to ambulate with walker. PT denies fall or any known injury. Pt denies chest pain, ha, numbness and tingling to upper and lower extremities.

## 2021-06-28 NOTE — ED PROVIDER NOTE - NSFOLLOWUPINSTRUCTIONS_ED_ALL_ED_FT
take medications as directed     follow up with your doctor this week    return to ER for fever, worsening pain or any other concern

## 2021-06-28 NOTE — ED ADULT NURSE NOTE - NSIMPLEMENTINTERV_GEN_ALL_ED
Implemented All Fall with Harm Risk Interventions:  [place] to call system. Call bell, personal items and telephone within reach. Instruct patient to call for assistance. Room bathroom lighting operational. Non-slip footwear when patient is off stretcher. Physically safe environment: no spills, clutter or unnecessary equipment. Stretcher in lowest position, wheels locked, appropriate side rails in place. Provide visual cue, wrist band, yellow gown, etc. Monitor gait and stability. Monitor for mental status changes and reorient to person, place, and time. Review medications for side effects contributing to fall risk. Reinforce activity limits and safety measures with patient and family. Provide visual clues: red socks.

## 2021-06-28 NOTE — ED ADULT NURSE REASSESSMENT NOTE - NS ED NURSE REASSESS COMMENT FT1
Pt resting in bed at this time pending evaluation. Bed alarm in place call bell within reach. Pt updated on plan of care.

## 2021-07-15 ENCOUNTER — APPOINTMENT (OUTPATIENT)
Dept: VASCULAR SURGERY | Facility: CLINIC | Age: 86
End: 2021-07-15

## 2021-08-04 NOTE — DIETITIAN INITIAL EVALUATION ADULT. - PROBLEM SELECTOR PLAN 5
Spoke with patient   Chose   Dr.Mohammad Jaquez  [facility]  [address], [place] [address]  Phone: [phone]    Demographics given to patient, instructed to call and schedule.   Orders faxed to  office    patient w/ history of chronic lower back pain from prior L3, L4 compression fracture. Takes tylenol every day at 8a and 4pm.  - c/w tylenol PRN for pain control  - PT consult

## 2021-08-07 VITALS
WEIGHT: 89.95 LBS | HEART RATE: 90 BPM | DIASTOLIC BLOOD PRESSURE: 70 MMHG | TEMPERATURE: 98 F | OXYGEN SATURATION: 97 % | SYSTOLIC BLOOD PRESSURE: 170 MMHG | RESPIRATION RATE: 18 BRPM | HEIGHT: 60 IN

## 2021-08-07 LAB
ALBUMIN SERPL ELPH-MCNC: 2.8 G/DL — LOW (ref 3.4–5)
ALP SERPL-CCNC: 90 U/L — SIGNIFICANT CHANGE UP (ref 40–120)
ALT FLD-CCNC: 17 U/L — SIGNIFICANT CHANGE UP (ref 12–42)
ANION GAP SERPL CALC-SCNC: 10 MMOL/L — SIGNIFICANT CHANGE UP (ref 9–16)
AST SERPL-CCNC: 16 U/L — SIGNIFICANT CHANGE UP (ref 15–37)
BASOPHILS # BLD AUTO: 0.08 K/UL — SIGNIFICANT CHANGE UP (ref 0–0.2)
BASOPHILS NFR BLD AUTO: 0.5 % — SIGNIFICANT CHANGE UP (ref 0–2)
BILIRUB SERPL-MCNC: 0.2 MG/DL — SIGNIFICANT CHANGE UP (ref 0.2–1.2)
BUN SERPL-MCNC: 26 MG/DL — HIGH (ref 7–23)
CALCIUM SERPL-MCNC: 9.2 MG/DL — SIGNIFICANT CHANGE UP (ref 8.5–10.5)
CHLORIDE SERPL-SCNC: 103 MMOL/L — SIGNIFICANT CHANGE UP (ref 96–108)
CO2 SERPL-SCNC: 26 MMOL/L — SIGNIFICANT CHANGE UP (ref 22–31)
CREAT SERPL-MCNC: 0.93 MG/DL — SIGNIFICANT CHANGE UP (ref 0.5–1.3)
EOSINOPHIL # BLD AUTO: 0.13 K/UL — SIGNIFICANT CHANGE UP (ref 0–0.5)
EOSINOPHIL NFR BLD AUTO: 0.8 % — SIGNIFICANT CHANGE UP (ref 0–6)
GLUCOSE SERPL-MCNC: 104 MG/DL — HIGH (ref 70–99)
HCT VFR BLD CALC: 36.6 % — SIGNIFICANT CHANGE UP (ref 34.5–45)
HGB BLD-MCNC: 11.6 G/DL — SIGNIFICANT CHANGE UP (ref 11.5–15.5)
IMM GRANULOCYTES NFR BLD AUTO: 0.7 % — SIGNIFICANT CHANGE UP (ref 0–1.5)
INR BLD: 1.01 — SIGNIFICANT CHANGE UP (ref 0.88–1.16)
LACTATE SERPL-SCNC: 0.7 MMOL/L — SIGNIFICANT CHANGE UP (ref 0.4–2)
LIDOCAIN IGE QN: 153 U/L — SIGNIFICANT CHANGE UP (ref 73–393)
LYMPHOCYTES # BLD AUTO: 17 % — SIGNIFICANT CHANGE UP (ref 13–44)
LYMPHOCYTES # BLD AUTO: 2.82 K/UL — SIGNIFICANT CHANGE UP (ref 1–3.3)
MAGNESIUM SERPL-MCNC: 2.1 MG/DL — SIGNIFICANT CHANGE UP (ref 1.6–2.6)
MCHC RBC-ENTMCNC: 28.3 PG — SIGNIFICANT CHANGE UP (ref 27–34)
MCHC RBC-ENTMCNC: 31.7 GM/DL — LOW (ref 32–36)
MCV RBC AUTO: 89.3 FL — SIGNIFICANT CHANGE UP (ref 80–100)
MONOCYTES # BLD AUTO: 1.14 K/UL — HIGH (ref 0–0.9)
MONOCYTES NFR BLD AUTO: 6.9 % — SIGNIFICANT CHANGE UP (ref 2–14)
NEUTROPHILS # BLD AUTO: 12.26 K/UL — HIGH (ref 1.8–7.4)
NEUTROPHILS NFR BLD AUTO: 74.1 % — SIGNIFICANT CHANGE UP (ref 43–77)
NRBC # BLD: 0 /100 WBCS — SIGNIFICANT CHANGE UP (ref 0–0)
NT-PROBNP SERPL-SCNC: 2649 PG/ML — HIGH
PLATELET # BLD AUTO: 569 K/UL — HIGH (ref 150–400)
POTASSIUM SERPL-MCNC: 4 MMOL/L — SIGNIFICANT CHANGE UP (ref 3.5–5.3)
POTASSIUM SERPL-SCNC: 4 MMOL/L — SIGNIFICANT CHANGE UP (ref 3.5–5.3)
PROT SERPL-MCNC: 7.6 G/DL — SIGNIFICANT CHANGE UP (ref 6.4–8.2)
PROTHROM AB SERPL-ACNC: 11.9 SEC — SIGNIFICANT CHANGE UP (ref 10.6–13.6)
RBC # BLD: 4.1 M/UL — SIGNIFICANT CHANGE UP (ref 3.8–5.2)
RBC # FLD: 16.2 % — HIGH (ref 10.3–14.5)
SODIUM SERPL-SCNC: 139 MMOL/L — SIGNIFICANT CHANGE UP (ref 132–145)
TROPONIN I SERPL-MCNC: <0.017 NG/ML — LOW (ref 0.02–0.06)
WBC # BLD: 16.54 K/UL — HIGH (ref 3.8–10.5)
WBC # FLD AUTO: 16.54 K/UL — HIGH (ref 3.8–10.5)

## 2021-08-07 PROCEDURE — 93010 ELECTROCARDIOGRAM REPORT: CPT

## 2021-08-07 PROCEDURE — 99291 CRITICAL CARE FIRST HOUR: CPT

## 2021-08-07 RX ORDER — ACETAMINOPHEN 500 MG
650 TABLET ORAL ONCE
Refills: 0 | Status: COMPLETED | OUTPATIENT
Start: 2021-08-07 | End: 2021-08-07

## 2021-08-07 RX ADMIN — Medication 650 MILLIGRAM(S): at 21:59

## 2021-08-07 NOTE — ED ADULT NURSE REASSESSMENT NOTE - NS ED NURSE REASSESS COMMENT FT1
Received Pt from previous RN sitting up on stretcher awake and alert, breathing without issue on RA. ACP with Pt.

## 2021-08-07 NOTE — ED ADULT TRIAGE NOTE - CHIEF COMPLAINT QUOTE
BIBA from home c/o chronic back pain. reports back pain for the past 8 years, today it is worse and has taken 1g Tylenol without relief. +walks with walker. denies recent injury/trauma/fall.

## 2021-08-08 ENCOUNTER — INPATIENT (INPATIENT)
Facility: HOSPITAL | Age: 86
LOS: 30 days | Discharge: HOSPICE | DRG: 329 | End: 2021-09-08
Attending: COLON & RECTAL SURGERY | Admitting: COLON & RECTAL SURGERY
Payer: MEDICARE

## 2021-08-08 DIAGNOSIS — N13.30 UNSPECIFIED HYDRONEPHROSIS: ICD-10-CM

## 2021-08-08 DIAGNOSIS — I65.22 OCCLUSION AND STENOSIS OF LEFT CAROTID ARTERY: ICD-10-CM

## 2021-08-08 DIAGNOSIS — R63.8 OTHER SYMPTOMS AND SIGNS CONCERNING FOOD AND FLUID INTAKE: ICD-10-CM

## 2021-08-08 DIAGNOSIS — I10 ESSENTIAL (PRIMARY) HYPERTENSION: ICD-10-CM

## 2021-08-08 DIAGNOSIS — K63.0 ABSCESS OF INTESTINE: ICD-10-CM

## 2021-08-08 DIAGNOSIS — A41.9 SEPSIS, UNSPECIFIED ORGANISM: ICD-10-CM

## 2021-08-08 DIAGNOSIS — Z98.890 OTHER SPECIFIED POSTPROCEDURAL STATES: Chronic | ICD-10-CM

## 2021-08-08 DIAGNOSIS — I25.10 ATHEROSCLEROTIC HEART DISEASE OF NATIVE CORONARY ARTERY WITHOUT ANGINA PECTORIS: ICD-10-CM

## 2021-08-08 DIAGNOSIS — M54.9 DORSALGIA, UNSPECIFIED: ICD-10-CM

## 2021-08-08 LAB
ALBUMIN SERPL ELPH-MCNC: 2.9 G/DL — LOW (ref 3.3–5)
ALP SERPL-CCNC: 68 U/L — SIGNIFICANT CHANGE UP (ref 40–120)
ALT FLD-CCNC: 6 U/L — LOW (ref 10–45)
ANION GAP SERPL CALC-SCNC: 10 MMOL/L — SIGNIFICANT CHANGE UP (ref 5–17)
APPEARANCE UR: CLEAR — SIGNIFICANT CHANGE UP
AST SERPL-CCNC: 10 U/L — SIGNIFICANT CHANGE UP (ref 10–40)
BASOPHILS # BLD AUTO: 0.02 K/UL — SIGNIFICANT CHANGE UP (ref 0–0.2)
BASOPHILS NFR BLD AUTO: 0.2 % — SIGNIFICANT CHANGE UP (ref 0–2)
BILIRUB SERPL-MCNC: 0.2 MG/DL — SIGNIFICANT CHANGE UP (ref 0.2–1.2)
BILIRUB UR-MCNC: NEGATIVE — SIGNIFICANT CHANGE UP
BUN SERPL-MCNC: 15 MG/DL — SIGNIFICANT CHANGE UP (ref 7–23)
CALCIUM SERPL-MCNC: 8.5 MG/DL — SIGNIFICANT CHANGE UP (ref 8.4–10.5)
CEA SERPL-MCNC: 7 NG/ML — HIGH (ref 0–3.8)
CHLORIDE SERPL-SCNC: 106 MMOL/L — SIGNIFICANT CHANGE UP (ref 96–108)
CO2 SERPL-SCNC: 21 MMOL/L — LOW (ref 22–31)
COLOR SPEC: YELLOW — SIGNIFICANT CHANGE UP
CREAT SERPL-MCNC: 0.71 MG/DL — SIGNIFICANT CHANGE UP (ref 0.5–1.3)
DIFF PNL FLD: NEGATIVE — SIGNIFICANT CHANGE UP
EOSINOPHIL # BLD AUTO: 0.08 K/UL — SIGNIFICANT CHANGE UP (ref 0–0.5)
EOSINOPHIL NFR BLD AUTO: 0.7 % — SIGNIFICANT CHANGE UP (ref 0–6)
GLUCOSE SERPL-MCNC: 155 MG/DL — HIGH (ref 70–99)
GLUCOSE UR QL: NEGATIVE — SIGNIFICANT CHANGE UP
HCT VFR BLD CALC: 35.7 % — SIGNIFICANT CHANGE UP (ref 34.5–45)
HGB BLD-MCNC: 10.8 G/DL — LOW (ref 11.5–15.5)
IMM GRANULOCYTES NFR BLD AUTO: 0.3 % — SIGNIFICANT CHANGE UP (ref 0–1.5)
KETONES UR-MCNC: NEGATIVE — SIGNIFICANT CHANGE UP
LEUKOCYTE ESTERASE UR-ACNC: NEGATIVE — SIGNIFICANT CHANGE UP
LYMPHOCYTES # BLD AUTO: 1.18 K/UL — SIGNIFICANT CHANGE UP (ref 1–3.3)
LYMPHOCYTES # BLD AUTO: 10.6 % — LOW (ref 13–44)
MCHC RBC-ENTMCNC: 27.8 PG — SIGNIFICANT CHANGE UP (ref 27–34)
MCHC RBC-ENTMCNC: 30.3 GM/DL — LOW (ref 32–36)
MCV RBC AUTO: 91.8 FL — SIGNIFICANT CHANGE UP (ref 80–100)
MONOCYTES # BLD AUTO: 0.32 K/UL — SIGNIFICANT CHANGE UP (ref 0–0.9)
MONOCYTES NFR BLD AUTO: 2.9 % — SIGNIFICANT CHANGE UP (ref 2–14)
NEUTROPHILS # BLD AUTO: 9.45 K/UL — HIGH (ref 1.8–7.4)
NEUTROPHILS NFR BLD AUTO: 85.3 % — HIGH (ref 43–77)
NITRITE UR-MCNC: NEGATIVE — SIGNIFICANT CHANGE UP
NRBC # BLD: 0 /100 WBCS — SIGNIFICANT CHANGE UP (ref 0–0)
PH UR: 5.5 — SIGNIFICANT CHANGE UP (ref 5–8)
PLATELET # BLD AUTO: 579 K/UL — HIGH (ref 150–400)
POTASSIUM SERPL-MCNC: 3.6 MMOL/L — SIGNIFICANT CHANGE UP (ref 3.5–5.3)
POTASSIUM SERPL-SCNC: 3.6 MMOL/L — SIGNIFICANT CHANGE UP (ref 3.5–5.3)
PROT SERPL-MCNC: 6 G/DL — SIGNIFICANT CHANGE UP (ref 6–8.3)
PROT UR-MCNC: NEGATIVE MG/DL — SIGNIFICANT CHANGE UP
RBC # BLD: 3.89 M/UL — SIGNIFICANT CHANGE UP (ref 3.8–5.2)
RBC # FLD: 16.3 % — HIGH (ref 10.3–14.5)
SARS-COV-2 RNA SPEC QL NAA+PROBE: SIGNIFICANT CHANGE UP
SODIUM SERPL-SCNC: 137 MMOL/L — SIGNIFICANT CHANGE UP (ref 135–145)
SP GR SPEC: 1.01 — SIGNIFICANT CHANGE UP (ref 1–1.03)
UROBILINOGEN FLD QL: 0.2 E.U./DL — SIGNIFICANT CHANGE UP
WBC # BLD: 11.08 K/UL — HIGH (ref 3.8–10.5)
WBC # FLD AUTO: 11.08 K/UL — HIGH (ref 3.8–10.5)

## 2021-08-08 PROCEDURE — 99222 1ST HOSP IP/OBS MODERATE 55: CPT

## 2021-08-08 PROCEDURE — 99221 1ST HOSP IP/OBS SF/LOW 40: CPT | Mod: GC

## 2021-08-08 PROCEDURE — 72125 CT NECK SPINE W/O DYE: CPT | Mod: 26,MH

## 2021-08-08 PROCEDURE — 71250 CT THORAX DX C-: CPT | Mod: 26

## 2021-08-08 PROCEDURE — 74177 CT ABD & PELVIS W/CONTRAST: CPT | Mod: 26,MH

## 2021-08-08 PROCEDURE — 99223 1ST HOSP IP/OBS HIGH 75: CPT | Mod: GC

## 2021-08-08 PROCEDURE — 99221 1ST HOSP IP/OBS SF/LOW 40: CPT

## 2021-08-08 PROCEDURE — 71045 X-RAY EXAM CHEST 1 VIEW: CPT | Mod: 26

## 2021-08-08 RX ORDER — ASPIRIN/CALCIUM CARB/MAGNESIUM 324 MG
81 TABLET ORAL DAILY
Refills: 0 | Status: DISCONTINUED | OUTPATIENT
Start: 2021-08-08 | End: 2021-08-08

## 2021-08-08 RX ORDER — ENOXAPARIN SODIUM 100 MG/ML
40 INJECTION SUBCUTANEOUS EVERY 24 HOURS
Refills: 0 | Status: DISCONTINUED | OUTPATIENT
Start: 2021-08-08 | End: 2021-08-08

## 2021-08-08 RX ORDER — PIPERACILLIN AND TAZOBACTAM 4; .5 G/20ML; G/20ML
3.38 INJECTION, POWDER, LYOPHILIZED, FOR SOLUTION INTRAVENOUS ONCE
Refills: 0 | Status: COMPLETED | OUTPATIENT
Start: 2021-08-08 | End: 2021-08-08

## 2021-08-08 RX ORDER — LIDOCAINE 4 G/100G
2 CREAM TOPICAL ONCE
Refills: 0 | Status: COMPLETED | OUTPATIENT
Start: 2021-08-08 | End: 2021-08-08

## 2021-08-08 RX ORDER — ATORVASTATIN CALCIUM 80 MG/1
80 TABLET, FILM COATED ORAL AT BEDTIME
Refills: 0 | Status: DISCONTINUED | OUTPATIENT
Start: 2021-08-08 | End: 2021-08-13

## 2021-08-08 RX ORDER — ACETAMINOPHEN 500 MG
1 TABLET ORAL
Qty: 0 | Refills: 0 | DISCHARGE

## 2021-08-08 RX ORDER — AMLODIPINE BESYLATE 2.5 MG/1
1 TABLET ORAL
Qty: 0 | Refills: 0 | DISCHARGE

## 2021-08-08 RX ORDER — SODIUM CHLORIDE 9 MG/ML
500 INJECTION, SOLUTION INTRAVENOUS
Refills: 0 | Status: DISCONTINUED | OUTPATIENT
Start: 2021-08-08 | End: 2021-08-09

## 2021-08-08 RX ORDER — ENOXAPARIN SODIUM 100 MG/ML
30 INJECTION SUBCUTANEOUS DAILY
Refills: 0 | Status: DISCONTINUED | OUTPATIENT
Start: 2021-08-08 | End: 2021-08-08

## 2021-08-08 RX ORDER — ACETAMINOPHEN 500 MG
650 TABLET ORAL EVERY 6 HOURS
Refills: 0 | Status: DISCONTINUED | OUTPATIENT
Start: 2021-08-08 | End: 2021-08-08

## 2021-08-08 RX ORDER — AMLODIPINE BESYLATE 2.5 MG/1
5 TABLET ORAL DAILY
Refills: 0 | Status: DISCONTINUED | OUTPATIENT
Start: 2021-08-08 | End: 2021-08-08

## 2021-08-08 RX ORDER — POTASSIUM CHLORIDE 20 MEQ
40 PACKET (EA) ORAL ONCE
Refills: 0 | Status: COMPLETED | OUTPATIENT
Start: 2021-08-08 | End: 2021-08-08

## 2021-08-08 RX ORDER — PIPERACILLIN AND TAZOBACTAM 4; .5 G/20ML; G/20ML
3.38 INJECTION, POWDER, LYOPHILIZED, FOR SOLUTION INTRAVENOUS EVERY 6 HOURS
Refills: 0 | Status: DISCONTINUED | OUTPATIENT
Start: 2021-08-08 | End: 2021-08-11

## 2021-08-08 RX ORDER — SODIUM CHLORIDE 9 MG/ML
1500 INJECTION INTRAMUSCULAR; INTRAVENOUS; SUBCUTANEOUS ONCE
Refills: 0 | Status: COMPLETED | OUTPATIENT
Start: 2021-08-08 | End: 2021-08-08

## 2021-08-08 RX ORDER — METOPROLOL TARTRATE 50 MG
12.5 TABLET ORAL DAILY
Refills: 0 | Status: DISCONTINUED | OUTPATIENT
Start: 2021-08-08 | End: 2021-08-08

## 2021-08-08 RX ORDER — CLOPIDOGREL BISULFATE 75 MG/1
75 TABLET, FILM COATED ORAL DAILY
Refills: 0 | Status: DISCONTINUED | OUTPATIENT
Start: 2021-08-08 | End: 2021-08-08

## 2021-08-08 RX ADMIN — Medication 12.5 MILLIGRAM(S): at 11:53

## 2021-08-08 RX ADMIN — PIPERACILLIN AND TAZOBACTAM 200 GRAM(S): 4; .5 INJECTION, POWDER, LYOPHILIZED, FOR SOLUTION INTRAVENOUS at 19:27

## 2021-08-08 RX ADMIN — CLOPIDOGREL BISULFATE 75 MILLIGRAM(S): 75 TABLET, FILM COATED ORAL at 11:53

## 2021-08-08 RX ADMIN — ATORVASTATIN CALCIUM 80 MILLIGRAM(S): 80 TABLET, FILM COATED ORAL at 21:45

## 2021-08-08 RX ADMIN — AMLODIPINE BESYLATE 5 MILLIGRAM(S): 2.5 TABLET ORAL at 11:53

## 2021-08-08 RX ADMIN — Medication 81 MILLIGRAM(S): at 11:53

## 2021-08-08 RX ADMIN — LIDOCAINE 2 PATCH: 4 CREAM TOPICAL at 11:57

## 2021-08-08 RX ADMIN — SODIUM CHLORIDE 500 MILLILITER(S): 9 INJECTION INTRAMUSCULAR; INTRAVENOUS; SUBCUTANEOUS at 04:27

## 2021-08-08 RX ADMIN — LIDOCAINE 2 PATCH: 4 CREAM TOPICAL at 23:00

## 2021-08-08 RX ADMIN — SODIUM CHLORIDE 80 MILLILITER(S): 9 INJECTION, SOLUTION INTRAVENOUS at 16:47

## 2021-08-08 RX ADMIN — Medication 40 MILLIEQUIVALENT(S): at 13:33

## 2021-08-08 RX ADMIN — PIPERACILLIN AND TAZOBACTAM 200 GRAM(S): 4; .5 INJECTION, POWDER, LYOPHILIZED, FOR SOLUTION INTRAVENOUS at 14:27

## 2021-08-08 RX ADMIN — PIPERACILLIN AND TAZOBACTAM 200 GRAM(S): 4; .5 INJECTION, POWDER, LYOPHILIZED, FOR SOLUTION INTRAVENOUS at 04:31

## 2021-08-08 RX ADMIN — PIPERACILLIN AND TAZOBACTAM 200 GRAM(S): 4; .5 INJECTION, POWDER, LYOPHILIZED, FOR SOLUTION INTRAVENOUS at 23:59

## 2021-08-08 RX ADMIN — LIDOCAINE 2 PATCH: 4 CREAM TOPICAL at 19:47

## 2021-08-08 NOTE — H&P ADULT - ASSESSMENT
89F PMHx of HTN, SBO, CAD and chronic back pain 2/2 compression fx of L3/L4 p/w....... admitted for work up colonic mass that is concerning for malignancy vs abscess 89F PMHx of HTN, SBO, CAD, Caortid stenosis s/p CEA 4/21 and chronic back pain 2/2 compression fx of L3/L4 p/w lower back pain, admitted for work up colonic mass that is concerning for malignancy vs abscess 89F PMHx of HTN, SBO, CAD, Carotid stenosis s/p CEA 4/21 and chronic back pain 2/2 compression fx of L3/L4 p/w lower back pain. 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 Admitted for work up colonic mass that is concerning for malignancy vs abscess

## 2021-08-08 NOTE — H&P ADULT - PROBLEM SELECTOR PLAN 1
CT abd showing necrotizing large necrotic mass in distal sigmoid colon w posterior extension to sacrum and caudal extension to uterus and rectum. Hx of subjective weight loss despite no changes to appetite. Last colonscopy on. CT C spine negative for mets    Plan:  - order CEA to assess for colonic malignancy  - CT Chest for staging of possible metastatic disease CT abd showing necrotizing large necrotic mass in distal sigmoid colon w posterior extension to sacrum and caudal extension to uterus and rectum. Hx of subjective weight loss despite no changes to appetite. Never had colonscopy. CT C spine negative for mets    Plan:  - order CEA to assess for colonic malignancy  - CT Chest for staging of possible metastatic disease CT abd showing necrotizing large necrotic mass in distal sigmoid colon w posterior extension to sacrum and caudal extension to uterus and rectum. Hx of subjective weight loss despite no changes to appetite. Never had colonscopy. CT C spine negative for mets    Plan:  - Consult GI to assess need for surgical intervention  - Consult heme/onc for possible malignancy  - Consult Vascular surgery to assess if DAPT can be d/c'd if patient requires surgical intervention  - order CEA to assess for colonic malignancy  - CT Chest for staging of possible metastatic disease CT abd showing necrotizing large necrotic mass in distal sigmoid colon w posterior extension to sacrum and caudal extension to uterus and rectum, and L sided hydronephrosis.  Hx of subjective weight loss despite no changes to appetite. Never had colonscopy. CT C spine negative for mets    Plan:  - Consult GI to assess need for surgical intervention  - Consult heme/onc for possible malignancy  - Consult Vascular surgery to assess if DAPT can be d/c'd if patient requires surgical intervention  - order CEA to assess for colonic malignancy  - CT Chest for staging of possible metastatic disease

## 2021-08-08 NOTE — CONSULT NOTE ADULT - SUBJECTIVE AND OBJECTIVE BOX
Hematology Oncology Consult Note (Dr. Francisco)  Discussed with Dr. Francisco and recommendations reviewed with the primary team.    HPI: 89 YO F with PMH of chronic back pain 2/2 L3,L4 compression fractures, triple vessel CAD on ASA, plavix, lipitor () and L CEA 2/2 L ICA stenosis >70% (), recent ED visit on  for UTI p/w abdominal pain x 1 day, constant, 6-7/10, not related to food. Denies any associated n/v/d/c, dark stools, BRBPR. She does reports having abdominal bloating for about 2 months and weight loss of 30 lbs over a year despite good appetite. She reports having chronic left sided low back pain, not worsening, denies any LE weakness, numbness/tingling. She has never had a colonoscopy in the past. CT a/p showing large necrotic mass in distal sigmoid colon w posterior extension to sacrum and caudal extension to uterus and rectum. Finding consistent with colonic malignancy. Moderate left hydronephrosis and proximal hydroureter extending to the mass lesion. Mild diffuse wall thickening in the stomach. Oncology consulted for malignancy workup. Currently abdominal pain is well controlled. Denies any back pain.     CT A/P 21  IMPRESSION:  ATTENDING ATTESTATION   1.  Findings suspicious for complicated perforated distal sigmoid diverticulitis with irregular large abscess in the presacral/posterior perirectal space and extending into the left gluteal region. Small presacral/pelvic extraluminal air (602:43, 2:65). No definite associated adjacent osseous erosion. No bowel obstruction, however moderate colonic stool burden from probable constipation. Findings can be also seen in perforated distal sigmoid colon cancer, clinical correlation is recommended.  2.  Left hydronephroureter to the level of left internal iliac artery, may be obstructed by pelvic abscess/pelvic inflammatory process, transition point on image 2:58.  3.  Unchanged left adrenal adenoma, hiatal hernia, severe aortoiliac calcifications.  4.  Anasarca.  IMPRESSION:  1. Large necrotic mass lesion in lower posterior pelvis which appears to be arising from distal sigmoid  colon with posterior extension to the sacrum and caudal extension to the rectum and the uterus. Mass  lesion measures approximately 8.1 x 10.1 x 10.7 cm. Necrotic components versus abscess  collections are seen posterior to the rectum and extending through left greater sciatic foramen.  Finding is consistent with colonic malignancy. No evidence of large bowel obstruction.  2. Moderate hiatal hernia. Wall thickening is seen in the visualized distal esophagus. Please correlate  clinically for esophagitis.  3. Moderate left hydronephrosis and proximal hydroureter extending to the mass lesion.  4. Mild diffuse wall thickening in the stomach. Please correlate clinically for gastritis.    CT Cervical Spine  21  IMPRESSION:  No acute cervical spine fracture. Degenerative changes of the cervical spine as above.      PAST MEDICAL & SURGICAL HISTORY:  SBO (small bowel obstruction)  HTN (hypertension)  Chronic back pain  S/P wrist surgery    Allergies: NKDA    Medications:  Home Medications:  amLODIPine 5 mg oral tablet: 1 tab(s) orally once a day (08 Aug 2021 11:13)  aspirin 81 mg oral tablet, chewable: 1 tab(s) orally every 24 hours (08 Aug 2021 11:13)  atorvastatin 80 mg oral tablet: 1 tab(s) orally once a day (at bedtime) (08 Aug 2021 11:13)  clopidogrel 75 mg oral tablet: 1 tab(s) orally every 24 hours (08 Aug 2021 11:13)  metoprolol: 12.5 milligram(s) orally once a day (08 Aug 2021 11:13)  Tylenol 325 mg oral capsule: 1 cap(s) orally 2 times a day (08 Aug 2021 11:13)    Social History: Former smoker 1PPD x 2 years (quit 30 years ago), drink 1 scotch per day since her adult life. Lives by herself in an apartment, able to do her ADLs. She has a  who comes twice a week. She has two nieces who live 60 miles away, has friends who visit her regularly.      FAMILY HISTORY: Denies any family history of cancer     PHYSICAL EXAM:    Vital Signs Last 24 Hrs  T(C): 36.9 (08 Aug 2021 11:51), Max: 38.1 (07 Aug 2021 21:41)  T(F): 98.4 (08 Aug 2021 11:51), Max: 100.5 (07 Aug 2021 21:41)  HR: 82 (08 Aug 2021 11:51) (80 - 95)  BP: 123/68 (08 Aug 2021 11:51) (123/68 - 170/70)  RR: 16 (08 Aug 2021 11:51) (16 - 18)  SpO2: 96% (08 Aug 2021 11:51) (95% - 98%)    Gen: elderly female in no acute distress   HEENT: normocephalic/atraumatic, no conjunctival pallor, no scleral icterus, no oral thrush/mucosal bleeding/mucositis  Neck: supple  Cardiovascular: RR, nl S1S2, no murmurs  Respiratory: clear air entry b/l  Gastrointestinal: BS+, soft, distended, diffusely tender to palpation.   Extremities: no edema, no calf tenderness  Neurological: AAOx3, no focal deficits, strength and sensation in UE/LE is intact.   Skin: no rash on visible skin  Musculoskeletal:  full ROM    Labs:                          10.8   11.08 )-----------( 579      ( 08 Aug 2021 11:18 )             35.7     CBC Full  -  ( 08 Aug 2021 11:18 )  WBC Count : 11.08 K/uL  RBC Count : 3.89 M/uL  Hemoglobin : 10.8 g/dL  Hematocrit : 35.7 %  Platelet Count - Automated : 579 K/uL  Mean Cell Volume : 91.8 fl  Mean Cell Hemoglobin : 27.8 pg  Mean Cell Hemoglobin Concentration : 30.3 gm/dL  Auto Neutrophil # : 9.45 K/uL  Auto Lymphocyte # : 1.18 K/uL  Auto Monocyte # : 0.32 K/uL  Auto Eosinophil # : 0.08 K/uL  Auto Basophil # : 0.02 K/uL  Auto Neutrophil % : 85.3 %  Auto Lymphocyte % : 10.6 %  Auto Monocyte % : 2.9 %  Auto Eosinophil % : 0.7 %  Auto Basophil % : 0.2 %    PT/INR - ( 07 Aug 2021 21:49 )   PT: 11.9 sec;   INR: 1.01           137  |  106  |  15  ----------------------------<  155<H>  3.6   |  21<L>  |  0.71    Ca    8.5      08 Aug 2021 11:18  Mg     2.1     08-    TPro  6.0  /  Alb  2.9<L>  /  TBili  0.2  /  DBili  x   /  AST  10  /  ALT  6<L>  /  AlkPhos  68  0808      Urinalysis Basic - ( 08 Aug 2021 04:33 )    Color: Yellow / Appearance: Clear / S.010 / pH: x  Gluc: x / Ketone: NEGATIVE  / Bili: NEGATIVE / Urobili: 0.2 E.U./dL   Blood: x / Protein: NEGATIVE mg/dL / Nitrite: NEGATIVE   Leuk Esterase: NEGATIVE / RBC: x / WBC x   Sq Epi: x / Non Sq Epi: x / Bacteria: x    Imaging Studies: Noted above.

## 2021-08-08 NOTE — CONSULT NOTE ADULT - ASSESSMENT
89F PMH HTN, chronic back pain 2/2 L3,L4 compression fractures, triple vessel CAD, and SBO 2/2 possible diverticulitis vs. GYN/colorectal malignancy (2018) never followed up and PSH L CEA 2/2 L ICA stenosis >70% (4/21) who presented with worsening back pain x1 week. Denies abdominal pain, endorses flatus and some recent weightloss. Admitted to medicine service from ED with elevated WBC (16.5) with left shift, lactate wnl (0.7), and Vrads CT scan (8/7) preliminary read concerning for pelvic malignancy.     General surgery consulted for concern of perforated, complicated sigmoid diverticulitis vs. colonic perforation 2/2 colorectal malignancy on updated CT read (8/8) now read as suspicious for complicated perforated distal sigmoid diverticulitis with irregular large abscess in the presacral/posterior perirectal space and extending into the left gluteal region. Small presacral/pelvic extraluminal air, moderate colonic stool and possible perforated distal sigmoid colon cancer. PE significant for lower abd. TTP without rebound or guarding and moderate abdominal distension.     Transfer to Team 4 General Surgery, Telemetry, Dr. Harrell   NPO/IVF   Pain/nausea control PRN   Zosyn (8/8-)  STANTON q6hr  Pre-op for possible OR (EKG, CXR, T+Sx1, Coags, CBC, BMP, Mag, Phos)   AM Labs   CT CHEST to evaluate for metastasis   IR consult to evaluate if abscess/collection amenable to drainage   F/u GI, Vascular surgery, Urology, Heme/onc, IR recs       Plan discussed with attending and chief resident.   ____________________________________________________  KP Portelli - Resident   Surgery

## 2021-08-08 NOTE — ED PROVIDER NOTE - PHYSICAL EXAMINATION
Constitutional:  Well appearing, thin, awake, alert, oriented to person, place, time/situation and in no apparent distress  Head:  NC/AT, symmetric, neck supple  ENMT: Airway patent, nasal mucosa clear, mouth with normal mucosa, throat has no vesicles, no oropharyngeal exudates and vulva is midline  Eyes:  Clear bilaterally, pupils equal, round and reactive to light  Cardiac:  Normal rate, regular rhythm. Heart sounds S1,S2.  No murmurs, rubs or gallops.  No JVD.  Respiratory:  Breath sounds clear and equal bilaterally  GI:   Abd soft, non-tender, no guarding  :  No discharge or lesions  MSK:  + midline cervical and lumbar ttp, no paraspinal ttp  Neuro:  Alert and oriented, no focal deficits, no motor or sensory deficits  Skin:  Skin normal color for race, warm, dry and intact.  No evidence of rash  Psych:  Normal mood and affect, no apparent risk to self or others.

## 2021-08-08 NOTE — H&P ADULT - PROBLEM SELECTOR PLAN 8
Fluids: None  Electrolytes: Mg>2, K>4  Nutrition:  No IVF currently needed, replete lytes PRN  Prophylaxis: Lovenox 40mg daily Subq  Activity: AAT, OOBTC  GI: none  C: FC  Dispo: Admit to [facility]

## 2021-08-08 NOTE — ED PROVIDER NOTE - OBJECTIVE STATEMENT
88 y/o F with PMH of HTN, SBO 2018, chronic back pain, L3/L4 compression fracture presents to ED c/o acute on chronic lumbar back pain with new onset neck pain noted upon awakening this morning.  She took 1000 mg of Tylenol as she usually does for pain with no relief.  She is ambulatory with her rollator.  She endorses mild abd pain/ache.  She denies fever/chills, chest pain, cough, SOB, headache, changes in appetite n/v/d, constipation, weakness, numbness, dysuria, hematuria, saddle anesthesia, sick contacts, recent travels.  She has chronic urinary and stool incontinence.

## 2021-08-08 NOTE — ED PROVIDER NOTE - CLINICAL SUMMARY MEDICAL DECISION MAKING FREE TEXT BOX
88 y/o F presents to ED c/o acute on chronic back pain.  VSS, NAD. Labs notable for WBC 16.  Pt had tmax 100.5.  CT cervical spine.  CT abd/pelvis shows large posterior pelvis extending from sigmoid colon to rectum and uterus.  There are collections, necrotic vs abscess.  Pt given Zosyn IV and IVFs.  pt discussed with Dr. Beltran and accepted to regional medicine.

## 2021-08-08 NOTE — H&P ADULT - CONVERSATION DETAILS
Had a discussion with the patient regarding this hospitalization and her CT findings of a large colonic mass in her colon, with additional findings suspicious for complicated perforation. Surgery has also evaluated the patient, and we are pending final recommendations. Although this is a malignancy that needs further work up, given that it is a new diagnosis, the patient, at this moment wishes to be full code, and wants to be aggressively resuscitated.

## 2021-08-08 NOTE — H&P ADULT - PROBLEM SELECTOR PLAN 4
Pt with Hx of chronic back pain 2/2 L3,L4 compression fractures. Pain appears to chronic in nature vs related to colonic malignancy/abscess    Plan    -  ...... As needed for pain Pt with Hx of chronic back pain 2/2 L3,L4 compression fractures. Pain appears to chronic in nature vs related to colonic malignancy/abscess. Ct spine    Plan  -  lidocaine patch As needed for pain  - Tylenol 650mg PRN Q6hrs for pain Pt with Hx of chronic back pain 2/2 L3,L4 compression fractures. Pain appears to chronic in nature vs related to colonic malignancy/abscess. CT spine neg for acute fx.    Plan  -  lidocaine patch As needed for pain  - Tylenol 650mg PRN Q6hrs for pain

## 2021-08-08 NOTE — CONSULT NOTE ADULT - ASSESSMENT
88 YO F with PMH of chronic back pain 2/2 L3,L4 compression fractures, triple vessel CAD and L CEA 2/2 L ICA stenosis >70% (4/21), recent ED visit on 6/28 for UTI p/w abdominal pain x 1 day, constant associated with weight loss and abdominal distention. CT a/p showing large necrotic mass arising from the sigmoid colon with posterior extension to the sacrum and caudal extension to the rectum and the uterus, findings also suspicious for complicated perforated distal sigmoid diverticulitis with irregular large abscess. Oncology consulted for malignancy workup     Large necrotic mass in the sigmoid colon c/w colonic malignancy   - CT a/p showing large necrotic mass arising from the sigmoid colon with posterior extension to the sacrum and caudal extension to the rectum and the uterus, findings also suspicious for complicated perforated distal sigmoid diverticulitis with irregular large abscess in the presacral/posterior perirectal space and extending into the left gluteal region. Small presacral/pelvic extraluminal air. Left hydronephroureter to the level of left internal iliac artery, may be obstructed by pelvic abscess/pelvic inflammatory process.   - CEA pending   - Recommend CT chest non-contrast for staging   - GI consulted for possible colonoscopy and biopsy   - Further recommendations pending biopsy results.     To discuss with Dr. Francisco.    88 YO F with PMH of chronic back pain 2/2 L3,L4 compression fractures, triple vessel CAD and L CEA 2/2 L ICA stenosis >70% (4/21), recent ED visit on 6/28 for UTI p/w abdominal pain x 1 day, constant associated with weight loss and abdominal distention. CT a/p showing large necrotic mass arising from the sigmoid colon with posterior extension to the sacrum and caudal extension to the rectum and the uterus, findings also suspicious for complicated perforated distal sigmoid diverticulitis with irregular large abscess. Oncology consulted for malignancy workup     Large necrotic mass in the sigmoid colon c/w colonic malignancy   Perforated sigmoid colonic diverticulitis   - CT a/p showing large necrotic mass arising from the sigmoid colon with posterior extension to the sacrum and caudal extension to the rectum and the uterus, findings also suspicious for complicated perforated distal sigmoid diverticulitis with irregular large abscess in the presacral/posterior perirectal space and extending into the left gluteal region. Small presacral/pelvic extraluminal air. Left hydronephroureter to the level of left internal iliac artery, may be obstructed by pelvic abscess/pelvic inflammatory process.   - CEA pending   - Recommend CT chest non-contrast for staging   - GI consulted for possible colonoscopy and biopsy   - Surgery consulted for perforated diverticulitis.   - Further recommendations pending biopsy results.     To discuss with Dr. Francisco.    88 YO F with PMH of chronic back pain 2/2 L3,L4 compression fractures, triple vessel CAD and L CEA 2/2 L ICA stenosis >70% (4/21), recent ED visit on 6/28 for UTI p/w abdominal pain x 1 day, constant associated with weight loss and abdominal distention. CT a/p showing large necrotic mass arising from the sigmoid colon with posterior extension to the sacrum and caudal extension to the rectum and the uterus, findings also suspicious for complicated perforated distal sigmoid diverticulitis with irregular large abscess. Oncology consulted for malignancy workup     Large necrotic mass in the sigmoid colon c/w colonic malignancy   Perforated sigmoid colonic diverticulitis   - CT a/p showing large necrotic mass arising from the sigmoid colon with posterior extension to the sacrum and caudal extension to the rectum and the uterus, findings also suspicious for complicated perforated distal sigmoid diverticulitis with irregular large abscess in the presacral/posterior perirectal space and extending into the left gluteal region. Small presacral/pelvic extraluminal air. Left hydronephroureter to the level of left internal iliac artery, may be obstructed by pelvic abscess/pelvic inflammatory process.   - CEA pending   - Recommend CT chest non-contrast for staging   - Surgery consulted for perforated diverticulitis, possibly going to OR today   - Further recommendations after surgical intervention.     Discussed with Dr. Francisco.

## 2021-08-08 NOTE — CONSULT NOTE ADULT - ASSESSMENT
88 YO F with PMH of chronic back pain 2/2 L3,L4 compression fractures, triple vessel CAD and L CEA 2/2 L ICA stenosis >70% (4/21), recent ED visit on 6/28 for UTI p/w abdominal pain x 1 day, constant associated with weight loss and abdominal distention. CT a/p showing large necrotic mass arising from the sigmoid colon with posterior extension to the sacrum and caudal extension to the rectum and the uterus, findings also suspicious for complicated perforated distal sigmoid diverticulitis with irregular large abscess. 88 YO F with PMH of chronic back pain 2/2 L3,L4 compression fractures, triple vessel CAD and L CEA 2/2 L ICA stenosis >70% (4/21), recent ED visit on 6/28 for UTI p/w abdominal pain x 1 day, constant associated with weight loss and abdominal distention. CT also showing Left hydronephrosis down to the iliac artery   88 YO F with PMH of chronic back pain 2/2 L3,L4 compression fractures, triple vessel CAD and L CEA 2/2 L ICA stenosis >70% (4/21), recent ED visit on 6/28 for UTI p/w abdominal pain x 1 day, constant associated with weight loss and abdominal distention. CT a/p showing large necrotic mass arising from the sigmoid colon with posterior extension to the sacrum and caudal extension to the rectum and the uterus, findings also suspicious for complicated perforated distal sigmoid diverticulitis with irregular large abscess.     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 Creatinine at baseline. No CVAT  On review of imaging there is no delayed nephrogram present. Would recommend further workup to access if patient is clinically obstructed

## 2021-08-08 NOTE — CHART NOTE - NSCHARTNOTEFT_GEN_A_CORE
Chief Surgery Resident's Note:    89F w/ pertinent hx of triple vessel on CAD, not currently on ASA/Plavix per pt, SBO 2/2 possible diverticulitis vs GYN malignancy in 2018 (patient did not follow up in office), L CEA 2/2 L ICA stenosis (4/2021) presenting with worsening back pain for 1 week. CT imaging concerning for perforated, but contained sigmoid diverticulitis vs colonic perforation due to malignancy in presacral/posterior perirectal space and extending into the left gluteal region. Small locules of air, but not gross. No hx of EGD or cscope. Vitals stable. Distended on exam but soft with diffuse lower abdominal tenderness. No signs of peritonitis. WBC 11 (16 on admission). Last lactate wnl from yesterday evening.     A/P: Patient's imaging and exam are secondary to malignancy (colorectal vs GYN) vs diverticulitis. Rectum, uterus and likely bladder involvement per imaging. Patient does not currently have an acute abdomen clinically based on vitals, exam, labs, and imaging. Plan will be to transfer to surgery, telemetry level of care, Zosyn, CT of chest to evaluate mets, IR consult to evaluate for possible drainage of abscess, and f/u pertinent consults: GI, vascular, urology, hem/onc, IR. Discussed with surgical attending on call.

## 2021-08-08 NOTE — CHART NOTE - NSCHARTNOTEFT_GEN_A_CORE
Patient with findings suspicious for complicated perforated distal sigmoid diverticulitis with irregular large abscess.  [facility] transferred [facility] ([facility] to Surgery service (team 4, telemetry, Dr. Harrell).    Pt is NPO for possible OR. Starting on LR maintenance fluid.  Continue with IV zosyn.  Preop EKG, CXR, T&S, AM labs ordered.    Surgery, Vascular, Urology services consulted. Patient with findings suspicious for complicated perforated distal sigmoid diverticulitis with irregular large abscess.  [facility] (team 4, telemetry, Dr. Harrell).    Pt is NPO for possible OR. Starting on LR maintenance fluid.  Continue with IV zosyn.  Preop EKG, CXR, T&S, AM labs ordered.    Home lopressor and norvasc currently being HELD.    Surgery, Vascular, Urology services consulted.

## 2021-08-08 NOTE — H&P ADULT - NSHPPHYSICALEXAM_GEN_ALL_CORE
VITALS:   T(C): 37.1 (08-08-21 @ 07:43), Max: 38.1 (08-07-21 @ 21:41)  HR: 85 (08-08-21 @ 07:43) (80 - 95)  BP: 149/72 (08-08-21 @ 07:43) (130/67 - 170/70)  RR: 16 (08-08-21 @ 07:43) (16 - 18)  SpO2: 95% (08-08-21 @ 07:43) (95% - 98%)    GENERAL: NAD, lying in bed comfortably  HEAD:  Atraumatic, normocephalic  EYES: EOMI, PERRLA, conjunctiva and sclera clear  ENT: Moist mucous membranes  NECK: Supple, no JVD  HEART: Regular rate and rhythm, no murmurs, rubs, or gallops  LUNGS: Unlabored respirations.  Clear to auscultation bilaterally, no crackles, wheezing, or rhonchi  ABDOMEN: Soft, nontender, nondistended, +BS  EXTREMITIES: 2+ peripheral pulses bilaterally. No clubbing, cyanosis, or edema  NERVOUS SYSTEM:  A&Ox3, no focal deficits   SKIN: No rashes or lesions VITALS:   T(C): 37.1 (08-08-21 @ 07:43), Max: 38.1 (08-07-21 @ 21:41)  HR: 85 (08-08-21 @ 07:43) (80 - 95)  BP: 149/72 (08-08-21 @ 07:43) (130/67 - 170/70)  RR: 16 (08-08-21 @ 07:43) (16 - 18)  SpO2: 95% (08-08-21 @ 07:43) (95% - 98%)    GENERAL: NAD, lying in bed comfortably  HEAD:  Atraumatic, normocephalic  EYES: EOMI, PERRLA, conjunctiva and sclera clear  ENT: Moist mucous membranes  NECK: Supple, no JVD, no cervical LAD  HEART: Regular rate and rhythm, no murmurs, rubs, or gallops  LUNGS: Unlabored respirations.  Clear to auscultation bilaterally, no crackles, wheezing, or rhonchi  ABDOMEN: Soft, mild tenderness to palpation at lower abdomen, nondistended, +BS  MSK: tenderness to palpation at left lumbosacral area t L4  EXTREMITIES: 2+ peripheral pulses bilaterally. No clubbing, cyanosis, or edema, mild chronic venous stasis changes, scaling of skin on LLE, petechia on b/l UE  NERVOUS SYSTEM:  A&Ox2 (thinks it is 2002), no focal deficits   SKIN: No rashes or lesions

## 2021-08-08 NOTE — H&P ADULT - HISTORY OF PRESENT ILLNESS
HPI  Denies fevers, chills, cough, chest pain, dyspnea, nausea, headache, diarrhea, sick contactschange in urinary or bowel habits    PMHx:   HTN  SBO (2018)  CAD (04/21)  Chronic back pain 2/2 L3/L4 compression fractures  PSHx: L CEA (04/21)  Meds: See med rec  Allergies:  Social: see below      In the ED:    - VS: Tmax: 100.5 , HR: 90  , BP: 170/70, RR: 18, O2: 97% on RA     - Pertinent Labs: WBC 16.5 Hgb 11.6  lactate 0.7 BUN 26 Cr 0.93 Trop neg Lipase 153 BNP 2649    - Imaging: CXR: CT abd: large necrotic mass in distal sigmoid colon w posterior extension to sacrum and caudal extension to uterus and rectum. Finding consistent with colonic malignancy CT cervical spine: No spinal fx, chronic degenerative changes EKG: NSR    - Treatment/interventions: Zosyn 3.375g X 1, 1.5L NS bolus       HPI:89F PMHx of chronic back pain 2/2 L3,L4 compression fractures, triple vessel CAD on ASA, plavix, lipitor (04/21) and CEA (4/21), multiple recent visits for UTI p/w achy non-radiating back pain 5/10 in the L lumbar region. Pain is worsened with movement and improves with rest. Pt tried tylenol without any relief of symptoms. She endorses 1 week of fatigue and unintentional weight loss over last several months despite good appetite.  She denies fever, chills, dysuria, diarrhea, constipation, dyspnea or chest pain.        PMHx:   HTN  SBO (2018)  CAD (04/21)  Chronic back pain 2/2 L3/L4 compression fractures  PSHx: L CEA (04/21)  Meds: See med rec  Allergies:  Social: see below      In the ED:    - VS: Tmax: 100.5 , HR: 90  , BP: 170/70, RR: 18, O2: 97% on RA     - Pertinent Labs: WBC 16.5 Hgb 11.6  lactate 0.7 BUN 26 Cr 0.93 Trop neg Lipase 153 BNP 2649    - Imaging: CXR: CT abd: large necrotic mass in distal sigmoid colon w posterior extension to sacrum and caudal extension to uterus and rectum. Finding consistent with colonic malignancy CT cervical spine: No spinal fx, chronic degenerative changes EKG: NSR    - Treatment/interventions: Zosyn 3.375g X 1, 1.5L NS bolus       HPI:89F PMHx of chronic back pain 2/2 L3,L4 compression fractures, triple vessel CAD on ASA, plavix, lipitor (04/21) and CEA (4/21), multiple recent visits for UTI p/w achy non-radiating back pain 5/10 in the L lumbar region. Pain is worsened with movement and improves with rest. Pt tried tylenol without any relief of symptoms. She endorses 1 week of fatigue and unintentional weight loss over last several months despite good appetite.  She denies fever, chills, dysuria, diarrhea, constipation, hematoschezia or hematemesis, dyspnea or chest pain.        PMHx:   HTN  SBO (2018)  CAD (04/21)  Chronic back pain 2/2 L3/L4 compression fractures  PSHx: L CEA (04/21)  Meds: See med rec  Allergies: NKDA  Social: Lives in an apartment alone. States she has a few friends who visit her regularly. She has a  who visits twice a week but does most household chores herself. She drinks 1 glass of scotch per night, she is a former smoker ( 1ppd quit 25 years ago). She denies illicit drug use.      In the ED:    - VS: Tmax: 100.5 , HR: 90  , BP: 170/70, RR: 18, O2: 97% on RA     - Pertinent Labs: WBC 16.5 Hgb 11.6  lactate 0.7 BUN 26 Cr 0.93 Trop neg Lipase 153 BNP 2649    - Imaging: CXR: CT abd: large necrotic mass in distal sigmoid colon w posterior extension to sacrum and caudal extension to uterus and rectum. Finding consistent with colonic malignancy CT cervical spine: No spinal fx, chronic degenerative changes EKG: NSR    - Treatment/interventions: Zosyn 3.375g X 1, 1.5L NS bolus       HPI:89F PMHx of chronic back pain 2/2 L3,L4 compression fractures, triple vessel CAD on ASA, plavix, lipitor (04/21) and L CEA 2/2 L ICA stenosis >70% (4/21), recent ED visit on 6/28 for UTI p/w achy non-radiating back pain 5/10 in the L lumbar region. Pain is worsened with movement and improves with rest. Pt tried tylenol without any relief of symptoms. She endorses 1 week of fatigue and unintentional weight loss over last several months despite good appetite.  She denies fever, chills, dysuria, diarrhea, constipation, hematoschezia or hematemesis, dyspnea or chest pain.  Pt states he has never had a colonoscopy. Denies recent travel or sick contacts.      PMHx:   HTN  SBO (2018)  CAD (04/21)  Chronic back pain 2/2 L3/L4 compression fractures  PSHx: L CEA (04/21)   Meds: See med rec  Allergies: NKDA  FHX: no pertinent Fhx of cardiac or pulmonary disease  Social: Lives in an apartment alone. States she has a few friends who visit her regularly. She has a  who visits twice a week but does most household chores herself. She drinks 1 glass of scotch per night, she is a former smoker ( 1ppd quit 25 years ago). She denies illicit drug use.      In the ED:    - VS: Tmax: 100.5 , HR: 90  , BP: 170/70, RR: 18, O2: 97% on RA     - Pertinent Labs: WBC 16.5 Hgb 11.6  lactate 0.7 BUN 26 Cr 0.93 Trop neg Lipase 153 BNP 2649    - Imaging: CXR: CT abd: large necrotic mass in distal sigmoid colon w posterior extension to sacrum and caudal extension to uterus and rectum. Finding consistent with colonic malignancy CT cervical spine: No spinal fx, chronic degenerative changes EKG: NSR    - Treatment/interventions: Zosyn 3.375g X 1, 1.5L NS bolus       HPI:89F PMHx of chronic back pain 2/2 L3,L4 compression fractures, triple vessel CAD on ASA, plavix, lipitor (04/21) and L CEA 2/2 L ICA stenosis >70% (4/21), recent ED visit on 6/28 for UTI p/w achy non-radiating back pain 5/10 in the L lumbar region. Pain is worsened with movement and improves with rest. Pt tried tylenol without any relief of symptoms. She endorses 1 week of fatigue and unintentional weight loss over last several months despite good appetite.  She denies fever, chills, dysuria, diarrhea, constipation, hematoschezia or hematemesis, dyspnea or chest pain.  Pt states he has never had a colonoscopy. Denies recent travel or sick contacts.      PMHx:   HTN  SBO (2018)  CAD (04/21)  Chronic back pain 2/2 L3/L4 compression fractures  PSHx: L CEA (04/21)   Meds: See med rec  Allergies: NKDA  FHX: no pertinent Fhx of cardiac or pulmonary disease  Social: Lives in an apartment alone. States she has a few friends who visit her regularly. She has a  who visits twice a week but does most household chores herself. She drinks 1 glass of scotch per night, she is a former smoker ( 1ppd quit 25 years ago). She denies illicit drug use.      In the ED:    - VS: Tmax: 100.5 , HR: 90  , BP: 170/70, RR: 18, O2: 97% on RA     - Pertinent Labs: WBC 16.5 Hgb 11.6  lactate 0.7 BUN 26 Cr 0.93 Trop neg Lipase 153 BNP 2649    - Imaging: CXR: CT abd: large necrotic mass in distal sigmoid colon w posterior extension to sacrum and caudal extension to uterus and rectum. Finding consistent with colonic malignancy. Moderate left hydronephrosis and proximal hydroureter extending to the mass lesion. Mild diffuse wall thickening in the stomach. Please correlate clinically for gastritis. CT cervical spine: No spinal fx, chronic degenerative changes EKG: NSR    - Treatment/interventions: Zosyn 3.375g X 1, 1.5L NS bolus

## 2021-08-08 NOTE — ED PROVIDER NOTE - CROS ED GI ALL NEG
well developed, well nourished , in no acute distress , ambulating without difficulty , normal communication ability - - -

## 2021-08-08 NOTE — CONSULT NOTE ADULT - PROBLEM SELECTOR RECOMMENDATION 9
Will discuss with the Urology team  Pt being transferred to Team 4 Gen Surgery  CEA  CTA of Chest for workup of metastasis  NPO/IVF   Pain/nausea control PRN   Anntibiotivs per primary team  STANTON q6hr  CT CHEST to evaluate for metastasis   IR consult to evaluate if abscess/collection amenable to drainage   F/u GI, Vascular surger Heme/onc, IR recs - NM Scan with LAsix  - Obtain PVRto ensure she is not retaining  - F/u Urine culture  - Rest of care per primary team  - Urology will continue to follow  - Discussed with  Attending

## 2021-08-08 NOTE — H&P ADULT - PROBLEM SELECTOR PLAN 5
Pt on 12.5mg metoprolol at home. Currently normotensive.    Plan:  Hold in patient meds for now Pt on amlodipine  12.5mg metoprolol at home. Currently normotensive.    Plan:  -Hold in patient meds for now Pt on amlodipine  12.5mg metoprolol at home.     Plan:  -C/w home meds Pt on amlodipine  12.5mg metoprolol at home.     Plan:  -C/w home meds (tolerating PO)  - Begin DASH/TLC diet

## 2021-08-08 NOTE — H&P ADULT - PROBLEM SELECTOR PLAN 7
Pt with hx of L ICA stenosis >70% s/p CEA on april 2021, on ASA, plavix and atorvastatin    Plan  - C/w Home meds Pt with hx of L ICA stenosis >70% s/p CEA on april 2021, on ASA, plavix and atorvastatin    Plan  -C/w home meds (tolerating PO)  - Begin DASH/TLC diet

## 2021-08-08 NOTE — CONSULT NOTE ADULT - SUBJECTIVE AND OBJECTIVE BOX
GASTROENTEROLOGY CONSULT NOTE  HPI:  HPI:89F PMHx of chronic back pain 2/2 L3,L4 compression fractures, triple vessel CAD on ASA, plavix, lipitor (04/21) and L CEA 2/2 L ICA stenosis >70% (4/21), recent ED visit on 6/28 for UTI p/w achy non-radiating back pain 5/10 in the L lumbar region. Pain is worsened with movement and improves with rest. Pt tried tylenol without any relief of symptoms. She endorses 1 week of fatigue and unintentional weight loss over last several months despite good appetite.  She denies fever, chills, dysuria, diarrhea, constipation, hematoschezia or hematemesis, dyspnea or chest pain.  Pt states he has never had a colonoscopy. Denies recent travel or sick contacts.    PMHx:   HTN  SBO (2018)  CAD (04/21)  Chronic back pain 2/2 L3/L4 compression fractures  PSHx: L CEA (04/21)   Meds: See med rec  Allergies: NKDA  FHX: no pertinent Fhx of cardiac or pulmonary disease  Social: Lives in an apartment alone. States she has a few friends who visit her regularly. She has a  who visits twice a week but does most household chores herself. She drinks 1 glass of scotch per night, she is a former smoker ( 1ppd quit 25 years ago). She denies illicit drug use.      In the ED:    - VS: Tmax: 100.5 , HR: 90  , BP: 170/70, RR: 18, O2: 97% on RA     - Pertinent Labs: WBC 16.5 Hgb 11.6  lactate 0.7 BUN 26 Cr 0.93 Trop neg Lipase 153 BNP 2649    - Imaging: CXR: CT abd: large necrotic mass in distal sigmoid colon w posterior extension to sacrum and caudal extension to uterus and rectum. Finding consistent with colonic malignancy. Moderate left hydronephrosis and proximal hydroureter extending to the mass lesion. Mild diffuse wall thickening in the stomach. Please correlate clinically for gastritis. CT cervical spine: No spinal fx, chronic degenerative changes EKG: NSR    - Treatment/interventions: Zosyn 3.375g X 1, 1.5L NS bolus   (08 Aug 2021 08:02)    GI consulted for sigmoid mass. Patient seen and examined at bedside.     Allergies    No Known Allergies    Intolerances      Home Medications:  amLODIPine 5 mg oral tablet: 1 tab(s) orally once a day (08 Aug 2021 11:13)  aspirin 81 mg oral tablet, chewable: 1 tab(s) orally every 24 hours (08 Aug 2021 11:13)  atorvastatin 80 mg oral tablet: 1 tab(s) orally once a day (at bedtime) (08 Aug 2021 11:13)  clopidogrel 75 mg oral tablet: 1 tab(s) orally every 24 hours (08 Aug 2021 11:13)  metoprolol: 12.5 milligram(s) orally once a day (08 Aug 2021 11:13)  Tylenol 325 mg oral capsule: 1 cap(s) orally 2 times a day (08 Aug 2021 11:13)    MEDICATIONS:  MEDICATIONS  (STANDING):  atorvastatin 80 milliGRAM(s) Oral at bedtime  lactated ringers. 500 milliLiter(s) (80 mL/Hr) IV Continuous <Continuous>  piperacillin/tazobactam IVPB.. 3.375 Gram(s) IV Intermittent every 6 hours    MEDICATIONS  (PRN):    PAST MEDICAL & SURGICAL HISTORY:  SBO (small bowel obstruction)    HTN (hypertension)    Chronic back pain    S/P wrist surgery      FAMILY HISTORY:    SOCIAL HISTORY:  Tobacco: [ ] Current, [ ] Former, [ ] Never; Pack Years:  Alcohol:  Illicit Drugs:    REVIEW OF SYSTEMS:  CONSTITUTIONAL: No weakness, fevers or chills  HEENT: No visual changes; No vertigo or throat pain   NECK: No pain or stiffness  RESPIRATORY: No cough, wheezing, hemoptysis; No shortness of breath  CARDIOVASCULAR: No chest pain or palpitations  GASTROINTESTINAL: As above.  GENITOURINARY: No dysuria, frequency or hematuria  NEUROLOGICAL: No numbness or weakness  SKIN: No itching, burning, rashes, or lesions   All other 10 review of systems is negative unless indicated above.    Vital Signs Last 24 Hrs  T(C): 37 (08 Aug 2021 17:44), Max: 38.1 (07 Aug 2021 21:41)  T(F): 98.6 (08 Aug 2021 17:44), Max: 100.5 (07 Aug 2021 21:41)  HR: 90 (08 Aug 2021 17:29) (80 - 95)  BP: 143/65 (08 Aug 2021 17:29) (123/68 - 170/70)  BP(mean): 93 (08 Aug 2021 17:29) (93 - 93)  RR: 17 (08 Aug 2021 17:29) (16 - 18)  SpO2: 96% (08 Aug 2021 17:29) (95% - 98%)    08-08 @ 07:01  -  08-08 @ 18:34  --------------------------------------------------------  IN: 320 mL / OUT: 0 mL / NET: 320 mL        PHYSICAL EXAM:    General: in no acute distress  Eyes: Anicteric sclerae, moist conjunctivae  HENT: Moist mucous membranes  Neck: Trachea midline, supple  Lungs: Normal respiratory effort, no intercostal retractions  Cardiovascular: RRR  Abdomen: Soft, non-tender non-distended; No rebound or guarding  Extremities: Normal range of motion, No clubbing, cyanosis or edema  Neurological: Alert and oriented x3  Skin: Warm and dry. No obvious rash    LABS:                        10.8   11.08 )-----------( 579      ( 08 Aug 2021 11:18 )             35.7     08-08    137  |  106  |  15  ----------------------------<  155<H>  3.6   |  21<L>  |  0.71    Ca    8.5      08 Aug 2021 11:18  Mg     2.1     08-07    TPro  6.0  /  Alb  2.9<L>  /  TBili  0.2  /  DBili  x   /  AST  10  /  ALT  6<L>  /  AlkPhos  68  08-08        PT/INR - ( 07 Aug 2021 21:49 )   PT: 11.9 sec;   INR: 1.01              RADIOLOGY & ADDITIONAL STUDIES:     Reviewed GASTROENTEROLOGY CONSULT NOTE  HPI:  HPI:89F PMHx of chronic back pain 2/2 L3,L4 compression fractures, triple vessel CAD on ASA, plavix, lipitor (04/21) and L CEA 2/2 L ICA stenosis >70% (4/21), recent ED visit on 6/28 for UTI p/w achy non-radiating back pain 5/10 in the L lumbar region. Pain is worsened with movement and improves with rest. Pt tried tylenol without any relief of symptoms. She endorses 1 week of fatigue and unintentional weight loss over last several months despite good appetite.  She denies fever, chills, dysuria, diarrhea, constipation, hematoschezia or hematemesis, dyspnea or chest pain.  Pt states he has never had a colonoscopy. Denies recent travel or sick contacts.    PMHx:   HTN  SBO (2018)  CAD (04/21)  Chronic back pain 2/2 L3/L4 compression fractures  PSHx: L CEA (04/21)   Meds: See med rec  Allergies: NKDA  FHX: no pertinent Fhx of cardiac or pulmonary disease  Social: Lives in an apartment alone. States she has a few friends who visit her regularly. She has a  who visits twice a week but does most household chores herself. She drinks 1 glass of scotch per night, she is a former smoker ( 1ppd quit 25 years ago). She denies illicit drug use.      In the ED:    - VS: Tmax: 100.5 , HR: 90  , BP: 170/70, RR: 18, O2: 97% on RA     - Pertinent Labs: WBC 16.5 Hgb 11.6  lactate 0.7 BUN 26 Cr 0.93 Trop neg Lipase 153 BNP 2649    - Imaging: CXR: CT abd: large necrotic mass in distal sigmoid colon w posterior extension to sacrum and caudal extension to uterus and rectum. Finding consistent with colonic malignancy. Moderate left hydronephrosis and proximal hydroureter extending to the mass lesion. Mild diffuse wall thickening in the stomach. Please correlate clinically for gastritis. CT cervical spine: No spinal fx, chronic degenerative changes EKG: NSR    - Treatment/interventions: Zosyn 3.375g X 1, 1.5L NS bolus   (08 Aug 2021 08:02)    GI consulted for sigmoid mass. Patient seen and examined at bedside.     Allergies    No Known Allergies    Intolerances      Home Medications:  amLODIPine 5 mg oral tablet: 1 tab(s) orally once a day (08 Aug 2021 11:13)  aspirin 81 mg oral tablet, chewable: 1 tab(s) orally every 24 hours (08 Aug 2021 11:13)  atorvastatin 80 mg oral tablet: 1 tab(s) orally once a day (at bedtime) (08 Aug 2021 11:13)  clopidogrel 75 mg oral tablet: 1 tab(s) orally every 24 hours (08 Aug 2021 11:13)  metoprolol: 12.5 milligram(s) orally once a day (08 Aug 2021 11:13)  Tylenol 325 mg oral capsule: 1 cap(s) orally 2 times a day (08 Aug 2021 11:13)    MEDICATIONS:  MEDICATIONS  (STANDING):  atorvastatin 80 milliGRAM(s) Oral at bedtime  lactated ringers. 500 milliLiter(s) (80 mL/Hr) IV Continuous <Continuous>  piperacillin/tazobactam IVPB.. 3.375 Gram(s) IV Intermittent every 6 hours    MEDICATIONS  (PRN):    PAST MEDICAL & SURGICAL HISTORY:  SBO (small bowel obstruction)    HTN (hypertension)    Chronic back pain    S/P wrist surgery      FAMILY HISTORY:    SOCIAL HISTORY:  Tobacco: [ ] Current, [ ] Former, [ ] Never; Pack Years:  Alcohol:  Illicit Drugs:    REVIEW OF SYSTEMS:  CONSTITUTIONAL: +weakness  HEENT: No visual changes; No vertigo or throat pain   NECK: No pain or stiffness  RESPIRATORY: No cough, wheezing, hemoptysis; No shortness of breath  CARDIOVASCULAR: No chest pain or palpitations  GASTROINTESTINAL: As above.  GENITOURINARY: No dysuria, frequency or hematuria  NEUROLOGICAL: No numbness  SKIN: No itching, burning, rashes, or lesions   All other 10 review of systems is negative unless indicated above.    Vital Signs Last 24 Hrs  T(C): 37 (08 Aug 2021 17:44), Max: 38.1 (07 Aug 2021 21:41)  T(F): 98.6 (08 Aug 2021 17:44), Max: 100.5 (07 Aug 2021 21:41)  HR: 90 (08 Aug 2021 17:29) (80 - 95)  BP: 143/65 (08 Aug 2021 17:29) (123/68 - 170/70)  BP(mean): 93 (08 Aug 2021 17:29) (93 - 93)  RR: 17 (08 Aug 2021 17:29) (16 - 18)  SpO2: 96% (08 Aug 2021 17:29) (95% - 98%)    08-08 @ 07:01  -  08-08 @ 18:34  --------------------------------------------------------  IN: 320 mL / OUT: 0 mL / NET: 320 mL        PHYSICAL EXAM:    General: in no acute distress  Eyes: Anicteric sclerae, moist conjunctivae  HENT: Moist mucous membranes  Neck: Trachea midline, supple  Lungs: Normal respiratory effort, no intercostal retractions  Cardiovascular: RRR  Abdomen: Soft, mildly tender in lower quadrants w/ some distension; No rebound or guarding  Extremities: Normal range of motion, No clubbing, cyanosis or edema  Neurological: Alert and oriented x3  Skin: Warm and dry. No obvious rash    LABS:                        10.8   11.08 )-----------( 579      ( 08 Aug 2021 11:18 )             35.7     08-08    137  |  106  |  15  ----------------------------<  155<H>  3.6   |  21<L>  |  0.71    Ca    8.5      08 Aug 2021 11:18  Mg     2.1     08-07    TPro  6.0  /  Alb  2.9<L>  /  TBili  0.2  /  DBili  x   /  AST  10  /  ALT  6<L>  /  AlkPhos  68  08-08        PT/INR - ( 07 Aug 2021 21:49 )   PT: 11.9 sec;   INR: 1.01              RADIOLOGY & ADDITIONAL STUDIES:     Reviewed

## 2021-08-08 NOTE — CONSULT NOTE ADULT - ASSESSMENT
89F PMHx of chronic back pain 2/2 L3,L4 compression fractures, triple vessel CAD on ASA, plavix, lipitor (04/21) and L CEA 2/2 L ICA stenosis >70% (4/21), recent ED visit on 6/28 for UTI p/w achy non-radiating back pain 5/10 in the L lumbar region. GI consulted for sigmoid mass.     #Perforated sigmoid mass vs diverticulitis   - personally reviewed CT imaging  - c/f perforated sigmoid mass w/ presacral/perirectal abscess  - would pursue drainage w/ IR and subsequent surgical planning for resection   - would not pursue colonoscopy w/ tissue sampling given significant area of perforation     Lakesha Paul MD  PGY-5, Gastroenterology Fellow  pager: [phone]

## 2021-08-08 NOTE — CONSULT NOTE ADULT - ASSESSMENT
89F PMHx of chronic back pain 2/2 L3,L4 compression fractures, triple vessel CAD on ASA, plavix, lipitor (04/21) and L CEA 2/2 L ICA stenosis >70% (4/21), presents for abdominal pain, found on CT to have colonic mass vs. perforate diverticulitis. Vascular surgery consulted to assess need for DAPT in case operative intervention is needed.    - From Vascular standpoint, would recommend continuing at least ASA, however if unable to per surgical team, restart as soon as possible  - Obtain cardiology/neurology input as both services recommended DAPT on prior admission as well (for 3v CAD and secondary stroke prevention, respectively)

## 2021-08-08 NOTE — H&P ADULT - PROBLEM SELECTOR PLAN 2
Meeting 2/4 SIRS criteria on admission (HR 90 and WBC 16.5) also with elevated temp 100.5. CT abd showing colonic mass that is concerning for abscess as possible source of infection. S/P zosyn 3.375 X1 and 1.5L NS in ED    Plan  -Begin Ceftriaxione 1g daily and flagyl 500mg Q8hrs for possible intrabdominal infection  - Encourage PO intake  - F/U AM labs  - Consider surgery/IR consult for abscess drainage if pt is having signs of infection Meeting 2/4 SIRS criteria on admission (HR 90 and WBC 16.5) also with elevated temp 100.5. CT abd showing colonic mass that is concerning for abscess as possible source of infection. S/P zosyn 3.375 X1 and 1.5L NS in ED.     Plan  - Hold antibiotcs currently as colonic mass is less likely an abscess and pt is not septic currently.  - Encourage PO intake  - F/U AM labs  - Consider surgery/IR consult for abscess drainage if pt is having signs of infection Meeting 2/4 SIRS criteria on admission (HR 90 and WBC 16.5) also with elevated temp 100.5. CT abd showing colonic mass that is concerning for abscess as possible source of infection. S/P zosyn 3.375 X1 and 1.5L NS in ED.     Plan  - Hold antibiotics currently as colonic mass is less likely an abscess and pt is not septic currently.  - Encourage PO intake  - F/U AM labs  - Consider surgery/IR consult for abscess drainage if pt is having signs of infection

## 2021-08-08 NOTE — PROGRESS NOTE ADULT - SUBJECTIVE AND OBJECTIVE BOX
STANTON @ 20:30  Subjective: Patient states she has mild abdominal pain diffusely. She is passing flatus regularly and had a BM today. Denies nausea or vomiting. States pain had been unchanged since arrival    T(C): 37 (08-08-21 @ 17:44), Max: 38.1 (08-07-21 @ 21:41)  HR: 90 (08-08-21 @ 17:29) (80 - 95)  BP: 143/65 (08-08-21 @ 17:29) (123/68 - 154/79)  RR: 17 (08-08-21 @ 17:29) (16 - 18)  SpO2: 96% (08-08-21 @ 17:29) (95% - 98%)    Abdominal Exam:  Soft, mildly distended, TTP in all quadrants (5/10), mild rebound tenderness, no guarding, no masses.     Plan: Will continue to monitor with serial abdominal exams.

## 2021-08-08 NOTE — CONSULT NOTE ADULT - SUBJECTIVE AND OBJECTIVE BOX
Surgery Consult Note    HPI:    89F PMHx of chronic back pain 2/2 L3,L4 compression fractures, triple vessel CAD on ASA, plavix, lipitor () and L CEA 2/2 L ICA stenosis >70% (), recent ED visit on  for UTI p/w achy non-radiating back pain 5/10 in the L lumbar region. Pain is worsened with movement and improves with rest. Pt tried tylenol without any relief of symptoms. She endorses 1 week of fatigue and unintentional weight loss over last several months despite good appetite.  She denies fever, chills, dysuria, diarrhea, constipation, hematoschezia or hematemesis, dyspnea or chest pain.  Pt states he has never had a colonoscopy. Denies recent travel or sick contacts. Patient got a CT abdomen remarkable for a necrotizing colonic mass and was admitted for workup of mass.    Vascular Addendum: Patient seen and examined at bedside. She endorses abdominal and back pain. She denies any headaches visual changes, motor deficits, and sensory deficits. Otherwise no acute complaints. Due to possible surgical intervention pending GI recs for necrotizing colonic mass found on CT, Vascular was consulted to see if it would be possible to stop DAPT therapy if needed.     PMHx:     HTN  SBO (2018)  CAD ()  Chronic back pain 2/2 L3/L4 compression fractures  PSHx: L CEA ()   Meds: See med rec  Allergies: NKDA  FHX: no pertinent Fhx of cardiac or pulmonary disease    Social: Lives in an apartment alone. States she has a few friends who visit her regularly. She has a  who visits twice a week but does most household chores herself. She drinks 1 glass of scotch per night, she is a former smoker ( 1ppd quit 25 years ago). She denies illicit drug use.        PAST MEDICAL & SURGICAL HISTORY:  SBO (small bowel obstruction)    HTN (hypertension)    Chronic back pain    S/P wrist surgery        MEDICATIONS  (STANDING):  amLODIPine   Tablet 5 milliGRAM(s) Oral daily  aspirin  chewable 81 milliGRAM(s) Oral daily  atorvastatin 80 milliGRAM(s) Oral at bedtime  clopidogrel Tablet 75 milliGRAM(s) Oral daily  enoxaparin Injectable 30 milliGRAM(s) SubCutaneous daily  lidocaine   4% Patch 2 Patch Transdermal once  metoprolol tartrate 12.5 milliGRAM(s) Oral daily    MEDICATIONS  (PRN):  acetaminophen   Tablet .. 650 milliGRAM(s) Oral every 6 hours PRN Moderate Pain (4 - 6)      Allergies    No Known Allergies    Intolerances            REVIEW OF SYSTEMS: Refer to HPI    Vital Signs Last 24 Hrs  T(C): 36.9 (08 Aug 2021 11:51), Max: 38.1 (07 Aug 2021 21:41)  T(F): 98.4 (08 Aug 2021 11:51), Max: 100.5 (07 Aug 2021 21:41)  HR: 82 (08 Aug 2021 11:51) (80 - 95)  BP: 123/68 (08 Aug 2021 11:51) (123/68 - 170/70)  BP(mean): --  RR: 16 (08 Aug 2021 11:51) (16 - 18)  SpO2: 96% (08 Aug 2021 11:51) (95% - 98%)    I&O's Summary      Physical Exam:    General: NAD, lying comfortable in bed  HEENT: NC/AT, neck supple  Pulmonary: nonlabored breathing, no respiratory distress, CTAB  Cardiovascular: RRR  Abdominal: soft, ND, TTP in the LLQ and RLQ of the abdomen  Extremities: venous stasis changes LE, TTP of b/l feet soles, no edema  Neuro: AOx3, appropriate strength in b/l UE and LE, motor and sensory grossly intact, sticks tongue out and moves from side to side, puffs out cheeks, wrinkles forhead  Pulses: palpable radial pulses bilaterally, dopperable signals in b/l LE     Lines/drains/tubes:    LABS:                        10.8   11.08 )-----------( 579      ( 08 Aug 2021 11:18 )             35.7     08-    137  |  106  |  x   ----------------------------<  x   3.6   |  x   |  x     Ca    9.2      07 Aug 2021 21:06  Mg     2.1     08-    TPro  7.6  /  Alb  2.8<L>  /  TBili  0.2  /  DBili  x   /  AST  16  /  ALT  17  /  AlkPhos  90  08-    PT/INR - ( 07 Aug 2021 21:49 )   PT: 11.9 sec;   INR: 1.01            Urinalysis Basic - ( 08 Aug 2021 04:33 )    Color: Yellow / Appearance: Clear / S.010 / pH: x  Gluc: x / Ketone: NEGATIVE  / Bili: NEGATIVE / Urobili: 0.2 E.U./dL   Blood: x / Protein: NEGATIVE mg/dL / Nitrite: NEGATIVE   Leuk Esterase: NEGATIVE / RBC: x / WBC x   Sq Epi: x / Non Sq Epi: x / Bacteria: x      CAPILLARY BLOOD GLUCOSE        LIVER FUNCTIONS - ( 07 Aug 2021 21:06 )  Alb: 2.8 g/dL / Pro: 7.6 g/dL / ALK PHOS: 90 U/L / ALT: 17 U/L / AST: 16 U/L / GGT: x             Cultures:      RADIOLOGY & ADDITIONAL STUDIES:         Surgery Consult Note    HPI:    89F PMHx of chronic back pain 2/2 L3,L4 compression fractures, triple vessel CAD on ASA, plavix, lipitor () and L CEA 2/2 L ICA stenosis >70% (), recent ED visit on  for UTI p/w achy non-radiating back pain 5/10 in the L lumbar region. Pain is worsened with movement and improves with rest. Pt tried tylenol without any relief of symptoms. She endorses 1 week of fatigue and unintentional weight loss over last several months despite good appetite.  She denies fever, chills, dysuria, diarrhea, constipation, hematoschezia or hematemesis, dyspnea or chest pain.  Pt states he has never had a colonoscopy. Denies recent travel or sick contacts. Patient got a CT abdomen remarkable for a necrotizing colonic mass and was admitted for workup of mass.    Vascular Addendum: Patient seen and examined at bedside. She endorses abdominal and back pain. She denies any headaches visual changes, motor deficits, and sensory deficits. Otherwise no acute complaints. Due to possible surgical intervention pending GI recs for necrotizing colonic mass found on CT, Vascular was consulted to see if it would be possible to stop DAPT therapy if needed.     PMHx:     HTN  SBO (2018)  CAD ()  Chronic back pain 2/2 L3/L4 compression fractures  PSHx: L CEA ()   Meds: See med rec  Allergies: NKDA  FHX: no pertinent Fhx of cardiac or pulmonary disease    Social: Lives in an apartment alone. States she has a few friends who visit her regularly. She has a  who visits twice a week but does most household chores herself. She drinks 1 glass of scotch per night, she is a former smoker ( 1ppd quit 25 years ago). She denies illicit drug use.        PAST MEDICAL & SURGICAL HISTORY:  SBO (small bowel obstruction)    HTN (hypertension)    Chronic back pain    S/P wrist surgery        MEDICATIONS  (STANDING):  amLODIPine   Tablet 5 milliGRAM(s) Oral daily  aspirin  chewable 81 milliGRAM(s) Oral daily  atorvastatin 80 milliGRAM(s) Oral at bedtime  clopidogrel Tablet 75 milliGRAM(s) Oral daily  enoxaparin Injectable 30 milliGRAM(s) SubCutaneous daily  lidocaine   4% Patch 2 Patch Transdermal once  metoprolol tartrate 12.5 milliGRAM(s) Oral daily    MEDICATIONS  (PRN):  acetaminophen   Tablet .. 650 milliGRAM(s) Oral every 6 hours PRN Moderate Pain (4 - 6)      Allergies    No Known Allergies    Intolerances            REVIEW OF SYSTEMS: Refer to HPI    Vital Signs Last 24 Hrs  T(C): 36.9 (08 Aug 2021 11:51), Max: 38.1 (07 Aug 2021 21:41)  T(F): 98.4 (08 Aug 2021 11:51), Max: 100.5 (07 Aug 2021 21:41)  HR: 82 (08 Aug 2021 11:51) (80 - 95)  BP: 123/68 (08 Aug 2021 11:51) (123/68 - 170/70)  BP(mean): --  RR: 16 (08 Aug 2021 11:51) (16 - 18)  SpO2: 96% (08 Aug 2021 11:51) (95% - 98%)    I&O's Summary      Physical Exam:    General: NAD, lying comfortable in bed  HEENT: NC/AT, neck supple  Pulmonary: nonlabored breathing, no respiratory distress, CTAB  Cardiovascular: RRR  Abdominal: soft, ND, TTP in the LLQ and RLQ of the abdomen  Extremities: venous stasis changes LE, TTP of b/l feet soles, no edema  Neuro: AOx3, appropriate strength in b/l UE and LE, motor and sensory grossly intact, sticks tongue out and moves from side to side, puffs out cheeks, wrinkles forhead  Pulses: palpable radial pulses bilaterally, dopperable DP and PT signals in b/l LE     Lines/drains/tubes:    LABS:                        10.8   11.08 )-----------( 579      ( 08 Aug 2021 11:18 )             35.7     08-    137  |  106  |  x   ----------------------------<  x   3.6   |  x   |  x     Ca    9.2      07 Aug 2021 21:06  Mg     2.1     -    TPro  7.6  /  Alb  2.8<L>  /  TBili  0.2  /  DBili  x   /  AST  16  /  ALT  17  /  AlkPhos  90  08-    PT/INR - ( 07 Aug 2021 21:49 )   PT: 11.9 sec;   INR: 1.01            Urinalysis Basic - ( 08 Aug 2021 04:33 )    Color: Yellow / Appearance: Clear / S.010 / pH: x  Gluc: x / Ketone: NEGATIVE  / Bili: NEGATIVE / Urobili: 0.2 E.U./dL   Blood: x / Protein: NEGATIVE mg/dL / Nitrite: NEGATIVE   Leuk Esterase: NEGATIVE / RBC: x / WBC x   Sq Epi: x / Non Sq Epi: x / Bacteria: x      CAPILLARY BLOOD GLUCOSE        LIVER FUNCTIONS - ( 07 Aug 2021 21:06 )  Alb: 2.8 g/dL / Pro: 7.6 g/dL / ALK PHOS: 90 U/L / ALT: 17 U/L / AST: 16 U/L / GGT: x             Cultures:      RADIOLOGY & ADDITIONAL STUDIES:

## 2021-08-08 NOTE — H&P ADULT - NSHPLABSRESULTS_GEN_ALL_CORE
LABS:                        11.6   16.54 )-----------( 569      ( 07 Aug 2021 21:06 )             36.6     08    139  |  103  |  26<H>  ----------------------------<  104<H>  4.0   |  26  |  0.93    Ca    9.2      07 Aug 2021 21:06  Mg     2.1     -    TPro  7.6  /  Alb  2.8<L>  /  TBili  0.2  /  DBili  x   /  AST  16  /  ALT  17  /  AlkPhos  90  08-    PT/INR - ( 07 Aug 2021 21:49 )   PT: 11.9 sec;   INR: 1.01            CARDIAC MARKERS ( 07 Aug 2021 22:01 )  <0.017 ng/mL / x     / x     / x     / x          Urinalysis Basic - ( 08 Aug 2021 04:33 )    Color: Yellow / Appearance: Clear / S.010 / pH: x  Gluc: x / Ketone: NEGATIVE  / Bili: NEGATIVE / Urobili: 0.2 E.U./dL   Blood: x / Protein: NEGATIVE mg/dL / Nitrite: NEGATIVE   Leuk Esterase: NEGATIVE / RBC: x / WBC x   Sq Epi: x / Non Sq Epi: x / Bacteria: x

## 2021-08-08 NOTE — ED PROVIDER NOTE - ATTENDING CONTRIBUTION TO CARE
89 yof pw lower back pain, hx of chronic back pain w/ L3/4 compression fractures.  denies any trauma.  pt did not get relief from tylenol.  no fc, no cp, no sob, no nv, no dysuria.    agree w/ NP, nontoxic appearing pt, will check labs, UA, CT eval for infectious process

## 2021-08-08 NOTE — H&P ADULT - PROBLEM SELECTOR PLAN 6
Pt on ASA 81, plavix 75 and atorvastatin 80 at home for CAD    Plan  - c/w home meds Pt on ASA 81, plavix 75 and atorvastatin 80 at home for CAD. Pt diagnosed with triple vessel disease in April 2021 recommended for medical management.    Plan  - c/w home meds Pt on ASA 81, plavix 75 and atorvastatin 80 at home for CAD. Pt diagnosed with triple vessel disease in April 2021 recommended for medical management.    Plan  -C/w home meds (tolerating PO)  - Begin DASH/TLC diet

## 2021-08-08 NOTE — CONSULT NOTE ADULT - SUBJECTIVE AND OBJECTIVE BOX
89 YO F with PMH of chronic back pain 2/2 L3,L4 compression fractures, triple vessel CAD and L CEA 2/2 L ICA stenosis >70% (4/21), recent ED visit on 6/28 for UTI p/w abdominal pain x 1 day, constant associated with weight loss and abdominal distention. CT a/p showing large necrotic mass arising from the sigmoid colon with posterior extension to the sacrum and caudal extension to the rectum and the uterus, findings also suspicious for complicated perforated distal sigmoid diverticulitis with irregular large abscess. Left Hydronephrosis down to the level of th iliac artery    Urology consulted for Left Hydronephrosis seen on CT scan.    Pt. reports having left lower back pain. Denies frequency    PAST MEDICAL & SURGICAL HISTORY:  SBO (small bowel obstruction)    HTN (hypertension)    Chronic back pain    S/P wrist surgery    Social History:  as above (08 Aug 2021 08:02)    ICU Vital Signs Last 24 Hrs  T(C): 36.9 (08 Aug 2021 11:51), Max: 38.1 (07 Aug 2021 21:41)  T(F): 98.4 (08 Aug 2021 11:51), Max: 100.5 (07 Aug 2021 21:41)  HR: 82 (08 Aug 2021 11:51) (80 - 95)  BP: 123/68 (08 Aug 2021 11:51) (123/68 - 170/70)  BP(mean): --  ABP: --  ABP(mean): --  RR: 16 (08 Aug 2021 11:51) (16 - 18)  SpO2: 96% (08 Aug 2021 11:51) (95% - 98%)                          10.8   11.08 )-----------( 579      ( 08 Aug 2021 11:18 )             35.7     08-08    137  |  106  |  15  ----------------------------<  155<H>  3.6   |  21<L>  |  0.71    Ca    8.5      08 Aug 2021 11:18  Mg     2.1     08-07    TPro  6.0  /  Alb  2.9<L>  /  TBili  0.2  /  DBili  x   /  AST  10  /  ALT  6<L>  /  AlkPhos  68  08-08      Physical exam  Awake and alert, nad  Lungs: unlabored breathing  Ab: Distended, mild discomfort RLQ increased tympany. No CVAT   88 YO F with PMH of chronic back pain 2/2 L3,L4 compression fractures, triple vessel CAD and L CEA 2/2 L ICA stenosis >70% (4/21), recent ED visit on 6/28 for UTI p/w abdominal pain x 1 day, constant associated with weight loss and abdominal distention. CT a/p showing large necrotic mass arising from the sigmoid colon with posterior extension to the sacrum and caudal extension to the rectum and the uterus, findings also suspicious for complicated perforated distal sigmoid diverticulitis with irregular large abscess. Left Hydronephrosis down to the level of th iliac artery    Urology consulted for new mild to moderate  Left Hydronephrosis with transition point near heterogenous pelvic melignancy seen on CT scan. Creatinine at baseline. No CVAT    Pt. reports having left lower back pain. Denies frequency    PAST MEDICAL & SURGICAL HISTORY:  SBO (small bowel obstruction)    HTN (hypertension)    Chronic back pain    S/P wrist surgery    Social History:  as above (08 Aug 2021 08:02)    ICU Vital Signs Last 24 Hrs  T(C): 36.9 (08 Aug 2021 11:51), Max: 38.1 (07 Aug 2021 21:41)  T(F): 98.4 (08 Aug 2021 11:51), Max: 100.5 (07 Aug 2021 21:41)  HR: 82 (08 Aug 2021 11:51) (80 - 95)  BP: 123/68 (08 Aug 2021 11:51) (123/68 - 170/70)  BP(mean): --  ABP: --  ABP(mean): --  RR: 16 (08 Aug 2021 11:51) (16 - 18)  SpO2: 96% (08 Aug 2021 11:51) (95% - 98%)                          10.8   11.08 )-----------( 579      ( 08 Aug 2021 11:18 )             35.7     08-08    137  |  106  |  15  ----------------------------<  155<H>  3.6   |  21<L>  |  0.71    Ca    8.5      08 Aug 2021 11:18  Mg     2.1     08-07    TPro  6.0  /  Alb  2.9<L>  /  TBili  0.2  /  DBili  x   /  AST  10  /  ALT  6<L>  /  AlkPhos  68  08-08      Physical exam  Awake and alert, nad  Lungs: unlabored breathing  Ab: Distended, mild discomfort RLQ increased tympany. No CVAT

## 2021-08-08 NOTE — H&P ADULT - ATTENDING COMMENTS
Pt seen and examined by me at bedside. Agree with above with additions,   CT a/p with IV contrast updated after discussion with housestaff this morning- suspicious for complicated perforated distal sigmoid diverticulitis with irregular large abscess in the presacral/posterior perirectal space and extending into the left gluteal region. Small presacral/pelvic extraluminal air (602:43, 2:65). No definite associated adjacent osseous erosion. No bowel obstruction, however moderate colonic stool burden from probable constipation. Findings can be also seen in perforated distal sigmoid colon cancer.   abd exam: +TTP on mostly R and Left lower abd, +distended.   reports +30lbs weight loss in 1 month. no hx of colonoscopy.   r/o perforated distal sigmoid colon-NPO, dc asa/plavix/lovenox ppx, low grade fever/leukocytosis noted on admission, surgery consult stat, zosyn.

## 2021-08-09 LAB
ANION GAP SERPL CALC-SCNC: 7 MMOL/L — SIGNIFICANT CHANGE UP (ref 5–17)
APTT BLD: 28.6 SEC — SIGNIFICANT CHANGE UP (ref 27.5–35.5)
BASOPHILS # BLD AUTO: 0.04 K/UL — SIGNIFICANT CHANGE UP (ref 0–0.2)
BASOPHILS NFR BLD AUTO: 0.4 % — SIGNIFICANT CHANGE UP (ref 0–2)
BUN SERPL-MCNC: 10 MG/DL — SIGNIFICANT CHANGE UP (ref 7–23)
CALCIUM SERPL-MCNC: 8 MG/DL — LOW (ref 8.4–10.5)
CHLORIDE SERPL-SCNC: 107 MMOL/L — SIGNIFICANT CHANGE UP (ref 96–108)
CO2 SERPL-SCNC: 21 MMOL/L — LOW (ref 22–31)
COVID-19 SPIKE DOMAIN AB INTERP: POSITIVE
COVID-19 SPIKE DOMAIN ANTIBODY RESULT: 55.9 U/ML — HIGH
CREAT SERPL-MCNC: 0.71 MG/DL — SIGNIFICANT CHANGE UP (ref 0.5–1.3)
CULTURE RESULTS: NO GROWTH — SIGNIFICANT CHANGE UP
EOSINOPHIL # BLD AUTO: 0.79 K/UL — HIGH (ref 0–0.5)
EOSINOPHIL NFR BLD AUTO: 7.3 % — HIGH (ref 0–6)
GLUCOSE SERPL-MCNC: 104 MG/DL — HIGH (ref 70–99)
GRAM STN FLD: SIGNIFICANT CHANGE UP
HCT VFR BLD CALC: 30.7 % — LOW (ref 34.5–45)
HGB BLD-MCNC: 9.5 G/DL — LOW (ref 11.5–15.5)
IMM GRANULOCYTES NFR BLD AUTO: 0.6 % — SIGNIFICANT CHANGE UP (ref 0–1.5)
INR BLD: 1.03 — SIGNIFICANT CHANGE UP (ref 0.88–1.16)
LYMPHOCYTES # BLD AUTO: 1.58 K/UL — SIGNIFICANT CHANGE UP (ref 1–3.3)
LYMPHOCYTES # BLD AUTO: 14.6 % — SIGNIFICANT CHANGE UP (ref 13–44)
MAGNESIUM SERPL-MCNC: 1.8 MG/DL — SIGNIFICANT CHANGE UP (ref 1.6–2.6)
MCHC RBC-ENTMCNC: 27.9 PG — SIGNIFICANT CHANGE UP (ref 27–34)
MCHC RBC-ENTMCNC: 30.9 GM/DL — LOW (ref 32–36)
MCV RBC AUTO: 90.3 FL — SIGNIFICANT CHANGE UP (ref 80–100)
MONOCYTES # BLD AUTO: 0.35 K/UL — SIGNIFICANT CHANGE UP (ref 0–0.9)
MONOCYTES NFR BLD AUTO: 3.2 % — SIGNIFICANT CHANGE UP (ref 2–14)
NEUTROPHILS # BLD AUTO: 8.01 K/UL — HIGH (ref 1.8–7.4)
NEUTROPHILS NFR BLD AUTO: 73.9 % — SIGNIFICANT CHANGE UP (ref 43–77)
NRBC # BLD: 0 /100 WBCS — SIGNIFICANT CHANGE UP (ref 0–0)
PHOSPHATE SERPL-MCNC: 3.1 MG/DL — SIGNIFICANT CHANGE UP (ref 2.5–4.5)
PLATELET # BLD AUTO: 502 K/UL — HIGH (ref 150–400)
POTASSIUM SERPL-MCNC: 3.2 MMOL/L — LOW (ref 3.5–5.3)
POTASSIUM SERPL-SCNC: 3.2 MMOL/L — LOW (ref 3.5–5.3)
PROTHROM AB SERPL-ACNC: 12.3 SEC — SIGNIFICANT CHANGE UP (ref 10.6–13.6)
RBC # BLD: 3.4 M/UL — LOW (ref 3.8–5.2)
RBC # FLD: 16.4 % — HIGH (ref 10.3–14.5)
SARS-COV-2 IGG+IGM SERPL QL IA: 55.9 U/ML — HIGH
SARS-COV-2 IGG+IGM SERPL QL IA: POSITIVE
SODIUM SERPL-SCNC: 135 MMOL/L — SIGNIFICANT CHANGE UP (ref 135–145)
SPECIMEN SOURCE: SIGNIFICANT CHANGE UP
SPECIMEN SOURCE: SIGNIFICANT CHANGE UP
WBC # BLD: 10.83 K/UL — HIGH (ref 3.8–10.5)
WBC # FLD AUTO: 10.83 K/UL — HIGH (ref 3.8–10.5)

## 2021-08-09 PROCEDURE — 99232 SBSQ HOSP IP/OBS MODERATE 35: CPT | Mod: GC

## 2021-08-09 PROCEDURE — 99231 SBSQ HOSP IP/OBS SF/LOW 25: CPT

## 2021-08-09 PROCEDURE — 99223 1ST HOSP IP/OBS HIGH 75: CPT

## 2021-08-09 PROCEDURE — 99232 SBSQ HOSP IP/OBS MODERATE 35: CPT

## 2021-08-09 PROCEDURE — 10030 IMG GID FLU COLL DRG SFT TIS: CPT

## 2021-08-09 PROCEDURE — 99152 MOD SED SAME PHYS/QHP 5/>YRS: CPT

## 2021-08-09 RX ORDER — MAGNESIUM SULFATE 500 MG/ML
1 VIAL (ML) INJECTION ONCE
Refills: 0 | Status: COMPLETED | OUTPATIENT
Start: 2021-08-09 | End: 2021-08-09

## 2021-08-09 RX ORDER — SODIUM CHLORIDE 9 MG/ML
1000 INJECTION, SOLUTION INTRAVENOUS
Refills: 0 | Status: DISCONTINUED | OUTPATIENT
Start: 2021-08-09 | End: 2021-08-13

## 2021-08-09 RX ORDER — HEPARIN SODIUM 5000 [USP'U]/ML
5000 INJECTION INTRAVENOUS; SUBCUTANEOUS EVERY 12 HOURS
Refills: 0 | Status: DISCONTINUED | OUTPATIENT
Start: 2021-08-09 | End: 2021-08-13

## 2021-08-09 RX ORDER — POTASSIUM CHLORIDE 20 MEQ
10 PACKET (EA) ORAL
Refills: 0 | Status: COMPLETED | OUTPATIENT
Start: 2021-08-09 | End: 2021-08-09

## 2021-08-09 RX ADMIN — HEPARIN SODIUM 5000 UNIT(S): 5000 INJECTION INTRAVENOUS; SUBCUTANEOUS at 18:34

## 2021-08-09 RX ADMIN — ATORVASTATIN CALCIUM 80 MILLIGRAM(S): 80 TABLET, FILM COATED ORAL at 21:11

## 2021-08-09 RX ADMIN — Medication 100 MILLIEQUIVALENT(S): at 19:30

## 2021-08-09 RX ADMIN — Medication 100 MILLIEQUIVALENT(S): at 10:34

## 2021-08-09 RX ADMIN — Medication 100 MILLIEQUIVALENT(S): at 22:23

## 2021-08-09 RX ADMIN — Medication 100 GRAM(S): at 09:30

## 2021-08-09 RX ADMIN — Medication 100 MILLIEQUIVALENT(S): at 22:22

## 2021-08-09 RX ADMIN — Medication 100 MILLIEQUIVALENT(S): at 21:11

## 2021-08-09 RX ADMIN — PIPERACILLIN AND TAZOBACTAM 200 GRAM(S): 4; .5 INJECTION, POWDER, LYOPHILIZED, FOR SOLUTION INTRAVENOUS at 18:34

## 2021-08-09 RX ADMIN — PIPERACILLIN AND TAZOBACTAM 200 GRAM(S): 4; .5 INJECTION, POWDER, LYOPHILIZED, FOR SOLUTION INTRAVENOUS at 12:09

## 2021-08-09 RX ADMIN — SODIUM CHLORIDE 80 MILLILITER(S): 9 INJECTION, SOLUTION INTRAVENOUS at 10:34

## 2021-08-09 RX ADMIN — Medication 100 MILLIEQUIVALENT(S): at 12:30

## 2021-08-09 RX ADMIN — SODIUM CHLORIDE 80 MILLILITER(S): 9 INJECTION, SOLUTION INTRAVENOUS at 21:17

## 2021-08-09 RX ADMIN — PIPERACILLIN AND TAZOBACTAM 200 GRAM(S): 4; .5 INJECTION, POWDER, LYOPHILIZED, FOR SOLUTION INTRAVENOUS at 06:00

## 2021-08-09 NOTE — DIETITIAN INITIAL EVALUATION ADULT. - MALNUTRITION
severe protein calorie malnutrition Severe protein calorie malnutrition as evidenced by NFPE performed, noted severe fat and muscle wasting, prolonged poor PO intake <75% of EER for 1 year.

## 2021-08-09 NOTE — PROGRESS NOTE ADULT - ASSESSMENT
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 Vascular surgery consulted to assess need for DAPT in case operative intervention is needed. I spoke to her primary team today and the plan seem to be to avoid surgery as long as they can. She is going for IR drainage of pelvic abscess today as a result her ASA was stopped. our recommendations continue to be:    - From Vascular standpoint, would recommend continuing at least ASA, however if unable to per surgical team, restart as soon as possible  - Obtain cardiology/neurology input as both services recommended DAPT on prior admission as well (for 3v CAD and secondary stroke prevention, respectively)

## 2021-08-09 NOTE — DIETITIAN INITIAL EVALUATION ADULT. - PROBLEM SELECTOR PLAN 6
Pt on ASA 81, plavix 75 and atorvastatin 80 at home for CAD. Pt diagnosed with triple vessel disease in April 2021 recommended for medical management.    Plan  -C/w home meds (tolerating PO)  - Begin DASH/TLC diet

## 2021-08-09 NOTE — PROGRESS NOTE ADULT - SUBJECTIVE AND OBJECTIVE BOX
Asked by [facility] service to see and care for this 89 yo woman who came to hospital for malaise but was noted to have some LLQ tenderness that led to CT scan showing complex pelvic abscess that has extended to Left gluteal region.  The process is of unclear etiology.  This process was discovered several years ago we think (abscess) but patient has not gotten any Rx for it.  Patient has BM's daily,she claims, and has not had nausea or vomiting.  Also does not c/o abdminal pain.   Denies prior abdominal surgery history.       PMH: Hx triple vessel CAD and lumbar compression fractures    PSH  Left carotid endartectomy  No abdominal surgery    Meds:  patient denies but chart suggests she is on plavix, ASA, lipitor    Vital Signs Last 24 Hrs  T(C): 36.6 (09 Aug 2021 08:46), Max: 37 (08 Aug 2021 17:44)  T(F): 97.9 (09 Aug 2021 08:46), Max: 98.6 (08 Aug 2021 17:44)  HR: 81 (09 Aug 2021 08:46) (78 - 90)  BP: 131/62 (09 Aug 2021 08:46) (119/55 - 143/65)  BP(mean): 80 (09 Aug 2021 04:21) (79 - 93)  RR: 20 (09 Aug 2021 08:46) (16 - 20)  SpO2: 96% (09 Aug 2021 08:46) (95% - 96%)  lungs clear  cor S1S2  abd: mild soft distension, some 1-2+ direct tenderness to deep palpation in LLQ > RLQ  rectal:  no masses, no induration noted.    Bidigital: no abscess or induration/abscess/fluctuance noted   ext: without calf tenderness    Patient is voidin ml charted (not bee capturing all voids)                          9.5    10.83 )-----------( 502      ( 09 Aug 2021 07:32 )             30.7   08-09    135  |  107  |  10  ----------------------------<  104<H>  3.2<L>   |  21<L>  |  0.71    Ca    8.0<L>      09 Aug 2021 07:32  Phos  3.1     08-09  Mg     1.8     08-09    TPro  6.0  /  Alb  2.9<L>  /  TBili  0.2  /  DBili  x   /  AST  10  /  ALT  6<L>  /  AlkPhos  68      CT scan: see report

## 2021-08-09 NOTE — PROGRESS NOTE ADULT - SUBJECTIVE AND OBJECTIVE BOX
SUBJECTIVE: NAEON. AVSS. Unrecorded voids. Denies urinary sx's, no SP pain.    piperacillin/tazobactam IVPB.. 3.375 Gram(s) IV Intermittent every 6 hours      Vital Signs Last 24 Hrs  T(C): 36.6 (09 Aug 2021 08:46), Max: 37 (08 Aug 2021 17:44)  T(F): 97.9 (09 Aug 2021 08:46), Max: 98.6 (08 Aug 2021 17:44)  HR: 81 (09 Aug 2021 08:46) (78 - 90)  BP: 131/62 (09 Aug 2021 08:46) (119/55 - 143/65)  BP(mean): 80 (09 Aug 2021 04:21) (79 - 93)  RR: 20 (09 Aug 2021 08:46) (16 - 20)  SpO2: 96% (09 Aug 2021 08:46) (95% - 96%)  I&O's Detail    08 Aug 2021 07:01  -  09 Aug 2021 07:00  --------------------------------------------------------  IN:    IV PiggyBack: 100 mL    Lactated Ringers: 1280 mL  Total IN: 1380 mL    OUT:    Voided (mL): 170 mL  Total OUT: 170 mL    Total NET: 1210 mL          Physical Exam:  General: No acute distress, resting comfortably in bed  Pulm: Nonlabored breathing, no respiratory distress  Abd: soft, non-tender, non-distended  : No SP or CVAT      LABS:                        9.5    10.83 )-----------( 502      ( 09 Aug 2021 07:32 )             30.7     08-09    135  |  107  |  10  ----------------------------<  104<H>  3.2<L>   |  21<L>  |  0.71    Ca    8.0<L>      09 Aug 2021 07:32  Phos  3.1     08-09  Mg     1.8     08-09    TPro  6.0  /  Alb  2.9<L>  /  TBili  0.2  /  DBili  x   /  AST  10  /  ALT  6<L>  /  AlkPhos  68  08-08    PT/INR - ( 09 Aug 2021 07:32 )   PT: 12.3 sec;   INR: 1.03          PTT - ( 09 Aug 2021 07:32 )  PTT:28.6 sec  Urinalysis Basic - ( 08 Aug 2021 04:33 )    Color: Yellow / Appearance: Clear / S.010 / pH: x  Gluc: x / Ketone: NEGATIVE  / Bili: NEGATIVE / Urobili: 0.2 E.U./dL   Blood: x / Protein: NEGATIVE mg/dL / Nitrite: NEGATIVE   Leuk Esterase: NEGATIVE / RBC: x / WBC x   Sq Epi: x / Non Sq Epi: x / Bacteria: x        RADIOLOGY & ADDITIONAL STUDIES:

## 2021-08-09 NOTE — DIETITIAN INITIAL EVALUATION ADULT. - PROBLEM SELECTOR PLAN 7
Pt with hx of L ICA stenosis >70% s/p CEA on april 2021, on ASA, plavix and atorvastatin    Plan  -C/w home meds (tolerating PO)  - Begin DASH/TLC diet

## 2021-08-09 NOTE — DIETITIAN INITIAL EVALUATION ADULT. - PROBLEM SELECTOR PLAN 2
Meeting 2/4 SIRS criteria on admission (HR 90 and WBC 16.5) also with elevated temp 100.5. CT abd showing colonic mass that is concerning for abscess as possible source of infection. S/P zosyn 3.375 X1 and 1.5L NS in ED.     Plan  - Hold antibiotics currently as colonic mass is less likely an abscess and pt is not septic currently.  - Encourage PO intake  - F/U AM labs  - Consider surgery/IR consult for abscess drainage if pt is having signs of infection

## 2021-08-09 NOTE — DIETITIAN INITIAL EVALUATION ADULT. - PROBLEM SELECTOR PLAN 1
CT abd showing necrotizing large necrotic mass in distal sigmoid colon w posterior extension to sacrum and caudal extension to uterus and rectum, and L sided hydronephrosis.  Hx of subjective weight loss despite no changes to appetite. Never had colonscopy. CT C spine negative for mets    Plan:  - Consult GI to assess need for surgical intervention  - Consult heme/onc for possible malignancy  - Consult Vascular surgery to assess if DAPT can be d/c'd if patient requires surgical intervention  - order CEA to assess for colonic malignancy  - CT Chest for staging of possible metastatic disease

## 2021-08-09 NOTE — DIETITIAN INITIAL EVALUATION ADULT. - ADD RECOMMEND
1. Adv diet when medically feasible 2. BM and pain regimen per team 3. Monitor BMP, CBC, BG, lytes, replete prn 1. Adv diet when medically feasible 2. 2. Rec Regular diet + Ensure Enlive TID (1050 kcal, 60g protein, 540mL free H2O) 3. BM and pain regimen per team 4. Monitor BMP, CBC, BG, lytes, replete prn

## 2021-08-09 NOTE — DIETITIAN INITIAL EVALUATION ADULT. - OTHER CALCULATIONS
ABW used to calculate energy needs due to pt's current body weight within % IBW (90%). Increased needs 2/2 malnutrition

## 2021-08-09 NOTE — DIETITIAN INITIAL EVALUATION ADULT. - OTHER INFO
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  CT imaging reviewed, c/f perforated sigmoid mass w/ presacral/perirectal abscess. Vascular surgery consulted to assess need for DAPT in case operative intervention is needed. Plan seem to be to avoid surgery. IR drainage of pelvic abscess today.    On assessment, pt seen resting in bed. Pt reports UBW to be 110 lbs, current adm wt 90 lbs, indicates wt loss of 20 lbs/18%. Pt reports wt loss over 1 year d/t poor appetite and decrease PO intake. Reports living alone, and normally recieves food from meal assistance programs. NPO 2/2 for planned IR drainage. NFPE performed, noted fat/muscle wasting. Pt also amenable to ONS when diet is able to be adv. Please see below for nutrition recs. Will continue to follow per RD protocol.

## 2021-08-09 NOTE — DIETITIAN NUTRITION RISK NOTIFICATION - TREATMENT: THE FOLLOWING DIET HAS BEEN RECOMMENDED
1. Adv diet when medically feasible   2. BM and pain regimen per team   3. Monitor BMP, CBC, BG, lytes, replete prn       1. Adv diet when medically feasible   2. Rec Regular diet + Ensure Enlive TID (1050 kcal, 60g protein, 540mL free H2O)   3. BM and pain regimen per team   4. Monitor BMP, CBC, BG, lytes, replete prn

## 2021-08-09 NOTE — DIETITIAN INITIAL EVALUATION ADULT. - PROBLEM/PLAN-5
Seen and examined by me this evening. Admitted for hypertensive emergency with fluid overload-acute on chronic diastolic CHF- requiring extra sessions of HD and thoracentesis for large Left pleural effusion. CHF now stable. Also w/ epistaxis that resolved after ENT placed a rhino rocket. BP remains borderline controlled, will follow up with patient's cardiologist as already on three agents and there's questionable compliance with meds. Also, completing course of Abx for pneumonia. C/w HD per Renal. Anticipate discharge home tomorrow, discussed with patient and daughter at bedside. Compliance encouraged. Rest as above. DISPLAY PLAN FREE TEXT

## 2021-08-09 NOTE — PROGRESS NOTE ADULT - ASSESSMENT
89F PMHx of chronic back pain 2/2 L3,L4 compression fractures, triple vessel CAD on ASA, plavix, lipitor (04/21) and L CEA 2/2 L ICA stenosis >70% (4/21), recent ED visit on 6/28 for UTI p/w achy non-radiating back pain 5/10 in the L lumbar region. GI consulted for sigmoid mass.     #Perforated sigmoid mass vs diverticulitis   - CT imaging reviewed, c/f perforated sigmoid mass w/ presacral/perirectal abscess  - would pursue drainage w/ IR and subsequent surgical planning for bowel resection   - would not pursue colonoscopy w/ tissue sampling given significant area of perforation   - c/w empiric abxs, Zosyn    INCOMPLETE NOTE, PENDING DISCUSSION WITH C ATTENDING    Angelica Nielson DO  Gastroenterology Fellow  Pager: [phone] 89F PMHx of chronic back pain 2/2 L3,L4 compression fractures, triple vessel CAD on ASA, plavix, lipitor (04/21) and L CEA 2/2 L ICA stenosis >70% (4/21), recent ED visit on 6/28 for UTI p/w achy non-radiating back pain 5/10 in the L lumbar region. GI consulted for sigmoid mass.     #Perforated sigmoid mass vs diverticulitis   - CT imaging reviewed, c/f perforated sigmoid mass w/ presacral/perirectal abscess  - would pursue drainage w/ IR and subsequent surgical planning for bowel resection   - would not pursue colonoscopy w/ tissue sampling given significant area of perforation. Patient will require colonoscopy at a later time (approx 6-8 weeks) however pending resolution of acute complicated diverticulitis  - c/w empiric abxs, Zosyn    Case discussed with c attending and primary team.    Thank you for allowing us to participate in the care of this patient.  GI will sign off. Please call back with any questions or concerns.     Angelica Nielson DO  Gastroenterology Fellow  Pager: [phone]

## 2021-08-09 NOTE — PROGRESS NOTE ADULT - ASSESSMENT
A/P  Pelvic abscess and much inflammation and probable fibrosis. Extending to L gluteal area.  On exam no fluctuance or mass or induration noted.  Etiology: diverticulitis vs cancer.  She claims to be essentially asymptomatic. Abdominal exam not impressive.    Suspect this process has been there x several years.  Would like IR consult.  transgluteal drainage would be least invasive option.  Surgical drainage is other - not attractive option.  Needs sigmoidoscopy or colonoscopy.  GI consulted.    On zosyn.  WBC mildly increased.  transfer patient to Team 1.  Consults as above.  Beth[place]unshine for now.   Medical consult - general for her cardiac disease.  May need psych evaluation for competency  Need to contract health care proxy and family to discuss situation  I fear that she may refuse Rx.

## 2021-08-09 NOTE — PROGRESS NOTE ADULT - SUBJECTIVE AND OBJECTIVE BOX
GASTROENTEROLOGY PROGRESS NOTE  Patient seen and examined at bedside. Transferred from Medicine [facility] to Surgical telemetry given CT A/P findings concerning for complicated perforated distal sigmoid diverticulitis with presacral/posterior perirectal abscess, extending to L gluteal region.     ROS: Patient notes no abdominal pain and no BMs overnight. No nausea/vomiting.     PERTINENT REVIEW OF SYSTEMS:  CONSTITUTIONAL: No weakness, fevers or chills  HEENT: No visual changes; No vertigo or throat pain   GASTROINTESTINAL: As above.  NEUROLOGICAL: No numbness or weakness  SKIN: No itching, burning, rashes, or lesions     Allergies    No Known Allergies    Intolerances      MEDICATIONS:  MEDICATIONS  (STANDING):  atorvastatin 80 milliGRAM(s) Oral at bedtime  lactated ringers 1000 milliLiter(s) (80 mL/Hr) IV Continuous <Continuous>  magnesium sulfate  IVPB 1 Gram(s) IV Intermittent once  piperacillin/tazobactam IVPB.. 3.375 Gram(s) IV Intermittent every 6 hours  potassium chloride  10 mEq/100 mL IVPB 10 milliEquivalent(s) IV Intermittent every 1 hour  potassium chloride  10 mEq/100 mL IVPB 10 milliEquivalent(s) IV Intermittent every 1 hour    MEDICATIONS  (PRN):    Vital Signs Last 24 Hrs  T(C): 36.6 (09 Aug 2021 08:46), Max: 37 (08 Aug 2021 17:44)  T(F): 97.9 (09 Aug 2021 08:46), Max: 98.6 (08 Aug 2021 17:44)  HR: 81 (09 Aug 2021 08:46) (78 - 90)  BP: 131/62 (09 Aug 2021 08:46) (119/55 - 143/65)  BP(mean): 80 (09 Aug 2021 04:21) (79 - 93)  RR: 20 (09 Aug 2021 08:46) (16 - 20)  SpO2: 96% (09 Aug 2021 08:46) (95% - 96%)    -08 @ 07:01  -  09 @ 07:00  --------------------------------------------------------  IN: 1380 mL / OUT: 170 mL / NET: 1210 mL      PHYSICAL EXAM:    General:  female, NAD  HEENT: Anicteric sclerae, moist conjunctivae, MMM  Neck: Trachea midline, supple  Lungs: Normal respiratory effort, no intercostal retractions  Cardiovascular: RRR  Abdomen: Soft, mildly tender in lower quadrants w/ some distension; No rebound or guarding  Extremities: Normal range of motion, No clubbing, cyanosis or edema  Neurological: Alert and oriented x3  Skin: Warm and dry. No obvious rash    LABS:                        9.5    10.83 )-----------( 502      ( 09 Aug 2021 07:32 )             30.7     08-    135  |  107  |  10  ----------------------------<  104<H>  3.2<L>   |  21<L>  |  0.71    Ca    8.0<L>      09 Aug 2021 07:32  Phos  3.1     08-  Mg     1.8     08-    TPro  6.0  /  Alb  2.9<L>  /  TBili  0.2  /  DBili  x   /  AST  10  /  ALT  6<L>  /  AlkPhos  68  08-08    PT/INR - ( 09 Aug 2021 07:32 )   PT: 12.3 sec;   INR: 1.03          PTT - ( 09 Aug 2021 07:32 )  PTT:28.6 sec      Urinalysis Basic - ( 08 Aug 2021 04:33 )    Color: Yellow / Appearance: Clear / S.010 / pH: x  Gluc: x / Ketone: NEGATIVE  / Bili: NEGATIVE / Urobili: 0.2 E.U./dL   Blood: x / Protein: NEGATIVE mg/dL / Nitrite: NEGATIVE   Leuk Esterase: NEGATIVE / RBC: x / WBC x   Sq Epi: x / Non Sq Epi: x / Bacteria: x                Culture - Blood (collected 08 Aug 2021 06:27)  Source: .Blood Blood-Peripheral  Preliminary Report (09 Aug 2021 07:01):    No growth to date.    Culture - Blood (collected 08 Aug 2021 06:27)  Source: .Blood Blood-Venous  Preliminary Report (09 Aug 2021 07:01):    No growth to date.      RADIOLOGY & ADDITIONAL STUDIES:  Reviewed

## 2021-08-09 NOTE — DIETITIAN INITIAL EVALUATION ADULT. - PROBLEM SELECTOR PLAN 5
16-Jan-2018 23:29
Pt on amlodipine  12.5mg metoprolol at home.     Plan:  -C/w home meds (tolerating PO)  - Begin DASH/TLC diet

## 2021-08-09 NOTE — DIETITIAN INITIAL EVALUATION ADULT. - PROBLEM SELECTOR PLAN 8
Fluids: None  Electrolytes: Mg>2, K>4  Nutrition:  No IVF currently needed, replete lytes PRN  Prophylaxis: Lovenox 40mg daily Subq  Activity: AAT, OOBTC  GI: none  C: FC  Dispo: Admit to [facility]

## 2021-08-09 NOTE — CHART NOTE - NSCHARTNOTEFT_GEN_A_CORE
Pt seen and examined at bedside. Pt denies any complaints, pain well controlled. Denies fever, chills, n/v/cp/sob.  Abd exam: Soft, non distended, mildly ttp mainly in LLQ. No rebound or guarding  Plan for IR this afternoon for drainage.   Will continue to monitor closely.

## 2021-08-09 NOTE — PROGRESS NOTE ADULT - ASSESSMENT
89 YO F with PMH of chronic back pain 2/2 L3,L4 compression fractures, triple vessel CAD and L CEA 2/2 L ICA stenosis >70% (4/21), recent ED visit on 6/28 for UTI p/w abdominal pain x 1 day, constant associated with weight loss and abdominal distention. CT a/p showing large necrotic mass arising from the sigmoid colon with posterior extension to the sacrum and caudal extension to the rectum and the uterus, findings also suspicious for complicated perforated distal sigmoid diverticulitis with irregular large abscess. 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 Creatinine at baseline. No CVAT or urinary sx's. On review of imaging, there is no delayed nephrogram present. Would recommend further workup to assess if patient is clinically obstructed.    Recommendations:  - NM Renal Scan with Lasix  - Obtain PVR to ensure she is not retaining  - Please record I/Os  - Rest of care per primary team  - No acute urologic intervention at this time

## 2021-08-09 NOTE — CONSULT NOTE ADULT - SUBJECTIVE AND OBJECTIVE BOX
Clinical History: BACK PAIN  In ED, VSS, WBC elevated (16.5) with left shift, lactate wnl (0.7). CT scan (8/7) performed with preliminary read concerning for pelvic malignancy. Admitted to medicine service from ED for further work-up and management of suspected pelvic malignancy. General surgery consulted for concern of perforated, complicated sigmoid diverticulitis vs. colonic perforation 2/2 colorectal malignancy on updated CT read (8/8) now read as suspicious for complicated perforated distal sigmoid diverticulitis with irregular large abscess in the presacral/posterior perirectal space and extending into the left gluteal region. Small presacral/pelvic extraluminal air, moderate colonic stool and possible perforated distal sigmoid colon cancer.      No pertinent family history in first degree relatives    Handoff    MEWS Score    No pertinent past medical history    SBO (small bowel obstruction)    HTN (hypertension)    Chronic back pain    Colonic mass    Abscess of intestine    Chronic back pain    HTN (hypertension)    CAD (coronary artery disease)    Nutrition, metabolism, and development symptoms    Sepsis    Internal carotid artery stenosis, left    Hydronephrosis of left kidney    S/P wrist surgery    BACK PAIN    Colon cancer    Abscess of intestine    40    [id number]        Meds:atorvastatin 80 milliGRAM(s) Oral at bedtime  lactated ringers 1000 milliLiter(s) IV Continuous <Continuous>  piperacillin/tazobactam IVPB.. 3.375 Gram(s) IV Intermittent every 6 hours  potassium chloride  10 mEq/100 mL IVPB 10 milliEquivalent(s) IV Intermittent every 1 hour  potassium chloride  10 mEq/100 mL IVPB 10 milliEquivalent(s) IV Intermittent every 1 hour      Allergies:No Known Allergies        Labs:                           9.5    10.83 )-----------( 502      ( 09 Aug 2021 07:32 )             30.7     PT/INR - ( 09 Aug 2021 07:32 )   PT: 12.3 sec;   INR: 1.03          PTT - ( 09 Aug 2021 07:32 )  PTT:28.6 sec  08-09    135  |  107  |  10  ----------------------------<  104<H>  3.2<L>   |  21<L>  |  0.71    Ca    8.0<L>      09 Aug 2021 07:32  Phos  3.1     08-09  Mg     1.8     08-09    TPro  6.0  /  Alb  2.9<L>  /  TBili  0.2  /  DBili  x   /  AST  10  /  ALT  6<L>  /  AlkPhos  68  08-08          Imaging Findings:  CT Abdomen 8/8 showed  Large necrotic mass lesion in lower posterior pelvis which appears to be arising from distal sigmoid colon with posterior extension to the sacrum and caudal extension to the rectum and the uterus. Mass lesion measures approximately 8.1 x 10.1 x 10.7 cm. Necrotic components versus abscess collections are seen posterior to the rectum and extending through left greater sciatic foramen. Finding is consistent with colonic malignancy. No evidence of large bowel obstruction.

## 2021-08-09 NOTE — PROGRESS NOTE ADULT - SUBJECTIVE AND OBJECTIVE BOX
SUBJECTIVE: Patient seen and examined bedside.    piperacillin/tazobactam IVPB.. 3.375 Gram(s) IV Intermittent every 6 hours      Vital Signs Last 24 Hrs  T(C): 36.6 (09 Aug 2021 08:46), Max: 37 (08 Aug 2021 17:44)  T(F): 97.9 (09 Aug 2021 08:46), Max: 98.6 (08 Aug 2021 17:44)  HR: 81 (09 Aug 2021 08:46) (78 - 90)  BP: 131/62 (09 Aug 2021 08:46) (119/55 - 143/65)  BP(mean): 80 (09 Aug 2021 04:21) (79 - 93)  RR: 20 (09 Aug 2021 08:46) (16 - 20)  SpO2: 96% (09 Aug 2021 08:46) (95% - 96%)  I&O's Detail    08 Aug 2021 07:01  -  09 Aug 2021 07:00  --------------------------------------------------------  IN:    IV PiggyBack: 100 mL    Lactated Ringers: 1280 mL  Total IN: 1380 mL    OUT:    Voided (mL): 170 mL  Total OUT: 170 mL    Total NET: 1210 mL          General: NAD, resting comfortably in bed  C/V: NSR  Pulm: Nonlabored breathing, no respiratory distress  Abd: soft  Extrem: WWP, no edema, SCDs in place        LABS:                        9.5    10.83 )-----------( 502      ( 09 Aug 2021 07:32 )             30.7     08-09    135  |  107  |  10  ----------------------------<  104<H>  3.2<L>   |  21<L>  |  0.71    Ca    8.0<L>      09 Aug 2021 07:32  Phos  3.1     08-09  Mg     1.8     08-09    TPro  6.0  /  Alb  2.9<L>  /  TBili  0.2  /  DBili  x   /  AST  10  /  ALT  6<L>  /  AlkPhos  68  08-08    PT/INR - ( 09 Aug 2021 07:32 )   PT: 12.3 sec;   INR: 1.03          PTT - ( 09 Aug 2021 07:32 )  PTT:28.6 sec  Urinalysis Basic - ( 08 Aug 2021 04:33 )    Color: Yellow / Appearance: Clear / S.010 / pH: x  Gluc: x / Ketone: NEGATIVE  / Bili: NEGATIVE / Urobili: 0.2 E.U./dL   Blood: x / Protein: NEGATIVE mg/dL / Nitrite: NEGATIVE   Leuk Esterase: NEGATIVE / RBC: x / WBC x   Sq Epi: x / Non Sq Epi: x / Bacteria: x

## 2021-08-09 NOTE — PROGRESS NOTE ADULT - ASSESSMENT
89F PMH HTN, chronic back pain 2/2 L3,L4 compression fractures, triple vessel CAD, and SBO 2/2 possible diverticulitis vs. GYN/colorectal malignancy (2018) never followed up and PSH L CEA 2/2 L ICA stenosis >70% (4/21) who presented with worsening back pain x1 week. Denies abdominal pain, endorses flatus and some recent weightloss. Transferred from medicine for perforated, complicated sigmoid diverticulitis vs. 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     NPO/IVF   Pain/nausea control PRN   Zosyn (8/8-)  STANTON q6hr  AM Labs   IR consult to evaluate if abscess/collection amenable to drainage   F/u GI, Vascular surgery, Urology, Heme/onc, IR recs   Possible IR   f/ u CT chest  Full code

## 2021-08-09 NOTE — DIETITIAN INITIAL EVALUATION ADULT. - PROBLEM SELECTOR PLAN 4
Pt with Hx of chronic back pain 2/2 L3,L4 compression fractures. Pain appears to chronic in nature vs related to colonic malignancy/abscess. CT spine neg for acute fx.    Plan  -  lidocaine patch As needed for pain  - Tylenol 650mg PRN Q6hrs for pain

## 2021-08-09 NOTE — PROGRESS NOTE ADULT - SUBJECTIVE AND OBJECTIVE BOX
SUBJECTIVE: Feeling ok, overall improving, +BF, no N/V. Patient seen and examined bedside by chief resident.    piperacillin/tazobactam IVPB.. 3.375 Gram(s) IV Intermittent every 6 hours    MEDICATIONS  (PRN):      I&O's Detail    08 Aug 2021 07:01  -  09 Aug 2021 07:00  --------------------------------------------------------  IN:    IV PiggyBack: 100 mL    Lactated Ringers: 1280 mL  Total IN: 1380 mL    OUT:    Voided (mL): 170 mL  Total OUT: 170 mL    Total NET: 1210 mL          Vital Signs Last 24 Hrs  T(C): 36.7 (09 Aug 2021 04:15), Max: 37 (08 Aug 2021 17:44)  T(F): 98.1 (09 Aug 2021 04:15), Max: 98.6 (08 Aug 2021 17:44)  HR: 78 (09 Aug 2021 04:21) (78 - 90)  BP: 119/56 (09 Aug 2021 04:21) (119/55 - 143/65)  BP(mean): 80 (09 Aug 2021 04:21) (79 - 93)  RR: 17 (09 Aug 2021 04:21) (16 - 17)  SpO2: 96% (09 Aug 2021 04:21) (95% - 96%)    General: NAD, resting comfortably in bed  C/V: NSR  Pulm: Nonlabored breathing, no respiratory distress  Abd: soft, mildly distended, diffuse mild TTP w/o rebound/guarding  Extrem: SCDs in place    LABS:                        10.8   11.08 )-----------( 579      ( 08 Aug 2021 11:18 )             35.7     08-08    137  |  106  |  15  ----------------------------<  155<H>  3.6   |  21<L>  |  0.71    Ca    8.5      08 Aug 2021 11:18  Mg     2.1     08-07    TPro  6.0  /  Alb  2.9<L>  /  TBili  0.2  /  DBili  x   /  AST  10  /  ALT  6<L>  /  AlkPhos  68      PT/INR - ( 07 Aug 2021 21:49 )   PT: 11.9 sec;   INR: 1.01            Urinalysis Basic - ( 08 Aug 2021 04:33 )    Color: Yellow / Appearance: Clear / S.010 / pH: x  Gluc: x / Ketone: NEGATIVE  / Bili: NEGATIVE / Urobili: 0.2 E.U./dL   Blood: x / Protein: NEGATIVE mg/dL / Nitrite: NEGATIVE   Leuk Esterase: NEGATIVE / RBC: x / WBC x   Sq Epi: x / Non Sq Epi: x / Bacteria: x        RADIOLOGY & ADDITIONAL STUDIES:  CT Abdomen and Pelvis w/ IV Cont:   EXAM:  CT ABDOMEN AND PELVIS IC                          *** ADDENDUM 2021  ***    The findings were discussed with Dr. Nichols at 2:50pm on 2021    --- End of Report ---    *** END OF ADDENDUM 2021  ***       PROCEDURE DATE:  2021          INTERPRETATION:  ATTENDING FINAL  IMPRESSION:    1.  Findings suspicious for complicated perforated distal sigmoid diverticulitis with irregular large abscess in the presacral/posterior perirectal space and extending into the left gluteal region. Small presacral/pelvic extraluminal air (602:43, 2:65). No definite associated adjacent osseous erosion. No bowel obstruction, however moderate colonic stool burden from probable constipation. Findings can be also seen in perforated distal sigmoid colon cancer, clinical correlation is recommended.  2.  Left hydronephroureter to the level of left internal iliac artery, may be obstructed by pelvic abscess/pelvic inflammatory process, transition point on image 2:58.  3.  Unchanged left adrenal adenoma, hiatal hernia, severe aortoiliac calcifications.  4.  Anasarca.    ===============================================================================================================================================================================================================================================================================================================================================    PROCEDURE INFORMATION:  Exam: CT Abdomen And Pelvis With Contrast  Exam date and time: 2021 12:49 AM  Age: 89 years old  Clinical indication: Generalized; Patient HX: Back and abdominal pain  TECHNIQUE:  Imaging protocol: Computed tomography of the abdomen and pelvis with contrast.  COMPARISON:  CT PELVIS BONY 5/10/2020 2:07 PM  FINDINGS:  Lungs: Scarring in bilateral lung bases. No infiltrates. No mass lesion is seen.  Mediastinal space: Moderate hiatal hernia.Wall thickening is seen in the visualized distal  esophagus.  Liver: No mass lesion in the liver to suggest metastatic disease.  Gallbladder and bile ducts: Normal. No calcified stones. No ductal dilation.  Pancreas: Normal. No ductal dilation.  Spleen: Normal. No splenomegaly.  Adrenal glands: Normal. No mass.  Kidneys and ureters: Mild to moderate left hydronephrosis and proximal hydroureter is seen  extending to left posterior pelvis. Large heterogeneous enhancing mass lesion is seen in posterior  pelvis at the transition point for hydroureter. Which appears to be arising from distal sigmoid colon  measuring 8.1 cm in transverse dimension, 10.1 cm in AP dimension and 10.7 cm in craniocaudal  dimension. Mass lesion extends posteriorly to the sacrum. 3.8 x 1.6 by 2.1 cm necrotic component  versus abscess is seen arising from left posterior aspect of the mass extending through left greater  sciatic foramen . A 2nd necrotic component versus abscess is seen posterior to the rectum  measuring 2.6 x 2 point 2 cm in axial dimension and 2.9 cm in craniocaudal dimension. Mass lesion is  inseparable from the uterus with multiple calcified leiomyomas in the uterus. Mass is inseparable from  distal sigmoid and rectum. Diffuse wall thickening is seen in distal sigmoid extending to the mass  lesion.  Stomach and bowel: Mild diffuse wall thickening in the stomach.  Appendix: No evidence of appendicitis.  Intraperitoneal space: No extraluminal free air is seen.  Vasculature: Moderate atherosclerotic disease in the aorta. No significant aneurysmal dilatation.  Lymph nodes: Multiple calcified lymph nodes are seen in periportal chain consistent with sequela of  previous granulomatous infection. No enlarged retroperitoneal lymph nodes seen.  Urinary bladder: Urinary bladder appears anterior to the mass and separate from the lesion.  Reproductive: See "Kidneys and ureters" finding.  Bones/joints: See "Kidneys and ureters" finding.  Soft tissues: See "Kidneys and ureters" finding.  IMPRESSION:  1. Large necrotic mass lesion in lower posterior pelvis which appears to be arising from distal sigmoid  colon with posterior extension to the sacrum and caudal extension to the rectum and the uterus. Mass  lesion measures approximately 8.1 x 10.1 x 10.7 cm. Necrotic components versus abscess  collections are seen posterior to the rectum and extending through left greater sciatic foramen.  Finding is consistent with colonic malignancy. No evidence of large bowel obstruction.  2. Moderate hiatal hernia.Wall thickening is seen in the visualized distal esophagus. Please correlate  clinically for esophagitis.  3. Moderate left hydronephrosis and proximal hydroureter extending to the mass lesion.  4. Mild diffuse wall thickening in the stomach. Please correlate clinically for gastritis.  Thank you for allowing us to participate in the care of your patient.  Dictated and Authenticated by: Judy Costello MD  2021 2:33 AM Eastern Time ([place] & [place])      The above report was submitted by the [facility] attending radiologist and copied to PowerScribe by resident Dr. Bassett.    --- End of Report ---    ***Please see the addendum at the top of this report. It may contain additional important information or changes.****      Thank you for the opportunity to participate in the care of this patient.    BRISA STAUBER MD; Resident Radiologist  This document has been electronically signed.  MARIE CARPENTER MD; Attending Radiologist  This document has been electronically signed. Aug  8 2021 12:50PM  Addend:MARIE CARPENTER MD; Attending Radiologist  This addendum was electronically signed on: Aug  8 2021  2:50PM. (08-08-21 @ 01:06)

## 2021-08-10 LAB
ANION GAP SERPL CALC-SCNC: 8 MMOL/L — SIGNIFICANT CHANGE UP (ref 5–17)
BUN SERPL-MCNC: 11 MG/DL — SIGNIFICANT CHANGE UP (ref 7–23)
CALCIUM SERPL-MCNC: 7.7 MG/DL — LOW (ref 8.4–10.5)
CHLORIDE SERPL-SCNC: 104 MMOL/L — SIGNIFICANT CHANGE UP (ref 96–108)
CO2 SERPL-SCNC: 19 MMOL/L — LOW (ref 22–31)
CREAT SERPL-MCNC: 0.73 MG/DL — SIGNIFICANT CHANGE UP (ref 0.5–1.3)
GLUCOSE SERPL-MCNC: 82 MG/DL — SIGNIFICANT CHANGE UP (ref 70–99)
HCT VFR BLD CALC: 30.4 % — LOW (ref 34.5–45)
HGB BLD-MCNC: 9.2 G/DL — LOW (ref 11.5–15.5)
MAGNESIUM SERPL-MCNC: 1.9 MG/DL — SIGNIFICANT CHANGE UP (ref 1.6–2.6)
MCHC RBC-ENTMCNC: 27.5 PG — SIGNIFICANT CHANGE UP (ref 27–34)
MCHC RBC-ENTMCNC: 30.3 GM/DL — LOW (ref 32–36)
MCV RBC AUTO: 90.7 FL — SIGNIFICANT CHANGE UP (ref 80–100)
NRBC # BLD: 0 /100 WBCS — SIGNIFICANT CHANGE UP (ref 0–0)
PHOSPHATE SERPL-MCNC: 2.7 MG/DL — SIGNIFICANT CHANGE UP (ref 2.5–4.5)
PLATELET # BLD AUTO: 530 K/UL — HIGH (ref 150–400)
POTASSIUM SERPL-MCNC: 4.4 MMOL/L — SIGNIFICANT CHANGE UP (ref 3.5–5.3)
POTASSIUM SERPL-SCNC: 4.4 MMOL/L — SIGNIFICANT CHANGE UP (ref 3.5–5.3)
RBC # BLD: 3.35 M/UL — LOW (ref 3.8–5.2)
RBC # FLD: 16.1 % — HIGH (ref 10.3–14.5)
SODIUM SERPL-SCNC: 131 MMOL/L — LOW (ref 135–145)
WBC # BLD: 13.63 K/UL — HIGH (ref 3.8–10.5)
WBC # FLD AUTO: 13.63 K/UL — HIGH (ref 3.8–10.5)

## 2021-08-10 PROCEDURE — 99232 SBSQ HOSP IP/OBS MODERATE 35: CPT

## 2021-08-10 PROCEDURE — 99232 SBSQ HOSP IP/OBS MODERATE 35: CPT | Mod: GC

## 2021-08-10 PROCEDURE — 78708 K FLOW/FUNCT IMAGE W/DRUG: CPT | Mod: 26

## 2021-08-10 PROCEDURE — 99221 1ST HOSP IP/OBS SF/LOW 40: CPT

## 2021-08-10 PROCEDURE — 99231 SBSQ HOSP IP/OBS SF/LOW 25: CPT

## 2021-08-10 RX ORDER — MAGNESIUM SULFATE 500 MG/ML
1 VIAL (ML) INJECTION ONCE
Refills: 0 | Status: COMPLETED | OUTPATIENT
Start: 2021-08-10 | End: 2021-08-10

## 2021-08-10 RX ORDER — SODIUM,POTASSIUM PHOSPHATES 278-250MG
1 POWDER IN PACKET (EA) ORAL ONCE
Refills: 0 | Status: COMPLETED | OUTPATIENT
Start: 2021-08-10 | End: 2021-08-10

## 2021-08-10 RX ORDER — METOPROLOL TARTRATE 50 MG
12.5 TABLET ORAL DAILY
Refills: 0 | Status: DISCONTINUED | OUTPATIENT
Start: 2021-08-10 | End: 2021-08-13

## 2021-08-10 RX ADMIN — Medication 100 GRAM(S): at 12:22

## 2021-08-10 RX ADMIN — Medication 1 ENEMA: at 22:00

## 2021-08-10 RX ADMIN — Medication 1 ENEMA: at 21:42

## 2021-08-10 RX ADMIN — PIPERACILLIN AND TAZOBACTAM 200 GRAM(S): 4; .5 INJECTION, POWDER, LYOPHILIZED, FOR SOLUTION INTRAVENOUS at 17:12

## 2021-08-10 RX ADMIN — Medication 1 PACKET(S): at 12:26

## 2021-08-10 RX ADMIN — HEPARIN SODIUM 5000 UNIT(S): 5000 INJECTION INTRAVENOUS; SUBCUTANEOUS at 05:36

## 2021-08-10 RX ADMIN — PIPERACILLIN AND TAZOBACTAM 200 GRAM(S): 4; .5 INJECTION, POWDER, LYOPHILIZED, FOR SOLUTION INTRAVENOUS at 00:00

## 2021-08-10 RX ADMIN — ATORVASTATIN CALCIUM 80 MILLIGRAM(S): 80 TABLET, FILM COATED ORAL at 21:18

## 2021-08-10 RX ADMIN — PIPERACILLIN AND TAZOBACTAM 200 GRAM(S): 4; .5 INJECTION, POWDER, LYOPHILIZED, FOR SOLUTION INTRAVENOUS at 05:36

## 2021-08-10 RX ADMIN — HEPARIN SODIUM 5000 UNIT(S): 5000 INJECTION INTRAVENOUS; SUBCUTANEOUS at 17:12

## 2021-08-10 RX ADMIN — SODIUM CHLORIDE 80 MILLILITER(S): 9 INJECTION, SOLUTION INTRAVENOUS at 02:11

## 2021-08-10 RX ADMIN — PIPERACILLIN AND TAZOBACTAM 200 GRAM(S): 4; .5 INJECTION, POWDER, LYOPHILIZED, FOR SOLUTION INTRAVENOUS at 23:14

## 2021-08-10 RX ADMIN — PIPERACILLIN AND TAZOBACTAM 200 GRAM(S): 4; .5 INJECTION, POWDER, LYOPHILIZED, FOR SOLUTION INTRAVENOUS at 12:21

## 2021-08-10 NOTE — PROGRESS NOTE ADULT - ASSESSMENT
89F PMH HTN, chronic back pain 2/2 L3,L4 compression fractures, triple vessel CAD, and SBO 2/2 possible diverticulitis vs. GYN/colorectal malignancy (2018) never followed up and PSH L CEA 2/2 L ICA stenosis >70% (4/21) who presented with worsening back pain x1 week. Denies abdominal pain, endorses flatus and some recent weightloss. Transferred from medicine for perforated, complicated sigmoid diverticulitis vs. 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     CLD and clear ensures/IVF   Pain/nausea control PRN   Zosyn (8/8-)  STANTON q6hr  AM Labs   IR consult to evaluate if abscess/collection amenable to drainage   F/u GI, Vascular surgery, Urology, Heme/onc, IR recs   Med/Cardiology consult

## 2021-08-10 NOTE — PROGRESS NOTE ADULT - ASSESSMENT
89 YO F with PMH of chronic back pain 2/2 L3,L4 compression fractures, triple vessel CAD and L CEA 2/2 L ICA stenosis >70% (4/21), recent ED visit on 6/28 for UTI p/w abdominal pain x 1 day, constant associated with weight loss and abdominal distention. CT a/p showing large necrotic mass arising from the sigmoid colon with posterior extension to the sacrum and caudal extension to the rectum and the uterus, findings also suspicious for complicated perforated distal sigmoid diverticulitis with irregular large abscess. 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 Creatinine at baseline. No CVAT or urinary sx's. On review of imaging, there is no delayed nephrogram present. Would recommend further workup to assess if patient is clinically obstructed.    Recommendations:  - Awaiting NM Renal Scan with Lasix  - Obtain PVR to ensure she is not retaining  - Please record I/Os  - Rest of care per primary team  - No acute urologic intervention at this time

## 2021-08-10 NOTE — CHART NOTE - NSCHARTNOTEFT_GEN_A_CORE
Serial abdominal exam @ 00:15    Patient seen at bedside and she was resting comfortably.  We started her on clears and ensure (clear) drinks and she is tolerating.  Patient has no complaints at this time.  Her abdominal exam, she is soft, slightly distended in the pelvic region, tender to deep palpation especially in the pelvic region with no rebound or guarding.  She states she is passing flatus and she had bowel movements during the day.  She denies nausea and vomiting.      ICU Vital Signs Last 24 Hrs  T(C): 37 (09 Aug 2021 22:04), Max: 37 (09 Aug 2021 22:04)  T(F): 98.6 (09 Aug 2021 22:04), Max: 98.6 (09 Aug 2021 22:04)  HR: 78 (09 Aug 2021 23:45) (76 - 90)  BP: 107/55 (09 Aug 2021 23:45) (107/55 - 134/62)  BP(mean): 75 (09 Aug 2021 23:45) (75 - 89)  RR: 17 (09 Aug 2021 23:45) (17 - 20)  SpO2: 95% (09 Aug 2021 23:45) (94% - 97%)    PHYSICAL EXAMINATION   General: NAD  NEURO: AAOx3, follows commands  CV: pulses present and strong b/l in UE  PULM: nonlabored breathing, no respiratory distress  ABD: soft, slightly distended in the pelvic region, tender to deep palpation especially in pelvic region, no rebound, and no guarding  Extem: WWP, no edema, no calf tenderness    89F PMH HTN, chronic back pain 2/2 L3,L4 compression fractures, triple vessel CAD, and SBO 2/2 possible diverticulitis vs. GYN/colorectal malignancy (2018) never followed up and PSH L CEA 2/2 L ICA stenosis >70% (4/21) who presented with worsening back pain x1 week. Denies abdominal pain, endorses flatus and some recent weight loss. Transferred from medicine for perforated, complicated sigmoid diverticulitis vs. 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     CLD and clear ensures/IVF   Pain/nausea control PRN   Zosyn (8/8-)  STANTON q6hr  AM Labs   IR consult to evaluate if abscess/collection amenable to drainage   F/u GI, Vascular surgery, Urology, Heme/onc, IR recs

## 2021-08-10 NOTE — PROGRESS NOTE ADULT - SUBJECTIVE AND OBJECTIVE BOX
INTERVAL HPI/OVERNIGHT EVENTS: No acute overnight events     STATUS POST: IR drainage     POST OPERATIVE DAY #: 1    SUBJECTIVE: Patient is doing well this am. Reports flatus and BM, making appropriate urine. Denies any chest pain or shortness of breath, pain is adequately controlled.     MEDICATIONS  (STANDING):  atorvastatin 80 milliGRAM(s) Oral at bedtime  heparin   Injectable 5000 Unit(s) SubCutaneous every 12 hours  lactated ringers 1000 milliLiter(s) (80 mL/Hr) IV Continuous <Continuous>  piperacillin/tazobactam IVPB.. 3.375 Gram(s) IV Intermittent every 6 hours    MEDICATIONS  (PRN):      Vital Signs Last 24 Hrs  T(C): 36.8 (10 Aug 2021 04:57), Max: 37 (09 Aug 2021 22:04)  T(F): 98.2 (10 Aug 2021 04:57), Max: 98.6 (09 Aug 2021 22:04)  HR: 88 (10 Aug 2021 04:21) (76 - 90)  BP: 120/58 (10 Aug 2021 04:21) (107/55 - 134/62)  BP(mean): 83 (10 Aug 2021 04:21) (75 - 89)  RR: 18 (10 Aug 2021 04:21) (17 - 20)  SpO2: 95% (10 Aug 2021 04:21) (94% - 97%)    PHYSICAL EXAM:    Constitutional: A&Ox3, AVSS    Respiratory: non labored breathing, no respiratory distress    Cardiovascular: NSR, RRR    Gastrointestinal: Abdomen is soft, non-distended, slightly tender to palpation                 Incision: CORDELL in place, no surrounding erythema, clean, dry and intact.     Extremities: (-) edema                  I&O's Detail    09 Aug 2021 07:01  -  10 Aug 2021 07:00  --------------------------------------------------------  IN:    IV PiggyBack: 925 mL    Lactated Ringers: 80 mL    Lactated Ringers w/ Additives: 1040 mL    Oral Fluid: 100 mL  Total IN: 2145 mL    OUT:    Bulb (mL): 100 mL    Voided (mL): 1350 mL  Total OUT: 1450 mL    Total NET: 695 mL          LABS:                        9.2    13.63 )-----------( 530      ( 10 Aug 2021 06:36 )             30.4     08-10    131<L>  |  104  |  11  ----------------------------<  82  4.4   |  19<L>  |  0.73    Ca    7.7<L>      10 Aug 2021 06:36  Phos  2.7     08-10  Mg     1.9     08-10    TPro  6.0  /  Alb  2.9<L>  /  TBili  0.2  /  DBili  x   /  AST  10  /  ALT  6<L>  /  AlkPhos  68  08-08    PT/INR - ( 09 Aug 2021 07:32 )   PT: 12.3 sec;   INR: 1.03          PTT - ( 09 Aug 2021 07:32 )  PTT:28.6 sec      RADIOLOGY & ADDITIONAL STUDIES:

## 2021-08-10 NOTE — PROGRESS NOTE ADULT - SUBJECTIVE AND OBJECTIVE BOX
GASTROENTEROLOGY PROGRESS NOTE  Patient seen and examined at bedside. PIETRO    ROS:     PERTINENT REVIEW OF SYSTEMS:  CONSTITUTIONAL: No weakness, fevers or chills  HEENT: No visual changes; No vertigo or throat pain   GASTROINTESTINAL: As above.  NEUROLOGICAL: No numbness or weakness  SKIN: No itching, burning, rashes, or lesions     Allergies    No Known Allergies    Intolerances      MEDICATIONS:  MEDICATIONS  (STANDING):  atorvastatin 80 milliGRAM(s) Oral at bedtime  heparin   Injectable 5000 Unit(s) SubCutaneous every 12 hours  lactated ringers 1000 milliLiter(s) (80 mL/Hr) IV Continuous <Continuous>  piperacillin/tazobactam IVPB.. 3.375 Gram(s) IV Intermittent every 6 hours    MEDICATIONS  (PRN):    Vital Signs Last 24 Hrs  T(C): 36.7 (10 Aug 2021 14:06), Max: 37 (09 Aug 2021 22:04)  T(F): 98 (10 Aug 2021 14:06), Max: 98.6 (09 Aug 2021 22:04)  HR: 78 (10 Aug 2021 16:10) (78 - 90)  BP: 135/60 (10 Aug 2021 16:10) (107/55 - 135/63)  BP(mean): 86 (10 Aug 2021 16:10) (75 - 90)  RR: 17 (10 Aug 2021 16:10) (17 - 18)  SpO2: 95% (10 Aug 2021 16:10) (94% - 97%)    08-09 @ 07:01  -  08-10 @ 07:00  --------------------------------------------------------  IN: 2145 mL / OUT: 1450 mL / NET: 695 mL    08-10 @ 07:01  -  08-10 @ 17:30  --------------------------------------------------------  IN: 360 mL / OUT: 730 mL / NET: -370 mL      PHYSICAL EXAM:    General:  female, NAD  HEENT: Anicteric sclerae, moist conjunctivae, MMM  Neck: Trachea midline, supple  Lungs: Normal respiratory effort, no intercostal retractions  Cardiovascular: RRR  Abdomen: Soft, mildly tender in lower quadrants w/ some distension; No rebound or guarding  Extremities: Normal range of motion, No clubbing, cyanosis or edema  Neurological: Alert and oriented x3  Skin: Warm and dry. No obvious rash    LABS:                        9.2    13.63 )-----------( 530      ( 10 Aug 2021 06:36 )             30.4     08-10    131<L>  |  104  |  11  ----------------------------<  82  4.4   |  19<L>  |  0.73    Ca    7.7<L>      10 Aug 2021 06:36  Phos  2.7     08-10  Mg     1.9     08-10      PT/INR - ( 09 Aug 2021 07:32 )   PT: 12.3 sec;   INR: 1.03          PTT - ( 09 Aug 2021 07:32 )  PTT:28.6 sec                  Culture - Abscess with Gram Stain (collected 09 Aug 2021 20:17)  Source: .Abscess Left Gluteal Collection  Gram Stain (09 Aug 2021 21:54):    Few-moderate Gram positive cocci in pairs    Few Gram Variable Rods    Numerous white blood cells  Preliminary Report (10 Aug 2021 13:31):    Rare Escherichia coli    Susceptibility to follow.    Culture - Urine (collected 08 Aug 2021 11:48)  Source: Clean Catch Clean Catch (Midstream)  Final Report (09 Aug 2021 13:38):    No growth    Culture - Blood (collected 08 Aug 2021 06:27)  Source: .Blood Blood-Peripheral  Preliminary Report (09 Aug 2021 07:01):    No growth to date.    Culture - Blood (collected 08 Aug 2021 06:27)  Source: .Blood Blood-Venous  Preliminary Report (09 Aug 2021 07:01):    No growth to date.      RADIOLOGY & ADDITIONAL STUDIES:  Reviewed GASTROENTEROLOGY PROGRESS NOTE  Patient seen and examined at bedside. s/p drainage of L gluteal abscess.    ROS: Denies any abdominal pain now however notes having lower abdominal pain earlier today. Denies any nausea/vomiting.     PERTINENT REVIEW OF SYSTEMS:  CONSTITUTIONAL: No weakness, fevers or chills  HEENT: No visual changes; No vertigo or throat pain   GASTROINTESTINAL: As above.  NEUROLOGICAL: No numbness or weakness  SKIN: No itching, burning, rashes, or lesions     Allergies    No Known Allergies    Intolerances      MEDICATIONS:  MEDICATIONS  (STANDING):  atorvastatin 80 milliGRAM(s) Oral at bedtime  heparin   Injectable 5000 Unit(s) SubCutaneous every 12 hours  lactated ringers 1000 milliLiter(s) (80 mL/Hr) IV Continuous <Continuous>  piperacillin/tazobactam IVPB.. 3.375 Gram(s) IV Intermittent every 6 hours    MEDICATIONS  (PRN):    Vital Signs Last 24 Hrs  T(C): 36.7 (10 Aug 2021 14:06), Max: 37 (09 Aug 2021 22:04)  T(F): 98 (10 Aug 2021 14:06), Max: 98.6 (09 Aug 2021 22:04)  HR: 78 (10 Aug 2021 16:10) (78 - 90)  BP: 135/60 (10 Aug 2021 16:10) (107/55 - 135/63)  BP(mean): 86 (10 Aug 2021 16:10) (75 - 90)  RR: 17 (10 Aug 2021 16:10) (17 - 18)  SpO2: 95% (10 Aug 2021 16:10) (94% - 97%)    08-09 @ 07:01  -  08-10 @ 07:00  --------------------------------------------------------  IN: 2145 mL / OUT: 1450 mL / NET: 695 mL    08-10 @ 07:01  -  08-10 @ 17:30  --------------------------------------------------------  IN: 360 mL / OUT: 730 mL / NET: -370 mL      PHYSICAL EXAM:    General:  female, NAD  HEENT: Anicteric sclerae, moist conjunctivae, MMM  Neck: Trachea midline, supple  Lungs: Normal respiratory effort, no intercostal retractions  Cardiovascular: RRR  Abdomen: Soft, mildly tender in lower quadrants w/ some distension; No rebound or guarding  Extremities: Normal range of motion, No clubbing, cyanosis or edema  Neurological: Alert and oriented x3  Skin: Warm and dry. No obvious rash    LABS:                        9.2    13.63 )-----------( 530      ( 10 Aug 2021 06:36 )             30.4     08-10    131<L>  |  104  |  11  ----------------------------<  82  4.4   |  19<L>  |  0.73    Ca    7.7<L>      10 Aug 2021 06:36  Phos  2.7     08-10  Mg     1.9     08-10      PT/INR - ( 09 Aug 2021 07:32 )   PT: 12.3 sec;   INR: 1.03          PTT - ( 09 Aug 2021 07:32 )  PTT:28.6 sec                  Culture - Abscess with Gram Stain (collected 09 Aug 2021 20:17)  Source: .Abscess Left Gluteal Collection  Gram Stain (09 Aug 2021 21:54):    Few-moderate Gram positive cocci in pairs    Few Gram Variable Rods    Numerous white blood cells  Preliminary Report (10 Aug 2021 13:31):    Rare Escherichia coli    Susceptibility to follow.    Culture - Urine (collected 08 Aug 2021 11:48)  Source: Clean Catch Clean Catch (Midstream)  Final Report (09 Aug 2021 13:38):    No growth    Culture - Blood (collected 08 Aug 2021 06:27)  Source: .Blood Blood-Peripheral  Preliminary Report (09 Aug 2021 07:01):    No growth to date.    Culture - Blood (collected 08 Aug 2021 06:27)  Source: .Blood Blood-Venous  Preliminary Report (09 Aug 2021 07:01):    No growth to date.      RADIOLOGY & ADDITIONAL STUDIES:  Reviewed

## 2021-08-10 NOTE — PROGRESS NOTE ADULT - ASSESSMENT
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 Vascular surgery consulted to assess need for DAPT in case operative intervention is needed. I spoke to her primary team today and the plan seem to be to avoid surgery as long as they can. She went for IR drainage of pelvic abscess yesterday and had drain put in place. Her ASA was stopped on 8/9 for that reason. Today she is going for Cscope to diagnose multifocal colon CA. our recommendations continue to be:    - From Vascular standpoint, would recommend continuing at least ASA, however if unable to per surgical team, restart as soon as possible  - Obtain cardiology/neurology input as both services recommended DAPT on prior admission as well (for 3v CAD and secondary stroke prevention, respectively)       89F PMHx of chronic back pain 2/2 L3,L4 compression fractures, triple vessel CAD on ASA, plavix, lipitor (04/21) and L CEA 2/2 L ICA stenosis >70% (4/21), was admitted on 8/8 for necrotizing colon mass. Vascular surgery consulted to assess need for DAPT in case operative intervention is needed. I spoke to her primary team today and the plan seem to be to avoid surgery as long as they can. She went for IR drainage of pelvic abscess yesterday and had drain put in place. Her ASA was stopped on 8/9 for that reason. Today she is going for Cscope to diagnose multifocal colon CA. our recommendations continue to be:    - From Vascular standpoint, would recommend continuing at least ASA, however if unable to per surgical team, restart as soon as possible  - Obtain cardiology/neurology input as both services recommended DAPT on prior admission as well (for 3v CAD and secondary stroke prevention, respectively)  We are signing off at this time.

## 2021-08-10 NOTE — PROGRESS NOTE ADULT - SUBJECTIVE AND OBJECTIVE BOX
SUBJECTIVE: Patient seen and examined bedside.    heparin   Injectable 5000 Unit(s) SubCutaneous every 12 hours  piperacillin/tazobactam IVPB.. 3.375 Gram(s) IV Intermittent every 6 hours      Vital Signs Last 24 Hrs  T(C): 36.8 (10 Aug 2021 04:57), Max: 37 (09 Aug 2021 22:04)  T(F): 98.2 (10 Aug 2021 04:57), Max: 98.6 (09 Aug 2021 22:04)  HR: 88 (10 Aug 2021 04:21) (76 - 90)  BP: 120/58 (10 Aug 2021 04:21) (107/55 - 134/62)  BP(mean): 83 (10 Aug 2021 04:21) (75 - 89)  RR: 18 (10 Aug 2021 04:21) (17 - 18)  SpO2: 95% (10 Aug 2021 04:21) (94% - 97%)  I&O's Detail    09 Aug 2021 07:01  -  10 Aug 2021 07:00  --------------------------------------------------------  IN:    IV PiggyBack: 925 mL    Lactated Ringers: 80 mL    Lactated Ringers w/ Additives: 1040 mL    Oral Fluid: 100 mL  Total IN: 2145 mL    OUT:    Bulb (mL): 100 mL    Voided (mL): 1350 mL  Total OUT: 1450 mL    Total NET: 695 mL          General: NAD, resting comfortably in bed  C/V: NSR, +1 carotid pulse b/l  Pulm: Nonlabored breathing, no respiratory distress  Abd: soft  Extrem: WWP, no edema        LABS:                        9.2    13.63 )-----------( 530      ( 10 Aug 2021 06:36 )             30.4     08-10    131<L>  |  104  |  11  ----------------------------<  82  4.4   |  19<L>  |  0.73    Ca    7.7<L>      10 Aug 2021 06:36  Phos  2.7     08-10  Mg     1.9     08-10    TPro  6.0  /  Alb  2.9<L>  /  TBili  0.2  /  DBili  x   /  AST  10  /  ALT  6<L>  /  AlkPhos  68  08-08    PT/INR - ( 09 Aug 2021 07:32 )   PT: 12.3 sec;   INR: 1.03          PTT - ( 09 Aug 2021 07:32 )  PTT:28.6 sec      RADIOLOGY & ADDITIONAL STUDIES:

## 2021-08-10 NOTE — CONSULT NOTE ADULT - SUBJECTIVE AND OBJECTIVE BOX
HPI:89F PMHx of chronic back pain 2/2 L3,L4 compression fractures, triple vessel CAD on ASA, plavix, lipitor (04/21) and L CEA 2/2 L ICA stenosis >70% (4/21), recent ED visit on 6/28 for UTI p/w achy non-radiating back pain 5/10 in the L lumbar region. Pain is worsened with movement and improves with rest. Pt tried tylenol without any relief of symptoms. She endorses 1 week of fatigue and unintentional weight loss over last several months despite good appetite.  She denies fever, chills, dysuria, diarrhea, constipation, hematoschezia or hematemesis, dyspnea or chest pain.  Pt states he has never had a colonoscopy. Denies recent travel or sick contacts.    PMHx:   HTN  SBO (2018)  CAD (04/21)  Chronic back pain 2/2 L3/L4 compression fractures  PSHx: L CEA (04/21)   Meds: See med rec  Allergies: NKDA  FHX: no pertinent Fhx of cardiac or pulmonary disease  Social: Lives in an apartment alone. States she has a few friends who visit her regularly. She has a  who visits twice a week but does most household chores herself. She drinks 1 glass of scotch per night, she is a former smoker ( 1ppd quit 25 years ago). She denies illicit drug use.    In the ED:    - VS: Tmax: 100.5 , HR: 90  , BP: 170/70, RR: 18, O2: 97% on RA     - Pertinent Labs: WBC 16.5 Hgb 11.6  lactate 0.7 BUN 26 Cr 0.93 Trop neg Lipase 153 BNP 2649    - Imaging: CXR: CT abd: large necrotic mass in distal sigmoid colon w posterior extension to sacrum and caudal extension to uterus and rectum. Finding consistent with colonic malignancy. Moderate left hydronephrosis and proximal hydroureter extending to the mass lesion. Mild diffuse wall thickening in the stomach. Please correlate clinically for gastritis. CT cervical spine: No spinal fx, chronic degenerative changes EKG: NSR    - Treatment/interventions: Zosyn 3.375g X 1, 1.5L NS bolus     (08 Aug 2021 08:02)    Patient presented with abdominal pain found to have diverticulitis complicated by abscess formation. She is s/p drainage by IR with minimal improvement per surgical team. She is pending possible sigmoidectomy later this admission. This patient has know CAD, she had a dCath performed last admission as part of a preoperative risk assessment for CEA. She was found to have triple vessel dx with  of the RCA/LCx and 80% stenosis of LAD. She was started on medical therapy to address her coronary artery disease. Since discharge she reports that she can ambulate several blocks using her walker without limitation, she denies shortness of breath or dyspnea on exertions.     PAST MEDICAL & SURGICAL HISTORY:  SBO (small bowel obstruction)    HTN (hypertension)    Chronic back pain    S/P wrist surgery    Allergies    No Known Allergies  Intolerances    HOME MEDICATIONS:  amLODIPine 5 mg oral tablet: 1 tab(s) orally once a day (08 Aug 2021 11:13)  aspirin 81 mg oral tablet, chewable: 1 tab(s) orally every 24 hours (08 Aug 2021 11:13)  atorvastatin 80 mg oral tablet: 1 tab(s) orally once a day (at bedtime) (08 Aug 2021 11:13)  clopidogrel 75 mg oral tablet: 1 tab(s) orally every 24 hours (08 Aug 2021 11:13)  metoprolol: 12.5 milligram(s) orally once a day (08 Aug 2021 11:13)  Tylenol 325 mg oral capsule: 1 cap(s) orally 2 times a day (08 Aug 2021 11:13)    REVIEW OF SYSTEMS:  CONSTITUTIONAL: No fatigue  RESPIRATORY: No cough, w; No shortness of breath  CARDIOVASCULAR: No chest pain, palpitations, dizziness, or leg swelling  GASTROINTESTINAL: +abdominal pain   NEUROLOGICAL: No headaches, loss of strength, numbness, or tremors  [x ] All other systems negative    OBJECTIVE:  ICU Vital Signs Last 24 Hrs  T(C): 36.9 (10 Aug 2021 17:54), Max: 37 (09 Aug 2021 22:04)  T(F): 98.5 (10 Aug 2021 17:54), Max: 98.6 (09 Aug 2021 22:04)  HR: 78 (10 Aug 2021 16:10) (78 - 90)  BP: 135/60 (10 Aug 2021 16:10) (107/55 - 135/63)  BP(mean): 86 (10 Aug 2021 16:10) (75 - 90)    RR: 17 (10 Aug 2021 16:10) (17 - 18)  SpO2: 95% (10 Aug 2021 16:10) (95% - 97%)    08-09 @ 07:01  -  08-10 @ 07:00  --------------------------------------------------------  IN: 2145 mL / OUT: 1450 mL / NET: 695 mL    08-10 @ 07:01  -  08-10 @ 18:40  --------------------------------------------------------  IN: 360 mL / OUT: 810 mL / NET: -450 mL    PHYSICAL EXAM:  GENERAL: NAD  HEAD:  Atraumatic, Normocephalic  EYES: EOMI, conjunctiva and sclera clear  ENMT: No tonsillar erythema, exudates, or enlargement; Moist mucous membranes  NECK: Supple, No JVD  NERVOUS SYSTEM: AOX2, motor and sensation grossly intact in b/l UE and b/l LE  PSYCHIATRIC: Appropriate affect and mood  CHEST/LUNG: Clear to auscultation bilaterally;   HEART: Regular rate and rhythm; No murmurs. No LE edema  ABDOMEN: Soft, LLQ TTP, CORDELL drain with scant s/s fluid, Nondistended; Bowel sounds present  EXTREMITIES:  2+ Peripheral Pulses, No clubbing, cyanosis  SKIN: No rashes or lesions    HOSPITAL MEDICATIONS:  MEDICATIONS  (STANDING):  atorvastatin 80 milliGRAM(s) Oral at bedtime  heparin   Injectable 5000 Unit(s) SubCutaneous every 12 hours  lactated ringers 1000 milliLiter(s) (80 mL/Hr) IV Continuous <Continuous>  piperacillin/tazobactam IVPB.. 3.375 Gram(s) IV Intermittent every 6 hours    MEDICATIONS  (PRN):      LABS:                        9.2    13.63 )-----------( 530      ( 10 Aug 2021 06:36 )             30.4     08-10    131<L>  |  104  |  11  ----------------------------<  82  4.4   |  19<L>  |  0.73    Ca    7.7<L>      10 Aug 2021 06:36  Phos  2.7     08-10  Mg     1.9     08-10      PT/INR - ( 09 Aug 2021 07:32 )   PT: 12.3 sec;   INR: 1.03     PTT - ( 09 Aug 2021 07:32 )  PTT:28.6 sec    EKG: NSR, non-specific ST changes

## 2021-08-10 NOTE — CONSULT NOTE ADULT - ASSESSMENT
89 yo F PMHx of HTN, Triple vessel CAD (medically managed), s/p L CEA  presenting with diverticulitis complicated by necrotizing mass formation. Cardiology consulted for preoperative risk assessment for possible sigmoidectomy     #Preoperative risk assessment   RCRI 3: 15% 30-day risk for MACE   s/p Cath 4/1 with  of RCA/LCx; diffuse 80% mLAD - medically managed   Patient reports improvement in functional status although limited  Given stable CAD, would consider this patient to be an intermediate risk fo rand intermediate risk procedure   No further cardiac workup needed prior to surgery     #CAD Triple Vessel disease, medically managed  - Restart metoprolol 12.5 mg qd  - ASA/Plavix held,  - c/w lipitor   Cardiology will follow up post-op

## 2021-08-10 NOTE — PROGRESS NOTE ADULT - SUBJECTIVE AND OBJECTIVE BOX
SUBJECTIVE: NAEON. AVSS. Denies any urinary issues. Making good urine. No flank pain.    heparin   Injectable 5000 Unit(s) SubCutaneous every 12 hours  piperacillin/tazobactam IVPB.. 3.375 Gram(s) IV Intermittent every 6 hours      Vital Signs Last 24 Hrs  T(C): 36.3 (10 Aug 2021 09:18), Max: 37 (09 Aug 2021 22:04)  T(F): 97.3 (10 Aug 2021 09:18), Max: 98.6 (09 Aug 2021 22:04)  HR: 88 (10 Aug 2021 08:24) (76 - 90)  BP: 126/60 (10 Aug 2021 08:24) (107/55 - 134/62)  BP(mean): 86 (10 Aug 2021 08:24) (75 - 89)  RR: 17 (10 Aug 2021 08:24) (17 - 18)  SpO2: 96% (10 Aug 2021 08:24) (94% - 97%)  I&O's Detail    09 Aug 2021 07:01  -  10 Aug 2021 07:00  --------------------------------------------------------  IN:    IV PiggyBack: 925 mL    Lactated Ringers: 80 mL    Lactated Ringers w/ Additives: 1040 mL    Oral Fluid: 100 mL  Total IN: 2145 mL    OUT:    Bulb (mL): 100 mL    Voided (mL): 1350 mL  Total OUT: 1450 mL    Total NET: 695 mL      10 Aug 2021 07:01  -  10 Aug 2021 10:31  --------------------------------------------------------  IN:    Lactated Ringers w/ Additives: 80 mL  Total IN: 80 mL    OUT:    Bulb (mL): 30 mL  Total OUT: 30 mL    Total NET: 50 mL          Physical Exam:  General: No acute distress, resting comfortably in bed  Pulm: Nonlabored breathing, no respiratory distress  Abd: soft, non-tender, non-distended  : no CVAT    LABS:                        9.2    13.63 )-----------( 530      ( 10 Aug 2021 06:36 )             30.4     08-10    131<L>  |  104  |  11  ----------------------------<  82  4.4   |  19<L>  |  0.73    Ca    7.7<L>      10 Aug 2021 06:36  Phos  2.7     08-10  Mg     1.9     08-10    TPro  6.0  /  Alb  2.9<L>  /  TBili  0.2  /  DBili  x   /  AST  10  /  ALT  6<L>  /  AlkPhos  68  08-08    PT/INR - ( 09 Aug 2021 07:32 )   PT: 12.3 sec;   INR: 1.03          PTT - ( 09 Aug 2021 07:32 )  PTT:28.6 sec      RADIOLOGY & ADDITIONAL STUDIES:

## 2021-08-10 NOTE — PROGRESS NOTE ADULT - SUBJECTIVE AND OBJECTIVE BOX
In ED, VSS, WBC elevated (16.5) with left shift, lactate wnl (0.7). CT scan (8/7) performed with preliminary read concerning for pelvic malignancy. Admitted to medicine service from ED for further work-up and management of suspected pelvic malignancy. General surgery consulted for concern of perforated, complicated sigmoid diverticulitis vs. colonic perforation 2/2 colorectal malignancy on updated CT read (8/8) now read as suspicious for complicated perforated distal sigmoid diverticulitis with irregular large abscess in the presacral/posterior perirectal space and extending into the left gluteal region. Small presacral/pelvic extraluminal air, moderate colonic stool and possible perforated distal sigmoid colon cancer. S/p IR drainage of gluteal abscess 8/9.

## 2021-08-10 NOTE — PROGRESS NOTE ADULT - ASSESSMENT
L gluteal drain with 30cc purulent/sanguinous output over last 24h. Afebrile and asymptomatic, but WBC up to 13.63 from 10.83 8/9.  Continue to monitor output. IR will continue to follow.  L gluteal drain with 30cc purulent/sanguinous output over last 24h. Flushes easily with 2cc NS. Pt afebrile and asymptomatic, but WBC up to 13.63 from 10.83 8/9.  Continue to monitor output. IR will continue to follow.

## 2021-08-11 ENCOUNTER — RESULT REVIEW (OUTPATIENT)
Age: 86
End: 2021-08-11

## 2021-08-11 ENCOUNTER — TRANSCRIPTION ENCOUNTER (OUTPATIENT)
Age: 86
End: 2021-08-11

## 2021-08-11 LAB
-  AMPICILLIN/SULBACTAM: SIGNIFICANT CHANGE UP
-  AMPICILLIN: SIGNIFICANT CHANGE UP
-  CEFAZOLIN: SIGNIFICANT CHANGE UP
-  CEFTRIAXONE: SIGNIFICANT CHANGE UP
-  CIPROFLOXACIN: SIGNIFICANT CHANGE UP
-  ERTAPENEM: SIGNIFICANT CHANGE UP
-  GENTAMICIN: SIGNIFICANT CHANGE UP
-  MEROPENEM: SIGNIFICANT CHANGE UP
-  PIPERACILLIN/TAZOBACTAM: SIGNIFICANT CHANGE UP
-  TOBRAMYCIN: SIGNIFICANT CHANGE UP
-  TRIMETHOPRIM/SULFAMETHOXAZOLE: SIGNIFICANT CHANGE UP
ANION GAP SERPL CALC-SCNC: 7 MMOL/L — SIGNIFICANT CHANGE UP (ref 5–17)
BUN SERPL-MCNC: 9 MG/DL — SIGNIFICANT CHANGE UP (ref 7–23)
CALCIUM SERPL-MCNC: 7.4 MG/DL — LOW (ref 8.4–10.5)
CHLORIDE SERPL-SCNC: 106 MMOL/L — SIGNIFICANT CHANGE UP (ref 96–108)
CO2 SERPL-SCNC: 24 MMOL/L — SIGNIFICANT CHANGE UP (ref 22–31)
CREAT SERPL-MCNC: 0.79 MG/DL — SIGNIFICANT CHANGE UP (ref 0.5–1.3)
CULTURE RESULTS: SIGNIFICANT CHANGE UP
GLUCOSE SERPL-MCNC: 113 MG/DL — HIGH (ref 70–99)
HCT VFR BLD CALC: 28.1 % — LOW (ref 34.5–45)
HGB BLD-MCNC: 8.7 G/DL — LOW (ref 11.5–15.5)
MAGNESIUM SERPL-MCNC: 2.1 MG/DL — SIGNIFICANT CHANGE UP (ref 1.6–2.6)
MCHC RBC-ENTMCNC: 27.6 PG — SIGNIFICANT CHANGE UP (ref 27–34)
MCHC RBC-ENTMCNC: 31 GM/DL — LOW (ref 32–36)
MCV RBC AUTO: 89.2 FL — SIGNIFICANT CHANGE UP (ref 80–100)
METHOD TYPE: SIGNIFICANT CHANGE UP
NRBC # BLD: 0 /100 WBCS — SIGNIFICANT CHANGE UP (ref 0–0)
ORGANISM # SPEC MICROSCOPIC CNT: SIGNIFICANT CHANGE UP
ORGANISM # SPEC MICROSCOPIC CNT: SIGNIFICANT CHANGE UP
PHOSPHATE SERPL-MCNC: 2 MG/DL — LOW (ref 2.5–4.5)
PLATELET # BLD AUTO: 538 K/UL — HIGH (ref 150–400)
POTASSIUM SERPL-MCNC: 4 MMOL/L — SIGNIFICANT CHANGE UP (ref 3.5–5.3)
POTASSIUM SERPL-SCNC: 4 MMOL/L — SIGNIFICANT CHANGE UP (ref 3.5–5.3)
RBC # BLD: 3.15 M/UL — LOW (ref 3.8–5.2)
RBC # FLD: 16.1 % — HIGH (ref 10.3–14.5)
SARS-COV-2 RNA SPEC QL NAA+PROBE: SIGNIFICANT CHANGE UP
SODIUM SERPL-SCNC: 137 MMOL/L — SIGNIFICANT CHANGE UP (ref 135–145)
SPECIMEN SOURCE: SIGNIFICANT CHANGE UP
WBC # BLD: 9.83 K/UL — SIGNIFICANT CHANGE UP (ref 3.8–10.5)
WBC # FLD AUTO: 9.83 K/UL — SIGNIFICANT CHANGE UP (ref 3.8–10.5)

## 2021-08-11 PROCEDURE — 99231 SBSQ HOSP IP/OBS SF/LOW 25: CPT

## 2021-08-11 PROCEDURE — 88305 TISSUE EXAM BY PATHOLOGIST: CPT | Mod: 26

## 2021-08-11 PROCEDURE — 88342 IMHCHEM/IMCYTCHM 1ST ANTB: CPT | Mod: 26

## 2021-08-11 PROCEDURE — 99222 1ST HOSP IP/OBS MODERATE 55: CPT

## 2021-08-11 PROCEDURE — 88341 IMHCHEM/IMCYTCHM EA ADD ANTB: CPT | Mod: 26

## 2021-08-11 RX ORDER — ACETAMINOPHEN 500 MG
700 TABLET ORAL ONCE
Refills: 0 | Status: DISCONTINUED | OUTPATIENT
Start: 2021-08-11 | End: 2021-08-13

## 2021-08-11 RX ORDER — MEROPENEM 1 G/30ML
1000 INJECTION INTRAVENOUS EVERY 12 HOURS
Refills: 0 | Status: DISCONTINUED | OUTPATIENT
Start: 2021-08-11 | End: 2021-08-13

## 2021-08-11 RX ORDER — ERTAPENEM SODIUM 1 G/1
1000 INJECTION, POWDER, LYOPHILIZED, FOR SOLUTION INTRAMUSCULAR; INTRAVENOUS ONCE
Refills: 0 | Status: COMPLETED | OUTPATIENT
Start: 2021-08-11 | End: 2021-08-11

## 2021-08-11 RX ADMIN — PIPERACILLIN AND TAZOBACTAM 200 GRAM(S): 4; .5 INJECTION, POWDER, LYOPHILIZED, FOR SOLUTION INTRAVENOUS at 11:19

## 2021-08-11 RX ADMIN — ATORVASTATIN CALCIUM 80 MILLIGRAM(S): 80 TABLET, FILM COATED ORAL at 22:38

## 2021-08-11 RX ADMIN — HEPARIN SODIUM 5000 UNIT(S): 5000 INJECTION INTRAVENOUS; SUBCUTANEOUS at 05:01

## 2021-08-11 RX ADMIN — MEROPENEM 200 MILLIGRAM(S): 1 INJECTION INTRAVENOUS at 19:00

## 2021-08-11 RX ADMIN — Medication 1 ENEMA: at 05:01

## 2021-08-11 RX ADMIN — PIPERACILLIN AND TAZOBACTAM 200 GRAM(S): 4; .5 INJECTION, POWDER, LYOPHILIZED, FOR SOLUTION INTRAVENOUS at 05:02

## 2021-08-11 RX ADMIN — HEPARIN SODIUM 5000 UNIT(S): 5000 INJECTION INTRAVENOUS; SUBCUTANEOUS at 17:02

## 2021-08-11 RX ADMIN — Medication 62.5 MILLIMOLE(S): at 12:02

## 2021-08-11 RX ADMIN — ERTAPENEM SODIUM 120 MILLIGRAM(S): 1 INJECTION, POWDER, LYOPHILIZED, FOR SOLUTION INTRAMUSCULAR; INTRAVENOUS at 16:28

## 2021-08-11 NOTE — PROGRESS NOTE ADULT - SUBJECTIVE AND OBJECTIVE BOX
HD #4  Drain is not putting out significant amount of purulent fluid/material.    Did flex sig today.  At 10 cm there is a high grade partial obstruction.  Can seen small lumen.  The mucoosa is abnormal but not clearly a cancer.  Not able to traverse.    Biopsies were taken and sent to path.    She tolerated the procedure well.    She has high grade partial obstruction from ? process (cancer vs severe divertiular disease).  I think that resection of this process is unlikely, certainly at this time.  A laparoscopic exploration and diverting loop colostomy makes the most sense.    Patient is consdering.      Vital Signs Last 24 Hrs  T(C): 36.6 (11 Aug 2021 06:24), Max: 36.9 (10 Aug 2021 17:54)  T(F): 97.8 (11 Aug 2021 06:24), Max: 98.5 (10 Aug 2021 17:54)  HR: 78 (11 Aug 2021 09:35) (74 - 90)  BP: 141/63 (11 Aug 2021 09:35) (103/50 - 141/63)  BP(mean): 91 (11 Aug 2021 09:35) (72 - 91)  RR: 17 (11 Aug 2021 09:35) (17 - 18)  SpO2: 95% (11 Aug 2021 09:35) (94% - 97%)    abd: softly distended post sigmoidoscopy                          8.7    9.83  )-----------( 538      ( 11 Aug 2021 07:02 )             28.1   08-11    137  |  106  |  9   ----------------------------<  113<H>  4.0   |  24  |  0.79    Ca    7.4<L>      11 Aug 2021 07:02  Phos  2.0     08-11  Mg     2.1     08-11

## 2021-08-11 NOTE — CONSULT NOTE ADULT - ASSESSMENT
89 F with PMHx back pain, CAD, CEA, UTI presented with back pain and found to have high grade partial sigmoid obstruction with gluteal abscess. ID consulted based on wound cultures positive for E coli.    Based on her polymicrobial wound culture including anaerobes and ESBL E coli, gut translocation due to perforation or mucosal breakdown is likely. Strong gram negative and anaerobic coverage indicated at this time, especially if operative treatment is planned.    Would recommend:  - Please start meropenem 1000mg every 12 hours starting now  - Please obtain daily CBC with diff, BMP while on antibiotics inpatient  - Please obtain blood cultures if patient is febrile T>38.0 C  - management of sigmoid mass per primary team    ID Team 1 will continue to follow. Please see below attending attestation for finalized recommendations.    Austen Kruse MD PGY2 Internal Medicine  Infectious Disease Consult Service  [phone]

## 2021-08-11 NOTE — PROGRESS NOTE ADULT - ASSESSMENT
[facility]  is faxing over a prior authorization for the medication listed below for ongoing refills.   L gluteal drain with 140cc purulent/sanguinous output over last 24h (30cc day prior). Flushes easily with 2cc NS. Pt afebrile and asymptomatic, but WBC down to 9.83 from 13.63 8/10.  Continue to monitor output. IR will continue to follow.

## 2021-08-11 NOTE — PROGRESS NOTE ADULT - ASSESSMENT
89F with PMH of chronic back pain 2/2 L3,L4 compression fractures, triple vessel CAD and L CEA 2/2 L ICA stenosis >70% (4/21), recent ED visit on 6/28 for UTI p/w abdominal pain x 1 day, constant associated with weight loss and abdominal distention. CT a/p showing large necrotic mass arising from the sigmoid colon with posterior extension to the sacrum and caudal extension to the rectum and the uterus, findings also suspicious for complicated perforated distal sigmoid diverticulitis with irregular large abscess. 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 Creatinine at baseline. No CVAT or urinary sx's. NM Renal Scan yesterday w/ symmetric function but T1/2 of L kidney is 26 minutes consistent with obstruction. Would therefore favor either ureteral stent placement vs Nephroureteral tube.  Creatinine stable.     Recommendations:  - Please keep urology updated on surgical plans for pt. May perform intra-operative ureteral stent placement.  - Please record I/Os  - Rest of care per primary team  - No acute urologic intervention at this time

## 2021-08-11 NOTE — CONSULT NOTE ADULT - SUBJECTIVE AND OBJECTIVE BOX
89F PMHx of chronic back pain 2/2 L3,L4 compression fractures, triple vessel CAD on ASA, plavix, lipitor (04/21) and L CEA 2/2 L ICA stenosis >70% (4/21), recent ED visit on 6/28 for UTI p/w achy non-radiating back pain 5/10 in the L lumbar region. Presented to ED where she was found to be febrile to 100.5 with WBC 16.5.  CT A/P at that time revealed large necrotic mass in distal sigmoid colon with posterior extension to sacrum and caudal extension to uterus and rectum. Initial evaluation concerning for malignancy. Also notable for possible diverticulitis and presacral fluid collection extending into L gluteal region.  Initially given Zosyn 3.375 in ED then abx were discontinued.  Surgery consulted, c/f colorectal malignancy vs Gyne malignancy vs diverticulitis with abscess. Transferred to surgical tele, given Zosyn, IR consulted for drainage.  Gastroenterology consulted, agreed with IR drainage and recommended against colonoscopy given concern for perforation around the sigmoid mass into abdomen.  Colorectal surgery consulted, concerned for diverticulitis c/b gluteal abscess vs malignancy. Flex sig done 8/11 showing 10cm high grade partial obstruction 2/2 cancer or severe diverticular disease.  IR performed drainage of gluteal abscess 8/9, which resulted ESBL E coli. Infectious diseases consulted for evaluation and management of E coli, as well as ongoing management of infectious symptoms given plans for diverting loop colostomy planned for 8/13/21.    Culture - Abscess with Gram Stain (08.09.21 @ 20:17)    -  Ampicillin: R >16     -  Ampicillin/Sulbactam: R 8/4     -  Cefazolin: R >16     -  Ceftriaxone: R >32     -  Ciprofloxacin: R >2    -  Ertapenem: S <=0.5    -  Gentamicin: S <=2    -  Meropenem: S <=1    -  Piperacillin/Tazobactam: R <=8    -  Tobramycin: S <=2    -  Trimethoprim/Sulfamethoxazole: S <=0.5/9.5    Specimen Source: .Abscess Left Gluteal Collection  Rare Escherichia coli ESBL  Mixed Anaerobic Milagro including  Few Actinomyces odontolyticus  Moderate Bacteroides thetaiotamcron group ... Beta lactamase positive  Moderate Bacteroides vulgatus group ... Beta lactamase positive      INTERVAL HPI/OVERNIGHT EVENTS:  Patient was seen and examined at bedside. As per nurse and patient, no o/n events, patient resting comfortably. Patient reporting of residual back pain, slightly poor appetite. Patient denies: fever, chills, lightheadedness, weakness, CP, palpitations, SOB, cough, N/V. ROS otherwise negative.    VITAL SIGNS:  T(F): 97.9 (08-11-21 @ 13:46)  HR: 76 (08-11-21 @ 16:30)  BP: 125/59 (08-11-21 @ 16:30)  RR: 17 (08-11-21 @ 16:30)  SpO2: 97% (08-11-21 @ 16:30)  Wt(kg): --      08-10-21 @ 07:01  -  08-11-21 @ 07:00  --------------------------------------------------------  IN: 2100 mL / OUT: 1420 mL / NET: 680 mL    08-11-21 @ 07:01  -  08-11-21 @ 18:30  --------------------------------------------------------  IN: 620 mL / OUT: 515 mL / NET: 105 mL        PHYSICAL EXAM:    Constitutional: cachectic elderly woman lying in bed watching television, interactive  HEENT: bitemporal wasting, PER, anicteric sclera, no nasal discharge; MMM  Respiratory: Slightly reduced breath sounds in R base; no W/R/R, no retractions  Cardiac: distant heart sounds; RRR; no M/R/G  Gastrointestinal: soft, moderately distended abdomen with band-like area of tenderness spanning L, R, and central abdomen around level of umbilicus; no rebound or guarding  Extremities: WWP, no clubbing or cyanosis; no peripheral edema  Vascular: 2+ radial, DP/PT pulses B/L  Dermatologic: skin warm, dry and intact; no rashes, wounds, or scars  Neurologic: marked hearing impairment despite hearing aid    MEDICATIONS  (STANDING):  atorvastatin 80 milliGRAM(s) Oral at bedtime  heparin   Injectable 5000 Unit(s) SubCutaneous every 12 hours  lactated ringers 1000 milliLiter(s) (80 mL/Hr) IV Continuous <Continuous>  metoprolol tartrate 12.5 milliGRAM(s) Oral daily    MEDICATIONS  (PRN):      Allergies    No Known Allergies    Intolerances        LABS:                        8.7    9.83  )-----------( 538      ( 11 Aug 2021 07:02 )             28.1     08-11    137  |  106  |  9   ----------------------------<  113<H>  4.0   |  24  |  0.79    Ca    7.4<L>      11 Aug 2021 07:02  Phos  2.0     08-11  Mg     2.1     08-11            RADIOLOGY & ADDITIONAL TESTS:  Reviewed

## 2021-08-11 NOTE — PROGRESS NOTE ADULT - SUBJECTIVE AND OBJECTIVE BOX
SUBJECTIVE: THAD. NOMI. NM Scan done yesterday.    heparin   Injectable 5000 Unit(s) SubCutaneous every 12 hours  metoprolol tartrate 12.5 milliGRAM(s) Oral daily  piperacillin/tazobactam IVPB.. 3.375 Gram(s) IV Intermittent every 6 hours      Vital Signs Last 24 Hrs  T(C): 36.6 (11 Aug 2021 06:24), Max: 36.9 (10 Aug 2021 17:54)  T(F): 97.8 (11 Aug 2021 06:24), Max: 98.5 (10 Aug 2021 17:54)  HR: 76 (11 Aug 2021 11:47) (74 - 78)  BP: 129/62 (11 Aug 2021 11:47) (103/50 - 141/63)  BP(mean): 89 (11 Aug 2021 11:47) (72 - 91)  RR: 17 (11 Aug 2021 11:47) (17 - 18)  SpO2: 96% (11 Aug 2021 11:47) (94% - 96%)  I&O's Detail    10 Aug 2021 07:01  -  11 Aug 2021 07:00  --------------------------------------------------------  IN:    IV PiggyBack: 400 mL    Lactated Ringers w/ Additives: 1600 mL    Oral Fluid: 100 mL  Total IN: 2100 mL    OUT:    Bulb (mL): 140 mL    Voided (mL): 1280 mL  Total OUT: 1420 mL    Total NET: 680 mL      11 Aug 2021 07:01  -  11 Aug 2021 13:00  --------------------------------------------------------  IN:    IV PiggyBack: 187.5 mL    Lactated Ringers w/ Additives: 240 mL  Total IN: 427.5 mL    OUT:    Bulb (mL): 35 mL  Total OUT: 35 mL    Total NET: 392.5 mL          Physical Exam:  General: No acute distress, resting comfortably in bed  Pulm: Nonlabored breathing, no respiratory distress  Abd: soft, non-tender, non-distended      LABS:                        8.7    9.83  )-----------( 538      ( 11 Aug 2021 07:02 )             28.1     08-11    137  |  106  |  9   ----------------------------<  113<H>  4.0   |  24  |  0.79    Ca    7.4<L>      11 Aug 2021 07:02  Phos  2.0     08-11  Mg     2.1     08-11            RADIOLOGY & ADDITIONAL STUDIES:

## 2021-08-11 NOTE — PROGRESS NOTE ADULT - SUBJECTIVE AND OBJECTIVE BOX
INTERVAL HPI/OVERNIGHT EVENTS: no acute overnight events, enema x2, covid swab pending    SUBJECTIVE:  Patient is reporting mild abdominal pain this morning, denies any chest pain or shortness of breath. +BM and flatus. Making appropriate urine.     MEDICATIONS  (STANDING):  atorvastatin 80 milliGRAM(s) Oral at bedtime  heparin   Injectable 5000 Unit(s) SubCutaneous every 12 hours  lactated ringers 1000 milliLiter(s) (80 mL/Hr) IV Continuous <Continuous>  metoprolol tartrate 12.5 milliGRAM(s) Oral daily  piperacillin/tazobactam IVPB.. 3.375 Gram(s) IV Intermittent every 6 hours  saline laxative (FLEET) Rectal Enema 1 Enema Rectal at bedtime    MEDICATIONS  (PRN):      Vital Signs Last 24 Hrs  T(C): 36.6 (11 Aug 2021 06:24), Max: 36.9 (10 Aug 2021 17:54)  T(F): 97.8 (11 Aug 2021 06:24), Max: 98.5 (10 Aug 2021 17:54)  HR: 74 (11 Aug 2021 06:24) (74 - 90)  BP: 103/50 (11 Aug 2021 06:24) (103/50 - 135/63)  BP(mean): 72 (11 Aug 2021 03:55) (72 - 90)  RR: 17 (11 Aug 2021 06:24) (17 - 18)  SpO2: 94% (11 Aug 2021 06:24) (94% - 97%)    PHYSICAL EXAM:  Constitutional: A&Ox3, AVSS    Respiratory: non labored breathing, no respiratory distress    Cardiovascular: NSR, RRR    Gastrointestinal: Abdomen is soft, TTP, slightly distended    Extremities: (-) edema        I&O's Detail    10 Aug 2021 07:01  -  11 Aug 2021 07:00  --------------------------------------------------------  IN:    IV PiggyBack: 400 mL    Lactated Ringers w/ Additives: 1600 mL    Oral Fluid: 100 mL  Total IN: 2100 mL    OUT:    Bulb (mL): 140 mL    Voided (mL): 1280 mL  Total OUT: 1420 mL    Total NET: 680 mL          LABS:                        8.7    9.83  )-----------( 538      ( 11 Aug 2021 07:02 )             28.1     08-11    137  |  106  |  9   ----------------------------<  113<H>  4.0   |  24  |  0.79    Ca    7.4<L>      11 Aug 2021 07:02  Phos  2.0     08-11  Mg     2.1     08-11            RADIOLOGY & ADDITIONAL STUDIES:

## 2021-08-11 NOTE — PROGRESS NOTE ADULT - ASSESSMENT
89F PMH HTN, chronic back pain 2/2 L3,L4 compression fractures, triple vessel CAD, and SBO 2/2 possible diverticulitis vs. GYN/colorectal malignancy (2018) never followed up and PSH L CEA 2/2 L ICA stenosis >70% (4/21) who presented with worsening back pain x1 week. Denies abdominal pain, endorses flatus and some recent weightloss. Transferred from medicine for perforated, complicated sigmoid diverticulitis vs. 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     CLD and clear ensures/IVF   Pain/nausea control PRN   metoprolol  Zosyn (8/8-)  F/u GI, Vascular surgery, Urology, Heme/onc, IR recs   AM Labs   Sig scope 8/11

## 2021-08-11 NOTE — PROGRESS NOTE ADULT - ASSESSMENT
A/P  As above.  Follow exam  Possible laparoscopy and loop stoma formation Friday this week.  When post scope distension passes can go back to liquid diet po.      OR been asked for operative time.

## 2021-08-12 ENCOUNTER — TRANSCRIPTION ENCOUNTER (OUTPATIENT)
Age: 86
End: 2021-08-12

## 2021-08-12 LAB
ANION GAP SERPL CALC-SCNC: 6 MMOL/L — SIGNIFICANT CHANGE UP (ref 5–17)
BUN SERPL-MCNC: 6 MG/DL — LOW (ref 7–23)
CALCIUM SERPL-MCNC: 7.8 MG/DL — LOW (ref 8.4–10.5)
CHLORIDE SERPL-SCNC: 104 MMOL/L — SIGNIFICANT CHANGE UP (ref 96–108)
CO2 SERPL-SCNC: 23 MMOL/L — SIGNIFICANT CHANGE UP (ref 22–31)
CREAT SERPL-MCNC: 0.64 MG/DL — SIGNIFICANT CHANGE UP (ref 0.5–1.3)
GLUCOSE SERPL-MCNC: 93 MG/DL — SIGNIFICANT CHANGE UP (ref 70–99)
HCT VFR BLD CALC: 29.6 % — LOW (ref 34.5–45)
HGB BLD-MCNC: 9.3 G/DL — LOW (ref 11.5–15.5)
MAGNESIUM SERPL-MCNC: 2 MG/DL — SIGNIFICANT CHANGE UP (ref 1.6–2.6)
MCHC RBC-ENTMCNC: 27.9 PG — SIGNIFICANT CHANGE UP (ref 27–34)
MCHC RBC-ENTMCNC: 31.4 GM/DL — LOW (ref 32–36)
MCV RBC AUTO: 88.9 FL — SIGNIFICANT CHANGE UP (ref 80–100)
NRBC # BLD: 0 /100 WBCS — SIGNIFICANT CHANGE UP (ref 0–0)
PHOSPHATE SERPL-MCNC: 2.7 MG/DL — SIGNIFICANT CHANGE UP (ref 2.5–4.5)
PLATELET # BLD AUTO: 517 K/UL — HIGH (ref 150–400)
POTASSIUM SERPL-MCNC: 4.1 MMOL/L — SIGNIFICANT CHANGE UP (ref 3.5–5.3)
POTASSIUM SERPL-SCNC: 4.1 MMOL/L — SIGNIFICANT CHANGE UP (ref 3.5–5.3)
RBC # BLD: 3.33 M/UL — LOW (ref 3.8–5.2)
RBC # FLD: 15.9 % — HIGH (ref 10.3–14.5)
SARS-COV-2 RNA SPEC QL NAA+PROBE: SIGNIFICANT CHANGE UP
SODIUM SERPL-SCNC: 133 MMOL/L — LOW (ref 135–145)
WBC # BLD: 8.73 K/UL — SIGNIFICANT CHANGE UP (ref 3.8–10.5)
WBC # FLD AUTO: 8.73 K/UL — SIGNIFICANT CHANGE UP (ref 3.8–10.5)

## 2021-08-12 PROCEDURE — 99232 SBSQ HOSP IP/OBS MODERATE 35: CPT | Mod: GC

## 2021-08-12 PROCEDURE — 99231 SBSQ HOSP IP/OBS SF/LOW 25: CPT | Mod: 57

## 2021-08-12 RX ORDER — POTASSIUM PHOSPHATE, MONOBASIC POTASSIUM PHOSPHATE, DIBASIC 236; 224 MG/ML; MG/ML
15 INJECTION, SOLUTION INTRAVENOUS ONCE
Refills: 0 | Status: DISCONTINUED | OUTPATIENT
Start: 2021-08-12 | End: 2021-08-12

## 2021-08-12 RX ADMIN — ATORVASTATIN CALCIUM 80 MILLIGRAM(S): 80 TABLET, FILM COATED ORAL at 21:39

## 2021-08-12 RX ADMIN — HEPARIN SODIUM 5000 UNIT(S): 5000 INJECTION INTRAVENOUS; SUBCUTANEOUS at 05:49

## 2021-08-12 RX ADMIN — Medication 12.5 MILLIGRAM(S): at 05:53

## 2021-08-12 RX ADMIN — HEPARIN SODIUM 5000 UNIT(S): 5000 INJECTION INTRAVENOUS; SUBCUTANEOUS at 17:08

## 2021-08-12 RX ADMIN — Medication 62.5 MILLIMOLE(S): at 14:14

## 2021-08-12 RX ADMIN — MEROPENEM 200 MILLIGRAM(S): 1 INJECTION INTRAVENOUS at 05:49

## 2021-08-12 RX ADMIN — MEROPENEM 200 MILLIGRAM(S): 1 INJECTION INTRAVENOUS at 17:08

## 2021-08-12 NOTE — PROGRESS NOTE ADULT - ASSESSMENT
89F PMH HTN, chronic back pain 2/2 L3,L4 compression fractures, triple vessel CAD, and SBO 2/2 possible diverticulitis vs. GYN/colorectal malignancy (2018) never followed up and PSH L CEA 2/2 L ICA stenosis >70% (4/21) who presented with worsening back pain x1 week. Denies abdominal pain, endorses flatus and some recent weightloss. Transferred from medicine for perforated, complicated sigmoid diverticulitis vs. 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     CLD and clear ensures/IVF   Pain/nausea control PRN   metoprolol  Zosyn (8/8-8/11) Meropenem (8/11-)  F/u GI, Vascular surgery, Urology, Heme/onc, IR recs   AM Labs   f/u pathology  preop and plan for diversion (8/13)

## 2021-08-12 NOTE — PROGRESS NOTE ADULT - ASSESSMENT
89 F with PMHx back pain, CAD, CEA, UTI presented with back pain and found to have high grade partial sigmoid obstruction with gluteal abscess. ID consulted based on wound cultures positive for E coli.    Based on her polymicrobial wound culture including anaerobes and ESBL E coli, gut translocation due to perforation or mucosal breakdown is likely. Strong gram negative and anaerobic coverage indicated at this time, especially if operative treatment is planned.    Would recommend:  - Please continue meropenem 1000mg every 12 hours (8/11 - )  - Please obtain daily CBC with diff, BMP while on antibiotics inpatient  - Please obtain blood cultures if patient is febrile T>38.0 C  - management of sigmoid mass per primary team    ID Team 1 will continue to follow. Please see below attending attestation for finalized recommendations.    Austen Kruse MD PGY2 Internal Medicine  Infectious Disease Consult Service

## 2021-08-12 NOTE — PROGRESS NOTE ADULT - ASSESSMENT
89F with PMH of chronic back pain 2/2 L3,L4 compression fractures, triple vessel CAD and L CEA 2/2 L ICA stenosis >70% (4/21), recent ED visit on 6/28 for UTI p/w abdominal pain x 1 day, constant associated with weight loss and abdominal distention. CT a/p showing large necrotic mass arising from the sigmoid colon with posterior extension to the sacrum and caudal extension to the rectum and the uterus, findings also suspicious for complicated perforated distal sigmoid diverticulitis with irregular large abscess. 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 Creatinine at baseline. No CVAT or urinary sx's. NM Renal Scan yesterday w/ symmetric function but T1/2 of L kidney is 26 minutes consistent with obstruction.    Recommendations:  - Urology to place intra-operative stent on L side during surgery tomorrow. OR and surgery team informed.  - Please record I/Os  - Rest of care per primary team  - No acute urologic intervention at this time

## 2021-08-12 NOTE — PROGRESS NOTE ADULT - SUBJECTIVE AND OBJECTIVE BOX
SUBJECTIVE: NAEON. AVSS. denies any urinary sx's,. good UOP.    heparin   Injectable 5000 Unit(s) SubCutaneous every 12 hours  meropenem  IVPB 1000 milliGRAM(s) IV Intermittent every 12 hours  metoprolol tartrate 12.5 milliGRAM(s) Oral daily      Vital Signs Last 24 Hrs  T(C): 36.7 (12 Aug 2021 09:25), Max: 36.7 (12 Aug 2021 00:31)  T(F): 98.1 (12 Aug 2021 09:25), Max: 98.1 (12 Aug 2021 08:18)  HR: 68 (12 Aug 2021 08:18) (68 - 78)  BP: 152/69 (12 Aug 2021 08:18) (125/59 - 156/71)  BP(mean): 99 (12 Aug 2021 08:18) (83 - 102)  RR: 18 (12 Aug 2021 08:18) (17 - 18)  SpO2: 96% (12 Aug 2021 08:18) (96% - 97%)  I&O's Detail    11 Aug 2021 07:01  -  12 Aug 2021 07:00  --------------------------------------------------------  IN:    IV PiggyBack: 300 mL    Lactated Ringers w/ Additives: 1360 mL  Total IN: 1660 mL    OUT:    Bulb (mL): 170 mL    Voided (mL): 1115 mL  Total OUT: 1285 mL    Total NET: 375 mL      12 Aug 2021 07:01  -  12 Aug 2021 11:40  --------------------------------------------------------  IN:  Total IN: 0 mL    OUT:    Bulb (mL): 60 mL    Voided (mL): 300 mL  Total OUT: 360 mL    Total NET: -360 mL          Physical Exam:  General: No acute distress, resting comfortably in bed  Pulm: Nonlabored breathing, no respiratory distress  Abd: soft, non-tender, non-distended. Drain w/ SS output  : no cvat      LABS:                        9.3    8.73  )-----------( 517      ( 12 Aug 2021 07:01 )             29.6     08-12    133<L>  |  104  |  6<L>  ----------------------------<  93  4.1   |  23  |  0.64    Ca    7.8<L>      12 Aug 2021 07:01  Phos  2.7     08-12  Mg     2.0     08-12            RADIOLOGY & ADDITIONAL STUDIES:

## 2021-08-12 NOTE — CHART NOTE - NSCHARTNOTEFT_GEN_A_CORE
Admitting Diagnosis:   Patient is a 89y old  Female who presents with a chief complaint of Neck pain (12 Aug 2021 10:05)      PAST MEDICAL & SURGICAL HISTORY:  SBO (small bowel obstruction)    HTN (hypertension)    Chronic back pain    S/P wrist surgery        Current Nutrition Order:  Clear Liquids  NPO after MN for OR    PO Intake: Good (%) [   ]  Fair (50-75%) [   ] Poor (<25%) [  x ]  observed pt consuming EnsureClears this am    GI Issues:   Denies N/V  last BM 8/10; non-bloody  Currently w/ partial obstruction- Planned for OR tomorrow for diverting colostomy    Pain:  No pain reported  Ordered for tylenol    Skin Integrity:  intact pressure wise  ankush score 15    Labs:   08-12    133<L>  |  104  |  6<L>  ----------------------------<  93  4.1   |  23  |  0.64    Ca    7.8<L>      12 Aug 2021 07:01  Phos  2.7     08-12  Mg     2.0     08-12      CAPILLARY BLOOD GLUCOSE          Medications:  MEDICATIONS  (STANDING):  atorvastatin 80 milliGRAM(s) Oral at bedtime  heparin   Injectable 5000 Unit(s) SubCutaneous every 12 hours  lactated ringers 1000 milliLiter(s) (80 mL/Hr) IV Continuous <Continuous>  meropenem  IVPB 1000 milliGRAM(s) IV Intermittent every 12 hours  metoprolol tartrate 12.5 milliGRAM(s) Oral daily  sodium phosphate IVPB 15 milliMole(s) IV Intermittent once    MEDICATIONS  (PRN):  acetaminophen  IVPB .. 700 milliGRAM(s) IV Intermittent once PRN Mild Pain (1 - 3), Moderate Pain (4 - 6), Severe Pain (7 - 10)    Admitted Anthropometrics:  Height: 5'0" Weight: 90lbs, IBW 100lbs+/-10%, %IBW 90%, BMI 17.6     Weight Change:    UBW to be 110 lbs, current adm wt 90 lbs, indicates wt loss of 20 lbs/18%. Pt reports wt loss over 1 year d/t poor appetite and decrease PO intake. Reports living alone, and normally recieves food from meal assistance programs.    Estimated energy needs:   ABW (40.8kg) used to calculate energy needs due to pt's current body weight within % IBW (90%).   Nutrient needs based on [facility] standards of care for maintenance in older adults.   Needs adjusted for age, pre-op/post-op healing, severe protein calorie malnutrition  1020-1224kcal/day (25-30kcal/kg)  61-69g pro/day (1.5-1.7g pro/kg)  1224-1428ml fluid/day (30-35ml/kg]    Subjective:   89F PMH HTN, chronic back pain 2/2 L3,L4 compression fractures, triple vessel CAD, and SBO 2/2 possible diverticulitis vs. GYN/colorectal malignancy (2018) never followed up and PSH L CEA 2/2 L ICA stenosis >70% (4/21) who presented with worsening back pain x1 week. Denies abdominal pain, endorses flatus and some recent weightloss. Transferred from medicine for perforated, complicated sigmoid diverticulitis vs. 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 pre-op for diverting ostomy.     Pt seen in room, resting in bed. [place], hears better in her right ear. Currently on a CLD and will be NPO after MN 2/2 planned for diverting colostomy tomorrow. Observed pt drinking EnsureClear w/ good tolerance. Was requesting vanilla (explained that once diet advances to fulls shell be able to have vanilla Ensures). Denies N/V, last BM 8/10 (non-bloody). Weight loss and malnutrition noted from intiial assessment. Discussed likely diet advancement post-op. Pt receptive and appreciative of information provided. Notable labs: Na 133, BUN 6. RD to follow.    Previous Nutrition Diagnosis:  Severe protein calorie malnutrition as evidenced by NFPE performed, noted severe fat and muscle wasting, prolonged poor PO intake <75% of EER for 1 year.  Active [  x ]  Resolved [   ]    If resolved, new PES:     Goal:  Pt to consistently meet % of estimated needs and avoid further s/s malnutrition    Recommendations:  1. Once feasible post-op, recommend trialing clears and advancing to fulls > low fiber w/ EnsureEnlive TID (1050kcal, 60g pro)  2. MVI daily  3. Pain and bowel reg per team  4. BMP, BG Q6hrs, CBC per MD discretion    Education:   supplement options while on clears. likely diet adv. process    Risk Level: High [ x  ] Moderate [   ] Low [   ]

## 2021-08-12 NOTE — PROGRESS NOTE ADULT - SUBJECTIVE AND OBJECTIVE BOX
INTERVAL HPI/OVERNIGHT EVENTS:  Patient was seen and examined at bedside. As per nurse and patient, no o/n events, patient resting comfortably. No complaints at this time. Patient denies: fever, chills, lightheadedness, weakness, CP, palpitations, SOB, cough, N/V. ROS otherwise negative.    VITAL SIGNS:  T(F): 97.3 (08-12-21 @ 18:00)  HR: 78 (08-12-21 @ 15:49)  BP: 142/69 (08-12-21 @ 15:49)  RR: 17 (08-12-21 @ 15:49)  SpO2: 97% (08-12-21 @ 15:49)  Wt(kg): --      08-11-21 @ 07:01  -  08-12-21 @ 07:00  --------------------------------------------------------  IN: 1660 mL / OUT: 1285 mL / NET: 375 mL    08-12-21 @ 07:01  -  08-12-21 @ 19:53  --------------------------------------------------------  IN: 750 mL / OUT: 830 mL / NET: -80 mL        PHYSICAL EXAM:    Constitutional: resting comfortably in bed; NAD  HEENT: NC/AT, PER, anicteric sclera, no nasal discharge; MMM  Respiratory: CTA B/L; no W/R/R, no retractions  Cardiac: +S1/S2; RRR; no M/R/G  Gastrointestinal: soft, distension unchanged, slightly less tender than previous exam  Extremities: WWP, no clubbing or cyanosis; no peripheral edema  Vascular: 2+ radial, DP/PT pulses B/L  Dermatologic: skin warm, dry and intact; no rashes, wounds, or scars  Neurologic: still hearing impaired, no dysarthria, feeding herself jello independently    MEDICATIONS  (STANDING):  atorvastatin 80 milliGRAM(s) Oral at bedtime  heparin   Injectable 5000 Unit(s) SubCutaneous every 12 hours  lactated ringers 1000 milliLiter(s) (80 mL/Hr) IV Continuous <Continuous>  meropenem  IVPB 1000 milliGRAM(s) IV Intermittent every 12 hours  metoprolol tartrate 12.5 milliGRAM(s) Oral daily    MEDICATIONS  (PRN):  acetaminophen  IVPB .. 700 milliGRAM(s) IV Intermittent once PRN Mild Pain (1 - 3), Moderate Pain (4 - 6), Severe Pain (7 - 10)      Allergies    No Known Allergies    Intolerances        LABS:                        9.3    8.73  )-----------( 517      ( 12 Aug 2021 07:01 )             29.6     08-12    133<L>  |  104  |  6<L>  ----------------------------<  93  4.1   |  23  |  0.64    Ca    7.8<L>      12 Aug 2021 07:01  Phos  2.7     08-12  Mg     2.0     08-12            RADIOLOGY & ADDITIONAL TESTS:  Reviewed

## 2021-08-12 NOTE — PROGRESS NOTE ADULT - ASSESSMENT
L gluteal drain with 170cc serosanguinous output over last 24h (140cc day prior). Flushes easily with 2cc NS. Pt afebrile and asymptomatic, WBC wnl.  Continue to monitor output. IR will continue to follow.

## 2021-08-12 NOTE — PROGRESS NOTE ADULT - ASSESSMENT
A/P Plan is for exploratory laparoscopy and stoma formation tomorrow.  The patient and I have discussed her situation and I have recommended an exploratory laparoscopy, possible biopsies in pelvis and diverting loop colostomy because the patient is in danger of developing a complete obstruction.  Risks and benefits discussed.    Patient understands that we do not know what the cause of her pelvic mass/perforation/chronic absecss is but that we are not planning on attempting to resect the colon/rectal involved in this process at the operation planned for tomorrow.  It is not clear to me that this problem can be fully resected.   This process has been ongoing x several years at least.    The goal is to divert her fecal stream so that she can eat freely.  This diversion will also cut off the fecal stream into the pelvic process.    L hydroureter noted on CT.  Renal scan documented increased T 1/2.   team (Dr. Kitchen and Maykel Clements) are planning on doing cystoscopy and placement of L ureteral stent tomorrow.    Path biopsies pending    Patient is malnourished and has low BM.  Albumin 2.9.  Getting ensure, will add supplements.   NO TPN planned.  Gut can be used.      Medicine has assessed patient   Cardiology has assessed the patient (CAD with stents) and has assigned her an intermediate risk for surgery.    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  CORDELL with moderate output of seropurulent fluid (not feculent or grossly purulent)

## 2021-08-12 NOTE — PROGRESS NOTE ADULT - SUBJECTIVE AND OBJECTIVE BOX
INTERVAL HPI/OVERNIGHT EVENTS: no acute overnight events, started on meropenem per ID     STATUS POST: c-scope with biopsy of mass    POST OPERATIVE DAY #: 1    SUBJECTIVE:  Patient is doing well this morning, reports +f and a small bm. She is tolerating a clear diet, denies any nausea or vomiting. Denies any chest pain or shortness of breath, pain is well controlled.     MEDICATIONS  (STANDING):  atorvastatin 80 milliGRAM(s) Oral at bedtime  heparin   Injectable 5000 Unit(s) SubCutaneous every 12 hours  lactated ringers 1000 milliLiter(s) (80 mL/Hr) IV Continuous <Continuous>  meropenem  IVPB 1000 milliGRAM(s) IV Intermittent every 12 hours  metoprolol tartrate 12.5 milliGRAM(s) Oral daily    MEDICATIONS  (PRN):  acetaminophen  IVPB .. 700 milliGRAM(s) IV Intermittent once PRN Mild Pain (1 - 3), Moderate Pain (4 - 6), Severe Pain (7 - 10)      Vital Signs Last 24 Hrs  T(C): 36.3 (12 Aug 2021 04:45), Max: 36.7 (12 Aug 2021 00:31)  T(F): 97.4 (12 Aug 2021 04:45), Max: 98 (12 Aug 2021 00:31)  HR: 76 (12 Aug 2021 04:16) (76 - 78)  BP: 156/71 (12 Aug 2021 04:16) (125/59 - 156/71)  BP(mean): 102 (12 Aug 2021 04:16) (83 - 102)  RR: 18 (12 Aug 2021 04:16) (17 - 18)  SpO2: 96% (12 Aug 2021 04:16) (95% - 97%)    PHYSICAL EXAM:  Constitutional: A&Ox3, AVSS    Respiratory: non labored breathing, no respiratory distress    Cardiovascular: NSR, RRR    Gastrointestinal: abdomen is soft, non-tender, slightly distended.                  Incision:    Extremities: (-) edema      I&O's Detail    11 Aug 2021 07:01  -  12 Aug 2021 07:00  --------------------------------------------------------  IN:    IV PiggyBack: 300 mL    Lactated Ringers w/ Additives: 1360 mL  Total IN: 1660 mL    OUT:    Bulb (mL): 170 mL    Voided (mL): 1115 mL  Total OUT: 1285 mL    Total NET: 375 mL          LABS:                        9.3    8.73  )-----------( 517      ( 12 Aug 2021 07:01 )             29.6     08-12    133<L>  |  104  |  6<L>  ----------------------------<  93  4.1   |  23  |  0.64    Ca    7.8<L>      12 Aug 2021 07:01  Phos  2.7     08-12  Mg     2.0     08-12            RADIOLOGY & ADDITIONAL STUDIES:

## 2021-08-12 NOTE — PROGRESS NOTE ADULT - SUBJECTIVE AND OBJECTIVE BOX
HD # 5    Flex sig yesterday revealed very tight rectal stenosis that could not be traversed with gastroscope.  Biopsies taken of friable tissue (? granulation and chronic inflammatory vs cancer). Path still pending.  She is passing gas and some stool as well. Been tolerating po intake minus N/V.  Tolerating po when it is given to her.    Other issues: CAD. Carotid stenosis, Lumbar compression fractures, malnourished. decreased hearing, L hydroureter    Vital Signs Last 24 Hrs  T(C): 36.8 (12 Aug 2021 14:28), Max: 36.8 (12 Aug 2021 14:28)  T(F): 98.2 (12 Aug 2021 14:28), Max: 98.2 (12 Aug 2021 14:28)  HR: 78 (12 Aug 2021 15:49) (68 - 78)  BP: 142/69 (12 Aug 2021 15:49) (128/58 - 156/71)  BP(mean): 91 (12 Aug 2021 12:02) (83 - 102)  RR: 17 (12 Aug 2021 15:49) (17 - 18)  SpO2: 97% (12 Aug 2021 15:49) (96% - 97%)    abd: LLQ mild tenderness, no peritoneal signs  drain in place  ext: without calf tenderness     ml/this current shift  drain: 130 ml /shift                          9.3    8.73  )-----------( 517      ( 12 Aug 2021 07:01 )             29.6   08-12    133<L>  |  104  |  6<L>  ----------------------------<  93  4.1   |  23  |  0.64    Ca    7.8<L>      12 Aug 2021 07:01  Phos  2.7     08-12  Mg     2.0     08-12

## 2021-08-13 ENCOUNTER — RESULT REVIEW (OUTPATIENT)
Age: 86
End: 2021-08-13

## 2021-08-13 ENCOUNTER — APPOINTMENT (OUTPATIENT)
Dept: COLORECTAL SURGERY | Facility: HOSPITAL | Age: 86
End: 2021-08-13

## 2021-08-13 LAB
ANION GAP SERPL CALC-SCNC: 5 MMOL/L — SIGNIFICANT CHANGE UP (ref 5–17)
APTT BLD: 30.6 SEC — SIGNIFICANT CHANGE UP (ref 27.5–35.5)
BASE EXCESS BLDA CALC-SCNC: -2.8 MMOL/L — LOW (ref -2–3)
BASOPHILS # BLD AUTO: 0.03 K/UL — SIGNIFICANT CHANGE UP (ref 0–0.2)
BASOPHILS NFR BLD AUTO: 0.4 % — SIGNIFICANT CHANGE UP (ref 0–2)
BLD GP AB SCN SERPL QL: NEGATIVE — SIGNIFICANT CHANGE UP
BUN SERPL-MCNC: 5 MG/DL — LOW (ref 7–23)
CA-I BLDA-SCNC: 1.1 MMOL/L — LOW (ref 1.15–1.33)
CALCIUM SERPL-MCNC: 7.8 MG/DL — LOW (ref 8.4–10.5)
CHLORIDE SERPL-SCNC: 106 MMOL/L — SIGNIFICANT CHANGE UP (ref 96–108)
CO2 BLDA-SCNC: 23 MMOL/L — SIGNIFICANT CHANGE UP (ref 19–24)
CO2 SERPL-SCNC: 25 MMOL/L — SIGNIFICANT CHANGE UP (ref 22–31)
COHGB MFR BLDA: 0.8 % — SIGNIFICANT CHANGE UP
CREAT SERPL-MCNC: 0.58 MG/DL — SIGNIFICANT CHANGE UP (ref 0.5–1.3)
CULTURE RESULTS: SIGNIFICANT CHANGE UP
CULTURE RESULTS: SIGNIFICANT CHANGE UP
EOSINOPHIL # BLD AUTO: 0.62 K/UL — HIGH (ref 0–0.5)
EOSINOPHIL NFR BLD AUTO: 8 % — HIGH (ref 0–6)
GLUCOSE BLDC GLUCOMTR-MCNC: 102 MG/DL — HIGH (ref 70–99)
GLUCOSE SERPL-MCNC: 87 MG/DL — SIGNIFICANT CHANGE UP (ref 70–99)
HCO3 BLDA-SCNC: 22 MMOL/L — SIGNIFICANT CHANGE UP (ref 21–28)
HCT VFR BLD CALC: 30.7 % — LOW (ref 34.5–45)
HGB BLD-MCNC: 9.4 G/DL — LOW (ref 11.5–15.5)
HGB BLDA-MCNC: 8.7 G/DL — LOW (ref 11.7–16.1)
IMM GRANULOCYTES NFR BLD AUTO: 0.6 % — SIGNIFICANT CHANGE UP (ref 0–1.5)
INR BLD: 0.97 — SIGNIFICANT CHANGE UP (ref 0.88–1.16)
LYMPHOCYTES # BLD AUTO: 2.13 K/UL — SIGNIFICANT CHANGE UP (ref 1–3.3)
LYMPHOCYTES # BLD AUTO: 27.3 % — SIGNIFICANT CHANGE UP (ref 13–44)
MAGNESIUM SERPL-MCNC: 2.1 MG/DL — SIGNIFICANT CHANGE UP (ref 1.6–2.6)
MCHC RBC-ENTMCNC: 27.3 PG — SIGNIFICANT CHANGE UP (ref 27–34)
MCHC RBC-ENTMCNC: 30.6 GM/DL — LOW (ref 32–36)
MCV RBC AUTO: 89.2 FL — SIGNIFICANT CHANGE UP (ref 80–100)
METHGB MFR BLDA: 3.3 % — HIGH
MONOCYTES # BLD AUTO: 0.46 K/UL — SIGNIFICANT CHANGE UP (ref 0–0.9)
MONOCYTES NFR BLD AUTO: 5.9 % — SIGNIFICANT CHANGE UP (ref 2–14)
NEUTROPHILS # BLD AUTO: 4.5 K/UL — SIGNIFICANT CHANGE UP (ref 1.8–7.4)
NEUTROPHILS NFR BLD AUTO: 57.8 % — SIGNIFICANT CHANGE UP (ref 43–77)
NRBC # BLD: 0 /100 WBCS — SIGNIFICANT CHANGE UP (ref 0–0)
OXYHGB MFR BLDA: 94.8 % — SIGNIFICANT CHANGE UP (ref 90–95)
PCO2 BLDA: 36 MMHG — HIGH (ref 32–35)
PH BLDA: 7.39 — SIGNIFICANT CHANGE UP (ref 7.35–7.45)
PHOSPHATE SERPL-MCNC: 2.6 MG/DL — SIGNIFICANT CHANGE UP (ref 2.5–4.5)
PLATELET # BLD AUTO: 617 K/UL — HIGH (ref 150–400)
PO2 BLDA: 281 MMHG — HIGH (ref 83–108)
POTASSIUM BLDA-SCNC: 3.4 MMOL/L — LOW (ref 3.5–5.1)
POTASSIUM SERPL-MCNC: 4 MMOL/L — SIGNIFICANT CHANGE UP (ref 3.5–5.3)
POTASSIUM SERPL-SCNC: 4 MMOL/L — SIGNIFICANT CHANGE UP (ref 3.5–5.3)
PROTHROM AB SERPL-ACNC: 11.6 SEC — SIGNIFICANT CHANGE UP (ref 10.6–13.6)
RBC # BLD: 3.44 M/UL — LOW (ref 3.8–5.2)
RBC # FLD: 15.8 % — HIGH (ref 10.3–14.5)
RH IG SCN BLD-IMP: POSITIVE — SIGNIFICANT CHANGE UP
SAO2 % BLDA: 99 % — HIGH (ref 94–98)
SODIUM BLDA-SCNC: 133 MMOL/L — LOW (ref 136–145)
SODIUM SERPL-SCNC: 136 MMOL/L — SIGNIFICANT CHANGE UP (ref 135–145)
SPECIMEN SOURCE: SIGNIFICANT CHANGE UP
SPECIMEN SOURCE: SIGNIFICANT CHANGE UP
SURGICAL PATHOLOGY STUDY: SIGNIFICANT CHANGE UP
WBC # BLD: 7.79 K/UL — SIGNIFICANT CHANGE UP (ref 3.8–10.5)
WBC # FLD AUTO: 7.79 K/UL — SIGNIFICANT CHANGE UP (ref 3.8–10.5)

## 2021-08-13 PROCEDURE — 52332 CYSTOSCOPY AND TREATMENT: CPT | Mod: LT

## 2021-08-13 PROCEDURE — 74018 RADEX ABDOMEN 1 VIEW: CPT | Mod: 26

## 2021-08-13 PROCEDURE — 99232 SBSQ HOSP IP/OBS MODERATE 35: CPT | Mod: GC

## 2021-08-13 PROCEDURE — 44320 COLOSTOMY: CPT

## 2021-08-13 RX ORDER — METOPROLOL TARTRATE 50 MG
12.5 TABLET ORAL DAILY
Refills: 0 | Status: DISCONTINUED | OUTPATIENT
Start: 2021-08-14 | End: 2021-08-18

## 2021-08-13 RX ORDER — HEPARIN SODIUM 5000 [USP'U]/ML
5000 INJECTION INTRAVENOUS; SUBCUTANEOUS EVERY 12 HOURS
Refills: 0 | Status: DISCONTINUED | OUTPATIENT
Start: 2021-08-13 | End: 2021-08-18

## 2021-08-13 RX ORDER — HYDROMORPHONE HYDROCHLORIDE 2 MG/ML
0.5 INJECTION INTRAMUSCULAR; INTRAVENOUS; SUBCUTANEOUS
Refills: 0 | Status: DISCONTINUED | OUTPATIENT
Start: 2021-08-13 | End: 2021-08-16

## 2021-08-13 RX ORDER — MEROPENEM 1 G/30ML
1000 INJECTION INTRAVENOUS EVERY 12 HOURS
Refills: 0 | Status: DISCONTINUED | OUTPATIENT
Start: 2021-08-13 | End: 2021-08-24

## 2021-08-13 RX ORDER — METOPROLOL TARTRATE 50 MG
12.5 TABLET ORAL DAILY
Refills: 0 | Status: DISCONTINUED | OUTPATIENT
Start: 2021-08-13 | End: 2021-08-13

## 2021-08-13 RX ORDER — ATORVASTATIN CALCIUM 80 MG/1
80 TABLET, FILM COATED ORAL AT BEDTIME
Refills: 0 | Status: DISCONTINUED | OUTPATIENT
Start: 2021-08-13 | End: 2021-08-22

## 2021-08-13 RX ORDER — SODIUM CHLORIDE 9 MG/ML
1000 INJECTION, SOLUTION INTRAVENOUS
Refills: 0 | Status: DISCONTINUED | OUTPATIENT
Start: 2021-08-13 | End: 2021-08-14

## 2021-08-13 RX ORDER — BUPIVACAINE 13.3 MG/ML
20 INJECTION, SUSPENSION, LIPOSOMAL INFILTRATION ONCE
Refills: 0 | Status: DISCONTINUED | OUTPATIENT
Start: 2021-08-13 | End: 2021-08-13

## 2021-08-13 RX ADMIN — Medication 64.25 MILLIMOLE(S): at 11:12

## 2021-08-13 RX ADMIN — Medication 1 ENEMA: at 08:22

## 2021-08-13 RX ADMIN — MEROPENEM 200 MILLIGRAM(S): 1 INJECTION INTRAVENOUS at 20:04

## 2021-08-13 RX ADMIN — Medication 12.5 MILLIGRAM(S): at 06:59

## 2021-08-13 RX ADMIN — HYDROMORPHONE HYDROCHLORIDE 0.5 MILLIGRAM(S): 2 INJECTION INTRAMUSCULAR; INTRAVENOUS; SUBCUTANEOUS at 20:05

## 2021-08-13 RX ADMIN — MEROPENEM 200 MILLIGRAM(S): 1 INJECTION INTRAVENOUS at 06:59

## 2021-08-13 RX ADMIN — HEPARIN SODIUM 5000 UNIT(S): 5000 INJECTION INTRAVENOUS; SUBCUTANEOUS at 06:59

## 2021-08-13 RX ADMIN — HYDROMORPHONE HYDROCHLORIDE 0.5 MILLIGRAM(S): 2 INJECTION INTRAMUSCULAR; INTRAVENOUS; SUBCUTANEOUS at 20:15

## 2021-08-13 NOTE — PROGRESS NOTE ADULT - SUBJECTIVE AND OBJECTIVE BOX
SUBJECTIVE:  Doing well this AM. NAEON. Denies n/v. Denies cp/sob. Pain is well controlled.    MEDICATIONS  (STANDING):  BUpivacaine liposome 1.3% Injectable (no eMAR) 20 milliLiter(s) Local Injection once    MEDICATIONS  (PRN):      Vital Signs Last 24 Hrs  T(C): 36.6 (13 Aug 2021 13:25), Max: 36.6 (13 Aug 2021 00:27)  T(F): 97.9 (13 Aug 2021 13:25), Max: 97.9 (13 Aug 2021 00:27)  HR: 80 (13 Aug 2021 13:34) (68 - 80)  BP: 166/74 (13 Aug 2021 13:34) (136/62 - 166/74)  BP(mean): 107 (13 Aug 2021 13:34) (89 - 109)  RR: 18 (13 Aug 2021 13:34) (18 - 18)  SpO2: 97% (13 Aug 2021 13:34) (95% - 97%)    Physical Exam:  General: NAD, resting comfortably in bed  Pulmonary: Nonlabored breathing, no respiratory distress  Cardiovascular: NSR  Abdominal: soft, NT, distended in the lower abdomen  Extremities: WWP, normal strength  Neuro: A/O x 3, CNs II-XII grossly intact, no focal deficits, normal motor/sensation  Pulses: palpable distal pulses    I&O's Summary    12 Aug 2021 07:01  -  13 Aug 2021 07:00  --------------------------------------------------------  IN: 1630 mL / OUT: 1700 mL / NET: -70 mL    13 Aug 2021 07:01  -  13 Aug 2021 18:33  --------------------------------------------------------  IN: 0 mL / OUT: 230 mL / NET: -230 mL        LABS:                        9.4    7.79  )-----------( 617      ( 13 Aug 2021 07:10 )             30.7     08-13    136  |  106  |  5<L>  ----------------------------<  87  4.0   |  25  |  0.58    Ca    7.8<L>      13 Aug 2021 07:10  Phos  2.6     08-13  Mg     2.1     08-13      PT/INR - ( 13 Aug 2021 07:10 )   PT: 11.6 sec;   INR: 0.97          PTT - ( 13 Aug 2021 07:10 )  PTT:30.6 sec    CAPILLARY BLOOD GLUCOSE      POCT Blood Glucose.: 102 mg/dL (13 Aug 2021 17:00)        RADIOLOGY & ADDITIONAL STUDIES:

## 2021-08-13 NOTE — PROGRESS NOTE ADULT - ASSESSMENT
89F with PMH of chronic back pain 2/2 L3,L4 compression fractures, triple vessel CAD and L CEA 2/2 L ICA stenosis >70% (4/21), recent ED visit on 6/28 for UTI p/w abdominal pain x 1 day, constant associated with weight loss and abdominal distention. CT a/p showing large necrotic mass arising from the sigmoid colon with posterior extension to the sacrum and caudal extension to the rectum and the uterus, findings also suspicious for complicated perforated distal sigmoid diverticulitis with irregular large abscess. 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 Creatinine at baseline. No CVAT or urinary sx's. NM Renal Scan 8/11 w/ symmetric function but T1/2 of L kidney is 26 minutes consistent with obstruction.    Recommendations:  - Urology to place intra-operative stent on L side during surgery today. OR and surgery team informed.  - Please record I/Os  - Rest of care per primary team  - No acute urologic intervention at this time

## 2021-08-13 NOTE — PROGRESS NOTE ADULT - ASSESSMENT
89F PMH HTN, chronic back pain 2/2 L3,L4 compression fractures, triple vessel CAD, and SBO 2/2 possible diverticulitis vs. GYN/colorectal malignancy (2018) never followed up and PSH L CEA 2/2 L ICA stenosis >70% (4/21) who presented with worsening back pain x1 week. Denies abdominal pain, endorses flatus and some recent weightloss. Transferred from medicine for perforated, complicated sigmoid diverticulitis vs. 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     IVF, NPO for OR  Pain/nausea control PRN   metoprolol  Zosyn (8/8-8/11) Meropenem (8/11-)  F/u GI, Vascular surgery, Urology, Heme/onc, IR recs   AM Labs   f/u pathology  OR for diversion colostomy

## 2021-08-13 NOTE — PROGRESS NOTE ADULT - SUBJECTIVE AND OBJECTIVE BOX
SUBJECTIVE: NAEON. AVSS    heparin   Injectable 5000 Unit(s) SubCutaneous every 12 hours  meropenem  IVPB 1000 milliGRAM(s) IV Intermittent every 12 hours  metoprolol tartrate 12.5 milliGRAM(s) Oral daily      Vital Signs Last 24 Hrs  T(C): 36.3 (12 Aug 2021 22:19), Max: 36.8 (12 Aug 2021 14:28)  T(F): 97.4 (12 Aug 2021 22:19), Max: 98.2 (12 Aug 2021 14:28)  HR: 72 (13 Aug 2021 04:50) (68 - 78)  BP: 136/62 (13 Aug 2021 04:50) (136/62 - 152/70)  BP(mean): 89 (13 Aug 2021 04:50) (89 - 109)  RR: 18 (13 Aug 2021 04:50) (17 - 18)  SpO2: 96% (13 Aug 2021 04:50) (95% - 97%)  I&O's Detail    12 Aug 2021 07:01  -  13 Aug 2021 07:00  --------------------------------------------------------  IN:    IV PiggyBack: 350 mL    Lactated Ringers w/ Additives: 1280 mL  Total IN: 1630 mL    OUT:    Bulb (mL): 250 mL    Voided (mL): 1450 mL  Total OUT: 1700 mL    Total NET: -70 mL          Physical Exam:  General: No acute distress, resting comfortably in bed  Pulm: Nonlabored breathing, no respiratory distress  Abd: soft, non-tender, non-distended  : no CVAT      LABS:                        9.4    7.79  )-----------( 617      ( 13 Aug 2021 07:10 )             30.7     08-12    133<L>  |  104  |  6<L>  ----------------------------<  93  4.1   |  23  |  0.64    Ca    7.8<L>      12 Aug 2021 07:01  Phos  2.7     08-12  Mg     2.0     08-12            RADIOLOGY & ADDITIONAL STUDIES:

## 2021-08-13 NOTE — PROGRESS NOTE ADULT - ATTENDING COMMENTS
As above.  Will follow with you – team 1.  Dr. Grubbs will cover from [facility] through 8/15.  I will resume care on 8/16.

## 2021-08-13 NOTE — PROGRESS NOTE ADULT - SUBJECTIVE AND OBJECTIVE BOX
Team 1 Surgery Post-Op Note, PCN:     Pre-Op Dx: colon mass  Procedure: Loop colostomy      Surgeon: Jinny    Subjective: pt seen at bedside, complains of some abdominal pain. denies nausea and vomiting      Vital Signs Last 24 Hrs  T(C): 36.3 (13 Aug 2021 19:15), Max: 36.6 (13 Aug 2021 00:27)  T(F): 97.3 (13 Aug 2021 19:15), Max: 97.9 (13 Aug 2021 00:27)  HR: 86 (13 Aug 2021 20:00) (68 - 86)  BP: 178/85 (13 Aug 2021 20:00) (136/62 - 178/85)  BP(mean): 122 (13 Aug 2021 20:00) (89 - 122)  RR: 22 (13 Aug 2021 20:00) (18 - 23)  SpO2: 97% (13 Aug 2021 20:00) (95% - 99%)    Physical Exam:  General: NAD, resting comfortably in bed  Pulmonary: Nonlabored breathing, no respiratory distress  Cardiovascular: NSR  Abdominal: soft, nondistended, lower abdominal tenderness, ostomy pink and patent with bowel sweat  Extremities: WWP, normal strength  Neuro: A/O x 3, no focal deficits, normal sensation  Pulses: palpable distal pulses      LABS:                        9.4    7.79  )-----------( 617      ( 13 Aug 2021 07:10 )             30.7     08-13    136  |  106  |  5<L>  ----------------------------<  87  4.0   |  25  |  0.58    Ca    7.8<L>      13 Aug 2021 07:10  Phos  2.6     08-13  Mg     2.1     08-13      PT/INR - ( 13 Aug 2021 07:10 )   PT: 11.6 sec;   INR: 0.97          PTT - ( 13 Aug 2021 07:10 )  PTT:30.6 sec  CAPILLARY BLOOD GLUCOSE      POCT Blood Glucose.: 102 mg/dL (13 Aug 2021 17:00)        ABO Interpretation: O (08-13 @ 08:35)        Radiology and Additional Studies:    Assessment:89y Female s/p above procedure    Plan:  Pain/nausea control PRN  Home meds  Incentive spirometer/OOB/Ambulate  IVF, Diet: CLD  AM labs  [place]

## 2021-08-13 NOTE — PROGRESS NOTE ADULT - SUBJECTIVE AND OBJECTIVE BOX
Rehab Medicine INTERVAL HPI/OVERNIGHT EVENTS:  Patient was seen and examined at bedside. Had 1 episode of small volume watery diarrhea in AM. Patient reticent about going to the OR, but amenable to surgery. Patient denies: fever, chills, lightheadedness, weakness, CP, palpitations, SOB, cough, N/V. ROS otherwise negative.    VITAL SIGNS:  T(F): 97.4 (08-13-21 @ 21:40)  HR: 88 (08-13-21 @ 21:40)  BP: 154/78 (08-13-21 @ 21:40)  RR: 18 (08-13-21 @ 21:40)  SpO2: 100% (08-13-21 @ 21:40)  Wt(kg): --      08-12-21 @ 07:01  -  08-13-21 @ 07:00  --------------------------------------------------------  IN: 1630 mL / OUT: 1700 mL / NET: -70 mL    08-13-21 @ 07:01  -  08-13-21 @ 22:28  --------------------------------------------------------  IN: 210 mL / OUT: 375 mL / NET: -165 mL        PHYSICAL EXAM:    Constitutional: resting comfortably in bed; NAD  HEENT: NC/AT, PER, anicteric sclera, no nasal discharge; MMM  Respiratory: CTA B/L; no W/R/R, no retractions  Cardiac: +S1/S2; RRR; no M/R/G  Gastrointestinal: soft, distension unchanged, abdomen tympanic and slightly tender diffusely  Extremities: WWP, no clubbing or cyanosis; no peripheral edema  Vascular: 2+ radial, DP/PT pulses B/L  Psychiatric: affect and characteristics of appearance, verbalizations, behaviors are appropriate    MEDICATIONS  (STANDING):  atorvastatin 80 milliGRAM(s) Oral at bedtime  heparin   Injectable 5000 Unit(s) SubCutaneous every 12 hours  lactated ringers. 1000 milliLiter(s) (40 mL/Hr) IV Continuous <Continuous>  meropenem  IVPB 1000 milliGRAM(s) IV Intermittent every 12 hours    MEDICATIONS  (PRN):  HYDROmorphone  Injectable 0.5 milliGRAM(s) IV Push every 15 minutes PRN Severe Pain (7 - 10)      Allergies    No Known Allergies    Intolerances        LABS:                        9.4    7.79  )-----------( 617      ( 13 Aug 2021 07:10 )             30.7     08-13    136  |  106  |  5<L>  ----------------------------<  87  4.0   |  25  |  0.58    Ca    7.8<L>      13 Aug 2021 07:10  Phos  2.6     08-13  Mg     2.1     08-13      PT/INR - ( 13 Aug 2021 07:10 )   PT: 11.6 sec;   INR: 0.97          PTT - ( 13 Aug 2021 07:10 )  PTT:30.6 sec      RADIOLOGY & ADDITIONAL TESTS:  Reviewed

## 2021-08-13 NOTE — PROGRESS NOTE ADULT - SUBJECTIVE AND OBJECTIVE BOX
Patient to go to OR today for laparoscopy, possible bx and formation of loop cololostomy and cystoscopy and probable placement of left ureteral stent.  I spoke with the patient x 20 minutes last night as regards the reasons for surgery and the procedures to be done.  Colostomies were explained as well as the lifestyle changes the colostomy carries with it.  The fact that the colostomy will not likely alleviate her back pain.  The fact that we do not have a diagnosis and do not plan to do a formal colorectal resection to remove the pathologic process due to its widespead extent.    The patient was marked on L and R sides for possible stoma    I also spoke with Cynthia Sims who is the patients HCP.  We discussed the patients situation, the findings thus far, as well as the recommendations for surgery, the planned procedures and the possible complications and ramificiations of the planned surgery.  Patients malnourished and high risk state as well as her numerous co-morbidities.  Cynthia agrees with the plan and has been talking to the patient during this admission.  She is aware of the situation and is in agreement.    Vital Signs Last 24 Hrs  T(C): 36.3 (12 Aug 2021 22:19), Max: 36.8 (12 Aug 2021 14:28)  T(F): 97.4 (12 Aug 2021 22:19), Max: 98.2 (12 Aug 2021 14:28)  HR: 72 (13 Aug 2021 04:50) (70 - 78)  BP: 136/62 (13 Aug 2021 04:50) (136/62 - 152/70)  BP(mean): 89 (13 Aug 2021 04:50) (89 - 109)  RR: 18 (13 Aug 2021 04:50) (17 - 18)  SpO2: 96% (13 Aug 2021 04:50) (95% - 97%)  exam without change                          9.4    7.79  )-----------( 617      ( 13 Aug 2021 07:10 )             30.7   08-13    136  |  106  |  5<L>  ----------------------------<  87  4.0   |  25  |  0.58    Ca    7.8<L>      13 Aug 2021 07:10  Phos  2.6     08-13  Mg     2.1     08-13

## 2021-08-13 NOTE — PROGRESS NOTE ADULT - ASSESSMENT
L gluteal drain with 250cc serosanguinous output over last 24h (170cc day prior). Flushes easily with 2cc NS. Pt afebrile and asymptomatic, WBC wnl.  Continue to monitor output. IR will continue to follow.

## 2021-08-13 NOTE — BRIEF OPERATIVE NOTE - OPERATION/FINDINGS
Distal transverse colon brought up as loop to left hemiadbominal stoma aperture without tension. Palpable pelvic mass,  no gross carcinomatosis or peritonitis noted.

## 2021-08-13 NOTE — ADVANCED PRACTICE NURSE CONSULT - ASSESSMENT
Introduced self to patient; confirmed marking sites; patient states she does not have any questions regarding colostomy creation surgery today. Will follow patient post-op.

## 2021-08-13 NOTE — PROGRESS NOTE ADULT - ASSESSMENT
89 F with PMHx back pain, CAD, CEA, UTI presented with back pain and found to have high grade partial sigmoid obstruction with gluteal abscess. ID consulted based on wound cultures positive for E coli.    Based on her polymicrobial wound culture including anaerobes and ESBL E coli, gut translocation due to perforation or mucosal breakdown is likely. Strong gram negative and anaerobic coverage indicated at this time, especially if operative treatment is planned.    Would recommend:  - Please continue meropenem 1000mg every 12 hours (8/11 - )  - Please obtain daily CBC with diff, BMP while on antibiotics inpatient  - Please obtain blood cultures if patient is febrile T>38.0 C  - management of sigmoid mass per primary team  - will continue to follow patient post-op to assess clinical status and abx needs, duration pending clinical improvement    ID Team 1 will continue to follow. Please see below attending attestation for finalized recommendations.    Austen Kruse MD PGY2 Internal Medicine  Infectious Disease Consult Service

## 2021-08-14 LAB
ANION GAP SERPL CALC-SCNC: 13 MMOL/L — SIGNIFICANT CHANGE UP (ref 5–17)
BASOPHILS # BLD AUTO: 0.08 K/UL — SIGNIFICANT CHANGE UP (ref 0–0.2)
BASOPHILS NFR BLD AUTO: 0.3 % — SIGNIFICANT CHANGE UP (ref 0–2)
BUN SERPL-MCNC: 9 MG/DL — SIGNIFICANT CHANGE UP (ref 7–23)
CALCIUM SERPL-MCNC: 8.2 MG/DL — LOW (ref 8.4–10.5)
CHLORIDE SERPL-SCNC: 100 MMOL/L — SIGNIFICANT CHANGE UP (ref 96–108)
CO2 SERPL-SCNC: 21 MMOL/L — LOW (ref 22–31)
CREAT SERPL-MCNC: 0.79 MG/DL — SIGNIFICANT CHANGE UP (ref 0.5–1.3)
EOSINOPHIL # BLD AUTO: 0 K/UL — SIGNIFICANT CHANGE UP (ref 0–0.5)
EOSINOPHIL NFR BLD AUTO: 0 % — SIGNIFICANT CHANGE UP (ref 0–6)
GLUCOSE SERPL-MCNC: 121 MG/DL — HIGH (ref 70–99)
HCT VFR BLD CALC: 32.7 % — LOW (ref 34.5–45)
HGB BLD-MCNC: 9.8 G/DL — LOW (ref 11.5–15.5)
IMM GRANULOCYTES NFR BLD AUTO: 1.2 % — SIGNIFICANT CHANGE UP (ref 0–1.5)
LYMPHOCYTES # BLD AUTO: 1.77 K/UL — SIGNIFICANT CHANGE UP (ref 1–3.3)
LYMPHOCYTES # BLD AUTO: 6.6 % — LOW (ref 13–44)
MAGNESIUM SERPL-MCNC: 2.1 MG/DL — SIGNIFICANT CHANGE UP (ref 1.6–2.6)
MCHC RBC-ENTMCNC: 27 PG — SIGNIFICANT CHANGE UP (ref 27–34)
MCHC RBC-ENTMCNC: 30 GM/DL — LOW (ref 32–36)
MCV RBC AUTO: 90.1 FL — SIGNIFICANT CHANGE UP (ref 80–100)
MONOCYTES # BLD AUTO: 0.69 K/UL — SIGNIFICANT CHANGE UP (ref 0–0.9)
MONOCYTES NFR BLD AUTO: 2.6 % — SIGNIFICANT CHANGE UP (ref 2–14)
NEUTROPHILS # BLD AUTO: 23.85 K/UL — HIGH (ref 1.8–7.4)
NEUTROPHILS NFR BLD AUTO: 89.3 % — HIGH (ref 43–77)
NRBC # BLD: 0 /100 WBCS — SIGNIFICANT CHANGE UP (ref 0–0)
PHOSPHATE SERPL-MCNC: 4.5 MG/DL — SIGNIFICANT CHANGE UP (ref 2.5–4.5)
PLATELET # BLD AUTO: 685 K/UL — HIGH (ref 150–400)
POTASSIUM SERPL-MCNC: 4.2 MMOL/L — SIGNIFICANT CHANGE UP (ref 3.5–5.3)
POTASSIUM SERPL-SCNC: 4.2 MMOL/L — SIGNIFICANT CHANGE UP (ref 3.5–5.3)
RBC # BLD: 3.63 M/UL — LOW (ref 3.8–5.2)
RBC # FLD: 15.9 % — HIGH (ref 10.3–14.5)
SODIUM SERPL-SCNC: 134 MMOL/L — LOW (ref 135–145)
WBC # BLD: 26.71 K/UL — HIGH (ref 3.8–10.5)
WBC # FLD AUTO: 26.71 K/UL — HIGH (ref 3.8–10.5)

## 2021-08-14 PROCEDURE — 99232 SBSQ HOSP IP/OBS MODERATE 35: CPT

## 2021-08-14 RX ORDER — SODIUM CHLORIDE 9 MG/ML
250 INJECTION, SOLUTION INTRAVENOUS ONCE
Refills: 0 | Status: COMPLETED | OUTPATIENT
Start: 2021-08-14 | End: 2021-08-14

## 2021-08-14 RX ORDER — ACETAMINOPHEN 500 MG
650 TABLET ORAL EVERY 6 HOURS
Refills: 0 | Status: DISCONTINUED | OUTPATIENT
Start: 2021-08-14 | End: 2021-08-16

## 2021-08-14 RX ORDER — SODIUM CHLORIDE 9 MG/ML
1000 INJECTION, SOLUTION INTRAVENOUS
Refills: 0 | Status: DISCONTINUED | OUTPATIENT
Start: 2021-08-14 | End: 2021-08-14

## 2021-08-14 RX ORDER — SODIUM CHLORIDE 9 MG/ML
500 INJECTION, SOLUTION INTRAVENOUS ONCE
Refills: 0 | Status: COMPLETED | OUTPATIENT
Start: 2021-08-14 | End: 2021-08-14

## 2021-08-14 RX ORDER — SODIUM CHLORIDE 9 MG/ML
1000 INJECTION, SOLUTION INTRAVENOUS
Refills: 0 | Status: DISCONTINUED | OUTPATIENT
Start: 2021-08-14 | End: 2021-08-16

## 2021-08-14 RX ADMIN — SODIUM CHLORIDE 70 MILLILITER(S): 9 INJECTION, SOLUTION INTRAVENOUS at 18:03

## 2021-08-14 RX ADMIN — Medication 12.5 MILLIGRAM(S): at 06:22

## 2021-08-14 RX ADMIN — HEPARIN SODIUM 5000 UNIT(S): 5000 INJECTION INTRAVENOUS; SUBCUTANEOUS at 06:23

## 2021-08-14 RX ADMIN — HEPARIN SODIUM 5000 UNIT(S): 5000 INJECTION INTRAVENOUS; SUBCUTANEOUS at 18:03

## 2021-08-14 RX ADMIN — MEROPENEM 200 MILLIGRAM(S): 1 INJECTION INTRAVENOUS at 06:21

## 2021-08-14 RX ADMIN — SODIUM CHLORIDE 500 MILLILITER(S): 9 INJECTION, SOLUTION INTRAVENOUS at 05:15

## 2021-08-14 RX ADMIN — SODIUM CHLORIDE 500 MILLILITER(S): 9 INJECTION, SOLUTION INTRAVENOUS at 09:05

## 2021-08-14 RX ADMIN — MEROPENEM 200 MILLIGRAM(S): 1 INJECTION INTRAVENOUS at 18:11

## 2021-08-14 RX ADMIN — ATORVASTATIN CALCIUM 80 MILLIGRAM(S): 80 TABLET, FILM COATED ORAL at 21:15

## 2021-08-14 NOTE — PROGRESS NOTE ADULT - ASSESSMENT
89F with PMH of chronic back pain 2/2 L3,L4 compression fractures, triple vessel CAD and L CEA 2/2 L ICA stenosis >70% (4/21), recent ED visit on 6/28 for UTI p/w abdominal pain x 1 day, constant associated with weight loss and abdominal distention. CT a/p showing large necrotic mass arising from the sigmoid colon with posterior extension to the sacrum and caudal extension to the rectum and the uterus, findings also suspicious for complicated perforated distal sigmoid diverticulitis with irregular large abscess. 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 NM Renal Scan 8/11 w/ symmetric function but T1/2 of L kidney is 26 minutes consistent with obstruction. Pt now s/p loop colostomy w/ intraoperative L ureteral stent placement 8/13. KUB showing stent in position. Lower UOP output overnight, but still appropriate based on weight.    Recommendations:  - Continue to trend UOP and Cr  - No further urologic intervention during this admission  - Rest of care per primary team  - Can follow up with Dr. Kitchen as an outpatient within 1-2 wks of discharge.   - Discussed with  attending

## 2021-08-14 NOTE — PROGRESS NOTE ADULT - ATTENDING COMMENTS
Please see fellow note for full details.  I have reviewed the case, examined the patient with the fellow and agree with plan.  Continue medical management for CAD.  Continue ASA, BB and statin.  Patient will need cardiology f/u after discharge.

## 2021-08-14 NOTE — PROGRESS NOTE ADULT - SUBJECTIVE AND OBJECTIVE BOX
SUBJECTIVE:  Denies n/v. Endorses slight f/bm in ostomy. Denies cp/sob. Tolerating diet.    MEDICATIONS  (STANDING):  atorvastatin 80 milliGRAM(s) Oral at bedtime  dextrose 5% + sodium chloride 0.9%. 1000 milliLiter(s) (70 mL/Hr) IV Continuous <Continuous>  heparin   Injectable 5000 Unit(s) SubCutaneous every 12 hours  meropenem  IVPB 1000 milliGRAM(s) IV Intermittent every 12 hours  metoprolol succinate ER 12.5 milliGRAM(s) Oral daily    MEDICATIONS  (PRN):  acetaminophen   Tablet .. 650 milliGRAM(s) Oral every 6 hours PRN Mild Pain (1 - 3), Moderate Pain (4 - 6)  HYDROmorphone  Injectable 0.5 milliGRAM(s) IV Push every 15 minutes PRN Severe Pain (7 - 10)      Vital Signs Last 24 Hrs  T(C): 36.4 (14 Aug 2021 09:00), Max: 36.4 (14 Aug 2021 09:00)  T(F): 97.5 (14 Aug 2021 09:00), Max: 97.5 (14 Aug 2021 09:00)  HR: 80 (14 Aug 2021 16:00) (80 - 90)  BP: 136/62 (14 Aug 2021 16:00) (119/59 - 178/85)  BP(mean): 79 (14 Aug 2021 00:16) (79 - 122)  RR: 18 (14 Aug 2021 16:00) (13 - 23)  SpO2: 97% (14 Aug 2021 16:00) (94% - 100%)    Physical Exam:  General: NAD, resting comfortably in bed  Pulmonary: Nonlabored breathing, no respiratory distress  Cardiovascular: NSR  Abdominal: soft, NT/ND, ostomy w/ minimal output  Extremities: WWP, normal strength  Neuro: A/O x 3, CNs II-XII grossly intact, no focal deficits    I&O's Summary    13 Aug 2021 07:01  -  14 Aug 2021 07:00  --------------------------------------------------------  IN: 780 mL / OUT: 645 mL / NET: 135 mL    14 Aug 2021 07:01  -  14 Aug 2021 18:53  --------------------------------------------------------  IN: 820 mL / OUT: 285 mL / NET: 535 mL        LABS:                        9.8    26.71 )-----------( 685      ( 14 Aug 2021 06:00 )             32.7     08-14    134<L>  |  100  |  9   ----------------------------<  121<H>  4.2   |  21<L>  |  0.79    Ca    8.2<L>      14 Aug 2021 06:00  Phos  4.5     08-14  Mg     2.1     08-14      PT/INR - ( 13 Aug 2021 07:10 )   PT: 11.6 sec;   INR: 0.97          PTT - ( 13 Aug 2021 07:10 )  PTT:30.6 sec    CAPILLARY BLOOD GLUCOSE            RADIOLOGY & ADDITIONAL STUDIES:

## 2021-08-14 NOTE — PROGRESS NOTE ADULT - SUBJECTIVE AND OBJECTIVE BOX
Cardiology Consult Service Progress Note     Silvino Mccoy MD JULIO  Cardiology Fellow   Pager [phone]    Patient is a 89y old  Female who presents with a chief complaint of Neck pain (14 Aug 2021 08:35)    SUBJECTIVE/OVERNIGHT EVENTS   No acute events overnight.  No chest pain, palpitations, or shortness of breath     OBJECTIVE  Vital Signs Last 24 Hrs  T(C): 36.4 (14 Aug 2021 09:00), Max: 36.6 (13 Aug 2021 13:25)  T(F): 97.5 (14 Aug 2021 09:00), Max: 97.9 (13 Aug 2021 13:25)  HR: 82 (14 Aug 2021 08:29) (80 - 90)  BP: 139/58 (14 Aug 2021 08:29) (119/59 - 178/85)  BP(mean): 79 (14 Aug 2021 00:16) (79 - 122)  RR: 18 (14 Aug 2021 08:29) (13 - 23)  SpO2: 96% (14 Aug 2021 08:29) (94% - 100%)    I&O's Summary    13 Aug 2021 07:01  -  14 Aug 2021 07:00  --------------------------------------------------------  IN: 780 mL / OUT: 645 mL / NET: 135 mL    14 Aug 2021 07:01  -  14 Aug 2021 12:06  --------------------------------------------------------  IN: 580 mL / OUT: 130 mL / NET: 450 mL    PHYSICAL EXAM:  GENERAL: NAD  HEAD:  Atraumatic, Normocephalic  EYES: EOMI, conjunctiva and sclera clear  NECK: Supple, No JVD  CHEST/LUNG: Clear to auscultation bilaterally  HEART: Regular rate and rhythm; No murmurs  ABDOMEN: +Stoma, with blood and clots, non tender   EXTREMITIES:  2+ Peripheral Pulses, No LE edema  PSYCH: AAOx3  NEUROLOGY: non-focal  SKIN: No rashes or lesions    LABS                        9.8    26.71 )-----------( 685      ( 14 Aug 2021 06:00 )             32.7                         9.4    7.79  )-----------( 617      ( 13 Aug 2021 07:10 )             30.7     08-14    134<L>  |  100  |  9   ----------------------------<  121<H>  4.2   |  21<L>  |  0.79  08-13    136  |  106  |  5<L>  ----------------------------<  87  4.0   |  25  |  0.58    Ca    8.2<L>      14 Aug 2021 06:00  Ca    7.8<L>      13 Aug 2021 07:10  Phos  4.5     08-14  Mg     2.1     08-14    CAPILLARY BLOOD GLUCOSE  POCT Blood Glucose.: 102 mg/dL (13 Aug 2021 17:00)    PT/INR - ( 13 Aug 2021 07:10 )   PT: 11.6 sec;   INR: 0.97     PTT - ( 13 Aug 2021 07:10 )  PTT:30.6 sec  ( 13 Aug 2021 16:55 ) pH: 7.39  /  pCO2: 36    /  pO2: 281   / HCO3: 22    / Base Excess: -2.8  /  SaO2: x         RADIOLOGY & ADDITIONAL TESTS:    MEDICATIONS  (STANDING):  atorvastatin 80 milliGRAM(s) Oral at bedtime  heparin   Injectable 5000 Unit(s) SubCutaneous every 12 hours  lactated ringers. 1000 milliLiter(s) (40 mL/Hr) IV Continuous <Continuous>  meropenem  IVPB 1000 milliGRAM(s) IV Intermittent every 12 hours  metoprolol succinate ER 12.5 milliGRAM(s) Oral daily    MEDICATIONS  (PRN):  acetaminophen   Tablet .. 650 milliGRAM(s) Oral every 6 hours PRN Mild Pain (1 - 3), Moderate Pain (4 - 6)  HYDROmorphone  Injectable 0.5 milliGRAM(s) IV Push every 15 minutes PRN Severe Pain (7 - 10)

## 2021-08-14 NOTE — PROGRESS NOTE ADULT - ASSESSMENT
89F PMH HTN, chronic back pain 2/2 L3,L4 compression fractures, triple vessel CAD, and SBO 2/2 possible diverticulitis vs. GYN/colorectal malignancy (2018) never followed up and PSH L CEA 2/2 L ICA stenosis >70% (4/21) who presented with worsening back pain x1 week. Denies abdominal pain, endorses flatus and some recent weightloss. Transferred from medicine for perforated, complicated sigmoid diverticulitis vs. 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 OR on 8/13 for diverting colostomy.    CLD/IVF  Pain/nausea control PRN   metoprolol  Zosyn (8/8-8/11) Meropenem (8/11-)  F/u GI, Vascular surgery, Urology, Heme/onc, IR recs   AM Labs

## 2021-08-14 NOTE — PROGRESS NOTE ADULT - SUBJECTIVE AND OBJECTIVE BOX
SUBJECTIVE: NEON, lower UOP but still >0.5cc/kg/hr. No significant abd pain at rest, denies fevers, chills, n/v, flank pain.    heparin   Injectable 5000 Unit(s) SubCutaneous every 12 hours  meropenem  IVPB 1000 milliGRAM(s) IV Intermittent every 12 hours  metoprolol succinate ER 12.5 milliGRAM(s) Oral daily      Vital Signs Last 24 Hrs  T(C): 36.3 (14 Aug 2021 04:36), Max: 36.6 (13 Aug 2021 13:25)  T(F): 97.4 (14 Aug 2021 04:36), Max: 97.9 (13 Aug 2021 13:25)  HR: 83 (14 Aug 2021 06:13) (68 - 90)  BP: 138/58 (14 Aug 2021 06:13) (119/59 - 178/85)  BP(mean): 79 (14 Aug 2021 00:16) (79 - 122)  RR: 18 (14 Aug 2021 06:13) (13 - 23)  SpO2: 95% (14 Aug 2021 06:13) (94% - 100%)  I&O's Detail    13 Aug 2021 07:01  -  14 Aug 2021 07:00  --------------------------------------------------------  IN:    IV PiggyBack: 50 mL    Lactated Ringers: 730 mL  Total IN: 780 mL    OUT:    Bulb (mL): 170 mL    Indwelling Catheter - Urethral (mL): 325 mL    Voided (mL): 150 mL  Total OUT: 645 mL    Total NET: 135 mL          Physical Exam:  General: No acute distress, resting comfortably in bed  Pulm: Nonlabored breathing, no respiratory distress  Abd: soft, non-tender, non-distended, ostomy w/ bowel sweat  : urbina draining clear yellow urine      LABS:                        9.8    26.71 )-----------( 685      ( 14 Aug 2021 06:00 )             32.7     08-14    134<L>  |  100  |  9   ----------------------------<  121<H>  4.2   |  21<L>  |  0.79    Ca    8.2<L>      14 Aug 2021 06:00  Phos  4.5     08-14  Mg     2.1     08-14      PT/INR - ( 13 Aug 2021 07:10 )   PT: 11.6 sec;   INR: 0.97          PTT - ( 13 Aug 2021 07:10 )  PTT:30.6 sec      RADIOLOGY & ADDITIONAL STUDIES:

## 2021-08-14 NOTE — PROGRESS NOTE ADULT - ASSESSMENT
88 yo F PMHx of HTN, Triple vessel CAD (medically managed), s/p L CEA  presenting with diverticulitis complicated by necrotizing mass formation. Cardiology consulted for preoperative risk assessment for possible sigmoidectomy     #Preoperative risk assessment   RCRI 3: 15% 30-day risk for MACE   s/p Cath 4/1 with  of RCA/LCx; diffuse 80% mLAD - medically managed   Patient reports improvement in functional status although limited  Given stable CAD, would consider this patient to be an intermediate risk fo rand intermediate risk procedure   Post operatively doing well, no chest pain, or shortness of breath    #CAD Triple Vessel disease, medically managed  - c/w metoprolol 12.5 mg qd  - ASA/Plavix held, restart when surgically allowable   - c/w lipitor   No further cardiac workup, re consult prn

## 2021-08-15 LAB
ANION GAP SERPL CALC-SCNC: 8 MMOL/L — SIGNIFICANT CHANGE UP (ref 5–17)
BASOPHILS # BLD AUTO: 0.03 K/UL — SIGNIFICANT CHANGE UP (ref 0–0.2)
BASOPHILS NFR BLD AUTO: 0.3 % — SIGNIFICANT CHANGE UP (ref 0–2)
BUN SERPL-MCNC: 8 MG/DL — SIGNIFICANT CHANGE UP (ref 7–23)
CALCIUM SERPL-MCNC: 7.8 MG/DL — LOW (ref 8.4–10.5)
CHLORIDE SERPL-SCNC: 104 MMOL/L — SIGNIFICANT CHANGE UP (ref 96–108)
CO2 SERPL-SCNC: 24 MMOL/L — SIGNIFICANT CHANGE UP (ref 22–31)
CREAT SERPL-MCNC: 0.73 MG/DL — SIGNIFICANT CHANGE UP (ref 0.5–1.3)
EOSINOPHIL # BLD AUTO: 0.22 K/UL — SIGNIFICANT CHANGE UP (ref 0–0.5)
EOSINOPHIL NFR BLD AUTO: 1.9 % — SIGNIFICANT CHANGE UP (ref 0–6)
GLUCOSE SERPL-MCNC: 112 MG/DL — HIGH (ref 70–99)
HCT VFR BLD CALC: 31 % — LOW (ref 34.5–45)
HGB BLD-MCNC: 9.4 G/DL — LOW (ref 11.5–15.5)
IMM GRANULOCYTES NFR BLD AUTO: 1 % — SIGNIFICANT CHANGE UP (ref 0–1.5)
LYMPHOCYTES # BLD AUTO: 2.41 K/UL — SIGNIFICANT CHANGE UP (ref 1–3.3)
LYMPHOCYTES # BLD AUTO: 20.6 % — SIGNIFICANT CHANGE UP (ref 13–44)
MAGNESIUM SERPL-MCNC: 2 MG/DL — SIGNIFICANT CHANGE UP (ref 1.6–2.6)
MCHC RBC-ENTMCNC: 27.2 PG — SIGNIFICANT CHANGE UP (ref 27–34)
MCHC RBC-ENTMCNC: 30.3 GM/DL — LOW (ref 32–36)
MCV RBC AUTO: 89.9 FL — SIGNIFICANT CHANGE UP (ref 80–100)
MONOCYTES # BLD AUTO: 0.75 K/UL — SIGNIFICANT CHANGE UP (ref 0–0.9)
MONOCYTES NFR BLD AUTO: 6.4 % — SIGNIFICANT CHANGE UP (ref 2–14)
NEUTROPHILS # BLD AUTO: 8.17 K/UL — HIGH (ref 1.8–7.4)
NEUTROPHILS NFR BLD AUTO: 69.8 % — SIGNIFICANT CHANGE UP (ref 43–77)
NRBC # BLD: 0 /100 WBCS — SIGNIFICANT CHANGE UP (ref 0–0)
PHOSPHATE SERPL-MCNC: 2.2 MG/DL — LOW (ref 2.5–4.5)
PLATELET # BLD AUTO: 640 K/UL — HIGH (ref 150–400)
POTASSIUM SERPL-MCNC: 4 MMOL/L — SIGNIFICANT CHANGE UP (ref 3.5–5.3)
POTASSIUM SERPL-SCNC: 4 MMOL/L — SIGNIFICANT CHANGE UP (ref 3.5–5.3)
RBC # BLD: 3.45 M/UL — LOW (ref 3.8–5.2)
RBC # FLD: 16 % — HIGH (ref 10.3–14.5)
SODIUM SERPL-SCNC: 136 MMOL/L — SIGNIFICANT CHANGE UP (ref 135–145)
WBC # BLD: 11.7 K/UL — HIGH (ref 3.8–10.5)
WBC # FLD AUTO: 11.7 K/UL — HIGH (ref 3.8–10.5)

## 2021-08-15 PROCEDURE — 93970 EXTREMITY STUDY: CPT | Mod: 26

## 2021-08-15 RX ADMIN — SODIUM CHLORIDE 70 MILLILITER(S): 9 INJECTION, SOLUTION INTRAVENOUS at 11:37

## 2021-08-15 RX ADMIN — Medication 650 MILLIGRAM(S): at 09:55

## 2021-08-15 RX ADMIN — Medication 62.5 MILLIMOLE(S): at 09:36

## 2021-08-15 RX ADMIN — HEPARIN SODIUM 5000 UNIT(S): 5000 INJECTION INTRAVENOUS; SUBCUTANEOUS at 05:27

## 2021-08-15 RX ADMIN — MEROPENEM 200 MILLIGRAM(S): 1 INJECTION INTRAVENOUS at 18:39

## 2021-08-15 RX ADMIN — HEPARIN SODIUM 5000 UNIT(S): 5000 INJECTION INTRAVENOUS; SUBCUTANEOUS at 18:38

## 2021-08-15 RX ADMIN — Medication 12.5 MILLIGRAM(S): at 05:27

## 2021-08-15 RX ADMIN — Medication 650 MILLIGRAM(S): at 11:00

## 2021-08-15 RX ADMIN — ATORVASTATIN CALCIUM 80 MILLIGRAM(S): 80 TABLET, FILM COATED ORAL at 22:03

## 2021-08-15 RX ADMIN — MEROPENEM 200 MILLIGRAM(S): 1 INJECTION INTRAVENOUS at 05:27

## 2021-08-15 NOTE — PROGRESS NOTE ADULT - SUBJECTIVE AND OBJECTIVE BOX
SUBJECTIVE: NAEON. AVSS. Denies fevers, chills. Urine output improved. Urine clear yellow.    heparin   Injectable 5000 Unit(s) SubCutaneous every 12 hours  meropenem  IVPB 1000 milliGRAM(s) IV Intermittent every 12 hours  metoprolol succinate ER 12.5 milliGRAM(s) Oral daily      Vital Signs Last 24 Hrs  T(C): 36.2 (15 Aug 2021 09:30), Max: 36.3 (14 Aug 2021 22:00)  T(F): 97.1 (15 Aug 2021 09:30), Max: 97.4 (14 Aug 2021 22:00)  HR: 80 (15 Aug 2021 08:20) (72 - 85)  BP: 170/79 (15 Aug 2021 08:20) (127/59 - 170/79)  BP(mean): --  RR: 18 (15 Aug 2021 08:20) (18 - 19)  SpO2: 94% (15 Aug 2021 08:20) (94% - 97%)  I&O's Detail    14 Aug 2021 07:01  -  15 Aug 2021 07:00  --------------------------------------------------------  IN:    dextrose 5% + sodium chloride 0.9%: 980 mL    Lactated Ringers: 620 mL    Lactated Ringers: 240 mL  Total IN: 1840 mL    OUT:    Bulb (mL): 115 mL    Colostomy (mL): 25 mL    Indwelling Catheter - Urethral (mL): 880 mL  Total OUT: 1020 mL    Total NET: 820 mL      15 Aug 2021 07:01  -  15 Aug 2021 10:52  --------------------------------------------------------  IN:    dextrose 5% + sodium chloride 0.9%: 210 mL    Oral Fluid: 240 mL  Total IN: 450 mL    OUT:    Bulb (mL): 20 mL    Colostomy (mL): 5 mL    Indwelling Catheter - Urethral (mL): 525 mL  Total OUT: 550 mL    Total NET: -100 mL          Physical Exam:  General: No acute distress, resting comfortably in bed  Neuro: AOX3  Pulm: Nonlabored breathing, no respiratory distress  : ciara in place, clear yellow      LABS:                        9.4    11.70 )-----------( 640      ( 15 Aug 2021 06:54 )             31.0     08-15    136  |  104  |  8   ----------------------------<  112<H>  4.0   |  24  |  0.73    Ca    7.8<L>      15 Aug 2021 06:54  Phos  2.2     08-15  Mg     2.0     08-15            RADIOLOGY & ADDITIONAL STUDIES:

## 2021-08-15 NOTE — PROGRESS NOTE ADULT - ASSESSMENT
89F with PMH of chronic back pain 2/2 L3,L4 compression fractures, triple vessel CAD and L CEA 2/2 L ICA stenosis >70% (4/21), recent ED visit on 6/28 for UTI p/w abdominal pain x 1 day, constant associated with weight loss and abdominal distention. CT a/p showing large necrotic mass arising from the sigmoid colon with posterior extension to the sacrum and caudal extension to the rectum and the uterus, findings also suspicious for complicated perforated distal sigmoid diverticulitis with irregular large abscess. 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 NM Renal Scan 8/11 w/ symmetric function but T1/2 of L kidney is 26 minutes consistent with obstruction. Pt now s/p loop colostomy w/ intraoperative L ureteral stent placement 8/13. KUB showing stent in position. WBC downtrending, Cr stable.     Recommendations:  - TOV per primary team, preferably when ambulating  - No further urologic intervention during this admission  - Rest of care per primary team  - Can follow up with Dr. Kitchen as an outpatient within 1-2 wks of discharge.   - Discussed with  attending

## 2021-08-15 NOTE — PROGRESS NOTE ADULT - SUBJECTIVE AND OBJECTIVE BOX
GENERAL SURGERY PROGRESS NOTE    SUBJECTIVE: Patient seen and examined bedside in morning with chief resident.     heparin   Injectable 5000 Unit(s) SubCutaneous every 12 hours  meropenem  IVPB 1000 milliGRAM(s) IV Intermittent every 12 hours  metoprolol succinate ER 12.5 milliGRAM(s) Oral daily      Vital Signs Last 24 Hrs  T(C): 36.7 (15 Aug 2021 16:00), Max: 36.7 (15 Aug 2021 16:00)  T(F): 98 (15 Aug 2021 16:00), Max: 98 (15 Aug 2021 16:00)  HR: 72 (15 Aug 2021 16:20) (72 - 85)  BP: 137/63 (15 Aug 2021 16:20) (136/65 - 170/79)  BP(mean): --  RR: 18 (15 Aug 2021 16:20) (18 - 19)  SpO2: 96% (15 Aug 2021 16:20) (94% - 96%)  I&O's Detail    14 Aug 2021 07:01  -  15 Aug 2021 07:00  --------------------------------------------------------  IN:    dextrose 5% + sodium chloride 0.9%: 980 mL    Lactated Ringers: 620 mL    Lactated Ringers: 240 mL  Total IN: 1840 mL    OUT:    Bulb (mL): 115 mL    Colostomy (mL): 25 mL    Indwelling Catheter - Urethral (mL): 880 mL  Total OUT: 1020 mL    Total NET: 820 mL      15 Aug 2021 07:01  -  15 Aug 2021 20:08  --------------------------------------------------------  IN:    dextrose 5% + sodium chloride 0.9%: 700 mL    Oral Fluid: 480 mL  Total IN: 1180 mL    OUT:    Bulb (mL): 60 mL    Colostomy (mL): 20 mL    Indwelling Catheter - Urethral (mL): 525 mL    Voided (mL): 125 mL  Total OUT: 730 mL    Total NET: 450 mL          PHYSICAL EXAM    General: NAD, resting comfortably in bed  C/V: NSR  Pulm: Nonlabored breathing, no respiratory distress  Abd: soft, NT/ND.  Extrem: WWP, no edema, SCDs in place        LABS:                        9.4    11.70 )-----------( 640      ( 15 Aug 2021 06:54 )             31.0     08-15    136  |  104  |  8   ----------------------------<  112<H>  4.0   |  24  |  0.73    Ca    7.8<L>      15 Aug 2021 06:54  Phos  2.2     08-15  Mg     2.0     08-15            RADIOLOGY & ADDITIONAL STUDIES:   GENERAL SURGERY PROGRESS NOTE    SUBJECTIVE: Patient seen and examined bedside in morning with chief resident. Overnight no acute events. Patient this morning no n/v, complains of some ab pain.     heparin   Injectable 5000 Unit(s) SubCutaneous every 12 hours  meropenem  IVPB 1000 milliGRAM(s) IV Intermittent every 12 hours  metoprolol succinate ER 12.5 milliGRAM(s) Oral daily      Vital Signs Last 24 Hrs  T(C): 36.7 (15 Aug 2021 16:00), Max: 36.7 (15 Aug 2021 16:00)  T(F): 98 (15 Aug 2021 16:00), Max: 98 (15 Aug 2021 16:00)  HR: 72 (15 Aug 2021 16:20) (72 - 85)  BP: 137/63 (15 Aug 2021 16:20) (136/65 - 170/79)  BP(mean): --  RR: 18 (15 Aug 2021 16:20) (18 - 19)  SpO2: 96% (15 Aug 2021 16:20) (94% - 96%)  I&O's Detail    14 Aug 2021 07:01  -  15 Aug 2021 07:00  --------------------------------------------------------  IN:    dextrose 5% + sodium chloride 0.9%: 980 mL    Lactated Ringers: 620 mL    Lactated Ringers: 240 mL  Total IN: 1840 mL    OUT:    Bulb (mL): 115 mL    Colostomy (mL): 25 mL    Indwelling Catheter - Urethral (mL): 880 mL  Total OUT: 1020 mL    Total NET: 820 mL      15 Aug 2021 07:01  -  15 Aug 2021 20:08  --------------------------------------------------------  IN:    dextrose 5% + sodium chloride 0.9%: 700 mL    Oral Fluid: 480 mL  Total IN: 1180 mL    OUT:    Bulb (mL): 60 mL    Colostomy (mL): 20 mL    Indwelling Catheter - Urethral (mL): 525 mL    Voided (mL): 125 mL  Total OUT: 730 mL    Total NET: 450 mL          PHYSICAL EXAM    General: NAD, resting comfortably in bed  C/V: NSR  Pulm: Nonlabored breathing, no respiratory distress on room air  Abd: soft, mildly distended, tenderness right hemiabdomen worse in lower quadrants around ostomy. Ostomy sweat no BM/gas. CORDELL in left glute SS. [place] clear urine output.  Extrem: WWP, no edema, SCDs in place, left calf tender but equal size bilaterally.        LABS:                        9.4    11.70 )-----------( 640      ( 15 Aug 2021 06:54 )             31.0     08-15    136  |  104  |  8   ----------------------------<  112<H>  4.0   |  24  |  0.73    Ca    7.8<L>      15 Aug 2021 06:54  Phos  2.2     08-15  Mg     2.0     08-15            RADIOLOGY & ADDITIONAL STUDIES:

## 2021-08-15 NOTE — PROGRESS NOTE ADULT - ASSESSMENT
L gluteal drain with 115cc serosanguinous output over last 24h (170cc day prior). Flushes easily with 2cc NS. Pt afebrile and asymptomatic, WBC wnl.  Continue to monitor output. IR will continue to follow.

## 2021-08-16 LAB
ANION GAP SERPL CALC-SCNC: 7 MMOL/L — SIGNIFICANT CHANGE UP (ref 5–17)
BASOPHILS # BLD AUTO: 0.04 K/UL — SIGNIFICANT CHANGE UP (ref 0–0.2)
BASOPHILS NFR BLD AUTO: 0.4 % — SIGNIFICANT CHANGE UP (ref 0–2)
BUN SERPL-MCNC: 5 MG/DL — LOW (ref 7–23)
CALCIUM SERPL-MCNC: 7.5 MG/DL — LOW (ref 8.4–10.5)
CHLORIDE SERPL-SCNC: 104 MMOL/L — SIGNIFICANT CHANGE UP (ref 96–108)
CO2 SERPL-SCNC: 23 MMOL/L — SIGNIFICANT CHANGE UP (ref 22–31)
CREAT SERPL-MCNC: 0.55 MG/DL — SIGNIFICANT CHANGE UP (ref 0.5–1.3)
EOSINOPHIL # BLD AUTO: 0.23 K/UL — SIGNIFICANT CHANGE UP (ref 0–0.5)
EOSINOPHIL NFR BLD AUTO: 2.1 % — SIGNIFICANT CHANGE UP (ref 0–6)
GLUCOSE SERPL-MCNC: 109 MG/DL — HIGH (ref 70–99)
HCT VFR BLD CALC: 28.9 % — LOW (ref 34.5–45)
HGB BLD-MCNC: 8.8 G/DL — LOW (ref 11.5–15.5)
IMM GRANULOCYTES NFR BLD AUTO: 0.8 % — SIGNIFICANT CHANGE UP (ref 0–1.5)
LYMPHOCYTES # BLD AUTO: 1.81 K/UL — SIGNIFICANT CHANGE UP (ref 1–3.3)
LYMPHOCYTES # BLD AUTO: 16.5 % — SIGNIFICANT CHANGE UP (ref 13–44)
MAGNESIUM SERPL-MCNC: 1.7 MG/DL — SIGNIFICANT CHANGE UP (ref 1.6–2.6)
MCHC RBC-ENTMCNC: 27.6 PG — SIGNIFICANT CHANGE UP (ref 27–34)
MCHC RBC-ENTMCNC: 30.4 GM/DL — LOW (ref 32–36)
MCV RBC AUTO: 90.6 FL — SIGNIFICANT CHANGE UP (ref 80–100)
MONOCYTES # BLD AUTO: 0.69 K/UL — SIGNIFICANT CHANGE UP (ref 0–0.9)
MONOCYTES NFR BLD AUTO: 6.3 % — SIGNIFICANT CHANGE UP (ref 2–14)
NEUTROPHILS # BLD AUTO: 8.13 K/UL — HIGH (ref 1.8–7.4)
NEUTROPHILS NFR BLD AUTO: 73.9 % — SIGNIFICANT CHANGE UP (ref 43–77)
NRBC # BLD: 0 /100 WBCS — SIGNIFICANT CHANGE UP (ref 0–0)
PHOSPHATE SERPL-MCNC: 1.7 MG/DL — LOW (ref 2.5–4.5)
PLATELET # BLD AUTO: 582 K/UL — HIGH (ref 150–400)
POTASSIUM SERPL-MCNC: 3.3 MMOL/L — LOW (ref 3.5–5.3)
POTASSIUM SERPL-SCNC: 3.3 MMOL/L — LOW (ref 3.5–5.3)
RBC # BLD: 3.19 M/UL — LOW (ref 3.8–5.2)
RBC # FLD: 15.9 % — HIGH (ref 10.3–14.5)
SODIUM SERPL-SCNC: 134 MMOL/L — LOW (ref 135–145)
WBC # BLD: 10.99 K/UL — HIGH (ref 3.8–10.5)
WBC # FLD AUTO: 10.99 K/UL — HIGH (ref 3.8–10.5)

## 2021-08-16 PROCEDURE — 74019 RADEX ABDOMEN 2 VIEWS: CPT | Mod: 26

## 2021-08-16 PROCEDURE — 99233 SBSQ HOSP IP/OBS HIGH 50: CPT

## 2021-08-16 PROCEDURE — 99232 SBSQ HOSP IP/OBS MODERATE 35: CPT | Mod: GC

## 2021-08-16 RX ORDER — CHLORHEXIDINE GLUCONATE 213 G/1000ML
1 SOLUTION TOPICAL DAILY
Refills: 0 | Status: DISCONTINUED | OUTPATIENT
Start: 2021-08-16 | End: 2021-09-08

## 2021-08-16 RX ORDER — POTASSIUM CHLORIDE 20 MEQ
10 PACKET (EA) ORAL
Refills: 0 | Status: COMPLETED | OUTPATIENT
Start: 2021-08-16 | End: 2021-08-16

## 2021-08-16 RX ORDER — POTASSIUM CHLORIDE 20 MEQ
40 PACKET (EA) ORAL
Refills: 0 | Status: DISCONTINUED | OUTPATIENT
Start: 2021-08-16 | End: 2021-08-16

## 2021-08-16 RX ORDER — ACETAMINOPHEN 500 MG
650 TABLET ORAL EVERY 6 HOURS
Refills: 0 | Status: DISCONTINUED | OUTPATIENT
Start: 2021-08-16 | End: 2021-08-18

## 2021-08-16 RX ORDER — MAGNESIUM SULFATE 500 MG/ML
1 VIAL (ML) INJECTION ONCE
Refills: 0 | Status: COMPLETED | OUTPATIENT
Start: 2021-08-16 | End: 2021-08-16

## 2021-08-16 RX ORDER — ASPIRIN/CALCIUM CARB/MAGNESIUM 324 MG
81 TABLET ORAL DAILY
Refills: 0 | Status: DISCONTINUED | OUTPATIENT
Start: 2021-08-16 | End: 2021-08-18

## 2021-08-16 RX ORDER — MAGNESIUM HYDROXIDE 400 MG/1
30 TABLET, CHEWABLE ORAL ONCE
Refills: 0 | Status: COMPLETED | OUTPATIENT
Start: 2021-08-16 | End: 2021-08-16

## 2021-08-16 RX ORDER — POTASSIUM PHOSPHATE, MONOBASIC POTASSIUM PHOSPHATE, DIBASIC 236; 224 MG/ML; MG/ML
15 INJECTION, SOLUTION INTRAVENOUS ONCE
Refills: 0 | Status: COMPLETED | OUTPATIENT
Start: 2021-08-16 | End: 2021-08-16

## 2021-08-16 RX ORDER — ACETAMINOPHEN 500 MG
700 TABLET ORAL ONCE
Refills: 0 | Status: COMPLETED | OUTPATIENT
Start: 2021-08-16 | End: 2021-08-16

## 2021-08-16 RX ADMIN — Medication 100 MILLIEQUIVALENT(S): at 18:54

## 2021-08-16 RX ADMIN — Medication 280 MILLIGRAM(S): at 06:07

## 2021-08-16 RX ADMIN — Medication 100 MILLIEQUIVALENT(S): at 09:46

## 2021-08-16 RX ADMIN — Medication 100 GRAM(S): at 07:46

## 2021-08-16 RX ADMIN — MEROPENEM 200 MILLIGRAM(S): 1 INJECTION INTRAVENOUS at 18:56

## 2021-08-16 RX ADMIN — HEPARIN SODIUM 5000 UNIT(S): 5000 INJECTION INTRAVENOUS; SUBCUTANEOUS at 17:56

## 2021-08-16 RX ADMIN — Medication 12.5 MILLIGRAM(S): at 05:18

## 2021-08-16 RX ADMIN — CHLORHEXIDINE GLUCONATE 1 APPLICATION(S): 213 SOLUTION TOPICAL at 13:39

## 2021-08-16 RX ADMIN — Medication 81 MILLIGRAM(S): at 12:16

## 2021-08-16 RX ADMIN — POTASSIUM PHOSPHATE, MONOBASIC POTASSIUM PHOSPHATE, DIBASIC 62.5 MILLIMOLE(S): 236; 224 INJECTION, SOLUTION INTRAVENOUS at 12:16

## 2021-08-16 RX ADMIN — HEPARIN SODIUM 5000 UNIT(S): 5000 INJECTION INTRAVENOUS; SUBCUTANEOUS at 05:18

## 2021-08-16 RX ADMIN — Medication 650 MILLIGRAM(S): at 17:57

## 2021-08-16 RX ADMIN — MAGNESIUM HYDROXIDE 30 MILLILITER(S): 400 TABLET, CHEWABLE ORAL at 09:46

## 2021-08-16 RX ADMIN — Medication 700 MILLIGRAM(S): at 06:47

## 2021-08-16 RX ADMIN — Medication 100 MILLIEQUIVALENT(S): at 16:08

## 2021-08-16 RX ADMIN — MEROPENEM 200 MILLIGRAM(S): 1 INJECTION INTRAVENOUS at 06:10

## 2021-08-16 RX ADMIN — Medication 650 MILLIGRAM(S): at 18:57

## 2021-08-16 NOTE — PROGRESS NOTE ADULT - ASSESSMENT
89F PMH HTN, chronic back pain 2/2 L3,L4 compression fractures, triple vessel CAD, and SBO 2/2 possible diverticulitis vs. GYN/colorectal malignancy (2018) never followed up and PSH L CEA 2/2 L ICA stenosis >70% (4/21) who presented with worsening back pain x1 week. Denies abdominal pain, endorses flatus and some recent weightloss. Transferred from medicine for perforated, complicated sigmoid diverticulitis vs. 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 OR on 8/13 for diverting colostomy.    CLD/IVF  Pain/nausea control PRN   metoprolol  Zosyn (8/8-8/11) Meropenem (8/11-)  F/u GI, Vascular surgery, Urology, Heme/onc, IR recs   AM Labs  [place]  Encourage OOB to chair

## 2021-08-16 NOTE — PHYSICAL THERAPY INITIAL EVALUATION ADULT - DIAGNOSIS, PT EVAL
4I: Impaired Joint Mobility, Motor Function, Muscle Performance, and Range of Motion Associated with Bony or Soft Tissue Surgery. 5A: Primary Prevention/Risk Reduction for Loss of Balance and Falling

## 2021-08-16 NOTE — PROGRESS NOTE ADULT - SUBJECTIVE AND OBJECTIVE BOX
INTERVAL HPI/OVERNIGHT EVENTS: no acute overnight events, failed TOV at 1900, bladder scanned with <80cc, b/l LE duplex negative for DVT    STATUS POST: diverting colostomy     POST OPERATIVE DAY #: 3    SUBJECTIVE:  Patient seen and examined at bedside with chief, reporting some abdominal pain. Denies any chest pain or shortness of breath, patient is hesitant to get out of bed to the chair. Denies any nausea or vomiting.     MEDICATIONS  (STANDING):  atorvastatin 80 milliGRAM(s) Oral at bedtime  dextrose 5% + sodium chloride 0.9%. 1000 milliLiter(s) (70 mL/Hr) IV Continuous <Continuous>  heparin   Injectable 5000 Unit(s) SubCutaneous every 12 hours  meropenem  IVPB 1000 milliGRAM(s) IV Intermittent every 12 hours  metoprolol succinate ER 12.5 milliGRAM(s) Oral daily  potassium chloride  10 mEq/100 mL IVPB 10 milliEquivalent(s) IV Intermittent every 1 hour  potassium phosphate IVPB 15 milliMole(s) IV Intermittent once    MEDICATIONS  (PRN):  acetaminophen   Tablet .. 650 milliGRAM(s) Oral every 6 hours PRN Mild Pain (1 - 3), Moderate Pain (4 - 6)      Vital Signs Last 24 Hrs  T(C): 36.1 (16 Aug 2021 04:38), Max: 36.8 (15 Aug 2021 22:38)  T(F): 97 (16 Aug 2021 04:38), Max: 98.2 (15 Aug 2021 22:38)  HR: 73 (16 Aug 2021 04:00) (72 - 78)  BP: 155/68 (16 Aug 2021 04:00) (137/63 - 161/72)  BP(mean): 98 (16 Aug 2021 04:00) (93 - 108)  RR: 18 (16 Aug 2021 04:00) (18 - 18)  SpO2: 95% (16 Aug 2021 04:00) (93% - 96%)    PHYSICAL EXAM:  Constitutional: A&Ox3, AVSS    Respiratory: non labored breathing, no respiratory distress    Cardiovascular: NSR, RRR    Gastrointestinal: Abdomen is soft, TTP especially in the RUQ, slightly distended. CORDELL in place, serosang output. Small amount of bowel sweat in ostomy, no stool or gas. Dressings were changed. Stoma appears pink, healthy and patent.     Extremities: (-) edema, SCDs in place    I&O's Detail    15 Aug 2021 07:01  -  16 Aug 2021 07:00  --------------------------------------------------------  IN:    dextrose 5% + sodium chloride 0.9%: 1470 mL    IV PiggyBack: 130 mL    Oral Fluid: 530 mL  Total IN: 2130 mL    OUT:    Bulb (mL): 105 mL    Colostomy (mL): 20 mL    Indwelling Catheter - Urethral (mL): 525 mL    Voided (mL): 1000 mL  Total OUT: 1650 mL    Total NET: 480 mL      16 Aug 2021 07:01  -  16 Aug 2021 08:44  --------------------------------------------------------  IN:    IV PiggyBack: 100 mL  Total IN: 100 mL    OUT:  Total OUT: 0 mL    Total NET: 100 mL          LABS:                        8.8    10.99 )-----------( 582      ( 16 Aug 2021 06:44 )             28.9     08-16    134<L>  |  104  |  5<L>  ----------------------------<  109<H>  3.3<L>   |  23  |  0.55    Ca    7.5<L>      16 Aug 2021 06:44  Phos  1.7     08-16  Mg     1.7     08-16            RADIOLOGY & ADDITIONAL STUDIES:

## 2021-08-16 NOTE — PROGRESS NOTE ADULT - SUBJECTIVE AND OBJECTIVE BOX
INTERVAL HPI/OVERNIGHT EVENTS:    Pt was seen and examined at the bedside. He was observed to be lying down comfortably.   Pt c/o      VITAL SIGNS:  T(F): 98.3 (08-16-21 @ 10:02)  HR: 73 (08-16-21 @ 08:31)  BP: 157/70 (08-16-21 @ 08:31)  RR: 20 (08-16-21 @ 08:31)  SpO2: 97% (08-16-21 @ 08:31)  Wt(kg): --  I&O's Summary    15 Aug 2021 07:01  -  16 Aug 2021 07:00  --------------------------------------------------------  IN: 2130 mL / OUT: 1650 mL / NET: 480 mL    16 Aug 2021 07:01  -  16 Aug 2021 13:35  --------------------------------------------------------  IN: 410 mL / OUT: 210 mL / NET: 200 mL      PHYSICAL EXAM:  Constitutional: NAD, well-groomed, well-developed  HEENT: PERRLA, EOMI, Normal Hearing, MMM  Neck: No LAD, No JVD  Back: Normal spine flexure, No CVA tenderness  Respiratory: CTAB  Cardiovascular: S1 and S2, RRR, no M/G/R  Gastrointestinal: BS+, soft, NT/ND  Extremities: No peripheral edema  Vascular: 2+ peripheral pulses  Neurological: A/O x 3, no focal deficits  Psychiatric: Normal mood, normal affect  Musculoskeletal: 5/5 strength b/l upper and lower extremities  Skin: No rashes        MEDICATIONS  (STANDING):  aspirin  chewable 81 milliGRAM(s) Oral daily  atorvastatin 80 milliGRAM(s) Oral at bedtime  chlorhexidine 2% Cloths 1 Application(s) Topical daily  heparin   Injectable 5000 Unit(s) SubCutaneous every 12 hours  meropenem  IVPB 1000 milliGRAM(s) IV Intermittent every 12 hours  metoprolol succinate ER 12.5 milliGRAM(s) Oral daily  potassium chloride  10 mEq/100 mL IVPB 10 milliEquivalent(s) IV Intermittent every 1 hour    MEDICATIONS  (PRN):  acetaminophen   Tablet .. 650 milliGRAM(s) Oral every 6 hours PRN Mild Pain (1 - 3), Moderate Pain (4 - 6)      Allergies    No Known Allergies    Intolerances        LABS:  CBC Full  -  ( 16 Aug 2021 06:44 )  WBC Count : 10.99 K/uL  RBC Count : 3.19 M/uL  Hemoglobin : 8.8 g/dL  Hematocrit : 28.9 %  Platelet Count - Automated : 582 K/uL  Mean Cell Volume : 90.6 fl  Mean Cell Hemoglobin : 27.6 pg  Mean Cell Hemoglobin Concentration : 30.4 gm/dL  Auto Neutrophil # : 8.13 K/uL  Auto Lymphocyte # : 1.81 K/uL  Auto Monocyte # : 0.69 K/uL  Auto Eosinophil # : 0.23 K/uL  Auto Basophil # : 0.04 K/uL  Auto Neutrophil % : 73.9 %  Auto Lymphocyte % : 16.5 %  Auto Monocyte % : 6.3 %  Auto Eosinophil % : 2.1 %  Auto Basophil % : 0.4 %    08-16    134<L>  |  104  |  5<L>  ----------------------------<  109<H>  3.3<L>   |  23  |  0.55    Ca    7.5<L>      16 Aug 2021 06:44  Phos  1.7     08-16  Mg     1.7     08-16                  RADIOLOGY & ADDITIONAL TESTS: Reviewed.           INTERVAL HPI/OVERNIGHT EVENTS:    Pt was seen and examined at the bedside. She was observed to be lying down comfortably.   No complaints.    VITAL SIGNS:  T(F): 98.3 (08-16-21 @ 10:02)  HR: 73 (08-16-21 @ 08:31)  BP: 157/70 (08-16-21 @ 08:31)  RR: 20 (08-16-21 @ 08:31)  SpO2: 97% (08-16-21 @ 08:31)  Wt(kg): --  I&O's Summary    15 Aug 2021 07:01  -  16 Aug 2021 07:00  --------------------------------------------------------  IN: 2130 mL / OUT: 1650 mL / NET: 480 mL    16 Aug 2021 07:01  -  16 Aug 2021 13:35  --------------------------------------------------------  IN: 410 mL / OUT: 210 mL / NET: 200 mL      PHYSICAL EXAM:  Constitutional: NAD, well-groomed, well-developed  HEENT: PERRLA, EOMI, Normal Hearing, MMM  Neck: No LAD, No JVD  Back: Normal spine flexure, No CVA tenderness  Respiratory: CTAB  Cardiovascular: S1 and S2, RRR, no M/G/R  Gastrointestinal: BS+, soft, ostomy+  Extremities: No peripheral edema  Vascular: 2+ peripheral pulses        MEDICATIONS  (STANDING):  aspirin  chewable 81 milliGRAM(s) Oral daily  atorvastatin 80 milliGRAM(s) Oral at bedtime  chlorhexidine 2% Cloths 1 Application(s) Topical daily  heparin   Injectable 5000 Unit(s) SubCutaneous every 12 hours  meropenem  IVPB 1000 milliGRAM(s) IV Intermittent every 12 hours  metoprolol succinate ER 12.5 milliGRAM(s) Oral daily  potassium chloride  10 mEq/100 mL IVPB 10 milliEquivalent(s) IV Intermittent every 1 hour    MEDICATIONS  (PRN):  acetaminophen   Tablet .. 650 milliGRAM(s) Oral every 6 hours PRN Mild Pain (1 - 3), Moderate Pain (4 - 6)      Allergies    No Known Allergies    Intolerances        LABS:  CBC Full  -  ( 16 Aug 2021 06:44 )  WBC Count : 10.99 K/uL  RBC Count : 3.19 M/uL  Hemoglobin : 8.8 g/dL  Hematocrit : 28.9 %  Platelet Count - Automated : 582 K/uL  Mean Cell Volume : 90.6 fl  Mean Cell Hemoglobin : 27.6 pg  Mean Cell Hemoglobin Concentration : 30.4 gm/dL  Auto Neutrophil # : 8.13 K/uL  Auto Lymphocyte # : 1.81 K/uL  Auto Monocyte # : 0.69 K/uL  Auto Eosinophil # : 0.23 K/uL  Auto Basophil # : 0.04 K/uL  Auto Neutrophil % : 73.9 %  Auto Lymphocyte % : 16.5 %  Auto Monocyte % : 6.3 %  Auto Eosinophil % : 2.1 %  Auto Basophil % : 0.4 %    08-16    134<L>  |  104  |  5<L>  ----------------------------<  109<H>  3.3<L>   |  23  |  0.55    Ca    7.5<L>      16 Aug 2021 06:44  Phos  1.7     08-16  Mg     1.7     08-16                  RADIOLOGY & ADDITIONAL TESTS: Reviewed.

## 2021-08-16 NOTE — PROGRESS NOTE ADULT - ATTENDING COMMENTS
88 YO F with PMH of chronic back pain 2/2 L3,L4 compression fractures, triple vessel CAD and L CEA 2/2 L ICA stenosis >70% (4/21), recent ED visit on 6/28 for UTI p/w abdominal pain x 1 day, constant associated with weight loss and abdominal distention. CT a/p showing large necrotic mass arising from the sigmoid colon with posterior extension to the sacrum and caudal extension to the rectum and the uterus, findings also suspicious for complicated perforated distal sigmoid diverticulitis with irregular large abscess. CT Chest -ve     8/11/2021:  Surgical Pathology Report:   Final Diagnosis  Proximal rectal biopsy:  - Invasive adenocarcinoma, moderately differentiated.  Note: MSI studies are pending.    8/13/2021: s/p loop colostomy on 8/13. Distal transverse colon brought up as loop to left hemiadbominal stoma aperture without tension. Palpable pelvic mass,  no gross carcinomatosis or peritonitis noted    Rectal adenocarcinoma ([id number]). Will need re-imaging once infection cleared to decide next steps.

## 2021-08-16 NOTE — PROGRESS NOTE ADULT - SUBJECTIVE AND OBJECTIVE BOX
INTERVAL HPI/OVERNIGHT EVENTS:  Patient was seen and examined at bedside. As per nurse and patient, no o/n events, patient resting comfortably. No complaints at this time. Patient denies: fever, chills, lightheadedness, weakness, CP, palpitations, SOB, cough, N/V. ROS otherwise negative.    VITAL SIGNS:  T(F): 98.8 (08-16-21 @ 17:02)  HR: 70 (08-16-21 @ 20:44)  BP: 122/78 (08-16-21 @ 20:44)  RR: 18 (08-16-21 @ 20:44)  SpO2: 96% (08-16-21 @ 20:44)  Wt(kg): --      08-15-21 @ 07:01  -  08-16-21 @ 07:00  --------------------------------------------------------  IN: 2130 mL / OUT: 1650 mL / NET: 480 mL    08-16-21 @ 07:01  -  08-16-21 @ 20:58  --------------------------------------------------------  IN: 960 mL / OUT: 480 mL / NET: 480 mL        PHYSICAL EXAM:    Constitutional: sleeping in bed, rousable and interactive  HEENT: NC/AT, PER, anicteric sclera, no nasal discharge; MMM  Respiratory: CTA B/L; no W/R/R, no retractions  Cardiac: +S1/S2; RRR; no M/R/G  Gastrointestinal: slightly tender, still distended, ostomy tube with clean edges and healthy-appearing mucosa visible  Extremities: WWP, no clubbing or cyanosis; no peripheral edema  Vascular: 2+ radial, DP/PT pulses B/L  Neurologic: CNII-XII grossly intact; no focal deficits  Psychiatric: affect and characteristics of appearance, verbalizations, behaviors are appropriate    MEDICATIONS  (STANDING):  acetaminophen   Tablet .. 650 milliGRAM(s) Oral every 6 hours  aspirin  chewable 81 milliGRAM(s) Oral daily  atorvastatin 80 milliGRAM(s) Oral at bedtime  chlorhexidine 2% Cloths 1 Application(s) Topical daily  heparin   Injectable 5000 Unit(s) SubCutaneous every 12 hours  meropenem  IVPB 1000 milliGRAM(s) IV Intermittent every 12 hours  metoprolol succinate ER 12.5 milliGRAM(s) Oral daily    MEDICATIONS  (PRN):      Allergies    No Known Allergies    Intolerances        LABS:                        8.8    10.99 )-----------( 582      ( 16 Aug 2021 06:44 )             28.9     08-16    134<L>  |  104  |  5<L>  ----------------------------<  109<H>  3.3<L>   |  23  |  0.55    Ca    7.5<L>      16 Aug 2021 06:44  Phos  1.7     08-16  Mg     1.7     08-16            RADIOLOGY & ADDITIONAL TESTS:  Reviewed

## 2021-08-16 NOTE — PROGRESS NOTE ADULT - ASSESSMENT
A/P   GI function not yet returned.  Stoma was intubated with red rubber catheter and small amount of gas released.  Some semiformed stool on cath when removed.  Mild soft distension  Afeb, VSS  Will get oob  MOM x 2 tablespoons to be ordered.  Enterostomal RN WOCN to be consulted   for placement to SNF or Rehab facility for stomal teaching and to ambulate the patient.

## 2021-08-16 NOTE — PROGRESS NOTE ADULT - ATTENDING COMMENTS
As above.  Complicated infection with in setting of adenocarcinoma arising from sigmoid.  Would continue meropenem for now.  Will need imaging to determine duration.  Will follow with you - team 1.

## 2021-08-16 NOTE — PROGRESS NOTE ADULT - ASSESSMENT
L gluteal drain with 105cc serosanguinous output over last 24h (115cc day prior). Flushes easily with 2cc NS. Pt afebrile and asymptomatic, WBC spike over the weekend, now downtrending to 10.99.  Continue to monitor output. IR will continue to follow.

## 2021-08-16 NOTE — PHYSICAL THERAPY INITIAL EVALUATION ADULT - PERTINENT HX OF CURRENT PROBLEM, REHAB EVAL
89F PMHx of HTN, SBO, CAD, Carotid stenosis s/p CEA 4/21 and chronic back pain 2/2 compression fx of L3/L4 p/w lower back pain. 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 Admitted for work up colonic mass that is concerning for malignancy vs abscess

## 2021-08-16 NOTE — PROGRESS NOTE ADULT - ASSESSMENT
89 F with PMHx back pain, CAD, CEA, UTI presented with back pain and found to have high grade partial sigmoid obstruction with gluteal abscess. ID consulted based on wound cultures positive for E coli.    Based on her polymicrobial wound culture including anaerobes and ESBL E coli, gut translocation due to perforation or mucosal breakdown is likely. Strong gram negative and anaerobic coverage indicated at this time.    Would recommend:  - Please continue meropenem 1000mg every 12 hours (8/11 - )  - Please obtain daily CBC with diff, BMP while on antibiotics inpatient  - Please obtain blood cultures if patient is febrile T>38.0 C  - management of adenocarcinoma per primary team  - duration pending clinical improvement, will update    ID Team 1 will continue to follow. Please see below attending attestation for finalized recommendations.    Austen Kruse MD PGY2 Internal Medicine  Infectious Disease Consult Service

## 2021-08-16 NOTE — PROGRESS NOTE ADULT - ASSESSMENT
88 YO F with PMH of chronic back pain 2/2 L3,L4 compression fractures, triple vessel CAD and L CEA 2/2 L ICA stenosis >70% (4/21), recent ED visit on 6/28 for UTI p/w abdominal pain x 1 day, constant associated with weight loss and abdominal distention. CT a/p showing large necrotic mass arising from the sigmoid colon with posterior extension to the sacrum and caudal extension to the rectum and the uterus, findings also suspicious for complicated perforated distal sigmoid diverticulitis with irregular large abscess. Oncology consulted for malignancy workup.    CT Abdomen and Pelvis w/ IV Cont (08.08.21 @ 01:06)   1.  Findings suspicious for complicated perforated distal sigmoid diverticulitis with irregular large abscess in the presacral/posterior perirectal space and extending into the left gluteal region. Small presacral/pelvic extraluminal air (602:43, 2:65). No definite associated adjacent osseous erosion. No bowel obstruction, however moderate colonic stool burden from probable constipation. Findings can be also seen in perforated distal sigmoid colon cancer, clinical correlation is recommended.  2.  Left hydronephroureter to the level of left internal iliac artery, may be obstructed by pelvic abscess/pelvic inflammatory process, transition point on image 2:58.  3.  Unchanged left adrenal adenoma, hiatal hernia, severe aortoiliac calcifications.  4.  Anasarca.    CT Chest No Cont (08.08.21 @ 16:20)   IMPRESSION:  No suspicious pulmonary finding.  Small bilateral pleural effusions, greater on the right.    8/11/2021:  Surgical Pathology Report:   Final Diagnosis  Proximal rectal biopsy:  - Invasive adenocarcinoma, moderately differentiated.  Note: MSI studies are pending.    8/13/2021:  Brief OP note:  Distal transverse colon brought up as loop to left hemiadbominal stoma aperture without tension. Palpable pelvic mass,  no gross carcinomatosis or peritonitis noted    Rectal adenocarcinoma (T4NxM0)  CEA 7  s/p loop colostomy on 8/13  CT a/p showing large necrotic mass arising from the sigmoid colon with posterior extension to the sacrum and caudal extension to the rectum and the uterus, findings also suspicious for complicated perforated distal sigmoid diverticulitis with irregular large abscess in the Surgical presacral/posterior perirectal space and extending into the left gluteal region. Small presacral/pelvic extraluminal air. Left hydronephroureter to the level of left internal iliac artery, may be obstructed by pelvic abscess/pelvic inflammatory process.   Needs MRI pelvis  Disposition to [facility] when clinically stable from surgery standpoint        Discussed with Dr. Francisco.    89 YO F with PMH of chronic back pain 2/2 L3,L4 compression fractures, triple vessel CAD and L CEA 2/2 L ICA stenosis >70% (4/21), recent ED visit on 6/28 for UTI p/w abdominal pain x 1 day, constant associated with weight loss and abdominal distention. CT a/p showing large necrotic mass arising from the sigmoid colon with posterior extension to the sacrum and caudal extension to the rectum and the uterus, findings also suspicious for complicated perforated distal sigmoid diverticulitis with irregular large abscess. Oncology consulted for malignancy workup.    CT Abdomen and Pelvis w/ IV Cont (08.08.21 @ 01:06)   1.  Findings suspicious for complicated perforated distal sigmoid diverticulitis with irregular large abscess in the presacral/posterior perirectal space and extending into the left gluteal region. Small presacral/pelvic extraluminal air (602:43, 2:65). No definite associated adjacent osseous erosion. No bowel obstruction, however moderate colonic stool burden from probable constipation. Findings can be also seen in perforated distal sigmoid colon cancer, clinical correlation is recommended.  2.  Left hydronephroureter to the level of left internal iliac artery, may be obstructed by pelvic abscess/pelvic inflammatory process, transition point on image 2:58.  3.  Unchanged left adrenal adenoma, hiatal hernia, severe aortoiliac calcifications.  4.  Anasarca.    CT Chest No Cont (08.08.21 @ 16:20)   IMPRESSION:  No suspicious pulmonary finding.  Small bilateral pleural effusions, greater on the right.    8/11/2021:  Surgical Pathology Report:   Final Diagnosis  Proximal rectal biopsy:  - Invasive adenocarcinoma, moderately differentiated.  Note: MSI studies are pending.    8/13/2021:  Brief OP note:  Distal transverse colon brought up as loop to left hemiadbominal stoma aperture without tension. Palpable pelvic mass,  no gross carcinomatosis or peritonitis noted    Rectal adenocarcinoma (T4NxM0)  CEA is 7  s/p loop colostomy on 8/13  Current ECOG is 3, but unclear if more from hospitalization and current illness. Will need rehab and the re-evaulation.  CT a/p showing large necrotic mass arising from the sigmoid colon with posterior extension to the sacrum and caudal extension to the rectum and the uterus, findings also suspicious for complicated perforated distal sigmoid diverticulitis with irregular large abscess in the Surgical presacral/posterior perirectal space and extending into the left gluteal region. Small presacral/pelvic extraluminal air. Left hydronephroureter to the level of left internal iliac artery, may be obstructed by pelvic abscess/pelvic inflammatory process.   Needs MRI pelvis once infection cleared  Disposition to [facility] when clinically stable from surgery standpoint  Will f/u microsatellite staus      For follow up please have patient schedule an appointment with Dr.Lakshmi Francisco, Chief Hematology/Oncology [facility].  Phone number to make appointment is [phone].        Discussed with Dr. Francisco.

## 2021-08-16 NOTE — ADVANCED PRACTICE NURSE CONSULT - ASSESSMENT
Colostomy pink, round, budded, measuring 1 7/8 inches; Red rubber catheter noted at 12 and 6 o'clock. Small amount of serosanguinous effluent noted in pouch. WOCN changed the pouching system while instructing the patient. The patient was able to open and close the ostomy pouch with WOCN coaching. WOCN cleansed peristomal skin, applied Cavilon skin prep, measured the stoma, cut the skin barrier to accommodate the stoma and the red rubber catheter, applied an ostomy ring around the stoma, then a Emerson 2 piece 2 3/4 inch, flat, cut to fit, lock and roll pouching system. Osotmy supplies left at the bedside.  Colostomy pink, round, budded, measuring 1 7/8 inches; Red rubber catheter noted at 12 and 6 o'clock. Small amount of serosanguinous effluent noted in pouch. WOCN changed the pouching system while instructing the patient. The patient was able to open and close the ostomy pouch with WOCN coaching. WOCN cleansed peristomal skin, applied Cavilon skin prep, measured the stoma, cut the skin barrier to accommodate the stoma and the red rubber catheter, applied an ostomy ring around the stoma, then a Emerson 2 piece 2 3/4 inch, flat, cut to fit, lock and roll pouching system. Ostomy supplies left at the bedside.

## 2021-08-16 NOTE — PROGRESS NOTE ADULT - SUBJECTIVE AND OBJECTIVE BOX
POD 3 s/p colostomy formation and placement of left ureteral double J stent    Not been OOB since op.  Been refusing to mobilize.  Been on clear liquids with no N/V.  No  stoma output yet.  [place] out over weekend.  Had some retention and required catherization.  Has voided 150 ml spontaneously this AM.  C/o back and some abdominal pain.    Vital Signs Last 24 Hrs  T(C): 36.8 (16 Aug 2021 10:02), Max: 36.8 (15 Aug 2021 22:38)  T(F): 98.3 (16 Aug 2021 10:02), Max: 98.3 (16 Aug 2021 10:02)  HR: 73 (16 Aug 2021 08:31) (72 - 78)  BP: 157/70 (16 Aug 2021 08:31) (137/63 - 161/72)  BP(mean): 101 (16 Aug 2021 08:31) (93 - 108)  RR: 20 (16 Aug 2021 08:31) (18 - 20)  SpO2: 97% (16 Aug 2021 08:31) (93% - 97%)    lungs clear  cor  S1S2  abd: soft distension and mild direct RUQ tenderness, no rebound. BS present but low frequency  stoma is edematous and bag empty minus 5-10 ml green serous fluid  ext: without calf tenderness    UO:  1000 ml/24 hrs  (got bolus yesterday)                          8.8    10.99 )-----------( 582      ( 16 Aug 2021 06:44 )             28.9   08-16    134<L>  |  104  |  5<L>  ----------------------------<  109<H>  3.3<L>   |  23  |  0.55    Ca    7.5<L>      16 Aug 2021 06:44  Phos  1.7     08-16  Mg     1.7     08-16

## 2021-08-16 NOTE — CHART NOTE - NSCHARTNOTEFT_GEN_A_CORE
Admitting Diagnosis:   Patient is a 89y old  Female who presents with a chief complaint of Neck pain (16 Aug 2021 13:34)      PAST MEDICAL & SURGICAL HISTORY:  SBO (small bowel obstruction)    HTN (hypertension)    Chronic back pain    S/P wrist surgery        Current Nutrition Order: Clear Liquid Diet + Ensure Clear 3x day (240 kcal, 8 g protein per serving)     PO Intake: Good (%) [   ]  Fair (50-75%) [   ] Poor (<25%) [ x ]    GI Issues: Pt endorses abdominal pain during RD interview. Denies nausea/vomiting. Last bowel movement 8/13. Noted with output in colostomy 20 mL 8/16.     Pain: Endorses pain at time of RD interview. Tylenol daily.    Skin Integrity: No pressure injures documented. Left upper quadrant surgical incision per chart. Douglas score: 17.     Labs:   08-16    134<L>  |  104  |  5<L>  ----------------------------<  109<H>  3.3<L>   |  23  |  0.55    Ca    7.5<L>      16 Aug 2021 06:44  Phos  1.7     08-16  Mg     1.7     08-16      CAPILLARY BLOOD GLUCOSE          Medications:  MEDICATIONS  (STANDING):  aspirin  chewable 81 milliGRAM(s) Oral daily  atorvastatin 80 milliGRAM(s) Oral at bedtime  chlorhexidine 2% Cloths 1 Application(s) Topical daily  heparin   Injectable 5000 Unit(s) SubCutaneous every 12 hours  meropenem  IVPB 1000 milliGRAM(s) IV Intermittent every 12 hours  metoprolol succinate ER 12.5 milliGRAM(s) Oral daily  potassium chloride  10 mEq/100 mL IVPB 10 milliEquivalent(s) IV Intermittent every 1 hour    MEDICATIONS  (PRN):  acetaminophen   Tablet .. 650 milliGRAM(s) Oral every 6 hours PRN Mild Pain (1 - 3), Moderate Pain (4 - 6)    Admitted Anthropometrics:  Height: 5'0" Weight: 90lbs, IBW 100lbs+/-10%, %IBW 90%, BMI 17.6     Weight Change: Per initial RD note pt reports UBW to be 110 lbs, current adm wt 90 lbs, indicates wt loss of 20 lbs/18% x 1 year.  8/13: 89.9 pounds     Estimated energy needs:   ABW (40.8kg) used to calculate energy needs due to pt's current body weight within % IBW (90%).   Nutrient needs based on [facility] standards of care for maintenance in older adults.   Needs adjusted for age, pre-op/post-op healing, severe protein calorie malnutrition  1020-1224kcal/day (25-30kcal/kg)  61-69g pro/day (1.5-1.7g pro/kg)  1224-1428ml fluid/day (30-35ml/kg]     Subjective:   89F PMH HTN, chronic back pain 2/2 L3,L4 compression fractures, triple vessel CAD, and SBO 2/2 possible diverticulitis vs. GYN/colorectal malignancy (2018) never followed up and PSH L CEA 2/2 L ICA stenosis >70% (4/21) who presented with worsening back pain x1 week. Endorses flatus and some recent weight loss. Transferred from medicine for perforated, complicated sigmoid diverticulitis vs. 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 OR 8/13 for diverting colostomy.    Pt seen at bedside for follow up assessment. WBC elevated; trending down. RBC low; trending down. Pt noted with hyponatremia, hypophosphatemia, and hypokalemia 8/16; supplementing with potassium chloride. Per RN, pt not eating well; observed lunch tray <25% completed. Pt able to complete ~1-2 Ensure Clear /day. Based on reported PO intake likely meeting <50% EER. Encouraged pt to optimize PO intake to meet nutritional needs; pt amenable to education and verbalized willingness to adhere. Reinforcement previous diet education on likelihood of diet advancement to full liquid--> low fiber diet. Pt willing to trial Ensure Enlive 3x/day (350 kcal, 20 g protein per serving) upon diet advancement. Made aware RD remains available. RD to follow up per protocol.     Previous Nutrition Diagnosis:  Severe protein calorie malnutrition as evidenced by NFPE performed, noted severe fat and muscle wasting, prolonged poor PO intake <75% of EER for 1 year.  Active [  x ]  Resolved [   ]    If resolved, new PES:     Goal: Pt to meet at least 75% of EER during hospital stay    Recommendations:  1. As medically feasible, recommend advancing diet to full--> low fiber with Ensure Enlive 3x/day (350 kcal, 20 g protein per serving)   2. Encourage pt to meet nutritional needs as able   3. Consider multivitamin to optimize nutritional status   4. Defer pain and bowel regimen to team   5. Monitor ostomy output     Education: Reinforced previous diet education.    Risk Level: High [ x  ] Moderate [   ] Low [   ]

## 2021-08-17 LAB
ANION GAP SERPL CALC-SCNC: 3 MMOL/L — LOW (ref 5–17)
BASOPHILS # BLD AUTO: 0.06 K/UL — SIGNIFICANT CHANGE UP (ref 0–0.2)
BASOPHILS NFR BLD AUTO: 0.5 % — SIGNIFICANT CHANGE UP (ref 0–2)
BUN SERPL-MCNC: 9 MG/DL — SIGNIFICANT CHANGE UP (ref 7–23)
CALCIUM SERPL-MCNC: 7.5 MG/DL — LOW (ref 8.4–10.5)
CHLORIDE SERPL-SCNC: 105 MMOL/L — SIGNIFICANT CHANGE UP (ref 96–108)
CO2 SERPL-SCNC: 24 MMOL/L — SIGNIFICANT CHANGE UP (ref 22–31)
CREAT SERPL-MCNC: 0.58 MG/DL — SIGNIFICANT CHANGE UP (ref 0.5–1.3)
EOSINOPHIL # BLD AUTO: 0.64 K/UL — HIGH (ref 0–0.5)
EOSINOPHIL NFR BLD AUTO: 5.3 % — SIGNIFICANT CHANGE UP (ref 0–6)
GLUCOSE SERPL-MCNC: 101 MG/DL — HIGH (ref 70–99)
HCT VFR BLD CALC: 26.1 % — LOW (ref 34.5–45)
HCT VFR BLD CALC: 26.9 % — LOW (ref 34.5–45)
HGB BLD-MCNC: 8.1 G/DL — LOW (ref 11.5–15.5)
HGB BLD-MCNC: 8.3 G/DL — LOW (ref 11.5–15.5)
IMM GRANULOCYTES NFR BLD AUTO: 0.7 % — SIGNIFICANT CHANGE UP (ref 0–1.5)
LYMPHOCYTES # BLD AUTO: 2.8 K/UL — SIGNIFICANT CHANGE UP (ref 1–3.3)
LYMPHOCYTES # BLD AUTO: 23.3 % — SIGNIFICANT CHANGE UP (ref 13–44)
MAGNESIUM SERPL-MCNC: 2.2 MG/DL — SIGNIFICANT CHANGE UP (ref 1.6–2.6)
MCHC RBC-ENTMCNC: 27.5 PG — SIGNIFICANT CHANGE UP (ref 27–34)
MCHC RBC-ENTMCNC: 27.6 PG — SIGNIFICANT CHANGE UP (ref 27–34)
MCHC RBC-ENTMCNC: 30.9 GM/DL — LOW (ref 32–36)
MCHC RBC-ENTMCNC: 31 GM/DL — LOW (ref 32–36)
MCV RBC AUTO: 89.1 FL — SIGNIFICANT CHANGE UP (ref 80–100)
MCV RBC AUTO: 89.1 FL — SIGNIFICANT CHANGE UP (ref 80–100)
MONOCYTES # BLD AUTO: 0.59 K/UL — SIGNIFICANT CHANGE UP (ref 0–0.9)
MONOCYTES NFR BLD AUTO: 4.9 % — SIGNIFICANT CHANGE UP (ref 2–14)
NEUTROPHILS # BLD AUTO: 7.86 K/UL — HIGH (ref 1.8–7.4)
NEUTROPHILS NFR BLD AUTO: 65.3 % — SIGNIFICANT CHANGE UP (ref 43–77)
NRBC # BLD: 0 /100 WBCS — SIGNIFICANT CHANGE UP (ref 0–0)
NRBC # BLD: 0 /100 WBCS — SIGNIFICANT CHANGE UP (ref 0–0)
PHOSPHATE SERPL-MCNC: 1.7 MG/DL — LOW (ref 2.5–4.5)
PLATELET # BLD AUTO: 639 K/UL — HIGH (ref 150–400)
PLATELET # BLD AUTO: 722 K/UL — HIGH (ref 150–400)
POTASSIUM SERPL-MCNC: 4.2 MMOL/L — SIGNIFICANT CHANGE UP (ref 3.5–5.3)
POTASSIUM SERPL-SCNC: 4.2 MMOL/L — SIGNIFICANT CHANGE UP (ref 3.5–5.3)
RBC # BLD: 2.93 M/UL — LOW (ref 3.8–5.2)
RBC # BLD: 3.02 M/UL — LOW (ref 3.8–5.2)
RBC # FLD: 15.8 % — HIGH (ref 10.3–14.5)
RBC # FLD: 16.1 % — HIGH (ref 10.3–14.5)
SARS-COV-2 RNA SPEC QL NAA+PROBE: SIGNIFICANT CHANGE UP
SODIUM SERPL-SCNC: 132 MMOL/L — LOW (ref 135–145)
SURGICAL PATHOLOGY STUDY: SIGNIFICANT CHANGE UP
WBC # BLD: 12.03 K/UL — HIGH (ref 3.8–10.5)
WBC # BLD: 12.67 K/UL — HIGH (ref 3.8–10.5)
WBC # FLD AUTO: 12.03 K/UL — HIGH (ref 3.8–10.5)
WBC # FLD AUTO: 12.67 K/UL — HIGH (ref 3.8–10.5)

## 2021-08-17 PROCEDURE — 99232 SBSQ HOSP IP/OBS MODERATE 35: CPT | Mod: GC

## 2021-08-17 RX ORDER — ONDANSETRON 8 MG/1
4 TABLET, FILM COATED ORAL ONCE
Refills: 0 | Status: COMPLETED | OUTPATIENT
Start: 2021-08-17 | End: 2021-08-17

## 2021-08-17 RX ORDER — ACETAMINOPHEN 500 MG
700 TABLET ORAL ONCE
Refills: 0 | Status: COMPLETED | OUTPATIENT
Start: 2021-08-17 | End: 2021-08-18

## 2021-08-17 RX ADMIN — MEROPENEM 200 MILLIGRAM(S): 1 INJECTION INTRAVENOUS at 06:12

## 2021-08-17 RX ADMIN — Medication 650 MILLIGRAM(S): at 19:15

## 2021-08-17 RX ADMIN — Medication 650 MILLIGRAM(S): at 12:02

## 2021-08-17 RX ADMIN — HEPARIN SODIUM 5000 UNIT(S): 5000 INJECTION INTRAVENOUS; SUBCUTANEOUS at 06:11

## 2021-08-17 RX ADMIN — Medication 650 MILLIGRAM(S): at 12:45

## 2021-08-17 RX ADMIN — ATORVASTATIN CALCIUM 80 MILLIGRAM(S): 80 TABLET, FILM COATED ORAL at 21:13

## 2021-08-17 RX ADMIN — HEPARIN SODIUM 5000 UNIT(S): 5000 INJECTION INTRAVENOUS; SUBCUTANEOUS at 18:42

## 2021-08-17 RX ADMIN — Medication 12.5 MILLIGRAM(S): at 06:12

## 2021-08-17 RX ADMIN — Medication 62.5 MILLIMOLE(S): at 18:42

## 2021-08-17 RX ADMIN — CHLORHEXIDINE GLUCONATE 1 APPLICATION(S): 213 SOLUTION TOPICAL at 12:03

## 2021-08-17 RX ADMIN — Medication 650 MILLIGRAM(S): at 18:42

## 2021-08-17 RX ADMIN — MEROPENEM 200 MILLIGRAM(S): 1 INJECTION INTRAVENOUS at 18:41

## 2021-08-17 RX ADMIN — Medication 81 MILLIGRAM(S): at 12:03

## 2021-08-17 NOTE — ADVANCED PRACTICE NURSE CONSULT - ASSESSMENT
The patient was being transferred from chair to bed. Dark brown stool noted from rectum. Thickening brown effluent noted in ostomy pouch.  Surgery Team evaluated patient during WOCN visit.   RNBrigitte to continue assessing patient.

## 2021-08-17 NOTE — PROGRESS NOTE ADULT - ASSESSMENT
89 F with PMHx back pain, CAD, CEA, UTI presented with back pain and found to have high grade partial sigmoid obstruction with gluteal abscess. ID consulted based on wound cultures positive for E coli.    Based on her polymicrobial wound culture including anaerobes and ESBL E coli, gut translocation due to perforation or mucosal breakdown is likely. Strong gram negative and anaerobic coverage indicated at this time.    Would recommend:  - Please continue meropenem 1000mg every 12 hours (8/11 - 8/25)  - management of adenocarcinoma per primary team, Heme/Onc  - if patient is febrile, please send UA, chest xray, blood culture, and notify ID  - patient requires repeat CT abdomen and pelvis with PO contrast with and without IV contrast ~8/27 to evaluate fluid collection  - prior to discharge, please obtain baseline CBC with diff, CMP, ESR, CRP  - upon discharge, patient requires weekly CBC with diff, CMP, ESR, CRP sent to the office of Dr. Jacobs. Fax to [phone]  - we will arrange follow-up: Reynaldo    ID Team 1 will sign off at this time. Please reach out if further ID guidance is required. Please see below attending attestation for finalized recommendations.    Austen Kruse MD PGY2 Internal Medicine  Infectious Disease Consult Service 89 F with PMHx back pain, CAD, CEA, UTI presented with back pain and found to have high grade partial sigmoid obstruction with gluteal abscess. ID consulted based on wound cultures positive for E coli.    Based on her polymicrobial wound culture including anaerobes and ESBL E coli, gut translocation due to perforation or mucosal breakdown is likely. Strong gram negative and anaerobic coverage indicated at this time.    Would recommend:  - Please continue meropenem 1000mg every 12 hours (8/11 - 8/25 - end date is very tentative)  - Would place PICC, not midline, as she may require prolonged antibiotics  - management of adenocarcinoma per primary team, Heme/Onc  - if patient is febrile, please send UA, chest xray, blood culture, and notify ID  - patient requires repeat CT abdomen and pelvis with PO contrast with and without IV contrast ~8/27 to evaluate fluid collection  - prior to discharge, please obtain baseline CBC with diff, CMP  - upon discharge, patient requires weekly CBC with diff, CMP sent to the office of Dr. Jacobs. Fax to [phone]  - we will arrange follow-up: Reynaldo    ID Team 1 will sign off at this time. Please reach out if further ID guidance is required. Please see below attending attestation for finalized recommendations.    Austen Kruse MD PGY2 Internal Medicine  Infectious Disease Consult Service

## 2021-08-17 NOTE — PROGRESS NOTE ADULT - SUBJECTIVE AND OBJECTIVE BOX
SUBJECTIVE:  Doing well this AM. NAEON. Denies n/v. Endorses f, denies bm. Denies cp/sob. Pain is well controlled. Tolerating diet.    MEDICATIONS  (STANDING):  acetaminophen   Tablet .. 650 milliGRAM(s) Oral every 6 hours  aspirin  chewable 81 milliGRAM(s) Oral daily  atorvastatin 80 milliGRAM(s) Oral at bedtime  chlorhexidine 2% Cloths 1 Application(s) Topical daily  heparin   Injectable 5000 Unit(s) SubCutaneous every 12 hours  meropenem  IVPB 1000 milliGRAM(s) IV Intermittent every 12 hours  metoprolol succinate ER 12.5 milliGRAM(s) Oral daily    MEDICATIONS  (PRN):      Vital Signs Last 24 Hrs  T(C): 37 (16 Aug 2021 21:56), Max: 37.1 (16 Aug 2021 14:57)  T(F): 98.6 (16 Aug 2021 21:56), Max: 98.8 (16 Aug 2021 14:57)  HR: 72 (17 Aug 2021 04:40) (68 - 82)  BP: 136/63 (17 Aug 2021 04:40) (87/49 - 157/70)  BP(mean): 90 (17 Aug 2021 04:40) (63 - 108)  RR: 18 (17 Aug 2021 04:40) (17 - 20)  SpO2: 96% (17 Aug 2021 04:40) (94% - 97%)    Physical Exam:  General: NAD, resting comfortably in bed  Pulmonary: Nonlabored breathing, no respiratory distress  Cardiovascular: NSR  Abdominal: soft, NT/ND, stool in ostomy, CORDELL w/ serous output  Extremities: WWP, normal strength  Neuro: A/O x 3, CNs II-XII grossly intact, no focal deficits    I&O's Summary    16 Aug 2021 07:01  -  17 Aug 2021 07:00  --------------------------------------------------------  IN: 1200 mL / OUT: 1725 mL / NET: -525 mL        LABS:                        8.1    12.03 )-----------( 639      ( 17 Aug 2021 07:01 )             26.1     08-17    132<L>  |  105  |  x   ----------------------------<  101<H>  4.2   |  24  |  0.58    Ca    7.5<L>      17 Aug 2021 07:01  Phos  1.7     08-17  Mg     2.2     08-17          CAPILLARY BLOOD GLUCOSE            RADIOLOGY & ADDITIONAL STUDIES:

## 2021-08-17 NOTE — ADVANCED PRACTICE NURSE CONSULT - RECOMMEDATIONS
WOCN to follow up to continue ostomy instructions.  Adequate: hears normal conversation without difficulty

## 2021-08-17 NOTE — PROGRESS NOTE ADULT - ASSESSMENT
L gluteal drain with 95cc serosanguinous output over last 24h (105cc day prior). Flushes easily with 2cc NS. Pt afebrile and asymptomatic, WBC increased to 12.03 today. Continue to monitor output. IR will continue to follow.

## 2021-08-17 NOTE — PROGRESS NOTE ADULT - ATTENDING COMMENTS
As above.  Per primary team, planning for d/c to [facility] soon.  Will be difficult to determine if infection is eradicated in setting of tumor/complex CT findings.  Would repeat imaging in ~2 weeks, likely will need longer. I will follow as an outpatient.  Continue meropenem for now.  Please recall if further ID input is desired - team 1.

## 2021-08-17 NOTE — PROGRESS NOTE ADULT - ASSESSMENT
89F PMH HTN, chronic back pain 2/2 L3,L4 compression fractures, triple vessel CAD, and SBO 2/2 possible diverticulitis vs. GYN/colorectal malignancy (2018) never followed up and PSH L CEA 2/2 L ICA stenosis >70% (4/21) who presented with worsening back pain x1 week. Denies abdominal pain, endorses flatus and some recent weight loss. Transferred from medicine for perforated, complicated sigmoid diverticulitis vs. 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 OR on 8/13 for diverting colostomy.    CLD/IVF  Pain/nausea control PRN   metoprolol  Zosyn (8/8-8/11) Meropenem (8/11-)  F/u GI, Vascular surgery, Urology, Heme/onc, IR recs   AM Labs  PT/OOB  IS  dispo: [facility]

## 2021-08-17 NOTE — PROGRESS NOTE ADULT - SUBJECTIVE AND OBJECTIVE BOX
INTERVAL HPI/OVERNIGHT EVENTS:  Patient was seen and examined at bedside. As per nurse and patient, no o/n events, patient resting comfortably. Complaining of unchanged back pain. Patient denies: fever, chills, lightheadedness, weakness, CP, palpitations, SOB, cough, N/V. ROS otherwise negative.    VITAL SIGNS:  T(F): 98.5 (08-17-21 @ 18:16)  HR: 90 (08-17-21 @ 17:11)  BP: 130/63 (08-17-21 @ 17:11)  RR: 18 (08-17-21 @ 17:11)  SpO2: 95% (08-17-21 @ 17:11)  Wt(kg): --      08-16-21 @ 07:01  -  08-17-21 @ 07:00  --------------------------------------------------------  IN: 1200 mL / OUT: 1725 mL / NET: -525 mL    08-17-21 @ 07:01  -  08-17-21 @ 21:05  --------------------------------------------------------  IN: 50 mL / OUT: 590 mL / NET: -540 mL        PHYSICAL EXAM:    Constitutional: resting comfortably in bed; NAD  HEENT: NC/AT, PER, anicteric sclera, no nasal discharge; MMM  Respiratory: CTA B/L; no W/R/R, no retractions  Cardiac: +S1/S2; RRR; no M/R/G  Gastrointestinal: distension unchanged, ostomy site clean appearing with normal output, diffusely tender still  Extremities: WWP, no clubbing or cyanosis; no peripheral edema  Vascular: 2+ radial, DP/PT pulses B/L  Neurologic: CNII-XII grossly intact; no focal deficits    MEDICATIONS  (STANDING):  acetaminophen   Tablet .. 650 milliGRAM(s) Oral every 6 hours  aspirin  chewable 81 milliGRAM(s) Oral daily  atorvastatin 80 milliGRAM(s) Oral at bedtime  chlorhexidine 2% Cloths 1 Application(s) Topical daily  heparin   Injectable 5000 Unit(s) SubCutaneous every 12 hours  meropenem  IVPB 1000 milliGRAM(s) IV Intermittent every 12 hours  metoprolol succinate ER 12.5 milliGRAM(s) Oral daily    MEDICATIONS  (PRN):      Allergies    No Known Allergies    Intolerances        LABS:                        8.3    12.67 )-----------( 722      ( 17 Aug 2021 12:30 )             26.9     08-17    132<L>  |  105  |  9   ----------------------------<  101<H>  4.2   |  24  |  0.58    Ca    7.5<L>      17 Aug 2021 07:01  Phos  1.7     08-17  Mg     2.2     08-17            RADIOLOGY & ADDITIONAL TESTS:  Reviewed

## 2021-08-18 ENCOUNTER — RESULT REVIEW (OUTPATIENT)
Age: 86
End: 2021-08-18

## 2021-08-18 DIAGNOSIS — K92.2 GASTROINTESTINAL HEMORRHAGE, UNSPECIFIED: ICD-10-CM

## 2021-08-18 DIAGNOSIS — K63.1 PERFORATION OF INTESTINE (NONTRAUMATIC): ICD-10-CM

## 2021-08-18 DIAGNOSIS — Z98.890 OTHER SPECIFIED POSTPROCEDURAL STATES: ICD-10-CM

## 2021-08-18 LAB
ANION GAP SERPL CALC-SCNC: 13 MMOL/L — SIGNIFICANT CHANGE UP (ref 5–17)
ANISOCYTOSIS BLD QL: SLIGHT — SIGNIFICANT CHANGE UP
APPEARANCE UR: CLEAR — SIGNIFICANT CHANGE UP
APTT BLD: 46.2 SEC — HIGH (ref 27.5–35.5)
BACTERIA # UR AUTO: PRESENT /HPF
BASOPHILS # BLD AUTO: 0 K/UL — SIGNIFICANT CHANGE UP (ref 0–0.2)
BASOPHILS NFR BLD AUTO: 0 % — SIGNIFICANT CHANGE UP (ref 0–2)
BILIRUB UR-MCNC: NEGATIVE — SIGNIFICANT CHANGE UP
BUN SERPL-MCNC: 34 MG/DL — HIGH (ref 7–23)
BURR CELLS BLD QL SMEAR: PRESENT — SIGNIFICANT CHANGE UP
CALCIUM SERPL-MCNC: 7.9 MG/DL — LOW (ref 8.4–10.5)
CHLORIDE SERPL-SCNC: 98 MMOL/L — SIGNIFICANT CHANGE UP (ref 96–108)
CO2 SERPL-SCNC: 22 MMOL/L — SIGNIFICANT CHANGE UP (ref 22–31)
COLOR SPEC: YELLOW — SIGNIFICANT CHANGE UP
COMMENT - URINE: SIGNIFICANT CHANGE UP
CREAT SERPL-MCNC: 1.04 MG/DL — SIGNIFICANT CHANGE UP (ref 0.5–1.3)
DIFF PNL FLD: ABNORMAL
EOSINOPHIL # BLD AUTO: 0.31 K/UL — SIGNIFICANT CHANGE UP (ref 0–0.5)
EOSINOPHIL NFR BLD AUTO: 0.9 % — SIGNIFICANT CHANGE UP (ref 0–6)
EPI CELLS # UR: SIGNIFICANT CHANGE UP /HPF (ref 0–5)
GIANT PLATELETS BLD QL SMEAR: PRESENT — SIGNIFICANT CHANGE UP
GLUCOSE BLDC GLUCOMTR-MCNC: 118 MG/DL — HIGH (ref 70–99)
GLUCOSE BLDC GLUCOMTR-MCNC: 138 MG/DL — HIGH (ref 70–99)
GLUCOSE BLDC GLUCOMTR-MCNC: 145 MG/DL — HIGH (ref 70–99)
GLUCOSE SERPL-MCNC: 206 MG/DL — HIGH (ref 70–99)
GLUCOSE UR QL: NEGATIVE — SIGNIFICANT CHANGE UP
HCT VFR BLD CALC: 19.6 % — CRITICAL LOW (ref 34.5–45)
HCT VFR BLD CALC: 23 % — LOW (ref 34.5–45)
HCT VFR BLD CALC: 33.6 % — LOW (ref 34.5–45)
HGB BLD-MCNC: 11 G/DL — LOW (ref 11.5–15.5)
HGB BLD-MCNC: 6.1 G/DL — CRITICAL LOW (ref 11.5–15.5)
HGB BLD-MCNC: 7 G/DL — CRITICAL LOW (ref 11.5–15.5)
HYPOCHROMIA BLD QL: SLIGHT — SIGNIFICANT CHANGE UP
INR BLD: 1.47 — HIGH (ref 0.88–1.16)
KETONES UR-MCNC: ABNORMAL MG/DL
LACTATE SERPL-SCNC: 3.3 MMOL/L — HIGH (ref 0.5–2)
LACTATE SERPL-SCNC: 4.1 MMOL/L — CRITICAL HIGH (ref 0.5–2)
LACTATE SERPL-SCNC: 4.8 MMOL/L — CRITICAL HIGH (ref 0.5–2)
LEUKOCYTE ESTERASE UR-ACNC: ABNORMAL
LYMPHOCYTES # BLD AUTO: 1.49 K/UL — SIGNIFICANT CHANGE UP (ref 1–3.3)
LYMPHOCYTES # BLD AUTO: 4.3 % — LOW (ref 13–44)
MACROCYTES BLD QL: SLIGHT — SIGNIFICANT CHANGE UP
MAGNESIUM SERPL-MCNC: 2.3 MG/DL — SIGNIFICANT CHANGE UP (ref 1.6–2.6)
MANUAL SMEAR VERIFICATION: SIGNIFICANT CHANGE UP
MCHC RBC-ENTMCNC: 28 PG — SIGNIFICANT CHANGE UP (ref 27–34)
MCHC RBC-ENTMCNC: 28.1 PG — SIGNIFICANT CHANGE UP (ref 27–34)
MCHC RBC-ENTMCNC: 28.4 PG — SIGNIFICANT CHANGE UP (ref 27–34)
MCHC RBC-ENTMCNC: 30.4 GM/DL — LOW (ref 32–36)
MCHC RBC-ENTMCNC: 31.1 GM/DL — LOW (ref 32–36)
MCHC RBC-ENTMCNC: 32.7 GM/DL — SIGNIFICANT CHANGE UP (ref 32–36)
MCV RBC AUTO: 86.8 FL — SIGNIFICANT CHANGE UP (ref 80–100)
MCV RBC AUTO: 90.3 FL — SIGNIFICANT CHANGE UP (ref 80–100)
MCV RBC AUTO: 92 FL — SIGNIFICANT CHANGE UP (ref 80–100)
MICROCYTES BLD QL: SLIGHT — SIGNIFICANT CHANGE UP
MONOCYTES # BLD AUTO: 0.31 K/UL — SIGNIFICANT CHANGE UP (ref 0–0.9)
MONOCYTES NFR BLD AUTO: 0.9 % — LOW (ref 2–14)
NEUTROPHILS # BLD AUTO: 32.61 K/UL — HIGH (ref 1.8–7.4)
NEUTROPHILS NFR BLD AUTO: 92.2 % — HIGH (ref 43–77)
NEUTS BAND # BLD: 1.7 % — SIGNIFICANT CHANGE UP (ref 0–8)
NITRITE UR-MCNC: NEGATIVE — SIGNIFICANT CHANGE UP
NRBC # BLD: 0 /100 WBCS — SIGNIFICANT CHANGE UP (ref 0–0)
NRBC # BLD: 0 /100 WBCS — SIGNIFICANT CHANGE UP (ref 0–0)
OVALOCYTES BLD QL SMEAR: SLIGHT — SIGNIFICANT CHANGE UP
PH UR: 5 — SIGNIFICANT CHANGE UP (ref 5–8)
PHOSPHATE SERPL-MCNC: 5 MG/DL — HIGH (ref 2.5–4.5)
PLAT MORPH BLD: ABNORMAL
PLATELET # BLD AUTO: 401 K/UL — HIGH (ref 150–400)
PLATELET # BLD AUTO: 588 K/UL — HIGH (ref 150–400)
PLATELET # BLD AUTO: 678 K/UL — HIGH (ref 150–400)
POIKILOCYTOSIS BLD QL AUTO: SLIGHT — SIGNIFICANT CHANGE UP
POLYCHROMASIA BLD QL SMEAR: SLIGHT — SIGNIFICANT CHANGE UP
POTASSIUM SERPL-MCNC: 4.3 MMOL/L — SIGNIFICANT CHANGE UP (ref 3.5–5.3)
POTASSIUM SERPL-SCNC: 4.3 MMOL/L — SIGNIFICANT CHANGE UP (ref 3.5–5.3)
PROT UR-MCNC: 100 MG/DL
PROTHROM AB SERPL-ACNC: 17.3 SEC — HIGH (ref 10.6–13.6)
RBC # BLD: 2.17 M/UL — LOW (ref 3.8–5.2)
RBC # BLD: 2.5 M/UL — LOW (ref 3.8–5.2)
RBC # BLD: 3.87 M/UL — SIGNIFICANT CHANGE UP (ref 3.8–5.2)
RBC # FLD: 14.9 % — HIGH (ref 10.3–14.5)
RBC # FLD: 15.9 % — HIGH (ref 10.3–14.5)
RBC # FLD: 16 % — HIGH (ref 10.3–14.5)
RBC BLD AUTO: ABNORMAL
RBC CASTS # UR COMP ASSIST: ABNORMAL /HPF
SCHISTOCYTES BLD QL AUTO: SLIGHT — SIGNIFICANT CHANGE UP
SODIUM SERPL-SCNC: 133 MMOL/L — LOW (ref 135–145)
SP GR SPEC: 1.02 — SIGNIFICANT CHANGE UP (ref 1–1.03)
SPHEROCYTES BLD QL SMEAR: SLIGHT — SIGNIFICANT CHANGE UP
UROBILINOGEN FLD QL: 1 E.U./DL — SIGNIFICANT CHANGE UP
WBC # BLD: 28.45 K/UL — HIGH (ref 3.8–10.5)
WBC # BLD: 28.52 K/UL — HIGH (ref 3.8–10.5)
WBC # BLD: 34.73 K/UL — HIGH (ref 3.8–10.5)
WBC # FLD AUTO: 28.45 K/UL — HIGH (ref 3.8–10.5)
WBC # FLD AUTO: 28.52 K/UL — HIGH (ref 3.8–10.5)
WBC # FLD AUTO: 34.73 K/UL — HIGH (ref 3.8–10.5)
WBC UR QL: > 10 /HPF

## 2021-08-18 PROCEDURE — 71045 X-RAY EXAM CHEST 1 VIEW: CPT | Mod: 26

## 2021-08-18 PROCEDURE — 88305 TISSUE EXAM BY PATHOLOGIST: CPT | Mod: 26

## 2021-08-18 PROCEDURE — 99233 SBSQ HOSP IP/OBS HIGH 50: CPT | Mod: 25

## 2021-08-18 PROCEDURE — 99232 SBSQ HOSP IP/OBS MODERATE 35: CPT | Mod: GC

## 2021-08-18 PROCEDURE — 99291 CRITICAL CARE FIRST HOUR: CPT

## 2021-08-18 RX ORDER — ONDANSETRON 8 MG/1
4 TABLET, FILM COATED ORAL EVERY 6 HOURS
Refills: 0 | Status: DISCONTINUED | OUTPATIENT
Start: 2021-08-18 | End: 2021-09-08

## 2021-08-18 RX ORDER — SODIUM CHLORIDE 9 MG/ML
500 INJECTION, SOLUTION INTRAVENOUS ONCE
Refills: 0 | Status: COMPLETED | OUTPATIENT
Start: 2021-08-18 | End: 2021-08-18

## 2021-08-18 RX ORDER — ONDANSETRON 8 MG/1
4 TABLET, FILM COATED ORAL ONCE
Refills: 0 | Status: COMPLETED | OUTPATIENT
Start: 2021-08-18 | End: 2021-08-18

## 2021-08-18 RX ORDER — MIDAZOLAM HYDROCHLORIDE 1 MG/ML
4 INJECTION, SOLUTION INTRAMUSCULAR; INTRAVENOUS ONCE
Refills: 0 | Status: DISCONTINUED | OUTPATIENT
Start: 2021-08-18 | End: 2021-08-18

## 2021-08-18 RX ORDER — IPRATROPIUM/ALBUTEROL SULFATE 18-103MCG
3 AEROSOL WITH ADAPTER (GRAM) INHALATION ONCE
Refills: 0 | Status: COMPLETED | OUTPATIENT
Start: 2021-08-18 | End: 2021-08-18

## 2021-08-18 RX ORDER — FENTANYL CITRATE 50 UG/ML
100 INJECTION INTRAVENOUS ONCE
Refills: 0 | Status: DISCONTINUED | OUTPATIENT
Start: 2021-08-18 | End: 2021-08-18

## 2021-08-18 RX ORDER — DEXTROSE 50 % IN WATER 50 %
15 SYRINGE (ML) INTRAVENOUS ONCE
Refills: 0 | Status: DISCONTINUED | OUTPATIENT
Start: 2021-08-18 | End: 2021-09-02

## 2021-08-18 RX ORDER — DEXTROSE 50 % IN WATER 50 %
25 SYRINGE (ML) INTRAVENOUS ONCE
Refills: 0 | Status: DISCONTINUED | OUTPATIENT
Start: 2021-08-18 | End: 2021-09-02

## 2021-08-18 RX ORDER — MIDAZOLAM HYDROCHLORIDE 1 MG/ML
3 INJECTION, SOLUTION INTRAMUSCULAR; INTRAVENOUS ONCE
Refills: 0 | Status: DISCONTINUED | OUTPATIENT
Start: 2021-08-18 | End: 2021-08-18

## 2021-08-18 RX ORDER — FENTANYL CITRATE 50 UG/ML
50 INJECTION INTRAVENOUS ONCE
Refills: 0 | Status: DISCONTINUED | OUTPATIENT
Start: 2021-08-18 | End: 2021-08-18

## 2021-08-18 RX ORDER — DEXTROSE 50 % IN WATER 50 %
12.5 SYRINGE (ML) INTRAVENOUS ONCE
Refills: 0 | Status: DISCONTINUED | OUTPATIENT
Start: 2021-08-18 | End: 2021-09-02

## 2021-08-18 RX ORDER — SODIUM CHLORIDE 9 MG/ML
1000 INJECTION, SOLUTION INTRAVENOUS
Refills: 0 | Status: DISCONTINUED | OUTPATIENT
Start: 2021-08-18 | End: 2021-09-02

## 2021-08-18 RX ORDER — ACETAMINOPHEN 500 MG
700 TABLET ORAL ONCE
Refills: 0 | Status: COMPLETED | OUTPATIENT
Start: 2021-08-18 | End: 2021-08-18

## 2021-08-18 RX ORDER — SUCRALFATE 1 G
1 TABLET ORAL
Refills: 0 | Status: DISCONTINUED | OUTPATIENT
Start: 2021-08-18 | End: 2021-08-29

## 2021-08-18 RX ORDER — PANTOPRAZOLE SODIUM 20 MG/1
40 TABLET, DELAYED RELEASE ORAL DAILY
Refills: 0 | Status: DISCONTINUED | OUTPATIENT
Start: 2021-08-18 | End: 2021-08-18

## 2021-08-18 RX ORDER — GLUCAGON INJECTION, SOLUTION 0.5 MG/.1ML
1 INJECTION, SOLUTION SUBCUTANEOUS ONCE
Refills: 0 | Status: DISCONTINUED | OUTPATIENT
Start: 2021-08-18 | End: 2021-09-02

## 2021-08-18 RX ORDER — MIDAZOLAM HYDROCHLORIDE 1 MG/ML
2 INJECTION, SOLUTION INTRAMUSCULAR; INTRAVENOUS ONCE
Refills: 0 | Status: DISCONTINUED | OUTPATIENT
Start: 2021-08-18 | End: 2021-08-18

## 2021-08-18 RX ORDER — FAMOTIDINE 10 MG/ML
20 INJECTION INTRAVENOUS DAILY
Refills: 0 | Status: DISCONTINUED | OUTPATIENT
Start: 2021-08-18 | End: 2021-08-22

## 2021-08-18 RX ORDER — SODIUM CHLORIDE 9 MG/ML
1000 INJECTION, SOLUTION INTRAVENOUS
Refills: 0 | Status: DISCONTINUED | OUTPATIENT
Start: 2021-08-18 | End: 2021-08-20

## 2021-08-18 RX ORDER — PANTOPRAZOLE SODIUM 20 MG/1
40 TABLET, DELAYED RELEASE ORAL EVERY 12 HOURS
Refills: 0 | Status: DISCONTINUED | OUTPATIENT
Start: 2021-08-19 | End: 2021-09-02

## 2021-08-18 RX ORDER — INSULIN LISPRO 100/ML
VIAL (ML) SUBCUTANEOUS
Refills: 0 | Status: DISCONTINUED | OUTPATIENT
Start: 2021-08-18 | End: 2021-08-20

## 2021-08-18 RX ORDER — FAMOTIDINE 10 MG/ML
20 INJECTION INTRAVENOUS
Refills: 0 | Status: DISCONTINUED | OUTPATIENT
Start: 2021-08-18 | End: 2021-08-18

## 2021-08-18 RX ORDER — INSULIN LISPRO 100/ML
VIAL (ML) SUBCUTANEOUS AT BEDTIME
Refills: 0 | Status: DISCONTINUED | OUTPATIENT
Start: 2021-08-18 | End: 2021-08-18

## 2021-08-18 RX ADMIN — Medication 280 MILLIGRAM(S): at 07:39

## 2021-08-18 RX ADMIN — CHLORHEXIDINE GLUCONATE 1 APPLICATION(S): 213 SOLUTION TOPICAL at 13:21

## 2021-08-18 RX ADMIN — ONDANSETRON 4 MILLIGRAM(S): 8 TABLET, FILM COATED ORAL at 08:44

## 2021-08-18 RX ADMIN — FENTANYL CITRATE 50 MICROGRAM(S): 50 INJECTION INTRAVENOUS at 19:29

## 2021-08-18 RX ADMIN — HEPARIN SODIUM 5000 UNIT(S): 5000 INJECTION INTRAVENOUS; SUBCUTANEOUS at 05:57

## 2021-08-18 RX ADMIN — MIDAZOLAM HYDROCHLORIDE 3 MILLIGRAM(S): 1 INJECTION, SOLUTION INTRAMUSCULAR; INTRAVENOUS at 19:28

## 2021-08-18 RX ADMIN — FENTANYL CITRATE 50 MICROGRAM(S): 50 INJECTION INTRAVENOUS at 19:48

## 2021-08-18 RX ADMIN — MEROPENEM 200 MILLIGRAM(S): 1 INJECTION INTRAVENOUS at 18:47

## 2021-08-18 RX ADMIN — Medication 700 MILLIGRAM(S): at 08:10

## 2021-08-18 RX ADMIN — Medication 3 MILLILITER(S): at 14:22

## 2021-08-18 RX ADMIN — ONDANSETRON 4 MILLIGRAM(S): 8 TABLET, FILM COATED ORAL at 00:11

## 2021-08-18 RX ADMIN — SODIUM CHLORIDE 1000 MILLILITER(S): 9 INJECTION, SOLUTION INTRAVENOUS at 09:00

## 2021-08-18 RX ADMIN — PANTOPRAZOLE SODIUM 40 MILLIGRAM(S): 20 TABLET, DELAYED RELEASE ORAL at 13:21

## 2021-08-18 RX ADMIN — SODIUM CHLORIDE 40 MILLILITER(S): 9 INJECTION, SOLUTION INTRAVENOUS at 05:57

## 2021-08-18 RX ADMIN — SODIUM CHLORIDE 1000 MILLILITER(S): 9 INJECTION, SOLUTION INTRAVENOUS at 19:46

## 2021-08-18 RX ADMIN — MEROPENEM 200 MILLIGRAM(S): 1 INJECTION INTRAVENOUS at 06:22

## 2021-08-18 RX ADMIN — Medication 700 MILLIGRAM(S): at 00:40

## 2021-08-18 RX ADMIN — Medication 280 MILLIGRAM(S): at 00:11

## 2021-08-18 NOTE — PROGRESS NOTE ADULT - ATTENDING COMMENTS
#Anemia  GI re-consulted for acute drop in H/H in the setting of coffee ground emesis. Baseline Hgb 10-11, today downtrended to 6.1. With sump in place with dark output, also noted in ostomy bag. With uptrend in BUN:Cr ratio c/f possible UGIB source.   -c/w Protonix 40 mg IVP BID  -Closely monitor H/H   -No utility in Fe studies given pRBC transfusions  -Maintain active T&S   -Transfusion goal as per primary team   -Keep sump in place, low intermittent suction  -Keep NPO, will tentatively plan for bedside EGD tomorrow pending H/H and sump output   -Consider palliative consult to discuss GOC

## 2021-08-18 NOTE — PROGRESS NOTE ADULT - ASSESSMENT
89F PMH HTN, chronic back pain 2/2 L3,L4 compression fractures, triple vessel CAD, and SBO 2/2 possible diverticulitis vs. GYN/colorectal malignancy (2018) never followed up and PSH L CEA 2/2 L ICA stenosis >70% (4/21) who presented with worsening back pain x1 week. Denies abdominal pain, endorses flatus and some recent weightloss. Transferred from medicine for perforated, complicated sigmoid diverticulitis vs. 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 OR on 8/13 for diverting colostomy.    NPO/IVF LR @ 40cc/hr  Pain/nausea control PRN   metoprolol  Zosyn (8/8-8/11) Meropenem (8/11-)  F/u GI, Vascular surgery, Urology, Heme/onc, IR recs  PT/OOB  IS   AM Labs  dispo: [facility]  speech and swallow eval to advance diet

## 2021-08-18 NOTE — PROGRESS NOTE ADULT - ATTENDING COMMENTS
As above.  She had aspiration event with marked leukocytosis.  This afternoon with hematemesis, EGD by GI showed eosphageal ulcers.  Would send CMV IgM and IgG, as well as CMV PCR.  Recommendations otherwise as above.  Will follow with you – ID Team 1.  Dr. Mathis will assume care tomorrow.

## 2021-08-18 NOTE — CONSULT NOTE ADULT - SUBJECTIVE AND OBJECTIVE BOX
CONSULT NOTE:    HPI:   89F PMHx of chronic back pain 2/2 L3,L4 compression fractures, triple vessel CAD on ASA, plavix, lipitor (04/21) and L CEA 2/2 L ICA stenosis >70% (4/21), recent ED visit on 6/28 for UTI p/w achy non-radiating back pain 5/10 in the L lumbar region. Pain is worsened with movement and improves with rest. Pt tried tylenol without any relief of symptoms. She endorses 1 week of fatigue and unintentional weight loss over last several months despite good appetite.  She denies fever, chills, dysuria, diarrhea, constipation, hematoschezia or hematemesis, dyspnea or chest pain.  Pt states he has never had a colonoscopy. Denies recent travel or sick contacts.    In ED, VSS, WBC elevated (16.5) with left shift, lactate wnl (0.7). CT scan (8/7) performed with preliminary read concerning for pelvic malignancy. Admitted to medicine service from ED for further work-up and management of suspected pelvic malignancy. General surgery consulted for concern of perforated, complicated sigmoid diverticulitis vs. colonic perforation 2/2 colorectal malignancy on updated CT read (8/8) now read as suspicious for complicated perforated distal sigmoid diverticulitis with irregular large abscess in the presacral/posterior perirectal space and extending into the left gluteal region. Small presacral/pelvic extraluminal air, moderate colonic stool and possible perforated distal sigmoid colon cancer.    SICU Addendum:  89F PMH HTN, chronic back pain 2/2 L3,L4 compression fractures, triple vessel CAD, and SBO 2/2 possible diverticulitis vs. GYN/colorectal malignancy (2018) never followed up and PSH L CEA 2/2 L ICA (stenosis >70% 4/21)) who presented with worsening back pain x1 week. Denies abdominal pain, endorses flatus and some recent weightloss. Transferred from medicine for perforated, complicated sigmoid diverticulitis vs. colonic perforation 2/2 pelvic malignancy on (8/8). Underwent  OR on 8/13 for diverting colostomy.    PMHx:   HTN  SBO (2018)  CAD (04/21)  Chronic back pain 2/2 L3/L4 compression fractures  PSHx: L CEA (04/21)   Meds: See med rec  Allergies: NKDA  FHX: no pertinent Fhx of cardiac or pulmonary disease  Social: Lives in an apartment alone. States she has a few friends who visit her regularly. She has a  who visits twice a week but does most household chores herself. She drinks 1 glass of scotch per night, she is a former smoker ( 1ppd quit 25 years ago). She denies illicit drug use.    O: ICU Vital Signs Last 24 Hrs  T(F): 97.8 (08-18-21 @ 11:43), Max: 99.4 (08-18-21 @ 05:00)  HR: 96 (08-18-21 @ 11:43) (86 - 124)  BP: 105/46 (08-18-21 @ 11:43) (81/42 - 174/77)  BP(mean): 67 (08-18-21 @ 11:43) (56 - 110)  ABP: --  RR: 23 (08-18-21 @ 11:43) (18 - 23)  SpO2: 97% (08-18-21 @ 11:43) (79% - 100%)    PHYSICAL EXAM:   Neurological: AAOx3, CNII-XII intact,  strength 5/5 b/l  Cardiovascular: Sinus tachycardia  Respiratory: CTA. No increased inspiratory effort. No conversational dyspnea.   Gastrointestinal: soft, nondistended, nTTP. No rebound or guarding. Stoma pink, edematous, patent with dark brown stool and gas in colostomy   MENDOZA: Brown stool on glove, no masses or ulcers palpated.   Extremities: warm, no dependent edema  Vascular: no cyanosis/erythema. DP 2+   Skin: warm, no rashes    LABS:    08-18    133<L>  |  98  |  34<H>  ----------------------------<  206<H>  4.3   |  22  |  1.04    Ca    7.9<L>      18 Aug 2021 08:37  Phos  5.0     08-18  Mg     2.3     08-18                          6.1    28.52 )-----------( 588      ( 18 Aug 2021 09:44 )             19.6   PT/INR - ( 18 Aug 2021 09:44 )   PT: 17.3 sec;   INR: 1.47          PTT - ( 18 Aug 2021 09:44 )  PTT:46.2 sec  CAPILLARY BLOOD GLUCOSE        MEDICATIONS  (STANDING):  acetaminophen   Tablet .. 650 milliGRAM(s) Oral every 6 hours  atorvastatin 80 milliGRAM(s) Oral at bedtime  chlorhexidine 2% Cloths 1 Application(s) Topical daily  dextrose 40% Gel 15 Gram(s) Oral once  dextrose 5%. 1000 milliLiter(s) (50 mL/Hr) IV Continuous <Continuous>  dextrose 5%. 1000 milliLiter(s) (100 mL/Hr) IV Continuous <Continuous>  dextrose 50% Injectable 25 Gram(s) IV Push once  dextrose 50% Injectable 12.5 Gram(s) IV Push once  dextrose 50% Injectable 25 Gram(s) IV Push once  glucagon  Injectable 1 milliGRAM(s) IntraMuscular once  insulin lispro (ADMELOG) corrective regimen sliding scale   SubCutaneous three times a day before meals  lactated ringers. 1000 milliLiter(s) (100 mL/Hr) IV Continuous <Continuous>  meropenem  IVPB 1000 milliGRAM(s) IV Intermittent every 12 hours  pantoprazole  Injectable 40 milliGRAM(s) IV Push daily    MEDICATIONS  (PRN):      Novak:	  [ ] None	[X] Daily Novak Order Placed	   Indication:	  [X] Strict I and O's    [ ] Obstruction     [ ] Incontinence + Stage 3 or 4 Decubitus  Central Line:  [X] None	   [ ]  Medication / TPN Administration     [ ] No Peripheral IV        CONSULT NOTE:    HPI:   89F PMHx of chronic back pain 2/2 L3,L4 compression fractures, triple vessel CAD on ASA, plavix, lipitor (04/21) and L CEA 2/2 L ICA stenosis >70% (4/21), recent ED visit on 6/28 for UTI p/w achy non-radiating back pain 5/10 in the L lumbar region. Pain is worsened with movement and improves with rest. Pt tried tylenol without any relief of symptoms. She endorses 1 week of fatigue and unintentional weight loss over last several months despite good appetite.  She denies fever, chills, dysuria, diarrhea, constipation, hematoschezia or hematemesis, dyspnea or chest pain.  Pt states he has never had a colonoscopy. Denies recent travel or sick contacts.    In ED, VSS, WBC elevated (16.5) with left shift, lactate wnl (0.7). CT scan (8/7) performed with preliminary read concerning for pelvic malignancy. Admitted to medicine service from ED for further work-up and management of suspected pelvic malignancy. General surgery consulted for concern of perforated, complicated sigmoid diverticulitis vs. colonic perforation 2/2 colorectal malignancy on updated CT read (8/8) now read as suspicious for complicated perforated distal sigmoid diverticulitis with irregular large abscess in the presacral/posterior perirectal space and extending into the left gluteal region. Small presacral/pelvic extraluminal air, moderate colonic stool and possible perforated distal sigmoid colon cancer.    SICU Addendum:  89F PMH HTN, chronic back pain 2/2 L3,L4 compression fractures, triple vessel CAD, and SBO 2/2 possible diverticulitis vs. GYN/colorectal malignancy (2018) never followed up and PSH L CEA 2/2 L ICA stenosis >70% (4/21) who presented with worsening back pain x1 week. Denies abdominal pain, endorses flatus and some recent weightloss. Transferred from medicine for perforated, complicated sigmoid diverticulitis vs. colonic perforation 2/2 pelvic malignancy (8/8). Left gluteal IR drain placed (8/9). Colonoscopy w/ sigmoid mass bx on 8/11- final path invasive adenocarcinoma moderately differentiated. Taken to OR on 8/13 for diverting colostomy. Stepped up to SICU for hemodynamic monitoring for suspected acute blood loss anemia.     PMHx:   HTN  SBO (2018)  CAD (04/21)  Chronic back pain 2/2 L3/L4 compression fractures  PSHx: L CEA (04/21)   Meds: See med rec  Allergies: NKDA  FHX: no pertinent Fhx of cardiac or pulmonary disease  Social: Lives in an apartment alone. States she has a few friends who visit her regularly. She has a  who visits twice a week but does most household chores herself. She drinks 1 glass of scotch per night, she is a former smoker ( 1ppd quit 25 years ago). She denies illicit drug use.    O: ICU Vital Signs Last 24 Hrs  T(F): 97.8 (08-18-21 @ 11:43), Max: 99.4 (08-18-21 @ 05:00)  HR: 96 (08-18-21 @ 11:43) (86 - 124)  BP: 105/46 (08-18-21 @ 11:43) (81/42 - 174/77)  BP(mean): 67 (08-18-21 @ 11:43) (56 - 110)  ABP: --  RR: 23 (08-18-21 @ 11:43) (18 - 23)  SpO2: 97% (08-18-21 @ 11:43) (79% - 100%)    PHYSICAL EXAM:   Neurological: AAOx3, CNII-XII intact,  strength 5/5 b/l  Cardiovascular: Sinus tachycardia  Respiratory: CTA. No increased inspiratory effort. No conversational dyspnea.   Gastrointestinal: soft, nondistended, nTTP. No rebound or guarding. Stoma pink, edematous, patent with dark brown stool and gas in colostomy   MENDOZA: Brown stool on glove, no masses or ulcers palpated.   Extremities: warm, no dependent edema  Vascular: no cyanosis/erythema. DP 2+   Skin: warm, no rashes    LABS:    08-18    133<L>  |  98  |  34<H>  ----------------------------<  206<H>  4.3   |  22  |  1.04    Ca    7.9<L>      18 Aug 2021 08:37  Phos  5.0     08-18  Mg     2.3     08-18                          6.1    28.52 )-----------( 588      ( 18 Aug 2021 09:44 )             19.6   PT/INR - ( 18 Aug 2021 09:44 )   PT: 17.3 sec;   INR: 1.47          PTT - ( 18 Aug 2021 09:44 )  PTT:46.2 sec  CAPILLARY BLOOD GLUCOSE        MEDICATIONS  (STANDING):  acetaminophen   Tablet .. 650 milliGRAM(s) Oral every 6 hours  atorvastatin 80 milliGRAM(s) Oral at bedtime  chlorhexidine 2% Cloths 1 Application(s) Topical daily  dextrose 40% Gel 15 Gram(s) Oral once  dextrose 5%. 1000 milliLiter(s) (50 mL/Hr) IV Continuous <Continuous>  dextrose 5%. 1000 milliLiter(s) (100 mL/Hr) IV Continuous <Continuous>  dextrose 50% Injectable 25 Gram(s) IV Push once  dextrose 50% Injectable 12.5 Gram(s) IV Push once  dextrose 50% Injectable 25 Gram(s) IV Push once  glucagon  Injectable 1 milliGRAM(s) IntraMuscular once  insulin lispro (ADMELOG) corrective regimen sliding scale   SubCutaneous three times a day before meals  lactated ringers. 1000 milliLiter(s) (100 mL/Hr) IV Continuous <Continuous>  meropenem  IVPB 1000 milliGRAM(s) IV Intermittent every 12 hours  pantoprazole  Injectable 40 milliGRAM(s) IV Push daily    MEDICATIONS  (PRN):      Novak:	  [ ] None	[X] Daily Novak Order Placed	   Indication:	  [X] Strict I and O's    [ ] Obstruction     [ ] Incontinence + Stage 3 or 4 Decubitus  Central Line:  [X] None	   [ ]  Medication / TPN Administration     [ ] No Peripheral IV

## 2021-08-18 NOTE — SWALLOW BEDSIDE ASSESSMENT ADULT - NS SPL SWALLOW CLINIC TRIAL FT
Pt presents with discoordinated swallow at times with difficulty coordinating respiration and deglutition. Oral stage is significant for inefficient bolus stripping with anterior bolus spillage and discoordinated bolus formation/processing. Pharyngeal stage was significant for suspected delay in swallow initiation. Pt did not present with overt signs of airway protection deficits, however, Pt had limited PO trials which can affect full clinical picture. This service will continue to monitor tolerance.

## 2021-08-18 NOTE — PROGRESS NOTE ADULT - SUBJECTIVE AND OBJECTIVE BOX
POD 5    Stoma is now functioning (gas and stool)  Patient on solid diet but intake is poor.  On meropenem  Drinking small volume as well.  Not been eating.   Episode of vomiting last PM.    Not been OOB yesterday - been resistant.    Vital Signs Last 24 Hrs  T(C): 36.9 (17 Aug 2021 23:00), Max: 36.9 (17 Aug 2021 09:50)  T(F): 98.4 (17 Aug 2021 23:00), Max: 98.5 (17 Aug 2021 18:16)  HR: 106 (18 Aug 2021 05:00) (86 - 124)  BP: 123/56 (18 Aug 2021 04:45) (89/52 - 174/77)  BP(mean): 81 (18 Aug 2021 04:45) (67 - 110)  RR: 20 (18 Aug 2021 05:20) (18 - 20)  SpO2: 95% (18 Aug 2021 05:20) (86% - 97%)  patient on nasal O2    abd: soft, non tender, stoma viable, bag with output, not distended,   ext: without calf tenderness    UO: 300/24 hours  colostomy: 450  ml/24 hours                          8.3    12.67 )-----------( 722      ( 17 Aug 2021 12:30 )             26.9   08-17    132<L>  |  105  |  9   ----------------------------<  101<H>  4.2   |  24  |  0.58    Ca    7.5<L>      17 Aug 2021 07:01  Phos  1.7     08-17  Mg     2.2     08-17

## 2021-08-18 NOTE — PROGRESS NOTE ADULT - SUBJECTIVE AND OBJECTIVE BOX
INTERVAL HPI/OVERNIGHT EVENTS:  Patient was seen and examined at bedside. Patient had observed aspiration event with desaturations afterwards requiring NRB then 3L NC. Marked elevation in WBC and worsening Hgb, prompting escalation to SICU for enhanced observation. Subjectively complaining only of unchanged back pain.    VITAL SIGNS:  T(F): 97.8 (21 @ 11:43)  HR: 105 (21 @ 14:00)  BP: 121/57 (21 @ 13:45)  RR: 28 (21 @ 14:00)  SpO2: 92% (21 @ 14:00)  Wt(kg): --      21 @ 07:01  -  21 @ 07:00  --------------------------------------------------------  IN: 460 mL / OUT: 805 mL / NET: -345 mL    21 @ 07:01  -  21 @ 15:03  --------------------------------------------------------  IN: 1330 mL / OUT: 1545 mL / NET: -215 mL        PHYSICAL EXAM: examined in [place] while RN was obtaining urgent repeat phlebotomy    Constitutional: resting with eyes closed during phlebotomy, rousable and conversational  HEENT: PER, anicteric sclera, no nasal discharge; MMM  Respiratory: CTA B/L on anterior lung fields; no W/R/R, no retractions  Cardiac: +S1/S2; RRR; no M/R/G  Gastrointestinal: distension and slight tenderness not appreciably different from previous exam, ostomy with fecal output  Back: unable to assess  Extremities: WWP, no clubbing or cyanosis; no peripheral edema  Vascular: 2+ radial, DP/PT pulses B/L  Neurologic: CNII-XII grossly intact; conversing normally    MEDICATIONS  (STANDING):  atorvastatin 80 milliGRAM(s) Oral at bedtime  chlorhexidine 2% Cloths 1 Application(s) Topical daily  dextrose 40% Gel 15 Gram(s) Oral once  dextrose 5%. 1000 milliLiter(s) (50 mL/Hr) IV Continuous <Continuous>  dextrose 5%. 1000 milliLiter(s) (100 mL/Hr) IV Continuous <Continuous>  dextrose 50% Injectable 25 Gram(s) IV Push once  dextrose 50% Injectable 12.5 Gram(s) IV Push once  dextrose 50% Injectable 25 Gram(s) IV Push once  glucagon  Injectable 1 milliGRAM(s) IntraMuscular once  insulin lispro (ADMELOG) corrective regimen sliding scale   SubCutaneous three times a day before meals  lactated ringers. 1000 milliLiter(s) (100 mL/Hr) IV Continuous <Continuous>  meropenem  IVPB 1000 milliGRAM(s) IV Intermittent every 12 hours    MEDICATIONS  (PRN):  ondansetron Injectable 4 milliGRAM(s) IV Push every 6 hours PRN Nausea and/or Vomiting      Allergies    No Known Allergies    Intolerances        LABS:                        6.1    28.52 )-----------( 588      ( 18 Aug 2021 09:44 )             19.6     08-18    133<L>  |  98  |  34<H>  ----------------------------<  206<H>  4.3   |  22  |  1.04    Ca    7.9<L>      18 Aug 2021 08:37  Phos  5.0     08-18  Mg     2.3     08-18      PT/INR - ( 18 Aug 2021 09:44 )   PT: 17.3 sec;   INR: 1.47          PTT - ( 18 Aug 2021 09:44 )  PTT:46.2 sec  Urinalysis Basic - ( 18 Aug 2021 13:33 )    Color: Yellow / Appearance: Clear / S.025 / pH: x  Gluc: x / Ketone: Trace mg/dL  / Bili: Negative / Urobili: 1.0 E.U./dL   Blood: x / Protein: 100 mg/dL / Nitrite: NEGATIVE   Leuk Esterase: Trace / RBC: Many /HPF / WBC > 10 /HPF   Sq Epi: x / Non Sq Epi: 0-5 /HPF / Bacteria: Present /HPF        RADIOLOGY & ADDITIONAL TESTS:  Reviewed

## 2021-08-18 NOTE — CONSULT NOTE ADULT - ASSESSMENT
89F PMH HTN, chronic back pain 2/2 L3,L4 compression fractures, triple vessel CAD, and SBO 2/2 possible diverticulitis vs. GYN/colorectal malignancy (2018) never followed up and PSH L CEA 2/2 L ICA stenosis >70% (4/21) who presented with worsening back pain x1 week. Denies abdominal pain, endorses flatus and some recent weightloss. Transferred from medicine for perforated, complicated sigmoid diverticulitis vs. colonic perforation 2/2 pelvic malignancy (8/8). Left gluteal IR drain placed (8/9). Colonoscopy on  OR on 8/13 for diverting colostomy. Stepped up to SICU for hemodynamic monitoring for suspected acute blood loss anemia.     Neuro: AOX3. NAD. Tylenol prn  CV: H/o CAD, L ICA s/p L CEA (4/21). Sinus tachycardia- bolused 1.5L Holding ASA, Plavix, metoprolol, and norvasc.   Pulm: Sating well on RA  GI/FEN: NPO except meds. LR@100  : Renal following. S/p NM renal function study (wnl). Novak (8/18-)  ID: ESBL Kleb in intraabdominal abscess: Daina (8/13-)  Endo: No h/o DM. Goal BG <180. mISS  Heme: no chemo ppx  PPX: SCDs  Lines: PIVs  Wounds: Functioning ostomy p/p/p  PT/OT: not ordered   89F PMH HTN, chronic back pain 2/2 L3,L4 compression fractures, triple vessel CAD, and SBO 2/2 possible diverticulitis vs. GYN/colorectal malignancy (2018) never followed up and PSH L CEA 2/2 L ICA stenosis >70% (4/21) who presented with worsening back pain x1 week. Denies abdominal pain, endorses flatus and some recent weightloss. Transferred from medicine for perforated, complicated sigmoid diverticulitis vs. colonic perforation 2/2 pelvic malignancy (8/8). Left gluteal IR drain placed (8/9). Colonoscopy w/ sigmoid mass bx on 8/11- final path invasive adenocarcinoma moderately differentiated. Taken to OR on 8/13 for diverting colostomy. Stepped up to SICU for hemodynamic monitoring for suspected acute blood loss anemia.     Neuro: AOX3. NAD. Tylenol prn  CV: H/o CAD, L ICA s/p L CEA (4/21). Sinus tachycardia- bolused 1.5L, SBP and HR responsive to fluid. Holding ASA, Plavix, metoprolol, and norvasc.   Pulm: Sating well on RA  GI/FEN: NPO except meds. LR@100. PPI  : Renal following. S/p NM renal function study (wnl). Scarlet (8/18-)  ID: ESBL Kleb in intraabdominal abscess: Daina (8/13-)  Endo: No h/o DM. Goal BG <180. mISS  Heme: AM hgb 6.1(7.0). High suspicion for acute blood loss anemia. Stat 2U pRBC. F/u posttransfusion   PPX: SCDs. Holding chemical ppx in setting of concern for acute blood loss anemia.   Lines: PIVs  Wounds: Functioning ostomy p/p/p  PT/OT: not ordered

## 2021-08-18 NOTE — PROVIDER CONTACT NOTE (OTHER) - SITUATION
patient called via call bell to report emesis. noted to have moderate amount coffee ground emesis on chest and bedsheetsa. patient reports chest pain and difficulty breathing patient called via call bell to report emesis. noted to have moderate amount coffee ground emesis on chest and bedsheets. patient reports chest pain and difficulty breathing

## 2021-08-18 NOTE — PROGRESS NOTE ADULT - ASSESSMENT
L gluteal drain with 55cc serosanguinous output over last 24h (95cc day prior). Flushes easily with 2cc NS. WBC almost tripled to day to 34.73, pt tachycardic with temp down to 96.1 this AM. Continue to monitor output. IR will continue to follow.

## 2021-08-18 NOTE — PROGRESS NOTE ADULT - ASSESSMENT
A/P Having stomal function yet had episode of vomiting.  CXR pending to assess for aspiration for which she is at high risk since she is in supine position and not active.  Chest PT and NT suction as needed.    Remains on meropenem  ID recommendations regarding interval scan appreciated.    Medical oncology consult also appreciated.  Presently, patient is not candidate for chemotherapy.  Her tumor is not resectable for cure.  The infection (multiple small pelvic collections not accessible to IR drainage) will be very difficult to eradicate.  The drain will stay in long term. Hopefully, now that she is diverted the pelvic infection will be managable (with drain).  If no further vomiting. then can resume diet.

## 2021-08-18 NOTE — PROGRESS NOTE ADULT - SUBJECTIVE AND OBJECTIVE BOX
GASTROENTEROLOGY PROGRESS NOTE  89F PMHx of HTN, chronic back pain 2/2 L3,L4 compression fractures, triple vessel CAD, and SBO 2/2 possible diverticulitis vs. GYN/colorectal malignancy () never followed up and PSH L CEA 2/2 L ICA stenosis >70% () who presented with worsening back pain x1 week on 2021. ROS + for some recent weight loss. Admitted to [facility] initially for CT A/P fidnings c/f necrotizing colonic mass, malignancy vs abscess. Further read of CT scan concerning for perforated, complicated sigmoid diverticulitis vs. colonic perforation 2/2 pelvic malignancy (), at which time the patient was transferred from the [facility] to surgery. Patient underwent Left gluteal IR drain placement the following day on 21, with ESBL E coli for which ID is following, subsequently placed on Meropenem. s/p Flex Sigmoidoscopy on , the pathology bx results revealing invasive adenocarcinoma moderately differentiated, rectal primary. Patient taken to OR on  as tumor not resectable and not a chemotherapy candidate based on poor performance status (ECOG 3). Underwent laparoscopy with diverting colostomy on . Now with inability to tolerate PO x 2 days and more recently with several episodes of coffee ground emesis starting last night () with an associated acute drop in H/H, requiring pRBC transfusion. Of note, started back up on ASA 81 mg daily for hx of CAD 2 days ago. Stepped up to SICU for hemodynamic monitoring for suspected acute blood loss anemia. GI consulted for consideration of endoscopic evaluation.     Patient seen and examined at bedside.    PERTINENT REVIEW OF SYSTEMS:  CONSTITUTIONAL: No weakness, fevers or chills  HEENT: No visual changes; No vertigo or throat pain   GASTROINTESTINAL: As above.  NEUROLOGICAL: No numbness or weakness  SKIN: No itching, burning, rashes, or lesions     Allergies    No Known Allergies    Intolerances      MEDICATIONS:  MEDICATIONS  (STANDING):  atorvastatin 80 milliGRAM(s) Oral at bedtime  chlorhexidine 2% Cloths 1 Application(s) Topical daily  dextrose 40% Gel 15 Gram(s) Oral once  dextrose 5%. 1000 milliLiter(s) (50 mL/Hr) IV Continuous <Continuous>  dextrose 5%. 1000 milliLiter(s) (100 mL/Hr) IV Continuous <Continuous>  dextrose 50% Injectable 25 Gram(s) IV Push once  dextrose 50% Injectable 12.5 Gram(s) IV Push once  dextrose 50% Injectable 25 Gram(s) IV Push once  glucagon  Injectable 1 milliGRAM(s) IntraMuscular once  insulin lispro (ADMELOG) corrective regimen sliding scale   SubCutaneous three times a day before meals  lactated ringers. 1000 milliLiter(s) (100 mL/Hr) IV Continuous <Continuous>  meropenem  IVPB 1000 milliGRAM(s) IV Intermittent every 12 hours    MEDICATIONS  (PRN):  ondansetron Injectable 4 milliGRAM(s) IV Push every 6 hours PRN Nausea and/or Vomiting    Vital Signs Last 24 Hrs  T(C): 36.6 (18 Aug 2021 11:43), Max: 37.4 (18 Aug 2021 05:00)  T(F): 97.8 (18 Aug 2021 11:43), Max: 99.4 (18 Aug 2021 05:00)  HR: 105 (18 Aug 2021 14:00) (86 - 124)  BP: 121/57 (18 Aug 2021 13:45) (81/42 - 174/77)  BP(mean): 82 (18 Aug 2021 13:45) (56 - 110)  RR: 28 (18 Aug 2021 14:00) (18 - 28)  SpO2: 92% (18 Aug 2021 14:00) (79% - 100%)     @ :  -   @ 07:00  --------------------------------------------------------  IN: 460 mL / OUT: 805 mL / NET: -345 mL     @ 07:  -   @ 14:46  --------------------------------------------------------  IN: 1330 mL / OUT: 45 mL / NET: 1285 mL      PHYSICAL EXAM:    General: in no acute distress  HEENT: MMM, conjunctiva and sclera clear  Gastrointestinal: Soft non-tender non-distended; No rebound or guarding  Skin: Warm and dry. No obvious rash    LABS:                        6.1    28.52 )-----------( 588      ( 18 Aug 2021 09:44 )             19.6     08-18    133<L>  |  98  |  34<H>  ----------------------------<  206<H>  4.3   |  22  |  1.04    Ca    7.9<L>      18 Aug 2021 08:37  Phos  5.0     08-18  Mg     2.3     08-18      PT/INR - ( 18 Aug 2021 09:44 )   PT: 17.3 sec;   INR: 1.47          PTT - ( 18 Aug 2021 09:44 )  PTT:46.2 sec      Urinalysis Basic - ( 18 Aug 2021 13:33 )    Color: Yellow / Appearance: Clear / S.025 / pH: x  Gluc: x / Ketone: Trace mg/dL  / Bili: Negative / Urobili: 1.0 E.U./dL   Blood: x / Protein: 100 mg/dL / Nitrite: NEGATIVE   Leuk Esterase: Trace / RBC: Many /HPF / WBC > 10 /HPF   Sq Epi: x / Non Sq Epi: 0-5 /HPF / Bacteria: Present /HPF                RADIOLOGY & ADDITIONAL STUDIES:  Reviewed GASTROENTEROLOGY PROGRESS NOTE  89F PMHx of HTN, chronic back pain 2/2 L3,L4 compression fractures, triple vessel CAD, and SBO 2/2 possible diverticulitis vs. GYN/colorectal malignancy () never followed up and PSH L CEA 2/2 L ICA stenosis >70% () who presented with worsening back pain x1 week on 2021. ROS + for some recent weight loss. Admitted to [facility] initially for CT A/P fidnings c/f necrotizing colonic mass, malignancy vs abscess. Further read of CT scan concerning for perforated, complicated sigmoid diverticulitis vs. colonic perforation 2/2 pelvic malignancy (), at which time the patient was transferred from the [facility] to surgery. Patient underwent Left gluteal IR drain placement the following day on 21, with ESBL E coli for which ID is following, subsequently placed on Meropenem. s/p Flex Sigmoidoscopy on , the pathology bx results revealing invasive adenocarcinoma moderately differentiated, rectal primary. Patient taken to OR on  as tumor not resectable and not a chemotherapy candidate based on poor performance status (ECOG 3). Underwent laparoscopy with diverting colostomy on . Now with inability to tolerate PO x 2 days and more recently with several episodes of coffee ground emesis starting last night () with an associated acute drop in H/H, requiring pRBC transfusion. Of note, started back up on ASA 81 mg daily for hx of CAD 2 days ago. Stepped up to SICU for hemodynamic monitoring for suspected acute blood loss anemia. GI consulted for consideration of endoscopic evaluation.     Patient seen and examined at bedside. No abdominal pain. With nausea.     PERTINENT REVIEW OF SYSTEMS:  CONSTITUTIONAL: No weakness, fevers or chills  HEENT: No visual changes; No vertigo or throat pain   GASTROINTESTINAL: As above.  NEUROLOGICAL: No numbness or weakness  SKIN: No itching, burning, rashes, or lesions     Allergies    No Known Allergies    Intolerances      MEDICATIONS:  MEDICATIONS  (STANDING):  atorvastatin 80 milliGRAM(s) Oral at bedtime  chlorhexidine 2% Cloths 1 Application(s) Topical daily  dextrose 40% Gel 15 Gram(s) Oral once  dextrose 5%. 1000 milliLiter(s) (50 mL/Hr) IV Continuous <Continuous>  dextrose 5%. 1000 milliLiter(s) (100 mL/Hr) IV Continuous <Continuous>  dextrose 50% Injectable 25 Gram(s) IV Push once  dextrose 50% Injectable 12.5 Gram(s) IV Push once  dextrose 50% Injectable 25 Gram(s) IV Push once  glucagon  Injectable 1 milliGRAM(s) IntraMuscular once  insulin lispro (ADMELOG) corrective regimen sliding scale   SubCutaneous three times a day before meals  lactated ringers. 1000 milliLiter(s) (100 mL/Hr) IV Continuous <Continuous>  meropenem  IVPB 1000 milliGRAM(s) IV Intermittent every 12 hours    MEDICATIONS  (PRN):  ondansetron Injectable 4 milliGRAM(s) IV Push every 6 hours PRN Nausea and/or Vomiting    Vital Signs Last 24 Hrs  T(C): 36.6 (18 Aug 2021 11:43), Max: 37.4 (18 Aug 2021 05:00)  T(F): 97.8 (18 Aug 2021 11:43), Max: 99.4 (18 Aug 2021 05:00)  HR: 105 (18 Aug 2021 14:00) (86 - 124)  BP: 121/57 (18 Aug 2021 13:45) (81/42 - 174/77)  BP(mean): 82 (18 Aug 2021 13:45) (56 - 110)  RR: 28 (18 Aug 2021 14:00) (18 - 28)  SpO2: 92% (18 Aug 2021 14:00) (79% - 100%)     @ 07:  -   @ 07:00  --------------------------------------------------------  IN: 460 mL / OUT: 805 mL / NET: -345 mL     @ 07:  -   @ 14:46  --------------------------------------------------------  IN: 1330 mL / OUT: 45 mL / NET: 1285 mL      PHYSICAL EXAM:   General: frail appearing female, lying in bed; + sump on low-intermittent suction with dark output  Cardiovascular: Sinus tachycardia  Respiratory: CTA. No increased inspiratory effort. No conversational dyspnea.   Gastrointestinal: soft, nondistended, nontender. No rebound or guarding. Stoma pink, edematous, patent with dark brown stool and gas in colostomy   MENDOZA: Brown stool on glove, no masses or ulcers palpated.   Extremities: warm, no dependent edema  Skin: warm, no rashes    LABS:                        6.1    28.52 )-----------( 588      ( 18 Aug 2021 09:44 )             19.6     08-18    133<L>  |  98  |  34<H>  ----------------------------<  206<H>  4.3   |  22  |  1.04    Ca    7.9<L>      18 Aug 2021 08:37  Phos  5.0     08-18  Mg     2.3     08-18      PT/INR - ( 18 Aug 2021 09:44 )   PT: 17.3 sec;   INR: 1.47          PTT - ( 18 Aug 2021 09:44 )  PTT:46.2 sec      Urinalysis Basic - ( 18 Aug 2021 13:33 )    Color: Yellow / Appearance: Clear / S.025 / pH: x  Gluc: x / Ketone: Trace mg/dL  / Bili: Negative / Urobili: 1.0 E.U./dL   Blood: x / Protein: 100 mg/dL / Nitrite: NEGATIVE   Leuk Esterase: Trace / RBC: Many /HPF / WBC > 10 /HPF   Sq Epi: x / Non Sq Epi: 0-5 /HPF / Bacteria: Present /HPF                RADIOLOGY & ADDITIONAL STUDIES:  Reviewed

## 2021-08-18 NOTE — PROVIDER CONTACT NOTE (OTHER) - ACTION/TREATMENT ORDERED:
zofran given, NGT placed at bedside by MD Flanagan, and confirmed with CXR. patient reports resolution of symptoms. 1.5L dark brown output from NGT zofran given, NGT placed at bedside by MD Ackerman and confirmed with CXR. patient reports resolution of symptoms. 1.5L dark brown output from NGT

## 2021-08-18 NOTE — CHART NOTE - NSCHARTNOTEFT_GEN_A_CORE
SENIOR SURGERY RESIDENT    Patient seen for hypotension SBP 90s, given 500 cc bolus crystalloid.     SBP responded, to /54, however patient still tachycardic.    Patient does not offer any complaints.    Patient is awake, not lethargic, appears pale, mentating at baseline (oriented to person, place, year), follows commands  On exam, slightly more distended suprapubic (more than on AM rounds exam), slightly tender (unchanged from AM exam) not peritonitic  CORDELL drain in place with few cc of serosanguinous fluid  No sacral decubitus  On MENDOZA, no blood, soft brown stool present      Hgb decreased on repeat from 7 to 6, lactate rising to 4.4, WBC 34, INR incrased, creatinine doubled, low UOP.   Possible bleeding intra-abdomially, either due to extension of malignancy vs post surgical (ASA was started 8/16 2 days ago)  Will transfuse 2 units PRBC  Hold ASA, HSQ in setting of possible bleeding  Place urbina for I/O and possible retention  Increased LR   NPO for aspiration concern from this morning  Once stable, if possible CTA abdomen/pelvis and CT chest  keep meropenem for ESBL e coli- one dose vancomycin renally dose for additional coverage per Dr. Coyle    Will transfer to SICU, spoke to Dr. Ackerman resident and attending Dr. Coyle, as well as Dr. Stearns and Dr. Mcleod, Dr. Stearns to speak to HCP and discuss if palliative consult is appropriate to further discuss goals of care. at this time, she is full code per her wishes.

## 2021-08-18 NOTE — CONSULT NOTE ADULT - SUBJECTIVE AND OBJECTIVE BOX
Vascular Access Service Consult Note    89yFemaleHEALTH ISSUES - PROBLEM Dx:           Diagnosis: gluteal abscess    Indications for Vascular Access (Check all that apply)  [ x ]  Antibiotic Therapy       Antibiotic Prescribed:   meropenem for at least 2 weeks with likely course extension as a outpatient                                                                                      [  ]  IV Hydration  [  ]  Total Parenteral Nutrition  [  ]  Chemotherapy  [  ]  Difficult Venous Access  [  ]  CVP monitoring  [  ]  Medications with high potential for tissue necrosis on extravasation  [  ]  Other    Screening (Check all that apply)  Previous Radiation to chest  [  ] Yes      [x ]  No  Breast Cancer                          [  ] Left     [  ]  Right    [x  ]  No  Lymph Node Dissection         [  ] Left     [  ]  Right    [ x ]  No  Pacemaker or ICD                   [  ] Left     [  ]  Right    [ x ]  No  Upper Extremity DVT             [  ] Left     [  ]  Right    [x  ]  No  Chronic Kidney Disease         [  ]  Yes     [ x ]  No  Hemodialysis                           [  ]  Yes     [  x]  No  AV Fistula/ Graft                     [  ]  Left    [  ]  Right    [x  ]  No  Temp>101F in past 24 H       [  ]  Yes     [ x ]  No  H/O PICC/Midline                   [  ]  Yes     [ x ]  No    Lab data:                        8.3    12.67 )-----------( 722      ( 17 Aug 2021 12:30 )             26.9     08-17    132<L>  |  105  |  9   ----------------------------<  101<H>  4.2   |  24  |  0.58    Ca    7.5<L>      17 Aug 2021 07:01  Phos  1.7     08-17  Mg     2.2     08-17                I have reviewed the chart, interviewed and examined the patient and determined that this patient:  [ x ] Is a candidate for a PICC line  [  ] Is a candidate for a Midline  [  ] Is not a candidate for vascular access device (reason)    Lumens:    [ x ] Single  [  ] Double

## 2021-08-18 NOTE — CHART NOTE - NSCHARTNOTEFT_GEN_A_CORE
Bedside EGD performed, by Dr Nichols and myself. Findings as noted below:     Impression:  -LA grade D esophagitis. Multiple ulcers in the middle to lower third of the esophagus s/p biopsy to rule out CMV.    -4 cm hiatal hernia.    -Non-erosive gastritis with scattered erythema consistent with NG tube trauma. Hematin on the gastric mucosa, bleeding likely from esophageal ulcer.    -Erythema in the duodenum compatible with duodenitis.      Plan:  -Clear liquid diet  -PPI 40 mg BID  -Pepcid BID  -Carafate 1g QID (suspension)  -Followup pathology  -Repeat EGD in 8 weeks  -Avoid NG tube placement

## 2021-08-18 NOTE — PROGRESS NOTE ADULT - ASSESSMENT
89F PMHx of HTN, chronic back pain 2/2 L3,L4 compression fractures, triple vessel CAD, and SBO 2/2 possible diverticulitis vs. GYN/colorectal malignancy (2018) never followed up and PSH L CEA 2/2 L ICA stenosis >70% (4/21) who presented with worsening back pain x1 week, CT A/P revealing perforated complicated diverticulitis with abscess, s/p drainage, sigmoid mass c/w rectal adenocarcinoma, not amenable to surgical resection and chemotherapy 2/2 poor performance status, now with 2 day history of nausea/vomiting, and 1 day of coffee ground emesis with associated acute drop in H/H, GI consulted for consideration of endoscopic evaluation.    #Anemia  GI re-consulted for acute drop in H/H in the setting of coffee ground emesis. Baseline Hgb 10-11, today downtrended to 6.1. With sump in place with dark output, also noted in ostomy bag. With uptrend in BUN:Cr ratio c/f possible UGIB source.   -c/w Protonix 40 mg IVP BID  -Closely monitor H/H   -No utility in Fe studies given pRBC transfusions  -Maintain active T&S   -Transfusion goal as per primary team   -Keep sump in place, low intermittent suction  -Keep NPO, will tentatively plan for bedside EGD tomorrow pending H/H and sump output   -Consider palliative consult to discuss GOC      Case discussed with svc attending and primary team.     Angelica Nielson DO  Gastroenterology Fellow  Pager; [phone] 89F PMHx of HTN, chronic back pain 2/2 L3,L4 compression fractures, triple vessel CAD, and SBO 2/2 possible diverticulitis vs. GYN/colorectal malignancy (2018) never followed up and PSH L CEA 2/2 L ICA stenosis >70% (4/21) who presented with worsening back pain x1 week, CT A/P revealing perforated complicated diverticulitis with abscess, s/p drainage, sigmoid mass c/w rectal adenocarcinoma, not amenable to surgical resection and chemotherapy 2/2 poor performance status, now with 2 day history of nausea/vomiting, and 1 day of coffee ground emesis with associated acute drop in H/H, GI consulted for consideration of endoscopic evaluation.    #Anemia  GI re-consulted for acute drop in H/H in the setting of coffee ground emesis. Baseline Hgb 10-11, today downtrended to 6.1. With sump in place with dark output, also noted in ostomy bag. With uptrend in BUN:Cr ratio c/f possible UGIB source.   -s/p EGD (8/18) - LA grade D esophagitis. Multiple ulcers in the middle to lower third of the esophagus s/p biopsy to rule out CMV. 4 cm hiatal hernia. Non-erosive gastritis with scattered erythema consistent with NG tube trauma. Hematin on the gastric mucosa, bleeding likely from esophageal ulcer. Erythema in the duodenum compatible with duodenitis.    -c/w Protonix 40 mg IVP BID  -Pepcid BID  -CLD  -Carafate 1g QID (suspension)  -Follow-up pathology  -Repeat EGD in 8 weeks  -Closely monitor H/H   -No utility in Fe studies given pRBC transfusions  -Avoid NG tube placement.  -Maintain active T&S   -Transfusion goal as per primary team   -Consider palliative consult to discuss GOC      Case discussed with svc attending and primary team.     Angelica Nielson DO  Gastroenterology Fellow  Pager; [phone]

## 2021-08-18 NOTE — SWALLOW BEDSIDE ASSESSMENT ADULT - COMMENTS
Pt deconditioned. Accepted minimal trials ~1oz via tsp and cup sips. Refused additional trials of clear liquids despite max encouragement and education.

## 2021-08-18 NOTE — PROGRESS NOTE ADULT - ASSESSMENT
89 F with PMHx back pain, CAD, CEA, UTI presented with back pain and found to have high grade partial sigmoid obstruction with gluteal abscess. ID consulted based on wound cultures positive for E coli.    Based on her polymicrobial wound culture including anaerobes and ESBL E coli, gut translocation due to perforation or mucosal breakdown is likely. Strong gram negative and anaerobic coverage indicated at this time.  Her 8/18 leukocytosis is likely aspiration pneumonitis given rapid timecourse. May also be worsening pelvic infection. At this time, her cancer is inoperable and her infection is not drainable, so source control is not reasonably an option and chemo will likely be delayed. Prognosis appears grave at this point.    Would recommend:  - Please continue meropenem 1000mg every 12 hours (8/11 - 8/25 or likely longer given decompensation) for intra-abdominal infection, also provides coverage for aspiration  - Please obtain MRSA nasal swab, if positive and patient decompensates further, may need vancomycin  - defer placement of PICC at this time given instability  - management of adenocarcinoma per primary team, Heme/Onc  - if patient is febrile, please send UA, chest xray, blood culture  - patient requires repeat CT abdomen and pelvis with PO contrast with and without IV contrast when stabilized    ID Team 1 will continue to follow. Please see below attending attestation for finalized recommendations.    Austen Kruse MD PGY2 Internal Medicine  Infectious Disease Consult Service

## 2021-08-18 NOTE — PROGRESS NOTE ADULT - SUBJECTIVE AND OBJECTIVE BOX
INTERVAL HPI/OVERNIGHT EVENTS: Patient had one episode of emesis while asleep, nurse found her choking, likely aspirated, put on a nonrebreather    STATUS POST: diverting colostomy     POST OPERATIVE DAY #: 5    SUBJECTIVE:  Patient is doing well this morning, seen at bedside with chief, denies any new complaints, denies any more nausea or vomiting after last night, denies any chest pain or shortness of breath, pain is adequately controlled. Patient currently switched to npo. Does not want to ambulate. +BF    MEDICATIONS  (STANDING):  acetaminophen   Tablet .. 650 milliGRAM(s) Oral every 6 hours  acetaminophen  IVPB .. 700 milliGRAM(s) IV Intermittent once  aspirin  chewable 81 milliGRAM(s) Oral daily  atorvastatin 80 milliGRAM(s) Oral at bedtime  chlorhexidine 2% Cloths 1 Application(s) Topical daily  heparin   Injectable 5000 Unit(s) SubCutaneous every 12 hours  lactated ringers. 1000 milliLiter(s) (40 mL/Hr) IV Continuous <Continuous>  meropenem  IVPB 1000 milliGRAM(s) IV Intermittent every 12 hours  metoprolol succinate ER 12.5 milliGRAM(s) Oral daily    MEDICATIONS  (PRN):      Vital Signs Last 24 Hrs  T(C): 36.9 (17 Aug 2021 23:00), Max: 36.9 (17 Aug 2021 09:50)  T(F): 98.4 (17 Aug 2021 23:00), Max: 98.5 (17 Aug 2021 18:16)  HR: 106 (18 Aug 2021 05:00) (86 - 124)  BP: 123/56 (18 Aug 2021 04:45) (89/52 - 174/77)  BP(mean): 81 (18 Aug 2021 04:45) (67 - 110)  RR: 20 (18 Aug 2021 05:20) (18 - 20)  SpO2: 95% (18 Aug 2021 05:20) (86% - 97%)    PHYSICAL EXAM:  Constitutional: A&Ox3, AVSS, resting in bed    Respiratory: non labored breathing, no respiratory distress    Cardiovascular: NSR, RRR    Gastrointestinal: abdomen is soft, nontender, nondistended, liquid stool in ostomy, serous output in CORDELL. Stoma is pink, healthy and patent     Extremities: (-) edema, SCDs in place       I&O's Detail    17 Aug 2021 07:01  -  18 Aug 2021 07:00  --------------------------------------------------------  IN:    IV PiggyBack: 370 mL    Lactated Ringers: 40 mL    Oral Fluid: 50 mL  Total IN: 460 mL    OUT:    Bulb (mL): 55 mL    Colostomy (mL): 450 mL    Voided (mL): 300 mL  Total OUT: 805 mL    Total NET: -345 mL          LABS:                        8.3    12.67 )-----------( 722      ( 17 Aug 2021 12:30 )             26.9     08-17    132<L>  |  105  |  9   ----------------------------<  101<H>  4.2   |  24  |  0.58    Ca    7.5<L>      17 Aug 2021 07:01  Phos  1.7     08-17  Mg     2.2     08-17            RADIOLOGY & ADDITIONAL STUDIES:

## 2021-08-18 NOTE — PROVIDER CONTACT NOTE (CRITICAL VALUE NOTIFICATION) - SITUATION
drop in hemoglobin from 8.3 to 7.0. pt hypotensive. patient on o2 3L nasal canula. pt tachy with movement.

## 2021-08-18 NOTE — PROVIDER CONTACT NOTE (OTHER) - SITUATION
HR 93, patient resting comfortably in bed. 2nd unit of PRBCs infusing at this time, continuing to hold IVF LR until blood finished per MD Flanagan HR 93, patient resting comfortably in bed. 2nd unit of PRBCs infusing at this time, continuing to hold IVF LR until blood finished per MD Ackerman

## 2021-08-19 ENCOUNTER — TRANSCRIPTION ENCOUNTER (OUTPATIENT)
Age: 86
End: 2021-08-19

## 2021-08-19 DIAGNOSIS — R53.2 FUNCTIONAL QUADRIPLEGIA: ICD-10-CM

## 2021-08-19 DIAGNOSIS — Z51.5 ENCOUNTER FOR PALLIATIVE CARE: ICD-10-CM

## 2021-08-19 DIAGNOSIS — Z71.89 OTHER SPECIFIED COUNSELING: ICD-10-CM

## 2021-08-19 DIAGNOSIS — C80.1 MALIGNANT (PRIMARY) NEOPLASM, UNSPECIFIED: ICD-10-CM

## 2021-08-19 LAB
A1C WITH ESTIMATED AVERAGE GLUCOSE RESULT: 5.4 % — SIGNIFICANT CHANGE UP (ref 4–5.6)
ANION GAP SERPL CALC-SCNC: 8 MMOL/L — SIGNIFICANT CHANGE UP (ref 5–17)
BASOPHILS # BLD AUTO: 0.06 K/UL — SIGNIFICANT CHANGE UP (ref 0–0.2)
BASOPHILS NFR BLD AUTO: 0.2 % — SIGNIFICANT CHANGE UP (ref 0–2)
BUN SERPL-MCNC: 36 MG/DL — HIGH (ref 7–23)
CALCIUM SERPL-MCNC: 8 MG/DL — LOW (ref 8.4–10.5)
CHLORIDE SERPL-SCNC: 103 MMOL/L — SIGNIFICANT CHANGE UP (ref 96–108)
CO2 SERPL-SCNC: 24 MMOL/L — SIGNIFICANT CHANGE UP (ref 22–31)
CREAT SERPL-MCNC: 0.98 MG/DL — SIGNIFICANT CHANGE UP (ref 0.5–1.3)
EOSINOPHIL # BLD AUTO: 0.01 K/UL — SIGNIFICANT CHANGE UP (ref 0–0.5)
EOSINOPHIL NFR BLD AUTO: 0 % — SIGNIFICANT CHANGE UP (ref 0–6)
ESTIMATED AVERAGE GLUCOSE: 108 MG/DL — SIGNIFICANT CHANGE UP (ref 68–114)
GLUCOSE BLDC GLUCOMTR-MCNC: 118 MG/DL — HIGH (ref 70–99)
GLUCOSE BLDC GLUCOMTR-MCNC: 124 MG/DL — HIGH (ref 70–99)
GLUCOSE BLDC GLUCOMTR-MCNC: 92 MG/DL — SIGNIFICANT CHANGE UP (ref 70–99)
GLUCOSE BLDC GLUCOMTR-MCNC: 98 MG/DL — SIGNIFICANT CHANGE UP (ref 70–99)
GLUCOSE SERPL-MCNC: 100 MG/DL — HIGH (ref 70–99)
HCT VFR BLD CALC: 27.1 % — LOW (ref 34.5–45)
HCT VFR BLD CALC: 27.5 % — LOW (ref 34.5–45)
HCT VFR BLD CALC: 27.9 % — LOW (ref 34.5–45)
HGB BLD-MCNC: 9.2 G/DL — LOW (ref 11.5–15.5)
HGB BLD-MCNC: 9.3 G/DL — LOW (ref 11.5–15.5)
HGB BLD-MCNC: 9.3 G/DL — LOW (ref 11.5–15.5)
IMM GRANULOCYTES NFR BLD AUTO: 0.8 % — SIGNIFICANT CHANGE UP (ref 0–1.5)
LACTATE SERPL-SCNC: 1.7 MMOL/L — SIGNIFICANT CHANGE UP (ref 0.5–2)
LYMPHOCYTES # BLD AUTO: 2.01 K/UL — SIGNIFICANT CHANGE UP (ref 1–3.3)
LYMPHOCYTES # BLD AUTO: 7.2 % — LOW (ref 13–44)
MAGNESIUM SERPL-MCNC: 2 MG/DL — SIGNIFICANT CHANGE UP (ref 1.6–2.6)
MCHC RBC-ENTMCNC: 29 PG — SIGNIFICANT CHANGE UP (ref 27–34)
MCHC RBC-ENTMCNC: 29.2 PG — SIGNIFICANT CHANGE UP (ref 27–34)
MCHC RBC-ENTMCNC: 29.2 PG — SIGNIFICANT CHANGE UP (ref 27–34)
MCHC RBC-ENTMCNC: 33.3 GM/DL — SIGNIFICANT CHANGE UP (ref 32–36)
MCHC RBC-ENTMCNC: 33.8 GM/DL — SIGNIFICANT CHANGE UP (ref 32–36)
MCHC RBC-ENTMCNC: 33.9 GM/DL — SIGNIFICANT CHANGE UP (ref 32–36)
MCV RBC AUTO: 86 FL — SIGNIFICANT CHANGE UP (ref 80–100)
MCV RBC AUTO: 86.5 FL — SIGNIFICANT CHANGE UP (ref 80–100)
MCV RBC AUTO: 86.9 FL — SIGNIFICANT CHANGE UP (ref 80–100)
MONOCYTES # BLD AUTO: 0.96 K/UL — HIGH (ref 0–0.9)
MONOCYTES NFR BLD AUTO: 3.5 % — SIGNIFICANT CHANGE UP (ref 2–14)
MRSA PCR RESULT.: NEGATIVE — SIGNIFICANT CHANGE UP
NEUTROPHILS # BLD AUTO: 24.49 K/UL — HIGH (ref 1.8–7.4)
NEUTROPHILS NFR BLD AUTO: 88.3 % — HIGH (ref 43–77)
NRBC # BLD: 0 /100 WBCS — SIGNIFICANT CHANGE UP (ref 0–0)
PHOSPHATE SERPL-MCNC: 3.3 MG/DL — SIGNIFICANT CHANGE UP (ref 2.5–4.5)
PLATELET # BLD AUTO: 391 K/UL — SIGNIFICANT CHANGE UP (ref 150–400)
PLATELET # BLD AUTO: 412 K/UL — HIGH (ref 150–400)
PLATELET # BLD AUTO: 422 K/UL — HIGH (ref 150–400)
POTASSIUM SERPL-MCNC: 4.2 MMOL/L — SIGNIFICANT CHANGE UP (ref 3.5–5.3)
POTASSIUM SERPL-SCNC: 4.2 MMOL/L — SIGNIFICANT CHANGE UP (ref 3.5–5.3)
RBC # BLD: 3.15 M/UL — LOW (ref 3.8–5.2)
RBC # BLD: 3.18 M/UL — LOW (ref 3.8–5.2)
RBC # BLD: 3.21 M/UL — LOW (ref 3.8–5.2)
RBC # FLD: 14.8 % — HIGH (ref 10.3–14.5)
RBC # FLD: 15.1 % — HIGH (ref 10.3–14.5)
RBC # FLD: 15.5 % — HIGH (ref 10.3–14.5)
S AUREUS DNA NOSE QL NAA+PROBE: NEGATIVE — SIGNIFICANT CHANGE UP
SODIUM SERPL-SCNC: 135 MMOL/L — SIGNIFICANT CHANGE UP (ref 135–145)
WBC # BLD: 27.75 K/UL — HIGH (ref 3.8–10.5)
WBC # BLD: 28.2 K/UL — HIGH (ref 3.8–10.5)
WBC # BLD: 28.82 K/UL — HIGH (ref 3.8–10.5)
WBC # FLD AUTO: 27.75 K/UL — HIGH (ref 3.8–10.5)
WBC # FLD AUTO: 28.2 K/UL — HIGH (ref 3.8–10.5)
WBC # FLD AUTO: 28.82 K/UL — HIGH (ref 3.8–10.5)

## 2021-08-19 PROCEDURE — 99232 SBSQ HOSP IP/OBS MODERATE 35: CPT

## 2021-08-19 PROCEDURE — 73600 X-RAY EXAM OF ANKLE: CPT | Mod: 26,LT

## 2021-08-19 PROCEDURE — 99497 ADVNCD CARE PLAN 30 MIN: CPT | Mod: 25

## 2021-08-19 PROCEDURE — 99223 1ST HOSP IP/OBS HIGH 75: CPT

## 2021-08-19 PROCEDURE — 99233 SBSQ HOSP IP/OBS HIGH 50: CPT | Mod: GC

## 2021-08-19 PROCEDURE — 71045 X-RAY EXAM CHEST 1 VIEW: CPT | Mod: 26

## 2021-08-19 PROCEDURE — 99358 PROLONG SERVICE W/O CONTACT: CPT

## 2021-08-19 PROCEDURE — 93971 EXTREMITY STUDY: CPT | Mod: 26,LT

## 2021-08-19 RX ORDER — ACETAMINOPHEN 500 MG
1000 TABLET ORAL EVERY 6 HOURS
Refills: 0 | Status: DISCONTINUED | OUTPATIENT
Start: 2021-08-19 | End: 2021-08-22

## 2021-08-19 RX ORDER — LIDOCAINE 4 G/100G
1 CREAM TOPICAL EVERY 24 HOURS
Refills: 0 | Status: DISCONTINUED | OUTPATIENT
Start: 2021-08-19 | End: 2021-09-08

## 2021-08-19 RX ORDER — ACETAMINOPHEN 500 MG
1000 TABLET ORAL ONCE
Refills: 0 | Status: DISCONTINUED | OUTPATIENT
Start: 2021-08-19 | End: 2021-08-19

## 2021-08-19 RX ORDER — METOCLOPRAMIDE HCL 10 MG
10 TABLET ORAL ONCE
Refills: 0 | Status: COMPLETED | OUTPATIENT
Start: 2021-08-19 | End: 2021-08-19

## 2021-08-19 RX ORDER — ACETAMINOPHEN 500 MG
100 TABLET ORAL EVERY 6 HOURS
Refills: 0 | Status: DISCONTINUED | OUTPATIENT
Start: 2021-08-19 | End: 2021-08-19

## 2021-08-19 RX ORDER — ACETAMINOPHEN 500 MG
1000 TABLET ORAL EVERY 6 HOURS
Refills: 0 | Status: DISCONTINUED | OUTPATIENT
Start: 2021-08-19 | End: 2021-08-19

## 2021-08-19 RX ADMIN — Medication 1000 MILLIGRAM(S): at 11:42

## 2021-08-19 RX ADMIN — SODIUM CHLORIDE 100 MILLILITER(S): 9 INJECTION, SOLUTION INTRAVENOUS at 02:38

## 2021-08-19 RX ADMIN — Medication 10 MILLIGRAM(S): at 19:45

## 2021-08-19 RX ADMIN — LIDOCAINE 1 PATCH: 4 CREAM TOPICAL at 10:43

## 2021-08-19 RX ADMIN — CHLORHEXIDINE GLUCONATE 1 APPLICATION(S): 213 SOLUTION TOPICAL at 11:16

## 2021-08-19 RX ADMIN — LIDOCAINE 1 PATCH: 4 CREAM TOPICAL at 21:30

## 2021-08-19 RX ADMIN — Medication 1 GRAM(S): at 06:11

## 2021-08-19 RX ADMIN — PANTOPRAZOLE SODIUM 40 MILLIGRAM(S): 20 TABLET, DELAYED RELEASE ORAL at 00:46

## 2021-08-19 RX ADMIN — PANTOPRAZOLE SODIUM 40 MILLIGRAM(S): 20 TABLET, DELAYED RELEASE ORAL at 11:15

## 2021-08-19 RX ADMIN — LIDOCAINE 1 PATCH: 4 CREAM TOPICAL at 17:03

## 2021-08-19 RX ADMIN — MEROPENEM 200 MILLIGRAM(S): 1 INJECTION INTRAVENOUS at 06:10

## 2021-08-19 RX ADMIN — Medication 1 GRAM(S): at 17:11

## 2021-08-19 RX ADMIN — ONDANSETRON 4 MILLIGRAM(S): 8 TABLET, FILM COATED ORAL at 19:11

## 2021-08-19 RX ADMIN — MEROPENEM 200 MILLIGRAM(S): 1 INJECTION INTRAVENOUS at 19:11

## 2021-08-19 RX ADMIN — FAMOTIDINE 20 MILLIGRAM(S): 10 INJECTION INTRAVENOUS at 11:14

## 2021-08-19 RX ADMIN — ATORVASTATIN CALCIUM 80 MILLIGRAM(S): 80 TABLET, FILM COATED ORAL at 21:57

## 2021-08-19 RX ADMIN — Medication 1000 MILLIGRAM(S): at 09:49

## 2021-08-19 NOTE — PROGRESS NOTE ADULT - ASSESSMENT
89F PMH HTN, chronic back pain 2/2 L3,L4 compression fractures, triple vessel CAD, and SBO 2/2 possible diverticulitis vs. GYN/colorectal malignancy (2018) never followed up and PSH L CEA 2/2 L ICA stenosis >70% (4/21) who presented with worsening back pain  found to have perforated colonic perforation 2/2 pelvic malignancy (8/8). Left gluteal IR drain placed (8/9). Colonoscopy w/ sigmoid mass bx on 8/11- final path invasive adenocarcinoma moderately differentiated. Taken to OR on 8/13 for diverting colostomy. Stepped up to SICU for hemodynamic monitoring for suspected acute blood loss anemia found to be due to duodenal ulceration on EGD.     Neuro: AOX3. NAD. Tylenol prn  CV: H/o CAD, L ICA s/p L CEA (4/21). Holding ASA, Plavix, metoprolol, and norvasc.   Pulm: Sating well on RA. Chest physio-therapy.   GI/FEN: Swallow study today; advance diet as tolerated. LR@100.UGIB: EGD on 8/18: Erosive esophagitis and doudenitis with esophageal ulcers bx x2  Cont PPI QD (unable to provide BID secondary to mild renal malfunction) and Carafate. Avoid NGT, per GI; f/u EGD in 8 weeks.   : Renal following. S/p NM renal function study (wnl). Scarlet (8/18-)  ID: ESBL Kleb in intraabdominal abscess: Daina (8/13-)  Endo: No h/o DM. Goal BG <180. mISS  Heme: holding chemo ppx until hgb stable  PPX: SCDs.    Lines: PIVs  Wounds: Functioning ostomy p/p/p  PT/OT: Aggressive PT.

## 2021-08-19 NOTE — CONSULT NOTE ADULT - PROBLEM SELECTOR RECOMMENDATION 3
-HCP Cynthia Sims, to bring in HCP paperwork for EMR  -discussion as above, ongoing   -continue full code, interventions medically indicated   -readdress code status and GOC with Cynthia following her conversation with patient, palliative happy to assist

## 2021-08-19 NOTE — PROGRESS NOTE ADULT - ASSESSMENT
89 F with PMHx back pain, CAD, CEA, UTI presented with back pain and found to have high grade partial sigmoid obstruction with gluteal abscess. ID consulted based on wound cultures positive for E coli.    Based on her polymicrobial wound culture including anaerobes and ESBL E coli, gut translocation due to perforation or mucosal breakdown is likely. Strong gram negative and anaerobic coverage indicated at this time.  Her 8/18 leukocytosis is likely aspiration pneumonitis given rapid timecourse. May also be worsening pelvic infection. At this time, her cancer is inoperable and her infection is not drainable, so source control is not reasonably an option and chemo will likely be delayed. Prognosis appears grave at this point.    Would recommend:  - Please continue meropenem 1000mg every 12 hours (8/11 - 9/1 at least, pending repeat imaging)  - Please obtain MRSA nasal swab, if positive and patient decompensates further, may need vancomycin given aspiration  - blood cultures negative, no contraindication to PICC but no clear benefit given patient far from discharge  - input of palliative care team is appreciated; patient cannot reasonably achieve source control so her infection will not be eradicated. Per heme/onc documentation, this would represent a contraindication to chemotherapy.  - if patient is febrile, please send UA, chest xray, blood culture  - patient requires repeat CT abdomen and pelvis with PO contrast with and without IV contrast within 2 weeks to evaluate intra-abdominal infection    ID Team 1 will continue to follow. Please see below attending attestation for finalized recommendations.    Austen Kruse MD PGY2 Internal Medicine  Infectious Disease Consult Service 89 F with PMHx back pain, CAD, CEA, UTI presented with back pain and found to have high grade partial sigmoid obstruction with gluteal abscess. ID consulted based on wound cultures positive for E coli.    Based on her polymicrobial wound culture including anaerobes and ESBL E coli, gut translocation due to perforation or mucosal breakdown is likely. Strong gram negative and anaerobic coverage indicated at this time.  Her 8/18 leukocytosis is likely aspiration pneumonitis given rapid timecourse. May also be worsening pelvic infection. At this time, her cancer is inoperable and her infection is not drainable, so source control is not reasonably an option and chemo will likely be delayed. Prognosis appears grave at this point.    Would recommend:  - Please continue meropenem 1000mg every 12 hours (8/11 - 9/2 or longer, pending repeat imaging)  - Please obtain MRSA nasal swab, if positive and patient decompensates further, may need vancomycin given aspiration  - blood cultures negative, no contraindication to PICC but no clear benefit given patient far from discharge  - input of palliative care team is appreciated; patient cannot reasonably achieve source control so her infection will not be eradicated. Per heme/onc documentation, this would represent a contraindication to chemotherapy.  - If patient transitions to hospice, would recommend discontinuation of antibiotics  - if patient is febrile, please send UA, chest xray, blood culture  - patient requires repeat CT abdomen and pelvis with PO contrast with and without IV contrast within 2 weeks to evaluate intra-abdominal infection    ID Team 1 signing off at this time. Please reconsult if patient develops new fever, patient is approaching discharge to [facility], or further ID guidance is needed. See below attestation.    Austen Kruse MD PGY2 Internal Medicine  Infectious Disease Consult Service

## 2021-08-19 NOTE — PROGRESS NOTE ADULT - ASSESSMENT
89F PMH HTN, chronic back pain 2/2 L3,L4 compression fractures, triple vessel CAD, and SBO 2/2 possible diverticulitis vs. GYN/colorectal malignancy (2018) never followed up and PSH L CEA 2/2 L ICA stenosis >70% (4/210; patient was transferred from medicine for perforated, complicated sigmoid diverticulitis vs. 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 Taken to OR on 8/13 for diverting colostomy. transfered to SICU on 8/18 for anemia of blood loss s/p EGD with finding of duodenal ulceration.      plan:   SICU following appreciate recs  OOB as tolerated   continue antibiotics, id following   GI following continue protonix and carafte   monitor H/H transfuse if Hg < 7   89F PMH HTN, chronic back pain 2/2 L3,L4 compression fractures, triple vessel CAD, and SBO 2/2 possible diverticulitis vs. GYN/colorectal malignancy (2018) never followed up and PSH L CEA 2/2 L ICA stenosis >70% (4/210; patient was transferred from medicine for perforated, complicated sigmoid diverticulitis vs. 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 Taken to OR on 8/13 for diverting colostomy. transfered to SICU on 8/18 for anemia of blood loss s/p EGD with finding of duodenal ulceration.      plan:   SICU following appreciate recs  OOB as tolerated   continue antibiotics, id following   GI following continue protonix and carafte   monitor H/H transfuse if Hg < 7  team 1 surgery will follow

## 2021-08-19 NOTE — CHART NOTE - NSCHARTNOTEFT_GEN_A_CORE
Admitting Diagnosis:   Patient is a 89y old  Female who presents with a chief complaint of Neck pain (19 Aug 2021 06:46)    PAST MEDICAL & SURGICAL HISTORY:  SBO (small bowel obstruction)  HTN (hypertension)  Chronic back pain  S/P wrist surgery    Current Nutrition Order: NPO diet     PO Intake: Good (%) [   ]  Fair (50-75%) [   ] Poor (<25%) [   ]- N/A    GI Issues:   WDL, last BM 8/18  +N/V noted last night (8/18)  Right side abd distended. All quadrants tender    Pain:  No pain noted- well controlled at time of assessment    Skin Integrity:  WDL, ankush score 15, ecchymotic  No edema present  No pressure ulcers noted    Labs:   08-19    135  |  103  |  36<H>  ----------------------------<  100<H>  4.2   |  24  |  0.98    Ca    8.0<L>      19 Aug 2021 05:47  Phos  3.3     08-19  Mg     2.0     08-19    CAPILLARY BLOOD GLUCOSE    POCT Blood Glucose.: 98 mg/dL (19 Aug 2021 05:22)  POCT Blood Glucose.: 118 mg/dL (18 Aug 2021 23:35)  POCT Blood Glucose.: 138 mg/dL (18 Aug 2021 16:24)  POCT Blood Glucose.: 145 mg/dL (18 Aug 2021 13:26)    Medications:  MEDICATIONS  (STANDING):  atorvastatin 80 milliGRAM(s) Oral at bedtime  chlorhexidine 2% Cloths 1 Application(s) Topical daily  dextrose 40% Gel 15 Gram(s) Oral once  dextrose 5%. 1000 milliLiter(s) (50 mL/Hr) IV Continuous <Continuous>  dextrose 5%. 1000 milliLiter(s) (100 mL/Hr) IV Continuous <Continuous>  dextrose 50% Injectable 25 Gram(s) IV Push once  dextrose 50% Injectable 12.5 Gram(s) IV Push once  dextrose 50% Injectable 25 Gram(s) IV Push once  famotidine    Tablet 20 milliGRAM(s) Oral daily  glucagon  Injectable 1 milliGRAM(s) IntraMuscular once  insulin lispro (ADMELOG) corrective regimen sliding scale   SubCutaneous three times a day before meals  lactated ringers. 1000 milliLiter(s) (100 mL/Hr) IV Continuous <Continuous>  meropenem  IVPB 1000 milliGRAM(s) IV Intermittent every 12 hours  pantoprazole  Injectable 40 milliGRAM(s) IV Push every 12 hours  sucralfate suspension 1 Gram(s) Oral two times a day    MEDICATIONS  (PRN):  acetaminophen   Tablet .. 1000 milliGRAM(s) Oral every 6 hours PRN Temp greater or equal to 38C (100.4F), Mild Pain (1 - 3)  ondansetron Injectable 4 milliGRAM(s) IV Push every 6 hours PRN Nausea and/or Vomiting    Admitted Anthropometrics:  Height: 5'0" Weight: 90lbs, IBW 100lbs+/-10%, %IBW 90%, BMI 17.6     Weight Change: Per initial RD note pt reports UBW to be 110 lbs, current adm wt 90 lbs, indicates wt loss of 20 lbs/18% x 1 year.  8/13: 89.9 pounds     Estimated energy needs:   ABW (40.8kg) used to calculate energy needs due to pt's current body weight within % IBW (90%).   Nutrient needs based on [facility] standards of care for maintenance in older adults.   Needs adjusted for age, pre-op/post-op healing, severe protein calorie malnutrition  1020-1224kcal/day (25-30kcal/kg)  61-69g pro/day (1.5-1.7g pro/kg)  1224-1428ml fluid/day (30-35ml/kg]     Subjective:   89F PMH HTN, chronic back pain 2/2 L3,L4 compression fractures, triple vessel CAD, and SBO 2/2 possible diverticulitis vs. GYN/colorectal malignancy (2018) never followed up and PSH L CEA 2/2 L ICA stenosis >70% (4/21) who presented with worsening back pain x1 week. Endorses flatus and some recent weight loss. Transferred from medicine for perforated, complicated sigmoid diverticulitis vs. 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 S/p OR 8/13 for diverting colostomy. Pt now transferred to SICU for HD monitoring for acute blood loss. s/p EGD on 8/18: Erosive esophagitis and doudenitis with esophageal ulcers bx x2 likely cause of acute blood loss. Avoid NGT placement, per GI; f/u EGD in 8 weeks. Plan for swallow study today 8/19 with plan to advance diet pending results.     On assessment, pt resting in bed noting hunger. Currently remains NPO pending swallow study. +N/V 8/18 but none reported this am.     Previous Nutrition Diagnosis:  Severe protein calorie malnutrition as evidenced by NFPE performed, noted severe fat and muscle wasting, prolonged poor PO intake <75% of EER for 1 year.  Active [  x ]  Resolved [   ]    If resolved, new PES:     Goal: Pt to meet at least 75% of EER during hospital stay    Recommendations:  1. As medically feasible, recommend advancing diet to full--> low fiber with Ensure Enlive 3x/day (350 kcal, 20 g protein per serving)   2. Encourage pt to meet nutritional needs as able   3. Consider multivitamin to optimize nutritional status   4. Defer pain and bowel regimen to team   5. Monitor ostomy output     Education: Reinforced previous diet education.    Risk Level: High [ x  ] Moderate [   ] Low [   ]. Admitting Diagnosis:   Patient is a 89y old  Female who presents with a chief complaint of Neck pain (19 Aug 2021 06:46)    PAST MEDICAL & SURGICAL HISTORY:  SBO (small bowel obstruction)  HTN (hypertension)  Chronic back pain  S/P wrist surgery    Current Nutrition Order: NPO diet     PO Intake: Good (%) [   ]  Fair (50-75%) [   ] Poor (<25%) [   ]- N/A    GI Issues:   WDL, last BM 8/18  +N/V noted last night (8/18)  Right side abd distended. All quadrants tender    Pain:  No pain noted- well controlled at time of assessment    Skin Integrity:  WDL, ankush score 15, ecchymotic  No edema present  No pressure ulcers noted    Labs:   08-19    135  |  103  |  36<H>  ----------------------------<  100<H>  4.2   |  24  |  0.98    Ca    8.0<L>      19 Aug 2021 05:47  Phos  3.3     08-19  Mg     2.0     08-19    CAPILLARY BLOOD GLUCOSE    POCT Blood Glucose.: 98 mg/dL (19 Aug 2021 05:22)  POCT Blood Glucose.: 118 mg/dL (18 Aug 2021 23:35)  POCT Blood Glucose.: 138 mg/dL (18 Aug 2021 16:24)  POCT Blood Glucose.: 145 mg/dL (18 Aug 2021 13:26)    Medications:  MEDICATIONS  (STANDING):  atorvastatin 80 milliGRAM(s) Oral at bedtime  chlorhexidine 2% Cloths 1 Application(s) Topical daily  dextrose 40% Gel 15 Gram(s) Oral once  dextrose 5%. 1000 milliLiter(s) (50 mL/Hr) IV Continuous <Continuous>  dextrose 5%. 1000 milliLiter(s) (100 mL/Hr) IV Continuous <Continuous>  dextrose 50% Injectable 25 Gram(s) IV Push once  dextrose 50% Injectable 12.5 Gram(s) IV Push once  dextrose 50% Injectable 25 Gram(s) IV Push once  famotidine    Tablet 20 milliGRAM(s) Oral daily  glucagon  Injectable 1 milliGRAM(s) IntraMuscular once  insulin lispro (ADMELOG) corrective regimen sliding scale   SubCutaneous three times a day before meals  lactated ringers. 1000 milliLiter(s) (100 mL/Hr) IV Continuous <Continuous>  meropenem  IVPB 1000 milliGRAM(s) IV Intermittent every 12 hours  pantoprazole  Injectable 40 milliGRAM(s) IV Push every 12 hours  sucralfate suspension 1 Gram(s) Oral two times a day    MEDICATIONS  (PRN):  acetaminophen   Tablet .. 1000 milliGRAM(s) Oral every 6 hours PRN Temp greater or equal to 38C (100.4F), Mild Pain (1 - 3)  ondansetron Injectable 4 milliGRAM(s) IV Push every 6 hours PRN Nausea and/or Vomiting    Admitted Anthropometrics:  Height: 5'0" Weight: 90lbs, IBW 100lbs+/-10%, %IBW 90%, BMI 17.6     Weight Change: Per initial RD note pt reports UBW to be 110 lbs, current adm wt 90 lbs, indicates wt loss of 20 lbs/18% x 1 year.  8/13: 89.9 pounds     Estimated energy needs:   ABW (40.8kg) used to calculate energy needs due to pt's current body weight within % IBW (90%).   Nutrient needs based on [facility] standards of care for maintenance in older adults.   Needs adjusted for age, pre-op/post-op healing, severe protein calorie malnutrition  1020-1224kcal/day (25-30kcal/kg)  61-69g pro/day (1.5-1.7g pro/kg)  1224-1428ml fluid/day (30-35ml/kg]     Subjective:   89F PMH HTN, chronic back pain 2/2 L3,L4 compression fractures, triple vessel CAD, and SBO 2/2 possible diverticulitis vs. GYN/colorectal malignancy (2018) never followed up and PSH L CEA 2/2 L ICA stenosis >70% (4/21) who presented with worsening back pain x1 week. Endorses flatus and some recent weight loss. Transferred from medicine for perforated, complicated sigmoid diverticulitis vs. 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 S/p OR 8/13 for diverting colostomy. Pt now transferred to SICU for HD monitoring for acute blood loss. s/p EGD on 8/18: Erosive esophagitis and doudenitis with esophageal ulcers bx x2 likely cause of acute blood loss. Avoid NGT placement, per GI; f/u EGD in 8 weeks. Plan for swallow study today 8/19 with plan to advance diet pending results.     On assessment, pt resting in bed noting hunger. Currently remains NPO pending swallow study. +N/V 8/18 but none reported this am.     Previous Nutrition Diagnosis:  Severe protein calorie malnutrition as evidenced by NFPE performed, noted severe fat and muscle wasting, prolonged poor PO intake <75% of EER for 1 year.  Active [  x ]  Resolved [   ]    If resolved, new PES:     Goal: Pt to meet at least 75% of EER during hospital stay    Recommendations:  1. As medically feasible, recommend advancing diet to full--> low fiber with Ensure Enlive 3x/day (350 kcal, 20 g protein per serving)   >> Cont to adjust consistency per SLP eval  2. Encourage pt to meet nutritional needs as able   3. Recommend addition of multivitamin to optimize nutritional status   4. Defer pain and bowel regimen to team   5. Monitor ostomy output     Education:     Risk Level: High [ x  ] Moderate [   ] Low [   ]. Admitting Diagnosis:   Patient is a 89y old  Female who presents with a chief complaint of Neck pain (19 Aug 2021 06:46)    PAST MEDICAL & SURGICAL HISTORY:  SBO (small bowel obstruction)  HTN (hypertension)  Chronic back pain  S/P wrist surgery    Current Nutrition Order: CLD     PO Intake: Good (%) [   ]  Fair (50-75%) [   ] Poor (<25%) [   ]- Diet just advanced, RD to follow to assess tolerance.     GI Issues:   WDL, last BM 8/18  +N/V noted last night (8/18)- resolved today.   Right side abd distended. All quadrants tender  Colostomy in place (minimal output today per RN)    Pain:  No pain noted- well controlled at time of assessment    Skin Integrity:  WDL, ankush score 15, ecchymotic  No edema present  No pressure ulcers noted    Labs:   08-19    135  |  103  |  36<H>  ----------------------------<  100<H>  4.2   |  24  |  0.98    Ca    8.0<L>      19 Aug 2021 05:47  Phos  3.3     08-19  Mg     2.0     08-19    CAPILLARY BLOOD GLUCOSE    POCT Blood Glucose.: 98 mg/dL (19 Aug 2021 05:22)  POCT Blood Glucose.: 118 mg/dL (18 Aug 2021 23:35)  POCT Blood Glucose.: 138 mg/dL (18 Aug 2021 16:24)  POCT Blood Glucose.: 145 mg/dL (18 Aug 2021 13:26)    Medications:  MEDICATIONS  (STANDING):  atorvastatin 80 milliGRAM(s) Oral at bedtime  chlorhexidine 2% Cloths 1 Application(s) Topical daily  dextrose 40% Gel 15 Gram(s) Oral once  dextrose 5%. 1000 milliLiter(s) (50 mL/Hr) IV Continuous <Continuous>  dextrose 5%. 1000 milliLiter(s) (100 mL/Hr) IV Continuous <Continuous>  dextrose 50% Injectable 25 Gram(s) IV Push once  dextrose 50% Injectable 12.5 Gram(s) IV Push once  dextrose 50% Injectable 25 Gram(s) IV Push once  famotidine    Tablet 20 milliGRAM(s) Oral daily  glucagon  Injectable 1 milliGRAM(s) IntraMuscular once  insulin lispro (ADMELOG) corrective regimen sliding scale   SubCutaneous three times a day before meals  lactated ringers. 1000 milliLiter(s) (100 mL/Hr) IV Continuous <Continuous>  meropenem  IVPB 1000 milliGRAM(s) IV Intermittent every 12 hours  pantoprazole  Injectable 40 milliGRAM(s) IV Push every 12 hours  sucralfate suspension 1 Gram(s) Oral two times a day    MEDICATIONS  (PRN):  acetaminophen   Tablet .. 1000 milliGRAM(s) Oral every 6 hours PRN Temp greater or equal to 38C (100.4F), Mild Pain (1 - 3)  ondansetron Injectable 4 milliGRAM(s) IV Push every 6 hours PRN Nausea and/or Vomiting    Admitted Anthropometrics:  Height: 5'0" Weight: 90lbs, IBW 100lbs+/-10%, %IBW 90%, BMI 17.6     Weight Change: Per initial RD note pt reports UBW to be 110 lbs, current adm wt 90 lbs, indicates wt loss of 20 lbs/18% x 1 year.  8/13: 89.9 pounds     Estimated energy needs:   ABW (40.8kg) used to calculate energy needs due to pt's current body weight within % IBW (90%).   Nutrient needs based on [facility] standards of care for maintenance in older adults.   Needs adjusted for age, pre-op/post-op healing, severe protein calorie malnutrition  1020-1224kcal/day (25-30kcal/kg)  61-69g pro/day (1.5-1.7g pro/kg)  1224-1428ml fluid/day (30-35ml/kg]     Subjective:   89F PMH HTN, chronic back pain 2/2 L3,L4 compression fractures, triple vessel CAD, and SBO 2/2 possible diverticulitis vs. GYN/colorectal malignancy (2018) never followed up and PSH L CEA 2/2 L ICA stenosis >70% (4/21) who presented with worsening back pain x1 week. Endorses flatus and some recent weight loss. Transferred from medicine for perforated, complicated sigmoid diverticulitis vs. 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 S/p OR 8/13 for diverting colostomy. Pt now transferred to SICU for HD monitoring for acute blood loss. s/p EGD on 8/18: Erosive esophagitis and doudenitis with esophageal ulcers bx x2 likely cause of acute blood loss. Avoid NGT placement, per GI; f/u EGD in 8 weeks. Plan for swallow study today 8/19 with plan to advance diet pending results. Palliative to start GOC disc with family and pt 8/19.     On assessment, pt resting in bed noting hunger. Pt was NPO this am, but now advanced to CLD awaiting tray delivery- RD to follow to assess tolerance. +N/V 8/18 but none reported today. Ostomy in place. Further education is deferred until GOC are established- RD to follow. Pertinent Labs: BUN 36H, Glu 100H. Please see nutr recs below. RD to follow.     Previous Nutrition Diagnosis:  Severe protein calorie malnutrition as evidenced by NFPE performed, noted severe fat and muscle wasting, prolonged poor PO intake <75% of EER for 1 year.  Active [  x ]  Resolved [   ]    If resolved, new PES:     Goal: Pt to meet at least 75% of EER during hospital stay    Recommendations:  1. CLD   >>As medically feasible, recommend advancing low fiber with Ensure Enlive BID (350 kcal, 20 g protein per serving)   >> Cont to adjust consistency per SLP eval  2. Encourage pt to meet nutritional needs as able   3. Recommend addition of multivitamin to optimize nutritional status   4. Defer pain and bowel regimen to team   5. Monitor ostomy output     Education: Deferred     Risk Level: High [ x  ] Moderate [   ] Low [   ]. Admitting Diagnosis:   Patient is a 89y old  Female who presents with a chief complaint of Neck pain (19 Aug 2021 06:46)    PAST MEDICAL & SURGICAL HISTORY:  SBO (small bowel obstruction)  HTN (hypertension)  Chronic back pain  S/P wrist surgery    Current Nutrition Order: CLD     PO Intake: Good (%) [   ]  Fair (50-75%) [   ] Poor (<25%) [   ]- Diet just advanced, RD to follow to assess tolerance.     GI Issues:   WDL, last BM 8/18  +N/V noted last night (8/18)- resolved today.   Right side abd distended. All quadrants tender  Colostomy in place (minimal output today per RN)    Pain:  No pain noted- well controlled at time of assessment    Skin Integrity:  WDL, ankush score 15, ecchymotic  No edema present  No pressure ulcers noted    Labs:   08-19    135  |  103  |  36<H>  ----------------------------<  100<H>  4.2   |  24  |  0.98    Ca    8.0<L>      19 Aug 2021 05:47  Phos  3.3     08-19  Mg     2.0     08-19    CAPILLARY BLOOD GLUCOSE    POCT Blood Glucose.: 98 mg/dL (19 Aug 2021 05:22)  POCT Blood Glucose.: 118 mg/dL (18 Aug 2021 23:35)  POCT Blood Glucose.: 138 mg/dL (18 Aug 2021 16:24)  POCT Blood Glucose.: 145 mg/dL (18 Aug 2021 13:26)    Medications:  MEDICATIONS  (STANDING):  atorvastatin 80 milliGRAM(s) Oral at bedtime  chlorhexidine 2% Cloths 1 Application(s) Topical daily  dextrose 40% Gel 15 Gram(s) Oral once  dextrose 5%. 1000 milliLiter(s) (50 mL/Hr) IV Continuous <Continuous>  dextrose 5%. 1000 milliLiter(s) (100 mL/Hr) IV Continuous <Continuous>  dextrose 50% Injectable 25 Gram(s) IV Push once  dextrose 50% Injectable 12.5 Gram(s) IV Push once  dextrose 50% Injectable 25 Gram(s) IV Push once  famotidine    Tablet 20 milliGRAM(s) Oral daily  glucagon  Injectable 1 milliGRAM(s) IntraMuscular once  insulin lispro (ADMELOG) corrective regimen sliding scale   SubCutaneous three times a day before meals  lactated ringers. 1000 milliLiter(s) (100 mL/Hr) IV Continuous <Continuous>  meropenem  IVPB 1000 milliGRAM(s) IV Intermittent every 12 hours  pantoprazole  Injectable 40 milliGRAM(s) IV Push every 12 hours  sucralfate suspension 1 Gram(s) Oral two times a day    MEDICATIONS  (PRN):  acetaminophen   Tablet .. 1000 milliGRAM(s) Oral every 6 hours PRN Temp greater or equal to 38C (100.4F), Mild Pain (1 - 3)  ondansetron Injectable 4 milliGRAM(s) IV Push every 6 hours PRN Nausea and/or Vomiting    Admitted Anthropometrics:  Height: 5'0" Weight: 90lbs, IBW 100lbs+/-10%, %IBW 90%, BMI 17.6     Weight Change: Per initial RD note pt reports UBW to be 110 lbs, current adm wt 90 lbs, indicates wt loss of 20 lbs/18% x 1 year.  8/13: 89.9 pounds     Estimated energy needs:   ABW (40.8kg) used to calculate energy needs due to pt's current body weight within % IBW (90%).   Nutrient needs based on [facility] standards of care for maintenance in older adults.   Needs adjusted for age, pre-op/post-op healing, severe protein calorie malnutrition  1020-1224kcal/day (25-30kcal/kg)  61-69g pro/day (1.5-1.7g pro/kg)  1224-1428ml fluid/day (30-35ml/kg]     Subjective:   89F PMH HTN, chronic back pain 2/2 L3,L4 compression fractures, triple vessel CAD, and SBO 2/2 possible diverticulitis vs. GYN/colorectal malignancy (2018) never followed up and PSH L CEA 2/2 L ICA stenosis >70% (4/21) who presented with worsening back pain x1 week. Endorses flatus and some recent weight loss. Transferred from medicine for perforated, complicated sigmoid diverticulitis vs. 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 S/p OR 8/13 for diverting colostomy. Pt now transferred to SICU for HD monitoring for acute blood loss. s/p EGD on 8/18: Erosive esophagitis and doudenitis with esophageal ulcers bx x2 likely cause of acute blood loss. Avoid NGT placement, per GI; f/u EGD in 8 weeks. S/p SLP eval 8/18 with recommendations to advance to CLD per sx team. Palliative to start GOC disc with family and pt 8/19.     On assessment, pt resting in bed noting hunger. Pt was NPO this am, but now advanced to CLD awaiting tray delivery- RD to follow to assess tolerance. +N/V 8/18 but none reported today. Ostomy in place. Further education is deferred until GOC are established- RD to follow. Pertinent Labs: BUN 36H, Glu 100H. Please see nutr recs below. RD to follow.     Previous Nutrition Diagnosis:  Severe protein calorie malnutrition as evidenced by NFPE performed, noted severe fat and muscle wasting, prolonged poor PO intake <75% of EER for 1 year.  Active [  x ]  Resolved [   ]    If resolved, new PES:     Goal: Pt to meet at least 75% of EER during hospital stay    Recommendations:  1. CLD   >>As medically feasible, recommend advancing low fiber with Ensure Enlive BID (350 kcal, 20 g protein per serving)   >> Cont to adjust consistency per SLP eval  2. Encourage pt to meet nutritional needs as able   3. Recommend addition of multivitamin to optimize nutritional status   4. Defer pain and bowel regimen to team   5. Monitor ostomy output     Education: Deferred     Risk Level: High [ x  ] Moderate [   ] Low [   ].

## 2021-08-19 NOTE — PROGRESS NOTE ADULT - SUBJECTIVE AND OBJECTIVE BOX
SUBJECTIVE:  Overnight: post-trans CBC 11 (6.1). Lactate 3.3 (4.8). Endoscopy - erosive esophagitis- No NGT, Cont protonix bid Pepcid qd( could not do bid 2* renal function) and start carafate. BP low post procedure with decreased uo - given 500 cc bolus x1. As per jovanni cont NPO tonight Repeat CBC @MN:9.2 <11<6.1.LA 1.7. Repeat in am. UO improved to 20/hr. Failed dysphagia screen- held po meds.      Patient seen and examined at bedside. Complains of R abdomen pain; no nausea vomiting diarrhea constipation; no fevers, chills, SOB or chest pain. no other concerns     meropenem  IVPB 1000 milliGRAM(s) IV Intermittent every 12 hours    MEDICATIONS  (PRN):  acetaminophen   Tablet .. 1000 milliGRAM(s) Oral every 6 hours PRN Temp greater or equal to 38C (100.4F), Mild Pain (1 - 3)  ondansetron Injectable 4 milliGRAM(s) IV Push every 6 hours PRN Nausea and/or Vomiting      I&O's Detail    ICU Vital Signs Last 24 Hrs  T(C): 36.3 (19 Aug 2021 05:59), Max: 37.1 (18 Aug 2021 22:11)  T(F): 97.4 (19 Aug 2021 05:59), Max: 98.8 (18 Aug 2021 22:11)  HR: 81 (19 Aug 2021 06:00) (77 - 110)  BP: 125/57 (19 Aug 2021 06:00) (81/42 - 141/65)  BP(mean): 81 (19 Aug 2021 05:00) (56 - 93)  ABP: --  ABP(mean): --  RR: 17 (19 Aug 2021 06:00) (15 - 28)  SpO2: 97% (19 Aug 2021 06:00) (79% - 100%)      I&O's Detail    17 Aug 2021 07:01  -  18 Aug 2021 07:00  --------------------------------------------------------  IN:    IV PiggyBack: 370 mL    Lactated Ringers: 40 mL    Oral Fluid: 50 mL  Total IN: 460 mL    OUT:    Bulb (mL): 55 mL    Colostomy (mL): 450 mL    Voided (mL): 300 mL  Total OUT: 805 mL    Total NET: -345 mL      18 Aug 2021 07:01  -  19 Aug 2021 06:46  --------------------------------------------------------  IN:    IV PiggyBack: 150 mL    Lactated Ringers: 1780 mL    Lactated Ringers Bolus: 500 mL    PRBCs (Packed Red Blood Cells): 700 mL  Total IN: 3130 mL    OUT:    Bulb (mL): 15 mL    Indwelling Catheter - Urethral (mL): 385 mL    Nasogastric/Oral tube (mL): 1500 mL  Total OUT: 1900 mL    Total NET: 1230 mL      T(C): 36.3 (19 Aug 2021 05:59), Max: 37.1 (18 Aug 2021 22:11)  T(F): 97.4 (19 Aug 2021 05:59), Max: 98.8 (18 Aug 2021 22:11)  HR: 81 (19 Aug 2021 06:00) (77 - 110)  BP: 125/57 (19 Aug 2021 06:00) (81/42 - 141/65)  BP(mean): 81 (19 Aug 2021 05:00) (56 - 93)  ABP: --  ABP(mean): --  RR: 17 (19 Aug 2021 06:00) (15 - 28)  SpO2: 97% (19 Aug 2021 06:00) (79% - 100%)      GENERAL: NAD, Resting comfortably in bed, awake, opens eyes spontaneously  HEENT: NCAT, MMM, Normal conjunctiva, PERRL  RESP: Nonlabored breathing, No respiratory distress  CARD: Normal rate, Normal peripheral perfusion  GI:bdomen soft, ND. tenderness around incisions and right abdomen;  Ostomy bag in place; pink, patent and non productive; 20cc dark black stool present. L gluteal drain in place with <5cc serosanguinous fluid collected.   EXTREM: No edema, No gross deformity of extremities  NEURO: AAOx3, No focal motor or sensory deficits      LABS:                        9.3    28.82 )-----------( 412      ( 19 Aug 2021 12:41 )             27.9         135  |  103  |  36<H>  ----------------------------<  100<H>  4.2   |  24  |  0.98    Ca    8.0<L>      19 Aug 2021 05:47  Phos  3.3       Mg     2.0           PT/INR - ( 18 Aug 2021 09:44 )   PT: 17.3 sec;   INR: 1.47          PTT - ( 18 Aug 2021 09:44 )  PTT:46.2 sec  Urinalysis Basic - ( 18 Aug 2021 13:33 )    Color: Yellow / Appearance: Clear / S.025 / pH: x  Gluc: x / Ketone: Trace mg/dL  / Bili: Negative / Urobili: 1.0 E.U./dL   Blood: x / Protein: 100 mg/dL / Nitrite: NEGATIVE   Leuk Esterase: Trace / RBC: Many /HPF / WBC > 10 /HPF   Sq Epi: x / Non Sq Epi: 0-5 /HPF / Bacteria: Present /HPF        RADIOLOGY & ADDITIONAL STUDIES:

## 2021-08-19 NOTE — CONSULT NOTE ADULT - PROBLEM SELECTOR RECOMMENDATION 9
deconditioned, weak, in setting of anemia, rectal cancer, poor po intake  -pps 30%  -assistance with ADLs, skin care, repositioning  -GI not recommending NGT given scattered erythema from previous NGT trauma  -dispo pending

## 2021-08-19 NOTE — PROGRESS NOTE ADULT - SUBJECTIVE AND OBJECTIVE BOX
INTERVAL HPI/OVERNIGHT EVENTS:  Patient was seen and examined at bedside. EGD done with UGIB, gastritis, duodenitis. Patient complaining of severe L foot and calf pain, ROS otherwise negative.    VITAL SIGNS:  T(F): 97.1 (21 @ 18:46)  HR: 94 (21 @ 20:00)  BP: 112/56 (21 @ 20:00)  RR: 25 (21 @ 20:00)  SpO2: 95% (21 @ 20:00)  Wt(kg): --      21 @ 07:01  -  21 @ 07:00  --------------------------------------------------------  IN: 3170 mL / OUT: 1925 mL / NET: 1245 mL    21 @ 07:01  -  21 @ 20:43  --------------------------------------------------------  IN: 1890 mL / OUT: 275 mL / NET: 1615 mL        PHYSICAL EXAM:    Constitutional: thin woman resting in bed in slight discomfort  HEENT:  PER, anicteric sclera, no nasal discharge; MMM  Respiratory: CTA B/L; no W/R/R, no retractions  Cardiac: +S1/S2; RRR; no M/R/G  Gastrointestinal: unchanged distension, globally tender to moderate palpation  Back: spine midline, no bony tenderness or step-offs; no CVAT B/L  Extremities: WWP, no clubbing or cyanosis; no peripheral edema  Musculoskeletal: pain on pROM of ankle, palpation of L gastrocnemius, no pain on R side  Vascular: 2+ radial, DP/PT pulses B/L  Psychiatric: poor insight into condition    MEDICATIONS  (STANDING):  atorvastatin 80 milliGRAM(s) Oral at bedtime  chlorhexidine 2% Cloths 1 Application(s) Topical daily  dextrose 40% Gel 15 Gram(s) Oral once  dextrose 5%. 1000 milliLiter(s) (50 mL/Hr) IV Continuous <Continuous>  dextrose 5%. 1000 milliLiter(s) (100 mL/Hr) IV Continuous <Continuous>  dextrose 50% Injectable 25 Gram(s) IV Push once  dextrose 50% Injectable 12.5 Gram(s) IV Push once  dextrose 50% Injectable 25 Gram(s) IV Push once  famotidine    Tablet 20 milliGRAM(s) Oral daily  glucagon  Injectable 1 milliGRAM(s) IntraMuscular once  insulin lispro (ADMELOG) corrective regimen sliding scale   SubCutaneous three times a day before meals  lactated ringers. 1000 milliLiter(s) (100 mL/Hr) IV Continuous <Continuous>  lidocaine   4% Patch 1 Patch Transdermal every 24 hours  meropenem  IVPB 1000 milliGRAM(s) IV Intermittent every 12 hours  pantoprazole  Injectable 40 milliGRAM(s) IV Push every 12 hours  sucralfate suspension 1 Gram(s) Oral two times a day    MEDICATIONS  (PRN):  acetaminophen   Tablet .. 1000 milliGRAM(s) Oral every 6 hours PRN Temp greater or equal to 38C (100.4F), Mild Pain (1 - 3)  ondansetron Injectable 4 milliGRAM(s) IV Push every 6 hours PRN Nausea and/or Vomiting      Allergies    No Known Allergies    Intolerances        LABS:                        9.3    28.82 )-----------( 412      ( 19 Aug 2021 12:41 )             27.9     08-    135  |  103  |  36<H>  ----------------------------<  100<H>  4.2   |  24  |  0.98    Ca    8.0<L>      19 Aug 2021 05:47  Phos  3.3     -  Mg     2.0     -      PT/INR - ( 18 Aug 2021 09:44 )   PT: 17.3 sec;   INR: 1.47          PTT - ( 18 Aug 2021 09:44 )  PTT:46.2 sec  Urinalysis Basic - ( 18 Aug 2021 13:33 )    Color: Yellow / Appearance: Clear / S.025 / pH: x  Gluc: x / Ketone: Trace mg/dL  / Bili: Negative / Urobili: 1.0 E.U./dL   Blood: x / Protein: 100 mg/dL / Nitrite: NEGATIVE   Leuk Esterase: Trace / RBC: Many /HPF / WBC > 10 /HPF   Sq Epi: x / Non Sq Epi: 0-5 /HPF / Bacteria: Present /HPF        RADIOLOGY & ADDITIONAL TESTS:  Reviewed

## 2021-08-19 NOTE — CONSULT NOTE ADULT - CONVERSATION DETAILS
Pts chart reviewed. Discussion held at bedside with pt, who demonstrates capacity to participate in discussion. Pt with poor insight. Discussed code status, expressed poor functional and cognitive outcomes if pt resuscitated. Pt undecided.     TC to friend and HCP Cynthia Sims. Cynthia to bring in HCP paperwork for EMR. Pt has living will at home. Cynthia discussed advance directives with pt "a year ago" at which point patient shared that "she wants to live." Again discussed poor functional and cognitive outcomes and risks including death if resuscitated in setting of malnourishment, cachexia, cancer.     Discussed pt's poor CA prognosis in setting of inoperable cancer and unlikely candidate for disease directed therapy in setting of active infection poor PS.     Cynthia expressed understanding and wants to discuss with pt at bedside today. Cynthia maintaining that she wishes to continue all interventions that are medically indicated for now.

## 2021-08-19 NOTE — PROGRESS NOTE ADULT - ASSESSMENT
89F PMHx of HTN, chronic back pain 2/2 L3,L4 compression fractures, triple vessel CAD, and SBO 2/2 possible diverticulitis vs. GYN/colorectal malignancy (2018) never followed up and PSH L CEA 2/2 L ICA stenosis >70% (4/21) who presented with worsening back pain x1 week, CT A/P revealing perforated complicated diverticulitis with abscess, s/p drainage, sigmoid mass c/w rectal adenocarcinoma, not amenable to surgical resection and chemotherapy 2/2 poor performance status, now with 2 day history of nausea/vomiting, and 1 day of coffee ground emesis with associated acute drop in H/H, GI consulted for consideration of endoscopic evaluation.    #Anemia  GI re-consulted for acute drop in H/H in the setting of coffee ground emesis. Baseline Hgb 10-11, today downtrended to 6.1. With sump in place with dark output, also noted in ostomy bag. With uptrend in BUN:Cr ratio c/f possible UGIB source.   -s/p EGD (8/18) - LA grade D esophagitis. Multiple ulcers in the middle to lower third of the esophagus s/p biopsy to rule out CMV. 4 cm hiatal hernia. Non-erosive gastritis with scattered erythema consistent with NG tube trauma. Hematin on the gastric mucosa, bleeding likely from esophageal ulcer. Erythema in the duodenum compatible with duodenitis.    -c/w Protonix 40 mg IVP BID  -Pepcid BID  -Advanced diet as tolerated  -Carafate 1g QID (suspension)  -Follow-up pathology  -Repeat EGD in 8 weeks  -Closely monitor H/H   -No utility in Fe studies given pRBC transfusions  -Avoid NG tube placement.  -Maintain active T&S   -Transfusion goal as per primary team   -Consider palliative consult to discuss GOC      INCOMPLETE NOTE, PENDING DISCUSSION WITH C ATTENDING AND PRIMARY TEAM.     Angelica Nielson DO  Gastroenterology Fellow  Pager; [phone] 89F PMHx of HTN, chronic back pain 2/2 L3,L4 compression fractures, triple vessel CAD, and SBO 2/2 possible diverticulitis vs. GYN/colorectal malignancy (2018) never followed up and PSH L CEA 2/2 L ICA stenosis >70% (4/21) who presented with worsening back pain x1 week, CT A/P revealing perforated complicated diverticulitis with abscess, s/p drainage, sigmoid mass c/w rectal adenocarcinoma, not amenable to surgical resection and chemotherapy 2/2 poor performance status, now with 2 day history of nausea/vomiting, and 1 day of coffee ground emesis with associated acute drop in H/H, GI consulted for consideration of endoscopic evaluation.    #Anemia  GI re-consulted for acute drop in H/H in the setting of coffee ground emesis. Baseline Hgb 10-11, today downtrended to 6.1. With sump in place with dark output, also noted in ostomy bag. With uptrend in BUN:Cr ratio c/f possible UGIB source.   -s/p EGD (8/18) - LA grade D esophagitis. Multiple ulcers in the middle to lower third of the esophagus s/p biopsy to rule out CMV. 4 cm hiatal hernia. Non-erosive gastritis with scattered erythema consistent with NG tube trauma. Hematin on the gastric mucosa, bleeding likely from esophageal ulcer. Erythema in the duodenum compatible with duodenitis.    -c/w Protonix 40 mg IVP BID  -Pepcid BID  -Advanced diet as tolerated  -Carafate 1g QID (suspension)  -Follow-up pathology  -Repeat EGD in 8 weeks  -Closely monitor H/H   -No utility in Fe studies given pRBC transfusions  -Avoid NG tube placement.  -Maintain active T&S   -Transfusion goal as per primary team   -Consider palliative consult to discuss GOC      Case discussed with svc attending and primary team.     Angelica Nielson DO  Gastroenterology Fellow  Pager; [phone]

## 2021-08-19 NOTE — CONSULT NOTE ADULT - PROBLEM SELECTOR RECOMMENDATION 2
rectal primary  -cancer is inoperable   -pt unlikely candidate for disease directed therapy iso poor performance status, active infection without source control  -poor prognosis  -goc ongoing: HCP and pt with poor insight

## 2021-08-19 NOTE — PROGRESS NOTE ADULT - SUBJECTIVE AND OBJECTIVE BOX
GASTROENTEROLOGY PROGRESS NOTE  Patient seen and examined at bedside. s/p bedside EGD (), notable for esophageal clean based ulcers, hiatal hernia, gastritis, duodenitis.     ROS: patient denies any abdominal pain, denies any further nausea/vomiting. Still with dark output from colostomy.    PERTINENT REVIEW OF SYSTEMS:  CONSTITUTIONAL: No weakness, fevers or chills  HEENT: No visual changes; No vertigo or throat pain   GASTROINTESTINAL: As above.  NEUROLOGICAL: No numbness or weakness  SKIN: No itching, burning, rashes, or lesions     Allergies    No Known Allergies    Intolerances      MEDICATIONS:  MEDICATIONS  (STANDING):  atorvastatin 80 milliGRAM(s) Oral at bedtime  chlorhexidine 2% Cloths 1 Application(s) Topical daily  dextrose 40% Gel 15 Gram(s) Oral once  dextrose 5%. 1000 milliLiter(s) (50 mL/Hr) IV Continuous <Continuous>  dextrose 5%. 1000 milliLiter(s) (100 mL/Hr) IV Continuous <Continuous>  dextrose 50% Injectable 25 Gram(s) IV Push once  dextrose 50% Injectable 12.5 Gram(s) IV Push once  dextrose 50% Injectable 25 Gram(s) IV Push once  famotidine    Tablet 20 milliGRAM(s) Oral daily  glucagon  Injectable 1 milliGRAM(s) IntraMuscular once  insulin lispro (ADMELOG) corrective regimen sliding scale   SubCutaneous three times a day before meals  lactated ringers. 1000 milliLiter(s) (100 mL/Hr) IV Continuous <Continuous>  lidocaine   4% Patch 1 Patch Transdermal every 24 hours  meropenem  IVPB 1000 milliGRAM(s) IV Intermittent every 12 hours  pantoprazole  Injectable 40 milliGRAM(s) IV Push every 12 hours  sucralfate suspension 1 Gram(s) Oral two times a day    MEDICATIONS  (PRN):  acetaminophen   Tablet .. 1000 milliGRAM(s) Oral every 6 hours PRN Temp greater or equal to 38C (100.4F), Mild Pain (1 - 3)  ondansetron Injectable 4 milliGRAM(s) IV Push every 6 hours PRN Nausea and/or Vomiting    Vital Signs Last 24 Hrs  T(C): 35.6 (19 Aug 2021 09:15), Max: 37.1 (18 Aug 2021 22:11)  T(F): 96.1 (19 Aug 2021 09:15), Max: 98.8 (18 Aug 2021 22:11)  HR: 84 (19 Aug 2021 10:00) (77 - 106)  BP: 114/56 (19 Aug 2021 10:00) (94/49 - 141/65)  BP(mean): 80 (19 Aug 2021 10:00) (65 - 93)  RR: 20 (19 Aug 2021 10:00) (13 - 28)  SpO2: 95% (19 Aug 2021 10:00) (90% - 99%)     @ 07:  -   @ 07:00  --------------------------------------------------------  IN: 3170 mL / OUT: 1925 mL / NET: 1245 mL     @ 07:01  -   @ 11:24  --------------------------------------------------------  IN: 300 mL / OUT: 65 mL / NET: 235 mL      PHYSICAL EXAM:    General: frail appearing female, lying in bed  Cardiovascular: Sinus tachycardia  Respiratory: CTA. No increased inspiratory effort. No conversational dyspnea.   Gastrointestinal: soft, nondistended, nontender. No rebound or guarding. Stoma pink, edematous, patent with dark brown stool and gas in colostomy   MENDOZA: Brown stool on glove, no masses or ulcers palpated.   Extremities: warm, no dependent edema  Skin: warm, no rashes    LABS:                        9.3    27.75 )-----------( 422      ( 19 Aug 2021 05:47 )             27.5     08    135  |  103  |  36<H>  ----------------------------<  100<H>  4.2   |  24  |  0.98    Ca    8.0<L>      19 Aug 2021 05:47  Phos  3.3       Mg     2.0           PT/INR - ( 18 Aug 2021 09:44 )   PT: 17.3 sec;   INR: 1.47          PTT - ( 18 Aug 2021 09:44 )  PTT:46.2 sec      Urinalysis Basic - ( 18 Aug 2021 13:33 )    Color: Yellow / Appearance: Clear / S.025 / pH: x  Gluc: x / Ketone: Trace mg/dL  / Bili: Negative / Urobili: 1.0 E.U./dL   Blood: x / Protein: 100 mg/dL / Nitrite: NEGATIVE   Leuk Esterase: Trace / RBC: Many /HPF / WBC > 10 /HPF   Sq Epi: x / Non Sq Epi: 0-5 /HPF / Bacteria: Present /HPF                RADIOLOGY & ADDITIONAL STUDIES:  Reviewed

## 2021-08-19 NOTE — CONSULT NOTE ADULT - SUBJECTIVE AND OBJECTIVE BOX
HPI:  89F PMHx of chronic back pain 2/2 L3,L4 compression fractures, triple vessel CAD on ASA, plavix, lipitor () and L CEA 2/2 L ICA stenosis >70% (), recent ED visit on  for UTI p/w achy non-radiating back pain 5/10 in the L lumbar region. Presented  with 1 week of fatigue and unintentional weight loss over last several months.      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 Left gluteal IR drain placed for abscess (). Colonoscopy w/ sigmoid mass bx on - final path invasive adenocarcinoma moderately differentiated. Taken to OR on  for diverting colostomy. Stepped up to SICU for hemodynamic monitoring for suspected acute blood loss anemia found to be due to duodenal ulceration on EGD. Pt deconditioned, weak.    Palliative care consulted for GOC. Pt seen and examined at bedside. Reports acute on chronic lower back pain that is worse with movement and improves with rest. Pt tried tylenol without any relief of symptoms.  Denied fever, chills, dysuria, diarrhea, constipation, dyspnea or chest pain.     PAST MEDICAL & SURGICAL HISTORY:  SBO (small bowel obstruction)  HTN (hypertension)  Chronic back pain  S/P wrist surgery    FAMILY HISTORY:   Reviewed and found non contributory in mother or father    SOCIAL HISTORY: . lives alone. no HHA. retired. no children. HCP is neighbor Cynthia Sims (documentation pending). drinks 1 glass of scotch per night, she is a former smoker ( 1ppd quit 25 years ago). She denies illicit drug use.      Allergies  No Known Allergies  Intolerances    Opiate Naive (Y/N):  Yes  -iStop reviewed (Y/N): Yes.   No Rx found on iStop review (Ref#: [id number]    Medications:      MEDICATIONS  (STANDING):  atorvastatin 80 milliGRAM(s) Oral at bedtime  chlorhexidine 2% Cloths 1 Application(s) Topical daily  dextrose 40% Gel 15 Gram(s) Oral once  dextrose 5%. 1000 milliLiter(s) (50 mL/Hr) IV Continuous <Continuous>  dextrose 5%. 1000 milliLiter(s) (100 mL/Hr) IV Continuous <Continuous>  dextrose 50% Injectable 25 Gram(s) IV Push once  dextrose 50% Injectable 12.5 Gram(s) IV Push once  dextrose 50% Injectable 25 Gram(s) IV Push once  famotidine    Tablet 20 milliGRAM(s) Oral daily  glucagon  Injectable 1 milliGRAM(s) IntraMuscular once  insulin lispro (ADMELOG) corrective regimen sliding scale   SubCutaneous three times a day before meals  lactated ringers. 1000 milliLiter(s) (100 mL/Hr) IV Continuous <Continuous>  lidocaine   4% Patch 1 Patch Transdermal every 24 hours  meropenem  IVPB 1000 milliGRAM(s) IV Intermittent every 12 hours  pantoprazole  Injectable 40 milliGRAM(s) IV Push every 12 hours  sucralfate suspension 1 Gram(s) Oral two times a day    MEDICATIONS  (PRN):  acetaminophen   Tablet .. 1000 milliGRAM(s) Oral every 6 hours PRN Temp greater or equal to 38C (100.4F), Mild Pain (1 - 3)  ondansetron Injectable 4 milliGRAM(s) IV Push every 6 hours PRN Nausea and/or Vomiting      Labs:    CBC:                        9.3    27.75 )-----------( 422      ( 19 Aug 2021 05:47 )             27.5     CMP:        135  |  103  |  36<H>  ----------------------------<  100<H>  4.2   |  24  |  0.98    Ca    8.0<L>      19 Aug 2021 05:47  Phos  3.3       Mg     2.0           PT/INR - ( 18 Aug 2021 09:44 )   PT: 17.3 sec;   INR: 1.47          PTT - ( 18 Aug 2021 09:44 )  PTT:46.2 sec   Urinalysis Basic - ( 18 Aug 2021 13:33 )    Color: Yellow / Appearance: Clear / S.025 / pH: x  Gluc: x / Ketone: Trace mg/dL  / Bili: Negative / Urobili: 1.0 E.U./dL   Blood: x / Protein: 100 mg/dL / Nitrite: NEGATIVE   Leuk Esterase: Trace / RBC: Many /HPF / WBC > 10 /HPF   Sq Epi: x / Non Sq Epi: 0-5 /HPF / Bacteria: Present /HPF        Imaging:  Reviewed    < from: CT Abdomen and Pelvis w/ IV Cont (21 @ 01:06) >  IMPRESSION:  1. Large necrotic mass lesion in lower posterior pelvis which appears to be arising from distal sigmoid  colon with posterior extension to the sacrum and caudal extension to the rectum and the uterus. Mass  lesion measures approximately 8.1 x 10.1 x 10.7 cm. Necrotic components versus abscess  collections are seen posterior to the rectum and extending through left greater sciatic foramen.  Finding is consistent with colonic malignancy. No evidence of large bowel obstruction.  2. Moderate hiatal hernia.Wall thickening is seen in the visualized distal esophagus. Please correlate  clinically for esophagitis.  3. Moderate left hydronephrosis and proximal hydroureter extending to the mass lesion.  4. Mild diffuse wall thickening in the stomach. Please correlate clinically for gastritis.    < from: CT Cervical Spine No Cont (21 @ 01:07) >    IMPRESSION:  No acute cervical spine fracture. Degenerative changes of the cervical spine as above.    < from: CT Chest No Cont (21 @ 16:20) >  IMPRESSION:  No suspicious pulmonary finding.  Small bilateral pleural effusions, greater on the right.          PEx:  T(C): 35.6 (21 @ 09:15), Max: 37.1 (21 @ 22:11)  HR: 76 (21 @ 12:00) (76 - 106)  BP: 96/51 (21 @ 12:00) (94/49 - 141/65)  RR: 14 (21 @ 12:00) (13 - 28)  SpO2: 98% (21 @ 12:00) (90% - 99%)  Wt(kg): --      GENERAL: thin, elderly female, in no acute distress.  HEENT: Temporal wasting MMM.    RESPIRATORY: CTAB. No wheezing, rales or rhonchi.  CARDIOVASCULAR: RRR. No audible murmurs, rubs or gallops.   GASTROINTESTINAL: Abdomen soft, NT. Mild TTP superior to ostomy. Ostomy bag in place; pink mucus membranes, well perfused with no signs of ischemia,   NEUROLOGIC: aox3  INTEGUMENTARY: Significant ecchymosis over right wrist, forearm, and dorsal aspect of right hand.  MUSCULOSKELETAL: No cyanosis or clubbing. No gross deformities  Psych: calm    Preadmit Karnofsky:  60%             Current Karnofsky:   30  %    BMI: 17  Wt: 40  Cachexia (Y/N): Y    PALLIATIVE MEDICINE COORDINATION OF CARE DOCUMENTATION: [x] Inpatient Consult  Non-Face-to-Face prolonged service provided that relates to (face-to-face) care that has or will occur and ongoing patient management, including one or more of the following: - Reviewed documentation from other physicians and other health care professional services - Reviewed medical records and diagnostic / radiology study results - Coordination with patient's support system  ************************************************************************  MEDICATION REVIEW:  - See Medication List Above    ISTOP REFERENCE:   - no active Rx's / see ISTOP Chart Note  - PRN usage: NO PRN'S  ------------------------------------------------------------------------  COORDINATION OF CARE:  - Palliative Care consulted for: [facility]   - Patient (to be) assessed: 2021  - Patient previously seen by Palliative Care service: NO    ADVANCE CARE PLANNING  - Code status: FULL  - MOLST reviewed in chart: NONE; None found on Alpha  - HCP/ Surrogate: HCP Cynthia Sims to bring in paperwork  - [facility] documents: NONE found on Alpha  - HCP/ Living will/Other Advanced Directives in Alpha: Per Cynthia pt has living will   ------------------------------------------------------------------------  CARE PROVIDER DOCUMENTATION:  - BRODERICK/DMITRY notes: Remains medically active  - SICU: hemodynamic stability   - GI: advance diet as tolerated, avoid NGT, f/u EGD in 8 weeks    PLAN OF CARE  - Known admissions in past year: two  - Current admit date: Aug 8 2021  - LOS: 11 days  - LACE score: 8  - Current dispo plan: TO BE DETERMINED    21 (11d)  ------------------------------------------------------------------------  - Time Spent/Chart reviewed: 31 Minutes [including time used to gather, review and transfer data]  - Start: 12:00  - End: 12:31    Prolonged services rendered, as part of this patient's care provided by Palliative Medicine, include: i. chart review for provider and ancillary service documentation, ii. pertinent diagnostics including laboratory and imaging studies, iii. medication review including PRN use, iv. admission history including previous palliative care encounters and GOC notes, v. advance care planning documents including HCP and MOLST forms in Alpha. Part of Palliative Medicine extended evaluation and management also involves coordination of care with our IDT, the primary and consulting teams, and unit CM/SW and Hospice if eligible. Recommendations based on the information gathered and discussed are outlined in the A/P of Palliative notes.

## 2021-08-19 NOTE — PROGRESS NOTE ADULT - ATTENDING COMMENTS
Leukocytosis improving.   Pall care evaluation appreciated.  Unfortunately patient has incurable intraabdominal infection since source control cannot be achieved due to unresectable mass and antibiotic therapy will eventually fail, and thus she remains at high risk of recurrent sepsis.  Cont meropenem.  Repeat CT a/p in 2 weeks to see if any improvement.  If patient is transitioned to hospice, then stop antibiotics and focus on comfort care.      Thank you for your consult.  Please re-consult us or call us with questions.  Call us back if patient will be discharged with IV antibiotics so that we can finalize discharge abx rec.    Case d/w primary team.    Jazmín Mathis MD, MS  Infectious Disease attending  work cell [phone]

## 2021-08-19 NOTE — PROGRESS NOTE ADULT - ATTENDING COMMENTS
#Anemia  GI re-consulted for acute drop in H/H in the setting of coffee ground emesis. Baseline Hgb 10-11, today downtrended to 6.1. With sump in place with dark output, also noted in ostomy bag. With uptrend in BUN:Cr ratio c/f possible UGIB source.   -s/p EGD (8/18) - LA grade D esophagitis. Multiple ulcers in the middle to lower third of the esophagus s/p biopsy to rule out CMV. 4 cm hiatal hernia. Non-erosive gastritis with scattered erythema consistent with NG tube trauma. Hematin on the gastric mucosa, bleeding likely from esophageal ulcer. Erythema in the duodenum compatible with duodenitis.    -c/w Protonix 40 mg IVP BID  -Pepcid BID  -Advanced diet as tolerated  -Carafate 1g QID (suspension)  -Follow-up pathology  -Repeat EGD in 8 weeks  -Closely monitor H/H   -No utility in Fe studies given pRBC transfusions  -Avoid NG tube placement.  -Maintain active T&S   -Transfusion goal as per primary team   -Consider palliative consult to discuss GOC

## 2021-08-19 NOTE — PROGRESS NOTE ADULT - SUBJECTIVE AND OBJECTIVE BOX
STATUS POST: Loop colostomy   OVERNIGHT EVENTS/SUBJECTIVE: post-trans CBC 11 (6.1). Lactate 3.3 (4.8). Endoscopy - erosive esophagitis- No NGT, Cont protonix bid Pepcid qd( could not do bid 2* renal function) and start carafate. BP low post procedure with decreased uo - given 500 cc bolus x1. As per jovanni cont NPO tonight Repeat CBC @MN:9.2 <11<6.1.LA 1.7. Repeat in am. UO improved to 20/hr. Failed dysphagia screen- held po meds.   INTERVAL HISTORY: Patient seen and evaluated. Patient endorses right sided abdominal pain characterized as "ache" and graded as 4/10, non-radiating. She otherwise denies any nausea, recurrent emesis, CP, or SOB. Patient denies passing flatus or having produced a bowel movement.     ICU Vital Signs Last 24 Hrs  T(C): 36.3 (19 Aug 2021 05:59), Max: 37.1 (18 Aug 2021 22:11)  T(F): 97.4 (19 Aug 2021 05:59), Max: 98.8 (18 Aug 2021 22:11)  HR: 81 (19 Aug 2021 06:00) (77 - 110)  BP: 125/57 (19 Aug 2021 06:00) (81/42 - 141/65)  BP(mean): 81 (19 Aug 2021 05:00) (56 - 93)  ABP: --  ABP(mean): --  RR: 17 (19 Aug 2021 06:00) (15 - 28)  SpO2: 97% (19 Aug 2021 06:00) (79% - 100%)      I&O's Detail    17 Aug 2021 07:01  -  18 Aug 2021 07:00  --------------------------------------------------------  IN:    IV PiggyBack: 370 mL    Lactated Ringers: 40 mL    Oral Fluid: 50 mL  Total IN: 460 mL    OUT:    Bulb (mL): 55 mL    Colostomy (mL): 450 mL    Voided (mL): 300 mL  Total OUT: 805 mL    Total NET: -345 mL      18 Aug 2021 07:01  -  19 Aug 2021 06:46  --------------------------------------------------------  IN:    IV PiggyBack: 150 mL    Lactated Ringers: 1780 mL    Lactated Ringers Bolus: 500 mL    PRBCs (Packed Red Blood Cells): 700 mL  Total IN: 3130 mL    OUT:    Bulb (mL): 15 mL    Indwelling Catheter - Urethral (mL): 385 mL    Nasogastric/Oral tube (mL): 1500 mL  Total OUT: 1900 mL    Total NET: 1230 mL                MEDICATIONS  (STANDING):  atorvastatin 80 milliGRAM(s) Oral at bedtime  chlorhexidine 2% Cloths 1 Application(s) Topical daily  dextrose 40% Gel 15 Gram(s) Oral once  dextrose 5%. 1000 milliLiter(s) (50 mL/Hr) IV Continuous <Continuous>  dextrose 5%. 1000 milliLiter(s) (100 mL/Hr) IV Continuous <Continuous>  dextrose 50% Injectable 25 Gram(s) IV Push once  dextrose 50% Injectable 12.5 Gram(s) IV Push once  dextrose 50% Injectable 25 Gram(s) IV Push once  famotidine    Tablet 20 milliGRAM(s) Oral daily  glucagon  Injectable 1 milliGRAM(s) IntraMuscular once  insulin lispro (ADMELOG) corrective regimen sliding scale   SubCutaneous three times a day before meals  lactated ringers. 1000 milliLiter(s) (100 mL/Hr) IV Continuous <Continuous>  meropenem  IVPB 1000 milliGRAM(s) IV Intermittent every 12 hours  pantoprazole  Injectable 40 milliGRAM(s) IV Push every 12 hours  sucralfate suspension 1 Gram(s) Oral two times a day    MEDICATIONS  (PRN):  ondansetron Injectable 4 milliGRAM(s) IV Push every 6 hours PRN Nausea and/or Vomiting      NUTRITION/IVF:     CENTRAL LINE:  LOCATION:   DATE INSERTED:  CVP:  SCVO2:    BUSTAMANTE:   DATE INSERTED:    A-LINE:    LOCATION:   DATE INSERTED:   SVV:  CO/CI:     CHEST TUBE:  LOCATION:  DATE INSERTED: OUTPUT/24 HRS:  SUCTION/WATER SEAL:     NG/OG TUBE:  DATE INSERTED:  OUTPUT/24 HRS:    MISC:     PHYSICAL EXAM  GENERAL: Alert, elderly female, in no acute distress.  MENTAL STATUS: Appropriate affect.  HEENT: Pupils minimally reactive to light bilaterally. MMM.  Trachea midline. No anterior cervical/posterior cervical/supraclavicular lymph node swelling or tenderness.  RESPIRATORY: CTAB. No wheezing, rales or rhonchi.  CARDIOVASCULAR: RRR. No audible murmurs, rubs or gallops.   GASTROINTESTINAL: Abdomen soft, NT. Mild TTP superior to ostomy. No suprapubic tenderness. Ostomy bag in place; pink mucus membranes, well perfused with no signs of ischemia, ~20cc dark black stool present.   NEUROLOGIC: Cranial nerves III-XII grossly intact. No focal neurological deficits. Moves all extremities spontaneously. Sensation intact bilaterally and symmetric.  INTEGUMENTARY: Significant ecchymosis over right wrist, forearm, and dorsal aspect of right hand.  MUSCULOSKELETAL: No cyanosis or clubbing. No gross deformities.     LABS:  CBC Full  -  ( 19 Aug 2021 05:47 )  WBC Count : 27.75 K/uL  RBC Count : 3.18 M/uL  Hemoglobin : 9.3 g/dL  Hematocrit : 27.5 %  Platelet Count - Automated : 422 K/uL  Mean Cell Volume : 86.5 fl  Mean Cell Hemoglobin : 29.2 pg  Mean Cell Hemoglobin Concentration : 33.8 gm/dL  Auto Neutrophil # : 24.49 K/uL  Auto Lymphocyte # : 2.01 K/uL  Auto Monocyte # : 0.96 K/uL  Auto Eosinophil # : 0.01 K/uL  Auto Basophil # : 0.06 K/uL  Auto Neutrophil % : 88.3 %  Auto Lymphocyte % : 7.2 %  Auto Monocyte % : 3.5 %  Auto Eosinophil % : 0.0 %  Auto Basophil % : 0.2 %        135  |  103  |  36<H>  ----------------------------<  100<H>  4.2   |  24  |  0.98    Ca    8.0<L>      19 Aug 2021 05:47  Phos  3.3       Mg     2.0           PT/INR - ( 18 Aug 2021 09:44 )   PT: 17.3 sec;   INR: 1.47          PTT - ( 18 Aug 2021 09:44 )  PTT:46.2 sec  Urinalysis Basic - ( 18 Aug 2021 13:33 )    Color: Yellow / Appearance: Clear / S.025 / pH: x  Gluc: x / Ketone: Trace mg/dL  / Bili: Negative / Urobili: 1.0 E.U./dL   Blood: x / Protein: 100 mg/dL / Nitrite: NEGATIVE   Leuk Esterase: Trace / RBC: Many /HPF / WBC > 10 /HPF   Sq Epi: x / Non Sq Epi: 0-5 /HPF / Bacteria: Present /HPF      RECENT CULTURES:            CAPILLARY BLOOD GLUCOSE      RADIOLOGY & ADDITIONAL STUDIES:   STATUS POST: Loop colostomy   OVERNIGHT EVENTS/SUBJECTIVE: post-trans CBC 11 (6.1). Lactate 3.3 (4.8). Endoscopy - erosive esophagitis- No NGT, Cont protonix bid Pepcid qd( could not do bid 2* renal function) and start carafate. BP low post procedure with decreased uo - given 500 cc bolus x1. As per jovanni cont NPO tonight Repeat CBC @MN:9.2 <11<6.1.LA 1.7. Repeat in am. UO improved to 20/hr. Failed dysphagia screen- held po meds.   INTERVAL HISTORY: Patient seen and evaluated. Patient endorses right sided abdominal pain characterized as "ache" and graded as 4/10, non-radiating. She otherwise denies any nausea, recurrent emesis, CP, or SOB. Patient denies passing flatus or having produced a bowel movement.     ICU Vital Signs Last 24 Hrs  T(C): 36.3 (19 Aug 2021 05:59), Max: 37.1 (18 Aug 2021 22:11)  T(F): 97.4 (19 Aug 2021 05:59), Max: 98.8 (18 Aug 2021 22:11)  HR: 81 (19 Aug 2021 06:00) (77 - 110)  BP: 125/57 (19 Aug 2021 06:00) (81/42 - 141/65)  BP(mean): 81 (19 Aug 2021 05:00) (56 - 93)  ABP: --  ABP(mean): --  RR: 17 (19 Aug 2021 06:00) (15 - 28)  SpO2: 97% (19 Aug 2021 06:00) (79% - 100%)      I&O's Detail    17 Aug 2021 07:01  -  18 Aug 2021 07:00  --------------------------------------------------------  IN:    IV PiggyBack: 370 mL    Lactated Ringers: 40 mL    Oral Fluid: 50 mL  Total IN: 460 mL    OUT:    Bulb (mL): 55 mL    Colostomy (mL): 450 mL    Voided (mL): 300 mL  Total OUT: 805 mL    Total NET: -345 mL      18 Aug 2021 07:01  -  19 Aug 2021 06:46  --------------------------------------------------------  IN:    IV PiggyBack: 150 mL    Lactated Ringers: 1780 mL    Lactated Ringers Bolus: 500 mL    PRBCs (Packed Red Blood Cells): 700 mL  Total IN: 3130 mL    OUT:    Bulb (mL): 15 mL    Indwelling Catheter - Urethral (mL): 385 mL    Nasogastric/Oral tube (mL): 1500 mL  Total OUT: 1900 mL    Total NET: 1230 mL                MEDICATIONS  (STANDING):  atorvastatin 80 milliGRAM(s) Oral at bedtime  chlorhexidine 2% Cloths 1 Application(s) Topical daily  dextrose 40% Gel 15 Gram(s) Oral once  dextrose 5%. 1000 milliLiter(s) (50 mL/Hr) IV Continuous <Continuous>  dextrose 5%. 1000 milliLiter(s) (100 mL/Hr) IV Continuous <Continuous>  dextrose 50% Injectable 25 Gram(s) IV Push once  dextrose 50% Injectable 12.5 Gram(s) IV Push once  dextrose 50% Injectable 25 Gram(s) IV Push once  famotidine    Tablet 20 milliGRAM(s) Oral daily  glucagon  Injectable 1 milliGRAM(s) IntraMuscular once  insulin lispro (ADMELOG) corrective regimen sliding scale   SubCutaneous three times a day before meals  lactated ringers. 1000 milliLiter(s) (100 mL/Hr) IV Continuous <Continuous>  meropenem  IVPB 1000 milliGRAM(s) IV Intermittent every 12 hours  pantoprazole  Injectable 40 milliGRAM(s) IV Push every 12 hours  sucralfate suspension 1 Gram(s) Oral two times a day    MEDICATIONS  (PRN):  ondansetron Injectable 4 milliGRAM(s) IV Push every 6 hours PRN Nausea and/or Vomiting      NUTRITION/IVF:     CENTRAL LINE:  LOCATION:   DATE INSERTED:  CVP:  SCVO2:    BUSTAMANTE:   DATE INSERTED:    A-LINE:    LOCATION:   DATE INSERTED:   SVV:  CO/CI:     CHEST TUBE:  LOCATION:  DATE INSERTED: OUTPUT/24 HRS:  SUCTION/WATER SEAL:     NG/OG TUBE:  DATE INSERTED:  OUTPUT/24 HRS:    MISC:     PHYSICAL EXAM  GENERAL: Alert, elderly female, in no acute distress.  MENTAL STATUS: Appropriate affect.  HEENT: Pupils minimally reactive to light bilaterally. MMM.  Trachea midline. No anterior cervical/posterior cervical/supraclavicular lymph node swelling or tenderness.  RESPIRATORY: CTAB. No wheezing, rales or rhonchi.  CARDIOVASCULAR: RRR. No audible murmurs, rubs or gallops.   GASTROINTESTINAL: Abdomen soft, NT. Mild TTP superior to ostomy. No suprapubic tenderness. Ostomy bag in place; pink mucus membranes, well perfused with no signs of ischemia, ~20cc dark black stool present. L gluteal drain in place with <2cc serosanguinous fluid collected.   NEUROLOGIC: Cranial nerves III-XII grossly intact. No focal neurological deficits. Moves all extremities spontaneously. Sensation intact bilaterally and symmetric.  INTEGUMENTARY: Significant ecchymosis over right wrist, forearm, and dorsal aspect of right hand.  MUSCULOSKELETAL: No cyanosis or clubbing. No gross deformities.     LABS:  CBC Full  -  ( 19 Aug 2021 05:47 )  WBC Count : 27.75 K/uL  RBC Count : 3.18 M/uL  Hemoglobin : 9.3 g/dL  Hematocrit : 27.5 %  Platelet Count - Automated : 422 K/uL  Mean Cell Volume : 86.5 fl  Mean Cell Hemoglobin : 29.2 pg  Mean Cell Hemoglobin Concentration : 33.8 gm/dL  Auto Neutrophil # : 24.49 K/uL  Auto Lymphocyte # : 2.01 K/uL  Auto Monocyte # : 0.96 K/uL  Auto Eosinophil # : 0.01 K/uL  Auto Basophil # : 0.06 K/uL  Auto Neutrophil % : 88.3 %  Auto Lymphocyte % : 7.2 %  Auto Monocyte % : 3.5 %  Auto Eosinophil % : 0.0 %  Auto Basophil % : 0.2 %        135  |  103  |  36<H>  ----------------------------<  100<H>  4.2   |  24  |  0.98    Ca    8.0<L>      19 Aug 2021 05:47  Phos  3.3     -  Mg     2.0           PT/INR - ( 18 Aug 2021 09:44 )   PT: 17.3 sec;   INR: 1.47          PTT - ( 18 Aug 2021 09:44 )  PTT:46.2 sec  Urinalysis Basic - ( 18 Aug 2021 13:33 )    Color: Yellow / Appearance: Clear / S.025 / pH: x  Gluc: x / Ketone: Trace mg/dL  / Bili: Negative / Urobili: 1.0 E.U./dL   Blood: x / Protein: 100 mg/dL / Nitrite: NEGATIVE   Leuk Esterase: Trace / RBC: Many /HPF / WBC > 10 /HPF   Sq Epi: x / Non Sq Epi: 0-5 /HPF / Bacteria: Present /HPF      RECENT CULTURES:            CAPILLARY BLOOD GLUCOSE      RADIOLOGY & ADDITIONAL STUDIES:

## 2021-08-19 NOTE — CONSULT NOTE ADULT - ASSESSMENT
89F PMHx of chronic back pain 2/2 L3,L4 compression fractures, triple vessel CAD,  L CEA 2/2 L ICA stenosis >70% (4/21) who presented 8/8 with fatigue and unintentional weight loss over last several months.  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 Hospital cb sp diverting colostomy (8/13), sp Left gluteal IR drain placed for gluteal abscess (8/9), pelvic infection ID following. SICU for monitoring suspected acute blood loss 2/2 to duodenal ulceration on EGD. Palliative consulted for GOC.

## 2021-08-19 NOTE — ADVANCED PRACTICE NURSE CONSULT - ASSESSMENT
WOCN unable to continue ostomy teaching. The patient was sleeping intermittently during change of ostomy pouching system. Colostomy pink, round, budded, measuring 1 7/8 inches; Red rubber catheter noted at 12 and 6 o'clock. Small amount of thick brown effluent in ostomy pouch. WOCN changed the pouching system.  WOCN cleansed peristomal skin, applied Cavilon skin prep, measured the stoma, cut the skin barrier to accommodate the stoma and the red rubber catheter, applied an ostomy ring around the stoma, then a [place] 2 piece 2 3/4 inch, flat, cut to fit, lock and roll pouching system.  WOCN unable to continue ostomy teaching. The patient was sleeping intermittently during change of ostomy pouching system. Colostomy pink, round, budded, measuring 1 7/8 inches; Red rubber catheter noted at 12 and 6 o'clock. Small amount of thick brown effluent in ostomy pouch. WOCN changed the pouching system.  WOCN cleansed peristomal skin, applied Cavilon skin prep to peristomal skin, measured the stoma, cut the skin barrier to accommodate the stoma and the red rubber catheter, applied an ostomy ring around the stoma, then a [place] 2 piece 2 3/4 inch, flat, cut to fit, lock and roll pouching system.

## 2021-08-19 NOTE — CONSULT NOTE ADULT - PROBLEM SELECTOR RECOMMENDATION 4
88 yo f with rectal adenocarcinoma complicated hospital course, poor PS, active infection, and unlikely candidate for disease directed therapy.   -would recommend DNR DNI in setting of cachexia  -all questions answered, emotional support provided  - primary team   -please contact Palliative Medicine at [phone] for any acute symptoms or further questions  -will continue to follow with you

## 2021-08-20 LAB
ANION GAP SERPL CALC-SCNC: 6 MMOL/L — SIGNIFICANT CHANGE UP (ref 5–17)
APTT BLD: 30.7 SEC — SIGNIFICANT CHANGE UP (ref 27.5–35.5)
BASE EXCESS BLDA CALC-SCNC: -2.7 MMOL/L — LOW (ref -2–3)
BUN SERPL-MCNC: 29 MG/DL — HIGH (ref 7–23)
CA-I BLDA-SCNC: 1.13 MMOL/L — LOW (ref 1.15–1.33)
CALCIUM SERPL-MCNC: 8.1 MG/DL — LOW (ref 8.4–10.5)
CHLORIDE SERPL-SCNC: 106 MMOL/L — SIGNIFICANT CHANGE UP (ref 96–108)
CMV IGG FLD QL: >10 U/ML — HIGH
CMV IGG SERPL-IMP: POSITIVE
CMV IGM FLD-ACNC: <8 AU/ML — SIGNIFICANT CHANGE UP
CMV IGM SERPL QL: NEGATIVE — SIGNIFICANT CHANGE UP
CO2 BLDA-SCNC: 23 MMOL/L — SIGNIFICANT CHANGE UP (ref 19–24)
CO2 SERPL-SCNC: 24 MMOL/L — SIGNIFICANT CHANGE UP (ref 22–31)
COHGB MFR BLDA: 1.4 % — SIGNIFICANT CHANGE UP
CREAT SERPL-MCNC: 0.68 MG/DL — SIGNIFICANT CHANGE UP (ref 0.5–1.3)
GLUCOSE BLDC GLUCOMTR-MCNC: 120 MG/DL — HIGH (ref 70–99)
GLUCOSE BLDC GLUCOMTR-MCNC: 129 MG/DL — HIGH (ref 70–99)
GLUCOSE BLDC GLUCOMTR-MCNC: 131 MG/DL — HIGH (ref 70–99)
GLUCOSE BLDC GLUCOMTR-MCNC: 143 MG/DL — HIGH (ref 70–99)
GLUCOSE SERPL-MCNC: 133 MG/DL — HIGH (ref 70–99)
HCO3 BLDA-SCNC: 22 MMOL/L — SIGNIFICANT CHANGE UP (ref 21–28)
HCT VFR BLD CALC: 25.6 % — LOW (ref 34.5–45)
HGB BLD-MCNC: 8.5 G/DL — LOW (ref 11.5–15.5)
HGB BLDA-MCNC: 7.8 G/DL — LOW (ref 11.7–16.1)
INR BLD: 1.34 — HIGH (ref 0.88–1.16)
LACTATE SERPL-SCNC: 1.2 MMOL/L — SIGNIFICANT CHANGE UP (ref 0.5–2)
MAGNESIUM SERPL-MCNC: 2 MG/DL — SIGNIFICANT CHANGE UP (ref 1.6–2.6)
MCHC RBC-ENTMCNC: 29.8 PG — SIGNIFICANT CHANGE UP (ref 27–34)
MCHC RBC-ENTMCNC: 33.2 GM/DL — SIGNIFICANT CHANGE UP (ref 32–36)
MCV RBC AUTO: 89.8 FL — SIGNIFICANT CHANGE UP (ref 80–100)
METHGB MFR BLDA: 0 % — SIGNIFICANT CHANGE UP
NRBC # BLD: 0 /100 WBCS — SIGNIFICANT CHANGE UP (ref 0–0)
OXYHGB MFR BLDA: 98.4 % — HIGH (ref 90–95)
PCO2 BLDA: 34 MMHG — SIGNIFICANT CHANGE UP (ref 32–35)
PH BLDA: 7.41 — SIGNIFICANT CHANGE UP (ref 7.35–7.45)
PHOSPHATE SERPL-MCNC: 1.9 MG/DL — LOW (ref 2.5–4.5)
PLATELET # BLD AUTO: 373 K/UL — SIGNIFICANT CHANGE UP (ref 150–400)
PO2 BLDA: 289 MMHG — HIGH (ref 83–108)
POTASSIUM BLDA-SCNC: 3.5 MMOL/L — SIGNIFICANT CHANGE UP (ref 3.5–5.1)
POTASSIUM SERPL-MCNC: 4 MMOL/L — SIGNIFICANT CHANGE UP (ref 3.5–5.3)
POTASSIUM SERPL-SCNC: 4 MMOL/L — SIGNIFICANT CHANGE UP (ref 3.5–5.3)
PROTHROM AB SERPL-ACNC: 15.9 SEC — HIGH (ref 10.6–13.6)
RBC # BLD: 2.85 M/UL — LOW (ref 3.8–5.2)
RBC # FLD: 15.6 % — HIGH (ref 10.3–14.5)
SAO2 % BLDA: 99.8 % — HIGH (ref 94–98)
SODIUM BLDA-SCNC: 135 MMOL/L — LOW (ref 136–145)
SODIUM SERPL-SCNC: 136 MMOL/L — SIGNIFICANT CHANGE UP (ref 135–145)
WBC # BLD: 22.61 K/UL — HIGH (ref 3.8–10.5)
WBC # FLD AUTO: 22.61 K/UL — HIGH (ref 3.8–10.5)

## 2021-08-20 PROCEDURE — 44005 FREEING OF BOWEL ADHESION: CPT | Mod: 78

## 2021-08-20 PROCEDURE — 71045 X-RAY EXAM CHEST 1 VIEW: CPT | Mod: 26,76

## 2021-08-20 PROCEDURE — 74177 CT ABD & PELVIS W/CONTRAST: CPT | Mod: 26

## 2021-08-20 PROCEDURE — 99291 CRITICAL CARE FIRST HOUR: CPT

## 2021-08-20 PROCEDURE — 99231 SBSQ HOSP IP/OBS SF/LOW 25: CPT

## 2021-08-20 RX ORDER — INSULIN LISPRO 100/ML
VIAL (ML) SUBCUTANEOUS EVERY 6 HOURS
Refills: 0 | Status: DISCONTINUED | OUTPATIENT
Start: 2021-08-20 | End: 2021-09-02

## 2021-08-20 RX ORDER — POTASSIUM PHOSPHATE, MONOBASIC POTASSIUM PHOSPHATE, DIBASIC 236; 224 MG/ML; MG/ML
15 INJECTION, SOLUTION INTRAVENOUS ONCE
Refills: 0 | Status: DISCONTINUED | OUTPATIENT
Start: 2021-08-20 | End: 2021-08-20

## 2021-08-20 RX ORDER — BENZOCAINE 10 %
1 GEL (GRAM) MUCOUS MEMBRANE ONCE
Refills: 0 | Status: COMPLETED | OUTPATIENT
Start: 2021-08-20 | End: 2021-08-20

## 2021-08-20 RX ORDER — SODIUM CHLORIDE 9 MG/ML
1000 INJECTION, SOLUTION INTRAVENOUS
Refills: 0 | Status: DISCONTINUED | OUTPATIENT
Start: 2021-08-20 | End: 2021-08-20

## 2021-08-20 RX ADMIN — LIDOCAINE 1 PATCH: 4 CREAM TOPICAL at 10:00

## 2021-08-20 RX ADMIN — SODIUM CHLORIDE 100 MILLILITER(S): 9 INJECTION, SOLUTION INTRAVENOUS at 01:31

## 2021-08-20 RX ADMIN — PANTOPRAZOLE SODIUM 40 MILLIGRAM(S): 20 TABLET, DELAYED RELEASE ORAL at 14:31

## 2021-08-20 RX ADMIN — FAMOTIDINE 20 MILLIGRAM(S): 10 INJECTION INTRAVENOUS at 14:31

## 2021-08-20 RX ADMIN — MEROPENEM 200 MILLIGRAM(S): 1 INJECTION INTRAVENOUS at 06:06

## 2021-08-20 RX ADMIN — MEROPENEM 200 MILLIGRAM(S): 1 INJECTION INTRAVENOUS at 18:21

## 2021-08-20 RX ADMIN — LIDOCAINE 1 PATCH: 4 CREAM TOPICAL at 19:13

## 2021-08-20 RX ADMIN — SODIUM CHLORIDE 70 MILLILITER(S): 9 INJECTION, SOLUTION INTRAVENOUS at 14:31

## 2021-08-20 RX ADMIN — Medication 62.5 MILLIMOLE(S): at 10:00

## 2021-08-20 RX ADMIN — PANTOPRAZOLE SODIUM 40 MILLIGRAM(S): 20 TABLET, DELAYED RELEASE ORAL at 01:32

## 2021-08-20 RX ADMIN — ONDANSETRON 4 MILLIGRAM(S): 8 TABLET, FILM COATED ORAL at 10:00

## 2021-08-20 RX ADMIN — CHLORHEXIDINE GLUCONATE 1 APPLICATION(S): 213 SOLUTION TOPICAL at 08:00

## 2021-08-20 NOTE — PROGRESS NOTE ADULT - ASSESSMENT
A/P Possible volvulus or SBO from other causes (tumor related vs internal hernia).   To OR for expl lap and indicated procedures.  If tumor related SBO found then side to side enterocolostomy or loop ileostomy might be needed.  Poor nutritional condition, suspected RLL PNA and overall cachectic condition make her high risk for a leak if an anastomosis is done.  Situation explained to the patient and her HCP by myself (2 separate conversations).  Patient understands reason for surgery and agrees.  She further agrees to a bypass or another stoma if judged best course of action.   Patient signed consent form.  HCP agrees with decision but is not giving consent for the patient.  Discussion with patient and HCP about the advanced malignancy and the limited treatment options open to patient now.  Nutritional and functional level (ECOG 3 or 4) would not permit major elective surgery.  Further, i dont think RO resection is possible given gluteal infection and extension from abdomen.   Also, patient may not tolerate chemotherapy.  Poor case for RT/chemo given her pelvic infection, the lack of pelvic pain and the fact that she is not likely to be rendered R-0 resectable post such treatment.  Better to save RT should see develop bad pelvic pain.    Patient understands the above but wants to do everything possible to preserve her life.  Consent obtained in ICU with RN and HO in the room.      Also of note, CT reveals bilateral pleural effusions and ? RLL atelectasis vs infiltrate with ? air broncogram  Needs antibiotics and cultures of sputum  On meropenem

## 2021-08-20 NOTE — PROGRESS NOTE ADULT - SUBJECTIVE AND OBJECTIVE BOX
SUBJECTIVE:   Overnight: ON: Nausea - given reglan and zofran,     Patient seen and examined at bedside; reports pain and bloating in the lower abdomen; endorses nausea no vomting; no gas or BM throught ostomy. no fevers or chills; Coughing but no chest pain or SOB while on O2. No other concerns     meropenem  IVPB 1000 milliGRAM(s) IV Intermittent every 12 hours    MEDICATIONS  (PRN):  acetaminophen   Tablet .. 1000 milliGRAM(s) Oral every 6 hours PRN Temp greater or equal to 38C (100.4F), Mild Pain (1 - 3)  ondansetron Injectable 4 milliGRAM(s) IV Push every 6 hours PRN Nausea and/or Vomiting      I&O's Detail    19 Aug 2021 07:01  -  20 Aug 2021 07:00  --------------------------------------------------------  IN:    IV PiggyBack: 150 mL    Lactated Ringers: 2400 mL    Oral Fluid: 540 mL  Total IN: 3090 mL    OUT:    Bulb (mL): 15 mL    Indwelling Catheter - Urethral (mL): 540 mL  Total OUT: 555 mL    Total NET: 2535 mL      20 Aug 2021 07:01  -  20 Aug 2021 14:13  --------------------------------------------------------  IN:    IV PiggyBack: 318.5 mL    Lactated Ringers: 100 mL  Total IN: 418.5 mL    OUT:    Bulb (mL): 10 mL    Indwelling Catheter - Urethral (mL): 165 mL  Total OUT: 175 mL    Total NET: 243.5 mL          T(C): 36.6 (08-20-21 @ 10:20), Max: 37.4 (08-19-21 @ 17:59)  HR: 92 (08-20-21 @ 13:00) (75 - 97)  BP: 142/64 (08-20-21 @ 13:00) (96/48 - 175/73)  RR: 21 (08-20-21 @ 13:00) (15 - 25)  SpO2: 97% (08-20-21 @ 13:00) (92% - 100%)    GENERAL: NAD, Resting comfortably in bed, awake, opens eyes spontaneously  HEENT: NCAT, MMM, Normal conjunctiva, PERRL  RESP: Nonlabored breathing, No respiratory distress, on 2L NC  CARD: Normal rate, Normal peripheral perfusion  GI: abdomen soft, Distension and tenderness in the lower abdomen; Ostomy bag in place; pink, patent and non productive; L gluteal drain in place with 3cc serosanguinous fluid collected.   EXTREM: No edema, No gross deformity of extremities  NEURO: AAOx3, No focal motor or sensory deficits      LABS:                        8.5    22.61 )-----------( 373      ( 20 Aug 2021 05:52 )             25.6     08-20    136  |  106  |  29<H>  ----------------------------<  133<H>  4.0   |  24  |  0.68    Ca    8.1<L>      20 Aug 2021 05:52  Phos  1.9     08-20  Mg     2.0     08-20      PT/INR - ( 20 Aug 2021 05:52 )   PT: 15.9 sec;   INR: 1.34          PTT - ( 20 Aug 2021 05:52 )  PTT:30.7 sec      RADIOLOGY & ADDITIONAL STUDIES:

## 2021-08-20 NOTE — PROGRESS NOTE ADULT - ATTENDING COMMENTS
#Anemia  GI re-consulted for acute drop in H/H in the setting of coffee ground emesis. Baseline Hgb 10-11, today downtrended to 6.1. With sump in place with dark output, also noted in ostomy bag. With uptrend in BUN:Cr ratio c/f possible UGIB source.   -s/p EGD (8/18) - LA grade D esophagitis. Multiple ulcers in the middle to lower third of the esophagus s/p biopsy to rule out CMV. 4 cm hiatal hernia. Non-erosive gastritis with scattered erythema consistent with NG tube trauma. Hematin on the gastric mucosa, bleeding likely from esophageal ulcer. Erythema in the duodenum compatible with duodenitis.    -c/w Protonix 40 mg IVP BID  -Pepcid BID  -Advanced diet as tolerated  -Carafate 1g QID (suspension)  -Follow-up pathology  -Repeat EGD in 8 weeks  -Closely monitor H/H   -No utility in Fe studies given pRBC transfusions  -Avoid NG tube placement.  -Maintain active T&S   -Transfusion goal as per primary team   -Ongoing discussion regarding GOC as per primary and Palliative care team

## 2021-08-20 NOTE — PROGRESS NOTE ADULT - ASSESSMENT
89F PMH HTN, chronic back pain 2/2 L3,L4 compression fractures, triple vessel CAD, and SBO 2/2 possible diverticulitis vs. 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 Left gluteal IR drain placed (8/9). Colonoscopy w/ sigmoid mass bx on 8/11- final path invasive adenocarcinoma moderately differentiated. Taken to OR on 8/13 for diverting colostomy. Stepped up to SICU for hemodynamic monitoring for suspected acute blood loss anemia found to be due to duodenal ulceration on EGD.     Neuro: NAD. Tylenol prn.   CV: H/o CAD, L ICA s/p L CEA (4/21). Holding ASA, Plavix, metoprolol, and norvasc.   Pulm: Sating well on RA. Chest PT.  GI/FEN: Mech soft diet. D5 1/2NS@70, UGIB: EGD on 8/18: Erosive esophagitis and doudenitis with esophageal ulcers bx x2. Cont PPI QD (unable to provide BID secondary to mild renal malfunction) and Carafate. Avoid NGT, per GI; f/u EGD in 8 weeks. CT abd/pelv w/ IV cont for abdominal distention.   : Renal following. S/p NM renal function study (wnl). [place] (8/18-)  ID: ESBL Kleb in intraabdominal abscess: Daina (8/13-)  Endo: No h/o DM. Goal BG <180. mISS  Heme: holding chemo ppx until hgb stable  PPX: SCDs.    Lines: PIVs  Wounds: Functioning ostomy p/p/p  PT/OT: Aggressive PT. ALIS.

## 2021-08-20 NOTE — PROGRESS NOTE ADULT - SUBJECTIVE AND OBJECTIVE BOX
GASTROENTEROLOGY PROGRESS NOTE  Patient seen and examined at bedside. Downtrend in Hgb 8.5, from 9.3.    PERTINENT REVIEW OF SYSTEMS:  CONSTITUTIONAL: No weakness, fevers or chills  HEENT: No visual changes; No vertigo or throat pain   GASTROINTESTINAL: As above.  NEUROLOGICAL: No numbness or weakness  SKIN: No itching, burning, rashes, or lesions     Allergies    No Known Allergies    Intolerances      MEDICATIONS:  MEDICATIONS  (STANDING):  atorvastatin 80 milliGRAM(s) Oral at bedtime  chlorhexidine 2% Cloths 1 Application(s) Topical daily  dextrose 40% Gel 15 Gram(s) Oral once  dextrose 5% + sodium chloride 0.45%. 1000 milliLiter(s) (70 mL/Hr) IV Continuous <Continuous>  dextrose 5%. 1000 milliLiter(s) (100 mL/Hr) IV Continuous <Continuous>  dextrose 5%. 1000 milliLiter(s) (50 mL/Hr) IV Continuous <Continuous>  dextrose 50% Injectable 25 Gram(s) IV Push once  dextrose 50% Injectable 12.5 Gram(s) IV Push once  dextrose 50% Injectable 25 Gram(s) IV Push once  famotidine    Tablet 20 milliGRAM(s) Oral daily  glucagon  Injectable 1 milliGRAM(s) IntraMuscular once  insulin lispro (ADMELOG) corrective regimen sliding scale   SubCutaneous three times a day before meals  lidocaine   4% Patch 1 Patch Transdermal every 24 hours  meropenem  IVPB 1000 milliGRAM(s) IV Intermittent every 12 hours  pantoprazole  Injectable 40 milliGRAM(s) IV Push every 12 hours  sodium phosphate IVPB 15 milliMole(s) IV Intermittent once  sucralfate suspension 1 Gram(s) Oral two times a day    MEDICATIONS  (PRN):  acetaminophen   Tablet .. 1000 milliGRAM(s) Oral every 6 hours PRN Temp greater or equal to 38C (100.4F), Mild Pain (1 - 3)  ondansetron Injectable 4 milliGRAM(s) IV Push every 6 hours PRN Nausea and/or Vomiting    Vital Signs Last 24 Hrs  T(C): 36.7 (20 Aug 2021 05:26), Max: 37.4 (19 Aug 2021 17:59)  T(F): 98 (20 Aug 2021 05:26), Max: 99.4 (19 Aug 2021 17:59)  HR: 92 (20 Aug 2021 08:00) (73 - 94)  BP: 158/57 (20 Aug 2021 08:00) (92/51 - 161/72)  BP(mean): 99 (20 Aug 2021 08:00) (65 - 101)  RR: 19 (20 Aug 2021 08:00) (14 - 27)  SpO2: 100% (20 Aug 2021 08:00) (92% - 100%)    08 @ 07:  -   @ 07:00  --------------------------------------------------------  IN: 3090 mL / OUT: 555 mL / NET: 2535 mL     @ 07:  -   @ 10:10  --------------------------------------------------------  IN: 100 mL / OUT: 35 mL / NET: 65 mL      PHYSICAL EXAM:    General: in no acute distress  HEENT: MMM, conjunctiva and sclera clear  Gastrointestinal: Soft non-tender non-distended; No rebound or guarding  Skin: Warm and dry. No obvious rash    LABS:                        8.5    22.61 )-----------( 373      ( 20 Aug 2021 05:52 )             25.6     08-20    136  |  106  |  29<H>  ----------------------------<  133<H>  4.0   |  24  |  0.68    Ca    8.1<L>      20 Aug 2021 05:52  Phos  1.9     08-20  Mg     2.0     08-20      PT/INR - ( 20 Aug 2021 05:52 )   PT: 15.9 sec;   INR: 1.34          PTT - ( 20 Aug 2021 05:52 )  PTT:30.7 sec      Urinalysis Basic - ( 18 Aug 2021 13:33 )    Color: Yellow / Appearance: Clear / S.025 / pH: x  Gluc: x / Ketone: Trace mg/dL  / Bili: Negative / Urobili: 1.0 E.U./dL   Blood: x / Protein: 100 mg/dL / Nitrite: NEGATIVE   Leuk Esterase: Trace / RBC: Many /HPF / WBC > 10 /HPF   Sq Epi: x / Non Sq Epi: 0-5 /HPF / Bacteria: Present /HPF                RADIOLOGY & ADDITIONAL STUDIES:  Reviewed GASTROENTEROLOGY PROGRESS NOTE  Patient seen and examined at bedside. Downtrend in Hgb 8.5, from 9.3.    ROS: Patient notes getting very little sleep overnight, no additional episodes of vomiting overnight. No ostomy output overnight.     PERTINENT REVIEW OF SYSTEMS:  CONSTITUTIONAL: No weakness, fevers or chills  HEENT: No visual changes; No vertigo or throat pain   GASTROINTESTINAL: As above.  NEUROLOGICAL: No numbness or weakness  SKIN: No itching, burning, rashes, or lesions     Allergies    No Known Allergies    Intolerances      MEDICATIONS:  MEDICATIONS  (STANDING):  atorvastatin 80 milliGRAM(s) Oral at bedtime  chlorhexidine 2% Cloths 1 Application(s) Topical daily  dextrose 40% Gel 15 Gram(s) Oral once  dextrose 5% + sodium chloride 0.45%. 1000 milliLiter(s) (70 mL/Hr) IV Continuous <Continuous>  dextrose 5%. 1000 milliLiter(s) (100 mL/Hr) IV Continuous <Continuous>  dextrose 5%. 1000 milliLiter(s) (50 mL/Hr) IV Continuous <Continuous>  dextrose 50% Injectable 25 Gram(s) IV Push once  dextrose 50% Injectable 12.5 Gram(s) IV Push once  dextrose 50% Injectable 25 Gram(s) IV Push once  famotidine    Tablet 20 milliGRAM(s) Oral daily  glucagon  Injectable 1 milliGRAM(s) IntraMuscular once  insulin lispro (ADMELOG) corrective regimen sliding scale   SubCutaneous three times a day before meals  lidocaine   4% Patch 1 Patch Transdermal every 24 hours  meropenem  IVPB 1000 milliGRAM(s) IV Intermittent every 12 hours  pantoprazole  Injectable 40 milliGRAM(s) IV Push every 12 hours  sodium phosphate IVPB 15 milliMole(s) IV Intermittent once  sucralfate suspension 1 Gram(s) Oral two times a day    MEDICATIONS  (PRN):  acetaminophen   Tablet .. 1000 milliGRAM(s) Oral every 6 hours PRN Temp greater or equal to 38C (100.4F), Mild Pain (1 - 3)  ondansetron Injectable 4 milliGRAM(s) IV Push every 6 hours PRN Nausea and/or Vomiting    Vital Signs Last 24 Hrs  T(C): 36.7 (20 Aug 2021 05:26), Max: 37.4 (19 Aug 2021 17:59)  T(F): 98 (20 Aug 2021 05:26), Max: 99.4 (19 Aug 2021 17:59)  HR: 92 (20 Aug 2021 08:00) (73 - 94)  BP: 158/57 (20 Aug 2021 08:00) (92/51 - 161/72)  BP(mean): 99 (20 Aug 2021 08:00) (65 - 101)  RR: 19 (20 Aug 2021 08:00) (14 - 27)  SpO2: 100% (20 Aug 2021 08:00) (92% - 100%)     @ 07:  -   @ 07:00  --------------------------------------------------------  IN: 3090 mL / OUT: 555 mL / NET: 2535 mL    08 @ 07:01  20 @ 10:10  --------------------------------------------------------  IN: 100 mL / OUT: 35 mL / NET: 65 mL      PHYSICAL EXAM:    General: frail appearing female, lying in bed  Cardiovascular: Sinus tachycardia  Respiratory: CTA. No increased inspiratory effort. No conversational dyspnea.   Gastrointestinal: soft, + distended, nontender. No rebound or guarding. Stoma pink, edematous, with gas in colostomy; no stool noted  MENDOZA: Brown stool on glove, no masses or ulcers palpated.   Extremities: warm, no dependent edema  Skin: warm, no rashes    LABS:                        8.5    22.61 )-----------( 373      ( 20 Aug 2021 05:52 )             25.6     08-20    136  |  106  |  29<H>  ----------------------------<  133<H>  4.0   |  24  |  0.68    Ca    8.1<L>      20 Aug 2021 05:52  Phos  1.9     08-20  Mg     2.0     08-20      PT/INR - ( 20 Aug 2021 05:52 )   PT: 15.9 sec;   INR: 1.34          PTT - ( 20 Aug 2021 05:52 )  PTT:30.7 sec      Urinalysis Basic - ( 18 Aug 2021 13:33 )    Color: Yellow / Appearance: Clear / S.025 / pH: x  Gluc: x / Ketone: Trace mg/dL  / Bili: Negative / Urobili: 1.0 E.U./dL   Blood: x / Protein: 100 mg/dL / Nitrite: NEGATIVE   Leuk Esterase: Trace / RBC: Many /HPF / WBC > 10 /HPF   Sq Epi: x / Non Sq Epi: 0-5 /HPF / Bacteria: Present /HPF                RADIOLOGY & ADDITIONAL STUDIES:  Reviewed

## 2021-08-20 NOTE — BRIEF OPERATIVE NOTE - COMMENTS
< 1 cm enterotomy noted at site of proximal adhesions at level of distal jejunum or proximal ileum.  Closed with 3-0 vicryl seromucular sutures.  Total of 3 sets of adhesions: 1) proximal at level of distal jejunum, 2) mid to distal ileal to retroperitoneum across center of abdomen and at TI area and 3) distal ileal (6-8 inches proximal to IC valve) to the top of the tumor area.  The latter were the most severe. Able to lyse these adhesions and get bowel out of pelvis.  serosal tear incurred during that was repaired.  Spilled 15-20 ml of SB sucus via enterotomy.  Irrigation with dilute betadine solution.

## 2021-08-20 NOTE — CHART NOTE - NSCHARTNOTEFT_GEN_A_CORE
89F hx HTN, triple vessel CAD, admitted w/ perforated sigmoid malignant mass, s/p L transgluteal IR drain placement (8/9), C-scope w/ biopsy of mass (8/11), diverting colostomy (8/13), EGD (8/18) showing esophagitis w/ multiple ulcers in middle to lower third of esophagus, as well as duodenitis. On protonix and carafate for EGD fingindgs, awaiting goals of care between palliative and family. Was stepped up to SICU 8/18 for Hgb 6.1, transfused 2 units pRBC and had EGD as above. Increasing abdominal distension and nausea today, obtained CT abd/pelvis showing "high-grade small bowel obstruction with possible closed loop obstruction and transition points in the right lower quadrant. There is severe dilation of the esophagus, stomach, and small bowel, with collapse of the distal bowel." NGT placed and immediately evacuated 1500 cc bilious output. Abdominal distension decreased markedly. Attempt was made to further decompress bowel from below w/ red ruber catheter in stoma however did not evacuate gas or stool. Patient is currently hemodynamically stable. Discussed w/ Dr. Stearns and fellow. Internal hernia is unlikely given that no mesenteric defect was created when patient was diverted. This is a complex case where patient has terminal illness and not a candidate for surgical resection. While she is a candidate for an operation to relieve an acute obstruction, further discussion with her health care proxy about goals of care is necessary while patient is being decompressed. Of note, still trying to contact HCP at this time. Further management pending.

## 2021-08-20 NOTE — PROGRESS NOTE ADULT - ASSESSMENT
A/P  Stable but not eating and has no stoma function recently.  Doubt intrabdominal obstruction or bleeding.    WBC is above 20k (? related to pelvic process vs aspiration vs stoma surgery related (doubt).  Her pelvic drain stopped draining.   Repeat CT of abdomen and pelvis (no po contrast) to assess pelvis and abdomen.  Our options for Rx are limited for the cancer.   Continuing antibiotics for now.  Esophagitis is being Rx ith protonix and carafate.   Malnourished.  Goals of care need to be established.  I have spoken to the patient but dont think she is comprehending all that I am saying or her situation.  HCP is Cynthia Sims and I have spoken to her x 3 and left message today.  She wants full court press.  I think hospice and comfort measures are appropriate.  The prospects of our being able to treat the cancer (would have to be RT/chemo or more likely chemo alone are poor.  She has poor performance status (ECOG 3 or 4).    She will leave unit today.  Needs to have help eating.   Needs PT and to be OOB.

## 2021-08-20 NOTE — PROGRESS NOTE ADULT - SUBJECTIVE AND OBJECTIVE BOX
Remains in ICU  On meropenem, protonix, carafate, tylenol    NO further vomiting or obvious bleeding.  No transfusions yesterday.  Cleared for soft mechanical diet po but not taking much po.  C/o back pain (has had this x long time pre admit.  Has compression fractures.  not been OOB  Arousable and will talk.       Vital Signs Last 24 Hrs  T(C): 36.7 (20 Aug 2021 05:26), Max: 37.4 (19 Aug 2021 17:59)  T(F): 98 (20 Aug 2021 05:26), Max: 99.4 (19 Aug 2021 17:59)  HR: 92 (20 Aug 2021 08:00) (73 - 94)  BP: 158/57 (20 Aug 2021 08:00) (92/51 - 161/72)  BP(mean): 99 (20 Aug 2021 08:00) (65 - 101)  RR: 19 (20 Aug 2021 08:00) (14 - 27)  SpO2: 100% (20 Aug 2021 08:00) (92% - 100%)    lungs clear  abd: soft distesnsion, BS minimal, stoma viable but without output in bag  no peritonitis  drain in place      UO  285 ml/last shift;  540 ml/24 hours    stoma: none charted    drain 15 ml/24 hours                          8.5    22.61 )-----------( 373      ( 20 Aug 2021 05:52 )             25.6       08-20    136  |  106  |  29<H>  ----------------------------<  133<H>  4.0   |  24  |  0.68    Ca    8.1<L>      20 Aug 2021 05:52  Phos  1.9     08-20  Mg     2.0     08-20

## 2021-08-20 NOTE — PROGRESS NOTE ADULT - ASSESSMENT
L gluteal drain with 15cc serosanguinous output over last 24h (15cc day prior). Flushes easily with 2cc NS. WBC remains elevated today, patient afebrile but tachypneic. Continue to monitor output. IR will continue to follow.

## 2021-08-20 NOTE — PROGRESS NOTE ADULT - SUBJECTIVE AND OBJECTIVE BOX
OVERNIGHT EVENTS/SUBJECTIVE: Nausea - given reglan and zofran,   INTERVAL HISTORY: Patient seen and evaluated. Patient says that she has been experiencing mild pain in her back which is consistent with her baseline discomfort at home; this typically resolves with Tylenol, per patient. Ms. Triana otherwise denies any nausea, emesis, CP, or SOB.     ICU Vital Signs Last 24 Hrs  T(C): 36.7 (20 Aug 2021 05:26), Max: 37.4 (19 Aug 2021 17:59)  T(F): 98 (20 Aug 2021 05:26), Max: 99.4 (19 Aug 2021 17:59)  HR: 90 (20 Aug 2021 06:00) (73 - 94)  BP: 151/68 (20 Aug 2021 06:00) (92/51 - 161/72)  BP(mean): 93 (20 Aug 2021 04:00) (65 - 93)  ABP: --  ABP(mean): --  RR: 18 (20 Aug 2021 06:00) (14 - 27)  SpO2: 100% (20 Aug 2021 06:00) (90% - 100%)      I&O's Detail    19 Aug 2021 07:01  -  20 Aug 2021 07:00  --------------------------------------------------------  IN:    IV PiggyBack: 150 mL    Lactated Ringers: 2400 mL    Oral Fluid: 540 mL  Total IN: 3090 mL    OUT:    Bulb (mL): 15 mL    Indwelling Catheter - Urethral (mL): 510 mL  Total OUT: 525 mL    Total NET: 2565 mL                MEDICATIONS  (STANDING):  atorvastatin 80 milliGRAM(s) Oral at bedtime  chlorhexidine 2% Cloths 1 Application(s) Topical daily  dextrose 40% Gel 15 Gram(s) Oral once  dextrose 5%. 1000 milliLiter(s) (50 mL/Hr) IV Continuous <Continuous>  dextrose 5%. 1000 milliLiter(s) (100 mL/Hr) IV Continuous <Continuous>  dextrose 50% Injectable 25 Gram(s) IV Push once  dextrose 50% Injectable 12.5 Gram(s) IV Push once  dextrose 50% Injectable 25 Gram(s) IV Push once  famotidine    Tablet 20 milliGRAM(s) Oral daily  glucagon  Injectable 1 milliGRAM(s) IntraMuscular once  insulin lispro (ADMELOG) corrective regimen sliding scale   SubCutaneous three times a day before meals  lactated ringers. 1000 milliLiter(s) (100 mL/Hr) IV Continuous <Continuous>  lidocaine   4% Patch 1 Patch Transdermal every 24 hours  meropenem  IVPB 1000 milliGRAM(s) IV Intermittent every 12 hours  pantoprazole  Injectable 40 milliGRAM(s) IV Push every 12 hours  sucralfate suspension 1 Gram(s) Oral two times a day    MEDICATIONS  (PRN):  acetaminophen   Tablet .. 1000 milliGRAM(s) Oral every 6 hours PRN Temp greater or equal to 38C (100.4F), Mild Pain (1 - 3)  ondansetron Injectable 4 milliGRAM(s) IV Push every 6 hours PRN Nausea and/or Vomiting      NUTRITION/IVF:     CENTRAL LINE:  LOCATION:   DATE INSERTED:  CVP:  SCVO2:    BUSTAMANTE:   DATE INSERTED:    A-LINE:    LOCATION:   DATE INSERTED:   SVV:  CO/CI:     CHEST TUBE:  LOCATION:  DATE INSERTED: OUTPUT/24 HRS:  SUCTION/WATER SEAL:     NG/OG TUBE:  DATE INSERTED:  OUTPUT/24 HRS:    MISC:     PHYSICAL EXAM  GENERAL: Alert, well developed, in no acute distress.  MENTAL STATUS: Appropriate affect.  HEENT: Pupils non-reactive to light bilaterally, unchanged. EOMI. MMM.  Trachea midline. No anterior cervical/posterior cervical/supraclavicular lymph node swelling or tenderness.  RESPIRATORY: CTAB. No wheezing, rales or rhonchi.  CARDIOVASCULAR: Heart rate 92 on Telemetry in room. No audible murmurs, rubs or gallops.   GASTROINTESTINAL: Abdomen soft. Mild TTP over right aspect of the ostomy site. Ostomy is pink, well perfused, and patent, with air collected. Abdomen non-distended. No rebound tenderness or guarding.   NEUROLOGIC: Cranial nerves III-XII grossly intact. No focal neurological deficits. Moves all extremities spontaneously. 5/5 strength bilateral /biceps/triceps/quadriceps/hamstrings. Sensation intact bilaterally.  INTEGUMENTARY: Moderate ecchymosis over right wrist and dorsal aspect of right hand. No edema.  MUSCULOSKELETAL: No cyanosis or clubbing. No gross deformities.       LABS:  CBC Full  -  ( 20 Aug 2021 05:52 )  WBC Count : 22.61 K/uL  RBC Count : 2.85 M/uL  Hemoglobin : 8.5 g/dL  Hematocrit : 25.6 %  Platelet Count - Automated : 373 K/uL  Mean Cell Volume : 89.8 fl  Mean Cell Hemoglobin : 29.8 pg  Mean Cell Hemoglobin Concentration : 33.2 gm/dL  Auto Neutrophil # : x  Auto Lymphocyte # : x  Auto Monocyte # : x  Auto Eosinophil # : x  Auto Basophil # : x  Auto Neutrophil % : x  Auto Lymphocyte % : x  Auto Monocyte % : x  Auto Eosinophil % : x  Auto Basophil % : x    08-20    136  |  106  |  29<H>  ----------------------------<  133<H>  4.0   |  24  |  0.68    Ca    8.1<L>      20 Aug 2021 05:52  Phos  1.9     08-20  Mg     2.0     08-20      PT/INR - ( 20 Aug 2021 05:52 )   PT: 15.9 sec;   INR: 1.34          PTT - ( 20 Aug 2021 05:52 )  PTT:30.7 sec  Urinalysis Basic - ( 18 Aug 2021 13:33 )    Color: Yellow / Appearance: Clear / S.025 / pH: x  Gluc: x / Ketone: Trace mg/dL  / Bili: Negative / Urobili: 1.0 E.U./dL   Blood: x / Protein: 100 mg/dL / Nitrite: NEGATIVE   Leuk Esterase: Trace / RBC: Many /HPF / WBC > 10 /HPF   Sq Epi: x / Non Sq Epi: 0-5 /HPF / Bacteria: Present /HPF      RECENT CULTURES:            CAPILLARY BLOOD GLUCOSE      RADIOLOGY & ADDITIONAL STUDIES:   OVERNIGHT EVENTS/SUBJECTIVE: Nausea - given reglan and zofran,   INTERVAL HISTORY: Patient seen and evaluated. Patient says that she has been experiencing mild pain in her back which is consistent with her baseline discomfort at home; this typically resolves with Tylenol, per patient. Ms. Triana otherwise denies any nausea, emesis, CP, or SOB.     ICU Vital Signs Last 24 Hrs  T(C): 36.7 (20 Aug 2021 05:26), Max: 37.4 (19 Aug 2021 17:59)  T(F): 98 (20 Aug 2021 05:26), Max: 99.4 (19 Aug 2021 17:59)  HR: 90 (20 Aug 2021 06:00) (73 - 94)  BP: 151/68 (20 Aug 2021 06:00) (92/51 - 161/72)  BP(mean): 93 (20 Aug 2021 04:00) (65 - 93)  ABP: --  ABP(mean): --  RR: 18 (20 Aug 2021 06:00) (14 - 27)  SpO2: 100% (20 Aug 2021 06:00) (90% - 100%)      I&O's Detail    19 Aug 2021 07:01  -  20 Aug 2021 07:00  --------------------------------------------------------  IN:    IV PiggyBack: 150 mL    Lactated Ringers: 2400 mL    Oral Fluid: 540 mL  Total IN: 3090 mL    OUT:    Bulb (mL): 15 mL    Indwelling Catheter - Urethral (mL): 510 mL  Total OUT: 525 mL    Total NET: 2565 mL                MEDICATIONS  (STANDING):  atorvastatin 80 milliGRAM(s) Oral at bedtime  chlorhexidine 2% Cloths 1 Application(s) Topical daily  dextrose 40% Gel 15 Gram(s) Oral once  dextrose 5%. 1000 milliLiter(s) (50 mL/Hr) IV Continuous <Continuous>  dextrose 5%. 1000 milliLiter(s) (100 mL/Hr) IV Continuous <Continuous>  dextrose 50% Injectable 25 Gram(s) IV Push once  dextrose 50% Injectable 12.5 Gram(s) IV Push once  dextrose 50% Injectable 25 Gram(s) IV Push once  famotidine    Tablet 20 milliGRAM(s) Oral daily  glucagon  Injectable 1 milliGRAM(s) IntraMuscular once  insulin lispro (ADMELOG) corrective regimen sliding scale   SubCutaneous three times a day before meals  lactated ringers. 1000 milliLiter(s) (100 mL/Hr) IV Continuous <Continuous>  lidocaine   4% Patch 1 Patch Transdermal every 24 hours  meropenem  IVPB 1000 milliGRAM(s) IV Intermittent every 12 hours  pantoprazole  Injectable 40 milliGRAM(s) IV Push every 12 hours  sucralfate suspension 1 Gram(s) Oral two times a day    MEDICATIONS  (PRN):  acetaminophen   Tablet .. 1000 milliGRAM(s) Oral every 6 hours PRN Temp greater or equal to 38C (100.4F), Mild Pain (1 - 3)  ondansetron Injectable 4 milliGRAM(s) IV Push every 6 hours PRN Nausea and/or Vomiting      NUTRITION/IVF:     CENTRAL LINE:  LOCATION:   DATE INSERTED:  CVP:  SCVO2:    BUSTAMANTE:   DATE INSERTED:    A-LINE:    LOCATION:   DATE INSERTED:   SVV:  CO/CI:     CHEST TUBE:  LOCATION:  DATE INSERTED: OUTPUT/24 HRS:  SUCTION/WATER SEAL:     NG/OG TUBE:  DATE INSERTED:  OUTPUT/24 HRS:    MISC:     PHYSICAL EXAM  GENERAL: Alert, well developed, in no acute distress.  MENTAL STATUS: Appropriate affect.  HEENT: Pupils non-reactive to light bilaterally, unchanged. EOMI. MMM.  Trachea midline. No anterior cervical/posterior cervical/supraclavicular lymph node swelling or tenderness.  RESPIRATORY: CTAB. No wheezing, rales or rhonchi.  CARDIOVASCULAR: Heart rate 92 on Telemetry in room. No audible murmurs, rubs or gallops.   GASTROINTESTINAL: Abdomen soft. Mild TTP over right aspect of the ostomy site. Ostomy is pink, well perfused, and patent, with air collected. Abdomen non-distended. No rebound tenderness or guarding. L gluteal drain in place with ~5cc serosanguinous fluid collected.  NEUROLOGIC: Cranial nerves III-XII grossly intact. No focal neurological deficits. Moves all extremities spontaneously. 5/5 strength bilateral /biceps/triceps/quadriceps/hamstrings. Sensation intact bilaterally.  INTEGUMENTARY: Moderate ecchymosis over right wrist and dorsal aspect of right hand. No edema.  MUSCULOSKELETAL: No cyanosis or clubbing. No gross deformities.       LABS:  CBC Full  -  ( 20 Aug 2021 05:52 )  WBC Count : 22.61 K/uL  RBC Count : 2.85 M/uL  Hemoglobin : 8.5 g/dL  Hematocrit : 25.6 %  Platelet Count - Automated : 373 K/uL  Mean Cell Volume : 89.8 fl  Mean Cell Hemoglobin : 29.8 pg  Mean Cell Hemoglobin Concentration : 33.2 gm/dL  Auto Neutrophil # : x  Auto Lymphocyte # : x  Auto Monocyte # : x  Auto Eosinophil # : x  Auto Basophil # : x  Auto Neutrophil % : x  Auto Lymphocyte % : x  Auto Monocyte % : x  Auto Eosinophil % : x  Auto Basophil % : x    08-20    136  |  106  |  29<H>  ----------------------------<  133<H>  4.0   |  24  |  0.68    Ca    8.1<L>      20 Aug 2021 05:52  Phos  1.9     08-20  Mg     2.0     08-20      PT/INR - ( 20 Aug 2021 05:52 )   PT: 15.9 sec;   INR: 1.34          PTT - ( 20 Aug 2021 05:52 )  PTT:30.7 sec  Urinalysis Basic - ( 18 Aug 2021 13:33 )    Color: Yellow / Appearance: Clear / S.025 / pH: x  Gluc: x / Ketone: Trace mg/dL  / Bili: Negative / Urobili: 1.0 E.U./dL   Blood: x / Protein: 100 mg/dL / Nitrite: NEGATIVE   Leuk Esterase: Trace / RBC: Many /HPF / WBC > 10 /HPF   Sq Epi: x / Non Sq Epi: 0-5 /HPF / Bacteria: Present /HPF      RECENT CULTURES:            CAPILLARY BLOOD GLUCOSE      RADIOLOGY & ADDITIONAL STUDIES:

## 2021-08-20 NOTE — PROGRESS NOTE ADULT - SUBJECTIVE AND OBJECTIVE BOX
POD 7  CT scan reveals possible SBO related to volvulus of SB with transition point and decompressed colon and distal SB.  Also showed greatly distended stomach and moderately distended SB proximally.  NG placed and about 1,700 ml removed. Since then 200 ml drained.    Vital Signs Last 24 Hrs  T(C): 36.7 (20 Aug 2021 18:11), Max: 37.1 (20 Aug 2021 01:33)  T(F): 98.1 (20 Aug 2021 18:11), Max: 98.8 (20 Aug 2021 01:33)  HR: 92 (20 Aug 2021 19:00) (88 - 98)  BP: 115/57 (20 Aug 2021 19:00) (115/57 - 175/73)  BP(mean): 96 (20 Aug 2021 19:00) (65 - 121)  RR: 21 (20 Aug 2021 19:00) (16 - 24)  SpO2: 95% (20 Aug 2021 19:00) (87% - 100%)  Patients abdomen is soft and non tender post NG.  Stoma with no output. digitalized without result.  Incision midline unremarkable                          8.5    22.61 )-----------( 373      ( 20 Aug 2021 05:52 )             25.6   08-20    136  |  106  |  29<H>  ----------------------------<  133<H>  4.0   |  24  |  0.68    Ca    8.1<L>      20 Aug 2021 05:52  Phos  1.9     08-20  Mg     2.0     08-20

## 2021-08-20 NOTE — PROGRESS NOTE ADULT - ASSESSMENT
89F PMH HTN, chronic back pain 2/2 L3,L4 compression fractures, triple vessel CAD, and SBO 2/2 possible diverticulitis vs. GYN/colorectal malignancy (2018) never followed up and PSH L CEA 2/2 L ICA stenosis >70% (4/210; patient was transferred from medicine for perforated, complicated sigmoid diverticulitis vs. 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 Taken to OR on 8/13 for diverting colostomy. transferred to SICU on 8/18 for anemia of blood loss s/p EGD with finding of duodenal ulceration.      plan:   SICU following appreciate recs  OOB as tolerated   Wean oxygen as tolerated; CXR with lung opacities in right lower lung concerning for pneumonia   continue antibiotics, id following, WBC continues to be in 20's, will need repeat CT to assess for possible etiology, intraabdominal abscess for aspiration vs other   GI following continue; protonix and Carafate  malnourished; on mechanical soft; tolerated but endorses nausea, on raglan and Zofran   Palliative on board for goals of care discussion with patient and health proxy   transfer to step down   monitor H/H transfuse if Hg < 7  team 1 surgery will follow

## 2021-08-20 NOTE — PROGRESS NOTE ADULT - ASSESSMENT
89F PMHx of HTN, chronic back pain 2/2 L3,L4 compression fractures, triple vessel CAD, and SBO 2/2 possible diverticulitis vs. GYN/colorectal malignancy (2018) never followed up and PSH L CEA 2/2 L ICA stenosis >70% (4/21) who presented with worsening back pain x1 week, CT A/P revealing perforated complicated diverticulitis with abscess, s/p drainage, sigmoid mass c/w rectal adenocarcinoma, not amenable to surgical resection and chemotherapy 2/2 poor performance status, now with 2 day history of nausea/vomiting, and 1 day of coffee ground emesis with associated acute drop in H/H, GI consulted for consideration of endoscopic evaluation.    #Anemia  GI re-consulted for acute drop in H/H in the setting of coffee ground emesis. Baseline Hgb 10-11, today downtrended to 6.1. With sump in place with dark output, also noted in ostomy bag. With uptrend in BUN:Cr ratio c/f possible UGIB source.   -s/p EGD (8/18) - LA grade D esophagitis. Multiple ulcers in the middle to lower third of the esophagus s/p biopsy to rule out CMV. 4 cm hiatal hernia. Non-erosive gastritis with scattered erythema consistent with NG tube trauma. Hematin on the gastric mucosa, bleeding likely from esophageal ulcer. Erythema in the duodenum compatible with duodenitis.    -c/w Protonix 40 mg IVP BID  -Pepcid BID  -Advanced diet as tolerated  -Carafate 1g QID (suspension)  -Follow-up pathology  -Repeat EGD in 8 weeks  -Closely monitor H/H   -No utility in Fe studies given pRBC transfusions  -Avoid NG tube placement.  -Maintain active T&S   -Transfusion goal as per primary team   -Ongoing discussion regarding GOC as per primary and Palliative care team     INCOMPLETE NOTE, PENDING DISCUSSION WITH [facility] [place] ATTENDING    Angelica Nielson DO  Gastroenterology Fellow  Pager; [phone] 89F PMHx of HTN, chronic back pain 2/2 L3,L4 compression fractures, triple vessel CAD, and SBO 2/2 possible diverticulitis vs. GYN/colorectal malignancy (2018) never followed up and PSH L CEA 2/2 L ICA stenosis >70% (4/21) who presented with worsening back pain x1 week, CT A/P revealing perforated complicated diverticulitis with abscess, s/p drainage, sigmoid mass c/w rectal adenocarcinoma, not amenable to surgical resection and chemotherapy 2/2 poor performance status, now with 2 day history of nausea/vomiting, and 1 day of coffee ground emesis with associated acute drop in H/H, GI consulted for consideration of endoscopic evaluation.    #Anemia  GI re-consulted for acute drop in H/H in the setting of coffee ground emesis. Baseline Hgb 10-11, today downtrended to 6.1. With sump in place with dark output, also noted in ostomy bag. With uptrend in BUN:Cr ratio c/f possible UGIB source.   -s/p EGD (8/18) - LA grade D esophagitis. Multiple ulcers in the middle to lower third of the esophagus s/p biopsy to rule out CMV. 4 cm hiatal hernia. Non-erosive gastritis with scattered erythema consistent with NG tube trauma. Hematin on the gastric mucosa, bleeding likely from esophageal ulcer. Erythema in the duodenum compatible with duodenitis.    -c/w Protonix 40 mg IVP BID  -Pepcid BID  -Advanced diet as tolerated  -Carafate 1g QID (suspension)  -Follow-up pathology  -Repeat EGD in 8 weeks  -Closely monitor H/H   -No utility in Fe studies given pRBC transfusions  -Avoid NG tube placement.  -Maintain active T&S   -Transfusion goal as per primary team   -Ongoing discussion regarding GOC as per primary and Palliative care team     Case discussed with svc attending and primary team.     Angelica Nielson DO  Gastroenterology Fellow  Pager; [phone]

## 2021-08-20 NOTE — BRIEF OPERATIVE NOTE - OPERATION/FINDINGS
Midline laparotomy extending Midline laparotomy extending from previous incision to 5cm inferiorly. Bowel assessed with findings of three significant areas of adhesions causing obstruction. Anteriorly a thickened band of scar tissue was identified as likely acute transition point and lysed using electrocautery. Second area of adhesiolysis distally with  one 5mm tear of small bowel, repaired primarily using 3-0 Vicryl suture. Third area of adhesions identified deep in pelvis adjacent to previously known area of malignancy. Single serosal tear identified and repaired using 3-0 Vicryl. Bowel run from ligament of treitz to ileocecal valve twice carefully without evidence of ischemia. Fascia closed with running PDS suture.

## 2021-08-21 LAB
ALBUMIN SERPL ELPH-MCNC: 1.9 G/DL — LOW (ref 3.3–5)
ALP SERPL-CCNC: 56 U/L — SIGNIFICANT CHANGE UP (ref 40–120)
ALT FLD-CCNC: 6 U/L — LOW (ref 10–45)
ANION GAP SERPL CALC-SCNC: 6 MMOL/L — SIGNIFICANT CHANGE UP (ref 5–17)
ANION GAP SERPL CALC-SCNC: 7 MMOL/L — SIGNIFICANT CHANGE UP (ref 5–17)
ANION GAP SERPL CALC-SCNC: 8 MMOL/L — SIGNIFICANT CHANGE UP (ref 5–17)
APPEARANCE UR: CLEAR — SIGNIFICANT CHANGE UP
AST SERPL-CCNC: 10 U/L — SIGNIFICANT CHANGE UP (ref 10–40)
BACTERIA # UR AUTO: PRESENT /HPF
BASE EXCESS BLDA CALC-SCNC: -2.6 MMOL/L — LOW (ref -2–3)
BILIRUB SERPL-MCNC: 0.5 MG/DL — SIGNIFICANT CHANGE UP (ref 0.2–1.2)
BILIRUB UR-MCNC: NEGATIVE — SIGNIFICANT CHANGE UP
BUN SERPL-MCNC: 17 MG/DL — SIGNIFICANT CHANGE UP (ref 7–23)
BUN SERPL-MCNC: 19 MG/DL — SIGNIFICANT CHANGE UP (ref 7–23)
BUN SERPL-MCNC: 20 MG/DL — SIGNIFICANT CHANGE UP (ref 7–23)
CALCIUM SERPL-MCNC: 7.4 MG/DL — LOW (ref 8.4–10.5)
CALCIUM SERPL-MCNC: 7.5 MG/DL — LOW (ref 8.4–10.5)
CALCIUM SERPL-MCNC: 7.7 MG/DL — LOW (ref 8.4–10.5)
CHLORIDE SERPL-SCNC: 106 MMOL/L — SIGNIFICANT CHANGE UP (ref 96–108)
CHLORIDE SERPL-SCNC: 109 MMOL/L — HIGH (ref 96–108)
CHLORIDE SERPL-SCNC: 109 MMOL/L — HIGH (ref 96–108)
CO2 BLDA-SCNC: 21 MMOL/L — SIGNIFICANT CHANGE UP (ref 19–24)
CO2 SERPL-SCNC: 20 MMOL/L — LOW (ref 22–31)
CO2 SERPL-SCNC: 22 MMOL/L — SIGNIFICANT CHANGE UP (ref 22–31)
CO2 SERPL-SCNC: 23 MMOL/L — SIGNIFICANT CHANGE UP (ref 22–31)
COLOR SPEC: YELLOW — SIGNIFICANT CHANGE UP
COMMENT - URINE: SIGNIFICANT CHANGE UP
CREAT ?TM UR-MCNC: 74 MG/DL — SIGNIFICANT CHANGE UP
CREAT SERPL-MCNC: 0.53 MG/DL — SIGNIFICANT CHANGE UP (ref 0.5–1.3)
CREAT SERPL-MCNC: 0.53 MG/DL — SIGNIFICANT CHANGE UP (ref 0.5–1.3)
CREAT SERPL-MCNC: 0.64 MG/DL — SIGNIFICANT CHANGE UP (ref 0.5–1.3)
DIFF PNL FLD: ABNORMAL
EPI CELLS # UR: SIGNIFICANT CHANGE UP /HPF (ref 0–5)
GAS PNL BLDA: SIGNIFICANT CHANGE UP
GAS PNL BLDA: SIGNIFICANT CHANGE UP
GLUCOSE BLDC GLUCOMTR-MCNC: 103 MG/DL — HIGH (ref 70–99)
GLUCOSE BLDC GLUCOMTR-MCNC: 108 MG/DL — HIGH (ref 70–99)
GLUCOSE BLDC GLUCOMTR-MCNC: 117 MG/DL — HIGH (ref 70–99)
GLUCOSE BLDC GLUCOMTR-MCNC: 118 MG/DL — HIGH (ref 70–99)
GLUCOSE SERPL-MCNC: 123 MG/DL — HIGH (ref 70–99)
GLUCOSE SERPL-MCNC: 143 MG/DL — HIGH (ref 70–99)
GLUCOSE SERPL-MCNC: 96 MG/DL — SIGNIFICANT CHANGE UP (ref 70–99)
GLUCOSE UR QL: NEGATIVE — SIGNIFICANT CHANGE UP
HCO3 BLDA-SCNC: 20 MMOL/L — LOW (ref 21–28)
HCT VFR BLD CALC: 35.2 % — SIGNIFICANT CHANGE UP (ref 34.5–45)
HCT VFR BLD CALC: 35.6 % — SIGNIFICANT CHANGE UP (ref 34.5–45)
HCT VFR BLD CALC: 39.2 % — SIGNIFICANT CHANGE UP (ref 34.5–45)
HCT VFR BLD CALC: 40.3 % — SIGNIFICANT CHANGE UP (ref 34.5–45)
HGB BLD-MCNC: 11.6 G/DL — SIGNIFICANT CHANGE UP (ref 11.5–15.5)
HGB BLD-MCNC: 11.9 G/DL — SIGNIFICANT CHANGE UP (ref 11.5–15.5)
HGB BLD-MCNC: 12.9 G/DL — SIGNIFICANT CHANGE UP (ref 11.5–15.5)
HGB BLD-MCNC: 13.5 G/DL — SIGNIFICANT CHANGE UP (ref 11.5–15.5)
HYALINE CASTS # UR AUTO: SIGNIFICANT CHANGE UP /LPF (ref 0–2)
KETONES UR-MCNC: NEGATIVE — SIGNIFICANT CHANGE UP
LACTATE SERPL-SCNC: 1 MMOL/L — SIGNIFICANT CHANGE UP (ref 0.5–2)
LACTATE SERPL-SCNC: 1.2 MMOL/L — SIGNIFICANT CHANGE UP (ref 0.5–2)
LEUKOCYTE ESTERASE UR-ACNC: NEGATIVE — SIGNIFICANT CHANGE UP
MAGNESIUM SERPL-MCNC: 1.6 MG/DL — SIGNIFICANT CHANGE UP (ref 1.6–2.6)
MAGNESIUM SERPL-MCNC: 1.8 MG/DL — SIGNIFICANT CHANGE UP (ref 1.6–2.6)
MAGNESIUM SERPL-MCNC: 2.2 MG/DL — SIGNIFICANT CHANGE UP (ref 1.6–2.6)
MCHC RBC-ENTMCNC: 27.8 PG — SIGNIFICANT CHANGE UP (ref 27–34)
MCHC RBC-ENTMCNC: 28.1 PG — SIGNIFICANT CHANGE UP (ref 27–34)
MCHC RBC-ENTMCNC: 28.2 PG — SIGNIFICANT CHANGE UP (ref 27–34)
MCHC RBC-ENTMCNC: 28.3 PG — SIGNIFICANT CHANGE UP (ref 27–34)
MCHC RBC-ENTMCNC: 32.9 GM/DL — SIGNIFICANT CHANGE UP (ref 32–36)
MCHC RBC-ENTMCNC: 33 GM/DL — SIGNIFICANT CHANGE UP (ref 32–36)
MCHC RBC-ENTMCNC: 33.4 GM/DL — SIGNIFICANT CHANGE UP (ref 32–36)
MCHC RBC-ENTMCNC: 33.5 GM/DL — SIGNIFICANT CHANGE UP (ref 32–36)
MCV RBC AUTO: 84.3 FL — SIGNIFICANT CHANGE UP (ref 80–100)
MCV RBC AUTO: 84.5 FL — SIGNIFICANT CHANGE UP (ref 80–100)
MCV RBC AUTO: 84.8 FL — SIGNIFICANT CHANGE UP (ref 80–100)
MCV RBC AUTO: 85.2 FL — SIGNIFICANT CHANGE UP (ref 80–100)
NITRITE UR-MCNC: NEGATIVE — SIGNIFICANT CHANGE UP
NRBC # BLD: 0 /100 WBCS — SIGNIFICANT CHANGE UP (ref 0–0)
OSMOLALITY UR: 669 MOSM/KG — SIGNIFICANT CHANGE UP (ref 300–900)
PCO2 BLDA: 28 MMHG — LOW (ref 32–35)
PH BLDA: 7.46 — HIGH (ref 7.35–7.45)
PH UR: 6 — SIGNIFICANT CHANGE UP (ref 5–8)
PHOSPHATE SERPL-MCNC: 2 MG/DL — LOW (ref 2.5–4.5)
PHOSPHATE SERPL-MCNC: 2.4 MG/DL — LOW (ref 2.5–4.5)
PHOSPHATE SERPL-MCNC: 2.6 MG/DL — SIGNIFICANT CHANGE UP (ref 2.5–4.5)
PLATELET # BLD AUTO: 343 K/UL — SIGNIFICANT CHANGE UP (ref 150–400)
PLATELET # BLD AUTO: 368 K/UL — SIGNIFICANT CHANGE UP (ref 150–400)
PLATELET # BLD AUTO: 369 K/UL — SIGNIFICANT CHANGE UP (ref 150–400)
PLATELET # BLD AUTO: 376 K/UL — SIGNIFICANT CHANGE UP (ref 150–400)
PO2 BLDA: 92 MMHG — SIGNIFICANT CHANGE UP (ref 83–108)
POTASSIUM SERPL-MCNC: 3.7 MMOL/L — SIGNIFICANT CHANGE UP (ref 3.5–5.3)
POTASSIUM SERPL-MCNC: 3.8 MMOL/L — SIGNIFICANT CHANGE UP (ref 3.5–5.3)
POTASSIUM SERPL-MCNC: 4.2 MMOL/L — SIGNIFICANT CHANGE UP (ref 3.5–5.3)
POTASSIUM SERPL-SCNC: 3.7 MMOL/L — SIGNIFICANT CHANGE UP (ref 3.5–5.3)
POTASSIUM SERPL-SCNC: 3.8 MMOL/L — SIGNIFICANT CHANGE UP (ref 3.5–5.3)
POTASSIUM SERPL-SCNC: 4.2 MMOL/L — SIGNIFICANT CHANGE UP (ref 3.5–5.3)
PROT SERPL-MCNC: 4.2 G/DL — LOW (ref 6–8.3)
PROT UR-MCNC: 100 MG/DL
RBC # BLD: 4.13 M/UL — SIGNIFICANT CHANGE UP (ref 3.8–5.2)
RBC # BLD: 4.2 M/UL — SIGNIFICANT CHANGE UP (ref 3.8–5.2)
RBC # BLD: 4.64 M/UL — SIGNIFICANT CHANGE UP (ref 3.8–5.2)
RBC # BLD: 4.78 M/UL — SIGNIFICANT CHANGE UP (ref 3.8–5.2)
RBC # FLD: 15.9 % — HIGH (ref 10.3–14.5)
RBC # FLD: 17.5 % — HIGH (ref 10.3–14.5)
RBC # FLD: 17.6 % — HIGH (ref 10.3–14.5)
RBC # FLD: 18.2 % — HIGH (ref 10.3–14.5)
RBC CASTS # UR COMP ASSIST: SIGNIFICANT CHANGE UP /HPF
SAO2 % BLDA: 98.6 % — HIGH (ref 94–98)
SODIUM SERPL-SCNC: 135 MMOL/L — SIGNIFICANT CHANGE UP (ref 135–145)
SODIUM SERPL-SCNC: 137 MMOL/L — SIGNIFICANT CHANGE UP (ref 135–145)
SODIUM SERPL-SCNC: 138 MMOL/L — SIGNIFICANT CHANGE UP (ref 135–145)
SODIUM UR-SCNC: 29 MMOL/L — SIGNIFICANT CHANGE UP
SP GR SPEC: >=1.03 — SIGNIFICANT CHANGE UP (ref 1–1.03)
UROBILINOGEN FLD QL: 0.2 E.U./DL — SIGNIFICANT CHANGE UP
WBC # BLD: 16.67 K/UL — HIGH (ref 3.8–10.5)
WBC # BLD: 16.79 K/UL — HIGH (ref 3.8–10.5)
WBC # BLD: 17.3 K/UL — HIGH (ref 3.8–10.5)
WBC # BLD: 17.43 K/UL — HIGH (ref 3.8–10.5)
WBC # FLD AUTO: 16.67 K/UL — HIGH (ref 3.8–10.5)
WBC # FLD AUTO: 16.79 K/UL — HIGH (ref 3.8–10.5)
WBC # FLD AUTO: 17.3 K/UL — HIGH (ref 3.8–10.5)
WBC # FLD AUTO: 17.43 K/UL — HIGH (ref 3.8–10.5)
WBC UR QL: ABNORMAL /HPF

## 2021-08-21 PROCEDURE — 99231 SBSQ HOSP IP/OBS SF/LOW 25: CPT

## 2021-08-21 PROCEDURE — 71045 X-RAY EXAM CHEST 1 VIEW: CPT | Mod: 26

## 2021-08-21 PROCEDURE — 99291 CRITICAL CARE FIRST HOUR: CPT

## 2021-08-21 RX ORDER — ACETAMINOPHEN 500 MG
620 TABLET ORAL ONCE
Refills: 0 | Status: COMPLETED | OUTPATIENT
Start: 2021-08-21 | End: 2021-08-21

## 2021-08-21 RX ORDER — PROPOFOL 10 MG/ML
20.43 INJECTION, EMULSION INTRAVENOUS
Qty: 1000 | Refills: 0 | Status: DISCONTINUED | OUTPATIENT
Start: 2021-08-21 | End: 2021-08-21

## 2021-08-21 RX ORDER — METOPROLOL TARTRATE 50 MG
2.5 TABLET ORAL EVERY 6 HOURS
Refills: 0 | Status: DISCONTINUED | OUTPATIENT
Start: 2021-08-21 | End: 2021-09-01

## 2021-08-21 RX ORDER — ACETAMINOPHEN 500 MG
620 TABLET ORAL ONCE
Refills: 0 | Status: COMPLETED | OUTPATIENT
Start: 2021-08-22 | End: 2021-08-22

## 2021-08-21 RX ORDER — MAGNESIUM SULFATE 500 MG/ML
1 VIAL (ML) INJECTION ONCE
Refills: 0 | Status: COMPLETED | OUTPATIENT
Start: 2021-08-21 | End: 2021-08-21

## 2021-08-21 RX ORDER — FENTANYL CITRATE 50 UG/ML
0.5 INJECTION INTRAVENOUS
Qty: 2500 | Refills: 0 | Status: DISCONTINUED | OUTPATIENT
Start: 2021-08-21 | End: 2021-08-21

## 2021-08-21 RX ORDER — HEPARIN SODIUM 5000 [USP'U]/ML
5000 INJECTION INTRAVENOUS; SUBCUTANEOUS EVERY 12 HOURS
Refills: 0 | Status: DISCONTINUED | OUTPATIENT
Start: 2021-08-21 | End: 2021-08-31

## 2021-08-21 RX ORDER — POTASSIUM PHOSPHATE, MONOBASIC POTASSIUM PHOSPHATE, DIBASIC 236; 224 MG/ML; MG/ML
21 INJECTION, SOLUTION INTRAVENOUS ONCE
Refills: 0 | Status: COMPLETED | OUTPATIENT
Start: 2021-08-21 | End: 2021-08-21

## 2021-08-21 RX ORDER — CHLORHEXIDINE GLUCONATE 213 G/1000ML
15 SOLUTION TOPICAL EVERY 12 HOURS
Refills: 0 | Status: DISCONTINUED | OUTPATIENT
Start: 2021-08-21 | End: 2021-08-23

## 2021-08-21 RX ORDER — ALBUMIN HUMAN 25 %
250 VIAL (ML) INTRAVENOUS ONCE
Refills: 0 | Status: COMPLETED | OUTPATIENT
Start: 2021-08-21 | End: 2021-08-21

## 2021-08-21 RX ORDER — ACETAMINOPHEN 500 MG
1000 TABLET ORAL ONCE
Refills: 0 | Status: DISCONTINUED | OUTPATIENT
Start: 2021-08-21 | End: 2021-08-21

## 2021-08-21 RX ORDER — SODIUM CHLORIDE 9 MG/ML
500 INJECTION, SOLUTION INTRAVENOUS ONCE
Refills: 0 | Status: COMPLETED | OUTPATIENT
Start: 2021-08-21 | End: 2021-08-21

## 2021-08-21 RX ORDER — ELECTROLYTE SOLUTION,INJ
1 VIAL (ML) INTRAVENOUS
Refills: 0 | Status: DISCONTINUED | OUTPATIENT
Start: 2021-08-21 | End: 2021-08-21

## 2021-08-21 RX ORDER — SODIUM CHLORIDE 9 MG/ML
250 INJECTION INTRAMUSCULAR; INTRAVENOUS; SUBCUTANEOUS ONCE
Refills: 0 | Status: COMPLETED | OUTPATIENT
Start: 2021-08-21 | End: 2021-08-21

## 2021-08-21 RX ORDER — ELECTROLYTE SOLUTION,INJ
1 VIAL (ML) INTRAVENOUS
Refills: 0 | Status: DISCONTINUED | OUTPATIENT
Start: 2021-08-22 | End: 2021-08-22

## 2021-08-21 RX ORDER — HYDROMORPHONE HYDROCHLORIDE 2 MG/ML
0.5 INJECTION INTRAMUSCULAR; INTRAVENOUS; SUBCUTANEOUS EVERY 6 HOURS
Refills: 0 | Status: DISCONTINUED | OUTPATIENT
Start: 2021-08-21 | End: 2021-08-22

## 2021-08-21 RX ORDER — SODIUM CHLORIDE 9 MG/ML
1000 INJECTION, SOLUTION INTRAVENOUS
Refills: 0 | Status: DISCONTINUED | OUTPATIENT
Start: 2021-08-20 | End: 2021-08-22

## 2021-08-21 RX ORDER — SODIUM CHLORIDE 9 MG/ML
500 INJECTION INTRAMUSCULAR; INTRAVENOUS; SUBCUTANEOUS ONCE
Refills: 0 | Status: COMPLETED | OUTPATIENT
Start: 2021-08-21 | End: 2021-08-21

## 2021-08-21 RX ORDER — I.V. FAT EMULSION 20 G/100ML
1.22 EMULSION INTRAVENOUS
Qty: 50 | Refills: 0 | Status: DISCONTINUED | OUTPATIENT
Start: 2021-08-21 | End: 2021-08-22

## 2021-08-21 RX ADMIN — HYDROMORPHONE HYDROCHLORIDE 0.5 MILLIGRAM(S): 2 INJECTION INTRAMUSCULAR; INTRAVENOUS; SUBCUTANEOUS at 11:55

## 2021-08-21 RX ADMIN — HYDROMORPHONE HYDROCHLORIDE 0.5 MILLIGRAM(S): 2 INJECTION INTRAMUSCULAR; INTRAVENOUS; SUBCUTANEOUS at 12:10

## 2021-08-21 RX ADMIN — PROPOFOL 5 MICROGRAM(S)/KG/MIN: 10 INJECTION, EMULSION INTRAVENOUS at 00:43

## 2021-08-21 RX ADMIN — SODIUM CHLORIDE 250 MILLILITER(S): 9 INJECTION INTRAMUSCULAR; INTRAVENOUS; SUBCUTANEOUS at 12:18

## 2021-08-21 RX ADMIN — CHLORHEXIDINE GLUCONATE 15 MILLILITER(S): 213 SOLUTION TOPICAL at 06:41

## 2021-08-21 RX ADMIN — MEROPENEM 200 MILLIGRAM(S): 1 INJECTION INTRAVENOUS at 19:12

## 2021-08-21 RX ADMIN — Medication 248 MILLIGRAM(S): at 12:56

## 2021-08-21 RX ADMIN — SODIUM CHLORIDE 2000 MILLILITER(S): 9 INJECTION, SOLUTION INTRAVENOUS at 16:25

## 2021-08-21 RX ADMIN — PANTOPRAZOLE SODIUM 40 MILLIGRAM(S): 20 TABLET, DELAYED RELEASE ORAL at 12:17

## 2021-08-21 RX ADMIN — CHLORHEXIDINE GLUCONATE 1 APPLICATION(S): 213 SOLUTION TOPICAL at 12:17

## 2021-08-21 RX ADMIN — PANTOPRAZOLE SODIUM 40 MILLIGRAM(S): 20 TABLET, DELAYED RELEASE ORAL at 01:14

## 2021-08-21 RX ADMIN — HEPARIN SODIUM 5000 UNIT(S): 5000 INJECTION INTRAVENOUS; SUBCUTANEOUS at 17:44

## 2021-08-21 RX ADMIN — SODIUM CHLORIDE 500 MILLILITER(S): 9 INJECTION INTRAMUSCULAR; INTRAVENOUS; SUBCUTANEOUS at 15:00

## 2021-08-21 RX ADMIN — CHLORHEXIDINE GLUCONATE 15 MILLILITER(S): 213 SOLUTION TOPICAL at 17:43

## 2021-08-21 RX ADMIN — Medication 620 MILLIGRAM(S): at 13:10

## 2021-08-21 RX ADMIN — SODIUM CHLORIDE 70 MILLILITER(S): 9 INJECTION, SOLUTION INTRAVENOUS at 00:43

## 2021-08-21 RX ADMIN — FENTANYL CITRATE 2.04 MICROGRAM(S)/KG/HR: 50 INJECTION INTRAVENOUS at 01:21

## 2021-08-21 RX ADMIN — Medication 100 GRAM(S): at 03:30

## 2021-08-21 RX ADMIN — Medication 620 MILLIGRAM(S): at 18:55

## 2021-08-21 RX ADMIN — MEROPENEM 200 MILLIGRAM(S): 1 INJECTION INTRAVENOUS at 06:41

## 2021-08-21 RX ADMIN — SODIUM CHLORIDE 70 MILLILITER(S): 9 INJECTION, SOLUTION INTRAVENOUS at 23:33

## 2021-08-21 RX ADMIN — LIDOCAINE 1 PATCH: 4 CREAM TOPICAL at 11:17

## 2021-08-21 RX ADMIN — Medication 125 MILLILITER(S): at 18:55

## 2021-08-21 RX ADMIN — LIDOCAINE 1 PATCH: 4 CREAM TOPICAL at 19:27

## 2021-08-21 RX ADMIN — POTASSIUM PHOSPHATE, MONOBASIC POTASSIUM PHOSPHATE, DIBASIC 62.5 MILLIMOLE(S): 236; 224 INJECTION, SOLUTION INTRAVENOUS at 06:41

## 2021-08-21 RX ADMIN — Medication 248 MILLIGRAM(S): at 18:40

## 2021-08-21 RX ADMIN — Medication 100 GRAM(S): at 21:27

## 2021-08-21 RX ADMIN — I.V. FAT EMULSION 20.83 GM/KG/DAY: 20 EMULSION INTRAVENOUS at 22:32

## 2021-08-21 NOTE — PROGRESS NOTE ADULT - ATTENDING COMMENTS
plan to provide nutrition via PPN. Consult PICC team, switch to TPN once PICC is in.   stop fentanyl drip. IV tylenol and dilaudid PRN for pain. did not toperate CPAP today. Plan for SBT again AM tomorrow.   Need additional IVF for decreased urine output with improvement.

## 2021-08-21 NOTE — PROGRESS NOTE ADULT - ATTENDING COMMENTS
#Anemia  GI re-consulted for acute drop in H/H in the setting of coffee ground emesis. Baseline Hgb 10-11, today downtrended to 6.1. With sump in place with dark output, also noted in ostomy bag. With uptrend in BUN:Cr ratio c/f possible UGIB source.   -s/p EGD (8/18) - LA grade D esophagitis. Multiple ulcers in the middle to lower third of the esophagus s/p biopsy to rule out CMV. 4 cm hiatal hernia. Non-erosive gastritis with scattered erythema consistent with NG tube trauma. Hematin on the gastric mucosa, bleeding likely from esophageal ulcer. Erythema in the duodenum compatible with duodenitis.    -c/w Protonix 40 mg IVP BID  -Pepcid BID  -Carafate 1g QID (suspension)  -Follow-up pathology  -Repeat EGD in 8 weeks  -Closely monitor H/H   -No utility in Fe studies given pRBC transfusions  -Avoid NG tube placement.  -Maintain active T&S   -Transfusion goal as per primary team   -Ongoing discussion regarding GOC as per primary and Palliative care team

## 2021-08-21 NOTE — PROGRESS NOTE ADULT - SUBJECTIVE AND OBJECTIVE BOX
GASTROENTEROLOGY PROGRESS NOTE  Patient seen and examined at bedside. S/p OR for SBO w/ STEPHANIE.     PERTINENT REVIEW OF SYSTEMS:  Unable to obtain as intubated and sedated.      Allergies    No Known Allergies    Intolerances      MEDICATIONS:  MEDICATIONS  (STANDING):  acetaminophen  IVPB .. 620 milliGRAM(s) IV Intermittent once  atorvastatin 80 milliGRAM(s) Oral at bedtime  chlorhexidine 0.12% Liquid 15 milliLiter(s) Oral Mucosa every 12 hours  chlorhexidine 2% Cloths 1 Application(s) Topical daily  dextrose 40% Gel 15 Gram(s) Oral once  dextrose 5%. 1000 milliLiter(s) (50 mL/Hr) IV Continuous <Continuous>  dextrose 5%. 1000 milliLiter(s) (100 mL/Hr) IV Continuous <Continuous>  dextrose 50% Injectable 25 Gram(s) IV Push once  dextrose 50% Injectable 12.5 Gram(s) IV Push once  dextrose 50% Injectable 25 Gram(s) IV Push once  famotidine    Tablet 20 milliGRAM(s) Oral daily  fat emulsion (Fish Oil and Plant Based) 20% Infusion 1.22 Gm/kG/Day (20.83 mL/Hr) IV Continuous <Continuous>  glucagon  Injectable 1 milliGRAM(s) IntraMuscular once  heparin   Injectable 5000 Unit(s) SubCutaneous every 12 hours  insulin lispro (ADMELOG) corrective regimen sliding scale   SubCutaneous every 6 hours  lactated ringers. 1000 milliLiter(s) (70 mL/Hr) IV Continuous <Continuous>  lidocaine   4% Patch 1 Patch Transdermal every 24 hours  magnesium sulfate  IVPB 1 Gram(s) IV Intermittent once  meropenem  IVPB 1000 milliGRAM(s) IV Intermittent every 12 hours  pantoprazole  Injectable 40 milliGRAM(s) IV Push every 12 hours  sucralfate suspension 1 Gram(s) Oral two times a day    MEDICATIONS  (PRN):  acetaminophen   Tablet .. 1000 milliGRAM(s) Oral every 6 hours PRN Temp greater or equal to 38C (100.4F), Mild Pain (1 - 3)  HYDROmorphone  Injectable 0.5 milliGRAM(s) IV Push every 6 hours PRN Severe Pain (7 - 10)  metoprolol tartrate Injectable 2.5 milliGRAM(s) IV Push every 6 hours PRN SBP > 160, HR > 100  ondansetron Injectable 4 milliGRAM(s) IV Push every 6 hours PRN Nausea and/or Vomiting    Vital Signs Last 24 Hrs  T(C): 35.8 (21 Aug 2021 18:05), Max: 36.5 (21 Aug 2021 09:01)  T(F): 96.5 (21 Aug 2021 18:05), Max: 97.7 (21 Aug 2021 09:01)  HR: 85 (21 Aug 2021 20:00) (72 - 88)  BP: 112/56 (21 Aug 2021 20:00) (101/53 - 152/74)  BP(mean): 80 (21 Aug 2021 20:00) (71 - 107)  RR: 16 (21 Aug 2021 20:00) (12 - 24)  SpO2: 95% (21 Aug 2021 20:00) (94% - 100%)    08-20 @ 07:01  -  08-21 @ 07:00  --------------------------------------------------------  IN: 1742 mL / OUT: 2930 mL / NET: -1188 mL    08-21 @ 07:01  -  08-21 @ 20:56  --------------------------------------------------------  IN: 1322 mL / OUT: 400 mL / NET: 922 mL      PHYSICAL EXAM:    General: in no acute distress  HEENT: MMM, conjunctiva and sclera clear  Gastrointestinal: Soft mildly distended; incisions c/d/i; drain in place in lower abdomen  Skin: Warm and dry. No obvious rash    LABS:                        11.9   17.43 )-----------( 376      ( 21 Aug 2021 18:25 )             35.6     08-21    137  |  109<H>  |  17  ----------------------------<  96  4.2   |  20<L>  |  0.64    Ca    7.5<L>      21 Aug 2021 18:25  Phos  2.6     08-21  Mg     1.8     08-21    TPro  4.2<L>  /  Alb  1.9<L>  /  TBili  0.5  /  DBili  x   /  AST  10  /  ALT  6<L>  /  AlkPhos  56  08-21    PT/INR - ( 20 Aug 2021 05:52 )   PT: 15.9 sec;   INR: 1.34          PTT - ( 20 Aug 2021 05:52 )  PTT:30.7 sec                  RADIOLOGY & ADDITIONAL STUDIES:  Reviewed

## 2021-08-21 NOTE — PROGRESS NOTE ADULT - ASSESSMENT
89F PMH HTN, chronic back pain 2/2 L3,L4 compression fractures, triple vessel CAD, and SBO due to possible diverticulitis vs. GYN/colorectal malignancy (2018) never followed up and L ICA s/p L CEA (4/21). 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 Left gluteal IR drain placed (8/9). Colonoscopy w/ sigmoid mass bx on 8/11- final path invasive adenocarcinoma moderately differentiated. Taken to OR on 8/13 for diverting colostomy. In SICU for acute blood loss anemia, s/p EGD 8/18 showing duodenal ulceration, erosive esophagitis duodenitis, bx x 2.  Clinically stable. Will attempt to extubate and provide nutrition through PPN.    Neuro: Tylenol prn.   CV: ICA s/p L CEA (4/21). Holding ASA, Plavix, metoprolol, and norvasc.   Pulm: nasal canula.   GI/FEN: NPO, NGT LIWS, D5 1/2NS@70, PPN, UGIB: EGD on 8/18: Erosive esophagitis and doudenitis with esophageal ulcers bx x2. protonix, Carafate.  : S/p NM renal function study (wnl). Scarlet (8/18-)  ID: ESBL Kleb in intraabdominal abscess: Daina (8/13-)  Endo: ISS  Heme: holding chemo ppx until hgb stable  PPX: SCDs. , SQH  Lines: PIVs  Wounds: Functioning ostomy  PT/OT: Aggressive PT. ALIS

## 2021-08-21 NOTE — PROVIDER CONTACT NOTE (EICU) - SITUATION
B/L LE cold to touch and RLE cyanotic to plantar area and all toes- unable to palpate DP/PD- and difficult to receive signal via Doppler- SICU team, made aware and assessed pt at bedside

## 2021-08-21 NOTE — PROGRESS NOTE ADULT - ASSESSMENT
89F PMH HTN, chronic back pain 2/2 L3,L4 compression fractures, triple vessel CAD, and SBO 2/2 possible diverticulitis vs. GYN/colorectal malignancy (2018) never followed up and PSH L CEA 2/2 L ICA stenosis >70% (4/210; patient was transferred from medicine for perforated, complicated sigmoid diverticulitis vs. 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 Taken to OR on 8/13 for diverting colostomy. transferred to SICU on 8/18 for anemia of blood loss s/p EGD with finding of duodenal ulceration.      plan:   SICU following appreciate recs  OOB as tolerated    continue antibiotics, id following  GI following continue; protonix and Carafate  malnourished; on mechanical soft; tolerated but endorses nausea, on raglan and Zofran   Palliative on board for goals of care discussion with patient and health proxy   monitor H/H transfuse if Hg < 7  team 1 surgery will follow    89F PMH HTN, chronic back pain 2/2 L3,L4 compression fractures, triple vessel CAD, and SBO due to possible diverticulitis vs. GYN/colorectal malignancy (2018) never followed up and L ICA s/p L CEA (4/21). 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 Left gluteal IR drain placed (8/9). Colonoscopy w/ sigmoid mass bx on 8/11- final path invasive adenocarcinoma moderately differentiated. Taken to OR on 8/13 for diverting colostomy. In SICU for acute blood loss anemia, s/p EGD 8/18 showing duodenal ulceration, erosive esophagitis duodenitis, bx x 2, CT 8/20 w/ closed loop obstxn, s/p OR 8/20 exlap STEPHANIE. Oliguria likely 2/2 dehydration; FENa < 1%, prerenal.      plan:   appreciate SICU care  extubate as tolerated  daily sedation vacation and CPAP trials  please watch urine output appropriately and bolus liberally  PPN for now; PICC and TPN on Monday  team 1 surgery will follow   discussed with chief resident, attending surgeon

## 2021-08-21 NOTE — PROGRESS NOTE ADULT - ASSESSMENT
A/P  Unresectable rectal cancer with long standing collections related to chronic perforation, s/p loop colostomy and, now, extensive STEPHANIE to treat SBO related to both acute and chronic adhesions (worst ones in area of tumor). 1 enterotomy mid SB closed and serosal tear repaired.  Malnourished and with probable PNA RLL (vs atelectasis), pleural effusions, and chronic pelvic infection.    Only 12 hours post op.  By system:  card: stable, BP at about 100, P ok.  pulm: PNA (vs atelectasis), vent settings ok.  Plan is to attempt extubation.  Off sedation.  As per ICU team.  Needs NT suctioning as she has clear secretions and cannot clear them herself.  Renal/I/O:  UO poor.  Needs more IV fluids (HO and ICU staff aware).  BUN/creatinine stable.  Heme:  Got 2 units and has stable hct  ID: WBC 17 k (down from 22 K). on meropenem.  As per ID  GI: expect ileus, NPO.  At risk for perforation due to her condition and enterotmy that was repaired.  Leave NG for now (low output now)  Nutritional: would like to start TPN but have no PICC.  PICC requested for weekend.  Central line is other option, ICU team against.  They suggest PPN for weekend.  To remain in IU  Case discussed with colorectal fellow (Dre Ortiz) and Surgery senior resident on call (Lisa Maciel).  Dr. Fabio Gardner to cover weekend although I am reachable by phone .

## 2021-08-21 NOTE — PROGRESS NOTE ADULT - SUBJECTIVE AND OBJECTIVE BOX
STATUS POST:      INTERVAL HPI/OVERNIGHT EVENTS:      SUBJECTIVE:     heparin   Injectable 5000 Unit(s) SubCutaneous every 12 hours  meropenem  IVPB 1000 milliGRAM(s) IV Intermittent every 12 hours  metoprolol tartrate Injectable 2.5 milliGRAM(s) IV Push every 6 hours PRN      Vital Signs Last 24 Hrs  T(C): 36.5 (21 Aug 2021 09:01), Max: 36.7 (20 Aug 2021 17:55)  T(F): 97.7 (21 Aug 2021 09:01), Max: 98.1 (20 Aug 2021 17:55)  HR: 78 (21 Aug 2021 11:00) (74 - 98)  BP: 112/53 (21 Aug 2021 11:00) (101/53 - 166/66)  BP(mean): 77 (21 Aug 2021 11:00) (71 - 107)  RR: 12 (21 Aug 2021 11:00) (12 - 24)  SpO2: 95% (21 Aug 2021 11:00) (87% - 100%)  I&O's Detail    20 Aug 2021 07:01  -  21 Aug 2021 07:00  --------------------------------------------------------  IN:    dextrose 5% + sodium chloride 0.45%: 225 mL    FentaNYL: 17.5 mL    IV PiggyBack: 768.5 mL    Lactated Ringers: 560 mL    Lactated Ringers: 100 mL    Oral Fluid: 50 mL    Propofol: 21 mL  Total IN: 1742 mL    OUT:    Bulb (mL): 15 mL    Colostomy (mL): 0 mL    Indwelling Catheter - Urethral (mL): 565 mL    Nasogastric/Oral tube (mL): 2350 mL  Total OUT: 2930 mL    Total NET: -1188 mL      21 Aug 2021 07:01  -  21 Aug 2021 11:48  --------------------------------------------------------  IN:    FentaNYL: 15 mL    Lactated Ringers: 140 mL    Propofol: 3 mL  Total IN: 158 mL    OUT:    Colostomy (mL): 0 mL    Indwelling Catheter - Urethral (mL): 75 mL    Nasogastric/Oral tube (mL): 50 mL  Total OUT: 125 mL    Total NET: 33 mL            LABS:                        12.9   17.30 )-----------( 369      ( 21 Aug 2021 06:24 )             39.2     08-21    138  |  109<H>  |  19  ----------------------------<  123<H>  3.8   |  23  |  0.53    Ca    7.7<L>      21 Aug 2021 06:25  Phos  2.4     08-21  Mg     2.2     08-21      PT/INR - ( 20 Aug 2021 05:52 )   PT: 15.9 sec;   INR: 1.34          PTT - ( 20 Aug 2021 05:52 )  PTT:30.7 sec      RADIOLOGY & ADDITIONAL STUDIES:   STATUS POST: : OR- Ex lap w/ STEPHANIE and primary repair of tears x2     INTERVAL HPI/OVERNIGHT EVENTS: O/N: OR-Ex lap w/ STEPHANIE and primary repair of tears x2. 2U pRBC. Postop lactate 1.2. hgb 13.5(8.5)  8/20: persistent leukocytosis (although down trended), bilious vomitus, CT A/P closed loop obstruction. NG placed with 1.5 liters coffee ground return. unable to intubate stoma w/ catheter.       SUBJECTIVE: Examined at the bedside intubated and sedated. Tolerating AC. No functional through colostomy. No acute complaints.     heparin   Injectable 5000 Unit(s) SubCutaneous every 12 hours  meropenem  IVPB 1000 milliGRAM(s) IV Intermittent every 12 hours  metoprolol tartrate Injectable 2.5 milliGRAM(s) IV Push every 6 hours PRN      Vital Signs Last 24 Hrs  T(C): 36.5 (21 Aug 2021 09:01), Max: 36.7 (20 Aug 2021 17:55)  T(F): 97.7 (21 Aug 2021 09:01), Max: 98.1 (20 Aug 2021 17:55)  HR: 78 (21 Aug 2021 11:00) (74 - 98)  BP: 112/53 (21 Aug 2021 11:00) (101/53 - 166/66)  BP(mean): 77 (21 Aug 2021 11:00) (71 - 107)  RR: 12 (21 Aug 2021 11:00) (12 - 24)  SpO2: 95% (21 Aug 2021 11:00) (87% - 100%)  I&O's Detail    20 Aug 2021 07:01  -  21 Aug 2021 07:00  --------------------------------------------------------  IN:    dextrose 5% + sodium chloride 0.45%: 225 mL    FentaNYL: 17.5 mL    IV PiggyBack: 768.5 mL    Lactated Ringers: 560 mL    Lactated Ringers: 100 mL    Oral Fluid: 50 mL    Propofol: 21 mL  Total IN: 1742 mL    OUT:    Bulb (mL): 15 mL    Colostomy (mL): 0 mL    Indwelling Catheter - Urethral (mL): 565 mL    Nasogastric/Oral tube (mL): 2350 mL  Total OUT: 2930 mL    Total NET: -1188 mL      21 Aug 2021 07:01  -  21 Aug 2021 11:48  --------------------------------------------------------  IN:    FentaNYL: 15 mL    Lactated Ringers: 140 mL    Propofol: 3 mL  Total IN: 158 mL    OUT:    Colostomy (mL): 0 mL    Indwelling Catheter - Urethral (mL): 75 mL    Nasogastric/Oral tube (mL): 50 mL  Total OUT: 125 mL    Total NET: 33 mL    PHYSICAL EXAM:  General: NAD, resting comfortably in bed  HEENT: NGT in place, coffee ground  C/V: NSR  Pulm: Nonlabored breathing, no respiratory distress, CTAB, no wheezes or rhonchi  Abd: soft, non-tender, non-distended, colostomy pink and patent w/o gas stool, incision c/d/i  Extrem: cool to touch, 1+ pitting edema,         LABS:                        12.9   17.30 )-----------( 369      ( 21 Aug 2021 06:24 )             39.2     08-21    138  |  109<H>  |  19  ----------------------------<  123<H>  3.8   |  23  |  0.53    Ca    7.7<L>      21 Aug 2021 06:25  Phos  2.4     08-21  Mg     2.2     08-21      PT/INR - ( 20 Aug 2021 05:52 )   PT: 15.9 sec;   INR: 1.34          PTT - ( 20 Aug 2021 05:52 )  PTT:30.7 sec      RADIOLOGY & ADDITIONAL STUDIES:  < from: Xray Chest 1 View- PORTABLE-Urgent (Xray Chest 1 View- PORTABLE-Urgent .) (08.21.21 @ 00:40) >  INTERPRETATION:  Clinical History: Pain    Frontal examination of the chest demonstrates endotracheal tube in satisfactory. Nasoenteric tube overlying course of esophagus and left upper abdomen. Chronic interstitial changes. Congestion and/or infiltrates. No interval change position remaining support devices    IMPRESSION: Congestion and/or infiltrates. Improvement left effusion in comparison to prior examination of the chest 8/20/2021. Right effusion unchanged    < end of copied text >

## 2021-08-21 NOTE — PROVIDER CONTACT NOTE (EICU) - ASSESSMENT
B/L LE cold to touch   unable to assess for DP via doppler  +1 bilateral PD via doppler  RLE cyanosis to plantar area and all 5 toes   unable to assess for sensation/numbness/tingling-pt intubated

## 2021-08-21 NOTE — PROGRESS NOTE ADULT - ASSESSMENT
L gluteal drain with 20cc serosanguinous output over last 24h (15cc day prior). Flushes easily with 2cc NS. Continue to monitor output. IR will continue to follow.

## 2021-08-21 NOTE — PROGRESS NOTE ADULT - ASSESSMENT
89F PMHx of HTN, chronic back pain 2/2 L3,L4 compression fractures, triple vessel CAD, and SBO 2/2 possible diverticulitis vs. GYN/colorectal malignancy (2018) never followed up and PSH L CEA 2/2 L ICA stenosis >70% (4/21) who presented with worsening back pain x1 week, CT A/P revealing perforated complicated diverticulitis with abscess, s/p drainage, sigmoid mass c/w rectal adenocarcinoma, not amenable to surgical resection and chemotherapy 2/2 poor performance status, now with 2 day history of nausea/vomiting, and 1 day of coffee ground emesis with associated acute drop in H/H, GI consulted for consideration of endoscopic evaluation.    #Anemia  GI re-consulted for acute drop in H/H in the setting of coffee ground emesis. Baseline Hgb 10-11, today downtrended to 6.1. With sump in place with dark output, also noted in ostomy bag. With uptrend in BUN:Cr ratio c/f possible UGIB source.   -s/p EGD (8/18) - LA grade D esophagitis. Multiple ulcers in the middle to lower third of the esophagus s/p biopsy to rule out CMV. 4 cm hiatal hernia. Non-erosive gastritis with scattered erythema consistent with NG tube trauma. Hematin on the gastric mucosa, bleeding likely from esophageal ulcer. Erythema in the duodenum compatible with duodenitis.    -c/w Protonix 40 mg IVP BID  -Pepcid BID  -Carafate 1g QID (suspension)  -Follow-up pathology  -Repeat EGD in 8 weeks  -Closely monitor H/H   -No utility in Fe studies given pRBC transfusions  -Avoid NG tube placement.  -Maintain active T&S   -Transfusion goal as per primary team   -Ongoing discussion regarding GOC as per primary and Palliative care team     Lakesha Paul MD  PGY-5, Gastroenterology Fellow  pager: [phone]

## 2021-08-21 NOTE — PROVIDER CONTACT NOTE (EICU) - ACTION/TREATMENT ORDERED:
Continue to monitor for bilateral PD pulses and notify SICU as soon as lost of signals to B/L PD  Hourly rounding

## 2021-08-21 NOTE — PROGRESS NOTE ADULT - SUBJECTIVE AND OBJECTIVE BOX
POD 1 s/p expl lap, extensive STEPHANIE and repair of enterotomy and serosal tear (separate).    In ICU  Intubated: FIO2 0.40, PEEP 5, IMV 12. OS sat 95+%  Been on fentanly and propofol drips (recently stopped)  Meropenem    NG, urbina, IR pelvic drain in place    Arousable and seems to comprehend.    ICU Vital Signs Last 24 Hrs  T(C): 36.5 (21 Aug 2021 09:01), Max: 36.7 (20 Aug 2021 17:55)  T(F): 97.7 (21 Aug 2021 09:01), Max: 98.1 (20 Aug 2021 17:55)  HR: 78 (21 Aug 2021 11:00) (74 - 98)  BP: 112/53 (21 Aug 2021 11:00) (101/53 - 166/66)  BP(mean): 77 (21 Aug 2021 11:00) (71 - 107)  ABP: 114/43 (21 Aug 2021 11:00) (105/76 - 178/64)  ABP(mean): 70 (21 Aug 2021 11:00) (64 - 112)  RR: 12 (21 Aug 2021 11:00) (12 - 24)  SpO2: 95% (21 Aug 2021 11:00) (87% - 100%)    lungs: moving air well, some large airway secretions, rhonchi   cor S1S2  abd: not distended, incision intact, penrose at bottom aspect with little output, retention suture in place, stoma viable without output, BS -  IR drain  in place (<10 ml serosanguinous output)     ml/last 24 hrs: 180 ml/last shift, 15 ml last 2 hrs  NG  2,350 ml/last 24 hours, 100 ml/this shift as of 11:45 AM  IV  <65 ml/hr;  INS:  800 ml charted for 24 hours + 1.5 L intraop + 2 untis PRBC's  drain: 15 ml/24 hrs                          12.9   17.30 )-----------( 369      ( 21 Aug 2021 06:24 )             39.2     08-21    138  |  109<H>  |  19  ----------------------------<  123<H>  3.8   |  23  |  0.53    Ca    7.7<L>      21 Aug 2021 06:25  Phos  2.4     08-21  Mg     2.2     08-21

## 2021-08-21 NOTE — PROGRESS NOTE ADULT - SUBJECTIVE AND OBJECTIVE BOX
GENERAL SURGERY PROGRESS NOTE    SUBJECTIVE: Patient seen and examined bedside in morning with chief resident. Patient has no complaints. no n/v, no SOB/CP. Dressing removed/changed.     heparin   Injectable 5000 Unit(s) SubCutaneous every 12 hours  meropenem  IVPB 1000 milliGRAM(s) IV Intermittent every 12 hours  metoprolol tartrate Injectable 2.5 milliGRAM(s) IV Push every 6 hours PRN      Vital Signs Last 24 Hrs  T(C): 35.6 (21 Aug 2021 22:17), Max: 36.5 (21 Aug 2021 09:01)  T(F): 96 (21 Aug 2021 22:17), Max: 97.7 (21 Aug 2021 09:01)  HR: 78 (21 Aug 2021 23:00) (72 - 88)  BP: 127/59 (21 Aug 2021 23:00) (101/53 - 141/65)  BP(mean): 85 (21 Aug 2021 23:00) (71 - 93)  RR: 14 (21 Aug 2021 23:00) (12 - 28)  SpO2: 97% (21 Aug 2021 23:00) (94% - 100%)  I&O's Detail    20 Aug 2021 07:01  -  21 Aug 2021 07:00  --------------------------------------------------------  IN:    dextrose 5% + sodium chloride 0.45%: 225 mL    FentaNYL: 17.5 mL    IV PiggyBack: 768.5 mL    Lactated Ringers: 560 mL    Lactated Ringers: 100 mL    Oral Fluid: 50 mL    Propofol: 21 mL  Total IN: 1742 mL    OUT:    Bulb (mL): 15 mL    Colostomy (mL): 0 mL    Indwelling Catheter - Urethral (mL): 565 mL    Nasogastric/Oral tube (mL): 2350 mL  Total OUT: 2930 mL    Total NET: -1188 mL      21 Aug 2021 07:01  -  22 Aug 2021 00:06  --------------------------------------------------------  IN:    Albumin 5%  - 250 mL: 250 mL    Fat Emulsion (Fish Oil &amp; Plant Based) 20% Infusion: 41.6 mL    FentaNYL: 15 mL    IV PiggyBack: 124 mL    IV PiggyBack: 50 mL    IV PiggyBack: 100 mL    Lactated Ringers: 840 mL    Propofol: 3 mL    Sodium Chloride 0.9% Bolus: 250 mL  Total IN: 1673.6 mL    OUT:    Bulb (mL): 25 mL    Colostomy (mL): 0 mL    Indwelling Catheter - Urethral (mL): 345 mL    Nasogastric/Oral tube (mL): 150 mL  Total OUT: 520 mL    Total NET: 1153.6 mL          PHYSICAL EXAM    General: NAD, resting comfortably in bed  C/V: NSR  Pulm: Nonlabored breathing, no respiratory distress on room air  Abd: soft, ND, appropriate incisional tenderness, no rebound tenderness, no guarding. ostomy w/ bowel sweat, CORDELL SS fluid  : clear urine  Extrem: WWP, no edema, SCDs in place        LABS:                        11.9   17.43 )-----------( 376      ( 21 Aug 2021 18:25 )             35.6     08-21    137  |  109<H>  |  17  ----------------------------<  96  4.2   |  20<L>  |  0.64    Ca    7.5<L>      21 Aug 2021 18:25  Phos  2.6     08-21  Mg     1.8     08-21    TPro  4.2<L>  /  Alb  1.9<L>  /  TBili  0.5  /  DBili  x   /  AST  10  /  ALT  6<L>  /  AlkPhos  56  08-21    PT/INR - ( 20 Aug 2021 05:52 )   PT: 15.9 sec;   INR: 1.34          PTT - ( 20 Aug 2021 05:52 )  PTT:30.7 sec  Urinalysis Basic - ( 21 Aug 2021 21:36 )    Color: Yellow / Appearance: Clear / SG: >=1.030 / pH: x  Gluc: x / Ketone: NEGATIVE  / Bili: Negative / Urobili: 0.2 E.U./dL   Blood: x / Protein: 100 mg/dL / Nitrite: NEGATIVE   Leuk Esterase: NEGATIVE / RBC: 3-5 /HPF / WBC 5-10 /HPF   Sq Epi: x / Non Sq Epi: 0-5 /HPF / Bacteria: Present /HPF        RADIOLOGY & ADDITIONAL STUDIES:       GENERAL SURGERY PROGRESS NOTE    SUBJECTIVE: Intubated/sedated, unable to respond to questions.      Vital Signs Last 24 Hrs  T(C): 35.6 (21 Aug 2021 22:17), Max: 36.5 (21 Aug 2021 09:01)  T(F): 96 (21 Aug 2021 22:17), Max: 97.7 (21 Aug 2021 09:01)  HR: 78 (21 Aug 2021 23:00) (72 - 88)  BP: 127/59 (21 Aug 2021 23:00) (101/53 - 141/65)  BP(mean): 85 (21 Aug 2021 23:00) (71 - 93)  RR: 14 (21 Aug 2021 23:00) (12 - 28)  SpO2: 97% (21 Aug 2021 23:00) (94% - 100%)  I&O's Detail    20 Aug 2021 07:01  -  21 Aug 2021 07:00  --------------------------------------------------------  IN:    dextrose 5% + sodium chloride 0.45%: 225 mL    FentaNYL: 17.5 mL    IV PiggyBack: 768.5 mL    Lactated Ringers: 560 mL    Lactated Ringers: 100 mL    Oral Fluid: 50 mL    Propofol: 21 mL  Total IN: 1742 mL    OUT:    Bulb (mL): 15 mL    Colostomy (mL): 0 mL    Indwelling Catheter - Urethral (mL): 565 mL    Nasogastric/Oral tube (mL): 2350 mL  Total OUT: 2930 mL    Total NET: -1188 mL          PHYSICAL EXAM    General: intubated, sedated  C/V: NSR on monitor  Pulm: intubated, 40/350/12/5  Abd: soft, nontender, nondistended, ostomy w/ bowel sweat, CORDELL SS fluid  : Novak in place with  clear urine  Extrem: WWP, no edema, SCDs in place        LABS:                        11.9   17.43 )-----------( 376      ( 21 Aug 2021 18:25 )             35.6     08-21    137  |  109<H>  |  17  ----------------------------<  96  4.2   |  20<L>  |  0.64    Ca    7.5<L>      21 Aug 2021 18:25  Phos  2.6     08-21  Mg     1.8     08-21    TPro  4.2<L>  /  Alb  1.9<L>  /  TBili  0.5  /  DBili  x   /  AST  10  /  ALT  6<L>  /  AlkPhos  56  08-21    PT/INR - ( 20 Aug 2021 05:52 )   PT: 15.9 sec;   INR: 1.34          PTT - ( 20 Aug 2021 05:52 )  PTT:30.7 sec

## 2021-08-22 LAB
ALBUMIN SERPL ELPH-MCNC: 2.2 G/DL — LOW (ref 3.3–5)
ANION GAP SERPL CALC-SCNC: 5 MMOL/L — SIGNIFICANT CHANGE UP (ref 5–17)
BUN SERPL-MCNC: 15 MG/DL — SIGNIFICANT CHANGE UP (ref 7–23)
CALCIUM SERPL-MCNC: 7.4 MG/DL — LOW (ref 8.4–10.5)
CHLORIDE SERPL-SCNC: 108 MMOL/L — SIGNIFICANT CHANGE UP (ref 96–108)
CO2 SERPL-SCNC: 23 MMOL/L — SIGNIFICANT CHANGE UP (ref 22–31)
CREAT SERPL-MCNC: 0.52 MG/DL — SIGNIFICANT CHANGE UP (ref 0.5–1.3)
GLUCOSE BLDC GLUCOMTR-MCNC: 104 MG/DL — HIGH (ref 70–99)
GLUCOSE BLDC GLUCOMTR-MCNC: 109 MG/DL — HIGH (ref 70–99)
GLUCOSE BLDC GLUCOMTR-MCNC: 118 MG/DL — HIGH (ref 70–99)
GLUCOSE BLDC GLUCOMTR-MCNC: 121 MG/DL — HIGH (ref 70–99)
GLUCOSE SERPL-MCNC: 117 MG/DL — HIGH (ref 70–99)
HCT VFR BLD CALC: 31.9 % — LOW (ref 34.5–45)
HGB BLD-MCNC: 10.4 G/DL — LOW (ref 11.5–15.5)
MAGNESIUM SERPL-MCNC: 2.2 MG/DL — SIGNIFICANT CHANGE UP (ref 1.6–2.6)
MCHC RBC-ENTMCNC: 28 PG — SIGNIFICANT CHANGE UP (ref 27–34)
MCHC RBC-ENTMCNC: 32.6 GM/DL — SIGNIFICANT CHANGE UP (ref 32–36)
MCV RBC AUTO: 86 FL — SIGNIFICANT CHANGE UP (ref 80–100)
NRBC # BLD: 0 /100 WBCS — SIGNIFICANT CHANGE UP (ref 0–0)
PHOSPHATE SERPL-MCNC: 2.1 MG/DL — LOW (ref 2.5–4.5)
PLATELET # BLD AUTO: 370 K/UL — SIGNIFICANT CHANGE UP (ref 150–400)
POTASSIUM SERPL-MCNC: 3.8 MMOL/L — SIGNIFICANT CHANGE UP (ref 3.5–5.3)
POTASSIUM SERPL-SCNC: 3.8 MMOL/L — SIGNIFICANT CHANGE UP (ref 3.5–5.3)
RBC # BLD: 3.71 M/UL — LOW (ref 3.8–5.2)
RBC # FLD: 17.7 % — HIGH (ref 10.3–14.5)
SODIUM SERPL-SCNC: 136 MMOL/L — SIGNIFICANT CHANGE UP (ref 135–145)
WBC # BLD: 16.16 K/UL — HIGH (ref 3.8–10.5)
WBC # FLD AUTO: 16.16 K/UL — HIGH (ref 3.8–10.5)

## 2021-08-22 PROCEDURE — 36000 PLACE NEEDLE IN VEIN: CPT

## 2021-08-22 PROCEDURE — 99291 CRITICAL CARE FIRST HOUR: CPT

## 2021-08-22 PROCEDURE — 99231 SBSQ HOSP IP/OBS SF/LOW 25: CPT

## 2021-08-22 PROCEDURE — 76937 US GUIDE VASCULAR ACCESS: CPT | Mod: 26

## 2021-08-22 PROCEDURE — 93971 EXTREMITY STUDY: CPT | Mod: 26,LT

## 2021-08-22 PROCEDURE — 71045 X-RAY EXAM CHEST 1 VIEW: CPT | Mod: 26

## 2021-08-22 RX ORDER — ACETAMINOPHEN 500 MG
620 TABLET ORAL ONCE
Refills: 0 | Status: COMPLETED | OUTPATIENT
Start: 2021-08-23 | End: 2021-08-23

## 2021-08-22 RX ORDER — ACETAMINOPHEN 500 MG
620 TABLET ORAL ONCE
Refills: 0 | Status: COMPLETED | OUTPATIENT
Start: 2021-08-22 | End: 2021-08-22

## 2021-08-22 RX ORDER — FAMOTIDINE 10 MG/ML
20 INJECTION INTRAVENOUS EVERY 12 HOURS
Refills: 0 | Status: DISCONTINUED | OUTPATIENT
Start: 2021-08-22 | End: 2021-08-25

## 2021-08-22 RX ORDER — ELECTROLYTE SOLUTION,INJ
1 VIAL (ML) INTRAVENOUS
Refills: 0 | Status: DISCONTINUED | OUTPATIENT
Start: 2021-08-23 | End: 2021-08-23

## 2021-08-22 RX ORDER — I.V. FAT EMULSION 20 G/100ML
1.22 EMULSION INTRAVENOUS
Qty: 50 | Refills: 0 | Status: DISCONTINUED | OUTPATIENT
Start: 2021-08-22 | End: 2021-08-22

## 2021-08-22 RX ORDER — SODIUM CHLORIDE 9 MG/ML
500 INJECTION INTRAMUSCULAR; INTRAVENOUS; SUBCUTANEOUS ONCE
Refills: 0 | Status: COMPLETED | OUTPATIENT
Start: 2021-08-22 | End: 2021-08-22

## 2021-08-22 RX ADMIN — Medication 620 MILLIGRAM(S): at 18:51

## 2021-08-22 RX ADMIN — LIDOCAINE 1 PATCH: 4 CREAM TOPICAL at 21:00

## 2021-08-22 RX ADMIN — Medication 620 MILLIGRAM(S): at 07:25

## 2021-08-22 RX ADMIN — HEPARIN SODIUM 5000 UNIT(S): 5000 INJECTION INTRAVENOUS; SUBCUTANEOUS at 17:38

## 2021-08-22 RX ADMIN — MEROPENEM 200 MILLIGRAM(S): 1 INJECTION INTRAVENOUS at 18:24

## 2021-08-22 RX ADMIN — I.V. FAT EMULSION 20.83 GM/KG/DAY: 20 EMULSION INTRAVENOUS at 14:10

## 2021-08-22 RX ADMIN — Medication 2.5 MILLIGRAM(S): at 07:00

## 2021-08-22 RX ADMIN — MEROPENEM 200 MILLIGRAM(S): 1 INJECTION INTRAVENOUS at 06:11

## 2021-08-22 RX ADMIN — PANTOPRAZOLE SODIUM 40 MILLIGRAM(S): 20 TABLET, DELAYED RELEASE ORAL at 00:31

## 2021-08-22 RX ADMIN — Medication 1 GRAM(S): at 17:37

## 2021-08-22 RX ADMIN — Medication 248 MILLIGRAM(S): at 14:00

## 2021-08-22 RX ADMIN — CHLORHEXIDINE GLUCONATE 15 MILLILITER(S): 213 SOLUTION TOPICAL at 06:11

## 2021-08-22 RX ADMIN — FAMOTIDINE 20 MILLIGRAM(S): 10 INJECTION INTRAVENOUS at 17:38

## 2021-08-22 RX ADMIN — I.V. FAT EMULSION 20.8 GM/KG/DAY: 20 EMULSION INTRAVENOUS at 22:15

## 2021-08-22 RX ADMIN — Medication 620 MILLIGRAM(S): at 01:08

## 2021-08-22 RX ADMIN — PANTOPRAZOLE SODIUM 40 MILLIGRAM(S): 20 TABLET, DELAYED RELEASE ORAL at 13:50

## 2021-08-22 RX ADMIN — Medication 248 MILLIGRAM(S): at 18:40

## 2021-08-22 RX ADMIN — Medication 620 MILLIGRAM(S): at 16:41

## 2021-08-22 RX ADMIN — LIDOCAINE 1 PATCH: 4 CREAM TOPICAL at 00:19

## 2021-08-22 RX ADMIN — SODIUM CHLORIDE 500 MILLILITER(S): 9 INJECTION INTRAMUSCULAR; INTRAVENOUS; SUBCUTANEOUS at 02:06

## 2021-08-22 RX ADMIN — Medication 63 EACH: at 19:46

## 2021-08-22 RX ADMIN — CHLORHEXIDINE GLUCONATE 15 MILLILITER(S): 213 SOLUTION TOPICAL at 17:37

## 2021-08-22 RX ADMIN — LIDOCAINE 1 PATCH: 4 CREAM TOPICAL at 17:40

## 2021-08-22 RX ADMIN — CHLORHEXIDINE GLUCONATE 1 APPLICATION(S): 213 SOLUTION TOPICAL at 11:39

## 2021-08-22 RX ADMIN — HEPARIN SODIUM 5000 UNIT(S): 5000 INJECTION INTRAVENOUS; SUBCUTANEOUS at 06:11

## 2021-08-22 RX ADMIN — Medication 248 MILLIGRAM(S): at 07:00

## 2021-08-22 RX ADMIN — Medication 248 MILLIGRAM(S): at 00:31

## 2021-08-22 RX ADMIN — LIDOCAINE 1 PATCH: 4 CREAM TOPICAL at 09:52

## 2021-08-22 NOTE — PROGRESS NOTE ADULT - ASSESSMENT
89F PMH HTN, chronic back pain 2/2 L3,L4 compression fractures, triple vessel CAD, and SBO due to possible diverticulitis vs. GYN/colorectal malignancy (2018) never followed up and L ICA s/p L CEA (4/21). 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 Left gluteal IR drain placed (8/9). Colonoscopy w/ sigmoid mass bx on 8/11- final path invasive adenocarcinoma moderately differentiated. Taken to OR on 8/13 for diverting colostomy. In SICU for acute blood loss anemia, s/p EGD 8/18 showing duodenal ulceration, erosive esophagitis duodenitis, bx x 2, CT 8/20 w/ closed loop obstxn, s/p OR 8/20 exlap STEPHANIE.     NPO/IVF/TPN  Meropenem (8/15-)  Pain/nausea control  PPI  HEATHER ostomy output  Rest of care per SICU team

## 2021-08-22 NOTE — PROGRESS NOTE ADULT - SUBJECTIVE AND OBJECTIVE BOX
POD 2 s/p ex lap STEPHANIE, repair enterotomy & serosal tear.  POD9 s/p colostomy formation  In ICU  Meropenem  Mini dose heparin  PPN running  IV tylenol and prn dilaudid    NG, urbina, and IR drain in place  Patient extubated at 8:45 AM, O2 mask in place    Awake and alert.  Not c/o abdominal pain    ICU Vital Signs Last 24 Hrs  T(C): 36.3 (22 Aug 2021 01:12), Max: 36.5 (21 Aug 2021 09:01)  T(F): 97.4 (22 Aug 2021 01:12), Max: 97.7 (21 Aug 2021 09:01)  HR: 78 (22 Aug 2021 08:00) (72 - 93)  BP: 164/74 (22 Aug 2021 07:00) (101/58 - 164/74)  BP(mean): 106 (22 Aug 2021 07:00) (76 - 106)  ABP: 110/76 (22 Aug 2021 08:00) (105/76 - 177/68)  ABP(mean): 93 (22 Aug 2021 08:00) (66 - 116)  RR: 15 (22 Aug 2021 08:00) (12 - 28)  SpO2: 100% (22 Aug 2021 08:00) (92% - 100%)    lungs: clear, decreased BS at bases, large airway secretions audible  cor: S1S2  abd: mildly distended, incision intact, stoma viable without output (was digitalized beyond the fascial level - no gas or stool)  ext: LE: no edema, pulses intact;  Upper extremity: edematous L hand but otherwise no edema noted     ml/24 hrs;  395 ml/last shift; 25 ml last hour   ml/last 24 hrs  IR drain  40 ml serosanguinous drainage/24 hrs  Total IV rate currently 80 ml/hr                          10.4   16.16 )-----------( 370      ( 22 Aug 2021 05:22 )             31.9   08-22    136  |  108  |  15  ----------------------------<  117<H>  3.8   |  23  |  0.52    Ca    7.4<L>      22 Aug 2021 05:22  Phos  2.1     08-22  Mg     2.2     08-22    TPro  4.2<L>  /  Alb  1.9<L>  /  TBili  0.5  /  DBili  x   /  AST  10  /  ALT  6<L>  /  AlkPhos  56  08-21    CXR: atelectasis vs infiltrate in RLL, patchy densities throughout lung

## 2021-08-22 NOTE — PROGRESS NOTE ADULT - SUBJECTIVE AND OBJECTIVE BOX
INTERVAL HPI/OVERNIGHT EVENTS:      MEDICATIONS  (STANDING):  atorvastatin 80 milliGRAM(s) Oral at bedtime  chlorhexidine 0.12% Liquid 15 milliLiter(s) Oral Mucosa every 12 hours  chlorhexidine 2% Cloths 1 Application(s) Topical daily  dextrose 40% Gel 15 Gram(s) Oral once  dextrose 5%. 1000 milliLiter(s) (50 mL/Hr) IV Continuous <Continuous>  dextrose 5%. 1000 milliLiter(s) (100 mL/Hr) IV Continuous <Continuous>  dextrose 50% Injectable 25 Gram(s) IV Push once  dextrose 50% Injectable 12.5 Gram(s) IV Push once  dextrose 50% Injectable 25 Gram(s) IV Push once  famotidine    Tablet 20 milliGRAM(s) Oral daily  fat emulsion (Fish Oil and Plant Based) 20% Infusion 1.22 Gm/kG/Day (20.83 mL/Hr) IV Continuous <Continuous>  glucagon  Injectable 1 milliGRAM(s) IntraMuscular once  heparin   Injectable 5000 Unit(s) SubCutaneous every 12 hours  insulin lispro (ADMELOG) corrective regimen sliding scale   SubCutaneous every 6 hours  lactated ringers. 1000 milliLiter(s) (70 mL/Hr) IV Continuous <Continuous>  lidocaine   4% Patch 1 Patch Transdermal every 24 hours  meropenem  IVPB 1000 milliGRAM(s) IV Intermittent every 12 hours  pantoprazole  Injectable 40 milliGRAM(s) IV Push every 12 hours  Parenteral Nutrition - Adult 1 Each (63 mL/Hr) TPN Continuous <Continuous>  sucralfate suspension 1 Gram(s) Oral two times a day    MEDICATIONS  (PRN):  acetaminophen   Tablet .. 1000 milliGRAM(s) Oral every 6 hours PRN Temp greater or equal to 38C (100.4F), Mild Pain (1 - 3)  HYDROmorphone  Injectable 0.5 milliGRAM(s) IV Push every 6 hours PRN Severe Pain (7 - 10)  metoprolol tartrate Injectable 2.5 milliGRAM(s) IV Push every 6 hours PRN SBP > 160, HR > 100  ondansetron Injectable 4 milliGRAM(s) IV Push every 6 hours PRN Nausea and/or Vomiting      Drug Dosing Weight  Height (cm): 152.4 (20 Aug 2021 18:11)  Weight (kg): 40.8 (20 Aug 2021 18:11)  BMI (kg/m2): 17.6 (20 Aug 2021 18:11)  BSA (m2): 1.33 (20 Aug 2021 18:11)    PAST MEDICAL & SURGICAL HISTORY:  SBO (small bowel obstruction)    HTN (hypertension)    Chronic back pain    S/P wrist surgery        ICU Vital Signs Last 24 Hrs  T(C): 36.3 (22 Aug 2021 01:12), Max: 36.5 (21 Aug 2021 09:01)  T(F): 97.4 (22 Aug 2021 01:12), Max: 97.7 (21 Aug 2021 09:01)  HR: 84 (22 Aug 2021 07:00) (72 - 93)  BP: 164/74 (22 Aug 2021 07:00) (101/58 - 164/74)  BP(mean): 106 (22 Aug 2021 07:00) (76 - 106)  ABP: 177/68 (22 Aug 2021 07:00) (105/76 - 177/68)  ABP(mean): 116 (22 Aug 2021 07:00) (66 - 116)  RR: 20 (22 Aug 2021 07:00) (12 - 28)  SpO2: 99% (22 Aug 2021 07:00) (92% - 100%)      ABG - ( 21 Aug 2021 18:27 )  pH, Arterial: 7.46  pH, Blood: x     /  pCO2: 28    /  pO2: 92    / HCO3: 20    / Base Excess: -2.6  /  SaO2: 98.6                  21 Aug 2021 07:01  -  22 Aug 2021 07:00  --------------------------------------------------------  IN:    Albumin 5%  - 250 mL: 250 mL    Fat Emulsion (Fish Oil &amp; Plant Based) 20% Infusion: 208 mL    FentaNYL: 15 mL    IV PiggyBack: 248 mL    IV PiggyBack: 100 mL    IV PiggyBack: 100 mL    Lactated Ringers: 1400 mL    Propofol: 3 mL    Sodium Chloride 0.9% Bolus: 750 mL  Total IN: 3074 mL    OUT:    Bulb (mL): 40 mL    Colostomy (mL): 0 mL    Indwelling Catheter - Urethral (mL): 580 mL    Nasogastric/Oral tube (mL): 200 mL  Total OUT: 820 mL    Total NET: 2254 mL          Mode: AC/ CMV (Assist Control/ Continuous Mandatory Ventilation)  RR (machine): 12  TV (machine): 350  FiO2: 40  PEEP: 5  MAP: 8.7  PIP: 23      PHYSICAL EXAM:      Constitutional:     Eyes:    ENMT:    Neck:    Breasts:    Back:    Respiratory:    Cardiovascular:    Gastrointestinal:    Genitourinary:    Rectal:    Extremities:    Vascular:    Neurological:    Skin:    Lymph Nodes:    Musculoskeletal:    Psychiatric:        LABS:  CBC Full  -  ( 22 Aug 2021 05:22 )  WBC Count : 16.16 K/uL  RBC Count : 3.71 M/uL  Hemoglobin : 10.4 g/dL  Hematocrit : 31.9 %  Platelet Count - Automated : 370 K/uL  Mean Cell Volume : 86.0 fl  Mean Cell Hemoglobin : 28.0 pg  Mean Cell Hemoglobin Concentration : 32.6 gm/dL  Auto Neutrophil # : x  Auto Lymphocyte # : x  Auto Monocyte # : x  Auto Eosinophil # : x  Auto Basophil # : x  Auto Neutrophil % : x  Auto Lymphocyte % : x  Auto Monocyte % : x  Auto Eosinophil % : x  Auto Basophil % : x    08-22    136  |  108  |  15  ----------------------------<  117<H>  3.8   |  23  |  0.52    Ca    7.4<L>      22 Aug 2021 05:22  Phos  2.1     08-22  Mg     2.2     08-22    TPro  4.2<L>  /  Alb  1.9<L>  /  TBili  0.5  /  DBili  x   /  AST  10  /  ALT  6<L>  /  AlkPhos  56  08-21      Urinalysis Basic - ( 21 Aug 2021 21:36 )    Color: Yellow / Appearance: Clear / SG: >=1.030 / pH: x  Gluc: x / Ketone: NEGATIVE  / Bili: Negative / Urobili: 0.2 E.U./dL   Blood: x / Protein: 100 mg/dL / Nitrite: NEGATIVE   Leuk Esterase: NEGATIVE / RBC: 3-5 /HPF / WBC 5-10 /HPF   Sq Epi: x / Non Sq Epi: 0-5 /HPF / Bacteria: Present /HPF        RADIOLOGY & ADDITIONAL STUDIES:   INTERVAL HPI/OVERNIGHT EVENTS:      MEDICATIONS  (STANDING):  atorvastatin 80 milliGRAM(s) Oral at bedtime  chlorhexidine 0.12% Liquid 15 milliLiter(s) Oral Mucosa every 12 hours  chlorhexidine 2% Cloths 1 Application(s) Topical daily  dextrose 40% Gel 15 Gram(s) Oral once  dextrose 5%. 1000 milliLiter(s) (50 mL/Hr) IV Continuous <Continuous>  dextrose 5%. 1000 milliLiter(s) (100 mL/Hr) IV Continuous <Continuous>  dextrose 50% Injectable 25 Gram(s) IV Push once  dextrose 50% Injectable 12.5 Gram(s) IV Push once  dextrose 50% Injectable 25 Gram(s) IV Push once  famotidine    Tablet 20 milliGRAM(s) Oral daily  fat emulsion (Fish Oil and Plant Based) 20% Infusion 1.22 Gm/kG/Day (20.83 mL/Hr) IV Continuous <Continuous>  glucagon  Injectable 1 milliGRAM(s) IntraMuscular once  heparin   Injectable 5000 Unit(s) SubCutaneous every 12 hours  insulin lispro (ADMELOG) corrective regimen sliding scale   SubCutaneous every 6 hours  lactated ringers. 1000 milliLiter(s) (70 mL/Hr) IV Continuous <Continuous>  lidocaine   4% Patch 1 Patch Transdermal every 24 hours  meropenem  IVPB 1000 milliGRAM(s) IV Intermittent every 12 hours  pantoprazole  Injectable 40 milliGRAM(s) IV Push every 12 hours  Parenteral Nutrition - Adult 1 Each (63 mL/Hr) TPN Continuous <Continuous>  sucralfate suspension 1 Gram(s) Oral two times a day    MEDICATIONS  (PRN):  acetaminophen   Tablet .. 1000 milliGRAM(s) Oral every 6 hours PRN Temp greater or equal to 38C (100.4F), Mild Pain (1 - 3)  HYDROmorphone  Injectable 0.5 milliGRAM(s) IV Push every 6 hours PRN Severe Pain (7 - 10)  metoprolol tartrate Injectable 2.5 milliGRAM(s) IV Push every 6 hours PRN SBP > 160, HR > 100  ondansetron Injectable 4 milliGRAM(s) IV Push every 6 hours PRN Nausea and/or Vomiting      Drug Dosing Weight  Height (cm): 152.4 (20 Aug 2021 18:11)  Weight (kg): 40.8 (20 Aug 2021 18:11)  BMI (kg/m2): 17.6 (20 Aug 2021 18:11)  BSA (m2): 1.33 (20 Aug 2021 18:11)    PAST MEDICAL & SURGICAL HISTORY:  SBO (small bowel obstruction)    HTN (hypertension)    Chronic back pain    S/P wrist surgery        ICU Vital Signs Last 24 Hrs  T(C): 36.3 (22 Aug 2021 01:12), Max: 36.5 (21 Aug 2021 09:01)  T(F): 97.4 (22 Aug 2021 01:12), Max: 97.7 (21 Aug 2021 09:01)  HR: 84 (22 Aug 2021 07:00) (72 - 93)  BP: 164/74 (22 Aug 2021 07:00) (101/58 - 164/74)  BP(mean): 106 (22 Aug 2021 07:00) (76 - 106)  ABP: 177/68 (22 Aug 2021 07:00) (105/76 - 177/68)  ABP(mean): 116 (22 Aug 2021 07:00) (66 - 116)  RR: 20 (22 Aug 2021 07:00) (12 - 28)  SpO2: 99% (22 Aug 2021 07:00) (92% - 100%)      ABG - ( 21 Aug 2021 18:27 )  pH, Arterial: 7.46  pH, Blood: x     /  pCO2: 28    /  pO2: 92    / HCO3: 20    / Base Excess: -2.6  /  SaO2: 98.6                  21 Aug 2021 07:01  -  22 Aug 2021 07:00  --------------------------------------------------------  IN:    Albumin 5%  - 250 mL: 250 mL    Fat Emulsion (Fish Oil &amp; Plant Based) 20% Infusion: 208 mL    FentaNYL: 15 mL    IV PiggyBack: 248 mL    IV PiggyBack: 100 mL    IV PiggyBack: 100 mL    Lactated Ringers: 1400 mL    Propofol: 3 mL    Sodium Chloride 0.9% Bolus: 750 mL  Total IN: 3074 mL    OUT:    Bulb (mL): 40 mL    Colostomy (mL): 0 mL    Indwelling Catheter - Urethral (mL): 580 mL    Nasogastric/Oral tube (mL): 200 mL  Total OUT: 820 mL    Total NET: 2254 mL          Mode: AC/ CMV (Assist Control/ Continuous Mandatory Ventilation)  RR (machine): 12  TV (machine): 350  FiO2: 40  PEEP: 5  MAP: 8.7  PIP: 23      PHYSICAL EXAM:  General: NAD, cachactic,   HEENT: NGT in place, coffee ground  C/V: NSR,   Pulm: on high flow nasal canula  Abd: soft, non-tender, non-distended, colostomy pink and patent w/o gas stool, incision c/d/i with staples and retention sutures in place  Extrem: WWP, 1+ pitting edema,         LABS:  CBC Full  -  ( 22 Aug 2021 05:22 )  WBC Count : 16.16 K/uL  RBC Count : 3.71 M/uL  Hemoglobin : 10.4 g/dL  Hematocrit : 31.9 %  Platelet Count - Automated : 370 K/uL  Mean Cell Volume : 86.0 fl  Mean Cell Hemoglobin : 28.0 pg  Mean Cell Hemoglobin Concentration : 32.6 gm/dL  Auto Neutrophil # : x  Auto Lymphocyte # : x  Auto Monocyte # : x  Auto Eosinophil # : x  Auto Basophil # : x  Auto Neutrophil % : x  Auto Lymphocyte % : x  Auto Monocyte % : x  Auto Eosinophil % : x  Auto Basophil % : x    08-22    136  |  108  |  15  ----------------------------<  117<H>  3.8   |  23  |  0.52    Ca    7.4<L>      22 Aug 2021 05:22  Phos  2.1     08-22  Mg     2.2     08-22    TPro  4.2<L>  /  Alb  1.9<L>  /  TBili  0.5  /  DBili  x   /  AST  10  /  ALT  6<L>  /  AlkPhos  56  08-21      Urinalysis Basic - ( 21 Aug 2021 21:36 )    Color: Yellow / Appearance: Clear / SG: >=1.030 / pH: x  Gluc: x / Ketone: NEGATIVE  / Bili: Negative / Urobili: 0.2 E.U./dL   Blood: x / Protein: 100 mg/dL / Nitrite: NEGATIVE   Leuk Esterase: NEGATIVE / RBC: 3-5 /HPF / WBC 5-10 /HPF   Sq Epi: x / Non Sq Epi: 0-5 /HPF / Bacteria: Present /HPF        RADIOLOGY & ADDITIONAL STUDIES:   INTERVAL HPI/OVERNIGHT EVENTS:      MEDICATIONS  (STANDING):  atorvastatin 80 milliGRAM(s) Oral at bedtime  chlorhexidine 0.12% Liquid 15 milliLiter(s) Oral Mucosa every 12 hours  chlorhexidine 2% Cloths 1 Application(s) Topical daily  dextrose 40% Gel 15 Gram(s) Oral once  dextrose 5%. 1000 milliLiter(s) (50 mL/Hr) IV Continuous <Continuous>  dextrose 5%. 1000 milliLiter(s) (100 mL/Hr) IV Continuous <Continuous>  dextrose 50% Injectable 25 Gram(s) IV Push once  dextrose 50% Injectable 12.5 Gram(s) IV Push once  dextrose 50% Injectable 25 Gram(s) IV Push once  famotidine    Tablet 20 milliGRAM(s) Oral daily  fat emulsion (Fish Oil and Plant Based) 20% Infusion 1.22 Gm/kG/Day (20.83 mL/Hr) IV Continuous <Continuous>  glucagon  Injectable 1 milliGRAM(s) IntraMuscular once  heparin   Injectable 5000 Unit(s) SubCutaneous every 12 hours  insulin lispro (ADMELOG) corrective regimen sliding scale   SubCutaneous every 6 hours  lactated ringers. 1000 milliLiter(s) (70 mL/Hr) IV Continuous <Continuous>  lidocaine   4% Patch 1 Patch Transdermal every 24 hours  meropenem  IVPB 1000 milliGRAM(s) IV Intermittent every 12 hours  pantoprazole  Injectable 40 milliGRAM(s) IV Push every 12 hours  Parenteral Nutrition - Adult 1 Each (63 mL/Hr) TPN Continuous <Continuous>  sucralfate suspension 1 Gram(s) Oral two times a day    MEDICATIONS  (PRN):  acetaminophen   Tablet .. 1000 milliGRAM(s) Oral every 6 hours PRN Temp greater or equal to 38C (100.4F), Mild Pain (1 - 3)  HYDROmorphone  Injectable 0.5 milliGRAM(s) IV Push every 6 hours PRN Severe Pain (7 - 10)  metoprolol tartrate Injectable 2.5 milliGRAM(s) IV Push every 6 hours PRN SBP > 160, HR > 100  ondansetron Injectable 4 milliGRAM(s) IV Push every 6 hours PRN Nausea and/or Vomiting      Drug Dosing Weight  Height (cm): 152.4 (20 Aug 2021 18:11)  Weight (kg): 40.8 (20 Aug 2021 18:11)  BMI (kg/m2): 17.6 (20 Aug 2021 18:11)  BSA (m2): 1.33 (20 Aug 2021 18:11)    PAST MEDICAL & SURGICAL HISTORY:  SBO (small bowel obstruction)    HTN (hypertension)    Chronic back pain    S/P wrist surgery        ICU Vital Signs Last 24 Hrs  T(C): 36.3 (22 Aug 2021 01:12), Max: 36.5 (21 Aug 2021 09:01)  T(F): 97.4 (22 Aug 2021 01:12), Max: 97.7 (21 Aug 2021 09:01)  HR: 84 (22 Aug 2021 07:00) (72 - 93)  BP: 164/74 (22 Aug 2021 07:00) (101/58 - 164/74)  BP(mean): 106 (22 Aug 2021 07:00) (76 - 106)  ABP: 177/68 (22 Aug 2021 07:00) (105/76 - 177/68)  ABP(mean): 116 (22 Aug 2021 07:00) (66 - 116)  RR: 20 (22 Aug 2021 07:00) (12 - 28)  SpO2: 99% (22 Aug 2021 07:00) (92% - 100%)      ABG - ( 21 Aug 2021 18:27 )  pH, Arterial: 7.46  pH, Blood: x     /  pCO2: 28    /  pO2: 92    / HCO3: 20    / Base Excess: -2.6  /  SaO2: 98.6                  21 Aug 2021 07:01  -  22 Aug 2021 07:00  --------------------------------------------------------  IN:    Albumin 5%  - 250 mL: 250 mL    Fat Emulsion (Fish Oil &amp; Plant Based) 20% Infusion: 208 mL    FentaNYL: 15 mL    IV PiggyBack: 248 mL    IV PiggyBack: 100 mL    IV PiggyBack: 100 mL    Lactated Ringers: 1400 mL    Propofol: 3 mL    Sodium Chloride 0.9% Bolus: 750 mL  Total IN: 3074 mL    OUT:    Bulb (mL): 40 mL    Colostomy (mL): 0 mL    Indwelling Catheter - Urethral (mL): 580 mL    Nasogastric/Oral tube (mL): 200 mL  Total OUT: 820 mL    Total NET: 2254 mL          Mode: AC/ CMV (Assist Control/ Continuous Mandatory Ventilation)  RR (machine): 12  TV (machine): 350  FiO2: 40  PEEP: 5  MAP: 8.7  PIP: 23      PHYSICAL EXAM:  General: NAD,    HEENT: NGT in place, green output in the tube  C/V: NSR,   Pulm: on high flow nasal canula  Abd: soft, non-tender, non-distended, colostomy pink and patent w/o gas stool, incision c/d/i  Extrem: WWP, 1+ pitting edema,         LABS:  CBC Full  -  ( 22 Aug 2021 05:22 )  WBC Count : 16.16 K/uL  RBC Count : 3.71 M/uL  Hemoglobin : 10.4 g/dL  Hematocrit : 31.9 %  Platelet Count - Automated : 370 K/uL  Mean Cell Volume : 86.0 fl  Mean Cell Hemoglobin : 28.0 pg  Mean Cell Hemoglobin Concentration : 32.6 gm/dL  Auto Neutrophil # : x  Auto Lymphocyte # : x  Auto Monocyte # : x  Auto Eosinophil # : x  Auto Basophil # : x  Auto Neutrophil % : x  Auto Lymphocyte % : x  Auto Monocyte % : x  Auto Eosinophil % : x  Auto Basophil % : x    08-22    136  |  108  |  15  ----------------------------<  117<H>  3.8   |  23  |  0.52    Ca    7.4<L>      22 Aug 2021 05:22  Phos  2.1     08-22  Mg     2.2     08-22    TPro  4.2<L>  /  Alb  1.9<L>  /  TBili  0.5  /  DBili  x   /  AST  10  /  ALT  6<L>  /  AlkPhos  56  08-21      Urinalysis Basic - ( 21 Aug 2021 21:36 )    Color: Yellow / Appearance: Clear / SG: >=1.030 / pH: x  Gluc: x / Ketone: NEGATIVE  / Bili: Negative / Urobili: 0.2 E.U./dL   Blood: x / Protein: 100 mg/dL / Nitrite: NEGATIVE   Leuk Esterase: NEGATIVE / RBC: 3-5 /HPF / WBC 5-10 /HPF   Sq Epi: x / Non Sq Epi: 0-5 /HPF / Bacteria: Present /HPF        RADIOLOGY & ADDITIONAL STUDIES:

## 2021-08-22 NOTE — PROGRESS NOTE ADULT - ATTENDING COMMENTS
#Anemia  GI re-consulted for acute drop in H/H in the setting of coffee ground emesis. Baseline Hgb 10-11, today downtrended to 6.1. With sump in place with dark output, also noted in ostomy bag. With uptrend in BUN:Cr ratio c/f possible UGIB source.   -s/p EGD (8/18) - LA grade D esophagitis. Multiple ulcers in the middle to lower third of the esophagus s/p biopsy to rule out CMV. 4 cm hiatal hernia. Non-erosive gastritis with scattered erythema consistent with NG tube trauma. Hematin on the gastric mucosa, bleeding likely from esophageal ulcer. Erythema in the duodenum compatible with duodenitis.    -c/w Protonix 40 mg BID  -Pepcid BID  -Carafate 1g QID (suspension)  -Follow-up pathology  -Repeat EGD in 8 weeks  -Closely monitor H/H   -No utility in Fe studies given pRBC transfusions  -Avoid NG tube placement.  -Maintain active T&S

## 2021-08-22 NOTE — PROGRESS NOTE ADULT - ASSESSMENT
A/P  Overall, looks bit better than yesterday.  VSS, BP bit higher. on metoprolol. Pulse stable.  Extubated and breathing well, thus far.  Has large airway secretions that need to be cleared via coughing or suction.   Also may have PNA RLL.  On antibiotics.  Renal /I/O:  UO been poor but she did respond to occasional bolus.  This AM output acceptable.  BUN / creatinine are stable.  Lytes WNL.  Urine lytes suggest patient is pre-renal, thus IV fluid (colloid and crystalloid) as needed. Goal 20 ml/hr.  Clearly third spacing in setting of albumen of 1.9 and poor colloid osmotic pressure.  GI: no function yet.  No BS.  NG output is low but will leave NG for now.  Keep NPO.    Nutrition:  PPN for today then PICC and TPN starting tomorrow.    On mini dose heparin 5000 units q 12 hrs.  Recent hx of upper GI bleed noted.    Continue antibiotics.

## 2021-08-22 NOTE — PROGRESS NOTE ADULT - ASSESSMENT
89F PMH HTN, chronic back pain 2/2 L3,L4 compression fractures, triple vessel CAD, and SBO due to possible diverticulitis vs. GYN/colorectal malignancy (2018) never followed up and L ICA s/p L CEA (4/21). 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 Left gluteal IR drain placed (8/9). Colonoscopy w/ sigmoid mass bx on 8/11- final path invasive adenocarcinoma moderately differentiated. Taken to OR on 8/13 for diverting colostomy. In SICU for acute blood loss anemia, s/p EGD 8/18 showing duodenal ulceration, erosive esophagitis duodenitis, bx x 2.  Clinically stable. On provide nutrition through PPN.    Neuro: Tylenol prn.   CV: ICA s/p L CEA (4/21). Holding ASA, Plavix, metoprolol, and norvasc.   Pulm: nasal canula.   GI/FEN: NPO, NGT LIWS, D5 1/2NS@70, PPN, UGIB: EGD on 8/18: Erosive esophagitis and doudenitis with esophageal ulcers bx x2. protonix, Carafate.  : S/p NM renal function study (wnl). Scarlet (8/18-)  ID: ESBL Kleb in intraabdominal abscess: Daina (8/13-)  Endo: ISS  Heme: holding chemo ppx until hgb stable  PPX: SCDs. , SQH  Lines: PIVs  Wounds: Functioning ostomy  PT/OT: Aggressive PT. ALIS   89F PMH HTN, chronic back pain 2/2 L3,L4 compression fractures, triple vessel CAD, and SBO due to possible diverticulitis vs. GYN/colorectal malignancy (2018) never followed up and L ICA s/p L CEA (4/21). 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 Left gluteal IR drain placed (8/9). Colonoscopy w/ sigmoid mass bx on 8/11- final path invasive adenocarcinoma moderately differentiated. Taken to OR on 8/13 for diverting colostomy. In SICU for acute blood loss anemia, s/p EGD 8/18 showing duodenal ulceration, erosive esophagitis duodenitis, bx x 2.  Clinically stable. 8/22 extubated. nutrition through PPN.    Neuro: Tylenol prn.   CV: ICA s/p L CEA (4/21). Holding ASA, Plavix, metoprolol, and norvasc. Bolus with crystaloids and/or colloids as needed.  Pulm: oxygen mask  GI/FEN: NPO, NGT LIWS, D5 1/2NS@70, PPN, UGIB: EGD on 8/18: Erosive esophagitis and doudenitis with esophageal ulcers bx x2. protonix, Carafate.  : S/p NM renal function study (wnl). Scarlet (8/18-)  ID: ESBL Kleb in intraabdominal abscess: Daina (8/13-)  Endo: ISS  Heme: holding chemo ppx until hgb stable  PPX: SCDs. , SQH  Lines: PIVs  Wounds: Functioning ostomy  PT/OT: Aggressive PT. ALIS

## 2021-08-22 NOTE — PROGRESS NOTE ADULT - ASSESSMENT
89F PMHx of HTN, chronic back pain 2/2 L3,L4 compression fractures, triple vessel CAD, and SBO 2/2 possible diverticulitis vs. GYN/colorectal malignancy (2018) never followed up and PSH L CEA 2/2 L ICA stenosis >70% (4/21) who presented with worsening back pain x1 week, CT A/P revealing perforated complicated diverticulitis with abscess, s/p drainage, sigmoid mass c/w rectal adenocarcinoma, not amenable to surgical resection and chemotherapy 2/2 poor performance status, now with 2 day history of nausea/vomiting, and 1 day of coffee ground emesis with associated acute drop in H/H, GI consulted for consideration of endoscopic evaluation.    #Anemia  GI re-consulted for acute drop in H/H in the setting of coffee ground emesis. Baseline Hgb 10-11, today downtrended to 6.1. With sump in place with dark output, also noted in ostomy bag. With uptrend in BUN:Cr ratio c/f possible UGIB source.   -s/p EGD (8/18) - LA grade D esophagitis. Multiple ulcers in the middle to lower third of the esophagus s/p biopsy to rule out CMV. 4 cm hiatal hernia. Non-erosive gastritis with scattered erythema consistent with NG tube trauma. Hematin on the gastric mucosa, bleeding likely from esophageal ulcer. Erythema in the duodenum compatible with duodenitis.    -c/w Protonix 40 mg BID  -Pepcid BID  -Carafate 1g QID (suspension)  -Follow-up pathology  -Repeat EGD in 8 weeks  -Closely monitor H/H   -No utility in Fe studies given pRBC transfusions  -Avoid NG tube placement.  -Maintain active T&S   -Transfusion goal as per primary team   -Ongoing discussion regarding GOC as per primary and Palliative care team     Thank you for allowing us to participate in the care of this patient.  GI will sign off. Please call back with any questions or concerns.     Lakesha Paul MD  PGY-5, Gastroenterology Fellow  pager: [phone]

## 2021-08-22 NOTE — PROGRESS NOTE ADULT - SUBJECTIVE AND OBJECTIVE BOX
GASTROENTEROLOGY PROGRESS NOTE  Patient seen and examined at bedside. Extubated this AM. Denies abd pain.     PERTINENT REVIEW OF SYSTEMS:  Unable to obtain given mental status.     Allergies    No Known Allergies    Intolerances      MEDICATIONS:  MEDICATIONS  (STANDING):  acetaminophen  IVPB .. 620 milliGRAM(s) IV Intermittent once  acetaminophen  IVPB .. 620 milliGRAM(s) IV Intermittent once  chlorhexidine 0.12% Liquid 15 milliLiter(s) Oral Mucosa every 12 hours  chlorhexidine 2% Cloths 1 Application(s) Topical daily  dextrose 40% Gel 15 Gram(s) Oral once  dextrose 5%. 1000 milliLiter(s) (50 mL/Hr) IV Continuous <Continuous>  dextrose 5%. 1000 milliLiter(s) (100 mL/Hr) IV Continuous <Continuous>  dextrose 50% Injectable 25 Gram(s) IV Push once  dextrose 50% Injectable 12.5 Gram(s) IV Push once  dextrose 50% Injectable 25 Gram(s) IV Push once  famotidine Injectable 20 milliGRAM(s) IV Push every 12 hours  fat emulsion (Fish Oil and Plant Based) 20% Infusion 1.22 Gm/kG/Day (20.8 mL/Hr) IV Continuous <Continuous>  fat emulsion (Fish Oil and Plant Based) 20% Infusion 1.22 Gm/kG/Day (20.83 mL/Hr) IV Continuous <Continuous>  glucagon  Injectable 1 milliGRAM(s) IntraMuscular once  heparin   Injectable 5000 Unit(s) SubCutaneous every 12 hours  insulin lispro (ADMELOG) corrective regimen sliding scale   SubCutaneous every 6 hours  lidocaine   4% Patch 1 Patch Transdermal every 24 hours  meropenem  IVPB 1000 milliGRAM(s) IV Intermittent every 12 hours  pantoprazole  Injectable 40 milliGRAM(s) IV Push every 12 hours  Parenteral Nutrition - Adult 1 Each (63 mL/Hr) TPN Continuous <Continuous>  sucralfate suspension 1 Gram(s) Oral two times a day    MEDICATIONS  (PRN):  metoprolol tartrate Injectable 2.5 milliGRAM(s) IV Push every 6 hours PRN SBP > 160, HR > 100  ondansetron Injectable 4 milliGRAM(s) IV Push every 6 hours PRN Nausea and/or Vomiting    Vital Signs Last 24 Hrs  T(C): 36.5 (22 Aug 2021 13:00), Max: 36.5 (22 Aug 2021 13:00)  T(F): 97.7 (22 Aug 2021 13:00), Max: 97.7 (22 Aug 2021 13:00)  HR: 84 (22 Aug 2021 13:00) (72 - 93)  BP: 149/65 (22 Aug 2021 09:00) (106/54 - 164/74)  BP(mean): 93 (22 Aug 2021 09:00) (78 - 106)  RR: 14 (22 Aug 2021 13:00) (12 - 28)  SpO2: 99% (22 Aug 2021 13:00) (92% - 100%)    08-21 @ 07:01  -  08-22 @ 07:00  --------------------------------------------------------  IN: 3074 mL / OUT: 820 mL / NET: 2254 mL    08-22 @ 07:01  -  08-22 @ 13:32  --------------------------------------------------------  IN: 377.8 mL / OUT: 195 mL / NET: 182.8 mL      PHYSICAL EXAM:    General: in no acute distress  HEENT: MMM, conjunctiva and sclera clear  Gastrointestinal: Soft non-tender slightly distended; incisions c/d/i  Skin: Warm and dry. No obvious rash    LABS:                        10.4   16.16 )-----------( 370      ( 22 Aug 2021 05:22 )             31.9     08-22    136  |  108  |  15  ----------------------------<  117<H>  3.8   |  23  |  0.52    Ca    7.4<L>      22 Aug 2021 05:22  Phos  2.1     08-22  Mg     2.2     08-22    TPro  x   /  Alb  2.2<L>  /  TBili  x   /  DBili  x   /  AST  x   /  ALT  x   /  AlkPhos  x   08-22          Urinalysis Basic - ( 21 Aug 2021 21:36 )    Color: Yellow / Appearance: Clear / SG: >=1.030 / pH: x  Gluc: x / Ketone: NEGATIVE  / Bili: Negative / Urobili: 0.2 E.U./dL   Blood: x / Protein: 100 mg/dL / Nitrite: NEGATIVE   Leuk Esterase: NEGATIVE / RBC: 3-5 /HPF / WBC 5-10 /HPF   Sq Epi: x / Non Sq Epi: 0-5 /HPF / Bacteria: Present /HPF                RADIOLOGY & ADDITIONAL STUDIES:  Reviewed

## 2021-08-22 NOTE — PROGRESS NOTE ADULT - SUBJECTIVE AND OBJECTIVE BOX
SUBJECTIVE: Pt seen and examined at bedside with Senior Resident. Initially intubated, on sedation vacation and later extubated. No c/o abdominal pain. Denies chest pain, dyspnea, abdominal pain, nausea, vomiting, leg pain.    MEDICATIONS  (STANDING):  acetaminophen  IVPB .. 620 milliGRAM(s) IV Intermittent once  acetaminophen  IVPB .. 620 milliGRAM(s) IV Intermittent once  chlorhexidine 0.12% Liquid 15 milliLiter(s) Oral Mucosa every 12 hours  chlorhexidine 2% Cloths 1 Application(s) Topical daily  dextrose 40% Gel 15 Gram(s) Oral once  dextrose 5%. 1000 milliLiter(s) (50 mL/Hr) IV Continuous <Continuous>  dextrose 5%. 1000 milliLiter(s) (100 mL/Hr) IV Continuous <Continuous>  dextrose 50% Injectable 25 Gram(s) IV Push once  dextrose 50% Injectable 12.5 Gram(s) IV Push once  dextrose 50% Injectable 25 Gram(s) IV Push once  famotidine Injectable 20 milliGRAM(s) IV Push every 12 hours  fat emulsion (Fish Oil and Plant Based) 20% Infusion 1.22 Gm/kG/Day (20.8 mL/Hr) IV Continuous <Continuous>  glucagon  Injectable 1 milliGRAM(s) IntraMuscular once  heparin   Injectable 5000 Unit(s) SubCutaneous every 12 hours  insulin lispro (ADMELOG) corrective regimen sliding scale   SubCutaneous every 6 hours  lidocaine   4% Patch 1 Patch Transdermal every 24 hours  meropenem  IVPB 1000 milliGRAM(s) IV Intermittent every 12 hours  pantoprazole  Injectable 40 milliGRAM(s) IV Push every 12 hours  Parenteral Nutrition - Adult 1 Each (63 mL/Hr) TPN Continuous <Continuous>  sucralfate suspension 1 Gram(s) Oral two times a day    MEDICATIONS  (PRN):  metoprolol tartrate Injectable 2.5 milliGRAM(s) IV Push every 6 hours PRN SBP > 160, HR > 100  ondansetron Injectable 4 milliGRAM(s) IV Push every 6 hours PRN Nausea and/or Vomiting      Vital Signs Last 24 Hrs  T(C): 36.8 (22 Aug 2021 13:35), Max: 36.8 (22 Aug 2021 13:35)  T(F): 98.3 (22 Aug 2021 13:35), Max: 98.3 (22 Aug 2021 13:35)  HR: 89 (22 Aug 2021 14:00) (72 - 93)  BP: 149/65 (22 Aug 2021 09:00) (112/56 - 164/74)  BP(mean): 93 (22 Aug 2021 09:00) (80 - 106)  RR: 18 (22 Aug 2021 14:00) (12 - 28)  SpO2: 97% (22 Aug 2021 14:00) (92% - 100%)    PHYSICAL EXAM      I&O's Detail    21 Aug 2021 07:01  -  22 Aug 2021 07:00  --------------------------------------------------------  IN:    Albumin 5%  - 250 mL: 250 mL    Fat Emulsion (Fish Oil &amp; Plant Based) 20% Infusion: 208 mL    FentaNYL: 15 mL    IV PiggyBack: 248 mL    IV PiggyBack: 100 mL    IV PiggyBack: 100 mL    Lactated Ringers: 1400 mL    Propofol: 3 mL    Sodium Chloride 0.9% Bolus: 750 mL  Total IN: 3074 mL    OUT:    Bulb (mL): 40 mL    Colostomy (mL): 0 mL    Indwelling Catheter - Urethral (mL): 580 mL    Nasogastric/Oral tube (mL): 200 mL  Total OUT: 820 mL    Total NET: 2254 mL      22 Aug 2021 07:01  -  22 Aug 2021 14:38  --------------------------------------------------------  IN:    Fat Emulsion (Fish Oil &amp; Plant Based) 20% Infusion: 62.8 mL    PPN (Peripheral Parenteral Nutrition): 441 mL  Total IN: 503.8 mL    OUT:    Indwelling Catheter - Urethral (mL): 285 mL    Nasogastric/Oral tube (mL): 30 mL  Total OUT: 315 mL    Total NET: 188.8 mL          LABS:                        10.4   16.16 )-----------( 370      ( 22 Aug 2021 05:22 )             31.9     08-22    136  |  108  |  15  ----------------------------<  117<H>  3.8   |  23  |  0.52    Ca    7.4<L>      22 Aug 2021 05:22  Phos  2.1     08-22  Mg     2.2     08-22    TPro  x   /  Alb  2.2<L>  /  TBili  x   /  DBili  x   /  AST  x   /  ALT  x   /  AlkPhos  x   08-22      Urinalysis Basic - ( 21 Aug 2021 21:36 )    Color: Yellow / Appearance: Clear / SG: >=1.030 / pH: x  Gluc: x / Ketone: NEGATIVE  / Bili: Negative / Urobili: 0.2 E.U./dL   Blood: x / Protein: 100 mg/dL / Nitrite: NEGATIVE   Leuk Esterase: NEGATIVE / RBC: 3-5 /HPF / WBC 5-10 /HPF   Sq Epi: x / Non Sq Epi: 0-5 /HPF / Bacteria: Present /HPF        RADIOLOGY & ADDITIONAL STUDIES: SUBJECTIVE: Pt seen and examined at bedside with Senior Resident. Initially intubated, on sedation vacation and later extubated. No c/o abdominal pain. Denies chest pain, dyspnea, abdominal pain, nausea, vomiting, leg pain.    MEDICATIONS  (STANDING):  acetaminophen  IVPB .. 620 milliGRAM(s) IV Intermittent once  acetaminophen  IVPB .. 620 milliGRAM(s) IV Intermittent once  chlorhexidine 0.12% Liquid 15 milliLiter(s) Oral Mucosa every 12 hours  chlorhexidine 2% Cloths 1 Application(s) Topical daily  dextrose 40% Gel 15 Gram(s) Oral once  dextrose 5%. 1000 milliLiter(s) (50 mL/Hr) IV Continuous <Continuous>  dextrose 5%. 1000 milliLiter(s) (100 mL/Hr) IV Continuous <Continuous>  dextrose 50% Injectable 25 Gram(s) IV Push once  dextrose 50% Injectable 12.5 Gram(s) IV Push once  dextrose 50% Injectable 25 Gram(s) IV Push once  famotidine Injectable 20 milliGRAM(s) IV Push every 12 hours  fat emulsion (Fish Oil and Plant Based) 20% Infusion 1.22 Gm/kG/Day (20.8 mL/Hr) IV Continuous <Continuous>  glucagon  Injectable 1 milliGRAM(s) IntraMuscular once  heparin   Injectable 5000 Unit(s) SubCutaneous every 12 hours  insulin lispro (ADMELOG) corrective regimen sliding scale   SubCutaneous every 6 hours  lidocaine   4% Patch 1 Patch Transdermal every 24 hours  meropenem  IVPB 1000 milliGRAM(s) IV Intermittent every 12 hours  pantoprazole  Injectable 40 milliGRAM(s) IV Push every 12 hours  Parenteral Nutrition - Adult 1 Each (63 mL/Hr) TPN Continuous <Continuous>  sucralfate suspension 1 Gram(s) Oral two times a day    MEDICATIONS  (PRN):  metoprolol tartrate Injectable 2.5 milliGRAM(s) IV Push every 6 hours PRN SBP > 160, HR > 100  ondansetron Injectable 4 milliGRAM(s) IV Push every 6 hours PRN Nausea and/or Vomiting      Vital Signs Last 24 Hrs  T(C): 36.8 (22 Aug 2021 13:35), Max: 36.8 (22 Aug 2021 13:35)  T(F): 98.3 (22 Aug 2021 13:35), Max: 98.3 (22 Aug 2021 13:35)  HR: 89 (22 Aug 2021 14:00) (72 - 93)  BP: 149/65 (22 Aug 2021 09:00) (112/56 - 164/74)  BP(mean): 93 (22 Aug 2021 09:00) (80 - 106)  RR: 18 (22 Aug 2021 14:00) (12 - 28)  SpO2: 97% (22 Aug 2021 14:00) (92% - 100%)    PHYSICAL EXAM  General: NAD, resting comfortably in bed  Pulmonary: Nonlabored, no respiratory distress  Cardiovascular: regular rate and rhythm   Abdominal: soft, non-distended, appropriately tender. Ostomy ppp, with no output yet. NGT in place. Retention red rubber noted. Incision cdi  Extremities: WWP, normal strength  Pulses: palpable distal pulses      I&O's Detail    21 Aug 2021 07:01  -  22 Aug 2021 07:00  --------------------------------------------------------  IN:    Albumin 5%  - 250 mL: 250 mL    Fat Emulsion (Fish Oil &amp; Plant Based) 20% Infusion: 208 mL    FentaNYL: 15 mL    IV PiggyBack: 248 mL    IV PiggyBack: 100 mL    IV PiggyBack: 100 mL    Lactated Ringers: 1400 mL    Propofol: 3 mL    Sodium Chloride 0.9% Bolus: 750 mL  Total IN: 3074 mL    OUT:    Bulb (mL): 40 mL    Colostomy (mL): 0 mL    Indwelling Catheter - Urethral (mL): 580 mL    Nasogastric/Oral tube (mL): 200 mL  Total OUT: 820 mL    Total NET: 2254 mL      22 Aug 2021 07:01  -  22 Aug 2021 14:38  --------------------------------------------------------  IN:    Fat Emulsion (Fish Oil &amp; Plant Based) 20% Infusion: 62.8 mL    PPN (Peripheral Parenteral Nutrition): 441 mL  Total IN: 503.8 mL    OUT:    Indwelling Catheter - Urethral (mL): 285 mL    Nasogastric/Oral tube (mL): 30 mL  Total OUT: 315 mL    Total NET: 188.8 mL          LABS:                        10.4   16.16 )-----------( 370      ( 22 Aug 2021 05:22 )             31.9     08-22    136  |  108  |  15  ----------------------------<  117<H>  3.8   |  23  |  0.52    Ca    7.4<L>      22 Aug 2021 05:22  Phos  2.1     08-22  Mg     2.2     08-22    TPro  x   /  Alb  2.2<L>  /  TBili  x   /  DBili  x   /  AST  x   /  ALT  x   /  AlkPhos  x   08-22      Urinalysis Basic - ( 21 Aug 2021 21:36 )    Color: Yellow / Appearance: Clear / SG: >=1.030 / pH: x  Gluc: x / Ketone: NEGATIVE  / Bili: Negative / Urobili: 0.2 E.U./dL   Blood: x / Protein: 100 mg/dL / Nitrite: NEGATIVE   Leuk Esterase: NEGATIVE / RBC: 3-5 /HPF / WBC 5-10 /HPF   Sq Epi: x / Non Sq Epi: 0-5 /HPF / Bacteria: Present /HPF        RADIOLOGY & ADDITIONAL STUDIES:

## 2021-08-23 LAB
ANION GAP SERPL CALC-SCNC: 5 MMOL/L — SIGNIFICANT CHANGE UP (ref 5–17)
BUN SERPL-MCNC: 14 MG/DL — SIGNIFICANT CHANGE UP (ref 7–23)
CALCIUM SERPL-MCNC: 7.1 MG/DL — LOW (ref 8.4–10.5)
CHLORIDE SERPL-SCNC: 102 MMOL/L — SIGNIFICANT CHANGE UP (ref 96–108)
CMV DNA CSF QL NAA+PROBE: SIGNIFICANT CHANGE UP
CO2 SERPL-SCNC: 25 MMOL/L — SIGNIFICANT CHANGE UP (ref 22–31)
CREAT SERPL-MCNC: 0.39 MG/DL — LOW (ref 0.5–1.3)
GLUCOSE BLDC GLUCOMTR-MCNC: 117 MG/DL — HIGH (ref 70–99)
GLUCOSE BLDC GLUCOMTR-MCNC: 126 MG/DL — HIGH (ref 70–99)
GLUCOSE BLDC GLUCOMTR-MCNC: 148 MG/DL — HIGH (ref 70–99)
GLUCOSE BLDC GLUCOMTR-MCNC: 153 MG/DL — HIGH (ref 70–99)
GLUCOSE SERPL-MCNC: 136 MG/DL — HIGH (ref 70–99)
HCT VFR BLD CALC: 32.5 % — LOW (ref 34.5–45)
HGB BLD-MCNC: 10.9 G/DL — LOW (ref 11.5–15.5)
MAGNESIUM SERPL-MCNC: 2 MG/DL — SIGNIFICANT CHANGE UP (ref 1.6–2.6)
MCHC RBC-ENTMCNC: 28.6 PG — SIGNIFICANT CHANGE UP (ref 27–34)
MCHC RBC-ENTMCNC: 33.5 GM/DL — SIGNIFICANT CHANGE UP (ref 32–36)
MCV RBC AUTO: 85.3 FL — SIGNIFICANT CHANGE UP (ref 80–100)
NRBC # BLD: 0 /100 WBCS — SIGNIFICANT CHANGE UP (ref 0–0)
PHOSPHATE SERPL-MCNC: 1.7 MG/DL — LOW (ref 2.5–4.5)
PLATELET # BLD AUTO: 400 K/UL — SIGNIFICANT CHANGE UP (ref 150–400)
POTASSIUM SERPL-MCNC: 3.8 MMOL/L — SIGNIFICANT CHANGE UP (ref 3.5–5.3)
POTASSIUM SERPL-SCNC: 3.8 MMOL/L — SIGNIFICANT CHANGE UP (ref 3.5–5.3)
RBC # BLD: 3.81 M/UL — SIGNIFICANT CHANGE UP (ref 3.8–5.2)
RBC # FLD: 16.9 % — HIGH (ref 10.3–14.5)
SODIUM SERPL-SCNC: 132 MMOL/L — LOW (ref 135–145)
WBC # BLD: 18.09 K/UL — HIGH (ref 3.8–10.5)
WBC # FLD AUTO: 18.09 K/UL — HIGH (ref 3.8–10.5)

## 2021-08-23 PROCEDURE — 71045 X-RAY EXAM CHEST 1 VIEW: CPT | Mod: 26,77

## 2021-08-23 PROCEDURE — 71045 X-RAY EXAM CHEST 1 VIEW: CPT | Mod: 26

## 2021-08-23 PROCEDURE — 99233 SBSQ HOSP IP/OBS HIGH 50: CPT

## 2021-08-23 PROCEDURE — 99291 CRITICAL CARE FIRST HOUR: CPT

## 2021-08-23 PROCEDURE — 99497 ADVNCD CARE PLAN 30 MIN: CPT

## 2021-08-23 RX ORDER — CHLORHEXIDINE GLUCONATE 213 G/1000ML
1 SOLUTION TOPICAL
Refills: 0 | Status: DISCONTINUED | OUTPATIENT
Start: 2021-08-23 | End: 2021-09-06

## 2021-08-23 RX ORDER — I.V. FAT EMULSION 20 G/100ML
0.5 EMULSION INTRAVENOUS
Qty: 20 | Refills: 0 | Status: DISCONTINUED | OUTPATIENT
Start: 2021-08-23 | End: 2021-08-23

## 2021-08-23 RX ORDER — SODIUM CHLORIDE 9 MG/ML
10 INJECTION INTRAMUSCULAR; INTRAVENOUS; SUBCUTANEOUS
Refills: 0 | Status: DISCONTINUED | OUTPATIENT
Start: 2021-08-23 | End: 2021-09-02

## 2021-08-23 RX ORDER — POTASSIUM PHOSPHATE, MONOBASIC POTASSIUM PHOSPHATE, DIBASIC 236; 224 MG/ML; MG/ML
21 INJECTION, SOLUTION INTRAVENOUS ONCE
Refills: 0 | Status: COMPLETED | OUTPATIENT
Start: 2021-08-23 | End: 2021-08-23

## 2021-08-23 RX ORDER — ELECTROLYTE SOLUTION,INJ
1 VIAL (ML) INTRAVENOUS
Refills: 0 | Status: DISCONTINUED | OUTPATIENT
Start: 2021-08-23 | End: 2021-08-23

## 2021-08-23 RX ORDER — ALBUMIN HUMAN 25 %
50 VIAL (ML) INTRAVENOUS EVERY 8 HOURS
Refills: 0 | Status: COMPLETED | OUTPATIENT
Start: 2021-08-23 | End: 2021-08-24

## 2021-08-23 RX ORDER — NYSTATIN CREAM 100000 [USP'U]/G
1 CREAM TOPICAL
Refills: 0 | Status: DISCONTINUED | OUTPATIENT
Start: 2021-08-23 | End: 2021-09-08

## 2021-08-23 RX ORDER — ASPIRIN/CALCIUM CARB/MAGNESIUM 324 MG
81 TABLET ORAL DAILY
Refills: 0 | Status: DISCONTINUED | OUTPATIENT
Start: 2021-08-23 | End: 2021-08-24

## 2021-08-23 RX ADMIN — Medication 62.5 MILLIMOLE(S): at 21:35

## 2021-08-23 RX ADMIN — HEPARIN SODIUM 5000 UNIT(S): 5000 INJECTION INTRAVENOUS; SUBCUTANEOUS at 17:11

## 2021-08-23 RX ADMIN — FAMOTIDINE 20 MILLIGRAM(S): 10 INJECTION INTRAVENOUS at 06:49

## 2021-08-23 RX ADMIN — I.V. FAT EMULSION 8.5 GM/KG/DAY: 20 EMULSION INTRAVENOUS at 22:01

## 2021-08-23 RX ADMIN — Medication 620 MILLIGRAM(S): at 08:07

## 2021-08-23 RX ADMIN — Medication 248 MILLIGRAM(S): at 16:49

## 2021-08-23 RX ADMIN — Medication 81 MILLIGRAM(S): at 13:53

## 2021-08-23 RX ADMIN — HEPARIN SODIUM 5000 UNIT(S): 5000 INJECTION INTRAVENOUS; SUBCUTANEOUS at 06:49

## 2021-08-23 RX ADMIN — LIDOCAINE 1 PATCH: 4 CREAM TOPICAL at 21:36

## 2021-08-23 RX ADMIN — PANTOPRAZOLE SODIUM 40 MILLIGRAM(S): 20 TABLET, DELAYED RELEASE ORAL at 13:52

## 2021-08-23 RX ADMIN — Medication 620 MILLIGRAM(S): at 17:50

## 2021-08-23 RX ADMIN — Medication 248 MILLIGRAM(S): at 07:44

## 2021-08-23 RX ADMIN — POTASSIUM PHOSPHATE, MONOBASIC POTASSIUM PHOSPHATE, DIBASIC 62.5 MILLIMOLE(S): 236; 224 INJECTION, SOLUTION INTRAVENOUS at 16:51

## 2021-08-23 RX ADMIN — MEROPENEM 200 MILLIGRAM(S): 1 INJECTION INTRAVENOUS at 19:01

## 2021-08-23 RX ADMIN — FAMOTIDINE 20 MILLIGRAM(S): 10 INJECTION INTRAVENOUS at 17:11

## 2021-08-23 RX ADMIN — Medication 248 MILLIGRAM(S): at 00:50

## 2021-08-23 RX ADMIN — Medication 1 EACH: at 17:45

## 2021-08-23 RX ADMIN — Medication 50 MILLILITER(S): at 16:48

## 2021-08-23 RX ADMIN — Medication 620 MILLIGRAM(S): at 01:05

## 2021-08-23 RX ADMIN — LIDOCAINE 1 PATCH: 4 CREAM TOPICAL at 10:31

## 2021-08-23 RX ADMIN — Medication 1 GRAM(S): at 17:16

## 2021-08-23 RX ADMIN — Medication 1: at 23:50

## 2021-08-23 RX ADMIN — PANTOPRAZOLE SODIUM 40 MILLIGRAM(S): 20 TABLET, DELAYED RELEASE ORAL at 00:47

## 2021-08-23 RX ADMIN — LIDOCAINE 1 PATCH: 4 CREAM TOPICAL at 18:49

## 2021-08-23 RX ADMIN — NYSTATIN CREAM 1 APPLICATION(S): 100000 CREAM TOPICAL at 17:11

## 2021-08-23 RX ADMIN — MEROPENEM 200 MILLIGRAM(S): 1 INJECTION INTRAVENOUS at 09:16

## 2021-08-23 RX ADMIN — Medication 50 MILLILITER(S): at 22:43

## 2021-08-23 NOTE — ADVANCED PRACTICE NURSE CONSULT - ASSESSMENT
Colostomy pouching system intact with scant serous effluent in pouch. Abdominal binder over abdomen. NG tube in place draining brown effluent. Patient unable to concentrate on ostomy teaching. Ostomy supplies left at the bedside.

## 2021-08-23 NOTE — PROGRESS NOTE ADULT - ASSESSMENT
89F PMH HTN, chronic back pain 2/2 L3,L4 compression fractures, triple vessel CAD, and SBO due to possible diverticulitis vs. GYN/colorectal malignancy (2018) never followed up and L ICA s/p L CEA (4/21). 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 Left gluteal IR drain placed (8/9). Colonoscopy w/ sigmoid mass bx on 8/11- final path invasive adenocarcinoma moderately differentiated. Taken to OR on 8/13 for diverting colostomy. In SICU for acute blood loss anemia, s/p EGD 8/18 showing duodenal ulceration, erosive esophagitis duodenitis, bx x 2, CT 8/20 w/ closed loop obstxn, s/p OR 8/20 exlap STEPHANIE.     NPO/IVF/TPN  NGT to LIWS; awaiting return of bowel function   Continue PPE  Meropenem (8/15-)  Pain/nausea control  OOB  Rest of care per SICU team

## 2021-08-23 NOTE — PROGRESS NOTE ADULT - SUBJECTIVE AND OBJECTIVE BOX
SUBJECTIVE: PIETRO overnight; Patient seen and examined at bedside, complains of abdominal pain, no nausea or vomiting no fevers or chills; continues to have cough, no SOB or chest pain. No other concerns or complaints     aspirin  chewable 81 milliGRAM(s) Oral daily  heparin   Injectable 5000 Unit(s) SubCutaneous every 12 hours  meropenem  IVPB 1000 milliGRAM(s) IV Intermittent every 12 hours  metoprolol tartrate Injectable 2.5 milliGRAM(s) IV Push every 6 hours PRN    MEDICATIONS  (PRN):  metoprolol tartrate Injectable 2.5 milliGRAM(s) IV Push every 6 hours PRN SBP > 160, HR > 100  ondansetron Injectable 4 milliGRAM(s) IV Push every 6 hours PRN Nausea and/or Vomiting  sodium chloride 0.9% lock flush 10 milliLiter(s) IV Push every 1 hour PRN Pre/post blood products, medications, blood draw, and to maintain line patency      ICU Vital Signs Last 24 Hrs  T(C): 36.4 (23 Aug 2021 05:38), Max: 36.8 (22 Aug 2021 13:35)  T(F): 97.5 (23 Aug 2021 05:38), Max: 98.3 (22 Aug 2021 13:35)  HR: 87 (23 Aug 2021 07:00) (75 - 89)  BP: 91/54 (22 Aug 2021 21:00) (91/54 - 149/65)  BP(mean): 71 (22 Aug 2021 21:00) (71 - 93)  ABP: 168/57 (23 Aug 2021 07:00) (110/76 - 169/69)  ABP(mean): 107 (23 Aug 2021 07:00) (83 - 137)  RR: 20 (23 Aug 2021 07:00) (14 - 27)  SpO2: 95% (23 Aug 2021 07:00) (93% - 100%)      I&O's Detail    22 Aug 2021 07:01  -  23 Aug 2021 07:00  --------------------------------------------------------  IN:    Fat Emulsion (Fish Oil &amp; Plant Based) 20% Infusion: 62.8 mL    Fat Emulsion (Fish Oil &amp; Plant Based) 20% Infusion: 210 mL    IV PiggyBack: 100 mL    IV PiggyBack: 220 mL    PPN (Peripheral Parenteral Nutrition): 1449 mL  Total IN: 2041.8 mL    OUT:    Bulb (mL): 20 mL    Colostomy (mL): 0 mL    Indwelling Catheter - Urethral (mL): 1150 mL    Nasogastric/Oral tube (mL): 30 mL  Total OUT: 1200 mL    Total NET: 841.8 mL      23 Aug 2021 07:01  -  23 Aug 2021 07:47  --------------------------------------------------------  IN:    Fat Emulsion (Fish Oil &amp; Plant Based) 20% Infusion: 21 mL    PPN (Peripheral Parenteral Nutrition): 63 mL  Total IN: 84 mL    OUT:  Total OUT: 0 mL    Total NET: 84 mL    General: NAD, resting comfortably in bed  Pulmonary: Nonlabored, no respiratory distress  Cardiovascular: regular rate and rhythm   Abdominal: soft, non-distended, appropriately tender. Ostomy p/p/ nonproductive, with no output yet. NGT in place. Retention red rubber noted. Incision cdi  Extremities: WWP, normal strength    LABS:                        10.9   18.09 )-----------( 400      ( 23 Aug 2021 04:42 )             32.5     08-23    132<L>  |  102  |  14  ----------------------------<  136<H>  3.8   |  25  |  0.39<L>    Ca    7.1<L>      23 Aug 2021 04:42  Phos  1.7     08-23  Mg     2.0     08-23    TPro  x   /  Alb  2.2<L>  /  TBili  x   /  DBili  x   /  AST  x   /  ALT  x   /  AlkPhos  x   08-22      Urinalysis Basic - ( 21 Aug 2021 21:36 )    Color: Yellow / Appearance: Clear / SG: >=1.030 / pH: x  Gluc: x / Ketone: NEGATIVE  / Bili: Negative / Urobili: 0.2 E.U./dL   Blood: x / Protein: 100 mg/dL / Nitrite: NEGATIVE   Leuk Esterase: NEGATIVE / RBC: 3-5 /HPF / WBC 5-10 /HPF   Sq Epi: x / Non Sq Epi: 0-5 /HPF / Bacteria: Present /HPF        RADIOLOGY & ADDITIONAL STUDIES:

## 2021-08-23 NOTE — PROCEDURAL SAFETY CHECKLIST WITH OR WITHOUT SEDATION - NSPOSTCOMMENTFT_GEN_ALL_CORE
Pt hypoxic/tachypenic during procedure to spO2 70%, nonrebreather placed on patient with effect, SPO2 raised to 95%. SBP stable. STAT chest xray performed for hypoxia and central line placement. Pt repositioned s/p procedure, now tolerating 6L NC. Monitoring continued.
patient remained stable throughout procedure. Versed 3mg and Fentanyl 50mcg given as ordered. tolerated well. Primary RN to continue with care/

## 2021-08-23 NOTE — CHART NOTE - NSCHARTNOTEFT_GEN_A_CORE
Admitting Diagnosis:   Patient is a 89y old  Female who presents with a chief complaint of Neck pain (23 Aug 2021 10:11)    PAST MEDICAL & SURGICAL HISTORY:  SBO (small bowel obstruction)  HTN (hypertension)  Chronic back pain  S/P wrist surgery    Current Nutrition Order: NPO diet  TPN regimen via cental line; 1100 ml @ 46 ml/hr providing 242g dex, 61g AA, 20g SMOF intralipids providing 1267 kcal, 61g pro, 1.35 g pro/kg IBW, 1.5g pro/kg ABW, GIR 4.09     PO Intake: Good (%) [   ]  Fair (50-75%) [   ] Poor (<25%) [   ]- N/A    GI Issues:   WDL, No reported n/v/d/c  +abd tender, no abd distention  Colostomy in place (0 ml/24hrs)  NGT to LIWS (30 ml/24hrs)    Pain:  No pain noted- well controlled at time of assessment    Skin Integrity:  WDL, ankush score 12, ecchymotic  Left gluteal Bulb (20 ml/24hrs)  +2 pitting edema BL arms  No pressure ulcers noted    Labs:   08-23    132<L>  |  102  |  14  ----------------------------<  136<H>  3.8   |  25  |  0.39<L>    Ca    7.1<L>      23 Aug 2021 04:42  Phos  1.7     08-23  Mg     2.0     08-23    TPro  x   /  Alb  2.2<L>  /  TBili  x   /  DBili  x   /  AST  x   /  ALT  x   /  AlkPhos  x   08-22    CAPILLARY BLOOD GLUCOSE    POCT Blood Glucose.: 117 mg/dL (23 Aug 2021 05:53)  POCT Blood Glucose.: 121 mg/dL (22 Aug 2021 23:10)  POCT Blood Glucose.: 109 mg/dL (22 Aug 2021 16:17)  POCT Blood Glucose.: 104 mg/dL (22 Aug 2021 11:22)    Medications:  MEDICATIONS  (STANDING):  acetaminophen  IVPB .. 620 milliGRAM(s) IV Intermittent once  albumin human 25% IVPB 50 milliLiter(s) IV Intermittent every 8 hours  aspirin  chewable 81 milliGRAM(s) Oral daily  chlorhexidine 2% Cloths 1 Application(s) Topical daily  dextrose 40% Gel 15 Gram(s) Oral once  dextrose 5%. 1000 milliLiter(s) (50 mL/Hr) IV Continuous <Continuous>  dextrose 5%. 1000 milliLiter(s) (100 mL/Hr) IV Continuous <Continuous>  dextrose 50% Injectable 25 Gram(s) IV Push once  dextrose 50% Injectable 12.5 Gram(s) IV Push once  dextrose 50% Injectable 25 Gram(s) IV Push once  famotidine Injectable 20 milliGRAM(s) IV Push every 12 hours  fat emulsion (Fish Oil and Plant Based) 20% Infusion 1.22 Gm/kG/Day (20.8 mL/Hr) IV Continuous <Continuous>  fat emulsion (Fish Oil and Plant Based) 20% Infusion 0.5 Gm/kG/Day (8.5 mL/Hr) IV Continuous <Continuous>  glucagon  Injectable 1 milliGRAM(s) IntraMuscular once  heparin   Injectable 5000 Unit(s) SubCutaneous every 12 hours  insulin lispro (ADMELOG) corrective regimen sliding scale   SubCutaneous every 6 hours  lidocaine   4% Patch 1 Patch Transdermal every 24 hours  meropenem  IVPB 1000 milliGRAM(s) IV Intermittent every 12 hours  nystatin Powder 1 Application(s) Topical two times a day  pantoprazole  Injectable 40 milliGRAM(s) IV Push every 12 hours  Parenteral Nutrition - Adult 1 Each (63 mL/Hr) TPN Continuous <Continuous>  Parenteral Nutrition - Adult 1 Each (46 mL/Hr) TPN Continuous <Continuous>  Parenteral Nutrition - Adult 1 Each (63 mL/Hr) TPN Continuous <Continuous>  potassium phosphate IVPB 21 milliMole(s) IV Intermittent once  sodium phosphate IVPB 15 milliMole(s) IV Intermittent once  sucralfate suspension 1 Gram(s) Oral two times a day    MEDICATIONS  (PRN):  metoprolol tartrate Injectable 2.5 milliGRAM(s) IV Push every 6 hours PRN SBP > 160, HR > 100  ondansetron Injectable 4 milliGRAM(s) IV Push every 6 hours PRN Nausea and/or Vomiting    Admitted Anthropometrics:  Height: 5'0" Weight: 90lbs, IBW 100lbs+/-10%, %IBW 90%, BMI 17.6     Weight Change: Per initial RD note pt reports UBW to be 110 lbs, current adm wt 90 lbs, indicates wt loss of 20 lbs/18% x 1 year.  8/13: 89.9 lbs  8/20 89.9lbs/ 92.4lbs    Estimated energy needs:   ABW (40.8kg) used to calculate energy needs due to pt's current body weight within % IBW (90%).   Nutrient needs based on [facility] standards of care for maintenance in older adults.   Needs adjusted for age, pre-op/post-op healing, severe protein calorie malnutrition  1020-1224kcal/day (25-30kcal/kg)  61-69g pro/day (1.5-1.7g pro/kg)  1224-1428ml fluid/day (30-35ml/kg]     Subjective:   89F PMH HTN, chronic back pain 2/2 L3,L4 compression fractures, triple vessel CAD, and SBO 2/2 possible diverticulitis vs. GYN/colorectal malignancy (2018) never followed up and PSH L CEA 2/2 L ICA stenosis >70% (4/21) who presented with worsening back pain x1 week. Endorses flatus and some recent weight loss. Transferred from medicine for perforated, complicated sigmoid diverticulitis vs. 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 S/p OR 8/13 for diverting colostomy. Pt now transferred to SICU for HD monitoring for acute blood loss. s/p EGD on 8/18: Erosive esophagitis and doudenitis with esophageal ulcers bx x2 likely cause of acute blood loss. Avoid NGT placement, per GI; f/u EGD in 8 weeks. S/p SLP eval 8/18 with recommendations to advance to CLD per sx team. Palliative to start GOC disc with family and pt 8/19- remains full code, palliative cont to follow. S/p OR- Ex lap w/ STEPHANIE and primary repair of tears x2 8/20. Pt initiated on PPN 8/21. Pt as weened and extubated 8/22. Now s/p PICC line placement 8/23 with plan to transition to TPN.     On assessment, pt resting in bed comfortably. Currently remains NPO with TPN meeting 100% of est needs. No n/v/d/c. No abd distention or discomfort noted. NGT to LIWS (30 ml/24hrs). Colostomy with no output- team suspecting ileus. Pertinent Labs: POCT 117H, Cre 0.39L, Glu 136H, Phos 1.7L- cont to replete lytes prn. Education deferred. Please see nutr recs below. RD to follow.     Previous Nutrition Diagnosis:  Severe protein calorie malnutrition as evidenced by NFPE performed, noted severe fat and muscle wasting, prolonged poor PO intake <75% of EER for 1 year.  Active [  x ]  Resolved [   ]    If resolved, new PES:     Goal: Pt to meet at least 75% of EER during hospital stay    Recommendations:  1. NPO  >>As medically feasible, recommend advancing CLD >> low fiber with Ensure Enlive BID (350 kcal, 20 g protein per serving) as tolerated  >> Cont to adjust consistency per SLP eval  2. If team wishes to cont with TPN: Recommend 242g dex, 61g AA, 20g SMOF intralipids providing 1267 kcal, 61g pro, 1.35 g pro/kg IBW, 1.5g pro/kg ABW, GIR 4.09  >>Recommend checking TG before starting TPN and then check TG weekly.   >>Check Mg, Phos, K daily and POC BG Q6hrs.   >>Trend daily weights. Fluids and lytes per MD discretion.   3. Pain and bowel regimen per team     Education: Deferred     Risk Level: High [ x  ] Moderate [   ] Low [   ].

## 2021-08-23 NOTE — PROGRESS NOTE ADULT - ASSESSMENT
89F PMH HTN, chronic back pain 2/2 L3,L4 compression fractures, triple vessel CAD, and SBO due to possible diverticulitis vs. GYN/colorectal malignancy (2018) never followed up and L ICA s/p L CEA (4/21). 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 Left gluteal IR drain placed (8/9). Colonoscopy w/ sigmoid mass bx on 8/11- final path invasive adenocarcinoma moderately differentiated. Taken to OR on 8/13 for diverting colostomy. In SICU for acute blood loss anemia, s/p EGD 8/18 showing duodenal ulceration, erosive esophagitis duodenitis, bx x 2, CT 8/20 w/ closed loop obstxn, s/p OR 8/20 exlap STEPHANIE. Patient doing well. Pulmonary infiltrates on xray likely congestion secondary to post-operative status and hypoalbuminemia; clinical suspicion augmented by generalized anasarca. No evidence of further GI bleeding. Minimal ostomy output.      Neuro: Tylenol prn.   CV: ICA s/p L CEA (4/21). ASA  Pulm: extubated 8/22, nasal canula.    GI/FEN: NPO, NGT LIWS until return of bowel function as delineated by ostomy output. PPN conversion to TPN with PICC placement today, EGD on 8/18: Erosive esophagitis and doudenitis with esophageal ulcers bx x2. protonix, pepcid, holding Carafate while NPO, Albumin 25%q8 to heighten intravascular osmotic gradient.   : S/p NM renal function study (wnl). Novak (8/18-. Urine output appropriate given weight.   ID: ESBL Kleb in intraabdominal abscess: Daina (8/13-), Buttock rash: Nystatin powder (8/23--)  Endo: ISS  PPX: SCDs. , SQH  Lines: PIVs, RIJ TLC (8/23-), R brachial Bebe discontinued (8/22-23)  Wounds: ostomy  PT/OT: re-ordered 8/22 for conditioning.

## 2021-08-23 NOTE — PROGRESS NOTE ADULT - SUBJECTIVE AND OBJECTIVE BOX
SUBJECTIVE: pt seen and examined. pain localized to surgical sites.     DNR in chart: No    If  [ ] blank, symptom not present  Dyspnea:                           [ ]Mild [ ]Moderate [ ]Severe  Anxiety:                             [ x]Mild [ ]Moderate [ ]Severe  Fatigue:                             [ ]Mild [ ]Moderate [x ]Severe  Nausea:                             [ ]Mild [ ]Moderate [ ]Severe  Loss of appetite:              [x ]Mild [ ]Moderate [ ]Severe  Constipation:                    [ ]Mild [ ]Moderate [ ]Severe  Grief Present                    [ ] Yes   [ ] No     Pain (Impact on QOL):    Location -    abdomen   Minimal acceptable level (0-10 scale): mild  Aggravating factors - recent surgery   Quality - sharp   Radiation - No  Severity (0-10 scale) -   Moderate   Timing - Intermittent    Rest of 12 point review of systems negative unless documented otherwise in note.    PEx:  T(C): 35.6 (08-23-21 @ 14:08), Max: 36.8 (08-22-21 @ 17:29)  HR: 84 (08-23-21 @ 13:00) (75 - 89)  BP: 107/53 (08-23-21 @ 09:00) (91/54 - 121/58)  RR: 21 (08-23-21 @ 13:00) (15 - 29)  SpO2: 94% (08-23-21 @ 13:00) (92% - 97%)  Wt(kg): --    GENERAL: thin, tired appearing elderly female, in no acute distress.  HEENT: Temporal wasting MMM.    RESPIRATORY: CTAB. No wheezing, rales or rhonchi.  CARDIOVASCULAR: RRR. No audible murmurs, rubs or gallops. +tele  GASTROINTESTINAL: Abdomen soft, NT. No arden-incisional drainage or purulence. Ostomy over left abdomen with <5cc feculent material. L transgluteal drain in place with ~2cc clear-green fluid collected; no arden-insertion site erythema or purulence.   NEUROLOGIC: aox3, 4/5 strength bilat upper extremities   INTEGUMENTARY: thin, pale, Significant ecchymosis over right wrist, forearm, and dorsal aspect of right hand.  MUSCULOSKELETAL: No cyanosis or clubbing. No gross deformities  Psych: calm, more withdrawn today   	   ALLERGIES: No Known Allergies      MEDICATIONS: REVIEWED  MEDICATIONS  (STANDING):  acetaminophen  IVPB .. 620 milliGRAM(s) IV Intermittent once  albumin human 25% IVPB 50 milliLiter(s) IV Intermittent every 8 hours  aspirin  chewable 81 milliGRAM(s) Oral daily  chlorhexidine 2% Cloths 1 Application(s) Topical daily  dextrose 40% Gel 15 Gram(s) Oral once  dextrose 5%. 1000 milliLiter(s) (50 mL/Hr) IV Continuous <Continuous>  dextrose 5%. 1000 milliLiter(s) (100 mL/Hr) IV Continuous <Continuous>  dextrose 50% Injectable 25 Gram(s) IV Push once  dextrose 50% Injectable 12.5 Gram(s) IV Push once  dextrose 50% Injectable 25 Gram(s) IV Push once  famotidine Injectable 20 milliGRAM(s) IV Push every 12 hours  fat emulsion (Fish Oil and Plant Based) 20% Infusion 1.22 Gm/kG/Day (20.8 mL/Hr) IV Continuous <Continuous>  fat emulsion (Fish Oil and Plant Based) 20% Infusion 0.5 Gm/kG/Day (8.5 mL/Hr) IV Continuous <Continuous>  glucagon  Injectable 1 milliGRAM(s) IntraMuscular once  heparin   Injectable 5000 Unit(s) SubCutaneous every 12 hours  insulin lispro (ADMELOG) corrective regimen sliding scale   SubCutaneous every 6 hours  lidocaine   4% Patch 1 Patch Transdermal every 24 hours  meropenem  IVPB 1000 milliGRAM(s) IV Intermittent every 12 hours  nystatin Powder 1 Application(s) Topical two times a day  pantoprazole  Injectable 40 milliGRAM(s) IV Push every 12 hours  Parenteral Nutrition - Adult 1 Each (63 mL/Hr) TPN Continuous <Continuous>  Parenteral Nutrition - Adult 1 Each (46 mL/Hr) TPN Continuous <Continuous>  potassium phosphate IVPB 21 milliMole(s) IV Intermittent once  sodium phosphate IVPB 15 milliMole(s) IV Intermittent once  sucralfate suspension 1 Gram(s) Oral two times a day    MEDICATIONS  (PRN):  metoprolol tartrate Injectable 2.5 milliGRAM(s) IV Push every 6 hours PRN SBP > 160, HR > 100  ondansetron Injectable 4 milliGRAM(s) IV Push every 6 hours PRN Nausea and/or Vomiting      CRITICAL CARE:  [ ]Shock Present  [ ]Septic [ ]Cardiogenic [ ]Neurologic [ ]Hypovolemic    [ ]Vasopressors [ ]Inotropes    [ ]Respiratory failure present   [ ]Mechanical Ventilation   [  ]Non-invasive ventilatory support   [ ]High-Flow  [ ]Acute  [ ]Chronic   [ ]Hypoxic  [ ]Hypercarbic   [ ]Other    [ ]Other organ failure     LABS: REVIEWED  CBC:                        10.9   18.09 )-----------( 400      ( 23 Aug 2021 04:42 )             32.5     CMP:    08-23    132<L>  |  102  |  14  ----------------------------<  136<H>  3.8   |  25  |  0.39<L>    Ca    7.1<L>      23 Aug 2021 04:42  Phos  1.7     08-23  Mg     2.0     08-23    TPro  x   /  Alb  2.2<L>  /  TBili  x   /  DBili  x   /  AST  x   /  ALT  x   /  AlkPhos  x   08-22      IMAGING: REVIEWED    Decision maker: The patient is able to participate in complex medical decision making conversations.   Legal surrogate: Cynthia Sims # [phone]    ADVANCE DIRECTIVES & GOALS OF CARE  - Full Code  - Multiple GOC conversations with palliative care and primary team: pt wanting to pursue all comprehensive medical interventions, HCP supportive. Repeated education provided re: prognosis, performance status, and pt's potential to receive disease directed therapy   - Palliative care info/support provided	        PSYCHOSOCIAL-SPIRITUAL ASSESSMENT: Reviewed, Care plan unchanged    REFERRALS:  [ ] palliative social work [ ]Chaplaincy  [ ]Hospice  [ ]Child Life  [ ]Social Work  [ ]Case management [  ]Holistic Therapy      SUBJECTIVE: pt seen and examined. pain localized to surgical sites.     DNR in chart: No    If  [ ] blank, symptom not present  Dyspnea:                           [ ]Mild [ ]Moderate [ ]Severe  Anxiety:                             [ x]Mild [ ]Moderate [ ]Severe  Fatigue:                             [ ]Mild [ ]Moderate [x ]Severe  Nausea:                             [ ]Mild [ ]Moderate [ ]Severe  Loss of appetite:              [x ]Mild [ ]Moderate [ ]Severe  Constipation:                    [ ]Mild [ ]Moderate [ ]Severe  Grief Present                    [ ] Yes   [ ] No     Pain (Impact on QOL):    Location -    abdomen   Minimal acceptable level (0-10 scale): mild  Aggravating factors - recent surgery   Quality - sharp   Radiation - No  Severity (0-10 scale) -   Moderate   Timing - Intermittent    Rest of 12 point review of systems negative unless documented otherwise in note.    PEx:  T(C): 35.6 (08-23-21 @ 14:08), Max: 36.8 (08-22-21 @ 17:29)  HR: 84 (08-23-21 @ 13:00) (75 - 89)  BP: 107/53 (08-23-21 @ 09:00) (91/54 - 121/58)  RR: 21 (08-23-21 @ 13:00) (15 - 29)  SpO2: 94% (08-23-21 @ 13:00) (92% - 97%)  Wt(kg): --    GENERAL: thin, tired appearing elderly female, in no acute distress.  HEENT: Temporal wasting MMM.    RESPIRATORY: CTAB. No wheezing, rales or rhonchi. Fair inspiratory effort.   CARDIOVASCULAR: RRR. No audible murmurs, rubs or gallops. +tele  GASTROINTESTINAL: Abdomen soft, NT. No arden-incisional drainage or purulence. Ostomy over left abdomen with <5cc feculent material. L transgluteal drain in place with ~2cc clear-green fluid collected; no arden-insertion site erythema or purulence.   NEUROLOGIC: aox3, 4/5 strength bilat upper extremities   INTEGUMENTARY: thin, pale, Significant ecchymosis over right wrist, forearm, and dorsal aspect of right hand.  MUSCULOSKELETAL: No cyanosis or clubbing. No gross deformities  Psych: calm, more withdrawn today   	   ALLERGIES: No Known Allergies      MEDICATIONS: REVIEWED  MEDICATIONS  (STANDING):  acetaminophen  IVPB .. 620 milliGRAM(s) IV Intermittent once  albumin human 25% IVPB 50 milliLiter(s) IV Intermittent every 8 hours  aspirin  chewable 81 milliGRAM(s) Oral daily  chlorhexidine 2% Cloths 1 Application(s) Topical daily  dextrose 40% Gel 15 Gram(s) Oral once  dextrose 5%. 1000 milliLiter(s) (50 mL/Hr) IV Continuous <Continuous>  dextrose 5%. 1000 milliLiter(s) (100 mL/Hr) IV Continuous <Continuous>  dextrose 50% Injectable 25 Gram(s) IV Push once  dextrose 50% Injectable 12.5 Gram(s) IV Push once  dextrose 50% Injectable 25 Gram(s) IV Push once  famotidine Injectable 20 milliGRAM(s) IV Push every 12 hours  fat emulsion (Fish Oil and Plant Based) 20% Infusion 1.22 Gm/kG/Day (20.8 mL/Hr) IV Continuous <Continuous>  fat emulsion (Fish Oil and Plant Based) 20% Infusion 0.5 Gm/kG/Day (8.5 mL/Hr) IV Continuous <Continuous>  glucagon  Injectable 1 milliGRAM(s) IntraMuscular once  heparin   Injectable 5000 Unit(s) SubCutaneous every 12 hours  insulin lispro (ADMELOG) corrective regimen sliding scale   SubCutaneous every 6 hours  lidocaine   4% Patch 1 Patch Transdermal every 24 hours  meropenem  IVPB 1000 milliGRAM(s) IV Intermittent every 12 hours  nystatin Powder 1 Application(s) Topical two times a day  pantoprazole  Injectable 40 milliGRAM(s) IV Push every 12 hours  Parenteral Nutrition - Adult 1 Each (63 mL/Hr) TPN Continuous <Continuous>  Parenteral Nutrition - Adult 1 Each (46 mL/Hr) TPN Continuous <Continuous>  potassium phosphate IVPB 21 milliMole(s) IV Intermittent once  sodium phosphate IVPB 15 milliMole(s) IV Intermittent once  sucralfate suspension 1 Gram(s) Oral two times a day    MEDICATIONS  (PRN):  metoprolol tartrate Injectable 2.5 milliGRAM(s) IV Push every 6 hours PRN SBP > 160, HR > 100  ondansetron Injectable 4 milliGRAM(s) IV Push every 6 hours PRN Nausea and/or Vomiting      CRITICAL CARE:  [ ]Shock Present  [ ]Septic [ ]Cardiogenic [ ]Neurologic [ ]Hypovolemic    [ ]Vasopressors [ ]Inotropes    [ ]Respiratory failure present   [ ]Mechanical Ventilation   [  ]Non-invasive ventilatory support   [ ]High-Flow  [ ]Acute  [ ]Chronic   [ ]Hypoxic  [ ]Hypercarbic   [ ]Other    [ ]Other organ failure     LABS: REVIEWED  CBC:                        10.9   18.09 )-----------( 400      ( 23 Aug 2021 04:42 )             32.5     CMP:    08-23    132<L>  |  102  |  14  ----------------------------<  136<H>  3.8   |  25  |  0.39<L>    Ca    7.1<L>      23 Aug 2021 04:42  Phos  1.7     08-23  Mg     2.0     08-23    TPro  x   /  Alb  2.2<L>  /  TBili  x   /  DBili  x   /  AST  x   /  ALT  x   /  AlkPhos  x   08-22      IMAGING: REVIEWED    Decision maker: The patient is able to participate in complex medical decision making conversations.   Legal surrogate: Cynthia Sims # [phone]    ADVANCE DIRECTIVES & GOALS OF CARE  - Full Code  - Multiple GOC conversations with palliative care and primary team: pt wanting to pursue all comprehensive medical interventions, HCP supportive. Repeated education provided re: prognosis, performance status, and pt's potential to receive disease directed therapy.  - Palliative care info/support provided	        PSYCHOSOCIAL-SPIRITUAL ASSESSMENT: Reviewed, Care plan unchanged    REFERRALS:  [ ] palliative social work [ ]Chaplaincy  [ ]Hospice  [ ]Child Life  [ ]Social Work  [ ]Case management [  ]Holistic Therapy

## 2021-08-23 NOTE — PROVIDER CONTACT NOTE (OTHER) - ACTION/TREATMENT ORDERED:
STAT chest xray performed for hypoxia and central line placement. Pt now placed on 6L NC, tolerating well. Will continue to monitor pt.

## 2021-08-23 NOTE — PROGRESS NOTE ADULT - ASSESSMENT
A/P Cardiovascular: stable, prn HTN meds, P and BP ok  pulmonary: Sats ok and her function is acceptable.  Effusions noted.    GI: ileus still, would leave NG for now.  NO BS yet.  ID: meropenem  Nutrition:  on PPN, to get PICC today and then PPN  Renal: UO ok, decrease IV fluids as possible.  Na+ is decreasing.  NS for all drips.  Mini heparin   Soaks for cephalic phlebitis  Was present for ICU team presentation and exam. Dr. eHrnandez attending this week.

## 2021-08-23 NOTE — PROGRESS NOTE ADULT - SUBJECTIVE AND OBJECTIVE BOX
POD 3 s/p ex lap, STEPHANIE, repair enterotomy, serosal injury, POD 10 s/p stoma formation    In ICU  Extubated x 24 hours  Awake, alert.  NG, urbina, pelvic drain tube in place    Meropenem    Vital Signs Last 24 Hrs  T(C): 35.6 (23 Aug 2021 08:56), Max: 36.8 (22 Aug 2021 13:35)  T(F): 96.1 (23 Aug 2021 08:56), Max: 98.3 (22 Aug 2021 13:35)  HR: 85 (23 Aug 2021 08:00) (75 - 89)  BP: 91/54 (22 Aug 2021 21:00) (91/54 - 121/58)  BP(mean): 71 (22 Aug 2021 21:00) (71 - 77)  RR: 20 (23 Aug 2021 08:00) (14 - 27)  SpO2: 95% (23 Aug 2021 08:00) (93% - 99%)    abd: mild distension, incision intact, stoma viable, no output, no BS  ext: mild edema noted, L hand less edema from yesterday    UO:  1150 ml/24 hrs, 600 ml/last shift  N ml/24hr  CORDELL drain: 20 ml/24 hrs                          10.9   18.09 )-----------( 400      ( 23 Aug 2021 04:42 )             32.5   08-23    132<L>  |  102  |  14  ----------------------------<  136<H>  3.8   |  25  |  0.39<L>    Ca    7.1<L>      23 Aug 2021 04:42  Phos  1.7     08  Mg     2.0         TPro  x   /  Alb  2.2<L>  /  TBili  x   /  DBili  x   /  AST  x   /  ALT  x   /  AlkPhos  x       CXR: increased effusions, ? atelectasis bilaterally (vs infiltrate)

## 2021-08-23 NOTE — PROGRESS NOTE ADULT - SUBJECTIVE AND OBJECTIVE BOX
STATUS POST:  8/9: Left transgluteal IR drain placement   8/11: c-scope with biopsy of mass  8/13: diverting colostomy   8/18: EGD:Erosive esophagitis and doudenitis with esophageal ulcers bx   8/20: OR- Ex lap w/ STEPHANIE and primary repair of tears x2; EBL 25. IVF 1500, 2U pRBC    OVERNIGHT EVENTS/SUBJECTIVE: PIETRO  INTERVAL HISTORY: Patient seen and evaluated. Patient says that she is experiencing mild abdominal discomfort localized to her surgical sites. Patient otherwise denies any nausea, emesis, CP, or SOB.     ICU Vital Signs Last 24 Hrs  T(C): 36.4 (23 Aug 2021 05:38), Max: 36.8 (22 Aug 2021 13:35)  T(F): 97.5 (23 Aug 2021 05:38), Max: 98.3 (22 Aug 2021 13:35)  HR: 87 (23 Aug 2021 07:00) (75 - 89)  BP: 91/54 (22 Aug 2021 21:00) (91/54 - 149/65)  BP(mean): 71 (22 Aug 2021 21:00) (71 - 93)  ABP: 168/57 (23 Aug 2021 07:00) (110/76 - 169/69)  ABP(mean): 107 (23 Aug 2021 07:00) (83 - 137)  RR: 20 (23 Aug 2021 07:00) (14 - 27)  SpO2: 95% (23 Aug 2021 07:00) (93% - 100%)      I&O's Detail    22 Aug 2021 07:01  -  23 Aug 2021 07:00  --------------------------------------------------------  IN:    Fat Emulsion (Fish Oil &amp; Plant Based) 20% Infusion: 62.8 mL    Fat Emulsion (Fish Oil &amp; Plant Based) 20% Infusion: 210 mL    IV PiggyBack: 100 mL    IV PiggyBack: 220 mL    PPN (Peripheral Parenteral Nutrition): 1449 mL  Total IN: 2041.8 mL    OUT:    Bulb (mL): 20 mL    Colostomy (mL): 0 mL    Indwelling Catheter - Urethral (mL): 1150 mL    Nasogastric/Oral tube (mL): 30 mL  Total OUT: 1200 mL    Total NET: 841.8 mL      23 Aug 2021 07:01  -  23 Aug 2021 07:47  --------------------------------------------------------  IN:    Fat Emulsion (Fish Oil &amp; Plant Based) 20% Infusion: 21 mL    PPN (Peripheral Parenteral Nutrition): 63 mL  Total IN: 84 mL    OUT:  Total OUT: 0 mL    Total NET: 84 mL            ABG - ( 21 Aug 2021 18:27 )  pH, Arterial: 7.46  pH, Blood: x     /  pCO2: 28    /  pO2: 92    / HCO3: 20    / Base Excess: -2.6  /  SaO2: 98.6                MEDICATIONS  (STANDING):  acetaminophen  IVPB .. 620 milliGRAM(s) IV Intermittent once  chlorhexidine 2% Cloths 1 Application(s) Topical daily  dextrose 40% Gel 15 Gram(s) Oral once  dextrose 5%. 1000 milliLiter(s) (50 mL/Hr) IV Continuous <Continuous>  dextrose 5%. 1000 milliLiter(s) (100 mL/Hr) IV Continuous <Continuous>  dextrose 50% Injectable 25 Gram(s) IV Push once  dextrose 50% Injectable 12.5 Gram(s) IV Push once  dextrose 50% Injectable 25 Gram(s) IV Push once  famotidine Injectable 20 milliGRAM(s) IV Push every 12 hours  fat emulsion (Fish Oil and Plant Based) 20% Infusion 1.22 Gm/kG/Day (20.8 mL/Hr) IV Continuous <Continuous>  glucagon  Injectable 1 milliGRAM(s) IntraMuscular once  heparin   Injectable 5000 Unit(s) SubCutaneous every 12 hours  insulin lispro (ADMELOG) corrective regimen sliding scale   SubCutaneous every 6 hours  lidocaine   4% Patch 1 Patch Transdermal every 24 hours  meropenem  IVPB 1000 milliGRAM(s) IV Intermittent every 12 hours  pantoprazole  Injectable 40 milliGRAM(s) IV Push every 12 hours  Parenteral Nutrition - Adult 1 Each (63 mL/Hr) TPN Continuous <Continuous>  Parenteral Nutrition - Adult 1 Each (63 mL/Hr) TPN Continuous <Continuous>  potassium phosphate IVPB 21 milliMole(s) IV Intermittent once  sucralfate suspension 1 Gram(s) Oral two times a day    MEDICATIONS  (PRN):  metoprolol tartrate Injectable 2.5 milliGRAM(s) IV Push every 6 hours PRN SBP > 160, HR > 100  ondansetron Injectable 4 milliGRAM(s) IV Push every 6 hours PRN Nausea and/or Vomiting      NUTRITION/IVF:     CENTRAL LINE:  LOCATION:   DATE INSERTED:  CVP:  SCVO2:    BUSTAMANTE:   DATE INSERTED:    A-LINE:    LOCATION:   DATE INSERTED:   SVV:  CO/CI:     CHEST TUBE:  LOCATION:  DATE INSERTED: OUTPUT/24 HRS:  SUCTION/WATER SEAL:     NG/OG TUBE:  DATE INSERTED:  OUTPUT/24 HRS:    MISC:     PHYSICAL EXAM  GENERAL: Alert, well developed, in no acute distress.  MENTAL STATUS: Appropriate affect.  HEENT: EOMI. MMM.  Trachea midline. No lymph node swelling or tenderness.  RESPIRATORY: CTAB. No wheezing, rales or rhonchi.  CARDIOVASCULAR: RRR. No audible murmurs, rubs or gallops.   GASTROINTESTINAL: Abdomen soft, mildly tender to palpation over the surgical site. No rebound tenderness or guarding. No arden-incisional drainage or purulence. Ostomy over left abdomen with <5cc feculent material. L transgluteal drain in place with ~2cc clear-green fluid collected; no arden-insertion site erythema or purulence.   NEUROLOGIC: Cranial nerves II-XII grossly intact. No focal neurological deficits. Moves all extremities spontaneously. Sensation intact bilaterally and symmetric. 5/5  strength bilaterally. 4/5 strength bilateral triceps/biceps/deltoids. 5/5 strength bilateral calves/quadriceps.   INTEGUMENTARY: Mild ecchymosis over the R forearm and the dorsal aspect of the right wrist. Mild L forearm erythema and edema.   MUSCULOSKELETAL: No cyanosis or clubbing. No gross deformities.   LYMPHATIC: Palpation of neck reveals no swelling or tenderness of neck nodes.      LABS:  CBC Full  -  ( 23 Aug 2021 04:42 )  WBC Count : 18.09 K/uL  RBC Count : 3.81 M/uL  Hemoglobin : 10.9 g/dL  Hematocrit : 32.5 %  Platelet Count - Automated : 400 K/uL  Mean Cell Volume : 85.3 fl  Mean Cell Hemoglobin : 28.6 pg  Mean Cell Hemoglobin Concentration : 33.5 gm/dL  Auto Neutrophil # : x  Auto Lymphocyte # : x  Auto Monocyte # : x  Auto Eosinophil # : x  Auto Basophil # : x  Auto Neutrophil % : x  Auto Lymphocyte % : x  Auto Monocyte % : x  Auto Eosinophil % : x  Auto Basophil % : x    08-23    132<L>  |  102  |  14  ----------------------------<  136<H>  3.8   |  25  |  0.39<L>    Ca    7.1<L>      23 Aug 2021 04:42  Phos  1.7     08-23  Mg     2.0     08-23    TPro  x   /  Alb  2.2<L>  /  TBili  x   /  DBili  x   /  AST  x   /  ALT  x   /  AlkPhos  x   08-22      Urinalysis Basic - ( 21 Aug 2021 21:36 )    Color: Yellow / Appearance: Clear / SG: >=1.030 / pH: x  Gluc: x / Ketone: NEGATIVE  / Bili: Negative / Urobili: 0.2 E.U./dL   Blood: x / Protein: 100 mg/dL / Nitrite: NEGATIVE   Leuk Esterase: NEGATIVE / RBC: 3-5 /HPF / WBC 5-10 /HPF   Sq Epi: x / Non Sq Epi: 0-5 /HPF / Bacteria: Present /HPF      RECENT CULTURES:      LIVER FUNCTIONS - ( 22 Aug 2021 05:22 )  Alb: 2.2 g/dL / Pro: x     / ALK PHOS: x     / ALT: x     / AST: x     / GGT: x               CAPILLARY BLOOD GLUCOSE      RADIOLOGY & ADDITIONAL STUDIES:   STATUS POST:  8/9: Left transgluteal IR drain placement   8/11: c-scope with biopsy of mass  8/13: diverting colostomy   8/18: EGD:Erosive esophagitis and doudenitis with esophageal ulcers bx   8/20: OR- Ex lap w/ STEPHANIE and primary repair of tears x2; EBL 25. IVF 1500, 2U pRBC    OVERNIGHT EVENTS/SUBJECTIVE: PIETRO  INTERVAL HISTORY: Patient seen and evaluated. Patient says that she is experiencing mild abdominal discomfort localized to her surgical sites. Patient otherwise denies any nausea, emesis, CP, or SOB.     ICU Vital Signs Last 24 Hrs  T(C): 36.4 (23 Aug 2021 05:38), Max: 36.8 (22 Aug 2021 13:35)  T(F): 97.5 (23 Aug 2021 05:38), Max: 98.3 (22 Aug 2021 13:35)  HR: 87 (23 Aug 2021 07:00) (75 - 89)  BP: 91/54 (22 Aug 2021 21:00) (91/54 - 149/65)  BP(mean): 71 (22 Aug 2021 21:00) (71 - 93)  ABP: 168/57 (23 Aug 2021 07:00) (110/76 - 169/69)  ABP(mean): 107 (23 Aug 2021 07:00) (83 - 137)  RR: 20 (23 Aug 2021 07:00) (14 - 27)  SpO2: 95% (23 Aug 2021 07:00) (93% - 100%)      I&O's Detail    22 Aug 2021 07:01  -  23 Aug 2021 07:00  --------------------------------------------------------  IN:    Fat Emulsion (Fish Oil &amp; Plant Based) 20% Infusion: 62.8 mL    Fat Emulsion (Fish Oil &amp; Plant Based) 20% Infusion: 210 mL    IV PiggyBack: 100 mL    IV PiggyBack: 220 mL    PPN (Peripheral Parenteral Nutrition): 1449 mL  Total IN: 2041.8 mL    OUT:    Bulb (mL): 20 mL    Colostomy (mL): 0 mL    Indwelling Catheter - Urethral (mL): 1150 mL    Nasogastric/Oral tube (mL): 30 mL  Total OUT: 1200 mL    Total NET: 841.8 mL      23 Aug 2021 07:01  -  23 Aug 2021 07:47  --------------------------------------------------------  IN:    Fat Emulsion (Fish Oil &amp; Plant Based) 20% Infusion: 21 mL    PPN (Peripheral Parenteral Nutrition): 63 mL  Total IN: 84 mL    OUT:  Total OUT: 0 mL    Total NET: 84 mL            ABG - ( 21 Aug 2021 18:27 )  pH, Arterial: 7.46  pH, Blood: x     /  pCO2: 28    /  pO2: 92    / HCO3: 20    / Base Excess: -2.6  /  SaO2: 98.6                MEDICATIONS  (STANDING):  acetaminophen  IVPB .. 620 milliGRAM(s) IV Intermittent once  chlorhexidine 2% Cloths 1 Application(s) Topical daily  dextrose 40% Gel 15 Gram(s) Oral once  dextrose 5%. 1000 milliLiter(s) (50 mL/Hr) IV Continuous <Continuous>  dextrose 5%. 1000 milliLiter(s) (100 mL/Hr) IV Continuous <Continuous>  dextrose 50% Injectable 25 Gram(s) IV Push once  dextrose 50% Injectable 12.5 Gram(s) IV Push once  dextrose 50% Injectable 25 Gram(s) IV Push once  famotidine Injectable 20 milliGRAM(s) IV Push every 12 hours  fat emulsion (Fish Oil and Plant Based) 20% Infusion 1.22 Gm/kG/Day (20.8 mL/Hr) IV Continuous <Continuous>  glucagon  Injectable 1 milliGRAM(s) IntraMuscular once  heparin   Injectable 5000 Unit(s) SubCutaneous every 12 hours  insulin lispro (ADMELOG) corrective regimen sliding scale   SubCutaneous every 6 hours  lidocaine   4% Patch 1 Patch Transdermal every 24 hours  meropenem  IVPB 1000 milliGRAM(s) IV Intermittent every 12 hours  pantoprazole  Injectable 40 milliGRAM(s) IV Push every 12 hours  Parenteral Nutrition - Adult 1 Each (63 mL/Hr) TPN Continuous <Continuous>  Parenteral Nutrition - Adult 1 Each (63 mL/Hr) TPN Continuous <Continuous>  potassium phosphate IVPB 21 milliMole(s) IV Intermittent once  sucralfate suspension 1 Gram(s) Oral two times a day    MEDICATIONS  (PRN):  metoprolol tartrate Injectable 2.5 milliGRAM(s) IV Push every 6 hours PRN SBP > 160, HR > 100  ondansetron Injectable 4 milliGRAM(s) IV Push every 6 hours PRN Nausea and/or Vomiting      NUTRITION/IVF:     CENTRAL LINE:  LOCATION:   DATE INSERTED:  CVP:  SCVO2:    BUSTAMANTE:   DATE INSERTED:    A-LINE:    LOCATION:   DATE INSERTED:   SVV:  CO/CI:     CHEST TUBE:  LOCATION:  DATE INSERTED: OUTPUT/24 HRS:  SUCTION/WATER SEAL:     NG/OG TUBE:  DATE INSERTED:  OUTPUT/24 HRS:    MISC:     PHYSICAL EXAM  GENERAL: Alert, well developed, in no acute distress.  MENTAL STATUS: Appropriate affect.  HEENT: EOMI. MMM.  Trachea midline. No lymph node swelling or tenderness.  RESPIRATORY: CTAB. No wheezing, rales or rhonchi.  CARDIOVASCULAR: RRR. No audible murmurs, rubs or gallops.   GASTROINTESTINAL: Abdomen soft, mildly tender to palpation over the surgical site. No rebound tenderness or guarding. No arden-incisional drainage or purulence. Ostomy over left abdomen with <5cc feculent material. L transgluteal drain in place with ~2cc clear-green fluid collected; no arden-insertion site erythema or purulence.   NEUROLOGIC: Cranial nerves II-XII grossly intact. No focal neurological deficits. Moves all extremities spontaneously. Sensation intact bilaterally and symmetric. 5/5  strength bilaterally. 4/5 strength bilateral triceps/biceps/deltoids. 5/5 strength bilateral calves/quadriceps.   INTEGUMENTARY: Mild ecchymosis over the R forearm and the dorsal aspect of the right wrist. Mild L forearm erythema and edema.   MUSCULOSKELETAL: No cyanosis or clubbing. No gross deformities.   EXTREMITIES: anasarca, cool distally  LYMPHATIC: Palpation of neck reveals no swelling or tenderness of neck nodes.      LABS:  CBC Full  -  ( 23 Aug 2021 04:42 )  WBC Count : 18.09 K/uL  RBC Count : 3.81 M/uL  Hemoglobin : 10.9 g/dL  Hematocrit : 32.5 %  Platelet Count - Automated : 400 K/uL  Mean Cell Volume : 85.3 fl  Mean Cell Hemoglobin : 28.6 pg  Mean Cell Hemoglobin Concentration : 33.5 gm/dL  Auto Neutrophil # : x  Auto Lymphocyte # : x  Auto Monocyte # : x  Auto Eosinophil # : x  Auto Basophil # : x  Auto Neutrophil % : x  Auto Lymphocyte % : x  Auto Monocyte % : x  Auto Eosinophil % : x  Auto Basophil % : x    08-23    132<L>  |  102  |  14  ----------------------------<  136<H>  3.8   |  25  |  0.39<L>    Ca    7.1<L>      23 Aug 2021 04:42  Phos  1.7     08-23  Mg     2.0     08-23    TPro  x   /  Alb  2.2<L>  /  TBili  x   /  DBili  x   /  AST  x   /  ALT  x   /  AlkPhos  x   08-22      Urinalysis Basic - ( 21 Aug 2021 21:36 )    Color: Yellow / Appearance: Clear / SG: >=1.030 / pH: x  Gluc: x / Ketone: NEGATIVE  / Bili: Negative / Urobili: 0.2 E.U./dL   Blood: x / Protein: 100 mg/dL / Nitrite: NEGATIVE   Leuk Esterase: NEGATIVE / RBC: 3-5 /HPF / WBC 5-10 /HPF   Sq Epi: x / Non Sq Epi: 0-5 /HPF / Bacteria: Present /HPF      RECENT CULTURES:      LIVER FUNCTIONS - ( 22 Aug 2021 05:22 )  Alb: 2.2 g/dL / Pro: x     / ALK PHOS: x     / ALT: x     / AST: x     / GGT: x               CAPILLARY BLOOD GLUCOSE      RADIOLOGY & ADDITIONAL STUDIES:

## 2021-08-23 NOTE — PROGRESS NOTE ADULT - ATTENDING COMMENTS
Patient now has more UO after fluid resuscitation yesterday but more effusions and anasarca. Will replete albumin and hope to diurese but renal function has improved. To discuss stopping antibiotics with ID or limiting course to 2 weeks, (now 11 days). Her respiratory status is better on 2-4 lpm oxygen. We have placed a central line and converting PPN to TPN. Hope to increase mobility. Continue carafate, PPI and H2 blockade. Patient now has more UO after fluid resuscitation yesterday but more effusions and anasarca. Will replete albumin and hope to diurese but renal function has improved. To discuss stopping antibiotics with ID or limiting course to 2 weeks, (now 11 days). Her respiratory status is better on 2-4 lpm oxygen. We have placed a central line and converting PPN to TPN for severe protein calorie malnutrition. Hope to increase mobility. Continue carafate, PPI and H2 blockade.

## 2021-08-23 NOTE — PROGRESS NOTE ADULT - CONVERSATION DETAILS
Pts chart reviewed. Discussion with pt at bedside, lethargic and deferring conversations today dt fatigue.     Discussion with Cynthia. Cynthia respective of pt making her own decisions.     incomplete Pts chart reviewed. GOC conversations on 8/20 and over the weekend: pt wanting to pursue all interventions that are offered to her; wants to prolong life even at the cost of quality. Discussion with pt at bedside, lethargic and deferring conversations today dt fatigue.     Discussion with Cynthia. Cynthia understands pt with advanced cancer with limited treatment options. Pt not a candidate for any disease directed therapy at this time dt her pelvic infection. Her ECOG 3/4 would not permit major elective surgery nor allow her to tolerate chemotherapy. Cynthia expresses understanding.

## 2021-08-23 NOTE — CONSULT NOTE ADULT - SUBJECTIVE AND OBJECTIVE BOX
Vascular Access Service Consult Note    89yFemaleHEALTH ISSUES - PROBLEM Dx:           Diagnosis: sigmoid mass    Indications for Vascular Access (Check all that apply)  [  ]  Antibiotic Therapy       Antibiotic Prescribed:                                                                             Expected Duration of Therapy:               [  ]  IV Hydration  [ x ]  Total Parenteral Nutrition  [  ]  Chemotherapy  [  ]  Difficult Venous Access  [  ]  CVP monitoring  [  ]  Medications with high potential for tissue necrosis on extravasation  [  ]  Other    Screening (Check all that apply)  Previous Radiation to chest  [  ] Yes      [x  ]  No  Breast Cancer                          [  ] Left     [  ]  Right    [x  ]  No  Lymph Node Dissection         [  ] Left     [  ]  Right    [ x ]  No  Pacemaker or ICD                   [  ] Left     [  ]  Right    [x  ]  No  Upper Extremity DVT             [  ] Left     [  ]  Right    [ x ]  No  Chronic Kidney Disease         [  ]  Yes     [ x ]  No  Hemodialysis                           [  ]  Yes     [x  ]  No  AV Fistula/ Graft                     [  ]  Left    [  ]  Right    [  x]  No  Temp>101F in past 24 H       [  ]  Yes     [  x]  No  H/O PICC/Midline                   [  ]  Yes     [x  ]  No    Lab data:                        10.9   18.09 )-----------( 400      ( 23 Aug 2021 04:42 )             32.5     08-23    132<L>  |  102  |  14  ----------------------------<  136<H>  3.8   |  25  |  0.39<L>    Ca    7.1<L>      23 Aug 2021 04:42  Phos  1.7     08-23  Mg     2.0     08-23    TPro  x   /  Alb  2.2<L>  /  TBili  x   /  DBili  x   /  AST  x   /  ALT  x   /  AlkPhos  x   08-22              I have reviewed the chart, interviewed and examined the patient and determined that this patient:  [ x ] Is a candidate for a PICC line  [  ] Is a candidate for a Midline  [  ] Is not a candidate for vascular access device (reason)    Lumens:    [  ] Single  [x  ] Double

## 2021-08-23 NOTE — PROGRESS NOTE ADULT - ASSESSMENT
89F PMHx of chronic back pain 2/2 L3,L4 compression fractures, triple vessel CAD,  L CEA 2/2 L ICA stenosis >70% (4/21) who presented 8/8 with fatigue and unintentional weight loss over last several months.  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 Hospital cb sp diverting colostomy (8/13), sp Left gluteal IR drain placed for gluteal abscess (8/9), pelvic infection ID following. SICU for monitoring suspected acute blood loss 2/2 to duodenal ulceration on EGD. sp exlap 8/20. Palliative consulted for GOC.

## 2021-08-23 NOTE — PROCEDURE NOTE - NSPOSTPRCRAD_GEN_A_CORE
16cm/central line located in the superior vena cava/depth of insertion/no pneumothorax/post-procedure radiography performed

## 2021-08-23 NOTE — PROCEDURE NOTE - ADDITIONAL PROCEDURE DETAILS
ultrasound guided PIV placement
Post CVC placed in R IJ at 16cm  Post CVC placement CXR reviewed with read negative for PTX with central line tip at the cavoatrial junction  Ok to use CVC

## 2021-08-24 LAB
ALBUMIN SERPL ELPH-MCNC: 2.6 G/DL — LOW (ref 3.3–5)
ALP SERPL-CCNC: 75 U/L — SIGNIFICANT CHANGE UP (ref 40–120)
ALT FLD-CCNC: 8 U/L — LOW (ref 10–45)
ANION GAP SERPL CALC-SCNC: 8 MMOL/L — SIGNIFICANT CHANGE UP (ref 5–17)
ANISOCYTOSIS BLD QL: SLIGHT — SIGNIFICANT CHANGE UP
APPEARANCE UR: CLEAR — SIGNIFICANT CHANGE UP
AST SERPL-CCNC: 17 U/L — SIGNIFICANT CHANGE UP (ref 10–40)
BACTERIA # UR AUTO: PRESENT /HPF
BASOPHILS # BLD AUTO: 0.06 K/UL — SIGNIFICANT CHANGE UP (ref 0–0.2)
BASOPHILS # BLD AUTO: 0.1 K/UL — SIGNIFICANT CHANGE UP (ref 0–0.2)
BASOPHILS NFR BLD AUTO: 0.3 % — SIGNIFICANT CHANGE UP (ref 0–2)
BASOPHILS NFR BLD AUTO: 0.3 % — SIGNIFICANT CHANGE UP (ref 0–2)
BILIRUB DIRECT SERPL-MCNC: <0.2 MG/DL — SIGNIFICANT CHANGE UP (ref 0–0.2)
BILIRUB INDIRECT FLD-MCNC: SIGNIFICANT CHANGE UP MG/DL (ref 0.2–1)
BILIRUB SERPL-MCNC: 0.4 MG/DL — SIGNIFICANT CHANGE UP (ref 0.2–1.2)
BILIRUB UR-MCNC: NEGATIVE — SIGNIFICANT CHANGE UP
BUN SERPL-MCNC: 14 MG/DL — SIGNIFICANT CHANGE UP (ref 7–23)
BURR CELLS BLD QL SMEAR: SIGNIFICANT CHANGE UP
CALCIUM SERPL-MCNC: 7.5 MG/DL — LOW (ref 8.4–10.5)
CHLORIDE SERPL-SCNC: 102 MMOL/L — SIGNIFICANT CHANGE UP (ref 96–108)
CO2 SERPL-SCNC: 27 MMOL/L — SIGNIFICANT CHANGE UP (ref 22–31)
COLOR SPEC: YELLOW — SIGNIFICANT CHANGE UP
COMMENT - URINE: SIGNIFICANT CHANGE UP
CREAT SERPL-MCNC: 0.43 MG/DL — LOW (ref 0.5–1.3)
DIFF PNL FLD: ABNORMAL
EOSINOPHIL # BLD AUTO: 0.39 K/UL — SIGNIFICANT CHANGE UP (ref 0–0.5)
EOSINOPHIL # BLD AUTO: 0.4 K/UL — SIGNIFICANT CHANGE UP (ref 0–0.5)
EOSINOPHIL NFR BLD AUTO: 1.9 % — SIGNIFICANT CHANGE UP (ref 0–6)
EOSINOPHIL NFR BLD AUTO: 2 % — SIGNIFICANT CHANGE UP (ref 0–6)
EPI CELLS # UR: SIGNIFICANT CHANGE UP /HPF (ref 0–5)
GLUCOSE BLDC GLUCOMTR-MCNC: 127 MG/DL — HIGH (ref 70–99)
GLUCOSE BLDC GLUCOMTR-MCNC: 130 MG/DL — HIGH (ref 70–99)
GLUCOSE BLDC GLUCOMTR-MCNC: 142 MG/DL — HIGH (ref 70–99)
GLUCOSE BLDC GLUCOMTR-MCNC: 146 MG/DL — HIGH (ref 70–99)
GLUCOSE SERPL-MCNC: 137 MG/DL — HIGH (ref 70–99)
GLUCOSE UR QL: NEGATIVE — SIGNIFICANT CHANGE UP
HCT VFR BLD CALC: 33.8 % — LOW (ref 34.5–45)
HGB BLD-MCNC: 11.2 G/DL — LOW (ref 11.5–15.5)
HYPOCHROMIA BLD QL: SLIGHT — SIGNIFICANT CHANGE UP
IMM GRANULOCYTES NFR BLD AUTO: 1.5 % — SIGNIFICANT CHANGE UP (ref 0–1.5)
KETONES UR-MCNC: NEGATIVE — SIGNIFICANT CHANGE UP
LEUKOCYTE ESTERASE UR-ACNC: ABNORMAL
LG PLATELETS BLD QL AUTO: PRESENT — SIGNIFICANT CHANGE UP
LYMPHOCYTES # BLD AUTO: 1.26 K/UL — SIGNIFICANT CHANGE UP (ref 1–3.3)
LYMPHOCYTES # BLD AUTO: 1.3 K/UL — SIGNIFICANT CHANGE UP (ref 1–3.3)
LYMPHOCYTES # BLD AUTO: 5 % — LOW (ref 13–44)
LYMPHOCYTES # BLD AUTO: 6.2 % — LOW (ref 13–44)
MACROCYTES BLD QL: SLIGHT — SIGNIFICANT CHANGE UP
MAGNESIUM SERPL-MCNC: 2.6 MG/DL — SIGNIFICANT CHANGE UP (ref 1.6–2.6)
MANUAL SMEAR VERIFICATION: SIGNIFICANT CHANGE UP
MCHC RBC-ENTMCNC: 28.2 PG — SIGNIFICANT CHANGE UP (ref 27–34)
MCHC RBC-ENTMCNC: 33.1 GM/DL — SIGNIFICANT CHANGE UP (ref 32–36)
MCV RBC AUTO: 85.1 FL — SIGNIFICANT CHANGE UP (ref 80–100)
MICROCYTES BLD QL: SLIGHT — SIGNIFICANT CHANGE UP
MONOCYTES # BLD AUTO: 0.9 K/UL — SIGNIFICANT CHANGE UP (ref 0–0.9)
MONOCYTES # BLD AUTO: 0.9 K/UL — SIGNIFICANT CHANGE UP (ref 0–0.9)
MONOCYTES NFR BLD AUTO: 2 % — SIGNIFICANT CHANGE UP (ref 2–14)
MONOCYTES NFR BLD AUTO: 4.4 % — SIGNIFICANT CHANGE UP (ref 2–14)
NEUTROPHILS # BLD AUTO: 17.5 K/UL — HIGH (ref 1.8–7.4)
NEUTROPHILS # BLD AUTO: 17.5 K/UL — HIGH (ref 1.8–7.4)
NEUTROPHILS NFR BLD AUTO: 85.7 % — HIGH (ref 43–77)
NEUTROPHILS NFR BLD AUTO: 91 % — HIGH (ref 43–77)
NITRITE UR-MCNC: NEGATIVE — SIGNIFICANT CHANGE UP
NRBC # BLD: 0 /100 WBCS — SIGNIFICANT CHANGE UP (ref 0–0)
OVALOCYTES BLD QL SMEAR: SLIGHT — SIGNIFICANT CHANGE UP
PH UR: 8 — SIGNIFICANT CHANGE UP (ref 5–8)
PHOSPHATE SERPL-MCNC: 3.3 MG/DL — SIGNIFICANT CHANGE UP (ref 2.5–4.5)
PLAT MORPH BLD: ABNORMAL
PLATELET # BLD AUTO: 431 K/UL — HIGH (ref 150–400)
POIKILOCYTOSIS BLD QL AUTO: SIGNIFICANT CHANGE UP
POLYCHROMASIA BLD QL SMEAR: SLIGHT — SIGNIFICANT CHANGE UP
POTASSIUM SERPL-MCNC: 4.4 MMOL/L — SIGNIFICANT CHANGE UP (ref 3.5–5.3)
POTASSIUM SERPL-SCNC: 4.4 MMOL/L — SIGNIFICANT CHANGE UP (ref 3.5–5.3)
PROT SERPL-MCNC: 5 G/DL — LOW (ref 6–8.3)
PROT UR-MCNC: 30 MG/DL
RBC # BLD: 3.97 M/UL — SIGNIFICANT CHANGE UP (ref 3.8–5.2)
RBC # FLD: 16.8 % — HIGH (ref 10.3–14.5)
RBC BLD AUTO: ABNORMAL
RBC CASTS # UR COMP ASSIST: ABNORMAL /HPF
SARS-COV-2 RNA SPEC QL NAA+PROBE: SIGNIFICANT CHANGE UP
SCHISTOCYTES BLD QL AUTO: SLIGHT — SIGNIFICANT CHANGE UP
SODIUM SERPL-SCNC: 137 MMOL/L — SIGNIFICANT CHANGE UP (ref 135–145)
SP GR SPEC: 1.01 — SIGNIFICANT CHANGE UP (ref 1–1.03)
SPHEROCYTES BLD QL SMEAR: SLIGHT — SIGNIFICANT CHANGE UP
TRIGL SERPL-MCNC: 96 MG/DL — SIGNIFICANT CHANGE UP
UROBILINOGEN FLD QL: 0.2 E.U./DL — SIGNIFICANT CHANGE UP
WBC # BLD: 20.62 K/UL — HIGH (ref 3.8–10.5)
WBC # FLD AUTO: 20.62 K/UL — HIGH (ref 3.8–10.5)
WBC UR QL: > 10 /HPF

## 2021-08-24 PROCEDURE — 74176 CT ABD & PELVIS W/O CONTRAST: CPT | Mod: 26

## 2021-08-24 PROCEDURE — 71045 X-RAY EXAM CHEST 1 VIEW: CPT | Mod: 26

## 2021-08-24 PROCEDURE — 99291 CRITICAL CARE FIRST HOUR: CPT

## 2021-08-24 PROCEDURE — 93010 ELECTROCARDIOGRAM REPORT: CPT

## 2021-08-24 RX ORDER — ELECTROLYTE SOLUTION,INJ
1 VIAL (ML) INTRAVENOUS
Refills: 0 | Status: DISCONTINUED | OUTPATIENT
Start: 2021-08-24 | End: 2021-08-24

## 2021-08-24 RX ORDER — HALOPERIDOL DECANOATE 100 MG/ML
0.5 INJECTION INTRAMUSCULAR ONCE
Refills: 0 | Status: DISCONTINUED | OUTPATIENT
Start: 2021-08-24 | End: 2021-08-24

## 2021-08-24 RX ORDER — LIDOCAINE 4 G/100G
1 CREAM TOPICAL EVERY 24 HOURS
Refills: 0 | Status: DISCONTINUED | OUTPATIENT
Start: 2021-08-24 | End: 2021-09-08

## 2021-08-24 RX ORDER — HALOPERIDOL DECANOATE 100 MG/ML
0.5 INJECTION INTRAMUSCULAR ONCE
Refills: 0 | Status: COMPLETED | OUTPATIENT
Start: 2021-08-24 | End: 2021-08-25

## 2021-08-24 RX ORDER — FUROSEMIDE 40 MG
20 TABLET ORAL ONCE
Refills: 0 | Status: COMPLETED | OUTPATIENT
Start: 2021-08-24 | End: 2021-08-24

## 2021-08-24 RX ORDER — I.V. FAT EMULSION 20 G/100ML
0.5 EMULSION INTRAVENOUS
Qty: 20 | Refills: 0 | Status: DISCONTINUED | OUTPATIENT
Start: 2021-08-24 | End: 2021-08-24

## 2021-08-24 RX ORDER — ASPIRIN/CALCIUM CARB/MAGNESIUM 324 MG
300 TABLET ORAL DAILY
Refills: 0 | Status: DISCONTINUED | OUTPATIENT
Start: 2021-08-24 | End: 2021-09-01

## 2021-08-24 RX ADMIN — Medication 300 MILLIGRAM(S): at 22:26

## 2021-08-24 RX ADMIN — Medication 50 MILLILITER(S): at 14:03

## 2021-08-24 RX ADMIN — Medication 1 EACH: at 18:45

## 2021-08-24 RX ADMIN — I.V. FAT EMULSION 8.5 GM/KG/DAY: 20 EMULSION INTRAVENOUS at 22:26

## 2021-08-24 RX ADMIN — Medication 50 MILLILITER(S): at 06:52

## 2021-08-24 RX ADMIN — NYSTATIN CREAM 1 APPLICATION(S): 100000 CREAM TOPICAL at 07:04

## 2021-08-24 RX ADMIN — FAMOTIDINE 20 MILLIGRAM(S): 10 INJECTION INTRAVENOUS at 18:52

## 2021-08-24 RX ADMIN — CHLORHEXIDINE GLUCONATE 1 APPLICATION(S): 213 SOLUTION TOPICAL at 05:49

## 2021-08-24 RX ADMIN — LIDOCAINE 1 PATCH: 4 CREAM TOPICAL at 10:26

## 2021-08-24 RX ADMIN — PANTOPRAZOLE SODIUM 40 MILLIGRAM(S): 20 TABLET, DELAYED RELEASE ORAL at 14:02

## 2021-08-24 RX ADMIN — LIDOCAINE 1 PATCH: 4 CREAM TOPICAL at 18:24

## 2021-08-24 RX ADMIN — LIDOCAINE 1 PATCH: 4 CREAM TOPICAL at 23:43

## 2021-08-24 RX ADMIN — HEPARIN SODIUM 5000 UNIT(S): 5000 INJECTION INTRAVENOUS; SUBCUTANEOUS at 18:00

## 2021-08-24 RX ADMIN — PANTOPRAZOLE SODIUM 40 MILLIGRAM(S): 20 TABLET, DELAYED RELEASE ORAL at 00:43

## 2021-08-24 RX ADMIN — Medication 1 GRAM(S): at 05:49

## 2021-08-24 RX ADMIN — HEPARIN SODIUM 5000 UNIT(S): 5000 INJECTION INTRAVENOUS; SUBCUTANEOUS at 05:49

## 2021-08-24 RX ADMIN — MEROPENEM 200 MILLIGRAM(S): 1 INJECTION INTRAVENOUS at 07:44

## 2021-08-24 RX ADMIN — LIDOCAINE 1 PATCH: 4 CREAM TOPICAL at 16:43

## 2021-08-24 RX ADMIN — FAMOTIDINE 20 MILLIGRAM(S): 10 INJECTION INTRAVENOUS at 05:49

## 2021-08-24 RX ADMIN — Medication 20 MILLIGRAM(S): at 17:33

## 2021-08-24 NOTE — PROGRESS NOTE ADULT - SUBJECTIVE AND OBJECTIVE BOX
With agitation O/N, inadvertently removed NGT. This AM easily arousable and oriented x 2-3. Denies N/V. Pain controlled.    08-24    137  |  102  |  14  ----------------------------<  137<H>  4.4   |  27  |  0.43<L>    Ca    7.5<L>      24 Aug 2021 05:52  Phos  3.3     08-24  Mg     2.6     08-24                            11.2   20.62 )-----------( 431      ( 24 Aug 2021 05:52 )             33.8     Urinalysis (08.21.21 @ 21:36)   pH Urine: 6.0   Glucose Qualitative, Urine: NEGATIVE   Blood, Urine: Large   Color: Yellow   Urine Appearance: Clear   Bilirubin: Negative   Ketone - Urine: NEGATIVE   Specific Gravity: >=1.030   Protein, Urine: 100 mg/dL   Urobilinogen: 0.2 E.U./dL   Nitrite: NEGATIVE   Leukocyte Esterase Concentration: NEGATIVE

## 2021-08-24 NOTE — PROGRESS NOTE ADULT - ASSESSMENT
89F PMH HTN, chronic back pain 2/2 L3,L4 compression fractures, triple vessel CAD, and SBO due to possible diverticulitis vs. GYN/colorectal malignancy (2018) never followed up and L ICA s/p L CEA (4/21). 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 Left gluteal IR drain placed (8/9). Colonoscopy w/ sigmoid mass bx on 8/11- final path invasive adenocarcinoma moderately differentiated. Taken to OR on 8/13 for diverting colostomy. In SICU for acute blood loss anemia, s/p EGD 8/18 showing duodenal ulceration, erosive esophagitis duodenitis, bx x 2, CT 8/20 w/ closed loop obstxn, s/p OR 8/20 exlap STEPHANIE.     Neuro: Tylenol prn, seroquel qHS for acute delirium; likely related to environment, unlikely infectious etiology; consider 0.5mg Haldol and hold Famotidine if acute delirium manifests overnight.    CV: ICA s/p L CEA (4/21). ASA for SHx L CEA, HD stable. Albumin 25%q8; last dose received 14:00 today. Holding Plavix.    Pulm: extubated 8/22, 3L nasal canula with appropriate O2 saturation.   GI/FEN: NPO, NG removed by patient last night, TPN via R IJ central line. EGD on 8/18: Erosive esophagitis and duodenitis with esophageal ulcers bx x2; no recurrent hematemesis; protonix, pepcid, Carafate per GI recs, Transgluteal drain left in place; will consider removal via IR if CT a/p shows decompression of the arden-colonic collection.  : S/p NM renal function study (wnl). Continue urbina per Dr. Holder.   ID: Increased WBC this am- will obtain CT a/p, ESBL Ecoli in intraabdominal abscess Buttock rash: Nystatin powder (8/23--). Discontinue Daina (8/13-8/24) given unclear, long-term goals of care and un-resolving WBC count; will re-establish consultation with ID if CT a/p demonstrates acute change,  Endo: ISS  PPX: SCDs., SQH  Lines: PIVs, RIJ TLC (8/23-), R brachial A-line discontinued yesterday (8/22-23)  Wounds: ostomy  PT/OT: re-ordered 8/22

## 2021-08-24 NOTE — PROGRESS NOTE ADULT - ATTENDING COMMENTS
Today some encephalopathy, likely ICU delirium vs. infection vs medication (famotidine). CT shows no new collections but left hydronephrosis and WBC with pyuria, urine culture sent.  wants to repeat imaging in 48 hours. Renal fxn stable, will diurese with effusions and edema. TPN written. Will likely pull gluteal drain tomorrow with serous drainage.

## 2021-08-24 NOTE — PROGRESS NOTE ADULT - ASSESSMENT
89 YOF with perforated pelvic mass, bx proven adenocarcinoma. S/p loop transverse colostomy on 8/13, and exlap and STEPHANIE and enterorrhaphy on 8/20.    Somewhat more distended on adbominal exam, still without stoma fx. No TTP. No N/V. With rising WBC but remains HDS, one episode of low grade tachy O/N.    C/w NPO, TPB via CVL, PPI  Routine stoma care  Recall urology to eval for ureteral stent infx given recent bacturia  Possible CTAP +/- CT chest in next 48h if spikes a fever or WBC continues to trend upwards  PT/OT 89 YOF with perforated pelvic mass, bx proven adenocarcinoma with perforation. S/p IR drainage of pelvic abscess, and s/p loop transverse colostomy on 8/13, and exlap and STEPHANIE and enterorrhaphy on 8/20 for SBO. Course c/b GIB from esophageal ulcers.    Somewhat more distended on abdominal exam, still without stoma fx. No TTP. No N/V. With rising WBC but remains HDS, one episode of low grade tachy O/N.    C/w NPO, TPB via CVL, PPI  Routine stoma care  Recall urology to eval for ureteral stent infx given recent bacturia  Possible CTAP +/- CT chest in next 48h if spikes a fever or WBC continues to trend upwards  PT/OT 89 YOF with perforated pelvic mass, bx proven adenocarcinoma with perforation. S/p IR drainage of pelvic abscess, and s/p loop transverse colostomy on 8/13, and exlap and STEPHANIE and enterorrhaphy on 8/20 for SBO. Course c/b GIB from esophageal ulcers.    Somewhat more distended on abdominal exam, still without stoma fx. No TTP. No N/V. With rising WBC but remains HDS, one episode of low grade tachy O/N.    C/w NPO, TPN via CVL, PPI  Routine stoma care  Recall urology to eval for ureteral stent infx given recent bacturia  Possible CTAP +/- CT chest in next 48h if spikes a fever or WBC continues to trend upwards  PT/OT

## 2021-08-24 NOTE — PROGRESS NOTE ADULT - ASSESSMENT
89F PMH HTN, chronic back pain 2/2 L3,L4 compression fractures, triple vessel CAD, and SBO due to possible diverticulitis vs. GYN/colorectal malignancy (2018) never followed up and L ICA s/p L CEA (4/21). 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 Left gluteal IR drain placed (8/9). Colonoscopy w/ sigmoid mass bx on 8/11- final path invasive adenocarcinoma moderately differentiated. Taken to OR on 8/13 for diverting colostomy. In SICU for acute blood loss anemia, s/p EGD 8/18 showing duodenal ulceration, erosive esophagitis duodenitis, bx x 2, CT 8/20 w/ closed loop obstxn, s/p OR 8/20 exlap STEPHANIE; now with persistent WBC with source unknown.     NPO/IVF/TPN  WBC uptrend possible source , Chest abdomen (enterostomy site); has been afebrile, mostly non tachy, continue Meropenem; will reconsult Urology to reevaluate possible infection from stents. if WBC continues to trend up will consider CT A/P  Drain with minimal output will consider d/c  Pain/nausea control  OOB/IS  Rest of care per SICU team

## 2021-08-24 NOTE — PROGRESS NOTE ADULT - SUBJECTIVE AND OBJECTIVE BOX
POD 4, 11  In ICU  Meropenem    Extubated x 2 days, with reasonable RR and O2 Sat %  Awake and alerted  C/o pain in abdomen, suprapubic region (3/10)  Patient self d/c'ed the NG tube last night (not replaced as output had been quite low).  Novak remains in place    Vital Signs Last 24 Hrs  T(C): 36.8 (24 Aug 2021 06:00), Max: 37 (23 Aug 2021 17:00)  T(F): 98.3 (24 Aug 2021 06:00), Max: 98.6 (23 Aug 2021 17:00)  HR: 93 (24 Aug 2021 09:55) (84 - 106)  BP: 118/70 (24 Aug 2021 09:55) (92/68 - 150/110)  BP(mean): 90 (24 Aug 2021 09:55) (68 - 126)  RR: 31 (24 Aug 2021 07:00) (20 - 59)  SpO2: 100% (24 Aug 2021 09:55) (82% - 100%)  lungs clear  cor S1S2  abd: mild distension, incision intact, stoma viable without output, BS scant  ext: pulses intact    UO: 860 ml/last 24 hrs; > 600 from 7-12 this AM.  NG: < 50 ml prior 24 hrs  pelvic drain:  < 50 ml / 24hrs                          11.2   20.62 )-----------( 431      ( 24 Aug 2021 05:52 )             33.8   08-24    137  |  102  |  14  ----------------------------<  137<H>  4.4   |  27  |  0.43<L>    Ca    7.5<L>      24 Aug 2021 05:52  Phos  3.3     08-24  Mg     2.6     08-24    TPro  5.0<L>  /  Alb  2.6<L>  /  TBili  0.4  /  DBili  <0.2  /  AST  17  /  ALT  8<L>  /  AlkPhos  75  08-24

## 2021-08-24 NOTE — PROGRESS NOTE ADULT - SUBJECTIVE AND OBJECTIVE BOX
SUBJECTIVE: Over night patinet was confused; pulled NGT out; During day developed Hematoma on right elbow, was unable to place PICC and placed RIJ TLC for nutrition.     Patient seen and examined at bedside; continues to be confused and is only oriented to name and time; denies abdominal pain, nausea or vomiting still has no ostomy output no fever, chills, chest pain.     aspirin  chewable 81 milliGRAM(s) Oral daily  heparin   Injectable 5000 Unit(s) SubCutaneous every 12 hours  meropenem  IVPB 1000 milliGRAM(s) IV Intermittent every 12 hours  metoprolol tartrate Injectable 2.5 milliGRAM(s) IV Push every 6 hours PRN    MEDICATIONS  (PRN):  metoprolol tartrate Injectable 2.5 milliGRAM(s) IV Push every 6 hours PRN SBP > 160, HR > 100  ondansetron Injectable 4 milliGRAM(s) IV Push every 6 hours PRN Nausea and/or Vomiting  sodium chloride 0.9% lock flush 10 milliLiter(s) IV Push every 1 hour PRN Pre/post blood products, medications, blood draw, and to maintain line patency      I&O's Detail    23 Aug 2021 07:01  -  24 Aug 2021 07:00  --------------------------------------------------------  IN:    Albumin 5%  - 250 mL: 350 mL    Fat Emulsion (Fish Oil &amp; Plant Based) 20% Infusion: 93.5 mL    Fat Emulsion (Fish Oil &amp; Plant Based) 20% Infusion: 63 mL    IV PiggyBack: 150 mL    IV PiggyBack: 63 mL    IV PiggyBack: 187.5 mL    IV PiggyBack: 250 mL    PPN (Peripheral Parenteral Nutrition): 1274 mL  Total IN: 2431 mL    OUT:    Bulb (mL): 20 mL    Colostomy (mL): 0 mL    Indwelling Catheter - Urethral (mL): 2310 mL    Nasogastric/Oral tube (mL): 30 mL  Total OUT: 2360 mL    Total NET: 71 mL      24 Aug 2021 07:01  -  24 Aug 2021 08:03  --------------------------------------------------------  IN:    PPN (Peripheral Parenteral Nutrition): 46 mL  Total IN: 46 mL    OUT:    Indwelling Catheter - Urethral (mL): 125 mL  Total OUT: 125 mL    Total NET: -79 mL          T(C): 36.8 (08-24-21 @ 06:00), Max: 37 (08-23-21 @ 17:00)  HR: 88 (08-24-21 @ 07:00) (81 - 106)  BP: 146/110 (08-24-21 @ 07:00) (92/68 - 150/110)  RR: 31 (08-24-21 @ 07:00) (18 - 59)  SpO2: 100% (08-24-21 @ 07:00) (82% - 100%)    General: NAD, resting comfortably in bed  Pulmonary: Nonlabored, no respiratory distress  Cardiovascular: regular rate and rhythm   Abdominal: soft, non-distended, appropriately tender. Ostomy p/p/ nonproductive, with no output yet. NGT in place. Retention red rubber noted. Incision cdi  Extremities: WWP, normal strength    LABS:                        11.2   20.62 )-----------( 431      ( 24 Aug 2021 05:52 )             33.8     08-24    137  |  102  |  14  ----------------------------<  137<H>  4.4   |  27  |  0.43<L>    Ca    7.5<L>      24 Aug 2021 05:52  Phos  3.3     08-24  Mg     2.6     08-24            RADIOLOGY & ADDITIONAL STUDIES:

## 2021-08-24 NOTE — PROGRESS NOTE ADULT - SUBJECTIVE AND OBJECTIVE BOX
STATUS POST:  8/9: Left transgluteal IR drain placement   8/11: c-scope with biopsy of mass  8/13: diverting colostomy   8/18: EGD:Erosive esophagitis and doudenitis with esophageal ulcers bx   8/20: OR- Ex lap w/ STEPHANIE and primary repair of tears x2; EBL 25. IVF 1500, 2U pRBC    OVERNIGHT EVENTS/SUBJECTIVE:  INTERVAL HISTORY:  Patient seen and evaluated. Patient says that she continues to experience intermittent pain in her abdomen localized to her surgical incision sites. She denies any nausea, emesis, or SOB. Patient endorses 1 year of centralized, non-radiating, non-pleuritic chest pain that is not aggravated by exertion and not aggravated with eating.      ICU Vital Signs Last 24 Hrs  T(C): 36.8 (24 Aug 2021 06:00), Max: 37 (23 Aug 2021 17:00)  T(F): 98.3 (24 Aug 2021 06:00), Max: 98.6 (23 Aug 2021 17:00)  HR: 90 (24 Aug 2021 05:00) (81 - 106)  BP: 134/83 (24 Aug 2021 05:00) (92/68 - 150/110)  BP(mean): 102 (24 Aug 2021 05:00) (68 - 126)  ABP: 125/62 (23 Aug 2021 12:00) (125/62 - 176/64)  ABP(mean): 88 (23 Aug 2021 12:00) (88 - 114)  RR: 23 (24 Aug 2021 05:00) (18 - 59)  SpO2: 97% (24 Aug 2021 05:00) (80% - 99%)      I&O's Detail    23 Aug 2021 07:01  -  24 Aug 2021 07:00  --------------------------------------------------------  IN:    Albumin 5%  - 250 mL: 350 mL    Fat Emulsion (Fish Oil &amp; Plant Based) 20% Infusion: 93.5 mL    Fat Emulsion (Fish Oil &amp; Plant Based) 20% Infusion: 63 mL    IV PiggyBack: 63 mL    IV PiggyBack: 187.5 mL    IV PiggyBack: 100 mL    IV PiggyBack: 250 mL    PPN (Peripheral Parenteral Nutrition): 1274 mL  Total IN: 2381 mL    OUT:    Bulb (mL): 20 mL    Colostomy (mL): 0 mL    Indwelling Catheter - Urethral (mL): 2160 mL    Nasogastric/Oral tube (mL): 30 mL  Total OUT: 2210 mL    Total NET: 171 mL      24 Aug 2021 07:01  -  24 Aug 2021 07:21  --------------------------------------------------------  IN:    Fat Emulsion (Fish Oil &amp; Plant Based) 20% Infusion: 8.5 mL    PPN (Peripheral Parenteral Nutrition): 46 mL  Total IN: 54.5 mL    OUT:  Total OUT: 0 mL    Total NET: 54.5 mL                MEDICATIONS  (STANDING):  albumin human 25% IVPB 50 milliLiter(s) IV Intermittent every 8 hours  aspirin  chewable 81 milliGRAM(s) Oral daily  chlorhexidine 2% Cloths 1 Application(s) Topical daily  chlorhexidine 4% Liquid 1 Application(s) Topical <User Schedule>  dextrose 40% Gel 15 Gram(s) Oral once  dextrose 5%. 1000 milliLiter(s) (50 mL/Hr) IV Continuous <Continuous>  dextrose 5%. 1000 milliLiter(s) (100 mL/Hr) IV Continuous <Continuous>  dextrose 50% Injectable 25 Gram(s) IV Push once  dextrose 50% Injectable 12.5 Gram(s) IV Push once  dextrose 50% Injectable 25 Gram(s) IV Push once  famotidine Injectable 20 milliGRAM(s) IV Push every 12 hours  fat emulsion (Fish Oil and Plant Based) 20% Infusion 0.5 Gm/kG/Day (8.5 mL/Hr) IV Continuous <Continuous>  glucagon  Injectable 1 milliGRAM(s) IntraMuscular once  heparin   Injectable 5000 Unit(s) SubCutaneous every 12 hours  insulin lispro (ADMELOG) corrective regimen sliding scale   SubCutaneous every 6 hours  lidocaine   4% Patch 1 Patch Transdermal every 24 hours  meropenem  IVPB 1000 milliGRAM(s) IV Intermittent every 12 hours  nystatin Powder 1 Application(s) Topical two times a day  pantoprazole  Injectable 40 milliGRAM(s) IV Push every 12 hours  Parenteral Nutrition - Adult 1 Each (46 mL/Hr) TPN Continuous <Continuous>  sucralfate suspension 1 Gram(s) Oral two times a day    MEDICATIONS  (PRN):  metoprolol tartrate Injectable 2.5 milliGRAM(s) IV Push every 6 hours PRN SBP > 160, HR > 100  ondansetron Injectable 4 milliGRAM(s) IV Push every 6 hours PRN Nausea and/or Vomiting  sodium chloride 0.9% lock flush 10 milliLiter(s) IV Push every 1 hour PRN Pre/post blood products, medications, blood draw, and to maintain line patency      NUTRITION/IVF:     CENTRAL LINE:  LOCATION:   DATE INSERTED:  CVP:  SCVO2:    BUSTAMANTE:   DATE INSERTED:    A-LINE:    LOCATION:   DATE INSERTED:   SVV:  CO/CI:     CHEST TUBE:  LOCATION:  DATE INSERTED: OUTPUT/24 HRS:  SUCTION/WATER SEAL:     NG/OG TUBE:  DATE INSERTED:  OUTPUT/24 HRS:    MISC:     PHYSICAL EXAM  GENERAL: Elderly female, in no acute distress. Appears lethargic, intermittently falls asleep during exam. Alert and conversational when prompted.   MENTAL STATUS: Oriented to person and place; mistakenly thinks it is Wednesday; she forgot she had a surgical operation. Appropriate affect, smiling occasionally.   HEENT: EOMI. MMM.  Trachea midline. No lymph node swelling or tenderness. R IJ in place with no palpable hematoma or underlying fluctuance; no arden-insertion site erythema, purulence, or drainage.   RESPIRATORY: CTAB. No wheezing, rales or rhonchi.  CARDIOVASCULAR: Regular rate on telemetry. No audible murmurs, rubs or gallops.   GASTROINTESTINAL: Mild TTP RUQ with guarding; no rebound tenderness. Remaining four quadrants non-TTP. Abdomen non-distended. Ostomy over L abdomen with <2cc feculent material collected; no gas. L transgluteal drain in place with <2cc serous fluid collected.   NEUROLOGIC: Cranial nerves II-XII grossly intact. No focal neurological deficits. Moves all extremities spontaneously. Sensation intact bilaterally and symmetric. 5/5  strength bilaterally. 4/5 strength bilateral biceps/triceps. 4/5 bilateral quadriceps/hamstrings/calves although provides less resistance to manipulation than previous exam.   INTEGUMENTARY: No overt rashes or lesions, petechia or purpura. Good turgor. No edema.  MUSCULOSKELETAL: Ace bandage over right bicep removed showing significant improvement of R bicep traumatic hematoma secondary to A-line removal; moderate ecchymotic patch over brachial area; ace bandage replaced.   LYMPHATIC: Palpation of neck reveals no swelling or tenderness of neck nodes.       LABS:  CBC Full  -  ( 24 Aug 2021 05:52 )  WBC Count : 20.62 K/uL  RBC Count : 3.97 M/uL  Hemoglobin : 11.2 g/dL  Hematocrit : 33.8 %  Platelet Count - Automated : 431 K/uL  Mean Cell Volume : 85.1 fl  Mean Cell Hemoglobin : 28.2 pg  Mean Cell Hemoglobin Concentration : 33.1 gm/dL  Auto Neutrophil # : x  Auto Lymphocyte # : x  Auto Monocyte # : x  Auto Eosinophil # : x  Auto Basophil # : x  Auto Neutrophil % : x  Auto Lymphocyte % : x  Auto Monocyte % : x  Auto Eosinophil % : x  Auto Basophil % : x    08-24    137  |  102  |  14  ----------------------------<  137<H>  4.4   |  27  |  0.43<L>    Ca    7.5<L>      24 Aug 2021 05:52  Phos  3.3     08-24  Mg     2.6     08-24          RECENT CULTURES:            CAPILLARY BLOOD GLUCOSE      RADIOLOGY & ADDITIONAL STUDIES:   STATUS POST:  8/9: Left transgluteal IR drain placement   8/11: c-scope with biopsy of mass  8/13: diverting colostomy   8/18: EGD:Erosive esophagitis and doudenitis with esophageal ulcers bx   8/20: OR- Ex lap w/ STEPHANIE and primary repair of tears x2; EBL 25. IVF 1500, 2U pRBC    OVERNIGHT EVENTS/SUBJECTIVE:  INTERVAL HISTORY:  Patient seen and evaluated. Patient says that she continues to experience intermittent pain in her abdomen localized to her surgical incision sites. She denies any nausea, emesis, or SOB. Patient endorses 1 year of centralized, non-radiating, non-pleuritic chest pain that is not aggravated by exertion and not aggravated with eating.      ICU Vital Signs Last 24 Hrs  T(C): 36.8 (24 Aug 2021 06:00), Max: 37 (23 Aug 2021 17:00)  T(F): 98.3 (24 Aug 2021 06:00), Max: 98.6 (23 Aug 2021 17:00)  HR: 90 (24 Aug 2021 05:00) (81 - 106)  BP: 134/83 (24 Aug 2021 05:00) (92/68 - 150/110)  BP(mean): 102 (24 Aug 2021 05:00) (68 - 126)  ABP: 125/62 (23 Aug 2021 12:00) (125/62 - 176/64)  ABP(mean): 88 (23 Aug 2021 12:00) (88 - 114)  RR: 23 (24 Aug 2021 05:00) (18 - 59)  SpO2: 97% (24 Aug 2021 05:00) (80% - 99%)      I&O's Detail    23 Aug 2021 07:01  -  24 Aug 2021 07:00  --------------------------------------------------------  IN:    Albumin 5%  - 250 mL: 350 mL    Fat Emulsion (Fish Oil &amp; Plant Based) 20% Infusion: 93.5 mL    Fat Emulsion (Fish Oil &amp; Plant Based) 20% Infusion: 63 mL    IV PiggyBack: 63 mL    IV PiggyBack: 187.5 mL    IV PiggyBack: 100 mL    IV PiggyBack: 250 mL    PPN (Peripheral Parenteral Nutrition): 1274 mL  Total IN: 2381 mL    OUT:    Bulb (mL): 20 mL    Colostomy (mL): 0 mL    Indwelling Catheter - Urethral (mL): 2160 mL    Nasogastric/Oral tube (mL): 30 mL  Total OUT: 2210 mL    Total NET: 171 mL      24 Aug 2021 07:01  -  24 Aug 2021 07:21  --------------------------------------------------------  IN:    Fat Emulsion (Fish Oil &amp; Plant Based) 20% Infusion: 8.5 mL    PPN (Peripheral Parenteral Nutrition): 46 mL  Total IN: 54.5 mL    OUT:  Total OUT: 0 mL    Total NET: 54.5 mL                MEDICATIONS  (STANDING):  albumin human 25% IVPB 50 milliLiter(s) IV Intermittent every 8 hours  aspirin  chewable 81 milliGRAM(s) Oral daily  chlorhexidine 2% Cloths 1 Application(s) Topical daily  chlorhexidine 4% Liquid 1 Application(s) Topical <User Schedule>  dextrose 40% Gel 15 Gram(s) Oral once  dextrose 5%. 1000 milliLiter(s) (50 mL/Hr) IV Continuous <Continuous>  dextrose 5%. 1000 milliLiter(s) (100 mL/Hr) IV Continuous <Continuous>  dextrose 50% Injectable 25 Gram(s) IV Push once  dextrose 50% Injectable 12.5 Gram(s) IV Push once  dextrose 50% Injectable 25 Gram(s) IV Push once  famotidine Injectable 20 milliGRAM(s) IV Push every 12 hours  fat emulsion (Fish Oil and Plant Based) 20% Infusion 0.5 Gm/kG/Day (8.5 mL/Hr) IV Continuous <Continuous>  glucagon  Injectable 1 milliGRAM(s) IntraMuscular once  heparin   Injectable 5000 Unit(s) SubCutaneous every 12 hours  insulin lispro (ADMELOG) corrective regimen sliding scale   SubCutaneous every 6 hours  lidocaine   4% Patch 1 Patch Transdermal every 24 hours  meropenem  IVPB 1000 milliGRAM(s) IV Intermittent every 12 hours  nystatin Powder 1 Application(s) Topical two times a day  pantoprazole  Injectable 40 milliGRAM(s) IV Push every 12 hours  Parenteral Nutrition - Adult 1 Each (46 mL/Hr) TPN Continuous <Continuous>  sucralfate suspension 1 Gram(s) Oral two times a day    MEDICATIONS  (PRN):  metoprolol tartrate Injectable 2.5 milliGRAM(s) IV Push every 6 hours PRN SBP > 160, HR > 100  ondansetron Injectable 4 milliGRAM(s) IV Push every 6 hours PRN Nausea and/or Vomiting  sodium chloride 0.9% lock flush 10 milliLiter(s) IV Push every 1 hour PRN Pre/post blood products, medications, blood draw, and to maintain line patency      NUTRITION/IVF:     CENTRAL LINE:  LOCATION:   DATE INSERTED:  CVP:  SCVO2:    BUSTAMANTE:   DATE INSERTED:    A-LINE:    LOCATION:   DATE INSERTED:   SVV:  CO/CI:     CHEST TUBE:  LOCATION:  DATE INSERTED: OUTPUT/24 HRS:  SUCTION/WATER SEAL:     NG/OG TUBE:  DATE INSERTED:  OUTPUT/24 HRS:    MISC:     PHYSICAL EXAM  GENERAL: Elderly female, in no acute distress. Appears lethargic, intermittently falls asleep during exam. Alert and conversational when prompted.   MENTAL STATUS: Oriented to person and place; mistakenly thinks it is Wednesday; she forgot she had a surgical operation. Appropriate affect, smiling occasionally.   HEENT: EOMI. MMM.  Trachea midline. No lymph node swelling or tenderness. R IJ in place with no palpable hematoma or underlying fluctuance; no arden-insertion site erythema, purulence, or drainage.   RESPIRATORY: CTAB. No wheezing, rales or rhonchi.  CARDIOVASCULAR: Regular rate on telemetry. No audible murmurs, rubs or gallops.   GASTROINTESTINAL: Mild TTP RUQ with guarding; no rebound tenderness. Remaining four quadrants non-TTP. Abdomen non-distended. Ostomy over L abdomen with <2cc feculent material collected; no gas. L transgluteal drain in place with <2cc serous fluid collected.  : Bustamante in place >200cc cloudy fluid collected.    NEUROLOGIC: Cranial nerves II-XII grossly intact. No focal neurological deficits. Moves all extremities spontaneously. Sensation intact bilaterally and symmetric. 5/5  strength bilaterally. 4/5 strength bilateral biceps/triceps. 4/5 bilateral quadriceps/hamstrings/calves although provides less resistance to manipulation than previous exam.   INTEGUMENTARY: Mild ecchymosis over R forearm and wrist. Moderate ecchymotic patch over LLE with surrounding erythema; mildly widened erythematous patch.   MUSCULOSKELETAL: Ace bandage over right bicep removed showing significant improvement of R bicep traumatic hematoma secondary to A-line removal; moderate ecchymotic patch over brachial area; ace bandage replaced.   LYMPHATIC: Palpation of neck reveals no swelling or tenderness of neck nodes.       LABS:  CBC Full  -  ( 24 Aug 2021 05:52 )  WBC Count : 20.62 K/uL  RBC Count : 3.97 M/uL  Hemoglobin : 11.2 g/dL  Hematocrit : 33.8 %  Platelet Count - Automated : 431 K/uL  Mean Cell Volume : 85.1 fl  Mean Cell Hemoglobin : 28.2 pg  Mean Cell Hemoglobin Concentration : 33.1 gm/dL  Auto Neutrophil # : x  Auto Lymphocyte # : x  Auto Monocyte # : x  Auto Eosinophil # : x  Auto Basophil # : x  Auto Neutrophil % : x  Auto Lymphocyte % : x  Auto Monocyte % : x  Auto Eosinophil % : x  Auto Basophil % : x    08-24    137  |  102  |  14  ----------------------------<  137<H>  4.4   |  27  |  0.43<L>    Ca    7.5<L>      24 Aug 2021 05:52  Phos  3.3     08-24  Mg     2.6     08-24          RECENT CULTURES:            CAPILLARY BLOOD GLUCOSE      RADIOLOGY & ADDITIONAL STUDIES:

## 2021-08-24 NOTE — PROGRESS NOTE ADULT - ASSESSMENT
A/P Elevated WBC noted.  Was tachycardic last PM and now P in 80-90's.  C/o some abdominal pain but not requesting medications.  Concerned about possible infection.  Possible causes include bowel perforation (closed enterotomy site at high risk).  On merepenem.  Will get CT scan of abdomen without po contrast this AM.  If perforation suspected then exploratory lap and resection on injured segment and then diverting ileostomy.   ID: as above, urine culture should be done and CXR followed.    GI: No function yet.  Some BS only.  Long ileus not surprising but lack of function worrisome.  Renal:  UO improved this current shift with only TPN and drips and albumin q shift - no crystalloid boluses.  Improved UO may be related to albumin and mobilization of 3rd spaced fluids.  Heme:  H/H stable  Nutrition:  TPN in progress.

## 2021-08-24 NOTE — PROGRESS NOTE ADULT - ASSESSMENT
L gluteal drain with 20cc serous output over last 24h (20cc day prior). Flushes easily with 2cc NS. Continue to monitor output. Removal pending decision from primary team and surgery. IR will continue to follow.

## 2021-08-25 LAB
ANION GAP SERPL CALC-SCNC: 8 MMOL/L — SIGNIFICANT CHANGE UP (ref 5–17)
BUN SERPL-MCNC: 14 MG/DL — SIGNIFICANT CHANGE UP (ref 7–23)
CALCIUM SERPL-MCNC: 7.4 MG/DL — LOW (ref 8.4–10.5)
CHLORIDE SERPL-SCNC: 103 MMOL/L — SIGNIFICANT CHANGE UP (ref 96–108)
CO2 SERPL-SCNC: 26 MMOL/L — SIGNIFICANT CHANGE UP (ref 22–31)
CREAT SERPL-MCNC: 0.43 MG/DL — LOW (ref 0.5–1.3)
GLUCOSE BLDC GLUCOMTR-MCNC: 118 MG/DL — HIGH (ref 70–99)
GLUCOSE BLDC GLUCOMTR-MCNC: 124 MG/DL — HIGH (ref 70–99)
GLUCOSE BLDC GLUCOMTR-MCNC: 127 MG/DL — HIGH (ref 70–99)
GLUCOSE BLDC GLUCOMTR-MCNC: 134 MG/DL — HIGH (ref 70–99)
GLUCOSE SERPL-MCNC: 129 MG/DL — HIGH (ref 70–99)
HCT VFR BLD CALC: 30.3 % — LOW (ref 34.5–45)
HGB BLD-MCNC: 9.9 G/DL — LOW (ref 11.5–15.5)
MAGNESIUM SERPL-MCNC: 2.3 MG/DL — SIGNIFICANT CHANGE UP (ref 1.6–2.6)
MCHC RBC-ENTMCNC: 28 PG — SIGNIFICANT CHANGE UP (ref 27–34)
MCHC RBC-ENTMCNC: 32.7 GM/DL — SIGNIFICANT CHANGE UP (ref 32–36)
MCV RBC AUTO: 85.6 FL — SIGNIFICANT CHANGE UP (ref 80–100)
NRBC # BLD: 0 /100 WBCS — SIGNIFICANT CHANGE UP (ref 0–0)
PHOSPHATE SERPL-MCNC: 2.6 MG/DL — SIGNIFICANT CHANGE UP (ref 2.5–4.5)
PLATELET # BLD AUTO: 404 K/UL — HIGH (ref 150–400)
POTASSIUM SERPL-MCNC: 3.8 MMOL/L — SIGNIFICANT CHANGE UP (ref 3.5–5.3)
POTASSIUM SERPL-SCNC: 3.8 MMOL/L — SIGNIFICANT CHANGE UP (ref 3.5–5.3)
RBC # BLD: 3.54 M/UL — LOW (ref 3.8–5.2)
RBC # FLD: 16.5 % — HIGH (ref 10.3–14.5)
SODIUM SERPL-SCNC: 137 MMOL/L — SIGNIFICANT CHANGE UP (ref 135–145)
WBC # BLD: 11.6 K/UL — HIGH (ref 3.8–10.5)
WBC # FLD AUTO: 11.6 K/UL — HIGH (ref 3.8–10.5)

## 2021-08-25 PROCEDURE — 99291 CRITICAL CARE FIRST HOUR: CPT

## 2021-08-25 RX ORDER — ALBUMIN HUMAN 25 %
50 VIAL (ML) INTRAVENOUS EVERY 8 HOURS
Refills: 0 | Status: DISCONTINUED | OUTPATIENT
Start: 2021-08-25 | End: 2021-08-31

## 2021-08-25 RX ORDER — FUROSEMIDE 40 MG
20 TABLET ORAL ONCE
Refills: 0 | Status: COMPLETED | OUTPATIENT
Start: 2021-08-25 | End: 2021-08-25

## 2021-08-25 RX ORDER — POTASSIUM CHLORIDE 20 MEQ
20 PACKET (EA) ORAL ONCE
Refills: 0 | Status: COMPLETED | OUTPATIENT
Start: 2021-08-25 | End: 2021-08-25

## 2021-08-25 RX ORDER — POTASSIUM PHOSPHATE, MONOBASIC POTASSIUM PHOSPHATE, DIBASIC 236; 224 MG/ML; MG/ML
15 INJECTION, SOLUTION INTRAVENOUS ONCE
Refills: 0 | Status: COMPLETED | OUTPATIENT
Start: 2021-08-25 | End: 2021-08-25

## 2021-08-25 RX ORDER — ELECTROLYTE SOLUTION,INJ
1 VIAL (ML) INTRAVENOUS
Refills: 0 | Status: DISCONTINUED | OUTPATIENT
Start: 2021-08-25 | End: 2021-08-25

## 2021-08-25 RX ORDER — I.V. FAT EMULSION 20 G/100ML
0.5 EMULSION INTRAVENOUS
Qty: 20 | Refills: 0 | Status: DISCONTINUED | OUTPATIENT
Start: 2021-08-25 | End: 2021-08-25

## 2021-08-25 RX ADMIN — NYSTATIN CREAM 1 APPLICATION(S): 100000 CREAM TOPICAL at 05:49

## 2021-08-25 RX ADMIN — I.V. FAT EMULSION 8.3 GM/KG/DAY: 20 EMULSION INTRAVENOUS at 22:03

## 2021-08-25 RX ADMIN — LIDOCAINE 1 PATCH: 4 CREAM TOPICAL at 04:00

## 2021-08-25 RX ADMIN — HEPARIN SODIUM 5000 UNIT(S): 5000 INJECTION INTRAVENOUS; SUBCUTANEOUS at 05:49

## 2021-08-25 RX ADMIN — LIDOCAINE 1 PATCH: 4 CREAM TOPICAL at 18:08

## 2021-08-25 RX ADMIN — Medication 50 MILLILITER(S): at 21:41

## 2021-08-25 RX ADMIN — HALOPERIDOL DECANOATE 0.5 MILLIGRAM(S): 100 INJECTION INTRAMUSCULAR at 01:42

## 2021-08-25 RX ADMIN — FAMOTIDINE 20 MILLIGRAM(S): 10 INJECTION INTRAVENOUS at 05:47

## 2021-08-25 RX ADMIN — Medication 1 EACH: at 20:35

## 2021-08-25 RX ADMIN — LIDOCAINE 1 PATCH: 4 CREAM TOPICAL at 11:03

## 2021-08-25 RX ADMIN — Medication 300 MILLIGRAM(S): at 15:09

## 2021-08-25 RX ADMIN — LIDOCAINE 1 PATCH: 4 CREAM TOPICAL at 22:15

## 2021-08-25 RX ADMIN — LIDOCAINE 1 PATCH: 4 CREAM TOPICAL at 15:13

## 2021-08-25 RX ADMIN — Medication 1 GRAM(S): at 05:47

## 2021-08-25 RX ADMIN — POTASSIUM PHOSPHATE, MONOBASIC POTASSIUM PHOSPHATE, DIBASIC 62.5 MILLIMOLE(S): 236; 224 INJECTION, SOLUTION INTRAVENOUS at 11:05

## 2021-08-25 RX ADMIN — PANTOPRAZOLE SODIUM 40 MILLIGRAM(S): 20 TABLET, DELAYED RELEASE ORAL at 00:55

## 2021-08-25 RX ADMIN — CHLORHEXIDINE GLUCONATE 1 APPLICATION(S): 213 SOLUTION TOPICAL at 05:48

## 2021-08-25 RX ADMIN — CHLORHEXIDINE GLUCONATE 1 APPLICATION(S): 213 SOLUTION TOPICAL at 05:47

## 2021-08-25 RX ADMIN — Medication 50 MILLIEQUIVALENT(S): at 09:28

## 2021-08-25 RX ADMIN — PANTOPRAZOLE SODIUM 40 MILLIGRAM(S): 20 TABLET, DELAYED RELEASE ORAL at 14:52

## 2021-08-25 RX ADMIN — NYSTATIN CREAM 1 APPLICATION(S): 100000 CREAM TOPICAL at 18:08

## 2021-08-25 RX ADMIN — Medication 20 MILLIGRAM(S): at 09:28

## 2021-08-25 RX ADMIN — HEPARIN SODIUM 5000 UNIT(S): 5000 INJECTION INTRAVENOUS; SUBCUTANEOUS at 18:10

## 2021-08-25 NOTE — SWALLOW BEDSIDE ASSESSMENT ADULT - NS SPL SWALLOW CLINIC TRIAL FT
Limited assessment of oral phase as solids were deferred for instrumental. Feeding assistance provided d/t UE weakness. Multiple (~4-7) swallows noted per single small bolus suggestive of pharyngeal clearance deficits. Immediate non-productive coughing episode noted x1 with thin liquids. Pt with c/o abdominal pain during coughing fit. Subsequent trials resulted in immediate wet/gurgling voicing. Oropharyngeal suctioning provided to retrieve copious thick creamy secretions. WOB increased and SpO2 decreased to ~90%.

## 2021-08-25 NOTE — SWALLOW BEDSIDE ASSESSMENT ADULT - SPECIFY REASON(S)
Consulted to assess swallowing to determine readiness for a PO diet
Determine ability to tolerate clear liquids

## 2021-08-25 NOTE — PROGRESS NOTE ADULT - ASSESSMENT
A/P  Stable.  VSS.  Urine output excellent (post lasix)  Resp: stable but effusions larger  ID/SBO:  CT scan as above. WBC lower.  Meropenem stopped  : hydronephrosis despite double J stent in position. Requesting Dr. Jeremy Kitchen ( attending who placed the stent) to see patient and to repeat the cysto and replace the stent which is likely blocked.   Nutrition: TPN.  Await stoma function. Has good BS today.  As per ICU team  Need dressing over lower wound penrose.  Enterostomal team assistance requested.  Colostomy rodrigue can be removed at next appliance change.    Spirometer and coughing / pulm toilet

## 2021-08-25 NOTE — SWALLOW FEES ASSESSMENT ADULT - UNSUCCESSFUL STRATEGIES TRIALED DURING PROCEDURE
unable to follow complex directions for positional maneuvers/compensatory strategies/head turn to the right/head turn to the left

## 2021-08-25 NOTE — PROGRESS NOTE ADULT - ASSESSMENT
89F PMH HTN, chronic back pain 2/2 L3,L4 compression fractures, triple vessel CAD, and SBO due to possible diverticulitis vs. GYN/colorectal malignancy (2018) never followed up and L ICA s/p L CEA (4/21). 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 Left gluteal IR drain placed (8/9). Colonoscopy w/ sigmoid mass bx on 8/11- final path invasive adenocarcinoma moderately differentiated. Taken to OR on 8/13 for diverting colostomy. In SICU for acute blood loss anemia, s/p EGD 8/18 showing duodenal ulceration, erosive esophagitis duodenitis, bx x 2, CT 8/20 w/ closed loop obstxn, s/p OR 8/20 exlap STEPHANIE; now with persistent WBC, improving     NPO/IVF/TPN  WBC downtrending 20 > 11;   Urology to reevaluate possible infection from stents; given CT findings   Drain with minimal output will consider d/c  Pain/nausea control  OOB/IS  Rest of care per SICU team 89F PMH HTN, chronic back pain 2/2 L3,L4 compression fractures, triple vessel CAD, and SBO due to possible diverticulitis vs. GYN/colorectal malignancy (2018) never followed up and L ICA s/p L CEA (4/21). 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 Left gluteal IR drain placed (8/9). Colonoscopy w/ sigmoid mass bx on 8/11- final path invasive adenocarcinoma moderately differentiated. Taken to OR on 8/13 for diverting colostomy. In SICU for acute blood loss anemia, s/p EGD 8/18 showing duodenal ulceration, erosive esophagitis duodenitis, bx x 2, CT 8/20 w/ closed loop obstxn, s/p OR 8/20 exlap STEPHANIE; now with persistent WBC, improving     NPO/IVF/TPN  WBC downtrending 20 > 11; not on antibiotics  Urology to reevaluate possible infection/hydronephrosis from stents; given CT findings   Drain with minimal output will consider d/c  Pain/nausea control  OOB/IS  Rest of care per SICU team

## 2021-08-25 NOTE — SWALLOW FEES ASSESSMENT ADULT - COMMENTS
Risks, benefits, and alternatives to this study were reviewed with pt. Verbal consent provided. Flexible endoscope passed transnasally to further assess swallow anatomy and physiology. Pt tolerated the passing of the scope and its presence. Small well circumscribed cystic appearing nodule lateral to the midline of the R aryepiglottic fold which was confirmed by ENT

## 2021-08-25 NOTE — PROGRESS NOTE ADULT - SUBJECTIVE AND OBJECTIVE BOX
SUBJECTIVE:     heparin   Injectable 5000 Unit(s) SubCutaneous every 12 hours  metoprolol tartrate Injectable 2.5 milliGRAM(s) IV Push every 6 hours PRN      Vital Signs Last 24 Hrs  T(C): 36.1 (24 Aug 2021 21:05), Max: 36.1 (24 Aug 2021 14:31)  T(F): 97 (24 Aug 2021 21:05), Max: 97 (24 Aug 2021 14:31)  HR: 83 (25 Aug 2021 10:00) (75 - 103)  BP: 133/62 (25 Aug 2021 10:00) (113/53 - 163/71)  BP(mean): 89 (25 Aug 2021 10:00) (77 - 103)  RR: 18 (25 Aug 2021 10:00) (14 - 30)  SpO2: 92% (25 Aug 2021 10:00) (92% - 100%)  I&O's Detail    24 Aug 2021 07:01  -  25 Aug 2021 07:00  --------------------------------------------------------  IN:    Albumin 5%  - 250 mL: 50 mL    Fat Emulsion (Fish Oil &amp; Plant Based) 20% Infusion: 85 mL    PPN (Peripheral Parenteral Nutrition): 1058 mL  Total IN: 1193 mL    OUT:    Bulb (mL): 30 mL    Colostomy (mL): 0 mL    Indwelling Catheter - Urethral (mL): 3110 mL  Total OUT: 3140 mL    Total NET: -1947 mL      25 Aug 2021 07:01  -  25 Aug 2021 10:44  --------------------------------------------------------  IN:    Fat Emulsion (Fish Oil &amp; Plant Based) 20% Infusion: 8.5 mL    PPN (Peripheral Parenteral Nutrition): 184 mL  Total IN: 192.5 mL    OUT:    Indwelling Catheter - Urethral (mL): 375 mL  Total OUT: 375 mL    Total NET: -182.5 mL          Physical Exam:  General: No acute distress, resting comfortably in bed  Pulm: Nonlabored breathing, no respiratory distress  Abd: soft, TTP in lower abdomen, non-distended.  : urbina in palce draining clear yellow urine      LABS:                        9.9    11.60 )-----------( 404      ( 25 Aug 2021 06:09 )             30.3     08-25    137  |  103  |  14  ----------------------------<  129<H>  3.8   |  26  |  0.43<L>    Ca    7.4<L>      25 Aug 2021 06:09  Phos  2.6     08-25  Mg     2.3     08-25    TPro  5.0<L>  /  Alb  2.6<L>  /  TBili  0.4  /  DBili  <0.2  /  AST  17  /  ALT  8<L>  /  AlkPhos  75  08-24      Urinalysis Basic - ( 24 Aug 2021 12:07 )    Color: Yellow / Appearance: Clear / S.015 / pH: x  Gluc: x / Ketone: NEGATIVE  / Bili: Negative / Urobili: 0.2 E.U./dL   Blood: x / Protein: 30 mg/dL / Nitrite: NEGATIVE   Leuk Esterase: Moderate / RBC: Many /HPF / WBC > 10 /HPF   Sq Epi: x / Non Sq Epi: 0-5 /HPF / Bacteria: Present /HPF        RADIOLOGY & ADDITIONAL STUDIES:        SUBJECTIVE: NAEON. Afebrile, VSS. Urbina replaced yesterday.     heparin   Injectable 5000 Unit(s) SubCutaneous every 12 hours  metoprolol tartrate Injectable 2.5 milliGRAM(s) IV Push every 6 hours PRN      Vital Signs Last 24 Hrs  T(C): 36.1 (24 Aug 2021 21:05), Max: 36.1 (24 Aug 2021 14:31)  T(F): 97 (24 Aug 2021 21:05), Max: 97 (24 Aug 2021 14:31)  HR: 83 (25 Aug 2021 10:00) (75 - 103)  BP: 133/62 (25 Aug 2021 10:00) (113/53 - 163/71)  BP(mean): 89 (25 Aug 2021 10:00) (77 - 103)  RR: 18 (25 Aug 2021 10:00) (14 - 30)  SpO2: 92% (25 Aug 2021 10:00) (92% - 100%)  I&O's Detail    24 Aug 2021 07:01  -  25 Aug 2021 07:00  --------------------------------------------------------  IN:    Albumin 5%  - 250 mL: 50 mL    Fat Emulsion (Fish Oil &amp; Plant Based) 20% Infusion: 85 mL    PPN (Peripheral Parenteral Nutrition): 1058 mL  Total IN: 1193 mL    OUT:    Bulb (mL): 30 mL    Colostomy (mL): 0 mL    Indwelling Catheter - Urethral (mL): 3110 mL  Total OUT: 3140 mL    Total NET: -1947 mL      25 Aug 2021 07:01  -  25 Aug 2021 10:44  --------------------------------------------------------  IN:    Fat Emulsion (Fish Oil &amp; Plant Based) 20% Infusion: 8.5 mL    PPN (Peripheral Parenteral Nutrition): 184 mL  Total IN: 192.5 mL    OUT:    Indwelling Catheter - Urethral (mL): 375 mL  Total OUT: 375 mL    Total NET: -182.5 mL          Physical Exam:  General: No acute distress, resting comfortably in bed  Pulm: Nonlabored breathing, no respiratory distress  Abd: soft, TTP in lower abdomen, non-distended.  : urbina in palce draining clear yellow urine      LABS:                        9.9    11.60 )-----------( 404      ( 25 Aug 2021 06:09 )             30.3     08-    137  |  103  |  14  ----------------------------<  129<H>  3.8   |  26  |  0.43<L>    Ca    7.4<L>      25 Aug 2021 06:09  Phos  2.6       Mg     2.3         TPro  5.0<L>  /  Alb  2.6<L>  /  TBili  0.4  /  DBili  <0.2  /  AST  17  /  ALT  8<L>  /  AlkPhos  75  08      Urinalysis Basic - ( 24 Aug 2021 12:07 )    Color: Yellow / Appearance: Clear / S.015 / pH: x  Gluc: x / Ketone: NEGATIVE  / Bili: Negative / Urobili: 0.2 E.U./dL   Blood: x / Protein: 30 mg/dL / Nitrite: NEGATIVE   Leuk Esterase: Moderate / RBC: Many /HPF / WBC > 10 /HPF   Sq Epi: x / Non Sq Epi: 0-5 /HPF / Bacteria: Present /HPF        RADIOLOGY & ADDITIONAL STUDIES:

## 2021-08-25 NOTE — PROGRESS NOTE ADULT - ATTENDING COMMENTS
WBC lower today. To remove drain. Follow off meropenem. Continue diuresis, renal fxn stable with addition of concentrated albumin. Haldol PRN but will try off famotidine as this may contribute to delirium. TPN written.

## 2021-08-25 NOTE — PROGRESS NOTE ADULT - ASSESSMENT
89F PMH HTN, chronic back pain 2/2 L3,L4 compression fractures, triple vessel CAD, and SBO due to possible diverticulitis vs. GYN/colorectal malignancy (2018) never followed up and L ICA s/p L CEA (4/21). 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 Left gluteal IR drain placed (8/9). Colonoscopy w/ sigmoid mass bx on 8/11- final path invasive adenocarcinoma moderately differentiated. Taken to OR on 8/13 for diverting colostomy. In SICU for acute blood loss anemia, s/p EGD 8/18 showing duodenal ulceration, erosive esophagitis duodenitis, bx x 2, CT 8/20 w/ closed loop obstxn, s/p OR 8/20 exlap STEPHANIE.     Neuro: Tylenol prn, AMS: Holding pepcid. Haldol PRN   CV: ICA s/p L CEA (4/21). ASA, HD stable. Albumin 25%q8 Plavix held.   Pulm: extubated 8/22, nasal canula.   GI/FEN: NPO; speech re-evaluation with recommendations, NGT LIWS, TPN, UGIB: EGD on 8/18: Erosive esophagitis and doudenitis with esophageal ulcers bx x2. protonix, Carafate per GI recs, Pepcid held for worsening mental status. Ostomy replacement for arden-ostomy leakage.   : S/p NM renal function study (wnl). Novak (8/18-)  ID: Decreased WBC this am. CTA&P showing hydronephrosis; urology to remove stent in 2 days with possible percutaneous nephrostomy tube to drain retained urine, ESBL Ecoli in intraabdominal abscess Buttock rash: Nystatin powder (8/23--) D/c: : Daina (8/13-8/24),  Endo: ISS  PPX: SCDs., SQH  Lines: PIVs, RIJ TLC (8/23-), R brachial Bebe (8/22-23)  Wounds: ostomy, Gluteal CORDELL placed by IR (poss fistulization to colon), IR will flush tube today and continue to follow.    PT/OT: re-ordered 8/22   89F PMH HTN, chronic back pain 2/2 L3,L4 compression fractures, triple vessel CAD, and SBO due to possible diverticulitis vs. GYN/colorectal malignancy (2018) never followed up and L ICA s/p L CEA (4/21). 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 Left gluteal IR drain placed (8/9). Colonoscopy w/ sigmoid mass bx on 8/11- final path invasive adenocarcinoma moderately differentiated. Taken to OR on 8/13 for diverting colostomy. In SICU for acute blood loss anemia, s/p EGD 8/18 showing duodenal ulceration, erosive esophagitis duodenitis, bx x 2, CT 8/20 w/ closed loop obstxn, s/p OR 8/20 exlap STEPHANIE.     Neuro: Tylenol prn, AMS: Holding pepcid. Haldol PRN   CV: ICA s/p L CEA (4/21). ASA, HD stable. Albumin 25%q8 Plavix held.   Pulm: extubated 8/22, nasal canula.   GI/FEN: NPO; speech re-evaluation with recommendations, NGT LIWS, TPN, UGIB: EGD on 8/18: Erosive esophagitis and doudenitis with esophageal ulcers bx x2. protonix, Carafate per GI recs, Pepcid held for worsening mental status. Ostomy replacement for arden-ostomy leakage.   : S/p NM renal function study (wnl). Novak (8/18-). CTA&P showing hydronephrosis; urology to remove stent in 2 days with possible percutaneous nephrostomy tube to drain retained urine; Urology requesting renal U/S tomorrow 8/26. Urine cx negative to date.   ID: Decreased WBC this am.  ESBL Ecoli in intraabdominal abscess Buttock rash: Nystatin powder (8/23--) D/c: : Daina (8/13-8/24), Blood cultures taken 8/24 show no growth to date.   Endo: ISS  PPX: SCDs., SQH  Lines: PIVs, RIJ TLC (8/23-), R brachial [place] (8/22-23)  Wounds: ostomy, Gluteal CORDELL placed by IR (poss fistulization to colon), IR will flush tube today and continue to follow.    PT/OT: re-ordered 8/22   89F PMH HTN, chronic back pain 2/2 L3,L4 compression fractures, triple vessel CAD, and SBO due to possible diverticulitis vs. GYN/colorectal malignancy (2018) never followed up and L ICA s/p L CEA (4/21). 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 Left gluteal IR drain placed (8/9). Colonoscopy w/ sigmoid mass bx on 8/11- final path invasive adenocarcinoma moderately differentiated. Taken to OR on 8/13 for diverting colostomy. In SICU for acute blood loss anemia, s/p EGD 8/18 showing duodenal ulceration, erosive esophagitis duodenitis, bx x 2, CT 8/20 w/ closed loop obstxn, s/p OR 8/20 exlap STEPHANIE.     Neuro: Tylenol prn, AMS: Holding pepcid. Haldol PRN   CV: ICA s/p L CEA (4/21). ASA, HD stable. Albumin 25%q8 Plavix held.   Pulm: extubated 8/22, nasal canula.   GI/FEN: NPO; speech re-evaluation with recommendations, NGT LIWS, TPN, UGIB: EGD on 8/18: Erosive esophagitis and doudenitis with esophageal ulcers bx x2. protonix, Carafate per GI recs, Pepcid held for worsening mental status. Ostomy replacement for arden-ostomy leakage.   : S/p NM renal function study (wnl). Novak (8/18-). CTA&P showing hydronephrosis; urology to exchange stent in 2 days; Urology requesting renal U/S tomorrow 8/26. Urine cx negative to date.   ID: Decreased WBC this am.  ESBL Ecoli in intraabdominal abscess Buttock rash: Nystatin powder (8/23--) D/c: : Daina (8/13-8/24), Blood cultures taken 8/24 show no growth to date.   Endo: ISS  PPX: SCDs., SQH  Lines: PIVs, RIJ TLC (8/23-), R brachial Bebe (8/22-23)  Wounds: ostomy, Gluteal CORDELL placed by IR (poss fistulization to colon), IR will flush tube today and continue to follow.    PT/OT: re-ordered 8/22

## 2021-08-25 NOTE — PROGRESS NOTE ADULT - ASSESSMENT
89F PMH HTN, chronic back pain 2/2 L3,L4 compression fractures, triple vessel CAD, and SBO due to possible diverticulitis vs. GYN/colorectal malignancy (2018) never followed up and L ICA s/p L CEA (4/21). 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 Left gluteal IR drain placed (8/9). Colonoscopy w/ sigmoid mass bx on 8/11- final path invasive adenocarcinoma moderately differentiated. Taken to OR on 8/13 for diverting colostomy. In SICU for acute blood loss anemia, s/p EGD 8/18 showing duodenal ulceration, erosive esophagitis duodenitis, bx x 2, CT 8/20 w/ closed loop obstxn, s/p OR 8/20 exlap STEPHANIE; now with persistent WBC, improving. 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 CTAP 8/24 showing development of moderate L hydronephrosis despite L ureteral stent. Creatinine is unchanged and at pt's baseline. Infected ureteral stent is highly unlikely given recency of placement.     Recommendations:  - Please obtain Renal US tomorrow 8/26 to assess for changes in L hydronephrosis. If worsening, will require placement of L PCN as she would have failed a ureteral stent trial.  - f/u urine cultures  - Rest of care per primary  team  - No acute urologic intervention at this time     89F PMH HTN, chronic back pain 2/2 L3,L4 compression fractures, triple vessel CAD, and SBO due to possible diverticulitis vs. GYN/colorectal malignancy (2018) never followed up and L ICA s/p L CEA (4/21). 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 Left gluteal IR drain placed (8/9). Colonoscopy w/ sigmoid mass bx on 8/11- final path invasive adenocarcinoma moderately differentiated. Taken to OR on 8/13 for diverting colostomy. In SICU for acute blood loss anemia, s/p EGD 8/18 showing duodenal ulceration, erosive esophagitis duodenitis, bx x 2, CT 8/20 w/ closed loop obstxn, s/p OR 8/20 exlap STEPHANIE; now with persistent WBC, improving. 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 CTAP 8/24 showing development of moderate L hydronephrosis despite L ureteral stent. Creatinine is unchanged and at pt's baseline. Infected ureteral stent is highly unlikely given recency of placement.     Recommendations:  - Urology will plan to do L stent exchange on Friday 8/27  - Please obtain Renal US tomorrow 8/26 to assess for changes in L hydronephrosis.   - f/u urine cultures  - Rest of care per primary  team  - No acute urologic intervention at this time  - Discussed with  attending

## 2021-08-25 NOTE — PROGRESS NOTE ADULT - ASSESSMENT
L gluteal drain with 30cc serous output over last 24h (20cc day prior). Flushes easily with 2cc NS. Continue to monitor output.  IR will continue to follow.

## 2021-08-25 NOTE — PROGRESS NOTE ADULT - SUBJECTIVE AND OBJECTIVE BOX
SUBJECTIVE:   Overnight: agitated received 0.5 haldol x 1  Patient seen and examined at bedside; denies abdominal pain except with palpation; no nausea or vomiting; no gas or stool in ostomy. No fevers, chills, SOB or chest pain.     heparin   Injectable 5000 Unit(s) SubCutaneous every 12 hours  metoprolol tartrate Injectable 2.5 milliGRAM(s) IV Push every 6 hours PRN    MEDICATIONS  (PRN):  metoprolol tartrate Injectable 2.5 milliGRAM(s) IV Push every 6 hours PRN SBP > 160, HR > 100  ondansetron Injectable 4 milliGRAM(s) IV Push every 6 hours PRN Nausea and/or Vomiting  sodium chloride 0.9% lock flush 10 milliLiter(s) IV Push every 1 hour PRN Pre/post blood products, medications, blood draw, and to maintain line patency      I&O's Detail    24 Aug 2021 07:01  -  25 Aug 2021 07:00  --------------------------------------------------------  IN:    Albumin 5%  - 250 mL: 50 mL    Fat Emulsion (Fish Oil &amp; Plant Based) 20% Infusion: 85 mL    PPN (Peripheral Parenteral Nutrition): 1058 mL  Total IN: 1193 mL    OUT:    Bulb (mL): 30 mL    Colostomy (mL): 0 mL    Indwelling Catheter - Urethral (mL): 3110 mL  Total OUT: 3140 mL    Total NET: -1947 mL          T(C): 36.1 (21 @ 21:05), Max: 36.1 (21 @ 14:31)  HR: 88 (21 @ 06:30) (75 - 103)  BP: 153/73 (21 @ 06:30) (113/53 - 163/71)  RR: 22 (21 @ 06:30) (14 - 30)  SpO2: 93% (21 @ 06:30) (93% - 100%)    General: NAD, resting comfortably in bed  Pulmonary: Nonlabored, no respiratory distress  Cardiovascular: regular rate and rhythm   Abdominal: soft, non-distended, tender in the lower abdomen; Ostomy p/p/ nonproductive, with no output yet. Retention red rubber noted. Incision cdi    LABS:                        9.9    11.60 )-----------( 404      ( 25 Aug 2021 06:09 )             30.3     08-    137  |  103  |  14  ----------------------------<  129<H>  3.8   |  26  |  0.43<L>    Ca    7.4<L>      25 Aug 2021 06:09  Phos  2.6       Mg     2.3         TPro  5.0<L>  /  Alb  2.6<L>  /  TBili  0.4  /  DBili  <0.2  /  AST  17  /  ALT  8<L>  /  AlkPhos  75        Urinalysis Basic - ( 24 Aug 2021 12:07 )    Color: Yellow / Appearance: Clear / S.015 / pH: x  Gluc: x / Ketone: NEGATIVE  / Bili: Negative / Urobili: 0.2 E.U./dL   Blood: x / Protein: 30 mg/dL / Nitrite: NEGATIVE   Leuk Esterase: Moderate / RBC: Many /HPF / WBC > 10 /HPF   Sq Epi: x / Non Sq Epi: 0-5 /HPF / Bacteria: Present /HPF        RADIOLOGY & ADDITIONAL STUDIES:  CT Abdomen and Pelvis No Cont:   EXAM:  CT ABDOMEN AND PELVIS                          PROCEDURE DATE:  2021          INTERPRETATION:  CT SCAN OF ABDOMEN AND PELVIS    History: 89-year-old female with colonic adenocarcinoma. Status post serosal tear and loop ostomy. Abdominal pain.    Technique: CT scan of abdomen and pelvis was performed from lung bases through symphysis pubis. Intravenous and oral contrast material were not utilized, as ordered. Axial, sagittal and coronal reformatted images were reviewed.    Comparison: Comparison made with most recent CT scan of abdomen and pelvis from 2021 and with multiple additional prior imaging studies dating back to 10/19/2018.    Findings: This study is limited as there is beam hardening artifact from the patient being scanned with her arms at her sides. Study also limited by lack of intravenous and oral contrast material.    Lower chest: Worsening of moderate bilateral pleural effusions. Passive atelectasis of small portions of each lower lobe. Small amount of atelectasis in lingula. There are again a few small patchy opacities in the right middle lobe.    Liver:  Normal.    Gallbladder: No radiopaque stones gallbladder.    Spleen:  Normal.    Pancreas:  Normal.    Adrenal glands:  1.3 cm left adrenal adenoma. Normal right adrenal gland.    Kidneys: Worsening of moderate left hydronephrosis despite the presence of a left ureteral stent. Right kidney unremarkable.    Adenopathy:  Multiple calcified lymph nodes again present in upper abdomen.    Ascites: Small.    Gastrointestinal tract: Interval abdominal surgery. There is no longer a small bowel obstruction. No free air. Again post diverting colostomy transverse colon. No significant change in mass arising from the rectosigmoid colon and extending posteriorly to the sacrum. Transgluteal drain again present left pelvis. New drains present infraumbilical anterior abdominal wall. Colonic diverticula are noted.    Vessels: Severe atherosclerotic disease, including large amount of calcified plaque aorta and its branch vessels in the chest, abdomen and pelvis, including severe coronary artery calcification.    Pelvic organs: Calcified fibroid uterus. Novak catheter within bladder. Moderate amount of gas and urine present within bladder.    Soft tissues: Severe anasarca.    Bones: No change appearance of spine, with compression fractures at T12, L1, and L5. Again post kyphoplasty at L2, L3, and L4.    Impression:  1. Since 2021, there is no longer a small bowel obstruction. No free air.    2. Interval increase in size of bilateral pleural effusions.    3. Exophytic rectosigmoid mass in the presacral space, similar to prior.    4. Worsening of moderate left hydronephrosis despite presence of a left ureteral stent. Recommend determining a Novak catheter is functioning, as there is moderate gas in urine within the bladder.    5. Patchy opacities in the right middle lobe could represent infection or aspiration.        --- End of Report ---          Thank you for the opportunity to participate in the care of this patient.    RODRI ANGELO MD; Resident Radiologist  This document has been electronically signed.  MILLI QURESHI MD; Attending Radiologist  This document has been electronically signed. Aug 24 2021  3:49PM (21 @ 14:13)      Culture - Blood (collected 21 @ 15:13)  Source: .Blood Blood  Preliminary Report (21 @ 04:00):    No growth at 12 hours    Culture - Blood (collected 21 @ 15:13)  Source: .Blood Blood  Preliminary Report (21 @ 04:00):    No growth at 12 hours

## 2021-08-25 NOTE — SWALLOW FEES ASSESSMENT ADULT - PHARYNGEAL PHASE COMMENTS
Pooling of copious thick yellow secretions at baseline (L>R). Severe aspiration of secretions occurred with ice chip trials as secretions poured over into the airway via the L aryepiglottic folds. Oropharyngeal suctioning provided to retrieve secretions. However, suctioning did not clear secretions as pt began to bite down on the yankauer. With nectar thick liquids (NTL), ~1/4 of the bolus began to spill over the superior edge of the epiglottis and into the airway. Deep laryngeal penetration and aspiration occurred during the swallow with thin and NTL d/t incomplete airway closure. Pharyngeal squeeze was weak and inefficient with an incomplete white-out period during the swallow.  There was severe diffuse residue that continuously spilled over into the airway after the swallow and during multiple incomplete spontaneous swallows. Pt with a blunted response to aspiration. Cued coughs were ineffective in clearing penetrate/aspirate.

## 2021-08-25 NOTE — PROGRESS NOTE ADULT - SUBJECTIVE AND OBJECTIVE BOX
POD 5, 12  ICU  Extubated still, O2 sats %, breathing comfortably  Awake and alert.  Not c/o abdominal pain except when examined  No N/V  Novak remains and IR drain as well.  Cooperative.      ICU Vital Signs Last 24 Hrs  T(C): 36.1 (24 Aug 2021 21:05), Max: 36.1 (24 Aug 2021 14:31)  T(F): 97 (24 Aug 2021 21:05), Max: 97 (24 Aug 2021 14:31)  HR: 88 (25 Aug 2021 06:30) (75 - 103)  BP: 153/73 (25 Aug 2021 06:30) (113/53 - 163/71)  BP(mean): 97 (25 Aug 2021 06:30) (77 - 103)  ABP: --  ABP(mean): --  RR: 22 (25 Aug 2021 06:30) (14 - 30)  SpO2: 93% (25 Aug 2021 06:30) (93% - 100%)    lungs clear, decrease BS at base  cor: S1S2  abd: mildly distended, incision intact, penrose with minimal drainage, stoma viable without output (was digitalized last PM minus output), BS +  ext: without calf tenderness    UO: 1,210 /last shift; 3110 ml/24 hour previous                           9.9    11.60 )-----------( 404      ( 25 Aug 2021 06:09 )             30.3   08-25    137  |  103  |  14  ----------------------------<  129<H>  3.8   |  26  |  0.43<L>    Ca    7.4<L>      25 Aug 2021 06:09  Phos  2.6     08-25  Mg     2.3     08-25    TPro  5.0<L>  /  Alb  2.6<L>  /  TBili  0.4  /  DBili  <0.2  /  AST  17  /  ALT  8<L>  /  AlkPhos  75  08-24    CT abdomen / pelvis yesterday: no free air, no large collection. No SBO, stool and gas in colon  pleural effusions larger and L hydronephrosis still present/worse

## 2021-08-25 NOTE — SWALLOW FEES ASSESSMENT ADULT - DIAGNOSTIC IMPRESSIONS
Pt presents with severe pharyngeal dysphagia. Deficits were marked by reduced bolus control, incomplete LVC, and severely impaired pharyngeal swallow efficiency. The above resulted in SILENT aspiration of all consistencies. Dysphagia further confounded by poor secretion management and weak cough response.    Pt denies hx of dysphagia; however, given her AMS, she is not considered to be a reliable historian. Suspect dysphagia is multifactorial likely r/t recent intubation, current deconditioned state, multiple medical problems, and AMS. Although unlikely, given the time frame, it is also possible that dysphagia is chronic r/t prior L CEA in 4/21.  Pt appears to be at high risk for dysphagia-related pulmonary complications given the severity of deficits, multiple medical problems, reduced mobility, and dependency on others for oral hygiene.

## 2021-08-25 NOTE — ADVANCED PRACTICE NURSE CONSULT - ASSESSMENT
Per Dr. Stearns's order, red rubber catheter removed from beneath stoma. Stoma sweat in pouch, no other output. Incision to right of stoma with approximated edges, piece of stoma ring placed over site to protect from adhesive of ostomy appliance. 2 3/4" Emerson 2 piece appliance placed over stoma. Pt with DTI to thoracic spine measuring approx 4x2.5 cm, Cavilon barrier wipes and foam dressing applied over site. Preventative foam dressings placed over bony prominences on spine and left shoulder.

## 2021-08-25 NOTE — PROGRESS NOTE ADULT - SUBJECTIVE AND OBJECTIVE BOX
STATUS POST:  : Left transgluteal IR drain placement   : c-scope with biopsy of mass  : diverting colostomy   : EGD:Erosive esophagitis and doudenitis with esophageal ulcers bx   : OR- Ex lap w/ STEPHANIE and primary repair of tears x2; EBL 25. IVF 1500, 2U pRBC    OVERNIGHT EVENTS/SUBJECTIVE: haldol 0.5 x1 for agitation. Good UOP >1L. Bld clx  NGTD  INTERVAL HISTORY: Patient seen and evaluated. Patient currently endorses mild lower abdominal pain. She denies any nausea, emesis, CP, SOB, dizziness, or new skin tears/lesions.     ICU Vital Signs Last 24 Hrs  T(C): 36.1 (24 Aug 2021 21:05), Max: 36.1 (24 Aug 2021 14:31)  T(F): 97 (24 Aug 2021 21:05), Max: 97 (24 Aug 2021 14:31)  HR: 75 (25 Aug 2021 05:00) (75 - 103)  BP: 130/60 (25 Aug 2021 05:00) (113/53 - 163/71)  BP(mean): 86 (25 Aug 2021 05:00) (77 - 123)  ABP: --  ABP(mean): --  RR: 15 (25 Aug 2021 05:00) (14 - 31)  SpO2: 94% (25 Aug 2021 05:00) (94% - 100%)      I&O's Detail    23 Aug 2021 07:01  -  24 Aug 2021 07:00  --------------------------------------------------------  IN:    Albumin 5%  - 250 mL: 350 mL    Fat Emulsion (Fish Oil &amp; Plant Based) 20% Infusion: 93.5 mL    Fat Emulsion (Fish Oil &amp; Plant Based) 20% Infusion: 63 mL    IV PiggyBack: 150 mL    IV PiggyBack: 63 mL    IV PiggyBack: 187.5 mL    IV PiggyBack: 250 mL    PPN (Peripheral Parenteral Nutrition): 1274 mL  Total IN: 2431 mL    OUT:    Bulb (mL): 20 mL    Colostomy (mL): 0 mL    Indwelling Catheter - Urethral (mL): 2310 mL    Nasogastric/Oral tube (mL): 30 mL  Total OUT: 2360 mL    Total NET: 71 mL      24 Aug 2021 07:01  -  25 Aug 2021 06:25  --------------------------------------------------------  IN:    Albumin 5%  - 250 mL: 50 mL    Fat Emulsion (Fish Oil &amp; Plant Based) 20% Infusion: 76.5 mL    PPN (Peripheral Parenteral Nutrition): 1012 mL  Total IN: 1138.5 mL    OUT:    Bulb (mL): 30 mL    Colostomy (mL): 0 mL    Indwelling Catheter - Urethral (mL): 2985 mL  Total OUT: 3015 mL    Total NET: -1876.5 mL                MEDICATIONS  (STANDING):  aspirin Suppository 300 milliGRAM(s) Rectal daily  chlorhexidine 2% Cloths 1 Application(s) Topical daily  chlorhexidine 4% Liquid 1 Application(s) Topical <User Schedule>  dextrose 40% Gel 15 Gram(s) Oral once  dextrose 5%. 1000 milliLiter(s) (50 mL/Hr) IV Continuous <Continuous>  dextrose 5%. 1000 milliLiter(s) (100 mL/Hr) IV Continuous <Continuous>  dextrose 50% Injectable 25 Gram(s) IV Push once  dextrose 50% Injectable 12.5 Gram(s) IV Push once  dextrose 50% Injectable 25 Gram(s) IV Push once  famotidine Injectable 20 milliGRAM(s) IV Push every 12 hours  fat emulsion (Fish Oil and Plant Based) 20% Infusion 0.5 Gm/kG/Day (8.5 mL/Hr) IV Continuous <Continuous>  glucagon  Injectable 1 milliGRAM(s) IntraMuscular once  heparin   Injectable 5000 Unit(s) SubCutaneous every 12 hours  insulin lispro (ADMELOG) corrective regimen sliding scale   SubCutaneous every 6 hours  lidocaine   4% Patch 1 Patch Transdermal every 24 hours  lidocaine   4% Patch 1 Patch Transdermal every 24 hours  nystatin Powder 1 Application(s) Topical two times a day  pantoprazole  Injectable 40 milliGRAM(s) IV Push every 12 hours  Parenteral Nutrition - Adult 1 Each (46 mL/Hr) TPN Continuous <Continuous>  sucralfate suspension 1 Gram(s) Oral two times a day    MEDICATIONS  (PRN):  metoprolol tartrate Injectable 2.5 milliGRAM(s) IV Push every 6 hours PRN SBP > 160, HR > 100  ondansetron Injectable 4 milliGRAM(s) IV Push every 6 hours PRN Nausea and/or Vomiting  sodium chloride 0.9% lock flush 10 milliLiter(s) IV Push every 1 hour PRN Pre/post blood products, medications, blood draw, and to maintain line patency      NUTRITION/IVF:     CENTRAL LINE:  LOCATION:   DATE INSERTED:  CVP:  SCVO2:    BUSTAMANTE:   DATE INSERTED:    A-LINE:    LOCATION:   DATE INSERTED:   SVV:  CO/CI:     CHEST TUBE:  LOCATION:  DATE INSERTED: OUTPUT/24 HRS:  SUCTION/WATER SEAL:     NG/OG TUBE:  DATE INSERTED:  OUTPUT/24 HRS:    MISC:     PHYSICAL EXAM  GENERAL: Alert, well developed, appears fatigued.  MENTAL STATUS: Appropriate affect.  HEENT: EOMI. MMM. Trachea midline. No anterior cervical/posterior cervical/supraclavicular lymph node swelling or tenderness.  RESPIRATORY: CTAB. No wheezing, rales or rhonchi.  CARDIOVASCULAR: RRR. No audible murmurs, rubs or gallops.   GASTROINTESTINAL: Abdomen soft, mild guarding LUQ and RUQ. RUQ tenderness to palpation; no rebound tenderness. Mild suprapubic tenderness to palpation. Abdomen minimally distended; unchanged.   NEUROLOGIC: Cranial nerves II-XII grossly intact. No focal neurological deficits. Moves all extremities spontaneously. Sensation intact bilaterally and symmetric. 5/5 bilateral  strength. Improved bilateral biceps and tricep strength. Unchanged, 4/5 bilateral hamstrings/quadriceps/calves.   INTEGUMENTARY: Mildly edematous weeping wound over RUE forearm; bandage in place with dried serous fluid.   MUSCULOSKELETAL: Mild ecchymosis over R internal biceps. No gross deformities.   LYMPHATIC: Palpation of neck reveals no swelling or tenderness of neck nodes.       LABS:  CBC Full  -  ( 25 Aug 2021 06:09 )  WBC Count : 11.60 K/uL  RBC Count : 3.54 M/uL  Hemoglobin : 9.9 g/dL  Hematocrit : 30.3 %  Platelet Count - Automated : 404 K/uL  Mean Cell Volume : 85.6 fl  Mean Cell Hemoglobin : 28.0 pg  Mean Cell Hemoglobin Concentration : 32.7 gm/dL  Auto Neutrophil # : x  Auto Lymphocyte # : x  Auto Monocyte # : x  Auto Eosinophil # : x  Auto Basophil # : x  Auto Neutrophil % : x  Auto Lymphocyte % : x  Auto Monocyte % : x  Auto Eosinophil % : x  Auto Basophil % : x        137  |  102  |  14  ----------------------------<  137<H>  4.4   |  27  |  0.43<L>    Ca    7.5<L>      24 Aug 2021 05:52  Phos  3.3       Mg     2.6         TPro  5.0<L>  /  Alb  2.6<L>  /  TBili  0.4  /  DBili  <0.2  /  AST  17  /  ALT  8<L>  /  AlkPhos  75        Urinalysis Basic - ( 24 Aug 2021 12:07 )    Color: Yellow / Appearance: Clear / S.015 / pH: x  Gluc: x / Ketone: NEGATIVE  / Bili: Negative / Urobili: 0.2 E.U./dL   Blood: x / Protein: 30 mg/dL / Nitrite: NEGATIVE   Leuk Esterase: Moderate / RBC: Many /HPF / WBC > 10 /HPF   Sq Epi: x / Non Sq Epi: 0-5 /HPF / Bacteria: Present /HPF      RECENT CULTURES:   .Blood Blood XXXX XXXX   No growth at 12 hours        LIVER FUNCTIONS - ( 24 Aug 2021 05:52 )  Alb: 2.6 g/dL / Pro: 5.0 g/dL / ALK PHOS: 75 U/L / ALT: 8 U/L / AST: 17 U/L / GGT: x               CAPILLARY BLOOD GLUCOSE      RADIOLOGY & ADDITIONAL STUDIES:   STATUS POST:  : Left transgluteal IR drain placement   : c-scope with biopsy of mass  : diverting colostomy   : EGD:Erosive esophagitis and doudenitis with esophageal ulcers bx   : OR- Ex lap w/ STEPHANIE and primary repair of tears x2; EBL 25. IVF 1500, 2U pRBC    OVERNIGHT EVENTS/SUBJECTIVE: haldol 0.5 x1 for agitation. Good UOP >1L. Bld clx  NGTD  INTERVAL HISTORY: Patient seen and evaluated. Patient currently endorses mild lower abdominal pain. She denies any nausea, emesis, CP, SOB, dizziness, or new skin tears/lesions.     ICU Vital Signs Last 24 Hrs  T(C): 36.1 (24 Aug 2021 21:05), Max: 36.1 (24 Aug 2021 14:31)  T(F): 97 (24 Aug 2021 21:05), Max: 97 (24 Aug 2021 14:31)  HR: 75 (25 Aug 2021 05:00) (75 - 103)  BP: 130/60 (25 Aug 2021 05:00) (113/53 - 163/71)  BP(mean): 86 (25 Aug 2021 05:00) (77 - 123)  ABP: --  ABP(mean): --  RR: 15 (25 Aug 2021 05:00) (14 - 31)  SpO2: 94% (25 Aug 2021 05:00) (94% - 100%)      I&O's Detail    23 Aug 2021 07:01  -  24 Aug 2021 07:00  --------------------------------------------------------  IN:    Albumin 5%  - 250 mL: 350 mL    Fat Emulsion (Fish Oil &amp; Plant Based) 20% Infusion: 93.5 mL    Fat Emulsion (Fish Oil &amp; Plant Based) 20% Infusion: 63 mL    IV PiggyBack: 150 mL    IV PiggyBack: 63 mL    IV PiggyBack: 187.5 mL    IV PiggyBack: 250 mL    PPN (Peripheral Parenteral Nutrition): 1274 mL  Total IN: 2431 mL    OUT:    Bulb (mL): 20 mL    Colostomy (mL): 0 mL    Indwelling Catheter - Urethral (mL): 2310 mL    Nasogastric/Oral tube (mL): 30 mL  Total OUT: 2360 mL    Total NET: 71 mL      24 Aug 2021 07:01  -  25 Aug 2021 06:25  --------------------------------------------------------  IN:    Albumin 5%  - 250 mL: 50 mL    Fat Emulsion (Fish Oil &amp; Plant Based) 20% Infusion: 76.5 mL    PPN (Peripheral Parenteral Nutrition): 1012 mL  Total IN: 1138.5 mL    OUT:    Bulb (mL): 30 mL    Colostomy (mL): 0 mL    Indwelling Catheter - Urethral (mL): 2985 mL  Total OUT: 3015 mL    Total NET: -1876.5 mL                MEDICATIONS  (STANDING):  aspirin Suppository 300 milliGRAM(s) Rectal daily  chlorhexidine 2% Cloths 1 Application(s) Topical daily  chlorhexidine 4% Liquid 1 Application(s) Topical <User Schedule>  dextrose 40% Gel 15 Gram(s) Oral once  dextrose 5%. 1000 milliLiter(s) (50 mL/Hr) IV Continuous <Continuous>  dextrose 5%. 1000 milliLiter(s) (100 mL/Hr) IV Continuous <Continuous>  dextrose 50% Injectable 25 Gram(s) IV Push once  dextrose 50% Injectable 12.5 Gram(s) IV Push once  dextrose 50% Injectable 25 Gram(s) IV Push once  famotidine Injectable 20 milliGRAM(s) IV Push every 12 hours  fat emulsion (Fish Oil and Plant Based) 20% Infusion 0.5 Gm/kG/Day (8.5 mL/Hr) IV Continuous <Continuous>  glucagon  Injectable 1 milliGRAM(s) IntraMuscular once  heparin   Injectable 5000 Unit(s) SubCutaneous every 12 hours  insulin lispro (ADMELOG) corrective regimen sliding scale   SubCutaneous every 6 hours  lidocaine   4% Patch 1 Patch Transdermal every 24 hours  lidocaine   4% Patch 1 Patch Transdermal every 24 hours  nystatin Powder 1 Application(s) Topical two times a day  pantoprazole  Injectable 40 milliGRAM(s) IV Push every 12 hours  Parenteral Nutrition - Adult 1 Each (46 mL/Hr) TPN Continuous <Continuous>  sucralfate suspension 1 Gram(s) Oral two times a day    MEDICATIONS  (PRN):  metoprolol tartrate Injectable 2.5 milliGRAM(s) IV Push every 6 hours PRN SBP > 160, HR > 100  ondansetron Injectable 4 milliGRAM(s) IV Push every 6 hours PRN Nausea and/or Vomiting  sodium chloride 0.9% lock flush 10 milliLiter(s) IV Push every 1 hour PRN Pre/post blood products, medications, blood draw, and to maintain line patency      NUTRITION/IVF:     CENTRAL LINE:  LOCATION:   DATE INSERTED:  CVP:  SCVO2:    BUSTAMANTE:   DATE INSERTED:    A-LINE:    LOCATION:   DATE INSERTED:   SVV:  CO/CI:     CHEST TUBE:  LOCATION:  DATE INSERTED: OUTPUT/24 HRS:  SUCTION/WATER SEAL:     NG/OG TUBE:  DATE INSERTED:  OUTPUT/24 HRS:    MISC:     PHYSICAL EXAM  GENERAL: Alert, well developed, appears fatigued.  MENTAL STATUS: Appropriate affect.  HEENT: EOMI. MMM. Trachea midline. No anterior cervical/posterior cervical/supraclavicular lymph node swelling or tenderness.  RESPIRATORY: CTAB. No wheezing, rales or rhonchi.  CARDIOVASCULAR: RRR. No audible murmurs, rubs or gallops.   GASTROINTESTINAL: Abdomen soft, mild guarding LUQ and RUQ. RUQ tenderness to palpation; no rebound tenderness. Mild suprapubic tenderness to palpation. Abdomen minimally distended; unchanged. Ostomy bag in place over L abdomen with 2cc of air collected; otherwise no new material. L transgluteal drain in place with ~1cc serous fluid collected.   NEUROLOGIC: Cranial nerves II-XII grossly intact. No focal neurological deficits. Moves all extremities spontaneously. Sensation intact bilaterally and symmetric. 5/5 bilateral  strength. Improved bilateral biceps and tricep strength. Unchanged, 4/5 bilateral hamstrings/quadriceps/calves.   INTEGUMENTARY: Mildly edematous weeping wound over RUE forearm; bandage in place with dried serous fluid.   MUSCULOSKELETAL: Mild ecchymosis over R internal biceps. No gross deformities.   LYMPHATIC: Palpation of neck reveals no swelling or tenderness of neck nodes.       LABS:  CBC Full  -  ( 25 Aug 2021 06:09 )  WBC Count : 11.60 K/uL  RBC Count : 3.54 M/uL  Hemoglobin : 9.9 g/dL  Hematocrit : 30.3 %  Platelet Count - Automated : 404 K/uL  Mean Cell Volume : 85.6 fl  Mean Cell Hemoglobin : 28.0 pg  Mean Cell Hemoglobin Concentration : 32.7 gm/dL  Auto Neutrophil # : x  Auto Lymphocyte # : x  Auto Monocyte # : x  Auto Eosinophil # : x  Auto Basophil # : x  Auto Neutrophil % : x  Auto Lymphocyte % : x  Auto Monocyte % : x  Auto Eosinophil % : x  Auto Basophil % : x        137  |  102  |  14  ----------------------------<  137<H>  4.4   |  27  |  0.43<L>    Ca    7.5<L>      24 Aug 2021 05:52  Phos  3.3     -  Mg     2.6     -    TPro  5.0<L>  /  Alb  2.6<L>  /  TBili  0.4  /  DBili  <0.2  /  AST  17  /  ALT  8<L>  /  AlkPhos  75  -24      Urinalysis Basic - ( 24 Aug 2021 12:07 )    Color: Yellow / Appearance: Clear / S.015 / pH: x  Gluc: x / Ketone: NEGATIVE  / Bili: Negative / Urobili: 0.2 E.U./dL   Blood: x / Protein: 30 mg/dL / Nitrite: NEGATIVE   Leuk Esterase: Moderate / RBC: Many /HPF / WBC > 10 /HPF   Sq Epi: x / Non Sq Epi: 0-5 /HPF / Bacteria: Present /HPF      RECENT CULTURES:   .Blood Blood XXXX XXXX   No growth at 12 hours        LIVER FUNCTIONS - ( 24 Aug 2021 05:52 )  Alb: 2.6 g/dL / Pro: 5.0 g/dL / ALK PHOS: 75 U/L / ALT: 8 U/L / AST: 17 U/L / GGT: x               CAPILLARY BLOOD GLUCOSE      RADIOLOGY & ADDITIONAL STUDIES:

## 2021-08-26 ENCOUNTER — TRANSCRIPTION ENCOUNTER (OUTPATIENT)
Age: 86
End: 2021-08-26

## 2021-08-26 DIAGNOSIS — N13.30 UNSPECIFIED HYDRONEPHROSIS: ICD-10-CM

## 2021-08-26 LAB
-  AMPICILLIN: SIGNIFICANT CHANGE UP
-  CIPROFLOXACIN: SIGNIFICANT CHANGE UP
-  LEVOFLOXACIN: SIGNIFICANT CHANGE UP
-  NITROFURANTOIN: SIGNIFICANT CHANGE UP
-  TETRACYCLINE: SIGNIFICANT CHANGE UP
-  VANCOMYCIN: SIGNIFICANT CHANGE UP
ALBUMIN SERPL ELPH-MCNC: 2.5 G/DL — LOW (ref 3.3–5)
ANION GAP SERPL CALC-SCNC: 7 MMOL/L — SIGNIFICANT CHANGE UP (ref 5–17)
BUN SERPL-MCNC: 18 MG/DL — SIGNIFICANT CHANGE UP (ref 7–23)
CALCIUM SERPL-MCNC: 7.7 MG/DL — LOW (ref 8.4–10.5)
CHLORIDE SERPL-SCNC: 106 MMOL/L — SIGNIFICANT CHANGE UP (ref 96–108)
CO2 SERPL-SCNC: 23 MMOL/L — SIGNIFICANT CHANGE UP (ref 22–31)
CREAT SERPL-MCNC: 0.48 MG/DL — LOW (ref 0.5–1.3)
CULTURE RESULTS: SIGNIFICANT CHANGE UP
GLUCOSE BLDC GLUCOMTR-MCNC: 123 MG/DL — HIGH (ref 70–99)
GLUCOSE BLDC GLUCOMTR-MCNC: 124 MG/DL — HIGH (ref 70–99)
GLUCOSE BLDC GLUCOMTR-MCNC: 129 MG/DL — HIGH (ref 70–99)
GLUCOSE BLDC GLUCOMTR-MCNC: 134 MG/DL — HIGH (ref 70–99)
GLUCOSE BLDC GLUCOMTR-MCNC: 147 MG/DL — HIGH (ref 70–99)
GLUCOSE SERPL-MCNC: 124 MG/DL — HIGH (ref 70–99)
HCT VFR BLD CALC: 29.3 % — LOW (ref 34.5–45)
HGB BLD-MCNC: 9.5 G/DL — LOW (ref 11.5–15.5)
MAGNESIUM SERPL-MCNC: 2.2 MG/DL — SIGNIFICANT CHANGE UP (ref 1.6–2.6)
MCHC RBC-ENTMCNC: 28 PG — SIGNIFICANT CHANGE UP (ref 27–34)
MCHC RBC-ENTMCNC: 32.4 GM/DL — SIGNIFICANT CHANGE UP (ref 32–36)
MCV RBC AUTO: 86.4 FL — SIGNIFICANT CHANGE UP (ref 80–100)
METHOD TYPE: SIGNIFICANT CHANGE UP
NRBC # BLD: 0 /100 WBCS — SIGNIFICANT CHANGE UP (ref 0–0)
ORGANISM # SPEC MICROSCOPIC CNT: SIGNIFICANT CHANGE UP
ORGANISM # SPEC MICROSCOPIC CNT: SIGNIFICANT CHANGE UP
PHOSPHATE SERPL-MCNC: 3.5 MG/DL — SIGNIFICANT CHANGE UP (ref 2.5–4.5)
PLATELET # BLD AUTO: 431 K/UL — HIGH (ref 150–400)
POTASSIUM SERPL-MCNC: 4.6 MMOL/L — SIGNIFICANT CHANGE UP (ref 3.5–5.3)
POTASSIUM SERPL-SCNC: 4.6 MMOL/L — SIGNIFICANT CHANGE UP (ref 3.5–5.3)
RBC # BLD: 3.39 M/UL — LOW (ref 3.8–5.2)
RBC # FLD: 16.6 % — HIGH (ref 10.3–14.5)
SODIUM SERPL-SCNC: 136 MMOL/L — SIGNIFICANT CHANGE UP (ref 135–145)
SPECIMEN SOURCE: SIGNIFICANT CHANGE UP
WBC # BLD: 9.49 K/UL — SIGNIFICANT CHANGE UP (ref 3.8–10.5)
WBC # FLD AUTO: 9.49 K/UL — SIGNIFICANT CHANGE UP (ref 3.8–10.5)

## 2021-08-26 PROCEDURE — 76770 US EXAM ABDO BACK WALL COMP: CPT | Mod: 26

## 2021-08-26 PROCEDURE — 99231 SBSQ HOSP IP/OBS SF/LOW 25: CPT | Mod: GC

## 2021-08-26 PROCEDURE — 99233 SBSQ HOSP IP/OBS HIGH 50: CPT | Mod: GC

## 2021-08-26 RX ORDER — FUROSEMIDE 40 MG
20 TABLET ORAL ONCE
Refills: 0 | Status: COMPLETED | OUTPATIENT
Start: 2021-08-26 | End: 2021-08-26

## 2021-08-26 RX ORDER — VANCOMYCIN HCL 1 G
500 VIAL (EA) INTRAVENOUS EVERY 12 HOURS
Refills: 0 | Status: DISCONTINUED | OUTPATIENT
Start: 2021-08-26 | End: 2021-08-27

## 2021-08-26 RX ORDER — ELECTROLYTE SOLUTION,INJ
1 VIAL (ML) INTRAVENOUS
Refills: 0 | Status: DISCONTINUED | OUTPATIENT
Start: 2021-08-26 | End: 2021-08-26

## 2021-08-26 RX ORDER — ACETAMINOPHEN 500 MG
750 TABLET ORAL ONCE
Refills: 0 | Status: COMPLETED | OUTPATIENT
Start: 2021-08-26 | End: 2021-08-26

## 2021-08-26 RX ORDER — I.V. FAT EMULSION 20 G/100ML
0.5 EMULSION INTRAVENOUS
Qty: 20 | Refills: 0 | Status: DISCONTINUED | OUTPATIENT
Start: 2021-08-26 | End: 2021-08-26

## 2021-08-26 RX ADMIN — PANTOPRAZOLE SODIUM 40 MILLIGRAM(S): 20 TABLET, DELAYED RELEASE ORAL at 00:43

## 2021-08-26 RX ADMIN — LIDOCAINE 1 PATCH: 4 CREAM TOPICAL at 19:23

## 2021-08-26 RX ADMIN — LIDOCAINE 1 PATCH: 4 CREAM TOPICAL at 21:05

## 2021-08-26 RX ADMIN — Medication 300 MILLIGRAM(S): at 18:44

## 2021-08-26 RX ADMIN — Medication 100 MILLIGRAM(S): at 22:43

## 2021-08-26 RX ADMIN — LIDOCAINE 1 PATCH: 4 CREAM TOPICAL at 12:08

## 2021-08-26 RX ADMIN — NYSTATIN CREAM 1 APPLICATION(S): 100000 CREAM TOPICAL at 18:30

## 2021-08-26 RX ADMIN — I.V. FAT EMULSION 8.3 GM/KG/DAY: 20 EMULSION INTRAVENOUS at 22:02

## 2021-08-26 RX ADMIN — HEPARIN SODIUM 5000 UNIT(S): 5000 INJECTION INTRAVENOUS; SUBCUTANEOUS at 05:23

## 2021-08-26 RX ADMIN — Medication 50 MILLILITER(S): at 05:21

## 2021-08-26 RX ADMIN — Medication 1 EACH: at 18:44

## 2021-08-26 RX ADMIN — Medication 300 MILLIGRAM(S): at 12:08

## 2021-08-26 RX ADMIN — Medication 50 MILLILITER(S): at 12:09

## 2021-08-26 RX ADMIN — HEPARIN SODIUM 5000 UNIT(S): 5000 INJECTION INTRAVENOUS; SUBCUTANEOUS at 19:26

## 2021-08-26 RX ADMIN — CHLORHEXIDINE GLUCONATE 1 APPLICATION(S): 213 SOLUTION TOPICAL at 05:24

## 2021-08-26 RX ADMIN — Medication 750 MILLIGRAM(S): at 13:00

## 2021-08-26 RX ADMIN — NYSTATIN CREAM 1 APPLICATION(S): 100000 CREAM TOPICAL at 05:24

## 2021-08-26 RX ADMIN — Medication 20 MILLIGRAM(S): at 09:14

## 2021-08-26 RX ADMIN — PANTOPRAZOLE SODIUM 40 MILLIGRAM(S): 20 TABLET, DELAYED RELEASE ORAL at 12:08

## 2021-08-26 RX ADMIN — LIDOCAINE 1 PATCH: 4 CREAM TOPICAL at 09:51

## 2021-08-26 RX ADMIN — Medication 50 MILLILITER(S): at 19:26

## 2021-08-26 RX ADMIN — LIDOCAINE 1 PATCH: 4 CREAM TOPICAL at 02:00

## 2021-08-26 NOTE — PROGRESS NOTE ADULT - ASSESSMENT
89F PMH HTN, chronic back pain 2/2 L3,L4 compression fractures, triple vessel CAD, and SBO due to possible diverticulitis vs. GYN/colorectal malignancy (2018) never followed up and L ICA s/p L CEA (4/21). 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 Left gluteal IR drain placed (8/9). Colonoscopy w/ sigmoid mass bx on 8/11- final path invasive adenocarcinoma moderately differentiated. Taken to OR on 8/13 for diverting colostomy. In SICU for acute blood loss anemia, s/p EGD 8/18 showing duodenal ulceration, erosive esophagitis duodenitis, bx x 2, CT 8/20 w/ closed loop obstxn, s/p OR 8/20 exlap STEPHANIE; now with persistent WBC, improving     plan:   SICU following   Failed bedside swallow and FEES; continue NPO/IVF/TPN  OOB/IS  WBC downtrending 20 > 11 > 9; not on antibiotics, has remained afebrile   Urology following plan for Renal ultrasound today and stent exchange tomorrow   Continues to have no ostomy output will consider c scope of ostomy if she continues to have not output   Drain with minimal serous output, discontinue today   Pain/nausea control  Rest of care per SICU team  team 1 surgery to follow

## 2021-08-26 NOTE — PROGRESS NOTE ADULT - ASSESSMENT
89F PMH HTN, chronic back pain 2/2 L3,L4 compression fractures, triple vessel CAD, and SBO due to possible diverticulitis vs. GYN/colorectal malignancy (2018) never followed up and L ICA s/p L CEA (4/21). 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 Left gluteal IR drain placed (8/9). Colonoscopy w/ sigmoid mass bx on 8/11- final path invasive adenocarcinoma moderately differentiated. Taken to OR on 8/13 for diverting colostomy. In SICU for acute blood loss anemia, s/p EGD 8/18 showing duodenal ulceration, erosive esophagitis duodenitis, bx x 2, CT 8/20 w/ closed loop obstxn, s/p OR 8/20 exlap STEPHANIE; now with persistent WBC, improving. 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 CTAP 8/24 showing development of moderate L hydronephrosis despite L ureteral stent. Creatinine is unchanged and at pt's baseline. Infected ureteral stent is highly unlikely given recency of placement.     Recommendations:  - Urology will plan to do L stent exchange on Friday 8/27  - Please obtain Renal US tomorrow 8/26 to assess for changes in L hydronephrosis.   - f/u urine cultures  - Rest of care per primary  team

## 2021-08-26 NOTE — PROGRESS NOTE ADULT - ASSESSMENT
A/P GI: Await bowel function.  BS +/-, exam minus change, stoma rodrigue out  pulm: stable. effusions noted  cor: VSS  Heme: stable  ID: WBC < 10 k  Nutrition: failed swallow evaluation.  On TPN  Renal: Diuresed last several days.  UO remains > 50 ml/hr, BUN/creat without change  VSS, Afebrile  Holding on repeat imaging as regards abdomen for now.

## 2021-08-26 NOTE — PROGRESS NOTE ADULT - SUBJECTIVE AND OBJECTIVE BOX
STATUS POST:       SUBJECTIVE: Patient seen and examined bedside by chief resident.     heparin   Injectable 5000 Unit(s) SubCutaneous every 12 hours  metoprolol tartrate Injectable 2.5 milliGRAM(s) IV Push every 6 hours PRN      Vital Signs Last 24 Hrs  T(C): 36.7 (26 Aug 2021 05:12), Max: 36.7 (26 Aug 2021 05:12)  T(F): 98 (26 Aug 2021 05:12), Max: 98 (26 Aug 2021 05:12)  HR: 88 (26 Aug 2021 10:00) (76 - 95)  BP: 132/58 (26 Aug 2021 10:00) (107/58 - 140/62)  BP(mean): 84 (26 Aug 2021 10:00) (76 - 91)  RR: 18 (26 Aug 2021 10:00) (15 - 24)  SpO2: 93% (26 Aug 2021 10:00) (92% - 98%)  I&O's Detail    25 Aug 2021 07:01  -  26 Aug 2021 07:00  --------------------------------------------------------  IN:    Albumin 5%  - 250 mL: 100 mL    Fat Emulsion (Fish Oil &amp; Plant Based) 20% Infusion: 8.5 mL    Fat Emulsion (Fish Oil &amp; Plant Based) 20% Infusion: 83 mL    IV PiggyBack: 350 mL    PPN (Peripheral Parenteral Nutrition): 1104 mL  Total IN: 1645.5 mL    OUT:    Bulb (mL): 25 mL    Colostomy (mL): 0 mL    Indwelling Catheter - Urethral (mL): 3030 mL  Total OUT: 3055 mL    Total NET: -1409.5 mL      26 Aug 2021 07:01  -  26 Aug 2021 10:08  --------------------------------------------------------  IN:    Fat Emulsion (Fish Oil &amp; Plant Based) 20% Infusion: 24.9 mL    PPN (Peripheral Parenteral Nutrition): 184 mL  Total IN: 208.9 mL    OUT:    Indwelling Catheter - Urethral (mL): 725 mL  Total OUT: 725 mL    Total NET: -516.1 mL          Physical Exam:  General: No acute distress, resting comfortably in bed  C/V: normal sinus rhythm  Pulm: Nonlabored breathing, no respiratory distress  Abd: soft, non-tender, non-distended, incisions clean/dry/intact.  Extrem: warm and well perfused, no edema, SCDs in place    LABS:                        9.5    9.49  )-----------( 431      ( 26 Aug 2021 05:08 )             29.3     08-    136  |  106  |  18  ----------------------------<  124<H>  4.6   |  23  |  0.48<L>    Ca    7.7<L>      26 Aug 2021 05:08  Phos  3.5       Mg     2.2             Urinalysis Basic - ( 24 Aug 2021 12:07 )    Color: Yellow / Appearance: Clear / S.015 / pH: x  Gluc: x / Ketone: NEGATIVE  / Bili: Negative / Urobili: 0.2 E.U./dL   Blood: x / Protein: 30 mg/dL / Nitrite: NEGATIVE   Leuk Esterase: Moderate / RBC: Many /HPF / WBC > 10 /HPF   Sq Epi: x / Non Sq Epi: 0-5 /HPF / Bacteria: Present /HPF        RADIOLOGY & ADDITIONAL STUDIES:     SUBJECTIVE: NAEON. AVSS    heparin   Injectable 5000 Unit(s) SubCutaneous every 12 hours  metoprolol tartrate Injectable 2.5 milliGRAM(s) IV Push every 6 hours PRN      Vital Signs Last 24 Hrs  T(C): 36.7 (26 Aug 2021 05:12), Max: 36.7 (26 Aug 2021 05:12)  T(F): 98 (26 Aug 2021 05:12), Max: 98 (26 Aug 2021 05:12)  HR: 88 (26 Aug 2021 10:00) (76 - 95)  BP: 132/58 (26 Aug 2021 10:00) (107/58 - 140/62)  BP(mean): 84 (26 Aug 2021 10:00) (76 - 91)  RR: 18 (26 Aug 2021 10:00) (15 - 24)  SpO2: 93% (26 Aug 2021 10:00) (92% - 98%)  I&O's Detail    25 Aug 2021 07:01  -  26 Aug 2021 07:00  --------------------------------------------------------  IN:    Albumin 5%  - 250 mL: 100 mL    Fat Emulsion (Fish Oil &amp; Plant Based) 20% Infusion: 8.5 mL    Fat Emulsion (Fish Oil &amp; Plant Based) 20% Infusion: 83 mL    IV PiggyBack: 350 mL    PPN (Peripheral Parenteral Nutrition): 1104 mL  Total IN: 1645.5 mL    OUT:    Bulb (mL): 25 mL    Colostomy (mL): 0 mL    Indwelling Catheter - Urethral (mL): 3030 mL  Total OUT: 3055 mL    Total NET: -1409.5 mL      26 Aug 2021 07:01  -  26 Aug 2021 10:08  --------------------------------------------------------  IN:    Fat Emulsion (Fish Oil &amp; Plant Based) 20% Infusion: 24.9 mL    PPN (Peripheral Parenteral Nutrition): 184 mL  Total IN: 208.9 mL    OUT:    Indwelling Catheter - Urethral (mL): 725 mL  Total OUT: 725 mL    Total NET: -516.1 mL          Physical Exam:  General: No acute distress, resting comfortably in bed  Pulm: Nonlabored breathing, no respiratory distress  Abd: soft, TTP in lower abdomen, non-distended.  : urbina in palce draining clear yellow urine      LABS:                        9.5    9.49  )-----------( 431      ( 26 Aug 2021 05:08 )             29.3         136  |  106  |  18  ----------------------------<  124<H>  4.6   |  23  |  0.48<L>    Ca    7.7<L>      26 Aug 2021 05:08  Phos  3.5       Mg     2.2             Urinalysis Basic - ( 24 Aug 2021 12:07 )    Color: Yellow / Appearance: Clear / S.015 / pH: x  Gluc: x / Ketone: NEGATIVE  / Bili: Negative / Urobili: 0.2 E.U./dL   Blood: x / Protein: 30 mg/dL / Nitrite: NEGATIVE   Leuk Esterase: Moderate / RBC: Many /HPF / WBC > 10 /HPF   Sq Epi: x / Non Sq Epi: 0-5 /HPF / Bacteria: Present /HPF        RADIOLOGY & ADDITIONAL STUDIES:

## 2021-08-26 NOTE — PROGRESS NOTE ADULT - SUBJECTIVE AND OBJECTIVE BOX
STATUS POST:  : Left transgluteal IR drain placement   : c-scope with biopsy of mass  : diverting colostomy   : EGD:Erosive esophagitis and doudenitis with esophageal ulcers bx   : OR- Ex lap w/ STEPHANIE and primary repair of tears x2; EBL 25. IVF 1500, 2U pRBC    OVERNIGHT EVENTS/SUBJECTIVE: PIETRO  INTERVAL HISTORY: Patient seen and evaluated. Patient says that she is doing well. She notes continued discomfort over the right aspect of her abdomen. Patient otherwise denies developing any new symptoms or areas of discomfort since yesterday. Patient further denies nausea, emesis, CP, SOB, or leg pain.     ICU Vital Signs Last 24 Hrs  T(C): 36.7 (26 Aug 2021 05:12), Max: 36.7 (26 Aug 2021 05:12)  T(F): 98 (26 Aug 2021 05:12), Max: 98 (26 Aug 2021 05:12)  HR: 81 (26 Aug 2021 05:00) (76 - 92)  BP: 129/59 (26 Aug 2021 05:00) (107/58 - 161/70)  BP(mean): 85 (26 Aug 2021 05:00) (76 - 100)  ABP: --  ABP(mean): --  RR: 18 (26 Aug 2021 05:00) (15 - 24)  SpO2: 96% (26 Aug 2021 05:00) (92% - 98%)      I&O's Detail    25 Aug 2021 07:01  -  26 Aug 2021 07:00  --------------------------------------------------------  IN:    Albumin 5%  - 250 mL: 100 mL    Fat Emulsion (Fish Oil &amp; Plant Based) 20% Infusion: 74.7 mL    Fat Emulsion (Fish Oil &amp; Plant Based) 20% Infusion: 8.5 mL    IV PiggyBack: 350 mL    PPN (Peripheral Parenteral Nutrition): 1058 mL  Total IN: 1591.2 mL    OUT:    Bulb (mL): 25 mL    Colostomy (mL): 0 mL    Indwelling Catheter - Urethral (mL): 2955 mL  Total OUT: 2980 mL    Total NET: -1388.8 mL                MEDICATIONS  (STANDING):  albumin human 25% IVPB 50 milliLiter(s) IV Intermittent every 8 hours  aspirin Suppository 300 milliGRAM(s) Rectal daily  chlorhexidine 2% Cloths 1 Application(s) Topical daily  chlorhexidine 4% Liquid 1 Application(s) Topical <User Schedule>  dextrose 40% Gel 15 Gram(s) Oral once  dextrose 5%. 1000 milliLiter(s) (50 mL/Hr) IV Continuous <Continuous>  dextrose 5%. 1000 milliLiter(s) (100 mL/Hr) IV Continuous <Continuous>  dextrose 50% Injectable 25 Gram(s) IV Push once  dextrose 50% Injectable 12.5 Gram(s) IV Push once  dextrose 50% Injectable 25 Gram(s) IV Push once  fat emulsion (Fish Oil and Plant Based) 20% Infusion 0.5 Gm/kG/Day (8.3 mL/Hr) IV Continuous <Continuous>  glucagon  Injectable 1 milliGRAM(s) IntraMuscular once  heparin   Injectable 5000 Unit(s) SubCutaneous every 12 hours  insulin lispro (ADMELOG) corrective regimen sliding scale   SubCutaneous every 6 hours  lidocaine   4% Patch 1 Patch Transdermal every 24 hours  lidocaine   4% Patch 1 Patch Transdermal every 24 hours  nystatin Powder 1 Application(s) Topical two times a day  pantoprazole  Injectable 40 milliGRAM(s) IV Push every 12 hours  Parenteral Nutrition - Adult 1 Each (46 mL/Hr) TPN Continuous <Continuous>  sucralfate suspension 1 Gram(s) Oral two times a day    MEDICATIONS  (PRN):  metoprolol tartrate Injectable 2.5 milliGRAM(s) IV Push every 6 hours PRN SBP > 160, HR > 100  ondansetron Injectable 4 milliGRAM(s) IV Push every 6 hours PRN Nausea and/or Vomiting  sodium chloride 0.9% lock flush 10 milliLiter(s) IV Push every 1 hour PRN Pre/post blood products, medications, blood draw, and to maintain line patency      NUTRITION/IVF:     CENTRAL LINE:  LOCATION:   DATE INSERTED:  CVP:  SCVO2:    BUSTAMANTE:   DATE INSERTED:    A-LINE:    LOCATION:   DATE INSERTED:   SVV:  CO/CI:     CHEST TUBE:  LOCATION:  DATE INSERTED: OUTPUT/24 HRS:  SUCTION/WATER SEAL:     NG/OG TUBE:  DATE INSERTED:  OUTPUT/24 HRS:    MISC:     PHYSICAL EXAM  GENERAL: Alert, elderly female, in no acute distress with nasal cannula in place.  MENTAL STATUS: Appropriate affect, intermittently smiling throughout conversation.   HEENT: EOMI. Dry oropharynx.  Trachea midline. No anterior cervical/posterior cervical/supraclavicular lymphadenopathy.   RESPIRATORY: CTAB. No wheezing, rales or rhonchi.  CARDIOVASCULAR: Regular rate. No audible murmurs, rubs or gallops.   GASTROINTESTINAL: Abdomen soft; patient tenses abdomen with inspiration. TTP RUQ, no underlying deformities appreciated; no hepatomegaly. No rebound tenderness or guarding. No suprapubic tenderness. CORDELL drain in place over L gluteal region with ~1cc serous fluid collected. Ostomy in place with minimal air; no feculent material collected.   NEUROLOGIC: Cranial nerves II-XII grossly intact. No focal neurological deficits. Moves all extremities spontaneously. Sensation intact bilaterally and symmetric. 5/5  strength. 5/5 plantar flexion/dorsiflexion.   INTEGUMENTARY: Small, weeping wound over lateral aspect of R forearm. Moderate ecchymosis circumferentially around R forearm with mild tenderness over R bicep at previous hematoma site; hematoma resolved. Poor skin turgor; appropriate for age. Mild skin wound over dorsal aspect of R wrist with tape in place; no drainage or purulence.   MUSCULOSKELETAL: No gross deformities.   LYMPHATIC: Palpation of neck reveals no swelling or tenderness of neck nodes.       LABS:  CBC Full  -  ( 26 Aug 2021 05:08 )  WBC Count : 9.49 K/uL  RBC Count : 3.39 M/uL  Hemoglobin : 9.5 g/dL  Hematocrit : 29.3 %  Platelet Count - Automated : 431 K/uL  Mean Cell Volume : 86.4 fl  Mean Cell Hemoglobin : 28.0 pg  Mean Cell Hemoglobin Concentration : 32.4 gm/dL  Auto Neutrophil # : x  Auto Lymphocyte # : x  Auto Monocyte # : x  Auto Eosinophil # : x  Auto Basophil # : x  Auto Neutrophil % : x  Auto Lymphocyte % : x  Auto Monocyte % : x  Auto Eosinophil % : x  Auto Basophil % : x        136  |  106  |  18  ----------------------------<  124<H>  4.6   |  23  |  0.48<L>    Ca    7.7<L>      26 Aug 2021 05:08  Phos  3.5     08-26  Mg     2.2             Urinalysis Basic - ( 24 Aug 2021 12:07 )    Color: Yellow / Appearance: Clear / S.015 / pH: x  Gluc: x / Ketone: NEGATIVE  / Bili: Negative / Urobili: 0.2 E.U./dL   Blood: x / Protein: 30 mg/dL / Nitrite: NEGATIVE   Leuk Esterase: Moderate / RBC: Many /HPF / WBC > 10 /HPF   Sq Epi: x / Non Sq Epi: 0-5 /HPF / Bacteria: Present /HPF      RECENT CULTURES:   Clean Catch Clean Catch (Midstream) XXXX XXXX   Culture in progress     .Blood Blood XXXX XXXX   No growth at 1 day.              CAPILLARY BLOOD GLUCOSE      RADIOLOGY & ADDITIONAL STUDIES:   STATUS POST:  : Left transgluteal IR drain placement   : c-scope with biopsy of mass  : diverting colostomy   : EGD:Erosive esophagitis and doudenitis with esophageal ulcers bx   : OR- Ex lap w/ STEPHANIE and primary repair of tears x2; EBL 25. IVF 1500, 2U pRBC    OVERNIGHT EVENTS/SUBJECTIVE: PIETRO  INTERVAL HISTORY: Patient seen and evaluated. Patient says that she is doing well. She notes continued discomfort over the right aspect of her abdomen. Patient otherwise denies developing any new symptoms or areas of discomfort since yesterday. Patient further denies nausea, emesis, CP, SOB, or leg pain.     ICU Vital Signs Last 24 Hrs  T(C): 36.7 (26 Aug 2021 05:12), Max: 36.7 (26 Aug 2021 05:12)  T(F): 98 (26 Aug 2021 05:12), Max: 98 (26 Aug 2021 05:12)  HR: 81 (26 Aug 2021 05:00) (76 - 92)  BP: 129/59 (26 Aug 2021 05:00) (107/58 - 161/70)  BP(mean): 85 (26 Aug 2021 05:00) (76 - 100)  ABP: --  ABP(mean): --  RR: 18 (26 Aug 2021 05:00) (15 - 24)  SpO2: 96% (26 Aug 2021 05:00) (92% - 98%)      I&O's Detail    25 Aug 2021 07:01  -  26 Aug 2021 07:00  --------------------------------------------------------  IN:    Albumin 5%  - 250 mL: 100 mL    Fat Emulsion (Fish Oil &amp; Plant Based) 20% Infusion: 74.7 mL    Fat Emulsion (Fish Oil &amp; Plant Based) 20% Infusion: 8.5 mL    IV PiggyBack: 350 mL    PPN (Peripheral Parenteral Nutrition): 1058 mL  Total IN: 1591.2 mL    OUT:    Bulb (mL): 25 mL    Colostomy (mL): 0 mL    Indwelling Catheter - Urethral (mL): 2955 mL  Total OUT: 2980 mL    Total NET: -1388.8 mL                MEDICATIONS  (STANDING):  albumin human 25% IVPB 50 milliLiter(s) IV Intermittent every 8 hours  aspirin Suppository 300 milliGRAM(s) Rectal daily  chlorhexidine 2% Cloths 1 Application(s) Topical daily  chlorhexidine 4% Liquid 1 Application(s) Topical <User Schedule>  dextrose 40% Gel 15 Gram(s) Oral once  dextrose 5%. 1000 milliLiter(s) (50 mL/Hr) IV Continuous <Continuous>  dextrose 5%. 1000 milliLiter(s) (100 mL/Hr) IV Continuous <Continuous>  dextrose 50% Injectable 25 Gram(s) IV Push once  dextrose 50% Injectable 12.5 Gram(s) IV Push once  dextrose 50% Injectable 25 Gram(s) IV Push once  fat emulsion (Fish Oil and Plant Based) 20% Infusion 0.5 Gm/kG/Day (8.3 mL/Hr) IV Continuous <Continuous>  glucagon  Injectable 1 milliGRAM(s) IntraMuscular once  heparin   Injectable 5000 Unit(s) SubCutaneous every 12 hours  insulin lispro (ADMELOG) corrective regimen sliding scale   SubCutaneous every 6 hours  lidocaine   4% Patch 1 Patch Transdermal every 24 hours  lidocaine   4% Patch 1 Patch Transdermal every 24 hours  nystatin Powder 1 Application(s) Topical two times a day  pantoprazole  Injectable 40 milliGRAM(s) IV Push every 12 hours  Parenteral Nutrition - Adult 1 Each (46 mL/Hr) TPN Continuous <Continuous>  sucralfate suspension 1 Gram(s) Oral two times a day    MEDICATIONS  (PRN):  metoprolol tartrate Injectable 2.5 milliGRAM(s) IV Push every 6 hours PRN SBP > 160, HR > 100  ondansetron Injectable 4 milliGRAM(s) IV Push every 6 hours PRN Nausea and/or Vomiting  sodium chloride 0.9% lock flush 10 milliLiter(s) IV Push every 1 hour PRN Pre/post blood products, medications, blood draw, and to maintain line patency      NUTRITION/IVF:     CENTRAL LINE:  LOCATION:   DATE INSERTED:  CVP:  SCVO2:    BUSTAMANTE:   DATE INSERTED:    A-LINE:    LOCATION:   DATE INSERTED:   SVV:  CO/CI:     CHEST TUBE:  LOCATION:  DATE INSERTED: OUTPUT/24 HRS:  SUCTION/WATER SEAL:     NG/OG TUBE:  DATE INSERTED:  OUTPUT/24 HRS:    MISC:     PHYSICAL EXAM  GENERAL: Alert, elderly female, in no acute distress with nasal cannula in place.  MENTAL STATUS: Appropriate affect, intermittently smiling throughout conversation.   HEENT: EOMI. Dry oropharynx.  Trachea midline. No anterior cervical/posterior cervical/supraclavicular lymphadenopathy.   RESPIRATORY: CTAB. No wheezing, rales or rhonchi.  CARDIOVASCULAR: Regular rate. No audible murmurs, rubs or gallops.   GASTROINTESTINAL: Abdomen soft; patient tenses abdomen with inspiration. TTP RUQ, no underlying deformities appreciated; no hepatomegaly. No rebound tenderness or guarding. No suprapubic tenderness. CORDELL drain in place over L gluteal region with ~1cc serous fluid collected. Ostomy in place with minimal air; no feculent material collected.   GENITOURINARY: Bustamante in place with cloudy urine collected; no hematuria, cloudiness mildly improved.   NEUROLOGIC: Cranial nerves II-XII grossly intact. No focal neurological deficits. Moves all extremities spontaneously. Sensation intact bilaterally and symmetric. 5/5  strength. 5/5 plantar flexion/dorsiflexion.   INTEGUMENTARY: Small, weeping wound over lateral aspect of R forearm. Moderate ecchymosis circumferentially around R forearm with mild tenderness over R bicep at previous hematoma site; hematoma resolved. Poor skin turgor; appropriate for age. Mild skin wound over dorsal aspect of R wrist with tape in place; no drainage or purulence.   MUSCULOSKELETAL: No gross deformities.   LYMPHATIC: Palpation of neck reveals no swelling or tenderness of neck nodes.       LABS:  CBC Full  -  ( 26 Aug 2021 05:08 )  WBC Count : 9.49 K/uL  RBC Count : 3.39 M/uL  Hemoglobin : 9.5 g/dL  Hematocrit : 29.3 %  Platelet Count - Automated : 431 K/uL  Mean Cell Volume : 86.4 fl  Mean Cell Hemoglobin : 28.0 pg  Mean Cell Hemoglobin Concentration : 32.4 gm/dL  Auto Neutrophil # : x  Auto Lymphocyte # : x  Auto Monocyte # : x  Auto Eosinophil # : x  Auto Basophil # : x  Auto Neutrophil % : x  Auto Lymphocyte % : x  Auto Monocyte % : x  Auto Eosinophil % : x  Auto Basophil % : x        136  |  106  |  18  ----------------------------<  124<H>  4.6   |  23  |  0.48<L>    Ca    7.7<L>      26 Aug 2021 05:08  Phos  3.5       Mg     2.2             Urinalysis Basic - ( 24 Aug 2021 12:07 )    Color: Yellow / Appearance: Clear / S.015 / pH: x  Gluc: x / Ketone: NEGATIVE  / Bili: Negative / Urobili: 0.2 E.U./dL   Blood: x / Protein: 30 mg/dL / Nitrite: NEGATIVE   Leuk Esterase: Moderate / RBC: Many /HPF / WBC > 10 /HPF   Sq Epi: x / Non Sq Epi: 0-5 /HPF / Bacteria: Present /HPF      RECENT CULTURES:   Clean Catch Clean Catch (Midstream) XXXX XXXX   Culture in progress     .Blood Blood XXXX XXXX   No growth at 1 day.              CAPILLARY BLOOD GLUCOSE      RADIOLOGY & ADDITIONAL STUDIES:   STATUS POST:  : Left transgluteal IR drain placement   : c-scope with biopsy of mass  : diverting colostomy   : EGD:Erosive esophagitis and doudenitis with esophageal ulcers bx   : OR- Ex lap w/ STEPHANIE and primary repair of tears x2; EBL 25. IVF 1500, 2U pRBC    OVERNIGHT EVENTS/SUBJECTIVE: PIETRO  INTERVAL HISTORY: Patient seen and evaluated. Patient says that she is doing well. She notes continued discomfort over the right aspect of her abdomen. Patient otherwise denies developing any new symptoms or areas of discomfort since yesterday. Patient further denies nausea, emesis, CP, SOB, or leg pain.     ICU Vital Signs Last 24 Hrs  T(C): 36.7 (26 Aug 2021 05:12), Max: 36.7 (26 Aug 2021 05:12)  T(F): 98 (26 Aug 2021 05:12), Max: 98 (26 Aug 2021 05:12)  HR: 81 (26 Aug 2021 05:00) (76 - 92)  BP: 129/59 (26 Aug 2021 05:00) (107/58 - 161/70)  BP(mean): 85 (26 Aug 2021 05:00) (76 - 100)  ABP: --  ABP(mean): --  RR: 18 (26 Aug 2021 05:00) (15 - 24)  SpO2: 96% (26 Aug 2021 05:00) (92% - 98%)      I&O's Detail    25 Aug 2021 07:01  -  26 Aug 2021 07:00  --------------------------------------------------------  IN:    Albumin 5%  - 250 mL: 100 mL    Fat Emulsion (Fish Oil &amp; Plant Based) 20% Infusion: 74.7 mL    Fat Emulsion (Fish Oil &amp; Plant Based) 20% Infusion: 8.5 mL    IV PiggyBack: 350 mL    PPN (Peripheral Parenteral Nutrition): 1058 mL  Total IN: 1591.2 mL    OUT:    Bulb (mL): 25 mL    Colostomy (mL): 0 mL    Indwelling Catheter - Urethral (mL): 2955 mL  Total OUT: 2980 mL    Total NET: -1388.8 mL                MEDICATIONS  (STANDING):  albumin human 25% IVPB 50 milliLiter(s) IV Intermittent every 8 hours  aspirin Suppository 300 milliGRAM(s) Rectal daily  chlorhexidine 2% Cloths 1 Application(s) Topical daily  chlorhexidine 4% Liquid 1 Application(s) Topical <User Schedule>  dextrose 40% Gel 15 Gram(s) Oral once  dextrose 5%. 1000 milliLiter(s) (50 mL/Hr) IV Continuous <Continuous>  dextrose 5%. 1000 milliLiter(s) (100 mL/Hr) IV Continuous <Continuous>  dextrose 50% Injectable 25 Gram(s) IV Push once  dextrose 50% Injectable 12.5 Gram(s) IV Push once  dextrose 50% Injectable 25 Gram(s) IV Push once  fat emulsion (Fish Oil and Plant Based) 20% Infusion 0.5 Gm/kG/Day (8.3 mL/Hr) IV Continuous <Continuous>  glucagon  Injectable 1 milliGRAM(s) IntraMuscular once  heparin   Injectable 5000 Unit(s) SubCutaneous every 12 hours  insulin lispro (ADMELOG) corrective regimen sliding scale   SubCutaneous every 6 hours  lidocaine   4% Patch 1 Patch Transdermal every 24 hours  lidocaine   4% Patch 1 Patch Transdermal every 24 hours  nystatin Powder 1 Application(s) Topical two times a day  pantoprazole  Injectable 40 milliGRAM(s) IV Push every 12 hours  Parenteral Nutrition - Adult 1 Each (46 mL/Hr) TPN Continuous <Continuous>  sucralfate suspension 1 Gram(s) Oral two times a day    MEDICATIONS  (PRN):  metoprolol tartrate Injectable 2.5 milliGRAM(s) IV Push every 6 hours PRN SBP > 160, HR > 100  ondansetron Injectable 4 milliGRAM(s) IV Push every 6 hours PRN Nausea and/or Vomiting  sodium chloride 0.9% lock flush 10 milliLiter(s) IV Push every 1 hour PRN Pre/post blood products, medications, blood draw, and to maintain line patency      NUTRITION/IVF:     CENTRAL LINE:  LOCATION:   DATE INSERTED:  CVP:  SCVO2:    BUSTAMANTE:   DATE INSERTED:    A-LINE:    LOCATION:   DATE INSERTED:   SVV:  CO/CI:     CHEST TUBE:  LOCATION:  DATE INSERTED: OUTPUT/24 HRS:  SUCTION/WATER SEAL:     NG/OG TUBE:  DATE INSERTED:  OUTPUT/24 HRS:    MISC:     PHYSICAL EXAM  GENERAL: Alert, elderly female, in no acute distress with nasal cannula in place.  MENTAL STATUS: Appropriate affect, intermittently smiling throughout conversation.   HEENT: EOMI. Dry oropharynx.  Trachea midline. No anterior cervical/posterior cervical/supraclavicular lymphadenopathy.   RESPIRATORY: CTAB. No wheezing, rales or rhonchi.  CARDIOVASCULAR: Regular rate. No audible murmurs, rubs or gallops.   GASTROINTESTINAL: Abdomen soft; patient tenses abdomen with inspiration. TTP RUQ, no underlying deformities appreciated; no hepatomegaly. No rebound tenderness or guarding. No suprapubic tenderness. CORDELL drain in place over L gluteal region with ~1cc serous fluid collected. Ostomy in place with minimal air; no feculent material collected. Perineum and rectal area inspected with no overt lesions; mild erythema, improved.   GENITOURINARY: Bustamante in place with cloudy urine collected; no hematuria, cloudiness mildly improved.   NEUROLOGIC: Cranial nerves II-XII grossly intact. No focal neurological deficits. Moves all extremities spontaneously. Sensation intact bilaterally and symmetric. 5/5  strength. 5/5 plantar flexion/dorsiflexion.   INTEGUMENTARY: Small, weeping wound over lateral aspect of R forearm. Moderate ecchymosis circumferentially around R forearm with mild tenderness over R bicep at previous hematoma site; hematoma resolved. Poor skin turgor; appropriate for age. Mild skin wound over dorsal aspect of R wrist with tape in place; no drainage or purulence.   MUSCULOSKELETAL: No gross deformities.   LYMPHATIC: Palpation of neck reveals no swelling or tenderness of neck nodes.       LABS:  CBC Full  -  ( 26 Aug 2021 05:08 )  WBC Count : 9.49 K/uL  RBC Count : 3.39 M/uL  Hemoglobin : 9.5 g/dL  Hematocrit : 29.3 %  Platelet Count - Automated : 431 K/uL  Mean Cell Volume : 86.4 fl  Mean Cell Hemoglobin : 28.0 pg  Mean Cell Hemoglobin Concentration : 32.4 gm/dL  Auto Neutrophil # : x  Auto Lymphocyte # : x  Auto Monocyte # : x  Auto Eosinophil # : x  Auto Basophil # : x  Auto Neutrophil % : x  Auto Lymphocyte % : x  Auto Monocyte % : x  Auto Eosinophil % : x  Auto Basophil % : x        136  |  106  |  18  ----------------------------<  124<H>  4.6   |  23  |  0.48<L>    Ca    7.7<L>      26 Aug 2021 05:08  Phos  3.5       Mg     2.2             Urinalysis Basic - ( 24 Aug 2021 12:07 )    Color: Yellow / Appearance: Clear / S.015 / pH: x  Gluc: x / Ketone: NEGATIVE  / Bili: Negative / Urobili: 0.2 E.U./dL   Blood: x / Protein: 30 mg/dL / Nitrite: NEGATIVE   Leuk Esterase: Moderate / RBC: Many /HPF / WBC > 10 /HPF   Sq Epi: x / Non Sq Epi: 0-5 /HPF / Bacteria: Present /HPF      RECENT CULTURES:   Clean Catch Clean Catch (Midstream) XXXX XXXX   Culture in progress     .Blood Blood XXXX XXXX   No growth at 1 day.              CAPILLARY BLOOD GLUCOSE      RADIOLOGY & ADDITIONAL STUDIES:

## 2021-08-26 NOTE — PROGRESS NOTE ADULT - ASSESSMENT
L gluteal drain with 25cc serous output over last 24h. Removal requested per primary team. Patient tolerated bedside removal of drain with no bleeding or purulent output. Dressing placed and c/d/i. IR signing off care of this patient.

## 2021-08-26 NOTE — PROGRESS NOTE ADULT - SUBJECTIVE AND OBJECTIVE BOX
SUBJECTIVE:   ON: PIETRO  : Famotidine dc'd. Lasix 20 x1. FEES: failed. Negative 800 bp decreased giving albumin 25%q8.  asking for US on  and will d/c stent on . IR to pull drain (P)   ON: haldol 0.5 x1 for agitation. Good UOP >1L. Bld clx  NGTD    Patient seen and examined at bedside; denies complaints; no abd pain, n/v, no stool or gas ouput from ostomy; no fevers, chills, SOB or chest pain.     furosemide   Injectable 20 milliGRAM(s) IV Push once  heparin   Injectable 5000 Unit(s) SubCutaneous every 12 hours  metoprolol tartrate Injectable 2.5 milliGRAM(s) IV Push every 6 hours PRN    MEDICATIONS  (PRN):  metoprolol tartrate Injectable 2.5 milliGRAM(s) IV Push every 6 hours PRN SBP > 160, HR > 100  ondansetron Injectable 4 milliGRAM(s) IV Push every 6 hours PRN Nausea and/or Vomiting  sodium chloride 0.9% lock flush 10 milliLiter(s) IV Push every 1 hour PRN Pre/post blood products, medications, blood draw, and to maintain line patency      I&O's Detail    25 Aug 2021 07:  -  26 Aug 2021 07:00  --------------------------------------------------------  IN:    Albumin 5%  - 250 mL: 100 mL    Fat Emulsion (Fish Oil &amp; Plant Based) 20% Infusion: 83 mL    Fat Emulsion (Fish Oil &amp; Plant Based) 20% Infusion: 8.5 mL    IV PiggyBack: 350 mL    PPN (Peripheral Parenteral Nutrition): 1104 mL  Total IN: 1645.5 mL    OUT:    Bulb (mL): 25 mL    Colostomy (mL): 0 mL    Indwelling Catheter - Urethral (mL): 3030 mL  Total OUT: 3055 mL    Total NET: -1409.5 mL      26 Aug 2021 07:  -  26 Aug 2021 08:53  --------------------------------------------------------  IN:    Fat Emulsion (Fish Oil &amp; Plant Based) 20% Infusion: 16.6 mL    PPN (Peripheral Parenteral Nutrition): 92 mL  Total IN: 108.6 mL    OUT:    Indwelling Catheter - Urethral (mL): 75 mL  Total OUT: 75 mL    Total NET: 33.6 mL          T(C): 36.7 (21 @ 05:12), Max: 36.7 (21 @ 05:12)  HR: 95 (21 @ 08:00) (76 - 95)  BP: 133/63 (21 @ 08:00) (107/58 - 150/67)  RR: 20 (21 @ 08:00) (15 - 24)  SpO2: 98% (21 @ 08:00) (92% - 98%)    General: NAD, resting comfortably in bed  Pulmonary: Nonlabored, no respiratory distress  Cardiovascular: regular rate and rhythm, normal peripheral perfusion   Abdominal: soft, non-distended, tender in the right lower abdomen; Ostomy p/p/ nonproductive, with no output yet. Retention red rubber noted. Incision cdi  skin: serous discharge from skin ov upper and lower extremitas     LABS:                        9.5    9.49  )-----------( 431      ( 26 Aug 2021 05:08 )             29.3     08-    136  |  106  |  18  ----------------------------<  124<H>  4.6   |  23  |  0.48<L>    Ca    7.7<L>      26 Aug 2021 05:08  Phos  3.5       Mg     2.2             Urinalysis Basic - ( 24 Aug 2021 12:07 )    Color: Yellow / Appearance: Clear / S.015 / pH: x  Gluc: x / Ketone: NEGATIVE  / Bili: Negative / Urobili: 0.2 E.U./dL   Blood: x / Protein: 30 mg/dL / Nitrite: NEGATIVE   Leuk Esterase: Moderate / RBC: Many /HPF / WBC > 10 /HPF   Sq Epi: x / Non Sq Epi: 0-5 /HPF / Bacteria: Present /HPF        RADIOLOGY & ADDITIONAL STUDIES:  CT Abdomen and Pelvis No Cont:   EXAM:  CT ABDOMEN AND PELVIS                          PROCEDURE DATE:  2021          INTERPRETATION:  CT SCAN OF ABDOMEN AND PELVIS    History: 89-year-old female with colonic adenocarcinoma. Status post serosal tear and loop ostomy. Abdominal pain.    Technique: CT scan of abdomen and pelvis was performed from lung bases through symphysis pubis. Intravenous and oral contrast material were not utilized, as ordered. Axial, sagittal and coronal reformatted images were reviewed.    Comparison: Comparison made with most recent CT scan of abdomen and pelvis from 2021 and with multiple additional prior imaging studies dating back to 10/19/2018.    Findings: This study is limited as there is beam hardening artifact from the patient being scanned with her arms at her sides. Study also limited by lack of intravenous and oral contrast material.    Lower chest: Worsening of moderate bilateral pleural effusions. Passive atelectasis of small portions of each lower lobe. Small amount of atelectasis in lingula. There are again a few small patchy opacities in the right middle lobe.    Liver:  Normal.    Gallbladder: No radiopaque stones gallbladder.    Spleen:  Normal.    Pancreas:  Normal.    Adrenal glands:  1.3 cm left adrenal adenoma. Normal right adrenal gland.    Kidneys: Worsening of moderate left hydronephrosis despite the presence of a left ureteral stent. Right kidney unremarkable.    Adenopathy:  Multiple calcified lymph nodes again present in upper abdomen.    Ascites: Small.    Gastrointestinal tract: Interval abdominal surgery. There is no longer a small bowel obstruction. No free air. Again post diverting colostomy transverse colon. No significant change in mass arising from the rectosigmoid colon and extending posteriorly to the sacrum. Transgluteal drain again present left pelvis. New drains present infraumbilical anterior abdominal wall. Colonic diverticula are noted.    Vessels: Severe atherosclerotic disease, including large amount of calcified plaque aorta and its branch vessels in the chest, abdomen and pelvis, including severe coronary artery calcification.    Pelvic organs: Calcified fibroid uterus. Novak catheter within bladder. Moderate amount of gas and urine present within bladder.    Soft tissues: Severe anasarca.    Bones: No change appearance of spine, with compression fractures at T12, L1, and L5. Again post kyphoplasty at L2, L3, and L4.    Impression:  1. Since 2021, there is no longer a small bowel obstruction. No free air.    2. Interval increase in size of bilateral pleural effusions.    3. Exophytic rectosigmoid mass in the presacral space, similar to prior.    4. Worsening of moderate left hydronephrosis despite presence of a left ureteral stent. Recommend determining a Novak catheter is functioning, as there is moderate gas in urine within the bladder.    5. Patchy opacities in the right middle lobe could represent infection or aspiration.        --- End of Report ---          Thank you for the opportunity to participate in the care of this patient.    RODRI ANGELO MD; Resident Radiologist  This document has been electronically signed.  MILLI QURESHI MD; Attending Radiologist  This document has been electronically signed. Aug 24 2021  3:49PM (21 @ 14:13)      Culture - Urine (collected 21 @ 20:00)  Source: Clean Catch Clean Catch (Midstream)  Preliminary Report (21 @ 11:06):    Culture in progress    Culture - Blood (collected 21 @ 15:13)  Source: .Blood Blood  Preliminary Report (21 @ 16:00):    No growth at 1 day.    Culture - Blood (collected 21 @ 15:13)  Source: .Blood Blood  Preliminary Report (21 @ 16:00):    No growth at 1 day.

## 2021-08-26 NOTE — PROGRESS NOTE ADULT - ASSESSMENT
89F PMH HTN, chronic back pain 2/2 L3,L4 compression fractures, triple vessel CAD, and SBO due to possible diverticulitis vs. GYN/colorectal malignancy (2018) never followed up and L ICA s/p L CEA (4/21). 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 Left gluteal IR drain placed (8/9). Colonoscopy w/ sigmoid mass bx on 8/11- final path invasive adenocarcinoma moderately differentiated. Taken to OR on 8/13 for diverting colostomy. In SICU for acute blood loss anemia, s/p EGD 8/18 showing duodenal ulceration, erosive esophagitis duodenitis, bx x 2, CT 8/20 w/ closed loop obstxn, s/p OR 8/20 exlap STEPHANIE.     Neuro: Tylenol prn, AMS: Holding pepcid. Haldol PRN   CV: ICA s/p L CEA (4/21). ASA, HD stable. Continue albumin 25%q8, Plavix held.   Pulm: extubated 8/22, stable on 2L nasal cannula.   GI/FEN: Patient demonstrating severe dysphagia per FEES examination; maintain NPO status, TPN, UGIB: EGD on 8/18: Erosive esophagitis and doudenitis with esophageal ulcers bx x2. protonix, Carafate per GI recs, Pepcid held for worsening mental status. No leakage surrounding ostomy.    : S/p NM renal function study (wnl). Novak (8/18-). CTA&P showing hydronephrosis; renal U/S today 8/26; urology to exchange stent on 8/27. Urine cx negative to date.   ID: Decreased WBC this am.  ESBL Ecoli in intraabdominal abscess. Buttock rash: Nystatin powder (8/23--) D/c: : Daina (8/13-8/24), Blood cultures taken 8/24 show no growth to date.   Endo: ISS  PPX: SCDs., SQH  Lines: PIVs, RIJ TLC (8/23-), R brachial Bebe (8/22-23)  Wounds: ostomy, red rubber cath removed 8/25, Gluteal CORDELL placed by IR; IR will remove drain today, Dr. Holder aware.    PT/OT: re-ordered 8/22

## 2021-08-26 NOTE — CHART NOTE - NSCHARTNOTEFT_GEN_A_CORE
Admitting Diagnosis:   Patient is a 89y old  Female who presents with a chief complaint of Neck pain (26 Aug 2021 10:08)    PAST MEDICAL & SURGICAL HISTORY:  SBO (small bowel obstruction)  HTN (hypertension)  Chronic back pain  S/P wrist surgery    Current Nutrition Order: NPO diet  TPN regimen via cental line; 1100 ml @ 46 ml/hr providing 242g dex, 61g AA, 20g SMOF intralipids providing 1267 kcal, 61g pro, 1.35 g pro/kg IBW, 1.5g pro/kg ABW, GIR 4.09     PO Intake: Good (%) [   ]  Fair (50-75%) [   ] Poor (<25%) [   ]- N/A    GI Issues:   WDL, No reported n/v/d/c  +abd tender, no abd distention  Colostomy in place (0 ml/24hrs)    Pain:  No pain noted- well controlled at time of assessment    Skin Integrity:  WDL, ankush score 11, ecchymotic  No edema present  +DTI midline lumbar spine    Labs:   08-26    136  |  106  |  18  ----------------------------<  124<H>  4.6   |  23  |  0.48<L>    Ca    7.7<L>      26 Aug 2021 05:08  Phos  3.5     08-26  Mg     2.2     08-26    TPro  x   /  Alb  2.5<L>  /  TBili  x   /  DBili  x   /  AST  x   /  ALT  x   /  AlkPhos  x   08-26    CAPILLARY BLOOD GLUCOSE    POCT Blood Glucose.: 124 mg/dL (26 Aug 2021 06:36)  POCT Blood Glucose.: 118 mg/dL (25 Aug 2021 23:22)  POCT Blood Glucose.: 134 mg/dL (25 Aug 2021 18:12)  POCT Blood Glucose.: 127 mg/dL (25 Aug 2021 11:09)    Medications:  MEDICATIONS  (STANDING):  acetaminophen  IVPB .. 750 milliGRAM(s) IV Intermittent once  albumin human 25% IVPB 50 milliLiter(s) IV Intermittent every 8 hours  aspirin Suppository 300 milliGRAM(s) Rectal daily  chlorhexidine 2% Cloths 1 Application(s) Topical daily  chlorhexidine 4% Liquid 1 Application(s) Topical <User Schedule>  dextrose 40% Gel 15 Gram(s) Oral once  dextrose 5%. 1000 milliLiter(s) (50 mL/Hr) IV Continuous <Continuous>  dextrose 5%. 1000 milliLiter(s) (100 mL/Hr) IV Continuous <Continuous>  dextrose 50% Injectable 25 Gram(s) IV Push once  dextrose 50% Injectable 12.5 Gram(s) IV Push once  dextrose 50% Injectable 25 Gram(s) IV Push once  fat emulsion (Fish Oil and Plant Based) 20% Infusion 0.5 Gm/kG/Day (8.3 mL/Hr) IV Continuous <Continuous>  fat emulsion (Fish Oil and Plant Based) 20% Infusion 0.5 Gm/kG/Day (8.3 mL/Hr) IV Continuous <Continuous>  glucagon  Injectable 1 milliGRAM(s) IntraMuscular once  heparin   Injectable 5000 Unit(s) SubCutaneous every 12 hours  insulin lispro (ADMELOG) corrective regimen sliding scale   SubCutaneous every 6 hours  lidocaine   4% Patch 1 Patch Transdermal every 24 hours  lidocaine   4% Patch 1 Patch Transdermal every 24 hours  nystatin Powder 1 Application(s) Topical two times a day  pantoprazole  Injectable 40 milliGRAM(s) IV Push every 12 hours  Parenteral Nutrition - Adult 1 Each (46 mL/Hr) TPN Continuous <Continuous>  Parenteral Nutrition - Adult 1 Each (46 mL/Hr) TPN Continuous <Continuous>  sucralfate suspension 1 Gram(s) Oral two times a day    MEDICATIONS  (PRN):  metoprolol tartrate Injectable 2.5 milliGRAM(s) IV Push every 6 hours PRN SBP > 160, HR > 100  ondansetron Injectable 4 milliGRAM(s) IV Push every 6 hours PRN Nausea and/or Vomiting  sodium chloride 0.9% lock flush 10 milliLiter(s) IV Push every 1 hour PRN Pre/post blood products, medications, blood draw, and to maintain line patency    Admitted Anthropometrics:  Height: 5'0" Weight: 90lbs, IBW 100lbs+/-10%, %IBW 90%, BMI 17.6     Weight Change: Per initial RD note pt reports UBW to be 110 lbs, current adm wt 90 lbs, indicates wt loss of 20 lbs/18% x 1 year.  8/13: 89.9 lbs  8/20 89.9lbs/ 92.4lbs    Estimated energy needs:   ABW (40.8kg) used to calculate energy needs due to pt's current body weight within % IBW (90%).   Nutrient needs based on [facility] standards of care for maintenance in older adults.   Needs adjusted for age, pre-op/post-op healing, severe protein calorie malnutrition  1020-1224kcal/day (25-30kcal/kg)  61-69g pro/day (1.5-1.7g pro/kg)  1224-1428ml fluid/day (30-35ml/kg]     Subjective:   89F PMH HTN, chronic back pain 2/2 L3,L4 compression fractures, triple vessel CAD, and SBO 2/2 possible diverticulitis vs. GYN/colorectal malignancy (2018) never followed up and PSH L CEA 2/2 L ICA stenosis >70% (4/21) who presented with worsening back pain x1 week. Endorses flatus and some recent weight loss. Transferred from medicine for perforated, complicated sigmoid diverticulitis vs. 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 S/p OR 8/13 for diverting colostomy. Pt now transferred to SICU for HD monitoring for acute blood loss. s/p EGD on 8/18: Erosive esophagitis and doudenitis with esophageal ulcers bx x2 likely cause of acute blood loss. Avoid NGT placement, per GI; f/u EGD in 8 weeks. S/p SLP eval 8/18 with recommendations to advance to [facility] per sx team. Palliative to start GOC disc with family and pt 8/19- remains full code, palliative cont to follow. S/p OR- Ex lap w/ STEPHANIE and primary repair of tears x2 8/20. Pt initiated on PPN 8/21. Pt as weened and extubated 8/22. Now s/p PICC line placement 8/23 and started on TPN. S/p FEES 8/25 with recommendations for NPO with continued TPN.      On assessment, pt resting in bed comfortably. Currently remains NPO with TPN meeting 100% of est needs. No n/v/d/c. No abd distention or discomfort noted. Colostomy with no output- pt has been NPO. Pertinent Labs: POCT 124H, Cre 0.48H, Glu 124H, TG 96 (8/24)- cont to replete lytes prn. Education deferred. Please see nutr recs below. RD to follow.     Previous Nutrition Diagnosis:  Severe protein calorie malnutrition as evidenced by NFPE performed, noted severe fat and muscle wasting, prolonged poor PO intake <75% of EER for 1 year.  Active [  x ]  Resolved [   ]    If resolved, new PES:     Goal: Pt to meet at least 75% of EER during hospital stay    Recommendations:  1. NPO  >>As medically feasible, recommend advancing CLD >> low fiber with Ensure Enlive BID (350 kcal, 20 g protein per serving) as tolerated  >> Cont to adjust consistency per SLP eval  2. TPN regimen: Recommend 242g dex, 61g AA, 20g SMOF intralipids providing 1267 kcal, 61g pro, 1.35 g pro/kg IBW, 1.5g pro/kg ABW, GIR 4.09  >>Recommend checking TG before starting TPN and then check TG weekly.   >>Check Mg, Phos, K daily and POC BG Q6hrs.   >>Trend daily weights. Fluids and lytes per MD discretion.   3. Pain and bowel regimen per team     Education: Deferred     Risk Level: High [ x  ] Moderate [   ] Low [   ].

## 2021-08-26 NOTE — PROGRESS NOTE ADULT - SUBJECTIVE AND OBJECTIVE BOX
POD 6, 13  In ICU  Off antibiotics  On TPN  Novak, IR drain in place    Remains extubated  More alert.  c/o pain when touched in most of her body but worse in RLQ.  When at rest has no pain.  No N/V. Stoma without output    Vital Signs Last 24 Hrs  T(C): 36.7 (26 Aug 2021 05:12), Max: 36.7 (26 Aug 2021 05:12)  T(F): 98 (26 Aug 2021 05:12), Max: 98 (26 Aug 2021 05:12)  HR: 90 (26 Aug 2021 09:00) (76 - 95)  BP: 137/60 (26 Aug 2021 09:00) (107/58 - 140/62)  BP(mean): 87 (26 Aug 2021 09:00) (76 - 91)  RR: 19 (26 Aug 2021 09:00) (15 - 24)  SpO2: 97% (26 Aug 2021 09:00) (92% - 98%)    lungs clear  cor: S1S2  abd:  not distended, BS present but decreased, stoma viable (was digitalized, minus output), colostomy rodrigue removed yesterday  ext: without calf tenderness, less edema    UO  1195 ml/last 12 hours: 3030 ml/prior 24 hrs  Drain:  25 ml/24 hrs                          9.5    9.49  )-----------( 431      ( 26 Aug 2021 05:08 )             29.3   08-26    136  |  106  |  18  ----------------------------<  124<H>  4.6   |  23  |  0.48<L>    Ca    7.7<L>      26 Aug 2021 05:08  Phos  3.5     08-26  Mg     2.2     08-26

## 2021-08-26 NOTE — PROGRESS NOTE ADULT - ASSESSMENT
89 YOF with perforated pelvic mass, bx proven adenocarcinoma with perforation. S/p IR drainage of pelvic abscess, and s/p loop transverse colostomy on 8/13, and exlap and STEPHANIE and enterorrhaphy on 8/20 for SBO. Course c/b GIB from esophageal ulcers.    HDS O/N with improving mental status. Good UOP, improved anasarca after lasix x 1 yesterday. WBC improving off Meropenem. Abdominal exam stable, still no stoma fx; no emesis.     Urology to evaluate for exchange of ureter stent  Recall IR to request pelvic drain removal  C/w NPO, TPN via CVL, PPI  Routine stoma care  PT/OT, SLP - not cleared for POI

## 2021-08-26 NOTE — PROGRESS NOTE ADULT - SUBJECTIVE AND OBJECTIVE BOX
THAD. At neurologic baseline this AM. Alert and oriented x 3. Denies N/V.    Vital Signs Last 24 Hrs  T(C): 36.7 (26 Aug 2021 05:12), Max: 36.7 (26 Aug 2021 05:12)  T(F): 98 (26 Aug 2021 05:12), Max: 98 (26 Aug 2021 05:12)  HR: 95 (26 Aug 2021 08:00) (76 - 95)  BP: 133/63 (26 Aug 2021 08:00) (107/58 - 150/67)  BP(mean): 91 (26 Aug 2021 08:00) (76 - 96)  RR: 20 (26 Aug 2021 08:00) (15 - 24)  SpO2: 98% (26 Aug 2021 08:00) (92% - 98%)    Basic Metabolic Panel in AM (08.26.21 @ 05:08)   Sodium, Serum: 136 mmol/L   Potassium, Serum: 4.6 mmol/L   Chloride, Serum: 106 mmol/L   Carbon Dioxide, Serum: 23 mmol/L   Anion Gap, Serum: 7 mmol/L   Blood Urea Nitrogen, Serum: 18 mg/dL   Creatinine, Serum: 0.48 mg/dL   Glucose, Serum: 124 mg/dL   Calcium, Total Serum: 7.7 mg/dL   Magnesium, Serum in AM (08.26.21 @ 05:08)   Magnesium, Serum: 2.2 mg/dL Phosphorus Level, Serum (08.26.21 @ 05:08)   Phosphorus Level, Serum: 3.5 mg/dL                         9.5    9.49  )-----------( 431      ( 26 Aug 2021 05:08 )             29.3     Culture - Blood (08.24.21 @ 15:13)   Specimen Source: .Blood Blood   Culture Results:   No growth at 1 day.

## 2021-08-27 LAB
ANION GAP SERPL CALC-SCNC: 7 MMOL/L — SIGNIFICANT CHANGE UP (ref 5–17)
APTT BLD: 31.1 SEC — SIGNIFICANT CHANGE UP (ref 27.5–35.5)
BLD GP AB SCN SERPL QL: NEGATIVE — SIGNIFICANT CHANGE UP
BUN SERPL-MCNC: 19 MG/DL — SIGNIFICANT CHANGE UP (ref 7–23)
CALCIUM SERPL-MCNC: 8.1 MG/DL — LOW (ref 8.4–10.5)
CHLORIDE SERPL-SCNC: 106 MMOL/L — SIGNIFICANT CHANGE UP (ref 96–108)
CO2 SERPL-SCNC: 25 MMOL/L — SIGNIFICANT CHANGE UP (ref 22–31)
CREAT SERPL-MCNC: 0.49 MG/DL — LOW (ref 0.5–1.3)
GLUCOSE BLDC GLUCOMTR-MCNC: 123 MG/DL — HIGH (ref 70–99)
GLUCOSE BLDC GLUCOMTR-MCNC: 126 MG/DL — HIGH (ref 70–99)
GLUCOSE BLDC GLUCOMTR-MCNC: 131 MG/DL — HIGH (ref 70–99)
GLUCOSE BLDC GLUCOMTR-MCNC: 136 MG/DL — HIGH (ref 70–99)
GLUCOSE SERPL-MCNC: 88 MG/DL — SIGNIFICANT CHANGE UP (ref 70–99)
HCT VFR BLD CALC: 27 % — LOW (ref 34.5–45)
HGB BLD-MCNC: 8.6 G/DL — LOW (ref 11.5–15.5)
INR BLD: 0.99 — SIGNIFICANT CHANGE UP (ref 0.88–1.16)
MAGNESIUM SERPL-MCNC: 2.2 MG/DL — SIGNIFICANT CHANGE UP (ref 1.6–2.6)
MCHC RBC-ENTMCNC: 28.2 PG — SIGNIFICANT CHANGE UP (ref 27–34)
MCHC RBC-ENTMCNC: 31.9 GM/DL — LOW (ref 32–36)
MCV RBC AUTO: 88.5 FL — SIGNIFICANT CHANGE UP (ref 80–100)
NRBC # BLD: 0 /100 WBCS — SIGNIFICANT CHANGE UP (ref 0–0)
PHOSPHATE SERPL-MCNC: 4 MG/DL — SIGNIFICANT CHANGE UP (ref 2.5–4.5)
PLATELET # BLD AUTO: 397 K/UL — SIGNIFICANT CHANGE UP (ref 150–400)
POTASSIUM SERPL-MCNC: 4.6 MMOL/L — SIGNIFICANT CHANGE UP (ref 3.5–5.3)
POTASSIUM SERPL-SCNC: 4.6 MMOL/L — SIGNIFICANT CHANGE UP (ref 3.5–5.3)
PROTHROM AB SERPL-ACNC: 11.9 SEC — SIGNIFICANT CHANGE UP (ref 10.6–13.6)
RBC # BLD: 3.05 M/UL — LOW (ref 3.8–5.2)
RBC # FLD: 16.6 % — HIGH (ref 10.3–14.5)
RH IG SCN BLD-IMP: POSITIVE — SIGNIFICANT CHANGE UP
SODIUM SERPL-SCNC: 138 MMOL/L — SIGNIFICANT CHANGE UP (ref 135–145)
TRIGL SERPL-MCNC: 76 MG/DL — SIGNIFICANT CHANGE UP
WBC # BLD: 8.98 K/UL — SIGNIFICANT CHANGE UP (ref 3.8–10.5)
WBC # FLD AUTO: 8.98 K/UL — SIGNIFICANT CHANGE UP (ref 3.8–10.5)

## 2021-08-27 PROCEDURE — 99233 SBSQ HOSP IP/OBS HIGH 50: CPT | Mod: GC

## 2021-08-27 PROCEDURE — 52332 CYSTOSCOPY AND TREATMENT: CPT | Mod: LT

## 2021-08-27 RX ORDER — FUROSEMIDE 40 MG
20 TABLET ORAL DAILY
Refills: 0 | Status: DISCONTINUED | OUTPATIENT
Start: 2021-08-28 | End: 2021-08-28

## 2021-08-27 RX ORDER — ELECTROLYTE SOLUTION,INJ
1 VIAL (ML) INTRAVENOUS
Refills: 0 | Status: DISCONTINUED | OUTPATIENT
Start: 2021-08-27 | End: 2021-08-27

## 2021-08-27 RX ORDER — FUROSEMIDE 40 MG
20 TABLET ORAL ONCE
Refills: 0 | Status: COMPLETED | OUTPATIENT
Start: 2021-08-27 | End: 2021-08-27

## 2021-08-27 RX ORDER — I.V. FAT EMULSION 20 G/100ML
0.5 EMULSION INTRAVENOUS
Qty: 20 | Refills: 0 | Status: DISCONTINUED | OUTPATIENT
Start: 2021-08-27 | End: 2021-08-27

## 2021-08-27 RX ADMIN — LIDOCAINE 1 PATCH: 4 CREAM TOPICAL at 00:00

## 2021-08-27 RX ADMIN — PANTOPRAZOLE SODIUM 40 MILLIGRAM(S): 20 TABLET, DELAYED RELEASE ORAL at 23:55

## 2021-08-27 RX ADMIN — NYSTATIN CREAM 1 APPLICATION(S): 100000 CREAM TOPICAL at 18:33

## 2021-08-27 RX ADMIN — HEPARIN SODIUM 5000 UNIT(S): 5000 INJECTION INTRAVENOUS; SUBCUTANEOUS at 07:04

## 2021-08-27 RX ADMIN — LIDOCAINE 1 PATCH: 4 CREAM TOPICAL at 18:43

## 2021-08-27 RX ADMIN — LIDOCAINE 1 PATCH: 4 CREAM TOPICAL at 11:38

## 2021-08-27 RX ADMIN — LIDOCAINE 1 PATCH: 4 CREAM TOPICAL at 22:10

## 2021-08-27 RX ADMIN — LIDOCAINE 1 PATCH: 4 CREAM TOPICAL at 18:26

## 2021-08-27 RX ADMIN — Medication 50 MILLILITER(S): at 13:45

## 2021-08-27 RX ADMIN — PANTOPRAZOLE SODIUM 40 MILLIGRAM(S): 20 TABLET, DELAYED RELEASE ORAL at 11:37

## 2021-08-27 RX ADMIN — CHLORHEXIDINE GLUCONATE 1 APPLICATION(S): 213 SOLUTION TOPICAL at 06:10

## 2021-08-27 RX ADMIN — HEPARIN SODIUM 5000 UNIT(S): 5000 INJECTION INTRAVENOUS; SUBCUTANEOUS at 18:25

## 2021-08-27 RX ADMIN — Medication 100 MILLIGRAM(S): at 09:11

## 2021-08-27 RX ADMIN — Medication 50 MILLILITER(S): at 21:10

## 2021-08-27 RX ADMIN — Medication 1 EACH: at 18:21

## 2021-08-27 RX ADMIN — NYSTATIN CREAM 1 APPLICATION(S): 100000 CREAM TOPICAL at 07:01

## 2021-08-27 RX ADMIN — Medication 50 MILLILITER(S): at 03:38

## 2021-08-27 RX ADMIN — Medication 300 MILLIGRAM(S): at 11:37

## 2021-08-27 RX ADMIN — Medication 20 MILLIGRAM(S): at 18:25

## 2021-08-27 RX ADMIN — I.V. FAT EMULSION 8.33 GM/KG/DAY: 20 EMULSION INTRAVENOUS at 23:00

## 2021-08-27 RX ADMIN — PANTOPRAZOLE SODIUM 40 MILLIGRAM(S): 20 TABLET, DELAYED RELEASE ORAL at 01:00

## 2021-08-27 RX ADMIN — Medication 20 MILLIGRAM(S): at 11:37

## 2021-08-27 NOTE — PROGRESS NOTE ADULT - ASSESSMENT
89F PMH HTN, chronic back pain 2/2 L3,L4 compression fractures, triple vessel CAD, and SBO due to possible diverticulitis vs. GYN/colorectal malignancy (2018) never followed up and L ICA s/p L CEA (4/21). 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 Left gluteal IR drain placed (8/9). Colonoscopy w/ sigmoid mass bx on 8/11- final path invasive adenocarcinoma moderately differentiated. Taken to OR on 8/13 for diverting colostomy. In SICU for acute blood loss anemia, s/p EGD 8/18 showing duodenal ulceration, erosive esophagitis duodenitis, bx x 2, CT 8/20 w/ closed loop obstxn, s/p OR 8/20 exlap STEPHANIE.     Neuro: Tylenol prn, AMS: Holding pepcid. Discontinue haldol; suspected medication discontinued.    CV: ICA s/p L CEA (4/21). ASA, HD stable. Continue albumin 25%q8, Plavix held. Continue Lasix 20mg QD today and over the weekend.   Pulm: extubated 8/22, stable on 2L nasal cannula; attempt trial of room air today.   GI/FEN: Patient demonstrating severe dysphagia per FEES examination; maintain NPO status, TPN; repeat speech and swallow evaluation today. UGIB: EGD on 8/18: Erosive esophagitis and doudenitis with esophageal ulcers bx x2. protonix, Carafate per GI recs, Pepcid held for worsening mental status. No leakage surrounding ostomy; minimal-to-no output, continue diuresis for third-space fluid removal.  : S/p NM renal function study (wnl). Urbina (8/18-). CTA&P showing hydronephrosis; renal U/S today 8/26 showing moderate L hydronephrosis; urology exchanged stent today 8/27. Urine cx growing E. faecium; currently on Vanco per Urology for procedure prophylaxis; clarify duration of treatment. Clarification regarding urbina continuation    ID: Decreased WBC this am. E faecium uclx, Vanc (8/26-), ESBL Ecoli in intraabdominal abscess. Buttock rash: Nystatin powder (8/23--) D/c: : Daina (8/13-8/24), Blood cultures taken 8/24 show no growth to date.   Endo: ISS  PPX: SCDs., SQH  Lines: PIVs, RIJ TLC (8/23-), R brachial Bebe (8/22-23)  Wounds: ostomy, red rubber cath removed 8/25, Gluteal CORDELL removal by IR 8/26.     PT/OT: re-ordered 8/22

## 2021-08-27 NOTE — PROGRESS NOTE ADULT - ASSESSMENT
A/P Stable overall.  cards: VSS  pulm: stable, effusions noted  ID: On vancomycin as per , ID recommendations  Renal:  Strong UO, BUN/creatinine stable  Nutrition: TPN, failed swallow evaluation  GI: no stoma function but no N/V, abdominal pain.  I digitalized the stoma today with no results  Follow exam.

## 2021-08-27 NOTE — PRE-OP CHECKLIST - SELECT TESTS ORDERED
BMP/CBC/CMP/PT/PTT/INR/Type and Cross/Type and Screen/EKG/COVID-19
143/CBC/PT/PTT/CXR/POCT Blood Glucose/COVID-19
BMP/CBC/PT/PTT/Type and Screen

## 2021-08-27 NOTE — PROGRESS NOTE ADULT - SUBJECTIVE AND OBJECTIVE BOX
STATUS POST:  8/9: Left transgluteal IR drain placement   8/11: c-scope with biopsy of mass  8/13: diverting colostomy   8/18: EGD:Erosive esophagitis and doudenitis with esophageal ulcers bx   8/20: OR- Ex lap w/ STEPHANIE and primary repair of tears x2; EBL 25. IVF 1500, 2U pRBC  8/27: cystoscopy with L ureteral stent replacement    OVERNIGHT EVENTS/SUBJECTIVE:  called, recommended starting abx to treat E faecium in Ucx per Dr Kitchen. started vanco because E faecium resistant to ampicillin.   INTERVAL HISTORY: Patient seen and evaluate. Patient says that she is doing well. She notes continued abdominal discomfort over the right side of her abdomen, although attests that this is improved. Patient denies any nausea, emesis, CP, or SOB.     ICU Vital Signs Last 24 Hrs  T(C): 36.1 (27 Aug 2021 09:00), Max: 36.4 (26 Aug 2021 21:00)  T(F): 97 (27 Aug 2021 09:00), Max: 97.5 (26 Aug 2021 21:00)  HR: 84 (27 Aug 2021 12:00) (80 - 91)  BP: 137/65 (27 Aug 2021 12:00) (113/55 - 148/67)  BP(mean): 93 (27 Aug 2021 12:00) (79 - 96)  ABP: --  ABP(mean): --  RR: 16 (27 Aug 2021 12:00) (13 - 20)  SpO2: 92% (27 Aug 2021 12:00) (92% - 96%)      I&O's Detail    26 Aug 2021 07:01  -  27 Aug 2021 07:00  --------------------------------------------------------  IN:    Albumin 5%  - 250 mL: 150 mL    Fat Emulsion (Fish Oil &amp; Plant Based) 20% Infusion: 24.9 mL    Fat Emulsion 20%: 83 mL    IV PiggyBack: 100 mL    IV PiggyBack: 75 mL    PPN (Peripheral Parenteral Nutrition): 1104 mL  Total IN: 1536.9 mL    OUT:    Colostomy (mL): 0 mL    Indwelling Catheter - Urethral (mL): 3710 mL  Total OUT: 3710 mL    Total NET: -2173.1 mL      27 Aug 2021 07:01  -  27 Aug 2021 13:05  --------------------------------------------------------  IN:    Fat Emulsion 20%: 24.9 mL    IV PiggyBack: 100 mL    PPN (Peripheral Parenteral Nutrition): 230 mL  Total IN: 354.9 mL    OUT:    Indwelling Catheter - Urethral (mL): 295 mL  Total OUT: 295 mL    Total NET: 59.9 mL                MEDICATIONS  (STANDING):  albumin human 25% IVPB 50 milliLiter(s) IV Intermittent every 8 hours  aspirin Suppository 300 milliGRAM(s) Rectal daily  chlorhexidine 2% Cloths 1 Application(s) Topical daily  chlorhexidine 4% Liquid 1 Application(s) Topical <User Schedule>  dextrose 40% Gel 15 Gram(s) Oral once  dextrose 5%. 1000 milliLiter(s) (50 mL/Hr) IV Continuous <Continuous>  dextrose 5%. 1000 milliLiter(s) (100 mL/Hr) IV Continuous <Continuous>  dextrose 50% Injectable 25 Gram(s) IV Push once  dextrose 50% Injectable 12.5 Gram(s) IV Push once  dextrose 50% Injectable 25 Gram(s) IV Push once  fat emulsion (Fish Oil and Plant Based) 20% Infusion 0.5 Gm/kG/Day (8.33 mL/Hr) IV Continuous <Continuous>  fat emulsion (Fish Oil and Plant Based) 20% Infusion 0.5 Gm/kG/Day (8.3 mL/Hr) IV Continuous <Continuous>  glucagon  Injectable 1 milliGRAM(s) IntraMuscular once  heparin   Injectable 5000 Unit(s) SubCutaneous every 12 hours  insulin lispro (ADMELOG) corrective regimen sliding scale   SubCutaneous every 6 hours  lidocaine   4% Patch 1 Patch Transdermal every 24 hours  lidocaine   4% Patch 1 Patch Transdermal every 24 hours  nystatin Powder 1 Application(s) Topical two times a day  pantoprazole  Injectable 40 milliGRAM(s) IV Push every 12 hours  Parenteral Nutrition - Adult 1 Each (46 mL/Hr) TPN Continuous <Continuous>  Parenteral Nutrition - Adult 1 Each (46 mL/Hr) TPN Continuous <Continuous>  sucralfate suspension 1 Gram(s) Oral two times a day  vancomycin  IVPB 500 milliGRAM(s) IV Intermittent every 12 hours    MEDICATIONS  (PRN):  metoprolol tartrate Injectable 2.5 milliGRAM(s) IV Push every 6 hours PRN SBP > 160, HR > 100  ondansetron Injectable 4 milliGRAM(s) IV Push every 6 hours PRN Nausea and/or Vomiting  sodium chloride 0.9% lock flush 10 milliLiter(s) IV Push every 1 hour PRN Pre/post blood products, medications, blood draw, and to maintain line patency      NUTRITION/IVF:     CENTRAL LINE:  LOCATION:   DATE INSERTED:  CVP:  SCVO2:    BUSTAMANTE:   DATE INSERTED:    A-LINE:    LOCATION:   DATE INSERTED:   SVV:  CO/CI:     CHEST TUBE:  LOCATION:  DATE INSERTED: OUTPUT/24 HRS:  SUCTION/WATER SEAL:     NG/OG TUBE:  DATE INSERTED:  OUTPUT/24 HRS:    MISC:     PHYSICAL EXAM  GENERAL: Alert, elderly female, in no acute distress.  MENTAL STATUS: Alert. Appropriate affect.  HEENT: EOMI. MMM.  Trachea midline. No anterior cervical/posterior cervical/supraclavicular lymph node swelling or tenderness. R IJ central line in place; no erythema or purulence or drainage.   RESPIRATORY: CTAB. No wheezing, rales or rhonchi.  CARDIOVASCULAR: Regular rate. No audible murmurs, rubs or gallops.   GASTROINTESTINAL: Abdomen soft, midly distended over RLQ; mild TTP RLQ; no rebound tenderness or guarding. Ostomy bag over L abdomen; <1cc feculent material collected.   NEUROLOGIC: Cranial nerves II-XII grossly intact. No focal neurological deficits. Moves all extremities spontaneously. Sensation intact bilaterally. Improved lower extremity strength in all myotomes.   INTEGUMENTARY: Improved pitting edema throughout lower and upper extremities. Mild ecchymosis circumferentially around R forearm. Resolved R bicep hematoma.   MUSCULOSKELETAL: No cyanosis or clubbing. No gross deformities.       LABS:  CBC Full  -  ( 27 Aug 2021 03:45 )  WBC Count : 8.98 K/uL  RBC Count : 3.05 M/uL  Hemoglobin : 8.6 g/dL  Hematocrit : 27.0 %  Platelet Count - Automated : 397 K/uL  Mean Cell Volume : 88.5 fl  Mean Cell Hemoglobin : 28.2 pg  Mean Cell Hemoglobin Concentration : 31.9 gm/dL  Auto Neutrophil # : x  Auto Lymphocyte # : x  Auto Monocyte # : x  Auto Eosinophil # : x  Auto Basophil # : x  Auto Neutrophil % : x  Auto Lymphocyte % : x  Auto Monocyte % : x  Auto Eosinophil % : x  Auto Basophil % : x    08-27    138  |  106  |  19  ----------------------------<  88  4.6   |  25  |  0.49<L>    Ca    8.1<L>      27 Aug 2021 03:45  Phos  4.0     08-27  Mg     2.2     08-27    TPro  x   /  Alb  2.5<L>  /  TBili  x   /  DBili  x   /  AST  x   /  ALT  x   /  AlkPhos  x   08-26    PT/INR - ( 27 Aug 2021 03:45 )   PT: 11.9 sec;   INR: 0.99          PTT - ( 27 Aug 2021 03:45 )  PTT:31.1 sec    RECENT CULTURES:  08-24 Clean Catch Clean Catch (Midstream) Enterococcus faecium XXXX   >100,000 CFU/ml Enterococcus faecium    08-24 .Blood Blood XXXX XXXX   No growth at 2 days.        LIVER FUNCTIONS - ( 26 Aug 2021 05:08 )  Alb: 2.5 g/dL / Pro: x     / ALK PHOS: x     / ALT: x     / AST: x     / GGT: x               CAPILLARY BLOOD GLUCOSE      RADIOLOGY & ADDITIONAL STUDIES:

## 2021-08-27 NOTE — PROGRESS NOTE ADULT - ASSESSMENT
89F PMH HTN, chronic back pain 2/2 L3,L4 compression fractures, triple vessel CAD, and SBO due to possible diverticulitis vs. GYN/colorectal malignancy (2018) never followed up and L ICA s/p L CEA (4/21). 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 Left gluteal IR drain placed (8/9). Colonoscopy w/ sigmoid mass bx on 8/11- final path invasive adenocarcinoma moderately differentiated. Taken to OR on 8/13 for diverting colostomy. In SICU for acute blood loss anemia, s/p EGD 8/18 showing duodenal ulceration, erosive esophagitis duodenitis, bx x 2, CT 8/20 w/ closed loop obstxn, s/p OR 8/20 exlap STEPHANIE; now with persistent WBC, improving; continues to grow E. Faecium in urine, now on Vanc per  recs.      plan:   SICU following   Failed bedside swallow and FEES; continue NPO/IVF/TPN  OOB/IS  WBC afebrile, downtrending 20 > 11 > 9 > 8.98; continues to grow E. Faecium in urine, now on Vanc per  recs.    Urology following, plan for stent exchange today   Continues to have no ostomy output; will consider c scope of ostomy if she continues to have no output   Pain/nausea control  Rest of care per SICU team  team 1 surgery to follow

## 2021-08-27 NOTE — PROGRESS NOTE ADULT - ATTENDING COMMENTS
Continuing to tolerate diuresis with minimal increase in BUN, will continue. Edema improved but still prominent. Stable off antibiotics, TPN written to continue to evaluate swallowing.

## 2021-08-27 NOTE — BRIEF OPERATIVE NOTE - NSICDXBRIEFPROCEDURE_GEN_ALL_CORE_FT
PROCEDURES:  Exploratory laparotomy 20-Aug-2021 23:42:41  Terra Fischer  Lysis of intestinal adhesions 20-Aug-2021 23:42:51  Terra Fischer  Repair of serosal tear 20-Aug-2021 23:43:29  Terra Fischer  
PROCEDURES:  Cystoscopy with replacement of left ureteral stent 27-Aug-2021 09:54:45  Shantanu Ashley  
PROCEDURES:  Loop colostomy 13-Aug-2021 19:19:13  Dre Mcleod

## 2021-08-27 NOTE — BRIEF OPERATIVE NOTE - NSICDXBRIEFPREOP_GEN_ALL_CORE_FT
PRE-OP DIAGNOSIS:  Small bowel obstruction 20-Aug-2021 23:43:44  Terra Fischer  
PRE-OP DIAGNOSIS:  Hydronephrosis, left 27-Aug-2021 09:55:05  Shantanu Ashley  
PRE-OP DIAGNOSIS:  Colon cancer 13-Aug-2021 19:19:28  Dre Mcleod

## 2021-08-27 NOTE — BRIEF OPERATIVE NOTE - NSICDXBRIEFPOSTOP_GEN_ALL_CORE_FT
POST-OP DIAGNOSIS:  Hydronephrosis, left 27-Aug-2021 09:55:10  Shantanu Ashley  
POST-OP DIAGNOSIS:  Small bowel obstruction 20-Aug-2021 23:43:55  Terra Fischer  
POST-OP DIAGNOSIS:  Colon cancer 13-Aug-2021 19:19:42  Dre Mcleod

## 2021-08-27 NOTE — PROGRESS NOTE ADULT - SUBJECTIVE AND OBJECTIVE BOX
POD 7, 14  In ICU  Remains extubated  TPN  Vancomycin    Awake and alert.    No c/o abdominal pain, N/V.  Stoma: no gas or stool.    Vital Signs Last 24 Hrs  T(C): 36.1 (27 Aug 2021 06:56), Max: 36.4 (26 Aug 2021 09:35)  T(F): 97 (27 Aug 2021 06:56), Max: 97.6 (26 Aug 2021 09:35)  HR: 85 (27 Aug 2021 07:00) (80 - 99)  BP: 140/62 (27 Aug 2021 07:00) (109/55 - 140/62)  BP(mean): 89 (27 Aug 2021 07:00) (77 - 91)  RR: 16 (27 Aug 2021 07:00) (13 - 20)  SpO2: 93% (27 Aug 2021 07:00) (91% - 98%)    abd: mildly distended, BS+, incision intact, stoma is viable and bag empty  ext: without calf tenderness    UO: 1000 ml/last shift, >3000 ml/24 hours                          8.6    8.98  )-----------( 397      ( 27 Aug 2021 03:45 )             27.0   08-27    138  |  106  |  19  ----------------------------<  88  4.6   |  25  |  0.49<L>    Ca    8.1<L>      27 Aug 2021 03:45  Phos  4.0     08-27  Mg     2.2     08-27    TPro  x   /  Alb  2.5<L>  /  TBili  x   /  DBili  x   /  AST  x   /  ALT  x   /  AlkPhos  x   08-26

## 2021-08-27 NOTE — PROGRESS NOTE ADULT - SUBJECTIVE AND OBJECTIVE BOX
SUBJECTIVE:   Overnight: ON:  called, recommended starting abx to treat E faecium in Ucx per Dr Kitchen. started vanco because E faecium resistant to ampicillin.     Patient seen and examined at bedside; denies complaints; no abd pain, n/v, no stool or gas ouput from ostomy; no fevers, chills, SOB or chest pain.     heparin   Injectable 5000 Unit(s) SubCutaneous every 12 hours  metoprolol tartrate Injectable 2.5 milliGRAM(s) IV Push every 6 hours PRN  vancomycin  IVPB 500 milliGRAM(s) IV Intermittent every 12 hours    MEDICATIONS  (PRN):  metoprolol tartrate Injectable 2.5 milliGRAM(s) IV Push every 6 hours PRN SBP > 160, HR > 100  ondansetron Injectable 4 milliGRAM(s) IV Push every 6 hours PRN Nausea and/or Vomiting  sodium chloride 0.9% lock flush 10 milliLiter(s) IV Push every 1 hour PRN Pre/post blood products, medications, blood draw, and to maintain line patency      I&O's Detail    26 Aug 2021 07:01  -  27 Aug 2021 07:00  --------------------------------------------------------  IN:    Albumin 5%  - 250 mL: 150 mL    Fat Emulsion (Fish Oil &amp; Plant Based) 20% Infusion: 24.9 mL    Fat Emulsion 20%: 83 mL    IV PiggyBack: 100 mL    IV PiggyBack: 75 mL    PPN (Peripheral Parenteral Nutrition): 1104 mL  Total IN: 1536.9 mL    OUT:    Colostomy (mL): 0 mL    Indwelling Catheter - Urethral (mL): 3710 mL  Total OUT: 3710 mL    Total NET: -2173.1 mL      27 Aug 2021 07:01  -  27 Aug 2021 12:29  --------------------------------------------------------  IN:    Fat Emulsion 20%: 24.9 mL    IV PiggyBack: 100 mL    PPN (Peripheral Parenteral Nutrition): 230 mL  Total IN: 354.9 mL    OUT:    Indwelling Catheter - Urethral (mL): 295 mL  Total OUT: 295 mL    Total NET: 59.9 mL          T(C): 36.1 (08-27-21 @ 09:00), Max: 36.4 (08-26-21 @ 21:00)  HR: 84 (08-27-21 @ 12:00) (80 - 91)  BP: 137/65 (08-27-21 @ 12:00) (109/55 - 148/67)  RR: 16 (08-27-21 @ 12:00) (13 - 20)  SpO2: 92% (08-27-21 @ 12:00) (91% - 96%)    General: NAD, resting comfortably in bed  Pulmonary: Nonlabored, no respiratory distress  Cardiovascular: regular rate and rhythm, normal peripheral perfusion   Abdominal: soft, non-distended, tender in the right lower abdomen; Ostomy p/p/ nonproductive, with no output yet. Retention red rubber noted. Incision cdi  skin: serous discharge from skin ov upper and lower extremitas  Neuro: alert and oriented x 3, no focal deficits     LABS:                        8.6    8.98  )-----------( 397      ( 27 Aug 2021 03:45 )             27.0     08-27    138  |  106  |  19  ----------------------------<  88  4.6   |  25  |  0.49<L>    Ca    8.1<L>      27 Aug 2021 03:45  Phos  4.0     08-27  Mg     2.2     08-27    TPro  x   /  Alb  2.5<L>  /  TBili  x   /  DBili  x   /  AST  x   /  ALT  x   /  AlkPhos  x   08-26    PT/INR - ( 27 Aug 2021 03:45 )   PT: 11.9 sec;   INR: 0.99          PTT - ( 27 Aug 2021 03:45 )  PTT:31.1 sec      RADIOLOGY & ADDITIONAL STUDIES:  CT Abdomen and Pelvis No Cont:   EXAM:  CT ABDOMEN AND PELVIS                          PROCEDURE DATE:  08/24/2021          INTERPRETATION:  CT SCAN OF ABDOMEN AND PELVIS    History: 89-year-old female with colonic adenocarcinoma. Status post serosal tear and loop ostomy. Abdominal pain.    Technique: CT scan of abdomen and pelvis was performed from lung bases through symphysis pubis. Intravenous and oral contrast material were not utilized, as ordered. Axial, sagittal and coronal reformatted images were reviewed.    Comparison: Comparison made with most recent CT scan of abdomen and pelvis from 8/20/2021 and with multiple additional prior imaging studies dating back to 10/19/2018.    Findings: This study is limited as there is beam hardening artifact from the patient being scanned with her arms at her sides. Study also limited by lack of intravenous and oral contrast material.    Lower chest: Worsening of moderate bilateral pleural effusions. Passive atelectasis of small portions of each lower lobe. Small amount of atelectasis in lingula. There are again a few small patchy opacities in the right middle lobe.    Liver:  Normal.    Gallbladder: No radiopaque stones gallbladder.    Spleen:  Normal.    Pancreas:  Normal.    Adrenal glands:  1.3 cm left adrenal adenoma. Normal right adrenal gland.    Kidneys: Worsening of moderate left hydronephrosis despite the presence of a left ureteral stent. Right kidney unremarkable.    Adenopathy:  Multiple calcified lymph nodes again present in upper abdomen.    Ascites: Small.    Gastrointestinal tract: Interval abdominal surgery. There is no longer a small bowel obstruction. No free air. Again post diverting colostomy transverse colon. No significant change in mass arising from the rectosigmoid colon and extending posteriorly to the sacrum. Transgluteal drain again present left pelvis. New drains present infraumbilical anterior abdominal wall. Colonic diverticula are noted.    Vessels: Severe atherosclerotic disease, including large amount of calcified plaque aorta and its branch vessels in the chest, abdomen and pelvis, including severe coronary artery calcification.    Pelvic organs: Calcified fibroid uterus. Novak catheter within bladder. Moderate amount of gas and urine present within bladder.    Soft tissues: Severe anasarca.    Bones: No change appearance of spine, with compression fractures at T12, L1, and L5. Again post kyphoplasty at L2, L3, and L4.    Impression:  1. Since 8/20/2021, there is no longer a small bowel obstruction. No free air.    2. Interval increase in size of bilateral pleural effusions.    3. Exophytic rectosigmoid mass in the presacral space, similar to prior.    4. Worsening of moderate left hydronephrosis despite presence of a left ureteral stent. Recommend determining a Novak catheter is functioning, as there is moderate gas in urine within the bladder.    5. Patchy opacities in the right middle lobe could represent infection or aspiration.        --- End of Report ---          Thank you for the opportunity to participate in the care of this patient.    RODRI ANGELO MD; Resident Radiologist  This document has been electronically signed.  MILLI QURESHI MD; Attending Radiologist  This document has been electronically signed. Aug 24 2021  3:49PM (08-24-21 @ 14:13)      Culture - Urine (collected 08-24-21 @ 20:00)  Source: Clean Catch Clean Catch (Midstream)  Final Report (08-26-21 @ 13:03):    >100,000 CFU/ml Enterococcus faecium  Organism: Enterococcus faecium (08-26-21 @ 13:03)  Organism: Enterococcus faecium (08-26-21 @ 13:03)      -  Ampicillin: R >8 Predicts results to ampicillin/sulbactam, amoxacillin-clavulanate and  piperacillin-tazobactam.      -  Ciprofloxacin: R >2      -  Levofloxacin: R >4      -  Nitrofurantoin: I 64 Should not be used to treat pyelonephritis.      -  Tetra/Doxy: S 4      -  Vancomycin: S 1      Method Type: RANDOLPH    Culture - Blood (collected 08-24-21 @ 15:13)  Source: .Blood Blood  Preliminary Report (08-26-21 @ 16:00):    No growth at 2 days.    Culture - Blood (collected 08-24-21 @ 15:13)  Source: .Blood Blood  Preliminary Report (08-26-21 @ 16:00):    No growth at 2 days.

## 2021-08-28 LAB
ANION GAP SERPL CALC-SCNC: 9 MMOL/L — SIGNIFICANT CHANGE UP (ref 5–17)
BUN SERPL-MCNC: 22 MG/DL — SIGNIFICANT CHANGE UP (ref 7–23)
CALCIUM SERPL-MCNC: 8.4 MG/DL — SIGNIFICANT CHANGE UP (ref 8.4–10.5)
CHLORIDE SERPL-SCNC: 104 MMOL/L — SIGNIFICANT CHANGE UP (ref 96–108)
CO2 SERPL-SCNC: 27 MMOL/L — SIGNIFICANT CHANGE UP (ref 22–31)
CREAT SERPL-MCNC: 0.55 MG/DL — SIGNIFICANT CHANGE UP (ref 0.5–1.3)
GLUCOSE BLDC GLUCOMTR-MCNC: 121 MG/DL — HIGH (ref 70–99)
GLUCOSE BLDC GLUCOMTR-MCNC: 132 MG/DL — HIGH (ref 70–99)
GLUCOSE BLDC GLUCOMTR-MCNC: 136 MG/DL — HIGH (ref 70–99)
GLUCOSE BLDC GLUCOMTR-MCNC: 136 MG/DL — HIGH (ref 70–99)
GLUCOSE SERPL-MCNC: 105 MG/DL — HIGH (ref 70–99)
HCT VFR BLD CALC: 27.6 % — LOW (ref 34.5–45)
HGB BLD-MCNC: 8.8 G/DL — LOW (ref 11.5–15.5)
MAGNESIUM SERPL-MCNC: 2.2 MG/DL — SIGNIFICANT CHANGE UP (ref 1.6–2.6)
MCHC RBC-ENTMCNC: 27.8 PG — SIGNIFICANT CHANGE UP (ref 27–34)
MCHC RBC-ENTMCNC: 31.9 GM/DL — LOW (ref 32–36)
MCV RBC AUTO: 87.1 FL — SIGNIFICANT CHANGE UP (ref 80–100)
NRBC # BLD: 0 /100 WBCS — SIGNIFICANT CHANGE UP (ref 0–0)
PHOSPHATE SERPL-MCNC: 4.6 MG/DL — HIGH (ref 2.5–4.5)
PLATELET # BLD AUTO: 429 K/UL — HIGH (ref 150–400)
POTASSIUM SERPL-MCNC: 4.4 MMOL/L — SIGNIFICANT CHANGE UP (ref 3.5–5.3)
POTASSIUM SERPL-SCNC: 4.4 MMOL/L — SIGNIFICANT CHANGE UP (ref 3.5–5.3)
RBC # BLD: 3.17 M/UL — LOW (ref 3.8–5.2)
RBC # FLD: 16.4 % — HIGH (ref 10.3–14.5)
SODIUM SERPL-SCNC: 140 MMOL/L — SIGNIFICANT CHANGE UP (ref 135–145)
WBC # BLD: 10.39 K/UL — SIGNIFICANT CHANGE UP (ref 3.8–10.5)
WBC # FLD AUTO: 10.39 K/UL — SIGNIFICANT CHANGE UP (ref 3.8–10.5)

## 2021-08-28 PROCEDURE — 99233 SBSQ HOSP IP/OBS HIGH 50: CPT | Mod: GC

## 2021-08-28 RX ORDER — ELECTROLYTE SOLUTION,INJ
1 VIAL (ML) INTRAVENOUS
Refills: 0 | Status: DISCONTINUED | OUTPATIENT
Start: 2021-08-28 | End: 2021-08-28

## 2021-08-28 RX ORDER — I.V. FAT EMULSION 20 G/100ML
0.5 EMULSION INTRAVENOUS
Qty: 20 | Refills: 0 | Status: DISCONTINUED | OUTPATIENT
Start: 2021-08-28 | End: 2021-08-28

## 2021-08-28 RX ADMIN — Medication 300 MILLIGRAM(S): at 11:38

## 2021-08-28 RX ADMIN — LIDOCAINE 1 PATCH: 4 CREAM TOPICAL at 23:10

## 2021-08-28 RX ADMIN — Medication 1 GRAM(S): at 05:46

## 2021-08-28 RX ADMIN — I.V. FAT EMULSION 8.3 GM/KG/DAY: 20 EMULSION INTRAVENOUS at 22:01

## 2021-08-28 RX ADMIN — PANTOPRAZOLE SODIUM 40 MILLIGRAM(S): 20 TABLET, DELAYED RELEASE ORAL at 11:37

## 2021-08-28 RX ADMIN — LIDOCAINE 1 PATCH: 4 CREAM TOPICAL at 08:14

## 2021-08-28 RX ADMIN — LIDOCAINE 1 PATCH: 4 CREAM TOPICAL at 18:24

## 2021-08-28 RX ADMIN — Medication 50 MILLILITER(S): at 21:01

## 2021-08-28 RX ADMIN — CHLORHEXIDINE GLUCONATE 1 APPLICATION(S): 213 SOLUTION TOPICAL at 05:46

## 2021-08-28 RX ADMIN — HEPARIN SODIUM 5000 UNIT(S): 5000 INJECTION INTRAVENOUS; SUBCUTANEOUS at 18:24

## 2021-08-28 RX ADMIN — CHLORHEXIDINE GLUCONATE 1 APPLICATION(S): 213 SOLUTION TOPICAL at 05:41

## 2021-08-28 RX ADMIN — Medication 20 MILLIGRAM(S): at 06:22

## 2021-08-28 RX ADMIN — LIDOCAINE 1 PATCH: 4 CREAM TOPICAL at 11:38

## 2021-08-28 RX ADMIN — Medication 50 MILLILITER(S): at 05:48

## 2021-08-28 RX ADMIN — Medication 50 MILLILITER(S): at 13:50

## 2021-08-28 RX ADMIN — LIDOCAINE 1 PATCH: 4 CREAM TOPICAL at 19:30

## 2021-08-28 RX ADMIN — Medication 1 EACH: at 18:25

## 2021-08-28 RX ADMIN — NYSTATIN CREAM 1 APPLICATION(S): 100000 CREAM TOPICAL at 08:14

## 2021-08-28 RX ADMIN — NYSTATIN CREAM 1 APPLICATION(S): 100000 CREAM TOPICAL at 18:29

## 2021-08-28 RX ADMIN — HEPARIN SODIUM 5000 UNIT(S): 5000 INJECTION INTRAVENOUS; SUBCUTANEOUS at 05:46

## 2021-08-28 RX ADMIN — PANTOPRAZOLE SODIUM 40 MILLIGRAM(S): 20 TABLET, DELAYED RELEASE ORAL at 23:09

## 2021-08-28 NOTE — PROGRESS NOTE ADULT - ASSESSMENT
89F PMH HTN, chronic back pain 2/2 L3,L4 compression fractures, triple vessel CAD, and SBO due to possible diverticulitis vs. GYN/colorectal malignancy (2018) never followed up and L ICA s/p L CEA (4/21). 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 Left gluteal IR drain placed (8/9). Colonoscopy w/ sigmoid mass bx on 8/11- final path invasive adenocarcinoma moderately differentiated. Taken to OR on 8/13 for diverting colostomy. In SICU for acute blood loss anemia, s/p EGD 8/18 showing duodenal ulceration, erosive esophagitis duodenitis, bx x 2, CT 8/20 w/ closed loop obstxn, s/p OR 8/20 exlap STEPHANIE; now with persistent WBC, improving; continues to grow E. Faecium in urine, now on Vanc per  recs. Now s/p stent exchange w/ uro    Failed bedside swallow and FEES  NPO/IVF/TPN  OOB/IS  WBC afebrile, downtrending 20 > 11 > 9 > 8.98 > 10.4; continues to grow E. Faecium in urine, now on Vanc per  recs.    Consider c scope of ostomy if she continues to have no output   Pain/nausea control  Rest of care per SICU team  team 1 surgery to follow

## 2021-08-28 NOTE — SWALLOW FEES ASSESSMENT ADULT - SLP PERTINENT HISTORY OF CURRENT PROBLEM
PMHx of chronic back pain 2/2 L3, L4 compression fractures, triple vessel CAD,  L CEA 2/2 L ICA stenosis >70% (4/21) who presented 8/8 with fatigue and unintentional weight loss over last several months.  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 Hospital cb sp diverting colostomy (8/13), sp Left gluteal IR drain placed for gluteal abscess (8/9), pelvic infection ID following. SICU for monitoring suspected acute blood loss 2/2 to duodenal ulceration on EGD. sp exlap 8/20. extubated 8/22
PMH HTN, chronic back pain 2/2 L3,L4 compression fractures, triple vessel CAD, and SBO due to possible diverticulitis vs. GYN/colorectal malignancy (2018) never followed up and L ICA s/p L CEA (4/21). 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 Left gluteal IR drain placed (8/9). Colonoscopy w/ sigmoid mass bx on 8/11- final path invasive adenocarcinoma moderately differentiated. Taken to OR on 8/13 for diverting colostomy. In SICU for acute blood loss anemia, s/p EGD 8/18 showing duodenal ulceration, erosive esophagitis duodenitis, bx x 2, CT 8/20 w/ closed loop obstxn, s/p OR 8/20 exlap STEPHANIE. Now s/p cystocopy with L ureteral stent replacement

## 2021-08-28 NOTE — SWALLOW FEES ASSESSMENT ADULT - PHARYNGEAL PHASE COMMENTS
Swallow trigger was delayed with liquid boli beginning to spill over the superior edge of the epiglottis and into the laryngeal vestibule prior to the swallow. Pharyngeal squeeze was weak and inefficient with an incomplete white-out period during the swallow. This resulted in diffuse pooling of all consistencies. Continuous episodes of laryngeal penetration and aspiration occurred after the swallow and during multiple incomplete secondary swallows as residue spilled over the b/l aryepiglottic folds, b/l arytenoids, and posterior commissure. Sensory response noted x1 only. Pt was unable to retrieve penetrated/aspirated material with spontaneous/cued coughs. A head turn (L and R) was attempted but unsuccessful in increasing UES opening to improve bolus clearance. Swallow trigger was delayed with liquid boli beginning to spill over the superior edge of the epiglottis and into the laryngeal vestibule prior to the swallow. Pharyngeal squeeze was weak and inefficient with an incomplete white-out period during the swallow. This resulted in diffuse pooling of all consistencies. Recurrent episodes of laryngeal penetration and aspiration occurred after the swallow and during multiple incomplete secondary swallows as residue spilled over the b/l aryepiglottic folds, b/l arytenoids, and posterior commissure. Sensory response noted x1 only. Pt was unable to retrieve penetrated/aspirated material with spontaneous/cued coughs. Attempted bilateral head rotations and cued dry swallows in attempt to improve bolus passage via UES, which were unsuccessful.

## 2021-08-28 NOTE — PROGRESS NOTE ADULT - SUBJECTIVE AND OBJECTIVE BOX
POD 8, 13  In ICU  Remains extubated, O2 Sats 96%   No antibiotics.  On TPN. Failed swallow evaluation earlier in week.  Remains NPO.  C/o intermittent back pain.  No abdominal pain, N, or V.  Was OOB to chair yesterday.    Vital Signs Last 24 Hrs  T(C): 35.7 (28 Aug 2021 09:07), Max: 36.4 (28 Aug 2021 05:16)  T(F): 96.3 (28 Aug 2021 09:07), Max: 97.6 (28 Aug 2021 05:16)  HR: 79 (28 Aug 2021 10:00) (78 - 93)  BP: 122/55 (28 Aug 2021 10:00) (110/52 - 153/70)  BP(mean): 79 (28 Aug 2021 10:00) (75 - 100)  RR: 17 (28 Aug 2021 10:00) (12 - 18)  SpO2: 94% (28 Aug 2021 10:00) (90% - 96%)    lungs clear  cor S1S2  abd: soft, mild direct tenderness to deep palpation, no rebound, no guarding, BS few.  stoma is viable - without stool or flatus (3-4 ml of light brown fluid in bag  incision intact  ext: no induration, she c/o pain if thigh or calf squeezed bilaterally     UO: 1100 ml/12 hours; 2,765 ml/prior 24 hours (getting albumin and lasix daily: 40 mg lasix total yesterday)    On TPN and drips + albumin                          8.8    10.39 )-----------( 429      ( 28 Aug 2021 05:24 )             27.6   08-28    140  |  104  |  22  ----------------------------<  105<H>  4.4   |  27  |  0.55    Ca    8.4      28 Aug 2021 05:24  Phos  4.6     08-28  Mg     2.2     08-28

## 2021-08-28 NOTE — SWALLOW BEDSIDE ASSESSMENT ADULT - SWALLOW EVAL: RECOMMENDED DIET
NPO with temporary alternate means of nutrition except for ice chips pending repeat FEES (if consistent with GOC)
Continue clear liquid diet as per surgery recommendations with strict aspiration precautions
NPO with TPN except for periodic ice chips to reduce secretions and risk for mucous plugging

## 2021-08-28 NOTE — SWALLOW BEDSIDE ASSESSMENT ADULT - COMMENTS
Formal note to follow Pt known to our service during current admission. FEES completed on 8/25. At that time, pt p/w severe pharyngeal dysphagia marked by reduced bolus control, incomplete LVC, and severely impaired pharyngeal swallow efficiency. Silent aspiration occurred across consistencies. Dysphagia was further confounded by poor secretion management and weak cough response.  Pt has been NPO with TPN. Now reconsulted to assess swallowing d/t improving mental status.

## 2021-08-28 NOTE — SWALLOW FEES ASSESSMENT ADULT - SPECIFY REASON(S)
Repeat FEES recommended based on presentation during clinical swallow evaluation today
FEES recommended based on clinical presentation during clinical swallow evaluation

## 2021-08-28 NOTE — PROGRESS NOTE ADULT - SUBJECTIVE AND OBJECTIVE BOX
SUBJECTIVE:  NAEON. Failed swallow eval earlier, continue NPO. Denies n/v. Pain well controlled. Now on Vanc.    MEDICATIONS  (STANDING):  albumin human 25% IVPB 50 milliLiter(s) IV Intermittent every 8 hours  aspirin Suppository 300 milliGRAM(s) Rectal daily  chlorhexidine 2% Cloths 1 Application(s) Topical daily  chlorhexidine 4% Liquid 1 Application(s) Topical <User Schedule>  dextrose 40% Gel 15 Gram(s) Oral once  dextrose 5%. 1000 milliLiter(s) (50 mL/Hr) IV Continuous <Continuous>  dextrose 5%. 1000 milliLiter(s) (100 mL/Hr) IV Continuous <Continuous>  dextrose 50% Injectable 25 Gram(s) IV Push once  dextrose 50% Injectable 12.5 Gram(s) IV Push once  dextrose 50% Injectable 25 Gram(s) IV Push once  fat emulsion (Fish Oil and Plant Based) 20% Infusion 0.5 Gm/kG/Day (8.33 mL/Hr) IV Continuous <Continuous>  fat emulsion (Fish Oil and Plant Based) 20% Infusion 0.5 Gm/kG/Day (8.33 mL/Hr) IV Continuous <Continuous>  glucagon  Injectable 1 milliGRAM(s) IntraMuscular once  heparin   Injectable 5000 Unit(s) SubCutaneous every 12 hours  insulin lispro (ADMELOG) corrective regimen sliding scale   SubCutaneous every 6 hours  lidocaine   4% Patch 1 Patch Transdermal every 24 hours  lidocaine   4% Patch 1 Patch Transdermal every 24 hours  nystatin Powder 1 Application(s) Topical two times a day  pantoprazole  Injectable 40 milliGRAM(s) IV Push every 12 hours  Parenteral Nutrition - Adult 1 Each (46 mL/Hr) TPN Continuous <Continuous>  Parenteral Nutrition - Adult 1 Each (46 mL/Hr) TPN Continuous <Continuous>  sucralfate suspension 1 Gram(s) Oral two times a day    MEDICATIONS  (PRN):  metoprolol tartrate Injectable 2.5 milliGRAM(s) IV Push every 6 hours PRN SBP > 160, HR > 100  ondansetron Injectable 4 milliGRAM(s) IV Push every 6 hours PRN Nausea and/or Vomiting  sodium chloride 0.9% lock flush 10 milliLiter(s) IV Push every 1 hour PRN Pre/post blood products, medications, blood draw, and to maintain line patency      Vital Signs Last 24 Hrs  T(C): 35.7 (28 Aug 2021 09:07), Max: 36.4 (28 Aug 2021 05:16)  T(F): 96.3 (28 Aug 2021 09:07), Max: 97.6 (28 Aug 2021 05:16)  HR: 78 (28 Aug 2021 11:00) (78 - 93)  BP: 119/59 (28 Aug 2021 11:00) (110/52 - 153/70)  BP(mean): 85 (28 Aug 2021 11:00) (75 - 100)  RR: 18 (28 Aug 2021 11:00) (12 - 18)  SpO2: 93% (28 Aug 2021 11:00) (90% - 96%)    Physical Exam:  General: NAD, resting comfortably in bed  Pulmonary: Nonlabored breathing, no respiratory distress  Cardiovascular: NSR  Abdominal: soft, mild tenderness, ND, ostomy w/o fxn, incision clean/dry/intact   Extremities: WWP, normal strength  Neuro: A/O x 3, CNs II-XII grossly intact, no focal deficits    I&O's Summary    27 Aug 2021 07:01  -  28 Aug 2021 07:00  --------------------------------------------------------  IN: 1461.9 mL / OUT: 2765 mL / NET: -1303.1 mL    28 Aug 2021 07:01  -  28 Aug 2021 11:34  --------------------------------------------------------  IN: 154.6 mL / OUT: 975 mL / NET: -820.4 mL        LABS:                        8.8    10.39 )-----------( 429      ( 28 Aug 2021 05:24 )             27.6     08-28    140  |  104  |  22  ----------------------------<  105<H>  4.4   |  27  |  0.55    Ca    8.4      28 Aug 2021 05:24  Phos  4.6     08-28  Mg     2.2     08-28      PT/INR - ( 27 Aug 2021 03:45 )   PT: 11.9 sec;   INR: 0.99          PTT - ( 27 Aug 2021 03:45 )  PTT:31.1 sec    CAPILLARY BLOOD GLUCOSE      POCT Blood Glucose.: 132 mg/dL (28 Aug 2021 06:54)  POCT Blood Glucose.: 136 mg/dL (27 Aug 2021 23:51)  POCT Blood Glucose.: 126 mg/dL (27 Aug 2021 18:26)        RADIOLOGY & ADDITIONAL STUDIES:

## 2021-08-28 NOTE — PROGRESS NOTE ADULT - ATTENDING COMMENTS
Patient seen and examined with house-staff during bedside rounds.  Resident note read, including vitals, physical findings, laboratory data, and radiological reports.   Revisions included below.  Direct personal management at bed side and extensive interpretation of the data.  Plan was outlined and discussed in details with the housestaff.  Decision making of high complexity  Action taken for acute disease activity to reflect the level of care provided:  - medication reconciliation  - review laboratory data    Patient is clinically stable.  The patient does not require any hemodynamic support.  Oxygen saturation is stable on nasal cannula.  The wound is stable the stoma is viable.  No gas in the Colostomy bag.  Continue TPN.  For swallow evaluation..  She is on Lopressor as needed basis.  No indication for ICU care.  Transfer transfer to [facility]

## 2021-08-28 NOTE — PROGRESS NOTE ADULT - SUBJECTIVE AND OBJECTIVE BOX
INTERVAL/OVERNIGHT EVENTS: NAEO. HDS.     SUBJECTIVE: This AM,     POD #  SICU Day #    Neurologic Medications  aspirin Suppository 300 milliGRAM(s) Rectal daily  ondansetron Injectable 4 milliGRAM(s) IV Push every 6 hours PRN Nausea and/or Vomiting    Respiratory Medications    Cardiovascular Medications  furosemide   Injectable 20 milliGRAM(s) IV Push daily  metoprolol tartrate Injectable 2.5 milliGRAM(s) IV Push every 6 hours PRN SBP > 160, HR > 100    Gastrointestinal Medications  albumin human 25% IVPB 50 milliLiter(s) IV Intermittent every 8 hours  dextrose 5%. 1000 milliLiter(s) IV Continuous <Continuous>  dextrose 5%. 1000 milliLiter(s) IV Continuous <Continuous>  fat emulsion (Fish Oil and Plant Based) 20% Infusion 0.5 Gm/kG/Day IV Continuous <Continuous>  pantoprazole  Injectable 40 milliGRAM(s) IV Push every 12 hours  Parenteral Nutrition - Adult 1 Each TPN Continuous <Continuous>  sodium chloride 0.9% lock flush 10 milliLiter(s) IV Push every 1 hour PRN Pre/post blood products, medications, blood draw, and to maintain line patency  sucralfate suspension 1 Gram(s) Oral two times a day    Genitourinary Medications    Hematologic/Oncologic Medications  heparin   Injectable 5000 Unit(s) SubCutaneous every 12 hours    Antimicrobial/Immunologic Medications    Endocrine/Metabolic Medications  dextrose 40% Gel 15 Gram(s) Oral once  dextrose 50% Injectable 25 Gram(s) IV Push once  dextrose 50% Injectable 12.5 Gram(s) IV Push once  dextrose 50% Injectable 25 Gram(s) IV Push once  glucagon  Injectable 1 milliGRAM(s) IntraMuscular once  insulin lispro (ADMELOG) corrective regimen sliding scale   SubCutaneous every 6 hours    Topical/Other Medications  chlorhexidine 2% Cloths 1 Application(s) Topical daily  chlorhexidine 4% Liquid 1 Application(s) Topical <User Schedule>  lidocaine   4% Patch 1 Patch Transdermal every 24 hours  lidocaine   4% Patch 1 Patch Transdermal every 24 hours  nystatin Powder 1 Application(s) Topical two times a day      MEDICATIONS  (PRN):  metoprolol tartrate Injectable 2.5 milliGRAM(s) IV Push every 6 hours PRN SBP > 160, HR > 100  ondansetron Injectable 4 milliGRAM(s) IV Push every 6 hours PRN Nausea and/or Vomiting  sodium chloride 0.9% lock flush 10 milliLiter(s) IV Push every 1 hour PRN Pre/post blood products, medications, blood draw, and to maintain line patency      I&O's Detail    27 Aug 2021 07:01  -  28 Aug 2021 07:00  --------------------------------------------------------  IN:    Albumin 5%  - 250 mL: 100 mL    Fat Emulsion 20%: 99.6 mL    IV PiggyBack: 100 mL    PPN (Peripheral Parenteral Nutrition): 1058 mL  Total IN: 1357.6 mL    OUT:    Colostomy (mL): 0 mL    Indwelling Catheter - Urethral (mL): 2565 mL  Total OUT: 2565 mL    Total NET: -1207.4 mL          Vital Signs Last 24 Hrs  T(C): 36.4 (28 Aug 2021 05:16), Max: 36.4 (28 Aug 2021 05:16)  T(F): 97.6 (28 Aug 2021 05:16), Max: 97.6 (28 Aug 2021 05:16)  HR: 80 (28 Aug 2021 07:00) (78 - 93)  BP: 120/60 (28 Aug 2021 07:00) (110/52 - 153/70)  BP(mean): 86 (28 Aug 2021 07:00) (75 - 100)  RR: 13 (28 Aug 2021 07:00) (12 - 20)  SpO2: 93% (28 Aug 2021 07:00) (90% - 96%)    GENERAL: NAD, resting comfortably in bed  HEENT: NCAT, MMM  C/V: Normal rate, normal peripheral perfusion  PULM: Nonlabored breathing, no respiratory distress,   ABD: Soft, ND, NT, no rebound tenderness, no guarding  EXTREM: WWP, no edema, SCDs in place  NEURO: No focal deficits    LABS:                        8.8    10.39 )-----------( 429      ( 28 Aug 2021 05:24 )             27.6     08-28    140  |  104  |  22  ----------------------------<  105<H>  4.4   |  27  |  0.55    Ca    8.4      28 Aug 2021 05:24  Phos  4.6     08-28  Mg     2.2     08-28      PT/INR - ( 27 Aug 2021 03:45 )   PT: 11.9 sec;   INR: 0.99          PTT - ( 27 Aug 2021 03:45 )  PTT:31.1 sec      RADIOLOGY & ADDITIONAL STUDIES:      Culture - Urine (collected 08-24-21 @ 20:00)  Source: Clean Catch Clean Catch (Midstream)  Final Report (08-26-21 @ 13:03):    >100,000 CFU/ml Enterococcus faecium  Organism: Enterococcus faecium (08-26-21 @ 13:03)  Organism: Enterococcus faecium (08-26-21 @ 13:03)      -  Ampicillin: R >8 Predicts results to ampicillin/sulbactam, amoxacillin-clavulanate and  piperacillin-tazobactam.      -  Ciprofloxacin: R >2      -  Levofloxacin: R >4      -  Nitrofurantoin: I 64 Should not be used to treat pyelonephritis.      -  Tetra/Doxy: S 4      -  Vancomycin: S 1      Method Type: RANDOLPH    Culture - Blood (collected 08-24-21 @ 15:13)  Source: .Blood Blood  Preliminary Report (08-27-21 @ 16:00):    No growth at 3 days.    Culture - Blood (collected 08-24-21 @ 15:13)  Source: .Blood Blood  Preliminary Report (08-27-21 @ 16:00):    No growth at 3 days.     INTERVAL/OVERNIGHT EVENTS: NAEO. HDS.     SUBJECTIVE: This AM, just states she wants to go home and to be left alone. No     STATUS POST:  8/9: Left transgluteal IR drain placement   8/11: c-scope with biopsy of mass  8/13: diverting colostomy   8/18: EGD:Erosive esophagitis and doudenitis with esophageal ulcers bx   8/20: OR- Ex lap w/ STEPHANIE and primary repair of tears x2; EBL 25. IVF 1500, 2U pRBC  8/27: cystoscopy with L ureteral stent replacement    Neurologic Medications  aspirin Suppository 300 milliGRAM(s) Rectal daily  ondansetron Injectable 4 milliGRAM(s) IV Push every 6 hours PRN Nausea and/or Vomiting    Respiratory Medications    Cardiovascular Medications  furosemide   Injectable 20 milliGRAM(s) IV Push daily  metoprolol tartrate Injectable 2.5 milliGRAM(s) IV Push every 6 hours PRN SBP > 160, HR > 100    Gastrointestinal Medications  albumin human 25% IVPB 50 milliLiter(s) IV Intermittent every 8 hours  dextrose 5%. 1000 milliLiter(s) IV Continuous <Continuous>  dextrose 5%. 1000 milliLiter(s) IV Continuous <Continuous>  fat emulsion (Fish Oil and Plant Based) 20% Infusion 0.5 Gm/kG/Day IV Continuous <Continuous>  pantoprazole  Injectable 40 milliGRAM(s) IV Push every 12 hours  Parenteral Nutrition - Adult 1 Each TPN Continuous <Continuous>  sodium chloride 0.9% lock flush 10 milliLiter(s) IV Push every 1 hour PRN Pre/post blood products, medications, blood draw, and to maintain line patency  sucralfate suspension 1 Gram(s) Oral two times a day    Genitourinary Medications    Hematologic/Oncologic Medications  heparin   Injectable 5000 Unit(s) SubCutaneous every 12 hours    Antimicrobial/Immunologic Medications    Endocrine/Metabolic Medications  dextrose 40% Gel 15 Gram(s) Oral once  dextrose 50% Injectable 25 Gram(s) IV Push once  dextrose 50% Injectable 12.5 Gram(s) IV Push once  dextrose 50% Injectable 25 Gram(s) IV Push once  glucagon  Injectable 1 milliGRAM(s) IntraMuscular once  insulin lispro (ADMELOG) corrective regimen sliding scale   SubCutaneous every 6 hours    Topical/Other Medications  chlorhexidine 2% Cloths 1 Application(s) Topical daily  chlorhexidine 4% Liquid 1 Application(s) Topical <User Schedule>  lidocaine   4% Patch 1 Patch Transdermal every 24 hours  lidocaine   4% Patch 1 Patch Transdermal every 24 hours  nystatin Powder 1 Application(s) Topical two times a day      MEDICATIONS  (PRN):  metoprolol tartrate Injectable 2.5 milliGRAM(s) IV Push every 6 hours PRN SBP > 160, HR > 100  ondansetron Injectable 4 milliGRAM(s) IV Push every 6 hours PRN Nausea and/or Vomiting  sodium chloride 0.9% lock flush 10 milliLiter(s) IV Push every 1 hour PRN Pre/post blood products, medications, blood draw, and to maintain line patency      I&O's Detail    27 Aug 2021 07:01  -  28 Aug 2021 07:00  --------------------------------------------------------  IN:    Albumin 5%  - 250 mL: 100 mL    Fat Emulsion 20%: 99.6 mL    IV PiggyBack: 100 mL    PPN (Peripheral Parenteral Nutrition): 1058 mL  Total IN: 1357.6 mL    OUT:    Colostomy (mL): 0 mL    Indwelling Catheter - Urethral (mL): 2565 mL  Total OUT: 2565 mL    Total NET: -1207.4 mL          Vital Signs Last 24 Hrs  T(C): 36.4 (28 Aug 2021 05:16), Max: 36.4 (28 Aug 2021 05:16)  T(F): 97.6 (28 Aug 2021 05:16), Max: 97.6 (28 Aug 2021 05:16)  HR: 80 (28 Aug 2021 07:00) (78 - 93)  BP: 120/60 (28 Aug 2021 07:00) (110/52 - 153/70)  BP(mean): 86 (28 Aug 2021 07:00) (75 - 100)  RR: 13 (28 Aug 2021 07:00) (12 - 20)  SpO2: 93% (28 Aug 2021 07:00) (90% - 96%)    GENERAL: NAD, resting comfortably in bed  HEENT: NCAT, MMM  C/V: Normal rate, normal peripheral perfusion  PULM: Nonlabored breathing, no respiratory distress,   ABD: Soft, ND, NT, no rebound tenderness, no guarding  EXTREM: WWP, no edema, SCDs in place  NEURO: No focal deficits    LABS:                        8.8    10.39 )-----------( 429      ( 28 Aug 2021 05:24 )             27.6     08-28    140  |  104  |  22  ----------------------------<  105<H>  4.4   |  27  |  0.55    Ca    8.4      28 Aug 2021 05:24  Phos  4.6     08-28  Mg     2.2     08-28      PT/INR - ( 27 Aug 2021 03:45 )   PT: 11.9 sec;   INR: 0.99          PTT - ( 27 Aug 2021 03:45 )  PTT:31.1 sec      RADIOLOGY & ADDITIONAL STUDIES:      Culture - Urine (collected 08-24-21 @ 20:00)  Source: Clean Catch Clean Catch (Midstream)  Final Report (08-26-21 @ 13:03):    >100,000 CFU/ml Enterococcus faecium  Organism: Enterococcus faecium (08-26-21 @ 13:03)  Organism: Enterococcus faecium (08-26-21 @ 13:03)      -  Ampicillin: R >8 Predicts results to ampicillin/sulbactam, amoxacillin-clavulanate and  piperacillin-tazobactam.      -  Ciprofloxacin: R >2      -  Levofloxacin: R >4      -  Nitrofurantoin: I 64 Should not be used to treat pyelonephritis.      -  Tetra/Doxy: S 4      -  Vancomycin: S 1      Method Type: RANDOLPH    Culture - Blood (collected 08-24-21 @ 15:13)  Source: .Blood Blood  Preliminary Report (08-27-21 @ 16:00):    No growth at 3 days.    Culture - Blood (collected 08-24-21 @ 15:13)  Source: .Blood Blood  Preliminary Report (08-27-21 @ 16:00):    No growth at 3 days.     INTERVAL/OVERNIGHT EVENTS: NAEO. HDS.     SUBJECTIVE: This AM, just states she wants to go home and to be left alone. No     STATUS POST:  8/9: Left transgluteal IR drain placement   8/11: c-scope with biopsy of mass  8/13: diverting colostomy   8/18: EGD:Erosive esophagitis and doudenitis with esophageal ulcers bx   8/20: OR- Ex lap w/ STEPHANIE and primary repair of tears x2; EBL 25. IVF 1500, 2U pRBC  8/27: cystoscopy with L ureteral stent replacement    Neurologic Medications  aspirin Suppository 300 milliGRAM(s) Rectal daily  ondansetron Injectable 4 milliGRAM(s) IV Push every 6 hours PRN Nausea and/or Vomiting    Cardiovascular Medications  furosemide   Injectable 20 milliGRAM(s) IV Push daily  metoprolol tartrate Injectable 2.5 milliGRAM(s) IV Push every 6 hours PRN SBP > 160, HR > 100    Gastrointestinal Medications  albumin human 25% IVPB 50 milliLiter(s) IV Intermittent every 8 hours  dextrose 5%. 1000 milliLiter(s) IV Continuous <Continuous>  dextrose 5%. 1000 milliLiter(s) IV Continuous <Continuous>  fat emulsion (Fish Oil and Plant Based) 20% Infusion 0.5 Gm/kG/Day IV Continuous <Continuous>  pantoprazole  Injectable 40 milliGRAM(s) IV Push every 12 hours  Parenteral Nutrition - Adult 1 Each TPN Continuous <Continuous>  sodium chloride 0.9% lock flush 10 milliLiter(s) IV Push every 1 hour PRN Pre/post blood products, medications, blood draw, and to maintain line patency  sucralfate suspension 1 Gram(s) Oral two times a day    Hematologic/Oncologic Medications  heparin   Injectable 5000 Unit(s) SubCutaneous every 12 hours    Endocrine/Metabolic Medications  dextrose 40% Gel 15 Gram(s) Oral once  dextrose 50% Injectable 25 Gram(s) IV Push once  dextrose 50% Injectable 12.5 Gram(s) IV Push once  dextrose 50% Injectable 25 Gram(s) IV Push once  glucagon  Injectable 1 milliGRAM(s) IntraMuscular once  insulin lispro (ADMELOG) corrective regimen sliding scale   SubCutaneous every 6 hours    Topical/Other Medications  chlorhexidine 2% Cloths 1 Application(s) Topical daily  chlorhexidine 4% Liquid 1 Application(s) Topical <User Schedule>  lidocaine   4% Patch 1 Patch Transdermal every 24 hours  lidocaine   4% Patch 1 Patch Transdermal every 24 hours  nystatin Powder 1 Application(s) Topical two times a day    MEDICATIONS  (PRN):  metoprolol tartrate Injectable 2.5 milliGRAM(s) IV Push every 6 hours PRN SBP > 160, HR > 100  ondansetron Injectable 4 milliGRAM(s) IV Push every 6 hours PRN Nausea and/or Vomiting  sodium chloride 0.9% lock flush 10 milliLiter(s) IV Push every 1 hour PRN Pre/post blood products, medications, blood draw, and to maintain line patency    I&O's Detail    27 Aug 2021 07:01  -  28 Aug 2021 07:00  --------------------------------------------------------  IN:    Albumin 5%  - 250 mL: 100 mL    Fat Emulsion 20%: 99.6 mL    IV PiggyBack: 100 mL    PPN (Peripheral Parenteral Nutrition): 1058 mL  Total IN: 1357.6 mL    OUT:    Colostomy (mL): 0 mL    Indwelling Catheter - Urethral (mL): 2565 mL  Total OUT: 2565 mL    Total NET: -1207.4 mL    Vital Signs Last 24 Hrs  T(C): 36.4 (28 Aug 2021 05:16), Max: 36.4 (28 Aug 2021 05:16)  T(F): 97.6 (28 Aug 2021 05:16), Max: 97.6 (28 Aug 2021 05:16)  HR: 80 (28 Aug 2021 07:00) (78 - 93)  BP: 120/60 (28 Aug 2021 07:00) (110/52 - 153/70)  BP(mean): 86 (28 Aug 2021 07:00) (75 - 100)  RR: 13 (28 Aug 2021 07:00) (12 - 20)  SpO2: 93% (28 Aug 2021 07:00) (90% - 96%)    GENERAL: Alert, elderly female, in no acute distress.  HEENT: MMM. Trachea midline. No LAD. R IJ central line in place; no erythema or purulence or drainage.   RESPIRATORY: CTAB. No wheezing, rales or rhonchi.  CARDIOVASCULAR: Regular rate. No audible murmurs, rubs or gallops.   GASTROINTESTINAL: Abdomen soft, Appropriately; mild TTP RLQ; no rebound tenderness or guarding. Ostomy bag over L abdomen.  NEUROLOGIC: Cranial nerves II-XII grossly intact. No focal neurological deficits. Moves all extremities spontaneously. Sensation intact bilaterally. Improved lower extremity strength in all myotomes.   INTEGUMENTARY: Improved pitting edema throughout lower and upper extremities. Mild ecchymosis circumferentially around R forearm.  MUSCULOSKELETAL: No cyanosis or clubbing. No gross deformities.     LABS:             8.8    10.39 )-----------( 429      ( 28 Aug 2021 05:24 )             27.6     08-28    140  |  104  |  22  ----------------------------<  105<H>  4.4   |  27  |  0.55    Ca    8.4      28 Aug 2021 05:24  Phos  4.6     08-28  Mg     2.2     08-28    PT/INR - ( 27 Aug 2021 03:45 )   PT: 11.9 sec;   INR: 0.99        PTT - ( 27 Aug 2021 03:45 )  PTT:31.1 sec    RADIOLOGY & ADDITIONAL STUDIES:    Culture - Urine (collected 08-24-21 @ 20:00)  Source: Clean Catch Clean Catch (Midstream)  Final Report (08-26-21 @ 13:03):    >100,000 CFU/ml Enterococcus faecium  Organism: Enterococcus faecium (08-26-21 @ 13:03)  Organism: Enterococcus faecium (08-26-21 @ 13:03)      -  Ampicillin: R >8 Predicts results to ampicillin/sulbactam, amoxacillin-clavulanate and  piperacillin-tazobactam.      -  Ciprofloxacin: R >2      -  Levofloxacin: R >4      -  Nitrofurantoin: I 64 Should not be used to treat pyelonephritis.      -  Tetra/Doxy: S 4      -  Vancomycin: S 1      Method Type: RANDOLPH    Culture - Blood (collected 08-24-21 @ 15:13)  Source: .Blood Blood  Preliminary Report (08-27-21 @ 16:00):    No growth at 3 days.    Culture - Blood (collected 08-24-21 @ 15:13)  Source: .Blood Blood  Preliminary Report (08-27-21 @ 16:00):    No growth at 3 days.

## 2021-08-28 NOTE — SWALLOW FEES ASSESSMENT ADULT - RECOMMENDED CONSISTENCY
NPO + TPN   Ice chip trials would also be beneficial in improving secretion management and hopefully prevent mucus plugging.
NPO + short-term enteral feeding route per physician discretion   + periodic ice chips for comfort/swallow practice

## 2021-08-28 NOTE — PROGRESS NOTE ADULT - ASSESSMENT
A/P  Overal condition has conitnued to slowly improve.  Diuresing nicely.  Net negative x days with lasix and albumin.  Stable VSS, Pulse in 70-90's, BP ok  Big issue is lack of bowel function.  Low level bowel sounds.  No distension, nausea, belching, or vomiting.  Could she have a very long colonic ileus?  She is comfortable yet there is nothing out stoma. I have digitalized it multiple times.  There is no fascial obstruction.  Although at each surgery we evaluated the stoma orientation and thought it was correct, there is a small chance that we were mistaken and it is maloriented.  Finger in what we think is distal limb heads directly to left side towards L flank which is appropriate.  Finger in what we think is proximal limb head to left and then cephalad a bit.  Perhaps there is a partial twist? Have gone over last CT scan at length but it is hard to interpret.  Since she feels fine and her exam is not worrisome we will give her more time.  She cannot eat presently due to her failed swallow evaluation.  She is on TPN as well.  Colonoscopy via stoma Monday is my current plan if no output before.  Will follow exam and her overall status as well.  Renal:  Need to back off on the lasix I think or we risk dehydrating her, in my opinion.  Lytes ok, BUN creatinien are stable.  ID: WBC around 10 K.  Off everything.  Appreciate ICU teams care and expertise.

## 2021-08-28 NOTE — SWALLOW FEES ASSESSMENT ADULT - ROSENBEK'S PENETRATION ASPIRATION SCALE
(8) material passes glottis, visible subglottic residue remains, absent patient response (aspiration)
(8) material passes glottis, visible subglottic residue remains, absent patient response (aspiration)

## 2021-08-28 NOTE — SWALLOW BEDSIDE ASSESSMENT ADULT - NS SPL SWALLOW CLINIC TRIAL FT
Limited assessment of oral phase as solids were deferred for repeat instrumental test. Laryngeal movement palpated with ~2 secondary swallows per single bolus suggestive of pharyngeal stasis. Limited assessment of oral phase as solids were deferred for repeat instrumental test. Laryngeal movement palpated with ~2 secondary swallows per single bolus suggestive of pharyngeal clearance deficits (consistent with FEES findings). Wet/gurgly voicing consistently noted with all PO trials suggestive of aspiration. Unable to elicit cued cough to clear. Cued throat clear was minimally effective in clearing voice.

## 2021-08-28 NOTE — SWALLOW FEES ASSESSMENT ADULT - DIAGNOSTIC IMPRESSIONS
Slight With the exception of a mild improvement in secretion management, there was minimal improvement in pt's swallow function compared to prior FEES. Pt continues to presents with severe pharyngeal dysphagia. Dysphagia marked by delayed swallow trigger and severely reduced pharyngeal squeeze. This resulted in reduced bolus clearance which ultimately led to aspiration of all consistencies. As stated in prior FEES report, etiology of dysphagia is unknown. It is possible that dysphagia is chronic and may be exacerbated now given recent endotracheal intubation, generalized weakness, and multiple current medical problems. Given the inefficiency of pt's swallow, she appears to be at risk for not only pulmonary complications associated with aspiration but dehydration and malnutrition as well.  Swallow prognosis is also guarded, given pt's age and unknown etiology of dysphagia.

## 2021-08-28 NOTE — SWALLOW FEES ASSESSMENT ADULT - ORAL PHASE COMMENTS
Bolus was adequately contained within the oral cavity. Reduced bolus control resulted in posterior spillage of liquids into the hypopharynx prior to the swallow.
Limited assessment of oral phase as solids were deferred d/t severity of pharyngeal phase deficits. Reduced bolus control resulted in posterior bolus loss into the hypopharynx prior to the swallow.

## 2021-08-28 NOTE — SWALLOW FEES ASSESSMENT ADULT - ADDITIONAL RECOMMENDATIONS
Given pt's advanced age, multiple medical problems, and severity of dysphagia, she would benefit from a palliative care consult with HCP to further discuss GOC as it relates to long-term feeding.
It is unclear if pt would benefit from exercise-based swallow rehabilitation given that etiology of dysphagia is unknown    -Palliative care consult to discuss long-term feeding as it relates to GOC and pt prognosis

## 2021-08-28 NOTE — SWALLOW FEES ASSESSMENT ADULT - DEMONSTRATES NEED FOR REFERRAL TO ANOTHER SERVICE
for further w/u of etiology of dysphagia/neurology
Pt may benefit from a neurology consult given that etiology of dysphagia is not fully understood

## 2021-08-28 NOTE — SWALLOW FEES ASSESSMENT ADULT - COMMENTS
Risks, benefits, and alternatives to this study were reviewed with pt. Verbal consent provided. Flexible endoscope passed transnasally to further assess swallow anatomy and physiology. Pt tolerated the passing of the scope but was sensate to its presence. Mild pooling of thin secretions within the lateral channels (L>R) and post cricoid region which occasionally spilled over into the airway over the posterior commissure during secondary swallows.

## 2021-08-28 NOTE — PROGRESS NOTE ADULT - ASSESSMENT
89F PMH HTN, chronic back pain 2/2 L3,L4 compression fractures, triple vessel CAD, and SBO due to possible diverticulitis vs. GYN/colorectal malignancy (2018) never followed up and L ICA s/p L CEA (4/21). 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 Left gluteal IR drain placed (8/9). Colonoscopy w/ sigmoid mass bx on 8/11- final path invasive adenocarcinoma moderately differentiated. Taken to OR on 8/13 for diverting colostomy. In SICU for acute blood loss anemia, s/p EGD 8/18 showing duodenal ulceration, erosive esophagitis duodenitis, bx x 2, CT 8/20 w/ closed loop obstxn, s/p OR 8/20 exlap STEPHANIE.     Neuro: Tylenol prn, AMS: Holding pepcid. Discontinue haldol; suspected medication discontinued.    CV: ICA s/p L CEA (4/21). ASA, HD stable. Continue albumin 25%q8, Plavix held. Continue Lasix 20mg QD today and over the weekend.   Pulm: extubated 8/22, stable on 2L nasal cannula; attempt trial of room air today.   GI/FEN: Patient demonstrating severe dysphagia per FEES examination; maintain NPO status, TPN; repeat speech and swallow evaluation today. UGIB: EGD on 8/18: Erosive esophagitis and doudenitis with esophageal ulcers bx x2. protonix, Carafate per GI recs, Pepcid held for worsening mental status. No leakage surrounding ostomy; minimal-to-no output, continue diuresis for third-space fluid removal.  : S/p NM renal function study (wnl). Urbina (8/18-). CTA&P showing hydronephrosis; renal U/S today 8/26 showing moderate L hydronephrosis; urology exchanged stent today 8/27. Urine cx growing E. faecium; currently on Vanco per Urology for procedure prophylaxis; clarify duration of treatment. Clarification regarding urbina continuation    ID: Decreased WBC this am. E faecium uclx, Vanc (8/26-), ESBL Ecoli in intraabdominal abscess. Buttock rash: Nystatin powder (8/23--) D/c: : Daina (8/13-8/24), Blood cultures taken 8/24 show no growth to date.   Endo: ISS  PPX: SCDs., SQH  Lines: PIVs, RIJ TLC (8/23-), R brachial Bebe (8/22-23)  Wounds: ostomy, red rubber cath removed 8/25, Gluteal CORDELL removal by IR 8/26.     PT/OT: re-ordered 8/22   89F PMH HTN, chronic back pain 2/2 L3,L4 compression fractures, triple vessel CAD, and SBO due to possible diverticulitis vs. GYN/colorectal malignancy (2018) never followed up and L ICA s/p L CEA (4/21). 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 Left gluteal IR drain placed (8/9). Colonoscopy w/ sigmoid mass bx on 8/11- final path invasive adenocarcinoma moderately differentiated. Taken to OR on 8/13 for diverting colostomy. In SICU for acute blood loss anemia, s/p EGD 8/18 showing duodenal ulceration, erosive esophagitis duodenitis, bx x 2, CT 8/20 w/ closed loop obstxn, s/p OR 8/20 exlap STEPHANIE.     Neuro: Tylenol prn, AMS: Holding pepcid. Discontinue haldol; suspected medication discontinued.    CV: ICA s/p L CEA (4/21). ASA, HD stable. Continue albumin 25%q8, Plavix held. Hold lasix 20.  Pulm: extubated 8/22, stable on 2L nasal cannula; attempt trial of room air today.   GI/FEN: SPSW and Fees today; maintain NPO status, TPN; repeat speech and swallow evaluation today. UGIB: EGD on 8/18: Erosive esophagitis and doudenitis with esophageal ulcers bx x2. protonix, Carafate per GI recs, Pepcid held for worsening mental status. No leakage surrounding ostomy; minimal-to-no output, continue diuresis for third-space fluid removal.  : S/p NM renal function study (wnl). Urbina (8/18-). CTA&P showing hydronephrosis; renal U/S today 8/26 showing moderate L hydronephrosis; urology exchanged stent today 8/27. Urine cx growing E. faecium; currently on Vanco per Urology for procedure prophylaxis; clarify duration of treatment. Clarification regarding urbina continuation    ID: E faecium uclx, Vanc (8/26-28), ESBL Ecoli in intraabdominal abscess. Buttock rash: Nystatin powder (8/23--) D/c: : Daina (8/13-8/24), Blood cultures taken 8/24 show no growth to date.   Endo: ISS  PPX: SCDs., SQH  Lines: PIVs, RIJ TLC (8/23-), R brachial [place] (8/22-23)  Wounds: ostomy, red rubber cath removed 8/25, Gluteal CORDELL removal by IR 8/26.     PT/OT: re-ordered 8/22

## 2021-08-29 LAB
ANION GAP SERPL CALC-SCNC: 9 MMOL/L — SIGNIFICANT CHANGE UP (ref 5–17)
BUN SERPL-MCNC: 26 MG/DL — HIGH (ref 7–23)
CALCIUM SERPL-MCNC: 8.7 MG/DL — SIGNIFICANT CHANGE UP (ref 8.4–10.5)
CHLORIDE SERPL-SCNC: 107 MMOL/L — SIGNIFICANT CHANGE UP (ref 96–108)
CO2 SERPL-SCNC: 24 MMOL/L — SIGNIFICANT CHANGE UP (ref 22–31)
CREAT SERPL-MCNC: 0.54 MG/DL — SIGNIFICANT CHANGE UP (ref 0.5–1.3)
CULTURE RESULTS: SIGNIFICANT CHANGE UP
CULTURE RESULTS: SIGNIFICANT CHANGE UP
GLUCOSE BLDC GLUCOMTR-MCNC: 116 MG/DL — HIGH (ref 70–99)
GLUCOSE BLDC GLUCOMTR-MCNC: 129 MG/DL — HIGH (ref 70–99)
GLUCOSE BLDC GLUCOMTR-MCNC: 133 MG/DL — HIGH (ref 70–99)
GLUCOSE SERPL-MCNC: 127 MG/DL — HIGH (ref 70–99)
HCT VFR BLD CALC: 26.7 % — LOW (ref 34.5–45)
HGB BLD-MCNC: 8.6 G/DL — LOW (ref 11.5–15.5)
MAGNESIUM SERPL-MCNC: 2.3 MG/DL — SIGNIFICANT CHANGE UP (ref 1.6–2.6)
MCHC RBC-ENTMCNC: 27.8 PG — SIGNIFICANT CHANGE UP (ref 27–34)
MCHC RBC-ENTMCNC: 32.2 GM/DL — SIGNIFICANT CHANGE UP (ref 32–36)
MCV RBC AUTO: 86.4 FL — SIGNIFICANT CHANGE UP (ref 80–100)
NRBC # BLD: 0 /100 WBCS — SIGNIFICANT CHANGE UP (ref 0–0)
PHOSPHATE SERPL-MCNC: 3.3 MG/DL — SIGNIFICANT CHANGE UP (ref 2.5–4.5)
PLATELET # BLD AUTO: 415 K/UL — HIGH (ref 150–400)
POTASSIUM SERPL-MCNC: 4.4 MMOL/L — SIGNIFICANT CHANGE UP (ref 3.5–5.3)
POTASSIUM SERPL-SCNC: 4.4 MMOL/L — SIGNIFICANT CHANGE UP (ref 3.5–5.3)
RBC # BLD: 3.09 M/UL — LOW (ref 3.8–5.2)
RBC # FLD: 16.6 % — HIGH (ref 10.3–14.5)
SODIUM SERPL-SCNC: 140 MMOL/L — SIGNIFICANT CHANGE UP (ref 135–145)
SPECIMEN SOURCE: SIGNIFICANT CHANGE UP
SPECIMEN SOURCE: SIGNIFICANT CHANGE UP
WBC # BLD: 10.61 K/UL — HIGH (ref 3.8–10.5)
WBC # FLD AUTO: 10.61 K/UL — HIGH (ref 3.8–10.5)

## 2021-08-29 PROCEDURE — 99232 SBSQ HOSP IP/OBS MODERATE 35: CPT | Mod: GC

## 2021-08-29 RX ORDER — ELECTROLYTE SOLUTION,INJ
1 VIAL (ML) INTRAVENOUS
Refills: 0 | Status: DISCONTINUED | OUTPATIENT
Start: 2021-08-29 | End: 2021-08-29

## 2021-08-29 RX ORDER — I.V. FAT EMULSION 20 G/100ML
0.5 EMULSION INTRAVENOUS
Qty: 20 | Refills: 0 | Status: DISCONTINUED | OUTPATIENT
Start: 2021-08-29 | End: 2021-08-30

## 2021-08-29 RX ADMIN — Medication 1 EACH: at 17:12

## 2021-08-29 RX ADMIN — Medication 50 MILLILITER(S): at 21:47

## 2021-08-29 RX ADMIN — NYSTATIN CREAM 1 APPLICATION(S): 100000 CREAM TOPICAL at 17:12

## 2021-08-29 RX ADMIN — Medication 300 MILLIGRAM(S): at 12:21

## 2021-08-29 RX ADMIN — LIDOCAINE 1 PATCH: 4 CREAM TOPICAL at 12:22

## 2021-08-29 RX ADMIN — Medication 50 MILLILITER(S): at 12:31

## 2021-08-29 RX ADMIN — HEPARIN SODIUM 5000 UNIT(S): 5000 INJECTION INTRAVENOUS; SUBCUTANEOUS at 05:52

## 2021-08-29 RX ADMIN — CHLORHEXIDINE GLUCONATE 1 APPLICATION(S): 213 SOLUTION TOPICAL at 05:52

## 2021-08-29 RX ADMIN — PANTOPRAZOLE SODIUM 40 MILLIGRAM(S): 20 TABLET, DELAYED RELEASE ORAL at 12:19

## 2021-08-29 RX ADMIN — LIDOCAINE 1 PATCH: 4 CREAM TOPICAL at 17:12

## 2021-08-29 RX ADMIN — LIDOCAINE 1 PATCH: 4 CREAM TOPICAL at 05:50

## 2021-08-29 RX ADMIN — Medication 50 MILLILITER(S): at 06:21

## 2021-08-29 RX ADMIN — HEPARIN SODIUM 5000 UNIT(S): 5000 INJECTION INTRAVENOUS; SUBCUTANEOUS at 17:13

## 2021-08-29 RX ADMIN — NYSTATIN CREAM 1 APPLICATION(S): 100000 CREAM TOPICAL at 06:22

## 2021-08-29 RX ADMIN — PANTOPRAZOLE SODIUM 40 MILLIGRAM(S): 20 TABLET, DELAYED RELEASE ORAL at 23:31

## 2021-08-29 RX ADMIN — CHLORHEXIDINE GLUCONATE 1 APPLICATION(S): 213 SOLUTION TOPICAL at 05:51

## 2021-08-29 NOTE — PROGRESS NOTE ADULT - SUBJECTIVE AND OBJECTIVE BOX
INTERVAL HPI/OVERNIGHT EVENTS:    PRESSORS: [ ] YES [ ] NO  WHICH:  DOSE:    ANTIBIOTICS:                  DATE STARTED:  ANTIBIOTICS:                  DATE STARTED:  ANTIBIOTICS:                  DATE STARTED:    CENTRAL LINE: [ ] YES [ ] NO  LOCATION:   DATE INSERTED:  REMOVE: [ ] YES [ ] NO  EXPLAIN:    BUSTAMANTE: [ ] YES [ ] NO    DATE INSERTED:  REMOVE: [ ] YES [ ] NO  EXPLAIN:    A-LINE: [ ] YES [ ] NO  LOCATION:   DATE INSERTED:  REMOVE: [ ] YES [ ] NO  EXPLAIN:    ICU Vital Signs Last 24 Hrs  T(C): 36.1 (29 Aug 2021 09:27), Max: 36.6 (28 Aug 2021 13:03)  T(F): 96.9 (29 Aug 2021 09:27), Max: 97.9 (28 Aug 2021 13:03)  HR: 81 (29 Aug 2021 08:00) (76 - 83)  BP: 127/58 (29 Aug 2021 08:00) (116/56 - 136/63)  BP(mean): 84 (29 Aug 2021 08:00) (79 - 91)  ABP: --  ABP(mean): --  RR: 17 (29 Aug 2021 08:00) (14 - 19)  SpO2: 94% (29 Aug 2021 08:00) (92% - 96%)            28 Aug 2021 07:01  -  29 Aug 2021 07:00  --------------------------------------------------------  IN:    Albumin 5%  - 250 mL: 50 mL    Albumin 5% - 50 mL: 100 mL    Fat Emulsion 20%: 99.6 mL    TPN (Total Parenteral Nutrition): 1104 mL  Total IN: 1353.6 mL    OUT:    Colostomy (mL): 0 mL    Indwelling Catheter - Urethral (mL): 2400 mL  Total OUT: 2400 mL    Total NET: -1046.4 mL      29 Aug 2021 07:01  -  29 Aug 2021 09:29  --------------------------------------------------------  IN:    Fat Emulsion 20%: 8.3 mL    TPN (Total Parenteral Nutrition): 46 mL  Total IN: 54.3 mL    OUT:    Indwelling Catheter - Urethral (mL): 60 mL  Total OUT: 60 mL    Total NET: -5.7 mL          PHYSICAL EXAM:    LABS:                        8.6    10.61 )-----------( 415      ( 29 Aug 2021 05:49 )             26.7     08-29    140  |  107  |  26<H>  ----------------------------<  127<H>  4.4   |  24  |  0.54    Ca    8.7      29 Aug 2021 05:49  Phos  3.3     08-29  Mg     2.3     08-29        RADIOLOGY & ADDITIONAL STUDIES: INTERVAL HPI/OVERNIGHT EVENTS: ON: afeb VSS, good UOP  8/28: Held lasix after 6AM dose 2/2 mildly elevated Cr, urine darker, TPN ordered; Per SLP rec FEES terrible    Examined at the bedside resting comfortably. States feels well this morning. Looking forward to drinking. No abdominal pain, cp, sb. No acute complaints.       CENTRAL LINE: [ ] YES [ ] NO  LOCATION:   DATE INSERTED:  REMOVE: [ ] YES [ ] NO  EXPLAIN:    BUSTAMANTE: [ ] YES [ ] NO    DATE INSERTED:  REMOVE: [ ] YES [ ] NO  EXPLAIN:    A-LINE: [ ] YES [ ] NO  LOCATION:   DATE INSERTED:  REMOVE: [ ] YES [ ] NO  EXPLAIN:    ICU Vital Signs Last 24 Hrs  T(C): 36.1 (29 Aug 2021 09:27), Max: 36.6 (28 Aug 2021 13:03)  T(F): 96.9 (29 Aug 2021 09:27), Max: 97.9 (28 Aug 2021 13:03)  HR: 81 (29 Aug 2021 08:00) (76 - 83)  BP: 127/58 (29 Aug 2021 08:00) (116/56 - 136/63)  BP(mean): 84 (29 Aug 2021 08:00) (79 - 91)  ABP: --  ABP(mean): --  RR: 17 (29 Aug 2021 08:00) (14 - 19)  SpO2: 94% (29 Aug 2021 08:00) (92% - 96%)            28 Aug 2021 07:01  -  29 Aug 2021 07:00  --------------------------------------------------------  IN:    Albumin 5%  - 250 mL: 50 mL    Albumin 5% - 50 mL: 100 mL    Fat Emulsion 20%: 99.6 mL    TPN (Total Parenteral Nutrition): 1104 mL  Total IN: 1353.6 mL    OUT:    Colostomy (mL): 0 mL    Indwelling Catheter - Urethral (mL): 2400 mL  Total OUT: 2400 mL    Total NET: -1046.4 mL      29 Aug 2021 07:01  -  29 Aug 2021 09:29  --------------------------------------------------------  IN:    Fat Emulsion 20%: 8.3 mL    TPN (Total Parenteral Nutrition): 46 mL  Total IN: 54.3 mL    OUT:    Indwelling Catheter - Urethral (mL): 60 mL  Total OUT: 60 mL    Total NET: -5.7 mL    PHYSICAL EXAM:  General: NAD, resting comfortably in bed, cachectic   C/V: NSR  Pulm: Nonlabored breathing, no respiratory distress, CTAB  Abd: soft, non-tender, non-distended, colostomy w/ air and bowel sweat  Extrem: WWP, no edema,   Neuro: A/O x 3, CNs II-XII grossly intact, no focal deficits, normal sensation  Pulses: palpable distal pulses     LABS:                        8.6    10.61 )-----------( 415      ( 29 Aug 2021 05:49 )             26.7     08-29    140  |  107  |  26<H>  ----------------------------<  127<H>  4.4   |  24  |  0.54    Ca    8.7      29 Aug 2021 05:49  Phos  3.3     08-29  Mg     2.3     08-29        RADIOLOGY & ADDITIONAL STUDIES:  < from: US Retroperitoneal Complete (08.26.21 @ 12:06) >    URINARY BLADDER:  Bustamante catheter noted within a partially decompressed bladder. Distal aspect of the left ureteric stent seen within the bladder.      Small bilateral pleural effusions.        IMPRESSION:  Moderate left hydronephrosis. Proximal aspect of the left ureteric stent not demonstrated. Distal aspect of the left ureteric stent within the urinary bladder.    < end of copied text >

## 2021-08-29 NOTE — PROGRESS NOTE ADULT - ATTENDING COMMENTS
Patient seen and examined with house-staff during bedside rounds.  Resident note read, including vitals, physical findings, laboratory data, and radiological reports.   Revisions included below.  Direct personal management at bed side and extensive interpretation of the data.  Plan was outlined and discussed in details with the housestaff.  Decision making of high complexity  Action taken for acute disease activity to reflect the level of care provided:  - medication reconciliation  - review laboratory data  Patient is clinically stable.  The patient has on TPN.  The patient failed swallow evaluation.  Has adequate urine output.  Continue TPN.  Off antibiotic

## 2021-08-29 NOTE — PROGRESS NOTE ADULT - SUBJECTIVE AND OBJECTIVE BOX
Coverage for Jinny  Seen and examined with the surgical team.   Agree with findings and plan.    Stable overnight.  No GI function. No N/V    abd- soft, min distention. no output from stoma  Wounds- clean    A/P  Ileus  Cont tpn  Supportive care

## 2021-08-29 NOTE — PROGRESS NOTE ADULT - ASSESSMENT
89F PMH HTN, chronic back pain 2/2 L3,L4 compression fractures, triple vessel CAD, and SBO due to possible diverticulitis vs. GYN/colorectal malignancy (2018) never followed up and L ICA s/p L CEA (4/21). 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 Left gluteal IR drain placed (8/9). Colonoscopy w/ sigmoid mass bx on 8/11- final path invasive adenocarcinoma moderately differentiated. Taken to OR on 8/13 for diverting colostomy. In SICU for acute blood loss anemia, s/p EGD 8/18 showing duodenal ulceration, erosive esophagitis duodenitis, bx x 2, CT 8/20 w/ closed loop obstxn, s/p OR 8/20 exlap STEPHANIE. With leukocystosis and now s/p 8/27 L ureteral stent exchange. Clinically stable. Plan to step down.     Neuro: Tylenol prn, AMS: Holding pepcid. Discontinue haldol; suspected medication discontinued.    CV: ICA s/p L CEA (4/21). ASA, HD stable. Continue albumin 25%q8, Plavix held. Continue Lasix 20mg QD today and over the weekend.   Pulm: extubated 8/22, stable on 2L nasal cannula; attempt trial of room air today.   GI/FEN: Patient demonstrating severe dysphagia per FEES examination; maintain NPO status, TPN; repeat speech and swallow evaluation today. UGIB: EGD on 8/18: Erosive esophagitis and doudenitis with esophageal ulcers bx x2. protonix, Carafate per GI recs, Pepcid held for worsening mental status. No leakage surrounding ostomy; minimal-to-no output, continue diuresis for third-space fluid removal.  : S/p NM renal function study (wnl). Urbina (8/18-). CTA&P showing hydronephrosis; renal U/S today 8/26 showing moderate L hydronephrosis; urology exchanged stent today 8/27. Urine cx growing E. faecium; currently on Vanco per Urology for procedure prophylaxis; clarify duration of treatment. Clarification regarding urbina continuation    ID: Decreased WBC this am. E faecium uclx, Vanc (8/26-), ESBL Ecoli in intraabdominal abscess. Buttock rash: Nystatin powder (8/23--) D/c: : Daina (8/13-8/24), Blood cultures taken 8/24 show no growth to date.   Endo: ISS  PPX: SCDs., SQH  Lines: PIVs, RIJ TLC (8/23-), R brachial Bebe (8/22-23)  Wounds: ostomy, red rubber cath removed 8/25, Gluteal CORDELL removal by IR 8/26.     PT/OT: re-ordered 8/22

## 2021-08-30 LAB
ANION GAP SERPL CALC-SCNC: 9 MMOL/L — SIGNIFICANT CHANGE UP (ref 5–17)
APTT BLD: 61.3 SEC — HIGH (ref 27.5–35.5)
BLD GP AB SCN SERPL QL: NEGATIVE — SIGNIFICANT CHANGE UP
BUN SERPL-MCNC: 29 MG/DL — HIGH (ref 7–23)
CALCIUM SERPL-MCNC: 9 MG/DL — SIGNIFICANT CHANGE UP (ref 8.4–10.5)
CHLORIDE SERPL-SCNC: 110 MMOL/L — HIGH (ref 96–108)
CO2 SERPL-SCNC: 24 MMOL/L — SIGNIFICANT CHANGE UP (ref 22–31)
CREAT SERPL-MCNC: 0.55 MG/DL — SIGNIFICANT CHANGE UP (ref 0.5–1.3)
GLUCOSE BLDC GLUCOMTR-MCNC: 106 MG/DL — HIGH (ref 70–99)
GLUCOSE BLDC GLUCOMTR-MCNC: 135 MG/DL — HIGH (ref 70–99)
GLUCOSE BLDC GLUCOMTR-MCNC: 138 MG/DL — HIGH (ref 70–99)
GLUCOSE BLDC GLUCOMTR-MCNC: 140 MG/DL — HIGH (ref 70–99)
GLUCOSE SERPL-MCNC: 128 MG/DL — HIGH (ref 70–99)
HCT VFR BLD CALC: 27.6 % — LOW (ref 34.5–45)
HGB BLD-MCNC: 8.6 G/DL — LOW (ref 11.5–15.5)
INR BLD: 1.13 — SIGNIFICANT CHANGE UP (ref 0.88–1.16)
MAGNESIUM SERPL-MCNC: 2.5 MG/DL — SIGNIFICANT CHANGE UP (ref 1.6–2.6)
MCHC RBC-ENTMCNC: 28 PG — SIGNIFICANT CHANGE UP (ref 27–34)
MCHC RBC-ENTMCNC: 31.2 GM/DL — LOW (ref 32–36)
MCV RBC AUTO: 89.9 FL — SIGNIFICANT CHANGE UP (ref 80–100)
NRBC # BLD: 0 /100 WBCS — SIGNIFICANT CHANGE UP (ref 0–0)
PHOSPHATE SERPL-MCNC: 3.2 MG/DL — SIGNIFICANT CHANGE UP (ref 2.5–4.5)
PLATELET # BLD AUTO: 446 K/UL — HIGH (ref 150–400)
POTASSIUM SERPL-MCNC: 4.2 MMOL/L — SIGNIFICANT CHANGE UP (ref 3.5–5.3)
POTASSIUM SERPL-SCNC: 4.2 MMOL/L — SIGNIFICANT CHANGE UP (ref 3.5–5.3)
PROTHROM AB SERPL-ACNC: 13.5 SEC — SIGNIFICANT CHANGE UP (ref 10.6–13.6)
RBC # BLD: 3.07 M/UL — LOW (ref 3.8–5.2)
RBC # FLD: 16.9 % — HIGH (ref 10.3–14.5)
RH IG SCN BLD-IMP: POSITIVE — SIGNIFICANT CHANGE UP
SARS-COV-2 RNA SPEC QL NAA+PROBE: SIGNIFICANT CHANGE UP
SODIUM SERPL-SCNC: 143 MMOL/L — SIGNIFICANT CHANGE UP (ref 135–145)
TRIGL SERPL-MCNC: 81 MG/DL — SIGNIFICANT CHANGE UP
WBC # BLD: 11.17 K/UL — HIGH (ref 3.8–10.5)
WBC # FLD AUTO: 11.17 K/UL — HIGH (ref 3.8–10.5)

## 2021-08-30 PROCEDURE — 99222 1ST HOSP IP/OBS MODERATE 55: CPT

## 2021-08-30 PROCEDURE — 74019 RADEX ABDOMEN 2 VIEWS: CPT | Mod: 26

## 2021-08-30 RX ORDER — I.V. FAT EMULSION 20 G/100ML
0.38 EMULSION INTRAVENOUS
Qty: 20 | Refills: 0 | Status: DISCONTINUED | OUTPATIENT
Start: 2021-08-30 | End: 2021-08-30

## 2021-08-30 RX ORDER — ELECTROLYTE SOLUTION,INJ
1 VIAL (ML) INTRAVENOUS
Refills: 0 | Status: DISCONTINUED | OUTPATIENT
Start: 2021-08-30 | End: 2021-08-30

## 2021-08-30 RX ADMIN — Medication 300 MILLIGRAM(S): at 11:20

## 2021-08-30 RX ADMIN — HEPARIN SODIUM 5000 UNIT(S): 5000 INJECTION INTRAVENOUS; SUBCUTANEOUS at 06:29

## 2021-08-30 RX ADMIN — PANTOPRAZOLE SODIUM 40 MILLIGRAM(S): 20 TABLET, DELAYED RELEASE ORAL at 11:20

## 2021-08-30 RX ADMIN — NYSTATIN CREAM 1 APPLICATION(S): 100000 CREAM TOPICAL at 06:28

## 2021-08-30 RX ADMIN — I.V. FAT EMULSION 8.33 GM/KG/DAY: 20 EMULSION INTRAVENOUS at 22:44

## 2021-08-30 RX ADMIN — CHLORHEXIDINE GLUCONATE 1 APPLICATION(S): 213 SOLUTION TOPICAL at 06:30

## 2021-08-30 RX ADMIN — LIDOCAINE 1 PATCH: 4 CREAM TOPICAL at 06:35

## 2021-08-30 RX ADMIN — CHLORHEXIDINE GLUCONATE 1 APPLICATION(S): 213 SOLUTION TOPICAL at 06:35

## 2021-08-30 RX ADMIN — Medication 50 MILLILITER(S): at 06:28

## 2021-08-30 RX ADMIN — NYSTATIN CREAM 1 APPLICATION(S): 100000 CREAM TOPICAL at 17:48

## 2021-08-30 RX ADMIN — HEPARIN SODIUM 5000 UNIT(S): 5000 INJECTION INTRAVENOUS; SUBCUTANEOUS at 17:48

## 2021-08-30 RX ADMIN — I.V. FAT EMULSION 8.3 GM/KG/DAY: 20 EMULSION INTRAVENOUS at 09:00

## 2021-08-30 RX ADMIN — Medication 50 MILLILITER(S): at 22:44

## 2021-08-30 RX ADMIN — Medication 1 EACH: at 17:47

## 2021-08-30 RX ADMIN — PANTOPRAZOLE SODIUM 40 MILLIGRAM(S): 20 TABLET, DELAYED RELEASE ORAL at 23:24

## 2021-08-30 RX ADMIN — Medication 50 MILLILITER(S): at 13:04

## 2021-08-30 NOTE — CONSULT NOTE ADULT - SUBJECTIVE AND OBJECTIVE BOX
NEUROLOGY INITIAL CONSULT NOTE    HPI:  89F PMH HTN, chronic back pain 2/2 L3,L4 compression fractures, triple vessel CAD, and SBO due to possible diverticulitis vs. GYN/colorectal malignancy (2018) never followed up and L ICA s/p L CEA (4/21). 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 Left gluteal IR drain placed (8/9). Colonoscopy w/ sigmoid mass bx on 8/11- final path invasive adenocarcinoma moderately differentiated. Taken to OR on 8/13 for diverting colostomy. In SICU for acute blood loss anemia, s/p EGD 8/18 showing duodenal ulceration, erosive esophagitis duodenitis, bx x 2, CT 8/20 w/ closed loop obstxn, s/p OR 8/20 exlap STEPHANIE. Patient is not tolerating PO 2/2 dysphagia. S/p speech and swallow eval, found to have reduced bolus control and severely impaired pharyngeal swallow. Neurology team consulted for dysphagia with concern for neurological etiology.     Patient seen and examined at bedside. Denies any trouble with swallowing prior to admission. Reports eating a regular diet without issues until 2 weeks ago. ROS otherwise negative.       PAST MEDICAL & SURGICAL HISTORY:  SBO (small bowel obstruction)    HTN (hypertension)    Chronic back pain    S/P wrist surgery    REVIEW OF SYSTEMS:  As per HPI, otherwise negative for Constitutional, Eyes, Ears/Nose/Mouth/Throat, Neck, Cardiovascular, Respiratory, Gastrointestinal, Genitourinary, Skin, Endocrine, Musculoskeletal, Psychiatric, and Hematologic/Lymphatic.    MEDICATIONS  (STANDING):  albumin human 25% IVPB 50 milliLiter(s) IV Intermittent every 8 hours  aspirin Suppository 300 milliGRAM(s) Rectal daily  chlorhexidine 2% Cloths 1 Application(s) Topical daily  chlorhexidine 4% Liquid 1 Application(s) Topical <User Schedule>  dextrose 40% Gel 15 Gram(s) Oral once  dextrose 5%. 1000 milliLiter(s) (100 mL/Hr) IV Continuous <Continuous>  dextrose 5%. 1000 milliLiter(s) (50 mL/Hr) IV Continuous <Continuous>  dextrose 50% Injectable 25 Gram(s) IV Push once  dextrose 50% Injectable 25 Gram(s) IV Push once  dextrose 50% Injectable 12.5 Gram(s) IV Push once  fat emulsion (Fish Oil and Plant Based) 20% Infusion 0.5 Gm/kG/Day (8.3 mL/Hr) IV Continuous <Continuous>  fat emulsion (Fish Oil and Plant Based) 20% Infusion 0.3759 Gm/kG/Day (8.33 mL/Hr) IV Continuous <Continuous>  glucagon  Injectable 1 milliGRAM(s) IntraMuscular once  heparin   Injectable 5000 Unit(s) SubCutaneous every 12 hours  insulin lispro (ADMELOG) corrective regimen sliding scale   SubCutaneous every 6 hours  lidocaine   4% Patch 1 Patch Transdermal every 24 hours  lidocaine   4% Patch 1 Patch Transdermal every 24 hours  nystatin Powder 1 Application(s) Topical two times a day  pantoprazole  Injectable 40 milliGRAM(s) IV Push every 12 hours  Parenteral Nutrition - Adult 1 Each (46 mL/Hr) TPN Continuous <Continuous>  Parenteral Nutrition - Adult 1 Each (46 mL/Hr) TPN Continuous <Continuous>    MEDICATIONS  (PRN):  metoprolol tartrate Injectable 2.5 milliGRAM(s) IV Push every 6 hours PRN SBP > 160, HR > 100  ondansetron Injectable 4 milliGRAM(s) IV Push every 6 hours PRN Nausea and/or Vomiting  sodium chloride 0.9% lock flush 10 milliLiter(s) IV Push every 1 hour PRN Pre/post blood products, medications, blood draw, and to maintain line patency      Allergies    No Known Allergies    Intolerances        FAMILY HISTORY:      SOCIAL HISTORY:  Lives in an apartment alone. States she has a few friends who visit her regularly. She has a  who visits twice a week but does most household chores herself. She drinks 1 glass of scotch per night, she is a former smoker (1ppd quit 25 years ago). She denies illicit drug use.    VITAL SIGNS:  Vital Signs Last 24 Hrs  T(C): 37.1 (30 Aug 2021 10:00), Max: 37.5 (29 Aug 2021 22:19)  T(F): 98.8 (30 Aug 2021 10:00), Max: 99.5 (29 Aug 2021 22:19)  HR: 80 (30 Aug 2021 11:26) (74 - 84)  BP: 129/59 (30 Aug 2021 11:26) (115/56 - 149/63)  BP(mean): 85 (30 Aug 2021 11:26) (81 - 92)  RR: 18 (30 Aug 2021 11:26) (14 - 18)  SpO2: 97% (30 Aug 2021 11:26) (93% - 97%)    PHYSICAL EXAM:   General: Frail, cachectic elderly female. No acute distress   HEENT: AT/NC. Dry MM. No oral lesions.   Neck: R TLC in place. L sided anterior neck tenderness with significant bounding pulse   Neurologic:  -Mental status: Awake, alert, oriented to person, place, and time. Speech is fluent with no dysarthria. Follows commands. Attention/concentration intact. Fund of knowledge appropriate.  -Cranial nerves:   II: Visual fields are full to confrontation.  III, IV, VI: Extraocular movements are intact without nystagmus. Pupils equally round and reactive to light  V:  Facial sensation V1-V3 equal and intact   VII: Face is symmetric with normal eye closure and smile  VIII: Hearing grossly intact  XII: Tongue protrudes midline  Motor: Normal bulk and tone. No pronator drift. Strength bilateral upper extremity 5/5, bilateral lower extremities 5/5.  Sensation: Intact to light touch bilaterally  Coordination: No dysmetria on finger-to-nose bilaterally    LABS:                        8.6    11.17 )-----------( 446      ( 30 Aug 2021 07:21 )             27.6     08-30    143  |  110<H>  |  29<H>  ----------------------------<  128<H>  4.2   |  24  |  0.55    Ca    9.0      30 Aug 2021 07:23  Phos  3.2     08-30  Mg     2.5     08-30      PT/INR - ( 30 Aug 2021 10:49 )   PT: 13.5 sec;   INR: 1.13          PTT - ( 30 Aug 2021 10:49 )  PTT:61.3 sec      RADIOLOGY & ADDITIONAL STUDIES:      IMPRESSION & RECOMMENDATIONS: NEUROLOGY INITIAL CONSULT NOTE    HPI:  89F PMH HTN, chronic back pain 2/2 L3,L4 compression fractures, triple vessel CAD, and SBO due to possible diverticulitis vs. GYN/colorectal malignancy (2018) never followed up and L ICA s/p L CEA (4/21). Presented to 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 Left gluteal IR drain placed (8/9). Colonoscopy w/ sigmoid mass bx on 8/11- final path invasive adenocarcinoma moderately differentiated. Taken to OR on 8/13 for diverting colostomy. In SICU for acute blood loss anemia, s/p EGD 8/18 showing duodenal ulceration, erosive esophagitis duodenitis, bx x 2, CT 8/20 w/ closed loop obstxn, s/p OR 8/20 exlap STEPHANIE. Patient is not tolerating PO 2/2 dysphagia. S/p speech and swallow eval, found to have reduced bolus control and severely impaired pharyngeal swallow. Neurology team consulted for dysphagia with concern for neurological etiology.     Patient seen and examined at bedside. Denies any trouble with swallowing prior to admission. Reports eating a regular diet without issues until 2 weeks ago. ROS otherwise negative.       PAST MEDICAL & SURGICAL HISTORY:  SBO (small bowel obstruction)    HTN (hypertension)    Chronic back pain    S/P wrist surgery    REVIEW OF SYSTEMS:  As per HPI, otherwise negative for Constitutional, Eyes, Ears/Nose/Mouth/Throat, Neck, Cardiovascular, Respiratory, Gastrointestinal, Genitourinary, Skin, Endocrine, Musculoskeletal, Psychiatric, and Hematologic/Lymphatic.    MEDICATIONS  (STANDING):  albumin human 25% IVPB 50 milliLiter(s) IV Intermittent every 8 hours  aspirin Suppository 300 milliGRAM(s) Rectal daily  chlorhexidine 2% Cloths 1 Application(s) Topical daily  chlorhexidine 4% Liquid 1 Application(s) Topical <User Schedule>  dextrose 40% Gel 15 Gram(s) Oral once  dextrose 5%. 1000 milliLiter(s) (100 mL/Hr) IV Continuous <Continuous>  dextrose 5%. 1000 milliLiter(s) (50 mL/Hr) IV Continuous <Continuous>  dextrose 50% Injectable 25 Gram(s) IV Push once  dextrose 50% Injectable 25 Gram(s) IV Push once  dextrose 50% Injectable 12.5 Gram(s) IV Push once  fat emulsion (Fish Oil and Plant Based) 20% Infusion 0.5 Gm/kG/Day (8.3 mL/Hr) IV Continuous <Continuous>  fat emulsion (Fish Oil and Plant Based) 20% Infusion 0.3759 Gm/kG/Day (8.33 mL/Hr) IV Continuous <Continuous>  glucagon  Injectable 1 milliGRAM(s) IntraMuscular once  heparin   Injectable 5000 Unit(s) SubCutaneous every 12 hours  insulin lispro (ADMELOG) corrective regimen sliding scale   SubCutaneous every 6 hours  lidocaine   4% Patch 1 Patch Transdermal every 24 hours  lidocaine   4% Patch 1 Patch Transdermal every 24 hours  nystatin Powder 1 Application(s) Topical two times a day  pantoprazole  Injectable 40 milliGRAM(s) IV Push every 12 hours  Parenteral Nutrition - Adult 1 Each (46 mL/Hr) TPN Continuous <Continuous>  Parenteral Nutrition - Adult 1 Each (46 mL/Hr) TPN Continuous <Continuous>    MEDICATIONS  (PRN):  metoprolol tartrate Injectable 2.5 milliGRAM(s) IV Push every 6 hours PRN SBP > 160, HR > 100  ondansetron Injectable 4 milliGRAM(s) IV Push every 6 hours PRN Nausea and/or Vomiting  sodium chloride 0.9% lock flush 10 milliLiter(s) IV Push every 1 hour PRN Pre/post blood products, medications, blood draw, and to maintain line patency      Allergies    No Known Allergies    Intolerances        FAMILY HISTORY:      SOCIAL HISTORY:  Lives in an apartment alone. States she has a few friends who visit her regularly. She has a  who visits twice a week but does most household chores herself. She drinks 1 glass of scotch per night, she is a former smoker (1ppd quit 25 years ago). She denies illicit drug use.    VITAL SIGNS:  Vital Signs Last 24 Hrs  T(C): 37.1 (30 Aug 2021 10:00), Max: 37.5 (29 Aug 2021 22:19)  T(F): 98.8 (30 Aug 2021 10:00), Max: 99.5 (29 Aug 2021 22:19)  HR: 80 (30 Aug 2021 11:26) (74 - 84)  BP: 129/59 (30 Aug 2021 11:26) (115/56 - 149/63)  BP(mean): 85 (30 Aug 2021 11:26) (81 - 92)  RR: 18 (30 Aug 2021 11:26) (14 - 18)  SpO2: 97% (30 Aug 2021 11:26) (93% - 97%)    PHYSICAL EXAM:   General: Frail, cachectic elderly female. No acute distress   HEENT: AT/NC. Dry MM. No oral lesions.   Neck: R TLC in place. L sided anterior neck tenderness with significant bounding pulse   Neurologic:  -Mental status: Awake, alert, oriented to person, place, and time. Speech is fluent with no dysarthria. Follows commands. Attention/concentration intact. Fund of knowledge appropriate.  -Cranial nerves:   II: Visual fields are full to confrontation.  III, IV, VI: Extraocular movements are intact without nystagmus. Pupils equally round and reactive to light  V:  Facial sensation V1-V3 equal and intact   VII: Face is symmetric with normal eye closure and smile  VIII: Hearing grossly intact  XII: Tongue protrudes midline  Motor: diffuse sarcopenia. No pronator drift. Strength bilateral upper extremity 5/5, bilateral lower extremities 5/5.  Sensation: Intact to light touch bilaterally  Coordination: No dysmetria on finger-to-nose bilaterally    LABS:                        8.6    11.17 )-----------( 446      ( 30 Aug 2021 07:21 )             27.6     08-30    143  |  110<H>  |  29<H>  ----------------------------<  128<H>  4.2   |  24  |  0.55    Ca    9.0      30 Aug 2021 07:23  Phos  3.2     08-30  Mg     2.5     08-30      PT/INR - ( 30 Aug 2021 10:49 )   PT: 13.5 sec;   INR: 1.13          PTT - ( 30 Aug 2021 10:49 )  PTT:61.3 sec      RADIOLOGY & ADDITIONAL STUDIES:      IMPRESSION & RECOMMENDATIONS:

## 2021-08-30 NOTE — OCCUPATIONAL THERAPY INITIAL EVALUATION ADULT - LEVEL OF INDEPENDENCE: SIT/STAND, REHAB EVAL
flexed posture, able to clear EOB but unable to achieve full hip extension, stood for ~5 sec/moderate assist (50% patients effort)

## 2021-08-30 NOTE — ADVANCED PRACTICE NURSE CONSULT - ASSESSMENT
Ostomy pouching system intact. Stoma pale pink. No output noted in ostomy pouch. WOCN informed surgery team member, Peter of assessment. Peter reported Dr Stearns was aware and the paln was to scope her via the ostomy today.

## 2021-08-30 NOTE — OCCUPATIONAL THERAPY INITIAL EVALUATION ADULT - ADDITIONAL COMMENTS
Pt reports that she lives alone in an apartment, no BERNARDO, uses rollator for mobility. Pt has a walk-in shower and shower chair with grab bars present throughout bathroom. Prior to admit, pt was independent with ADLs.

## 2021-08-30 NOTE — PROGRESS NOTE ADULT - ASSESSMENT
89F PMH HTN, chronic back pain 2/2 L3,L4 compression fractures, triple vessel CAD, and SBO due to possible diverticulitis vs. GYN/colorectal malignancy (2018) never followed up and L ICA s/p L CEA (4/21). 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 Left gluteal IR drain placed (8/9). Colonoscopy w/ sigmoid mass bx on 8/11- final path invasive adenocarcinoma moderately differentiated. Taken to OR on 8/13 for diverting colostomy. In SICU for acute blood loss anemia, s/p EGD 8/18 showing duodenal ulceration, erosive esophagitis duodenitis, bx x 2, CT 8/20 w/ closed loop obstxn, s/p OR 8/20 exlap STEPHANIE; now with persistent WBC, improving; continues to grow E. Faecium in urine, now on Vanc per  recs. Now s/p stent exchange w/ uro    NPO/IVF/TPN  Albumin  Pain and nausea control  ASA suppository  SQH/SCD  OOB/IS  For endoscopy today, repeat COVID/TS/Co[place]  fu 2 view XR

## 2021-08-30 NOTE — PROGRESS NOTE ADULT - SUBJECTIVE AND OBJECTIVE BOX
SUBJECTIVE:  Patient seen and evaluated with Senior Resident. Patient with no complaints overnight. No ostomy output.     MEDICATIONS  (STANDING):  albumin human 25% IVPB 50 milliLiter(s) IV Intermittent every 8 hours  aspirin Suppository 300 milliGRAM(s) Rectal daily  chlorhexidine 2% Cloths 1 Application(s) Topical daily  chlorhexidine 4% Liquid 1 Application(s) Topical <User Schedule>  dextrose 40% Gel 15 Gram(s) Oral once  dextrose 5%. 1000 milliLiter(s) (50 mL/Hr) IV Continuous <Continuous>  dextrose 5%. 1000 milliLiter(s) (100 mL/Hr) IV Continuous <Continuous>  dextrose 50% Injectable 25 Gram(s) IV Push once  dextrose 50% Injectable 12.5 Gram(s) IV Push once  dextrose 50% Injectable 25 Gram(s) IV Push once  fat emulsion (Fish Oil and Plant Based) 20% Infusion 0.5 Gm/kG/Day (8.33 mL/Hr) IV Continuous <Continuous>  fat emulsion (Fish Oil and Plant Based) 20% Infusion 0.5 Gm/kG/Day (8.33 mL/Hr) IV Continuous <Continuous>  glucagon  Injectable 1 milliGRAM(s) IntraMuscular once  heparin   Injectable 5000 Unit(s) SubCutaneous every 12 hours  insulin lispro (ADMELOG) corrective regimen sliding scale   SubCutaneous every 6 hours  lidocaine   4% Patch 1 Patch Transdermal every 24 hours  lidocaine   4% Patch 1 Patch Transdermal every 24 hours  nystatin Powder 1 Application(s) Topical two times a day  pantoprazole  Injectable 40 milliGRAM(s) IV Push every 12 hours  Parenteral Nutrition - Adult 1 Each (46 mL/Hr) TPN Continuous <Continuous>  Parenteral Nutrition - Adult 1 Each (46 mL/Hr) TPN Continuous <Continuous>  sucralfate suspension 1 Gram(s) Oral two times a day    MEDICATIONS  (PRN):  metoprolol tartrate Injectable 2.5 milliGRAM(s) IV Push every 6 hours PRN SBP > 160, HR > 100  ondansetron Injectable 4 milliGRAM(s) IV Push every 6 hours PRN Nausea and/or Vomiting  sodium chloride 0.9% lock flush 10 milliLiter(s) IV Push every 1 hour PRN Pre/post blood products, medications, blood draw, and to maintain line patency      Vital Signs Last 24 Hrs  T(C): 35.7 (28 Aug 2021 09:07), Max: 36.4 (28 Aug 2021 05:16)  T(F): 96.3 (28 Aug 2021 09:07), Max: 97.6 (28 Aug 2021 05:16)  HR: 78 (28 Aug 2021 11:00) (78 - 93)  BP: 119/59 (28 Aug 2021 11:00) (110/52 - 153/70)  BP(mean): 85 (28 Aug 2021 11:00) (75 - 100)  RR: 18 (28 Aug 2021 11:00) (12 - 18)  SpO2: 93% (28 Aug 2021 11:00) (90% - 96%)    Physical Exam:  General: NAD, resting comfortably in bed  Pulmonary: Nonlabored breathing, no respiratory distress  Cardiovascular: RRR  Abdominal: soft, mildly distended, tenderness to palpation lower>upper abdomen, no rebound or guarding. Ostomy ppp w/o function. Retention suture in place.  Extremities: WWP, normal strength  Neuro: A/O x 3, CNs II-XII grossly intact, no focal deficits    I&O's Summary    27 Aug 2021 07:01  -  28 Aug 2021 07:00  --------------------------------------------------------  IN: 1461.9 mL / OUT: 2765 mL / NET: -1303.1 mL    28 Aug 2021 07:01  -  28 Aug 2021 11:34  --------------------------------------------------------  IN: 154.6 mL / OUT: 975 mL / NET: -820.4 mL        LABS:                        8.8    10.39 )-----------( 429      ( 28 Aug 2021 05:24 )             27.6     08-28    140  |  104  |  22  ----------------------------<  105<H>  4.4   |  27  |  0.55    Ca    8.4      28 Aug 2021 05:24  Phos  4.6     08-28  Mg     2.2     08-28      PT/INR - ( 27 Aug 2021 03:45 )   PT: 11.9 sec;   INR: 0.99          PTT - ( 27 Aug 2021 03:45 )  PTT:31.1 sec    CAPILLARY BLOOD GLUCOSE      POCT Blood Glucose.: 132 mg/dL (28 Aug 2021 06:54)  POCT Blood Glucose.: 136 mg/dL (27 Aug 2021 23:51)  POCT Blood Glucose.: 126 mg/dL (27 Aug 2021 18:26)        RADIOLOGY & ADDITIONAL STUDIES:

## 2021-08-30 NOTE — OCCUPATIONAL THERAPY INITIAL EVALUATION ADULT - PERTINENT HX OF CURRENT PROBLEM, REHAB EVAL
89F PMH HTN, chronic back pain 2/2 L3,L4 compression fractures, triple vessel CAD, and SBO due to possible diverticulitis vs. GYN/colorectal malignancy (2018) never followed up and L ICA s/p L CEA (4/21). 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 Left gluteal IR drain placed (8/9). Colonoscopy w/ sigmoid mass bx on 8/11- final path invasive adenocarcinoma moderately differentiated. Taken to OR on 8/13 for diverting colostomy.

## 2021-08-30 NOTE — OCCUPATIONAL THERAPY INITIAL EVALUATION ADULT - MARITAL STATUS
Atrial fibrillation    CAD (coronary artery disease)    DM type 2 (diabetes mellitus, type 2)    HLD (hyperlipidemia)    HTN (hypertension)    Obesity    Obesity    SVT (Supraventricular Tachycardia)  Cardioversion 6 yrs ago
Single

## 2021-08-30 NOTE — OCCUPATIONAL THERAPY INITIAL EVALUATION ADULT - GENERAL OBSERVATIONS, REHAB EVAL
Pt received semi-supine in bed, +colostomy, +urbina, +tele, +SCDs, +PICC, in NAD and agreeable. Cleared by ANT Cardona to see.

## 2021-08-30 NOTE — PROGRESS NOTE ADULT - ASSESSMENT
A/P   Overall stable but no bowel function.  Worrisome.    Will get Abdominal X rays this AM   Endoscopy via stoma planned this PM  R/O mechanical obstruction.  TPN continues  Swallow evaluation / associated consults  HO aware of plan

## 2021-08-30 NOTE — CONSULT NOTE ADULT - ASSESSMENT
89F PMH HTN, chronic back pain 2/2 L3,L4 compression fractures, triple vessel CAD, and SBO due to possible diverticulitis vs. GYN/colorectal malignancy (2018) never followed up and L ICA s/p L CEA (4/21). Presented to 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 s/p EGD 8/18 showing duodenal ulceration and erosive esophagitis duodenitis. Extubated 8/22. Patient is not tolerating PO 2/2 dysphagia. S/p speech and swallow eval, found to have reduced bolus control and severely impaired pharyngeal swallow. Currently NPO and receiving TPN. Neurology team consulted for ongoing dysphagia with concern for neurological etiology. Dysphagia likely acute on chronic in setting of recent intubation. Mild chronic dysphagia is likely present 2/2 old age, however low suspicion for other acute neurological process causing the dysphagia since pt denies dysphagia prior to 2 weeks ago, and there's no other neurological deficit on exam. Bounding pulse and tenderness noted along left anterior neck; Could be benign but would consider further evaluation to r/o obstructing hematoma.     Plan:   - NPO for now, pending repeat EGD   - Consider imaging of the neck to further evaluate for hematoma   - Neurology team will continue to follow       See attestation for finalized recommendations.  89F PMH HTN, chronic back pain 2/2 L3,L4 compression fractures, triple vessel CAD, and SBO due to possible diverticulitis vs. GYN/colorectal malignancy (2018) never followed up and L ICA s/p L CEA (4/21). 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 s/p EGD 8/18 showing duodenal ulceration and erosive esophagitis duodenitis. Extubated 8/22. Patient is not tolerating PO 2/2 dysphagia. S/p speech and swallow eval, found to have reduced bolus control and severely impaired pharyngeal swallow. Currently NPO and receiving TPN. Neurology team consulted for ongoing dysphagia with concern for neurological etiology. Dysphagia likely acute on chronic in setting of recent intubation. Mild chronic dysphagia is likely present 2/2 old age, however low suspicion for other acute neurological process causing the dysphagia since pt denies dysphagia prior to 2 weeks ago, and there's no other neurological deficit on exam. Bounding pulse and tenderness noted along left anterior neck; Could be benign but would consider further evaluation to r/o obstructing hematoma / aneurysm / pseudoaneurysm    Plan:   - NPO for now, pending repeat EGD   - Recommend repeat CTA neck to evaluate tenderness and bounding pulse at left anterior neck   - Neurology team will continue to follow       See attestation for finalized recommendations.

## 2021-08-30 NOTE — OCCUPATIONAL THERAPY INITIAL EVALUATION ADULT - DIAGNOSIS, OT EVAL
Pt presents with generalized weakness, decreased trunk control, and decreased activity tolerance impacting ease of performing ADLs and transfers.

## 2021-08-30 NOTE — PROGRESS NOTE ADULT - SUBJECTIVE AND OBJECTIVE BOX
POD 10, 17  On 8 [place], Room 29    Clinically, not c/o pain.  No stoma function yet.  No N/V.    Failed swallow evaluations thus far.    Vital Signs Last 24 Hrs  T(C): 36.5 (30 Aug 2021 06:34), Max: 37.5 (29 Aug 2021 22:19)  T(F): 97.7 (30 Aug 2021 06:34), Max: 99.5 (29 Aug 2021 22:19)  HR: 80 (30 Aug 2021 08:28) (74 - 83)  BP: 141/58 (30 Aug 2021 08:28) (115/56 - 149/63)  BP(mean): 83 (30 Aug 2021 08:28) (81 - 90)  RR: 18 (30 Aug 2021 08:28) (14 - 18)  SpO2: 95% (30 Aug 2021 08:28) (93% - 96%)    abd: soft mild distension, incision intact, penrose is out. stoma viable but without output.  ext: without calf tenderness     ml/last 12 hours; 1,385 ml/last 24 hours                          8.6    11.17 )-----------( 446      ( 30 Aug 2021 07:21 )             27.6   08-30    143  |  110<H>  |  29<H>  ----------------------------<  128<H>  4.2   |  24  |  0.55    Ca    9.0      30 Aug 2021 07:23  Phos  3.2     08-30  Mg     2.5     08-30

## 2021-08-30 NOTE — OCCUPATIONAL THERAPY INITIAL EVALUATION ADULT - LEVEL OF INDEPENDENCE: SUPINE/SIT, REHAB EVAL
HOB raised, assist to guides LEs off bed and to bring trunk upright/moderate assist (50% patients effort)

## 2021-08-31 LAB
ANION GAP SERPL CALC-SCNC: 10 MMOL/L — SIGNIFICANT CHANGE UP (ref 5–17)
ANION GAP SERPL CALC-SCNC: 11 MMOL/L — SIGNIFICANT CHANGE UP (ref 5–17)
APPEARANCE UR: ABNORMAL
APTT BLD: 30.8 SEC — SIGNIFICANT CHANGE UP (ref 27.5–35.5)
BACTERIA # UR AUTO: PRESENT /HPF
BILIRUB UR-MCNC: ABNORMAL
BUN SERPL-MCNC: 38 MG/DL — HIGH (ref 7–23)
BUN SERPL-MCNC: 43 MG/DL — HIGH (ref 7–23)
CALCIUM SERPL-MCNC: 9.1 MG/DL — SIGNIFICANT CHANGE UP (ref 8.4–10.5)
CALCIUM SERPL-MCNC: 9.2 MG/DL — SIGNIFICANT CHANGE UP (ref 8.4–10.5)
CHLORIDE SERPL-SCNC: 111 MMOL/L — HIGH (ref 96–108)
CHLORIDE SERPL-SCNC: 113 MMOL/L — HIGH (ref 96–108)
CK MB CFR SERPL CALC: 4.9 NG/ML — SIGNIFICANT CHANGE UP (ref 0–6.7)
CK SERPL-CCNC: 43 U/L — SIGNIFICANT CHANGE UP (ref 25–170)
CO2 SERPL-SCNC: 24 MMOL/L — SIGNIFICANT CHANGE UP (ref 22–31)
CO2 SERPL-SCNC: 24 MMOL/L — SIGNIFICANT CHANGE UP (ref 22–31)
COLOR SPEC: ABNORMAL
CREAT SERPL-MCNC: 0.61 MG/DL — SIGNIFICANT CHANGE UP (ref 0.5–1.3)
CREAT SERPL-MCNC: 0.7 MG/DL — SIGNIFICANT CHANGE UP (ref 0.5–1.3)
DIFF PNL FLD: ABNORMAL
EPI CELLS # UR: SIGNIFICANT CHANGE UP /HPF (ref 0–5)
GAS PNL BLDA: SIGNIFICANT CHANGE UP
GLUCOSE BLDC GLUCOMTR-MCNC: 135 MG/DL — HIGH (ref 70–99)
GLUCOSE BLDC GLUCOMTR-MCNC: 142 MG/DL — HIGH (ref 70–99)
GLUCOSE BLDC GLUCOMTR-MCNC: 149 MG/DL — HIGH (ref 70–99)
GLUCOSE BLDC GLUCOMTR-MCNC: 151 MG/DL — HIGH (ref 70–99)
GLUCOSE BLDC GLUCOMTR-MCNC: 151 MG/DL — HIGH (ref 70–99)
GLUCOSE SERPL-MCNC: 115 MG/DL — HIGH (ref 70–99)
GLUCOSE SERPL-MCNC: 145 MG/DL — HIGH (ref 70–99)
GLUCOSE UR QL: NEGATIVE — SIGNIFICANT CHANGE UP
HCT VFR BLD CALC: 24.5 % — LOW (ref 34.5–45)
HCT VFR BLD CALC: 26 % — LOW (ref 34.5–45)
HGB BLD-MCNC: 7.6 G/DL — LOW (ref 11.5–15.5)
HGB BLD-MCNC: 8 G/DL — LOW (ref 11.5–15.5)
INR BLD: 1.13 — SIGNIFICANT CHANGE UP (ref 0.88–1.16)
KETONES UR-MCNC: NEGATIVE — SIGNIFICANT CHANGE UP
LACTATE SERPL-SCNC: 1.3 MMOL/L — SIGNIFICANT CHANGE UP (ref 0.5–2)
LEUKOCYTE ESTERASE UR-ACNC: ABNORMAL
MAGNESIUM SERPL-MCNC: 2.3 MG/DL — SIGNIFICANT CHANGE UP (ref 1.6–2.6)
MAGNESIUM SERPL-MCNC: 2.6 MG/DL — SIGNIFICANT CHANGE UP (ref 1.6–2.6)
MCHC RBC-ENTMCNC: 28.1 PG — SIGNIFICANT CHANGE UP (ref 27–34)
MCHC RBC-ENTMCNC: 28.6 PG — SIGNIFICANT CHANGE UP (ref 27–34)
MCHC RBC-ENTMCNC: 30.8 GM/DL — LOW (ref 32–36)
MCHC RBC-ENTMCNC: 31 GM/DL — LOW (ref 32–36)
MCV RBC AUTO: 91.2 FL — SIGNIFICANT CHANGE UP (ref 80–100)
MCV RBC AUTO: 92.1 FL — SIGNIFICANT CHANGE UP (ref 80–100)
MRSA PCR RESULT.: NEGATIVE — SIGNIFICANT CHANGE UP
NITRITE UR-MCNC: POSITIVE
NRBC # BLD: 0 /100 WBCS — SIGNIFICANT CHANGE UP (ref 0–0)
NRBC # BLD: 0 /100 WBCS — SIGNIFICANT CHANGE UP (ref 0–0)
PH UR: 7 — SIGNIFICANT CHANGE UP (ref 5–8)
PHOSPHATE SERPL-MCNC: 3.9 MG/DL — SIGNIFICANT CHANGE UP (ref 2.5–4.5)
PHOSPHATE SERPL-MCNC: 4.3 MG/DL — SIGNIFICANT CHANGE UP (ref 2.5–4.5)
PLATELET # BLD AUTO: 374 K/UL — SIGNIFICANT CHANGE UP (ref 150–400)
PLATELET # BLD AUTO: 443 K/UL — HIGH (ref 150–400)
POTASSIUM SERPL-MCNC: 4.3 MMOL/L — SIGNIFICANT CHANGE UP (ref 3.5–5.3)
POTASSIUM SERPL-MCNC: 4.7 MMOL/L — SIGNIFICANT CHANGE UP (ref 3.5–5.3)
POTASSIUM SERPL-SCNC: 4.3 MMOL/L — SIGNIFICANT CHANGE UP (ref 3.5–5.3)
POTASSIUM SERPL-SCNC: 4.7 MMOL/L — SIGNIFICANT CHANGE UP (ref 3.5–5.3)
PROCALCITONIN SERPL-MCNC: 0.05 NG/ML — SIGNIFICANT CHANGE UP (ref 0.02–0.1)
PROT UR-MCNC: >=300 MG/DL
PROTHROM AB SERPL-ACNC: 13.5 SEC — SIGNIFICANT CHANGE UP (ref 10.6–13.6)
RBC # BLD: 2.66 M/UL — LOW (ref 3.8–5.2)
RBC # BLD: 2.85 M/UL — LOW (ref 3.8–5.2)
RBC # FLD: 17.2 % — HIGH (ref 10.3–14.5)
RBC # FLD: 17.4 % — HIGH (ref 10.3–14.5)
RBC CASTS # UR COMP ASSIST: ABNORMAL /HPF
S AUREUS DNA NOSE QL NAA+PROBE: NEGATIVE — SIGNIFICANT CHANGE UP
SARS-COV-2 RNA SPEC QL NAA+PROBE: SIGNIFICANT CHANGE UP
SODIUM SERPL-SCNC: 145 MMOL/L — SIGNIFICANT CHANGE UP (ref 135–145)
SODIUM SERPL-SCNC: 148 MMOL/L — HIGH (ref 135–145)
SP GR SPEC: 1.02 — SIGNIFICANT CHANGE UP (ref 1–1.03)
TROPONIN T SERPL-MCNC: 0.09 NG/ML — CRITICAL HIGH (ref 0–0.01)
TROPONIN T SERPL-MCNC: 0.2 NG/ML — CRITICAL HIGH (ref 0–0.01)
UROBILINOGEN FLD QL: 4 E.U./DL
WBC # BLD: 11.82 K/UL — HIGH (ref 3.8–10.5)
WBC # BLD: 17.69 K/UL — HIGH (ref 3.8–10.5)
WBC # FLD AUTO: 11.82 K/UL — HIGH (ref 3.8–10.5)
WBC # FLD AUTO: 17.69 K/UL — HIGH (ref 3.8–10.5)
WBC UR QL: ABNORMAL /HPF

## 2021-08-31 PROCEDURE — 99232 SBSQ HOSP IP/OBS MODERATE 35: CPT

## 2021-08-31 PROCEDURE — 99233 SBSQ HOSP IP/OBS HIGH 50: CPT | Mod: GC

## 2021-08-31 PROCEDURE — 74018 RADEX ABDOMEN 1 VIEW: CPT | Mod: 26

## 2021-08-31 PROCEDURE — 71045 X-RAY EXAM CHEST 1 VIEW: CPT | Mod: 26,76

## 2021-08-31 RX ORDER — MEROPENEM 1 G/30ML
1000 INJECTION INTRAVENOUS ONCE
Refills: 0 | Status: COMPLETED | OUTPATIENT
Start: 2021-08-31 | End: 2021-08-31

## 2021-08-31 RX ORDER — MEROPENEM 1 G/30ML
1000 INJECTION INTRAVENOUS EVERY 12 HOURS
Refills: 0 | Status: DISCONTINUED | OUTPATIENT
Start: 2021-09-01 | End: 2021-09-02

## 2021-08-31 RX ORDER — ENOXAPARIN SODIUM 100 MG/ML
40 INJECTION SUBCUTANEOUS EVERY 24 HOURS
Refills: 0 | Status: DISCONTINUED | OUTPATIENT
Start: 2021-08-31 | End: 2021-09-01

## 2021-08-31 RX ORDER — IPRATROPIUM/ALBUTEROL SULFATE 18-103MCG
3 AEROSOL WITH ADAPTER (GRAM) INHALATION ONCE
Refills: 0 | Status: COMPLETED | OUTPATIENT
Start: 2021-08-31 | End: 2021-08-31

## 2021-08-31 RX ORDER — VANCOMYCIN HCL 1 G
750 VIAL (EA) INTRAVENOUS
Refills: 0 | Status: DISCONTINUED | OUTPATIENT
Start: 2021-09-01 | End: 2021-09-01

## 2021-08-31 RX ORDER — ELECTROLYTE SOLUTION,INJ
1 VIAL (ML) INTRAVENOUS
Refills: 0 | Status: DISCONTINUED | OUTPATIENT
Start: 2021-08-31 | End: 2021-08-31

## 2021-08-31 RX ORDER — IPRATROPIUM/ALBUTEROL SULFATE 18-103MCG
3 AEROSOL WITH ADAPTER (GRAM) INHALATION EVERY 6 HOURS
Refills: 0 | Status: DISCONTINUED | OUTPATIENT
Start: 2021-08-31 | End: 2021-08-31

## 2021-08-31 RX ORDER — TIOTROPIUM BROMIDE 18 UG/1
1 CAPSULE ORAL; RESPIRATORY (INHALATION) DAILY
Refills: 0 | Status: DISCONTINUED | OUTPATIENT
Start: 2021-08-31 | End: 2021-09-02

## 2021-08-31 RX ORDER — ENOXAPARIN SODIUM 100 MG/ML
30 INJECTION SUBCUTANEOUS EVERY 12 HOURS
Refills: 0 | Status: DISCONTINUED | OUTPATIENT
Start: 2021-08-31 | End: 2021-08-31

## 2021-08-31 RX ORDER — IPRATROPIUM/ALBUTEROL SULFATE 18-103MCG
3 AEROSOL WITH ADAPTER (GRAM) INHALATION EVERY 4 HOURS
Refills: 0 | Status: DISCONTINUED | OUTPATIENT
Start: 2021-08-31 | End: 2021-09-02

## 2021-08-31 RX ORDER — ALBUMIN HUMAN 25 %
250 VIAL (ML) INTRAVENOUS ONCE
Refills: 0 | Status: COMPLETED | OUTPATIENT
Start: 2021-08-31 | End: 2021-08-31

## 2021-08-31 RX ORDER — ALBUTEROL 90 UG/1
1 AEROSOL, METERED ORAL EVERY 4 HOURS
Refills: 0 | Status: DISCONTINUED | OUTPATIENT
Start: 2021-08-31 | End: 2021-09-02

## 2021-08-31 RX ORDER — ENOXAPARIN SODIUM 100 MG/ML
36 INJECTION SUBCUTANEOUS DAILY
Refills: 0 | Status: DISCONTINUED | OUTPATIENT
Start: 2021-08-31 | End: 2021-08-31

## 2021-08-31 RX ORDER — ALBUTEROL 90 UG/1
1.25 AEROSOL, METERED ORAL ONCE
Refills: 0 | Status: DISCONTINUED | OUTPATIENT
Start: 2021-08-31 | End: 2021-09-01

## 2021-08-31 RX ORDER — VANCOMYCIN HCL 1 G
15 VIAL (EA) INTRAVENOUS ONCE
Refills: 0 | Status: DISCONTINUED | OUTPATIENT
Start: 2021-08-31 | End: 2021-08-31

## 2021-08-31 RX ORDER — CHLORHEXIDINE GLUCONATE 213 G/1000ML
15 SOLUTION TOPICAL EVERY 12 HOURS
Refills: 0 | Status: DISCONTINUED | OUTPATIENT
Start: 2021-08-31 | End: 2021-08-31

## 2021-08-31 RX ORDER — I.V. FAT EMULSION 20 G/100ML
0.38 EMULSION INTRAVENOUS
Qty: 20 | Refills: 0 | Status: DISCONTINUED | OUTPATIENT
Start: 2021-08-31 | End: 2021-08-31

## 2021-08-31 RX ORDER — ENOXAPARIN SODIUM 100 MG/ML
40 INJECTION SUBCUTANEOUS EVERY 12 HOURS
Refills: 0 | Status: DISCONTINUED | OUTPATIENT
Start: 2021-08-31 | End: 2021-08-31

## 2021-08-31 RX ORDER — ACETAMINOPHEN 500 MG
1000 TABLET ORAL ONCE
Refills: 0 | Status: COMPLETED | OUTPATIENT
Start: 2021-08-31 | End: 2021-08-31

## 2021-08-31 RX ORDER — VANCOMYCIN HCL 1 G
750 VIAL (EA) INTRAVENOUS ONCE
Refills: 0 | Status: COMPLETED | OUTPATIENT
Start: 2021-08-31 | End: 2021-08-31

## 2021-08-31 RX ORDER — SODIUM CHLORIDE 9 MG/ML
1000 INJECTION, SOLUTION INTRAVENOUS
Refills: 0 | Status: DISCONTINUED | OUTPATIENT
Start: 2021-08-31 | End: 2021-09-02

## 2021-08-31 RX ORDER — MEROPENEM 1 G/30ML
INJECTION INTRAVENOUS
Refills: 0 | Status: DISCONTINUED | OUTPATIENT
Start: 2021-08-31 | End: 2021-08-31

## 2021-08-31 RX ORDER — MEROPENEM 1 G/30ML
INJECTION INTRAVENOUS
Refills: 0 | Status: DISCONTINUED | OUTPATIENT
Start: 2021-08-31 | End: 2021-09-02

## 2021-08-31 RX ADMIN — Medication 125 MILLILITER(S): at 16:43

## 2021-08-31 RX ADMIN — Medication 250 MILLIGRAM(S): at 17:25

## 2021-08-31 RX ADMIN — MEROPENEM 100 MILLIGRAM(S): 1 INJECTION INTRAVENOUS at 17:37

## 2021-08-31 RX ADMIN — Medication 50 MILLILITER(S): at 05:55

## 2021-08-31 RX ADMIN — HEPARIN SODIUM 5000 UNIT(S): 5000 INJECTION INTRAVENOUS; SUBCUTANEOUS at 05:54

## 2021-08-31 RX ADMIN — ENOXAPARIN SODIUM 40 MILLIGRAM(S): 100 INJECTION SUBCUTANEOUS at 23:00

## 2021-08-31 RX ADMIN — LIDOCAINE 1 PATCH: 4 CREAM TOPICAL at 19:35

## 2021-08-31 RX ADMIN — Medication 3 MILLILITER(S): at 20:00

## 2021-08-31 RX ADMIN — CHLORHEXIDINE GLUCONATE 1 APPLICATION(S): 213 SOLUTION TOPICAL at 06:07

## 2021-08-31 RX ADMIN — I.V. FAT EMULSION 8.3 GM/KG/DAY: 20 EMULSION INTRAVENOUS at 22:01

## 2021-08-31 RX ADMIN — Medication 400 MILLIGRAM(S): at 13:30

## 2021-08-31 RX ADMIN — PANTOPRAZOLE SODIUM 40 MILLIGRAM(S): 20 TABLET, DELAYED RELEASE ORAL at 11:03

## 2021-08-31 RX ADMIN — NYSTATIN CREAM 1 APPLICATION(S): 100000 CREAM TOPICAL at 18:17

## 2021-08-31 RX ADMIN — Medication 1: at 12:11

## 2021-08-31 RX ADMIN — Medication 60 MILLIGRAM(S): at 20:56

## 2021-08-31 RX ADMIN — Medication 1000 MILLIGRAM(S): at 14:52

## 2021-08-31 RX ADMIN — PANTOPRAZOLE SODIUM 40 MILLIGRAM(S): 20 TABLET, DELAYED RELEASE ORAL at 23:00

## 2021-08-31 RX ADMIN — NYSTATIN CREAM 1 APPLICATION(S): 100000 CREAM TOPICAL at 05:54

## 2021-08-31 RX ADMIN — Medication 46 EACH: at 18:00

## 2021-08-31 RX ADMIN — LIDOCAINE 1 PATCH: 4 CREAM TOPICAL at 17:38

## 2021-08-31 RX ADMIN — Medication 1: at 17:38

## 2021-08-31 RX ADMIN — Medication 3 MILLILITER(S): at 18:00

## 2021-08-31 RX ADMIN — Medication 300 MILLIGRAM(S): at 11:03

## 2021-08-31 RX ADMIN — SODIUM CHLORIDE 80 MILLILITER(S): 9 INJECTION, SOLUTION INTRAVENOUS at 19:35

## 2021-08-31 RX ADMIN — CHLORHEXIDINE GLUCONATE 1 APPLICATION(S): 213 SOLUTION TOPICAL at 05:58

## 2021-08-31 RX ADMIN — Medication 50 MILLILITER(S): at 13:33

## 2021-08-31 NOTE — PROGRESS NOTE ADULT - ATTENDING COMMENTS
Restarted on meropenem. She is very dry on exam, now no edema. Given 250 ml 5% albumin, 1/2 NS at 80/hr now with hypernatremia.

## 2021-08-31 NOTE — CHART NOTE - NSCHARTNOTEFT_GEN_A_CORE
Admitting Diagnosis:   Patient is a 89y old  Female who presents with a chief complaint of Neck pain (31 Aug 2021 11:05)      PAST MEDICAL & SURGICAL HISTORY:  SBO (small bowel obstruction)    HTN (hypertension)    Chronic back pain    S/P wrist surgery        Current Nutrition Order: NPO diet  NPO w/ ice chips/sips  TPN regimen via cental line; 1100 ml @ 46 ml/hr providing 242g dex, 61g AA, 20g SMOF intralipids providing 1267 kcal, 61g pro, 1.35 g pro/kg IBW, 1.5g pro/kg ABW, GIR 4.09     PO Intake: Good (%) [   ]  Fair (50-75%) [   ] Poor (<25%) [   ]- N/A    GI Issues:   WDL, No reported n/v/d/c  +abd tender, no abd distention  Colostomy in place (0 ml/24hrs)  Plan to scope via ostomy today    Pain:  No pain noted- well controlled at time of assessment    Skin Integrity:  WDL, ankush score 11, ecchymotic  No edema present  +DTI midline lumbar spine      Labs:   08-31    145  |  111<H>  |  38<H>  ----------------------------<  145<H>  4.3   |  24  |  0.61    Ca    9.2      31 Aug 2021 06:26  Phos  3.9     08-31  Mg     2.3     08-31      CAPILLARY BLOOD GLUCOSE      POCT Blood Glucose.: 151 mg/dL (31 Aug 2021 12:03)  POCT Blood Glucose.: 142 mg/dL (31 Aug 2021 07:13)  POCT Blood Glucose.: 135 mg/dL (31 Aug 2021 00:31)  POCT Blood Glucose.: 106 mg/dL (30 Aug 2021 17:46)      Medications:  MEDICATIONS  (STANDING):  albumin human 25% IVPB 50 milliLiter(s) IV Intermittent every 8 hours  aspirin Suppository 300 milliGRAM(s) Rectal daily  chlorhexidine 2% Cloths 1 Application(s) Topical daily  chlorhexidine 4% Liquid 1 Application(s) Topical <User Schedule>  dextrose 40% Gel 15 Gram(s) Oral once  dextrose 5%. 1000 milliLiter(s) (50 mL/Hr) IV Continuous <Continuous>  dextrose 5%. 1000 milliLiter(s) (100 mL/Hr) IV Continuous <Continuous>  dextrose 50% Injectable 25 Gram(s) IV Push once  dextrose 50% Injectable 12.5 Gram(s) IV Push once  dextrose 50% Injectable 25 Gram(s) IV Push once  fat emulsion (Fish Oil and Plant Based) 20% Infusion 0.3759 Gm/kG/Day (8.33 mL/Hr) IV Continuous <Continuous>  glucagon  Injectable 1 milliGRAM(s) IntraMuscular once  heparin   Injectable 5000 Unit(s) SubCutaneous every 12 hours  insulin lispro (ADMELOG) corrective regimen sliding scale   SubCutaneous every 6 hours  lidocaine   4% Patch 1 Patch Transdermal every 24 hours  lidocaine   4% Patch 1 Patch Transdermal every 24 hours  nystatin Powder 1 Application(s) Topical two times a day  pantoprazole  Injectable 40 milliGRAM(s) IV Push every 12 hours  Parenteral Nutrition - Adult 1 Each (46 mL/Hr) TPN Continuous <Continuous>  Parenteral Nutrition - Adult 1 Each (46 mL/Hr) TPN Continuous <Continuous>    MEDICATIONS  (PRN):  metoprolol tartrate Injectable 2.5 milliGRAM(s) IV Push every 6 hours PRN SBP > 160, HR > 100  ondansetron Injectable 4 milliGRAM(s) IV Push every 6 hours PRN Nausea and/or Vomiting  sodium chloride 0.9% lock flush 10 milliLiter(s) IV Push every 1 hour PRN Pre/post blood products, medications, blood draw, and to maintain line patency    Admitted Anthropometrics:  Height: 5'0" Weight: 90lbs, IBW 100lbs+/-10%, %IBW 90%, BMI 17.6     Weight Change: Per initial RD note pt reports UBW to be 110 lbs, current adm wt 90 lbs, indicates wt loss of 20 lbs/18% x 1 year.  8/13: 89.9 lbs  8/20 89.9lbs/ 92.4lbs    Estimated energy needs:   ABW (40.8kg) used to calculate energy needs due to pt's current body weight within % IBW (90%).   Nutrient needs based on [facility] standards of care for maintenance in older adults.   Needs adjusted for age, pre-op/post-op healing, severe protein calorie malnutrition  1020-1224kcal/day (25-30kcal/kg)  61-69g pro/day (1.5-1.7g pro/kg)  1224-1428ml fluid/day (30-35ml/kg]     Subjective:   89F PMH HTN, chronic back pain 2/2 L3,L4 compression fractures, triple vessel CAD, and SBO 2/2 possible diverticulitis vs. GYN/colorectal malignancy (2018) never followed up and PSH L CEA 2/2 L ICA stenosis >70% (4/21) who presented with worsening back pain x1 week. Endorses flatus and some recent weight loss. Transferred from medicine for perforated, complicated sigmoid diverticulitis vs. 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 S/p OR 8/13 for diverting colostomy. Pt now transferred to SICU for HD monitoring for acute blood loss. s/p EGD on 8/18: Erosive esophagitis and doudenitis with esophageal ulcers bx x2 likely cause of acute blood loss. Avoid NGT placement, per GI; f/u EGD in 8 weeks. S/p SLP eval 8/18 with recommendations to advance to [facility] per sx team. Palliative to start GOC disc with family and pt 8/19- remains full code, palliative cont to follow. S/p OR- Ex lap w/ STEPHANIE and primary repair of tears x2 8/20. Pt initiated on PPN 8/21. Pt as weened and extubated 8/22. Now s/p PICC line placement 8/23 and started on TPN. S/p FEES 8/25 with recommendations for NPO with continued TPN. Now adv to ice chips/sips. Planning for scope via ostomy today 8/31.     Pt seen in room, resting in bed. Currently NPO w/ sips/chips. dependant on TPN for meeting 100% of EER at this time.  Bag infusing at ordered goal this am. Pt understanding of TPN is primary form of nutrition support. Did not have any questions. Denies N/V, no output in ostomy. Notable labs: POCT , 135, 106, 138mg/dL. BUN 38, TG 75=6 (8/27). RD to follow.      Previous Nutrition Diagnosis:  Severe protein calorie malnutrition as evidenced by NFPE performed, noted severe fat and muscle wasting, prolonged poor PO intake <75% of EER for 1 year.  Active [  x ]  Resolved [   ]    If resolved, new PES:     Goal: Pt to meet at least 75% of EER during hospital stay    Recommendations:  1. TPN regimen: Recommend 242g dex, 61g AA, 20g SMOF intralipids providing 1267 kcal, 61g pro, 1.35 g pro/kg IBW, 1.5g pro/kg ABW, GIR 4.09  >>Recommend checking TG before starting TPN and then check TG weekly.   >>Check Mg, Phos, K daily and POC BG Q6hrs.   >>Trend daily weights. Fluids and lytes per MD discretion.   2. PO diet adv per SLP eval  3. Pain and bowel regimen per team     Education: Deferred     Risk Level: High [ x  ] Moderate [   ] Low [   ].

## 2021-08-31 NOTE — PROGRESS NOTE ADULT - ASSESSMENT
89F PMH HTN, chronic back pain, triple vessel CAD, colorectal malignancy (2018) loss of followed up. 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 Colonoscopy w/ sigmoid mass bx on 8/11- invasive adenocarcinoma moderately differentiated. Patient started on 8/11 on Meropenem for polymicrobial wound culture including anaerobes and ESBL E. Coli, last day 8/24. With multiple revisions last OR 8/20 exlap STEPHANIE. Patient completed Meropenem and since 8/24 is off ABx. Now with evaluation for dysphagia and recent colonoscopy on 8/31with possible aspiration. ID was reconsulted for suspected aspiration PNA vs. HAP    - If possible please obtain sputum culture prior starting ABx  - Please obtain MRSA swab and COVID swab  - Please obtain procalcitonin  - Start empirically on Meropenem 1g q12 and vancomycin 750mg q24 for broad spectrum for m/p lung infection (increase secretions/cough, s/p sedation w/ suspected aspiration, increasing O2 demands)  - Please get vanc trough before the 4th dose  - f/u Bcx and sputum Cx  - UA is positive but patient with no urinary symptoms, will hold Ucx  - On going GOC conversation    ID Team 1 will continue to follow. Please see below attending attestation for finalized recommendations.    Papi Portillo MD PGY2 Internal Medicine  Infectious Disease Consult Service, Team 1

## 2021-08-31 NOTE — PROGRESS NOTE ADULT - ASSESSMENT
89F PMH HTN, chronic back pain 2/2 L3,L4 compression fractures, triple vessel CAD, and SBO due to possible diverticulitis vs. GYN/colorectal malignancy (2018) never followed up and L ICA s/p L CEA (4/21). 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 Left gluteal IR drain placed (8/9). Colonoscopy w/ sigmoid mass bx on 8/11- final path invasive adenocarcinoma moderately differentiated. Taken to OR on 8/13 for diverting colostomy. In SICU for acute blood loss anemia, s/p EGD 8/18 showing duodenal ulceration, erosive esophagitis duodenitis, bx x 2, CT 8/20 w/ closed loop obstxn, s/p OR 8/20 exlap STEPHANIE, S/p Scope via ostomy on 8/31    NPO/IVF/TPN via RIJ  Albumin  Pain and nausea control  ASA suppository  SQH/SCD  OOB/IS  fu Renal US  fu US doppler neck  PICC consult

## 2021-08-31 NOTE — PROVIDER CONTACT NOTE (OTHER) - ACTION/TREATMENT ORDERED:
Gustavo SHARPE & team at beside. Pt increased to 5L NC, SpO2 99%. Blood cultures, ABG & labs sent, EKG done, STAT albuterol given. STAT IV Tylenol given.  CPAP on standby, Pt pending ICU transfer

## 2021-08-31 NOTE — CHART NOTE - NSCHARTNOTESELECT_GEN_ALL_CORE
Chief Surgery Resident's Note:/Event Note
Event Note
Event Note
Follow Up Nutrition Assessment/Nutrition Services
Nutrition Services
Nutrition Services
Transfer Note
Event Note
Event Note
Follow Up Nutrition Assessment/Nutrition Services
Follow Up Nutrition Assessment/Nutrition Services
Gastroenterology - EGD
Nutrition Services

## 2021-08-31 NOTE — PROGRESS NOTE ADULT - ATTENDING COMMENTS
She has no complaints, although she is more tachypneic on exam and is now on oxygen via nasal cannula  f/u carotid ultrasound, may need further imaging based on results  If unremarkable, likely etiology of dysphagia is multifactorial - perhaps some baseline dysphagia, + transient dysfunction after recent CEA, + recent intubation She has no complaints, although she is more tachypneic on exam and is now on oxygen via nasal cannula  f/u carotid ultrasound, may need further imaging based on results  If unremarkable, likely etiology of dysphagia is multifactorial - perhaps some baseline dysphagia, + transient dysfunction after recent CEA, + recent intubation  There is no evidence of underlying neurologic disease that would explain dysphagia

## 2021-08-31 NOTE — PROGRESS NOTE ADULT - SUBJECTIVE AND OBJECTIVE BOX
POD 11,18  On 8 [place]    Plain X ray yesterday pre scope showed air and stool in colon but no colonic dilatation.  Colonoscopy via loop transverse colostomy was done yesterday and showed that the orientation of the stoma is correct.  There is no twist or mechanical obstruction. Stool and gas was found in the proximal colon.  The exam was taken to the cecum (60 cm inserted) which had good amount of stool.  Light path followed on withdrawal also confirmed that scope was in proximal limb.  Exam of distal limb showed diverticuli and narrowing lumen with 40-45 cm of scope inserted.     Today patient is comfortable.  No pain reported.  Not hungry.    No N/V.    Vital Signs Last 24 Hrs  T(C): 36.9 (31 Aug 2021 07:00), Max: 37.1 (30 Aug 2021 10:00)  T(F): 98.4 (31 Aug 2021 07:00), Max: 98.8 (30 Aug 2021 10:00)  HR: 98 (31 Aug 2021 08:00) (62 - 98)  BP: 110/53 (31 Aug 2021 08:00) (101/56 - 134/60)  BP(mean): 76 (31 Aug 2021 08:00) (74 - 90)  RR: 18 (31 Aug 2021 08:00) (18 - 18)  SpO2: 95% (31 Aug 2021 08:00) (93% - 98%)    Audible large airway secretions  abd: soft, non tender, stoma viable no stool or gas noted.  Visible incision intact, retention in place  ext: moving arms and legs, no significant edema    /shift,  1150 ml/24 hours  TPN in progress                          7.6    11.82 )-----------( 374      ( 31 Aug 2021 06:26 )             24.5   08-31    145  |  111<H>  |  38<H>  ----------------------------<  145<H>  4.3   |  24  |  0.61    Ca    9.2      31 Aug 2021 06:26  Phos  3.9     08-31  Mg     2.3     08-31

## 2021-08-31 NOTE — PROGRESS NOTE ADULT - SUBJECTIVE AND OBJECTIVE BOX
SUBJECTIVE:  Patient seen and evaluated with Senior Resident. Patient with no complaints overnight. No ostomy output. S/p scope via ostomy yesterday with no sign of obstruction.      MEDICATIONS  (STANDING):  albumin human 25% IVPB 50 milliLiter(s) IV Intermittent every 8 hours  aspirin Suppository 300 milliGRAM(s) Rectal daily  chlorhexidine 2% Cloths 1 Application(s) Topical daily  chlorhexidine 4% Liquid 1 Application(s) Topical <User Schedule>  dextrose 40% Gel 15 Gram(s) Oral once  dextrose 5%. 1000 milliLiter(s) (50 mL/Hr) IV Continuous <Continuous>  dextrose 5%. 1000 milliLiter(s) (100 mL/Hr) IV Continuous <Continuous>  dextrose 50% Injectable 25 Gram(s) IV Push once  dextrose 50% Injectable 12.5 Gram(s) IV Push once  dextrose 50% Injectable 25 Gram(s) IV Push once  fat emulsion (Fish Oil and Plant Based) 20% Infusion 0.5 Gm/kG/Day (8.33 mL/Hr) IV Continuous <Continuous>  fat emulsion (Fish Oil and Plant Based) 20% Infusion 0.5 Gm/kG/Day (8.33 mL/Hr) IV Continuous <Continuous>  glucagon  Injectable 1 milliGRAM(s) IntraMuscular once  heparin   Injectable 5000 Unit(s) SubCutaneous every 12 hours  insulin lispro (ADMELOG) corrective regimen sliding scale   SubCutaneous every 6 hours  lidocaine   4% Patch 1 Patch Transdermal every 24 hours  lidocaine   4% Patch 1 Patch Transdermal every 24 hours  nystatin Powder 1 Application(s) Topical two times a day  pantoprazole  Injectable 40 milliGRAM(s) IV Push every 12 hours  Parenteral Nutrition - Adult 1 Each (46 mL/Hr) TPN Continuous <Continuous>  Parenteral Nutrition - Adult 1 Each (46 mL/Hr) TPN Continuous <Continuous>  sucralfate suspension 1 Gram(s) Oral two times a day    MEDICATIONS  (PRN):  metoprolol tartrate Injectable 2.5 milliGRAM(s) IV Push every 6 hours PRN SBP > 160, HR > 100  ondansetron Injectable 4 milliGRAM(s) IV Push every 6 hours PRN Nausea and/or Vomiting  sodium chloride 0.9% lock flush 10 milliLiter(s) IV Push every 1 hour PRN Pre/post blood products, medications, blood draw, and to maintain line patency      Vital Signs Last 24 Hrs  T(C): 35.7 (28 Aug 2021 09:07), Max: 36.4 (28 Aug 2021 05:16)  T(F): 96.3 (28 Aug 2021 09:07), Max: 97.6 (28 Aug 2021 05:16)  HR: 78 (28 Aug 2021 11:00) (78 - 93)  BP: 119/59 (28 Aug 2021 11:00) (110/52 - 153/70)  BP(mean): 85 (28 Aug 2021 11:00) (75 - 100)  RR: 18 (28 Aug 2021 11:00) (12 - 18)  SpO2: 93% (28 Aug 2021 11:00) (90% - 96%)    Physical Exam:  General: NAD, resting comfortably in bed  Pulmonary: Nonlabored breathing, no respiratory distress  Cardiovascular: RRR  Abdominal: soft, mildly distended, tenderness to palpation lower>upper abdomen, no rebound or guarding. Ostomy ppp w/o function.   Extremities: WWP, normal strength  Neuro: A/O x 3, CNs II-XII grossly intact, no focal deficits    I&O's Summary    27 Aug 2021 07:01  -  28 Aug 2021 07:00  --------------------------------------------------------  IN: 1461.9 mL / OUT: 2765 mL / NET: -1303.1 mL    28 Aug 2021 07:01  -  28 Aug 2021 11:34  --------------------------------------------------------  IN: 154.6 mL / OUT: 975 mL / NET: -820.4 mL        LABS:                        8.8    10.39 )-----------( 429      ( 28 Aug 2021 05:24 )             27.6     08-28    140  |  104  |  22  ----------------------------<  105<H>  4.4   |  27  |  0.55    Ca    8.4      28 Aug 2021 05:24  Phos  4.6     08-28  Mg     2.2     08-28      PT/INR - ( 27 Aug 2021 03:45 )   PT: 11.9 sec;   INR: 0.99          PTT - ( 27 Aug 2021 03:45 )  PTT:31.1 sec

## 2021-08-31 NOTE — PROGRESS NOTE ADULT - ATTENDING COMMENTS
Reconsulted for acute hypoxic respiratory failure and severe sepsis.  Patient had colonoscopy through ostomy yesterday with unremarkable finding. She was sedated during that time - possibly aspirated. Today she spiked fever with leukocytosis of 17, hypoxic respiratory failure requiring HFNC. Suspect aspiration PNA vs HAP.  MRSA/MSSA swab neg. OK to stop vanco. Start kami 1g IV q12h. Check procal. Obtain sputum culture and CXR. f/u BCx. UA positive but patient w/o urinary symptoms, not suspecting UTI. Suggest GOC discussion with pall care.     Team 1 will follow you.  Case d/w primary team.    Jazmín Mathis MD, MS  Infectious Disease attending  work cell [phone]

## 2021-08-31 NOTE — PROGRESS NOTE ADULT - SUBJECTIVE AND OBJECTIVE BOX
**INCOMPLETE NOTE    HPI:  89F PMH HTN, chronic back pain 2/2 L3,L4 compression fractures, triple vessel CAD, and SBO due to possible diverticulitis vs. GYN/colorectal malignancy () loss of followed up and L ICA s/p L CEA (). 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 Left gluteal IR drain placed (). Colonoscopy w/ sigmoid mass bx on - final path invasive adenocarcinoma moderately differentiated. Patient started on  on Meropenem for polymicrobial wound culture including anaerobes and ESBL E. Coli, last day . Taken to OR on  for diverting colostomy. Placed in SICU for acute blood loss anemia, s/p EGD  showing duodenal ulceration, erosive esophagitis duodenitis. CT  w/ closed loop obstxn, s/p OR  exlap STEPHANIE. Patient completed Meropenem and since  is off ABx. Renal U/S on  showing moderate L hydronephrosis; urology exchanged stent on  with prophylaxis vanc for 2 days prior procedure as Ucx grow E. faecium. Neuro consulted as patient is not tolerating PO 2/2 dysphagia. S/p speech and swallow eval, found to have reduced bolus control and severely impaired pharyngeal swallow, recommending NPO and CTA. S/p Scope via ostomy on  that showed that the orientation of the stoma is correct. There is no twist or mechanical obstruction. During colonoscopy patient was sedated with possible aspiration. ID was reconsulted for suspected aspiration PNA vs. HAP    SUBJECTIVE:  Patient seen and examined at bedside. Patient admits of increase cough and SOB in the past day. Patient denies h/n/v/d, fever, chills, cp, palpitations, sob, abd pain, leg swelling, rashes, dysuri.     Vital Signs Last 12 Hrs  T(F): 99.5 (21 @ 15:53), Max: 100.7 (21 @ 13:07)  HR: 87 (21 @ 16:00) (87 - 118)  BP: 107/54 (21 @ 15:58) (105/52 - 129/63)  BP(mean): 78 (21 @ 15:58) (71 - 88)  RR: 22 (21 @ 15:58) (18 - 25)  SpO2: 98% (21 @ 16:00) (84% - 100%)    I&O's Summary    30 Aug 2021 07:  -  31 Aug 2021 07:00  --------------------------------------------------------  IN: 526.4 mL / OUT: 1150 mL / NET: -623.6 mL    31 Aug 2021 07:  -  31 Aug 2021 17:00  --------------------------------------------------------  IN: 518 mL / OUT: 400 mL / NET: 118 mL    PHYSICAL EXAM:  Constitutional: Cachectic elderly and frail women, in bed on 5L NC. Novak appreciated   HEENT: NC/AT, PERRLA  Neck: Supple  Respiratory: Decrease BS on the right, BL crackles R>L  Cardiovascular: RRR, normal S1 and S2, no m/r/g.   Gastrointestinal: +BS, soft, mild diffuse tenderness, stoma in place with drain, without erythema/ edema, no guarding or rebound tenderness, no palpable masses   Extremities: wwp; no cyanosis, clubbing or edema.   Vascular: Pulses equal and strong throughout.   Neurological: AAOx3, no CN deficits, strength and sensation intact throughout.  Skin: No gross skin abnormalities or rashes    LABS:                        8.0    17.69 )-----------( 443      ( 31 Aug 2021 14:28 )             26.0         148<H>  |  113<H>  |  43<H>  ----------------------------<  115<H>  4.7   |  24  |  0.70    Ca    9.1      31 Aug 2021 14:28  Phos  4.3     08-31  Mg     2.6     08-31      PT/INR - ( 31 Aug 2021 14:28 )   PT: 13.5 sec;   INR: 1.13          PTT - ( 31 Aug 2021 14:28 )  PTT:30.8 sec  Urinalysis Basic - ( 31 Aug 2021 14:28 )    Color: Red / Appearance: Turbid / S.020 / pH: x  Gluc: x / Ketone: NEGATIVE  / Bili: Small / Urobili: 4.0 E.U./dL   Blood: x / Protein: >=300 mg/dL / Nitrite: POSITIVE   Leuk Esterase: Large / RBC: Many /HPF / WBC Many /HPF   Sq Epi: x / Non Sq Epi: 0-5 /HPF / Bacteria: Present /HPF    MEDICATIONS  (STANDING):  albumin human 25% IVPB 50 milliLiter(s) IV Intermittent every 8 hours  ALBUTerol    90 MICROgram(s) HFA Inhaler 1 Puff(s) Inhalation every 4 hours  ALBUTerol   0.042% 1.25 milliGRAM(s) Nebulizer once  albuterol/ipratropium for Nebulization 3 milliLiter(s) Nebulizer every 6 hours  aspirin Suppository 300 milliGRAM(s) Rectal daily  chlorhexidine 2% Cloths 1 Application(s) Topical daily  chlorhexidine 4% Liquid 1 Application(s) Topical <User Schedule>  dextrose 40% Gel 15 Gram(s) Oral once  dextrose 5%. 1000 milliLiter(s) (50 mL/Hr) IV Continuous <Continuous>  dextrose 5%. 1000 milliLiter(s) (100 mL/Hr) IV Continuous <Continuous>  dextrose 50% Injectable 25 Gram(s) IV Push once  dextrose 50% Injectable 12.5 Gram(s) IV Push once  dextrose 50% Injectable 25 Gram(s) IV Push once  fat emulsion (Fish Oil and Plant Based) 20% Infusion 0.3759 Gm/kG/Day (8.3 mL/Hr) IV Continuous <Continuous>  glucagon  Injectable 1 milliGRAM(s) IntraMuscular once  insulin lispro (ADMELOG) corrective regimen sliding scale   SubCutaneous every 6 hours  lidocaine   4% Patch 1 Patch Transdermal every 24 hours  lidocaine   4% Patch 1 Patch Transdermal every 24 hours  meropenem  IVPB 1000 milliGRAM(s) IV Intermittent once  meropenem  IVPB      nystatin Powder 1 Application(s) Topical two times a day  pantoprazole  Injectable 40 milliGRAM(s) IV Push every 12 hours  Parenteral Nutrition - Adult 1 Each (46 mL/Hr) TPN Continuous <Continuous>  Parenteral Nutrition - Adult 1 Each (46 mL/Hr) TPN Continuous <Continuous>  tiotropium 18 MICROgram(s) Capsule 1 Capsule(s) Inhalation daily  vancomycin  IVPB 750 milliGRAM(s) IV Intermittent once    MEDICATIONS  (PRN):  metoprolol tartrate Injectable 2.5 milliGRAM(s) IV Push every 6 hours PRN SBP > 160, HR > 100  ondansetron Injectable 4 milliGRAM(s) IV Push every 6 hours PRN Nausea and/or Vomiting  sodium chloride 0.9% lock flush 10 milliLiter(s) IV Push every 1 hour PRN Pre/post blood products, medications, blood draw, and to maintain line patency   HPI:  89F PMH HTN, chronic back pain 2/2 L3,L4 compression fractures, triple vessel CAD, and SBO due to possible diverticulitis vs. GYN/colorectal malignancy () loss of followed up and L ICA s/p L CEA (). 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 Left gluteal IR drain placed (). Colonoscopy w/ sigmoid mass bx on - final path invasive adenocarcinoma moderately differentiated. Patient started on  on Meropenem for polymicrobial wound culture including anaerobes and ESBL E. Coli, last day . Taken to OR on  for diverting colostomy. Placed in SICU for acute blood loss anemia, s/p EGD  showing duodenal ulceration, erosive esophagitis duodenitis. CT  w/ closed loop obstxn, s/p OR  exlap STEPHANIE. Patient completed Meropenem and since  is off ABx. Renal U/S on  showing moderate L hydronephrosis; urology exchanged stent on  with prophylaxis vanc for 2 days prior procedure as Ucx grow E. faecium. Neuro consulted as patient is not tolerating PO 2/2 dysphagia. S/p speech and swallow eval, found to have reduced bolus control and severely impaired pharyngeal swallow, recommending NPO and CTA. S/p Scope via ostomy on  that showed that the orientation of the stoma is correct. There is no twist or mechanical obstruction. During colonoscopy patient was sedated with possible aspiration. ID was reconsulted for suspected aspiration PNA vs. HAP    SUBJECTIVE:  Patient seen and examined at bedside. Patient admits of increase cough and SOB in the past day. Patient denies h/n/v/d, fever, chills, cp, palpitations, sob, abd pain, leg swelling, rashes, dysuri.     Vital Signs Last 12 Hrs  T(F): 99.5 (21 @ 15:53), Max: 100.7 (21 @ 13:07)  HR: 87 (21 @ 16:00) (87 - 118)  BP: 107/54 (08-31-21 @ 15:58) (105/52 - 129/63)  BP(mean): 78 (21 @ 15:58) (71 - 88)  RR: 22 (21 @ 15:58) (18 - 25)  SpO2: 98% (21 @ 16:00) (84% - 100%)    I&O's Summary    30 Aug 2021 07:  -  31 Aug 2021 07:00  --------------------------------------------------------  IN: 526.4 mL / OUT: 1150 mL / NET: -623.6 mL    31 Aug 2021 07:01  -  31 Aug 2021 17:00  --------------------------------------------------------  IN: 518 mL / OUT: 400 mL / NET: 118 mL    PHYSICAL EXAM:  Constitutional: Cachectic elderly and frail women, in bed on 5L NC. Novak appreciated   HEENT: NC/AT, PERRLA  Neck: Supple  Respiratory: Decrease BS on the right, BL crackles R>L  Cardiovascular: RRR, normal S1 and S2, no m/r/g.   Gastrointestinal: +BS, soft, mild diffuse tenderness, stoma in place with drain, without erythema/ edema, no guarding or rebound tenderness, no palpable masses   Extremities: wwp; no cyanosis, clubbing or edema.   Vascular: Pulses equal and strong throughout.   Neurological: AAOx3, no CN deficits, strength and sensation intact throughout.  Skin: No gross skin abnormalities or rashes    LABS:                        8.0    17.69 )-----------( 443      ( 31 Aug 2021 14:28 )             26.0         148<H>  |  113<H>  |  43<H>  ----------------------------<  115<H>  4.7   |  24  |  0.70    Ca    9.1      31 Aug 2021 14:28  Phos  4.3     08-31  Mg     2.6     08-31      PT/INR - ( 31 Aug 2021 14:28 )   PT: 13.5 sec;   INR: 1.13          PTT - ( 31 Aug 2021 14:28 )  PTT:30.8 sec  Urinalysis Basic - ( 31 Aug 2021 14:28 )    Color: Red / Appearance: Turbid / S.020 / pH: x  Gluc: x / Ketone: NEGATIVE  / Bili: Small / Urobili: 4.0 E.U./dL   Blood: x / Protein: >=300 mg/dL / Nitrite: POSITIVE   Leuk Esterase: Large / RBC: Many /HPF / WBC Many /HPF   Sq Epi: x / Non Sq Epi: 0-5 /HPF / Bacteria: Present /HPF    MEDICATIONS  (STANDING):  albumin human 25% IVPB 50 milliLiter(s) IV Intermittent every 8 hours  ALBUTerol    90 MICROgram(s) HFA Inhaler 1 Puff(s) Inhalation every 4 hours  ALBUTerol   0.042% 1.25 milliGRAM(s) Nebulizer once  albuterol/ipratropium for Nebulization 3 milliLiter(s) Nebulizer every 6 hours  aspirin Suppository 300 milliGRAM(s) Rectal daily  chlorhexidine 2% Cloths 1 Application(s) Topical daily  chlorhexidine 4% Liquid 1 Application(s) Topical <User Schedule>  dextrose 40% Gel 15 Gram(s) Oral once  dextrose 5%. 1000 milliLiter(s) (50 mL/Hr) IV Continuous <Continuous>  dextrose 5%. 1000 milliLiter(s) (100 mL/Hr) IV Continuous <Continuous>  dextrose 50% Injectable 25 Gram(s) IV Push once  dextrose 50% Injectable 12.5 Gram(s) IV Push once  dextrose 50% Injectable 25 Gram(s) IV Push once  fat emulsion (Fish Oil and Plant Based) 20% Infusion 0.3759 Gm/kG/Day (8.3 mL/Hr) IV Continuous <Continuous>  glucagon  Injectable 1 milliGRAM(s) IntraMuscular once  insulin lispro (ADMELOG) corrective regimen sliding scale   SubCutaneous every 6 hours  lidocaine   4% Patch 1 Patch Transdermal every 24 hours  lidocaine   4% Patch 1 Patch Transdermal every 24 hours  meropenem  IVPB 1000 milliGRAM(s) IV Intermittent once  meropenem  IVPB      nystatin Powder 1 Application(s) Topical two times a day  pantoprazole  Injectable 40 milliGRAM(s) IV Push every 12 hours  Parenteral Nutrition - Adult 1 Each (46 mL/Hr) TPN Continuous <Continuous>  Parenteral Nutrition - Adult 1 Each (46 mL/Hr) TPN Continuous <Continuous>  tiotropium 18 MICROgram(s) Capsule 1 Capsule(s) Inhalation daily  vancomycin  IVPB 750 milliGRAM(s) IV Intermittent once    MEDICATIONS  (PRN):  metoprolol tartrate Injectable 2.5 milliGRAM(s) IV Push every 6 hours PRN SBP > 160, HR > 100  ondansetron Injectable 4 milliGRAM(s) IV Push every 6 hours PRN Nausea and/or Vomiting  sodium chloride 0.9% lock flush 10 milliLiter(s) IV Push every 1 hour PRN Pre/post blood products, medications, blood draw, and to maintain line patency

## 2021-08-31 NOTE — PROGRESS NOTE ADULT - ASSESSMENT
A/P  Colonic function sluggish and bowel activity in general is very poor.  No mechanical obstruction.  Otherwise stable.  She continues to have large airway rhonchi and secretions that she cannot clear.  Afebrile, VSS, WBC same.     Need to get a PICC line in place because central line needs to come out.  Swallowing evaluation (USG of neck today) in progress.  Currently she cannot eat.  PEG/PEJ may be best last choice for gut feeding if she doesnt get cleared.  PT to get her OOB  Spirometer use and self suction   Goals of care need to be determined.

## 2021-08-31 NOTE — CONSULT NOTE ADULT - SUBJECTIVE AND OBJECTIVE BOX
Vascular Access Service Consult Note    89yFemaleHEALTH ISSUES - PROBLEM Dx:         Diagnosis: colon perforation    Indications for Vascular Access (Check all that apply)  [  ]  Antibiotic Therapy       Antibiotic Prescribed:                                                                             Expected Duration of Therapy:               [  ]  IV Hydration  [x  ]  Total Parenteral Nutrition  [  ]  Chemotherapy  [  ]  Difficult Venous Access  [  ]  CVP monitoring  [  ]  Medications with high potential for tissue necrosis on extravasation  [  ]  Other    Screening (Check all that apply)  Previous Radiation to chest  [  ] Yes      [  x]  No  Breast Cancer                          [  ] Left     [  ]  Right    [x  ]  No  Lymph Node Dissection         [  ] Left     [  ]  Right    [  x]  No  Pacemaker or ICD                   [  ] Left     [  ]  Right    [ x ]  No  Upper Extremity DVT             [  ] Left     [  ]  Right    [x  ]  No, but left arm superficial thrombophelbitis  Chronic Kidney Disease         [  ]  Yes     [x  ]  No  Hemodialysis                           [  ]  Yes     [ x ]  No  AV Fistula/ Graft                     [  ]  Left    [  ]  Right    [ x ]  No  Temp>101F in past 24 H       [  ]  Yes     [x  ]  No  H/O PICC/Midline                   [  ]  Yes     [ x ]  No    Lab data:                        7.6    11.82 )-----------( 374      ( 31 Aug 2021 06:26 )             24.5     08-31    145  |  111<H>  |  38<H>  ----------------------------<  145<H>  4.3   |  24  |  0.61    Ca    9.2      31 Aug 2021 06:26  Phos  3.9     08-31  Mg     2.3     08-31      PT/INR - ( 30 Aug 2021 10:49 )   PT: 13.5 sec;   INR: 1.13          PTT - ( 30 Aug 2021 10:49 )  PTT:61.3 sec          I have reviewed the chart, interviewed and examined the patient and determined that this patient:  [ x ] Is a candidate for a PICC line  [  ] Is a candidate for a Midline  [  ] Is not a candidate for vascular access device (reason)    Lumens:    [  ] Single  [x  ] Double

## 2021-08-31 NOTE — PROGRESS NOTE ADULT - SUBJECTIVE AND OBJECTIVE BOX
Notified by nursing and interns in the OR that she developed dyspnea. Rectal temp 100.7, , /63, 94% on 5L n/c. Tachypneic with increased work of breathing, accessory muscle use. Abdomen soft. Urine red, worse than morning. Stat ABG pH 7.45, pCO2 36, pO2 89, HCO3- 25. Lactate 1.3. EKG with new ST depressions in II, V2-V6. CXR from am with bilateral opacities/pleural effusion unchanged from 8/22. Concerning for inability to maintain increased work of breathing given catabolic state, muscle wasting. BiPAP. SICU consult. Pending cardiac enzymes. Fellow in room. Discussed with attending surgeon. Notified by nursing and interns in the OR that she developed dyspnea. Rectal temp 100.7, , /63, 94% on 5L n/c. Tachypneic with increased work of breathing, accessory muscle use. Abdomen soft. Urine red, worse than morning. Stat ABG pH 7.45, pCO2 36, pO2 89, HCO3- 25. Lactate 1.3. EKG with new ST depressions in II, V2-V6. CXR from am with bilateral opacities/pleural effusion unchanged from 8/22. Concerning for inability to maintain increased work of breathing given catabolic state, muscle wasting. Stop HSQ. BiPAP. SICU consult. Pending cardiac enzymes. Fellow in room. Discussed with attending surgeon. Notified by nursing and interns in the OR that she developed dyspnea. Rectal temp 100.7, , /63, 94% on 5L n/c. Tachypneic with increased work of breathing, accessory muscle use. Abdomen soft. Urine red, worse than morning. Stat ABG pH 7.45, pCO2 36, pO2 89, HCO3- 25. Lactate 1.3. EKG with new ST depressions in II, V2-V6. CXR from am with bilateral opacities/pleural effusion unchanged from 8/22. Concerning for inability to maintain increased work of breathing given catabolic state, muscle wasting. If requires intubation, extremely poor prognosis. Poor candidate for chemotherapy. Will likely die within the next few years of perforated colon cancer. Stop HSQ. BiPAP. SICU consult. Pending cardiac enzymes. Palliative consult to help guide goals of care, potential for comfort care. Start empiric antibiotics. Fellow in room. Discussed with attending surgeon.

## 2021-08-31 NOTE — PROGRESS NOTE ADULT - SUBJECTIVE AND OBJECTIVE BOX
S: Pt reports feeling less short of breath now on oxygen. She is worried about what is happening to her. No HA/CP.     Vitals: ICU Vital Signs Last 24 Hrs  T(C): 37.5 (31 Aug 2021 15:53), Max: 38.2 (31 Aug 2021 13:07)  T(F): 99.5 (31 Aug 2021 15:53), Max: 100.7 (31 Aug 2021 13:07)  HR: 98 (31 Aug 2021 15:58) (80 - 118)  BP: 129/63 (31 Aug 2021 13:07) (101/56 - 129/63)  BP(mean): 85 (31 Aug 2021 13:07) (74 - 90)  ABP: --  ABP(mean): --  RR: 22 (31 Aug 2021 15:58) (18 - 25)  SpO2: 100% (31 Aug 2021 15:58) (84% - 100%)            I&O:  I&O's Detail    30 Aug 2021 07:01  -  31 Aug 2021 07:00  --------------------------------------------------------  IN:    Albumin 5% - 50 mL: 50 mL    Fat Emulsion (Fish Oil &amp; Plant Based) 20% Infusion: 62.4 mL    TPN (Total Parenteral Nutrition): 414 mL  Total IN: 526.4 mL    OUT:    Colostomy (mL): 0 mL    Indwelling Catheter - Urethral (mL): 1150 mL  Total OUT: 1150 mL    Total NET: -623.6 mL      31 Aug 2021 07:01  -  31 Aug 2021 16:16  --------------------------------------------------------  IN:    Albumin 5% - 50 mL: 50 mL    IV PiggyBack: 100 mL    TPN (Total Parenteral Nutrition): 368 mL  Total IN: 518 mL    OUT:    Colostomy (mL): 0 mL    Indwelling Catheter - Urethral (mL): 400 mL  Total OUT: 400 mL    Total NET: 118 mL            ABG - ( 31 Aug 2021 14:06 )  pH, Arterial: 7.45  pH, Blood: x     /  pCO2: 36    /  pO2: 89    / HCO3: 25    / Base Excess: 1.3   /  SaO2: 97.9            CAPILLARY BLOOD GLUCOSE      POCT Blood Glucose.: 151 mg/dL (31 Aug 2021 12:03)  POCT Blood Glucose.: 142 mg/dL (31 Aug 2021 07:13)  POCT Blood Glucose.: 135 mg/dL (31 Aug 2021 00:31)  POCT Blood Glucose.: 106 mg/dL (30 Aug 2021 17:46)      Labs:                         8.0    17.69 )-----------( 443      ( 31 Aug 2021 14:28 )             26.0       148<H>  |  113<H>  |  43<H>  ----------------------------<  115<H>  4.7   |  24  |  0.70    Ca    9.1      31 Aug 2021 14:28  Phos  4.3       Mg     2.6     31    PT/INR - ( 31 Aug 2021 14:28 )   PT: 13.5 sec;   INR: 1.13          PTT - ( 31 Aug 2021 14:28 )  PTT:30.8 secUrinalysis Basic - ( 31 Aug 2021 14:28 )    Color: Red / Appearance: Turbid / S.020 / pH: x  Gluc: x / Ketone: NEGATIVE  / Bili: Small / Urobili: 4.0 E.U./dL   Blood: x / Protein: >=300 mg/dL / Nitrite: POSITIVE   Leuk Esterase: Large / RBC: Many /HPF / WBC Many /HPF   Sq Epi: x / Non Sq Epi: 0-5 /HPF / Bacteria: Present /HPF      Troponin T, Serum: 0.09 ng/mL (21 @ 14:28)  ABG - ( 31 Aug 2021 14:06 )  pH, Arterial: 7.45  pH, Blood: x     /  pCO2: 36    /  pO2: 89    / HCO3: 25    / Base Excess: 1.3   /  SaO2: 97.9                Electrolytes repleated to goals as follows: Potassium 4, Phosphorus 3 and Magnesium 2 where appropriate and necessary    Exam:  Gen: Cachectic  Neuro: A&Ox3, NAD, grossly neuro intact  HEENT: PERRL < EOMI, MMM  Neck: Supple  CV: RRR, no MRGs  Pulm: CTAB, no wheezes, rales, or rhonchi  Abd: Softly distended, NT, no rebound or guarding, stoma with pink mucosa and brown stool and gas in the bag. Drains in place,  incision c/d/i.   : Novak in place  Ext: No edema peripherally or centrally  Vasc: Extremities warm to palpation, 2+ pulses - radial and PT  MSK: no joint swelling  Skin: no rash  Psych: affect appropriate

## 2021-08-31 NOTE — PROGRESS NOTE ADULT - ASSESSMENT
89F PMH HTN, chronic back pain 2/2 L3,L4 compression fractures, triple vessel CAD, and SBO due to possible diverticulitis vs. GYN/colorectal malignancy (2018) never followed up and L ICA s/p L CEA (4/21). 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 Left gluteal IR drain placed (8/9). Colonoscopy w/ sigmoid mass bx on 8/11- final path invasive adenocarcinoma moderately differentiated. Taken to OR on 8/13 for diverting colostomy. In SICU for acute blood loss anemia, s/p EGD 8/18 showing duodenal ulceration, erosive esophagitis duodenitis, bx x 2, CT 8/20 w/ closed loop obstxn, s/p OR 8/20 exlap STEPHANIE. 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     Neuro: Tylenol prn  CV: ICA s/p L CEA (4/21). ASA, HD stable. Plavix held.  Pulm: extubated 8/22, on HFNC  GI/FEN: Patient demonstrating severe dysphagia per FEES examination; maintain NPO status, TPN; Prior UGIB: EGD on 8/18: Erosive esophagitis and doudenitis with esophageal ulcers. protonix, Carafate per GI recs, Pepcid held for worsening mental status. No leakage surrounding ostomy; minimal-to-no output.  : S/p NM renal function study (wnl). Novak (8/18-). CTA&P showing hydronephrosis; renal U/S 8/26 showing moderate L hydronephrosis, repeat study ordered today; urology exchanged stent 8/27. Urine cx growing E. faecium;  ID: Febrile/tachypneic empiric coverage: Vanc (8/31-), Daina (8/31-) h/o previous ESBL Ecoli in intraabdominal abscess. Buttock rash: Nystatin powder (8/23--) D/c: : Daina (8/13-8/24), Blood cultures taken 8/24 show no growth to date.   Endo: ISS  PPX: SCDs., SQH  Lines: PIVs, RIJ TLC (8/23-), s/p R brachial Bebe (8/22-23)  Wounds: ostomy, red rubber cath removed 8/25, Gluteal CORDELL removal by IR 8/26.     PT/OT: re-ordered 8/22  Dispo: [facility] convo with family 9/1

## 2021-08-31 NOTE — PROGRESS NOTE ADULT - ASSESSMENT
89F PMH HTN, chronic back pain 2/2 L3,L4 compression fractures, triple vessel CAD, and SBO due to possible diverticulitis vs. GYN/colorectal malignancy (2018) never followed up and L ICA s/p L CEA (4/21). Presented to 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 s/p EGD 8/18 showing duodenal ulceration and erosive esophagitis duodenitis. Extubated 8/22. Patient is not tolerating PO 2/2 dysphagia. S/p speech and swallow eval, found to have severely impaired pharyngeal swallow. Currently NPO and receiving TPN. Neurology team consulted for ongoing dysphagia with concern for neurological etiology. Dysphagia likely acute on chronic in setting of recent intubation. Mild chronic dysphagia is likely present 2/2 old age, however low suspicion for other acute neurological process causing the dysphagia since pt denies dysphagia prior to 2 weeks ago, and there's no other neurological deficit on exam. Bounding pulse and tenderness noted along left anterior neck; Could be benign but would consider further evaluation to r/o obstructing hematoma / aneurysm / pseudoaneurysm. Advised Primary Team to ask Vascular for recommendations regarding study of choice; Carotid ultrasound was ordered (pending).     Plan:   - Follow up Carotid Ultrasound to evaluate tenderness and bounding pulse at left anterior neck. May need further imaging depending on findings.     Neurology team will continue to follow.  See attestation for finalized recommendations.  89F PMH HTN, chronic back pain 2/2 L3,L4 compression fractures, triple vessel CAD, and SBO due to possible diverticulitis vs. GYN/colorectal malignancy (2018) never followed up and L ICA s/p L CEA (4/21). Presented to 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 s/p EGD 8/18 showing duodenal ulceration and erosive esophagitis duodenitis. Extubated 8/22. Patient is not tolerating PO 2/2 dysphagia. S/p speech and swallow eval, found to have severely impaired pharyngeal swallow. Currently NPO and receiving TPN. Neurology team consulted for ongoing dysphagia with concern for neurological etiology. Dysphagia likely acute on chronic in setting of recent intubation. Mild chronic dysphagia is likely present 2/2 old age, however low suspicion for other acute neurological process causing the dysphagia since pt denies dysphagia prior to 2 weeks ago, and there's no other neurological deficit on exam. Bounding pulse and tenderness noted along left anterior neck; Could be benign but would consider further evaluation to r/o obstructing hematoma / aneurysm / pseudoaneurysm. Advised Primary Team to ask Vascular for recommendations regarding study of choice; Carotid ultrasound was ordered (pending).     Plan:   - Follow up Carotid Ultrasound to evaluate tenderness and bounding pulse at left anterior neck. May need further imaging depending on findings.     Neurology team will continue to follow.

## 2021-08-31 NOTE — CONSULT NOTE ADULT - SUBJECTIVE AND OBJECTIVE BOX
**INCOMPLETE NOTE    OVERNIGHT EVENTS:    SUBJECTIVE:  Patient seen and examined at bedside.    Vital Signs Last 12 Hrs  T(F): 99.5 (21 @ 15:53), Max: 100.7 (21 @ 13:07)  HR: 98 (21 @ 15:58) (98 - 118)  BP: 129/63 (21 @ 13:07) (110/53 - 129/63)  BP(mean): 85 (21 @ 13:07) (76 - 88)  RR: 22 (21 @ 15:58) (18 - 25)  SpO2: 100% (21 @ 15:58) (84% - 100%)  I&O's Summary    30 Aug 2021 07:  -  31 Aug 2021 07:00  --------------------------------------------------------  IN: 526.4 mL / OUT: 1150 mL / NET: -623.6 mL    31 Aug 2021 07:01  -  31 Aug 2021 16:17  --------------------------------------------------------  IN: 518 mL / OUT: 400 mL / NET: 118 mL    PHYSICAL EXAM:  Constitutional: Cachectic elderly and frail women, in bed on 5L NC. [place] appreciated   HEENT: NC/AT, PERRLA  Neck: Supple  Respiratory: Decrease BS on the right, BL crackles R>L  Cardiovascular: RRR, normal S1 and S2, no m/r/g.   Gastrointestinal: +BS, soft, mild diffuse tenderness, stoma in place with drain, without erythema/ edema, no guarding or rebound tenderness, no palpable masses   Extremities: wwp; no cyanosis, clubbing or edema.   Vascular: Pulses equal and strong throughout.   Neurological: AAOx3, no CN deficits, strength and sensation intact throughout.   Skin: No gross skin abnormalities or rashes    LABS:                        8.0    17.69 )-----------( 443      ( 31 Aug 2021 14:28 )             26.0     08-31    148<H>  |  113<H>  |  43<H>  ----------------------------<  115<H>  4.7   |  24  |  0.70    Ca    9.1      31 Aug 2021 14:28  Phos  4.3     08  Mg     2.6     31      PT/INR - ( 31 Aug 2021 14:28 )   PT: 13.5 sec;   INR: 1.13          PTT - ( 31 Aug 2021 14:28 )  PTT:30.8 sec  Urinalysis Basic - ( 31 Aug 2021 14:28 )    Color: Red / Appearance: Turbid / S.020 / pH: x  Gluc: x / Ketone: NEGATIVE  / Bili: Small / Urobili: 4.0 E.U./dL   Blood: x / Protein: >=300 mg/dL / Nitrite: POSITIVE   Leuk Esterase: Large / RBC: Many /HPF / WBC Many /HPF   Sq Epi: x / Non Sq Epi: 0-5 /HPF / Bacteria: Present /HPF    MEDICATIONS  (STANDING):  albumin human  5% IVPB 250 milliLiter(s) IV Intermittent once  albumin human 25% IVPB 50 milliLiter(s) IV Intermittent every 8 hours  ALBUTerol    90 MICROgram(s) HFA Inhaler 1 Puff(s) Inhalation every 4 hours  ALBUTerol   0.042% 1.25 milliGRAM(s) Nebulizer once  albuterol/ipratropium for Nebulization 3 milliLiter(s) Nebulizer every 6 hours  aspirin Suppository 300 milliGRAM(s) Rectal daily  chlorhexidine 2% Cloths 1 Application(s) Topical daily  chlorhexidine 4% Liquid 1 Application(s) Topical <User Schedule>  dextrose 40% Gel 15 Gram(s) Oral once  dextrose 5%. 1000 milliLiter(s) (50 mL/Hr) IV Continuous <Continuous>  dextrose 5%. 1000 milliLiter(s) (100 mL/Hr) IV Continuous <Continuous>  dextrose 50% Injectable 25 Gram(s) IV Push once  dextrose 50% Injectable 12.5 Gram(s) IV Push once  dextrose 50% Injectable 25 Gram(s) IV Push once  fat emulsion (Fish Oil and Plant Based) 20% Infusion 0.3759 Gm/kG/Day (8.3 mL/Hr) IV Continuous <Continuous>  glucagon  Injectable 1 milliGRAM(s) IntraMuscular once  insulin lispro (ADMELOG) corrective regimen sliding scale   SubCutaneous every 6 hours  lidocaine   4% Patch 1 Patch Transdermal every 24 hours  lidocaine   4% Patch 1 Patch Transdermal every 24 hours  meropenem  IVPB 1000 milliGRAM(s) IV Intermittent once  meropenem  IVPB      nystatin Powder 1 Application(s) Topical two times a day  pantoprazole  Injectable 40 milliGRAM(s) IV Push every 12 hours  Parenteral Nutrition - Adult 1 Each (46 mL/Hr) TPN Continuous <Continuous>  Parenteral Nutrition - Adult 1 Each (46 mL/Hr) TPN Continuous <Continuous>  tiotropium 18 MICROgram(s) Capsule 1 Capsule(s) Inhalation daily  vancomycin  IVPB 750 milliGRAM(s) IV Intermittent once    MEDICATIONS  (PRN):  metoprolol tartrate Injectable 2.5 milliGRAM(s) IV Push every 6 hours PRN SBP > 160, HR > 100  ondansetron Injectable 4 milliGRAM(s) IV Push every 6 hours PRN Nausea and/or Vomiting  sodium chloride 0.9% lock flush 10 milliLiter(s) IV Push every 1 hour PRN Pre/post blood products, medications, blood draw, and to maintain line patency

## 2021-08-31 NOTE — CONSULT NOTE ADULT - ASSESSMENT
- Please get MRSA swab and COVID swab  - Please obtain procalcitonin  - Start empirically on Meropenem 1g q12 and vancomycin 750mg for broad spectrum for m/p lung infection (increase secretions/cough, s/p sedation w/ suspected aspiration, increasing O2 demands)    INCOMPLETE

## 2021-08-31 NOTE — PROVIDER CONTACT NOTE (OTHER) - SITUATION
Pt Desaturated to 84%, pt tachypneic and has increased work of breathing. Pt stated difficult to breath and appears to be using accessory muscles.

## 2021-08-31 NOTE — PROGRESS NOTE ADULT - SUBJECTIVE AND OBJECTIVE BOX
Neurology  Progress Note  08-31-21    Name:  AJ HAWKINS; 89y    Interval History: Patient seen and examined at bedside. Denies headache, dizziness, throat pain, chest pain, SOB, palpitations, N/V/D. No complaints at this time. Unable to assess swallowing function since pt is NPO.     REVIEW OF SYSTEMS:  As states in HPI.    Medications:  acetaminophen   Tablet .. 650 milliGRAM(s) Oral once  acetaminophen  IVPB .. 700 milliGRAM(s) IV Intermittent once PRN  acetaminophen  IVPB .. 700 milliGRAM(s) IV Intermittent once  acetaminophen  IVPB .. 620 milliGRAM(s) IV Intermittent once  acetaminophen  IVPB .. 620 milliGRAM(s) IV Intermittent once  acetaminophen  IVPB .. 620 milliGRAM(s) IV Intermittent once  acetaminophen  IVPB .. 620 milliGRAM(s) IV Intermittent once  acetaminophen  IVPB .. 620 milliGRAM(s) IV Intermittent once  acetaminophen  IVPB .. 620 milliGRAM(s) IV Intermittent once  acetaminophen  IVPB .. 620 milliGRAM(s) IV Intermittent once  acetaminophen  IVPB .. 620 milliGRAM(s) IV Intermittent once  acetaminophen  IVPB .. 620 milliGRAM(s) IV Intermittent once  acetaminophen  IVPB .. 700 milliGRAM(s) IV Intermittent once  acetaminophen  IVPB .. 750 milliGRAM(s) IV Intermittent once  albumin human  5% IVPB 250 milliLiter(s) IV Intermittent once  albumin human 25% IVPB 50 milliLiter(s) IV Intermittent every 8 hours  albumin human 25% IVPB 50 milliLiter(s) IV Intermittent every 8 hours  albuterol/ipratropium for Nebulization. 3 milliLiter(s) Nebulizer once  aspirin Suppository 300 milliGRAM(s) Rectal daily  benzocaine 20% Spray 1 Spray(s) Topical once  chlorhexidine 2% Cloths 1 Application(s) Topical daily  chlorhexidine 4% Liquid 1 Application(s) Topical <User Schedule>  dextrose 40% Gel 15 Gram(s) Oral once  dextrose 5%. 1000 milliLiter(s) IV Continuous <Continuous>  dextrose 5%. 1000 milliLiter(s) IV Continuous <Continuous>  dextrose 50% Injectable 25 Gram(s) IV Push once  dextrose 50% Injectable 12.5 Gram(s) IV Push once  dextrose 50% Injectable 25 Gram(s) IV Push once  ertapenem  IVPB 1000 milliGRAM(s) IV Intermittent once  fat emulsion (Fish Oil and Plant Based) 20% Infusion 1.22 Gm/kG/Day IV Continuous <Continuous>  fat emulsion (Fish Oil and Plant Based) 20% Infusion 1.22 Gm/kG/Day IV Continuous <Continuous>  fat emulsion (Fish Oil and Plant Based) 20% Infusion 0.5 Gm/kG/Day IV Continuous <Continuous>  fat emulsion (Fish Oil and Plant Based) 20% Infusion 0.5 Gm/kG/Day IV Continuous <Continuous>  fat emulsion (Fish Oil and Plant Based) 20% Infusion 0.5 Gm/kG/Day IV Continuous <Continuous>  fat emulsion (Fish Oil and Plant Based) 20% Infusion 0.5 Gm/kG/Day IV Continuous <Continuous>  fat emulsion (Fish Oil and Plant Based) 20% Infusion 0.5 Gm/kG/Day IV Continuous <Continuous>  fat emulsion (Fish Oil and Plant Based) 20% Infusion 0.5 Gm/kG/Day IV Continuous <Continuous>  fat emulsion (Fish Oil and Plant Based) 20% Infusion 0.5 Gm/kG/Day IV Continuous <Continuous>  fat emulsion (Fish Oil and Plant Based) 20% Infusion 0.3759 Gm/kG/Day IV Continuous <Continuous>  fat emulsion (Fish Oil and Plant Based) 20% Infusion 0.3759 Gm/kG/Day IV Continuous <Continuous>  fentaNYL    Injectable 50 MICROGram(s) IV Push once  furosemide   Injectable 20 milliGRAM(s) IV Push once  furosemide   Injectable 20 milliGRAM(s) IV Push once  furosemide   Injectable 20 milliGRAM(s) IV Push once  furosemide   Injectable 20 milliGRAM(s) IV Push once  furosemide   Injectable 20 milliGRAM(s) IV Push once  glucagon  Injectable 1 milliGRAM(s) IntraMuscular once  haloperidol    Injectable 0.5 milliGRAM(s) IV Push once PRN  heparin   Injectable 5000 Unit(s) SubCutaneous every 12 hours  insulin lispro (ADMELOG) corrective regimen sliding scale   SubCutaneous every 6 hours  lactated ringers Bolus 250 milliLiter(s) IV Bolus once  lactated ringers Bolus 500 milliLiter(s) IV Bolus once  lactated ringers Bolus 500 milliLiter(s) IV Bolus once  lactated ringers Bolus 500 milliLiter(s) IV Bolus once  lactated ringers Bolus 500 milliLiter(s) IV Bolus once  lidocaine   4% Patch 2 Patch Transdermal once  lidocaine   4% Patch 1 Patch Transdermal every 24 hours  lidocaine   4% Patch 1 Patch Transdermal every 24 hours  magnesium hydroxide Suspension 30 milliLiter(s) Oral once  magnesium sulfate  IVPB 1 Gram(s) IV Intermittent once  magnesium sulfate  IVPB 1 Gram(s) IV Intermittent once  magnesium sulfate  IVPB 1 Gram(s) IV Intermittent once  magnesium sulfate  IVPB 1 Gram(s) IV Intermittent once  magnesium sulfate  IVPB 1 Gram(s) IV Intermittent once  metoclopramide Injectable 10 milliGRAM(s) IV Push once  metoprolol tartrate Injectable 2.5 milliGRAM(s) IV Push every 6 hours PRN  midazolam Injectable 2 milliGRAM(s) IntraMuscular once  midazolam Injectable 3 milliGRAM(s) IV Push once  nystatin Powder 1 Application(s) Topical two times a day  ondansetron Injectable 4 milliGRAM(s) IV Push once  ondansetron Injectable 4 milliGRAM(s) IV Push once  ondansetron Injectable 4 milliGRAM(s) IV Push every 6 hours PRN  pantoprazole  Injectable 40 milliGRAM(s) IV Push every 12 hours  Parenteral Nutrition - Adult 1 Each TPN Continuous <Continuous>  Parenteral Nutrition - Adult 1 Each TPN Continuous <Continuous>  Parenteral Nutrition - Adult 1 Each TPN Continuous <Continuous>  Parenteral Nutrition - Adult 1 Each TPN Continuous <Continuous>  Parenteral Nutrition - Adult 1 Each TPN Continuous <Continuous>  Parenteral Nutrition - Adult 1 Each TPN Continuous <Continuous>  Parenteral Nutrition - Adult 1 Each TPN Continuous <Continuous>  Parenteral Nutrition - Adult 1 Each TPN Continuous <Continuous>  Parenteral Nutrition - Adult 1 Each TPN Continuous <Continuous>  Parenteral Nutrition - Adult 1 Each TPN Continuous <Continuous>  piperacillin/tazobactam IVPB. 3.375 Gram(s) IV Intermittent once  piperacillin/tazobactam IVPB... 3.375 Gram(s) IV Intermittent once  potassium chloride   Powder 40 milliEquivalent(s) Oral once  potassium chloride  10 mEq/100 mL IVPB 10 milliEquivalent(s) IV Intermittent every 1 hour  potassium chloride  10 mEq/100 mL IVPB 10 milliEquivalent(s) IV Intermittent every 1 hour  potassium chloride  10 mEq/100 mL IVPB 10 milliEquivalent(s) IV Intermittent every 1 hour  potassium chloride  20 mEq/100 mL IVPB 20 milliEquivalent(s) IV Intermittent once  potassium phosphate / sodium phosphate Powder (PHOS-NaK) 1 Packet(s) Oral once  potassium phosphate IVPB 15 milliMole(s) IV Intermittent once  potassium phosphate IVPB 21 milliMole(s) IV Intermittent once  potassium phosphate IVPB 15 milliMole(s) IV Intermittent once  potassium phosphate IVPB 21 milliMole(s) IV Intermittent once  saline laxative (FLEET) Rectal Enema 1 Enema Rectal once  saline laxative (FLEET) Rectal Enema 1 Enema Rectal once  saline laxative (FLEET) Rectal Enema 1 Enema Rectal once  saline laxative (FLEET) Rectal Enema 1 Enema Rectal once  sodium chloride 0.9% Bolus 1500 milliLiter(s) IV Bolus once  sodium chloride 0.9% Bolus 250 milliLiter(s) IV Bolus once  sodium chloride 0.9% Bolus 500 milliLiter(s) IV Bolus once  sodium chloride 0.9% Bolus 500 milliLiter(s) IV Bolus once  sodium chloride 0.9% lock flush 10 milliLiter(s) IV Push every 1 hour PRN  sodium phosphate IVPB 15 milliMole(s) IV Intermittent once  sodium phosphate IVPB 15 milliMole(s) IV Intermittent once  sodium phosphate IVPB 15 milliMole(s) IV Intermittent once  sodium phosphate IVPB 15 milliMole(s) IV Intermittent once  sodium phosphate IVPB 21 milliMole(s) IV Intermittent once  sodium phosphate IVPB 15 milliMole(s) IV Intermittent once  sodium phosphate IVPB 15 milliMole(s) IV Intermittent once    Vitals:  T(C): 36.9 (08-31-21 @ 10:15), Max: 37.1 (08-30-21 @ 14:00)  HR: 98 (08-31-21 @ 08:00) (62 - 98)  BP: 110/53 (08-31-21 @ 08:00) (101/56 - 134/60)  RR: 18 (08-31-21 @ 08:00) (18 - 18)  SpO2: 95% (08-31-21 @ 08:00) (94% - 98%)    PHYSICAL EXAM:   General: Frail, cachectic elderly female. Mild respiratory distress   HEENT: AT/NC. Dry MM. No oral lesions or airway obstruction   Respiratory: Tachypneic with use of accessory muscles. Gurgling breath sounds   Neck: R TLC in place. L sided anterior neck tenderness with significant bounding pulse   Neurologic:  -Mental status: Awake, alert, oriented to person, place, and time. Speech is fluent with no dysarthria. Follows commands. Attention/concentration intact. Fund of knowledge appropriate.  -Cranial nerves:   II: Visual fields are full to confrontation.  III, IV, VI: Extraocular movements are intact without nystagmus. Pupils equally round and reactive to light  V:  Facial sensation V1-V3 equal and intact   VII: Face is symmetric with normal eye closure and smile  VIII: Hearing grossly intact  XII: Tongue protrudes midline  Motor: Diffuse sarcopenia. No pronator drift. Strength bilateral upper extremity 5/5, bilateral lower extremities 5/5.  Sensation: Intact to light touch bilaterally  Coordination: No dysmetria on finger-to-nose bilaterally    Labs:                        7.6    11.82 )-----------( 374      ( 31 Aug 2021 06:26 )             24.5     08-31    145  |  111<H>  |  38<H>  ----------------------------<  145<H>  4.3   |  24  |  0.61    Ca    9.2      31 Aug 2021 06:26  Phos  3.9     08-31  Mg     2.3     08-31      CAPILLARY BLOOD GLUCOSE      POCT Blood Glucose.: 142 mg/dL (31 Aug 2021 07:13)        PT/INR - ( 30 Aug 2021 10:49 )   PT: 13.5 sec;   INR: 1.13          PTT - ( 30 Aug 2021 10:49 )  PTT:61.3 sec   Neurology  Progress Note  08-31-21    Name:  AJ HAWKINS; 89y    Interval History: Patient seen and examined at bedside. Denies headache, dizziness, throat pain, chest pain, SOB, palpitations, N/V/D. No complaints at this time. Unable to assess swallowing function since pt is NPO. She is more tachpneic on exam although denies shortness of breath.     REVIEW OF SYSTEMS:  As states in HPI.    Medications:  acetaminophen   Tablet .. 650 milliGRAM(s) Oral once  acetaminophen  IVPB .. 700 milliGRAM(s) IV Intermittent once PRN  acetaminophen  IVPB .. 700 milliGRAM(s) IV Intermittent once  acetaminophen  IVPB .. 620 milliGRAM(s) IV Intermittent once  acetaminophen  IVPB .. 620 milliGRAM(s) IV Intermittent once  acetaminophen  IVPB .. 620 milliGRAM(s) IV Intermittent once  acetaminophen  IVPB .. 620 milliGRAM(s) IV Intermittent once  acetaminophen  IVPB .. 620 milliGRAM(s) IV Intermittent once  acetaminophen  IVPB .. 620 milliGRAM(s) IV Intermittent once  acetaminophen  IVPB .. 620 milliGRAM(s) IV Intermittent once  acetaminophen  IVPB .. 620 milliGRAM(s) IV Intermittent once  acetaminophen  IVPB .. 620 milliGRAM(s) IV Intermittent once  acetaminophen  IVPB .. 700 milliGRAM(s) IV Intermittent once  acetaminophen  IVPB .. 750 milliGRAM(s) IV Intermittent once  albumin human  5% IVPB 250 milliLiter(s) IV Intermittent once  albumin human 25% IVPB 50 milliLiter(s) IV Intermittent every 8 hours  albumin human 25% IVPB 50 milliLiter(s) IV Intermittent every 8 hours  albuterol/ipratropium for Nebulization. 3 milliLiter(s) Nebulizer once  aspirin Suppository 300 milliGRAM(s) Rectal daily  benzocaine 20% Spray 1 Spray(s) Topical once  chlorhexidine 2% Cloths 1 Application(s) Topical daily  chlorhexidine 4% Liquid 1 Application(s) Topical <User Schedule>  dextrose 40% Gel 15 Gram(s) Oral once  dextrose 5%. 1000 milliLiter(s) IV Continuous <Continuous>  dextrose 5%. 1000 milliLiter(s) IV Continuous <Continuous>  dextrose 50% Injectable 25 Gram(s) IV Push once  dextrose 50% Injectable 12.5 Gram(s) IV Push once  dextrose 50% Injectable 25 Gram(s) IV Push once  ertapenem  IVPB 1000 milliGRAM(s) IV Intermittent once  fat emulsion (Fish Oil and Plant Based) 20% Infusion 1.22 Gm/kG/Day IV Continuous <Continuous>  fat emulsion (Fish Oil and Plant Based) 20% Infusion 1.22 Gm/kG/Day IV Continuous <Continuous>  fat emulsion (Fish Oil and Plant Based) 20% Infusion 0.5 Gm/kG/Day IV Continuous <Continuous>  fat emulsion (Fish Oil and Plant Based) 20% Infusion 0.5 Gm/kG/Day IV Continuous <Continuous>  fat emulsion (Fish Oil and Plant Based) 20% Infusion 0.5 Gm/kG/Day IV Continuous <Continuous>  fat emulsion (Fish Oil and Plant Based) 20% Infusion 0.5 Gm/kG/Day IV Continuous <Continuous>  fat emulsion (Fish Oil and Plant Based) 20% Infusion 0.5 Gm/kG/Day IV Continuous <Continuous>  fat emulsion (Fish Oil and Plant Based) 20% Infusion 0.5 Gm/kG/Day IV Continuous <Continuous>  fat emulsion (Fish Oil and Plant Based) 20% Infusion 0.5 Gm/kG/Day IV Continuous <Continuous>  fat emulsion (Fish Oil and Plant Based) 20% Infusion 0.3759 Gm/kG/Day IV Continuous <Continuous>  fat emulsion (Fish Oil and Plant Based) 20% Infusion 0.3759 Gm/kG/Day IV Continuous <Continuous>  fentaNYL    Injectable 50 MICROGram(s) IV Push once  furosemide   Injectable 20 milliGRAM(s) IV Push once  furosemide   Injectable 20 milliGRAM(s) IV Push once  furosemide   Injectable 20 milliGRAM(s) IV Push once  furosemide   Injectable 20 milliGRAM(s) IV Push once  furosemide   Injectable 20 milliGRAM(s) IV Push once  glucagon  Injectable 1 milliGRAM(s) IntraMuscular once  haloperidol    Injectable 0.5 milliGRAM(s) IV Push once PRN  heparin   Injectable 5000 Unit(s) SubCutaneous every 12 hours  insulin lispro (ADMELOG) corrective regimen sliding scale   SubCutaneous every 6 hours  lactated ringers Bolus 250 milliLiter(s) IV Bolus once  lactated ringers Bolus 500 milliLiter(s) IV Bolus once  lactated ringers Bolus 500 milliLiter(s) IV Bolus once  lactated ringers Bolus 500 milliLiter(s) IV Bolus once  lactated ringers Bolus 500 milliLiter(s) IV Bolus once  lidocaine   4% Patch 2 Patch Transdermal once  lidocaine   4% Patch 1 Patch Transdermal every 24 hours  lidocaine   4% Patch 1 Patch Transdermal every 24 hours  magnesium hydroxide Suspension 30 milliLiter(s) Oral once  magnesium sulfate  IVPB 1 Gram(s) IV Intermittent once  magnesium sulfate  IVPB 1 Gram(s) IV Intermittent once  magnesium sulfate  IVPB 1 Gram(s) IV Intermittent once  magnesium sulfate  IVPB 1 Gram(s) IV Intermittent once  magnesium sulfate  IVPB 1 Gram(s) IV Intermittent once  metoclopramide Injectable 10 milliGRAM(s) IV Push once  metoprolol tartrate Injectable 2.5 milliGRAM(s) IV Push every 6 hours PRN  midazolam Injectable 2 milliGRAM(s) IntraMuscular once  midazolam Injectable 3 milliGRAM(s) IV Push once  nystatin Powder 1 Application(s) Topical two times a day  ondansetron Injectable 4 milliGRAM(s) IV Push once  ondansetron Injectable 4 milliGRAM(s) IV Push once  ondansetron Injectable 4 milliGRAM(s) IV Push every 6 hours PRN  pantoprazole  Injectable 40 milliGRAM(s) IV Push every 12 hours  Parenteral Nutrition - Adult 1 Each TPN Continuous <Continuous>  Parenteral Nutrition - Adult 1 Each TPN Continuous <Continuous>  Parenteral Nutrition - Adult 1 Each TPN Continuous <Continuous>  Parenteral Nutrition - Adult 1 Each TPN Continuous <Continuous>  Parenteral Nutrition - Adult 1 Each TPN Continuous <Continuous>  Parenteral Nutrition - Adult 1 Each TPN Continuous <Continuous>  Parenteral Nutrition - Adult 1 Each TPN Continuous <Continuous>  Parenteral Nutrition - Adult 1 Each TPN Continuous <Continuous>  Parenteral Nutrition - Adult 1 Each TPN Continuous <Continuous>  Parenteral Nutrition - Adult 1 Each TPN Continuous <Continuous>  piperacillin/tazobactam IVPB. 3.375 Gram(s) IV Intermittent once  piperacillin/tazobactam IVPB... 3.375 Gram(s) IV Intermittent once  potassium chloride   Powder 40 milliEquivalent(s) Oral once  potassium chloride  10 mEq/100 mL IVPB 10 milliEquivalent(s) IV Intermittent every 1 hour  potassium chloride  10 mEq/100 mL IVPB 10 milliEquivalent(s) IV Intermittent every 1 hour  potassium chloride  10 mEq/100 mL IVPB 10 milliEquivalent(s) IV Intermittent every 1 hour  potassium chloride  20 mEq/100 mL IVPB 20 milliEquivalent(s) IV Intermittent once  potassium phosphate / sodium phosphate Powder (PHOS-NaK) 1 Packet(s) Oral once  potassium phosphate IVPB 15 milliMole(s) IV Intermittent once  potassium phosphate IVPB 21 milliMole(s) IV Intermittent once  potassium phosphate IVPB 15 milliMole(s) IV Intermittent once  potassium phosphate IVPB 21 milliMole(s) IV Intermittent once  saline laxative (FLEET) Rectal Enema 1 Enema Rectal once  saline laxative (FLEET) Rectal Enema 1 Enema Rectal once  saline laxative (FLEET) Rectal Enema 1 Enema Rectal once  saline laxative (FLEET) Rectal Enema 1 Enema Rectal once  sodium chloride 0.9% Bolus 1500 milliLiter(s) IV Bolus once  sodium chloride 0.9% Bolus 250 milliLiter(s) IV Bolus once  sodium chloride 0.9% Bolus 500 milliLiter(s) IV Bolus once  sodium chloride 0.9% Bolus 500 milliLiter(s) IV Bolus once  sodium chloride 0.9% lock flush 10 milliLiter(s) IV Push every 1 hour PRN  sodium phosphate IVPB 15 milliMole(s) IV Intermittent once  sodium phosphate IVPB 15 milliMole(s) IV Intermittent once  sodium phosphate IVPB 15 milliMole(s) IV Intermittent once  sodium phosphate IVPB 15 milliMole(s) IV Intermittent once  sodium phosphate IVPB 21 milliMole(s) IV Intermittent once  sodium phosphate IVPB 15 milliMole(s) IV Intermittent once  sodium phosphate IVPB 15 milliMole(s) IV Intermittent once    Vitals:  T(C): 36.9 (08-31-21 @ 10:15), Max: 37.1 (08-30-21 @ 14:00)  HR: 98 (08-31-21 @ 08:00) (62 - 98)  BP: 110/53 (08-31-21 @ 08:00) (101/56 - 134/60)  RR: 18 (08-31-21 @ 08:00) (18 - 18)  SpO2: 95% (08-31-21 @ 08:00) (94% - 98%)    PHYSICAL EXAM:   General: Frail, cachectic elderly female. Mild respiratory distress   HEENT: AT/NC. Dry MM. No oral lesions or airway obstruction   Respiratory: Tachypneic with use of accessory muscles. Gurgling breath sounds   Neck: R TLC in place. L sided anterior neck tenderness with significant bounding pulse   Neurologic:  -Mental status: Awake, alert, oriented to person, place, and time. Speech is fluent with no dysarthria. Follows commands. Attention/concentration intact. Fund of knowledge appropriate.  -Cranial nerves:   II: Visual fields are full to confrontation.  III, IV, VI: Extraocular movements are intact without nystagmus. Pupils equally round and reactive to light  V:  Facial sensation V1-V3 equal and intact   VII: Face is symmetric with normal eye closure and smile  VIII: Hearing grossly intact  XII: Tongue protrudes midline  Motor: Diffuse sarcopenia. No pronator drift. Strength bilateral upper extremity 5/5, bilateral lower extremities 5/5.  Sensation: Intact to light touch bilaterally  Coordination: No dysmetria on finger-to-nose bilaterally    Labs:                        7.6    11.82 )-----------( 374      ( 31 Aug 2021 06:26 )             24.5     08-31    145  |  111<H>  |  38<H>  ----------------------------<  145<H>  4.3   |  24  |  0.61    Ca    9.2      31 Aug 2021 06:26  Phos  3.9     08-31  Mg     2.3     08-31      CAPILLARY BLOOD GLUCOSE      POCT Blood Glucose.: 142 mg/dL (31 Aug 2021 07:13)        PT/INR - ( 30 Aug 2021 10:49 )   PT: 13.5 sec;   INR: 1.13          PTT - ( 30 Aug 2021 10:49 )  PTT:61.3 sec

## 2021-09-01 LAB
ALBUMIN SERPL ELPH-MCNC: 4.4 G/DL — SIGNIFICANT CHANGE UP (ref 3.3–5)
ANION GAP SERPL CALC-SCNC: 13 MMOL/L — SIGNIFICANT CHANGE UP (ref 5–17)
BUN SERPL-MCNC: 49 MG/DL — HIGH (ref 7–23)
CALCIUM SERPL-MCNC: 9.4 MG/DL — SIGNIFICANT CHANGE UP (ref 8.4–10.5)
CHLORIDE SERPL-SCNC: 110 MMOL/L — HIGH (ref 96–108)
CO2 SERPL-SCNC: 23 MMOL/L — SIGNIFICANT CHANGE UP (ref 22–31)
CREAT SERPL-MCNC: 0.67 MG/DL — SIGNIFICANT CHANGE UP (ref 0.5–1.3)
GLUCOSE BLDC GLUCOMTR-MCNC: 136 MG/DL — HIGH (ref 70–99)
GLUCOSE BLDC GLUCOMTR-MCNC: 137 MG/DL — HIGH (ref 70–99)
GLUCOSE BLDC GLUCOMTR-MCNC: 158 MG/DL — HIGH (ref 70–99)
GLUCOSE BLDC GLUCOMTR-MCNC: 172 MG/DL — HIGH (ref 70–99)
GLUCOSE BLDC GLUCOMTR-MCNC: 176 MG/DL — HIGH (ref 70–99)
GLUCOSE BLDC GLUCOMTR-MCNC: 218 MG/DL — HIGH (ref 70–99)
GLUCOSE SERPL-MCNC: 220 MG/DL — HIGH (ref 70–99)
HCT VFR BLD CALC: 24.1 % — LOW (ref 34.5–45)
HCT VFR BLD CALC: 28.3 % — LOW (ref 34.5–45)
HGB BLD-MCNC: 7.6 G/DL — LOW (ref 11.5–15.5)
HGB BLD-MCNC: 9 G/DL — LOW (ref 11.5–15.5)
MAGNESIUM SERPL-MCNC: 2.3 MG/DL — SIGNIFICANT CHANGE UP (ref 1.6–2.6)
MCHC RBC-ENTMCNC: 28.8 PG — SIGNIFICANT CHANGE UP (ref 27–34)
MCHC RBC-ENTMCNC: 28.8 PG — SIGNIFICANT CHANGE UP (ref 27–34)
MCHC RBC-ENTMCNC: 31.5 GM/DL — LOW (ref 32–36)
MCHC RBC-ENTMCNC: 31.8 GM/DL — LOW (ref 32–36)
MCV RBC AUTO: 90.4 FL — SIGNIFICANT CHANGE UP (ref 80–100)
MCV RBC AUTO: 91.3 FL — SIGNIFICANT CHANGE UP (ref 80–100)
NRBC # BLD: 0 /100 WBCS — SIGNIFICANT CHANGE UP (ref 0–0)
NRBC # BLD: 0 /100 WBCS — SIGNIFICANT CHANGE UP (ref 0–0)
PHOSPHATE SERPL-MCNC: 4 MG/DL — SIGNIFICANT CHANGE UP (ref 2.5–4.5)
PLATELET # BLD AUTO: 377 K/UL — SIGNIFICANT CHANGE UP (ref 150–400)
PLATELET # BLD AUTO: 394 K/UL — SIGNIFICANT CHANGE UP (ref 150–400)
POTASSIUM SERPL-MCNC: 4.5 MMOL/L — SIGNIFICANT CHANGE UP (ref 3.5–5.3)
POTASSIUM SERPL-SCNC: 4.5 MMOL/L — SIGNIFICANT CHANGE UP (ref 3.5–5.3)
RBC # BLD: 2.64 M/UL — LOW (ref 3.8–5.2)
RBC # BLD: 3.13 M/UL — LOW (ref 3.8–5.2)
RBC # FLD: 16.6 % — HIGH (ref 10.3–14.5)
RBC # FLD: 17.2 % — HIGH (ref 10.3–14.5)
SODIUM SERPL-SCNC: 146 MMOL/L — HIGH (ref 135–145)
TROPONIN T SERPL-MCNC: 0.16 NG/ML — CRITICAL HIGH (ref 0–0.01)
WBC # BLD: 16.1 K/UL — HIGH (ref 3.8–10.5)
WBC # BLD: 16.67 K/UL — HIGH (ref 3.8–10.5)
WBC # FLD AUTO: 16.1 K/UL — HIGH (ref 3.8–10.5)
WBC # FLD AUTO: 16.67 K/UL — HIGH (ref 3.8–10.5)

## 2021-09-01 PROCEDURE — 76770 US EXAM ABDO BACK WALL COMP: CPT | Mod: 26

## 2021-09-01 PROCEDURE — 99232 SBSQ HOSP IP/OBS MODERATE 35: CPT

## 2021-09-01 PROCEDURE — 99233 SBSQ HOSP IP/OBS HIGH 50: CPT | Mod: GC

## 2021-09-01 PROCEDURE — 93306 TTE W/DOPPLER COMPLETE: CPT | Mod: 26

## 2021-09-01 PROCEDURE — 93880 EXTRACRANIAL BILAT STUDY: CPT | Mod: 26

## 2021-09-01 PROCEDURE — 99291 CRITICAL CARE FIRST HOUR: CPT

## 2021-09-01 PROCEDURE — 99233 SBSQ HOSP IP/OBS HIGH 50: CPT

## 2021-09-01 RX ORDER — VANCOMYCIN HCL 1 G
500 VIAL (EA) INTRAVENOUS
Refills: 0 | Status: DISCONTINUED | OUTPATIENT
Start: 2021-09-01 | End: 2021-09-01

## 2021-09-01 RX ORDER — ALBUMIN HUMAN 25 %
250 VIAL (ML) INTRAVENOUS ONCE
Refills: 0 | Status: COMPLETED | OUTPATIENT
Start: 2021-09-01 | End: 2021-09-01

## 2021-09-01 RX ORDER — ACETAMINOPHEN 500 MG
500 TABLET ORAL ONCE
Refills: 0 | Status: COMPLETED | OUTPATIENT
Start: 2021-09-01 | End: 2021-09-01

## 2021-09-01 RX ORDER — ALBUMIN HUMAN 25 %
250 VIAL (ML) INTRAVENOUS ONCE
Refills: 0 | Status: DISCONTINUED | OUTPATIENT
Start: 2021-09-01 | End: 2021-09-01

## 2021-09-01 RX ORDER — ELECTROLYTE SOLUTION,INJ
1 VIAL (ML) INTRAVENOUS
Refills: 0 | Status: DISCONTINUED | OUTPATIENT
Start: 2021-09-01 | End: 2021-09-02

## 2021-09-01 RX ORDER — I.V. FAT EMULSION 20 G/100ML
0.38 EMULSION INTRAVENOUS
Qty: 20 | Refills: 0 | Status: DISCONTINUED | OUTPATIENT
Start: 2021-09-01 | End: 2021-09-02

## 2021-09-01 RX ORDER — IPRATROPIUM/ALBUTEROL SULFATE 18-103MCG
3 AEROSOL WITH ADAPTER (GRAM) INHALATION ONCE
Refills: 0 | Status: DISCONTINUED | OUTPATIENT
Start: 2021-09-01 | End: 2021-09-01

## 2021-09-01 RX ORDER — ASPIRIN/CALCIUM CARB/MAGNESIUM 324 MG
150 TABLET ORAL DAILY
Refills: 0 | Status: DISCONTINUED | OUTPATIENT
Start: 2021-09-02 | End: 2021-09-02

## 2021-09-01 RX ADMIN — LIDOCAINE 1 PATCH: 4 CREAM TOPICAL at 13:05

## 2021-09-01 RX ADMIN — Medication 2: at 05:43

## 2021-09-01 RX ADMIN — Medication 200 MILLIGRAM(S): at 21:15

## 2021-09-01 RX ADMIN — MEROPENEM 100 MILLIGRAM(S): 1 INJECTION INTRAVENOUS at 05:33

## 2021-09-01 RX ADMIN — Medication 1 EACH: at 18:15

## 2021-09-01 RX ADMIN — LIDOCAINE 1 PATCH: 4 CREAM TOPICAL at 17:09

## 2021-09-01 RX ADMIN — Medication 3 MILLILITER(S): at 17:08

## 2021-09-01 RX ADMIN — Medication 1: at 00:29

## 2021-09-01 RX ADMIN — Medication 3 MILLILITER(S): at 07:24

## 2021-09-01 RX ADMIN — Medication 3 MILLILITER(S): at 04:05

## 2021-09-01 RX ADMIN — Medication 500 MILLIGRAM(S): at 21:45

## 2021-09-01 RX ADMIN — PANTOPRAZOLE SODIUM 40 MILLIGRAM(S): 20 TABLET, DELAYED RELEASE ORAL at 12:51

## 2021-09-01 RX ADMIN — MEROPENEM 100 MILLIGRAM(S): 1 INJECTION INTRAVENOUS at 17:08

## 2021-09-01 RX ADMIN — NYSTATIN CREAM 1 APPLICATION(S): 100000 CREAM TOPICAL at 06:41

## 2021-09-01 RX ADMIN — LIDOCAINE 1 PATCH: 4 CREAM TOPICAL at 18:43

## 2021-09-01 RX ADMIN — Medication 300 MILLIGRAM(S): at 12:52

## 2021-09-01 RX ADMIN — LIDOCAINE 1 PATCH: 4 CREAM TOPICAL at 04:49

## 2021-09-01 RX ADMIN — I.V. FAT EMULSION 8.3 GM/KG/DAY: 20 EMULSION INTRAVENOUS at 22:01

## 2021-09-01 RX ADMIN — LIDOCAINE 1 PATCH: 4 CREAM TOPICAL at 18:44

## 2021-09-01 RX ADMIN — Medication 3 MILLILITER(S): at 00:29

## 2021-09-01 RX ADMIN — CHLORHEXIDINE GLUCONATE 1 APPLICATION(S): 213 SOLUTION TOPICAL at 05:44

## 2021-09-01 RX ADMIN — NYSTATIN CREAM 1 APPLICATION(S): 100000 CREAM TOPICAL at 17:08

## 2021-09-01 RX ADMIN — Medication 125 MILLILITER(S): at 23:11

## 2021-09-01 RX ADMIN — Medication 3 MILLILITER(S): at 22:15

## 2021-09-01 RX ADMIN — PANTOPRAZOLE SODIUM 40 MILLIGRAM(S): 20 TABLET, DELAYED RELEASE ORAL at 23:11

## 2021-09-01 RX ADMIN — Medication 1: at 12:58

## 2021-09-01 NOTE — PROGRESS NOTE ADULT - ASSESSMENT
89F PMH HTN, chronic back pain, triple vessel CAD, colorectal malignancy (2018) loss of followed up. 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 Colonoscopy w/ sigmoid mass bx on 8/11- invasive adenocarcinoma moderately differentiated. Patient started on 8/11 on Meropenem for polymicrobial wound culture including anaerobes and ESBL E. Coli, last day 8/24. With multiple revisions last OR 8/20 exlap STEPHANIE. Patient completed Meropenem and since 8/24 is off ABx. ID was reconsulted for suspected aspiration PNA vs. HAP in the settingof acute respiratory distress. Today, BCx 8/31 NGTD, MRSA swab neg, O/N 1upRBC for hgb 7.6 with appropriate response, placed back on BiPAP. Last fever on 8/31 1pm 100.7. Mild improvement of leukocytosis. TTE with severe MR.    - DDx aspiration PNA vs. HAP vs. PE in the setting of CRC, immobile (only moderate risk Well's 2.5)  - Can get LEUS to r/o DVT  - If possible please obtain sputum culture  - AS MRSA swab is neg in non intubated pt can dc'd vancomycin   - C/w empirically on Meropenem 1g q12  - f/u Bcx and sputum Cx, NGTD  - On going GOC conversation    ID Team 1 will continue to follow. Please see below attending attestation for finalized recommendations.    Papi Portillo MD PGY2 Internal Medicine  Infectious Disease Consult Service, Team 1

## 2021-09-01 NOTE — PROGRESS NOTE ADULT - SUBJECTIVE AND OBJECTIVE BOX
INCOMPLETE NOTE  INCOMPLETE NOTE  INCOMPLETE NOTE    Neurology  Progress Note  09-01-21    Name:  AJ HAWKINS; 89y    Interval History: Yesterday, pt was found to have new fever with acute hypoxic respiratory failure. Concern for aspiration PNA vs HAP. Also found to have ST depressions with elevated trops. She was placed on BiPAP, started on broad spectrum antibiotics and transferred to SICU.     REVIEW OF SYSTEMS:  As states in HPI.    Medications:  acetaminophen   Tablet .. 650 milliGRAM(s) Oral once  acetaminophen  IVPB .. 700 milliGRAM(s) IV Intermittent once  acetaminophen  IVPB .. 700 milliGRAM(s) IV Intermittent once PRN  acetaminophen  IVPB .. 700 milliGRAM(s) IV Intermittent once  acetaminophen  IVPB .. 620 milliGRAM(s) IV Intermittent once  acetaminophen  IVPB .. 620 milliGRAM(s) IV Intermittent once  acetaminophen  IVPB .. 620 milliGRAM(s) IV Intermittent once  acetaminophen  IVPB .. 620 milliGRAM(s) IV Intermittent once  acetaminophen  IVPB .. 620 milliGRAM(s) IV Intermittent once  acetaminophen  IVPB .. 620 milliGRAM(s) IV Intermittent once  acetaminophen  IVPB .. 620 milliGRAM(s) IV Intermittent once  acetaminophen  IVPB .. 620 milliGRAM(s) IV Intermittent once  acetaminophen  IVPB .. 620 milliGRAM(s) IV Intermittent once  acetaminophen  IVPB .. 1000 milliGRAM(s) IV Intermittent once  acetaminophen  IVPB .. 750 milliGRAM(s) IV Intermittent once  albumin human  5% IVPB 250 milliLiter(s) IV Intermittent once  albumin human  5% IVPB 250 milliLiter(s) IV Intermittent once  albumin human 25% IVPB 50 milliLiter(s) IV Intermittent every 8 hours  ALBUTerol    90 MICROgram(s) HFA Inhaler 1 Puff(s) Inhalation every 4 hours  ALBUTerol   0.042% 1.25 milliGRAM(s) Nebulizer once  albuterol/ipratropium for Nebulization 3 milliLiter(s) Nebulizer every 4 hours  albuterol/ipratropium for Nebulization. 3 milliLiter(s) Nebulizer once  albuterol/ipratropium for Nebulization. 3 milliLiter(s) Nebulizer once  aspirin Suppository 300 milliGRAM(s) Rectal daily  benzocaine 20% Spray 1 Spray(s) Topical once  chlorhexidine 2% Cloths 1 Application(s) Topical daily  chlorhexidine 4% Liquid 1 Application(s) Topical <User Schedule>  dextrose 40% Gel 15 Gram(s) Oral once  dextrose 5%. 1000 milliLiter(s) IV Continuous <Continuous>  dextrose 5%. 1000 milliLiter(s) IV Continuous <Continuous>  dextrose 50% Injectable 25 Gram(s) IV Push once  dextrose 50% Injectable 12.5 Gram(s) IV Push once  dextrose 50% Injectable 25 Gram(s) IV Push once  enoxaparin Injectable 40 milliGRAM(s) SubCutaneous every 24 hours  ertapenem  IVPB 1000 milliGRAM(s) IV Intermittent once  fat emulsion (Fish Oil and Plant Based) 20% Infusion 1.22 Gm/kG/Day IV Continuous <Continuous>  fat emulsion (Fish Oil and Plant Based) 20% Infusion 0.3759 Gm/kG/Day IV Continuous <Continuous>  fat emulsion (Fish Oil and Plant Based) 20% Infusion 0.5 Gm/kG/Day IV Continuous <Continuous>  fat emulsion (Fish Oil and Plant Based) 20% Infusion 0.5 Gm/kG/Day IV Continuous <Continuous>  fat emulsion (Fish Oil and Plant Based) 20% Infusion 0.5 Gm/kG/Day IV Continuous <Continuous>  fat emulsion (Fish Oil and Plant Based) 20% Infusion 0.5 Gm/kG/Day IV Continuous <Continuous>  fat emulsion (Fish Oil and Plant Based) 20% Infusion 0.5 Gm/kG/Day IV Continuous <Continuous>  fat emulsion (Fish Oil and Plant Based) 20% Infusion 0.5 Gm/kG/Day IV Continuous <Continuous>  fat emulsion (Fish Oil and Plant Based) 20% Infusion 0.3759 Gm/kG/Day IV Continuous <Continuous>  fat emulsion (Fish Oil and Plant Based) 20% Infusion 0.5 Gm/kG/Day IV Continuous <Continuous>  fat emulsion (Fish Oil and Plant Based) 20% Infusion 0.3759 Gm/kG/Day IV Continuous <Continuous>  fat emulsion (Fish Oil and Plant Based) 20% Infusion 1.22 Gm/kG/Day IV Continuous <Continuous>  fentaNYL    Injectable 50 MICROGram(s) IV Push once  furosemide   Injectable 20 milliGRAM(s) IV Push once  furosemide   Injectable 20 milliGRAM(s) IV Push once  furosemide   Injectable 20 milliGRAM(s) IV Push once  furosemide   Injectable 20 milliGRAM(s) IV Push once  furosemide   Injectable 20 milliGRAM(s) IV Push once  glucagon  Injectable 1 milliGRAM(s) IntraMuscular once  haloperidol    Injectable 0.5 milliGRAM(s) IV Push once PRN  insulin lispro (ADMELOG) corrective regimen sliding scale   SubCutaneous every 6 hours  lactated ringers Bolus 250 milliLiter(s) IV Bolus once  lactated ringers Bolus 500 milliLiter(s) IV Bolus once  lactated ringers Bolus 500 milliLiter(s) IV Bolus once  lactated ringers Bolus 500 milliLiter(s) IV Bolus once  lactated ringers Bolus 500 milliLiter(s) IV Bolus once  lidocaine   4% Patch 2 Patch Transdermal once  lidocaine   4% Patch 1 Patch Transdermal every 24 hours  lidocaine   4% Patch 1 Patch Transdermal every 24 hours  magnesium hydroxide Suspension 30 milliLiter(s) Oral once  magnesium sulfate  IVPB 1 Gram(s) IV Intermittent once  magnesium sulfate  IVPB 1 Gram(s) IV Intermittent once  magnesium sulfate  IVPB 1 Gram(s) IV Intermittent once  magnesium sulfate  IVPB 1 Gram(s) IV Intermittent once  magnesium sulfate  IVPB 1 Gram(s) IV Intermittent once  meropenem  IVPB      meropenem  IVPB 1000 milliGRAM(s) IV Intermittent once  meropenem  IVPB 1000 milliGRAM(s) IV Intermittent every 12 hours  methylPREDNISolone sodium succinate Injectable 60 milliGRAM(s) IV Push once  metoclopramide Injectable 10 milliGRAM(s) IV Push once  metoprolol tartrate Injectable 2.5 milliGRAM(s) IV Push every 6 hours PRN  midazolam Injectable 2 milliGRAM(s) IntraMuscular once  midazolam Injectable 3 milliGRAM(s) IV Push once  nystatin Powder 1 Application(s) Topical two times a day  ondansetron Injectable 4 milliGRAM(s) IV Push once  ondansetron Injectable 4 milliGRAM(s) IV Push once  ondansetron Injectable 4 milliGRAM(s) IV Push every 6 hours PRN  pantoprazole  Injectable 40 milliGRAM(s) IV Push every 12 hours  Parenteral Nutrition - Adult 1 Each TPN Continuous <Continuous>  Parenteral Nutrition - Adult 1 Each TPN Continuous <Continuous>  Parenteral Nutrition - Adult 1 Each TPN Continuous <Continuous>  Parenteral Nutrition - Adult 1 Each TPN Continuous <Continuous>  Parenteral Nutrition - Adult 1 Each TPN Continuous <Continuous>  Parenteral Nutrition - Adult 1 Each TPN Continuous <Continuous>  Parenteral Nutrition - Adult 1 Each TPN Continuous <Continuous>  Parenteral Nutrition - Adult 1 Each TPN Continuous <Continuous>  Parenteral Nutrition - Adult 1 Each TPN Continuous <Continuous>  Parenteral Nutrition - Adult 1 Each TPN Continuous <Continuous>  Parenteral Nutrition - Adult 1 Each TPN Continuous <Continuous>  piperacillin/tazobactam IVPB. 3.375 Gram(s) IV Intermittent once  piperacillin/tazobactam IVPB... 3.375 Gram(s) IV Intermittent once  potassium chloride   Powder 40 milliEquivalent(s) Oral once  potassium chloride  10 mEq/100 mL IVPB 10 milliEquivalent(s) IV Intermittent every 1 hour  potassium chloride  10 mEq/100 mL IVPB 10 milliEquivalent(s) IV Intermittent every 1 hour  potassium chloride  10 mEq/100 mL IVPB 10 milliEquivalent(s) IV Intermittent every 1 hour  potassium chloride  20 mEq/100 mL IVPB 20 milliEquivalent(s) IV Intermittent once  potassium phosphate / sodium phosphate Powder (PHOS-NaK) 1 Packet(s) Oral once  potassium phosphate IVPB 15 milliMole(s) IV Intermittent once  potassium phosphate IVPB 21 milliMole(s) IV Intermittent once  potassium phosphate IVPB 15 milliMole(s) IV Intermittent once  potassium phosphate IVPB 21 milliMole(s) IV Intermittent once  saline laxative (FLEET) Rectal Enema 1 Enema Rectal once  saline laxative (FLEET) Rectal Enema 1 Enema Rectal once  saline laxative (FLEET) Rectal Enema 1 Enema Rectal once  saline laxative (FLEET) Rectal Enema 1 Enema Rectal once  sodium chloride 0.45%. 1000 milliLiter(s) IV Continuous <Continuous>  sodium chloride 0.9% Bolus 500 milliLiter(s) IV Bolus once  sodium chloride 0.9% Bolus 1500 milliLiter(s) IV Bolus once  sodium chloride 0.9% Bolus 250 milliLiter(s) IV Bolus once  sodium chloride 0.9% Bolus 500 milliLiter(s) IV Bolus once  sodium chloride 0.9% lock flush 10 milliLiter(s) IV Push every 1 hour PRN  sodium phosphate IVPB 15 milliMole(s) IV Intermittent once  sodium phosphate IVPB 15 milliMole(s) IV Intermittent once  sodium phosphate IVPB 15 milliMole(s) IV Intermittent once  sodium phosphate IVPB 21 milliMole(s) IV Intermittent once  sodium phosphate IVPB 15 milliMole(s) IV Intermittent once  sodium phosphate IVPB 15 milliMole(s) IV Intermittent once  sodium phosphate IVPB 15 milliMole(s) IV Intermittent once  tiotropium 18 MICROgram(s) Capsule 1 Capsule(s) Inhalation daily  vancomycin  IVPB 750 milliGRAM(s) IV Intermittent <User Schedule>  vancomycin  IVPB 750 milliGRAM(s) IV Intermittent once    Vitals:  T(C): 37 (09-01-21 @ 05:30), Max: 38.2 (08-31-21 @ 13:07)  HR: 86 (09-01-21 @ 08:55) (84 - 118)  BP: 116/58 (09-01-21 @ 08:00) (105/52 - 134/61)  RR: 25 (09-01-21 @ 08:55) (18 - 28)  SpO2: 100% (09-01-21 @ 08:55) (84% - 100%)    Labs:                        7.6    16.67 )-----------( 394      ( 01 Sep 2021 03:51 )             24.1     09-01    146<H>  |  110<H>  |  49<H>  ----------------------------<  220<H>  4.5   |  23  |  0.67    Ca    9.4      01 Sep 2021 03:51  Phos  4.0     09-01  Mg     2.3     09-01    TPro  x   /  Alb  4.4  /  TBili  x   /  DBili  x   /  AST  x   /  ALT  x   /  AlkPhos  x   08-31    CAPILLARY BLOOD GLUCOSE      POCT Blood Glucose.: 218 mg/dL (01 Sep 2021 05:41)    LIVER FUNCTIONS - ( 31 Aug 2021 21:17 )  Alb: 4.4 g/dL / Pro: x     / ALK PHOS: x     / ALT: x     / AST: x     / GGT: x             Culture - Blood (collected 31 Aug 2021 15:11)  Source: .Blood Blood-Venous  Preliminary Report (01 Sep 2021 04:01):    No growth at 12 hours    Culture - Blood (collected 31 Aug 2021 15:11)  Source: .Blood Blood-Venous  Preliminary Report (01 Sep 2021 04:01):    No growth at 12 hours      PT/INR - ( 31 Aug 2021 14:28 )   PT: 13.5 sec;   INR: 1.13          PTT - ( 31 Aug 2021 14:28 )  PTT:30.8 sec  CSF: N/A      Radiology: REVIEWED Neurology  Progress Note  09-01-21    Name:  AJ HAWKINS; 89y    Interval History: Yesterday, pt was found to have new fever with acute hypoxic respiratory failure. Concern for aspiration PNA vs HAP. Also found to have ST depressions with elevated trops. She was placed on BiPAP, started on broad spectrum antibiotics and transferred to SICU. Patient seen and examined at bedside this morning. Difficult to obtain history since pt is on BiPAP. She reports discomfort from the BiPAP mask, otherwise no complaints. She denies any episodes of choking prior to this acute event. ROS otherwise negative.    REVIEW OF SYSTEMS:  As states in HPI.    Medications:  acetaminophen   Tablet .. 650 milliGRAM(s) Oral once  acetaminophen  IVPB .. 700 milliGRAM(s) IV Intermittent once  acetaminophen  IVPB .. 700 milliGRAM(s) IV Intermittent once PRN  acetaminophen  IVPB .. 700 milliGRAM(s) IV Intermittent once  acetaminophen  IVPB .. 620 milliGRAM(s) IV Intermittent once  acetaminophen  IVPB .. 620 milliGRAM(s) IV Intermittent once  acetaminophen  IVPB .. 620 milliGRAM(s) IV Intermittent once  acetaminophen  IVPB .. 620 milliGRAM(s) IV Intermittent once  acetaminophen  IVPB .. 620 milliGRAM(s) IV Intermittent once  acetaminophen  IVPB .. 620 milliGRAM(s) IV Intermittent once  acetaminophen  IVPB .. 620 milliGRAM(s) IV Intermittent once  acetaminophen  IVPB .. 620 milliGRAM(s) IV Intermittent once  acetaminophen  IVPB .. 620 milliGRAM(s) IV Intermittent once  acetaminophen  IVPB .. 1000 milliGRAM(s) IV Intermittent once  acetaminophen  IVPB .. 750 milliGRAM(s) IV Intermittent once  albumin human  5% IVPB 250 milliLiter(s) IV Intermittent once  albumin human  5% IVPB 250 milliLiter(s) IV Intermittent once  albumin human 25% IVPB 50 milliLiter(s) IV Intermittent every 8 hours  ALBUTerol    90 MICROgram(s) HFA Inhaler 1 Puff(s) Inhalation every 4 hours  ALBUTerol   0.042% 1.25 milliGRAM(s) Nebulizer once  albuterol/ipratropium for Nebulization 3 milliLiter(s) Nebulizer every 4 hours  albuterol/ipratropium for Nebulization. 3 milliLiter(s) Nebulizer once  albuterol/ipratropium for Nebulization. 3 milliLiter(s) Nebulizer once  aspirin Suppository 300 milliGRAM(s) Rectal daily  benzocaine 20% Spray 1 Spray(s) Topical once  chlorhexidine 2% Cloths 1 Application(s) Topical daily  chlorhexidine 4% Liquid 1 Application(s) Topical <User Schedule>  dextrose 40% Gel 15 Gram(s) Oral once  dextrose 5%. 1000 milliLiter(s) IV Continuous <Continuous>  dextrose 5%. 1000 milliLiter(s) IV Continuous <Continuous>  dextrose 50% Injectable 25 Gram(s) IV Push once  dextrose 50% Injectable 12.5 Gram(s) IV Push once  dextrose 50% Injectable 25 Gram(s) IV Push once  enoxaparin Injectable 40 milliGRAM(s) SubCutaneous every 24 hours  ertapenem  IVPB 1000 milliGRAM(s) IV Intermittent once  fat emulsion (Fish Oil and Plant Based) 20% Infusion 1.22 Gm/kG/Day IV Continuous <Continuous>  fat emulsion (Fish Oil and Plant Based) 20% Infusion 0.3759 Gm/kG/Day IV Continuous <Continuous>  fat emulsion (Fish Oil and Plant Based) 20% Infusion 0.5 Gm/kG/Day IV Continuous <Continuous>  fat emulsion (Fish Oil and Plant Based) 20% Infusion 0.5 Gm/kG/Day IV Continuous <Continuous>  fat emulsion (Fish Oil and Plant Based) 20% Infusion 0.5 Gm/kG/Day IV Continuous <Continuous>  fat emulsion (Fish Oil and Plant Based) 20% Infusion 0.5 Gm/kG/Day IV Continuous <Continuous>  fat emulsion (Fish Oil and Plant Based) 20% Infusion 0.5 Gm/kG/Day IV Continuous <Continuous>  fat emulsion (Fish Oil and Plant Based) 20% Infusion 0.5 Gm/kG/Day IV Continuous <Continuous>  fat emulsion (Fish Oil and Plant Based) 20% Infusion 0.3759 Gm/kG/Day IV Continuous <Continuous>  fat emulsion (Fish Oil and Plant Based) 20% Infusion 0.5 Gm/kG/Day IV Continuous <Continuous>  fat emulsion (Fish Oil and Plant Based) 20% Infusion 0.3759 Gm/kG/Day IV Continuous <Continuous>  fat emulsion (Fish Oil and Plant Based) 20% Infusion 1.22 Gm/kG/Day IV Continuous <Continuous>  fentaNYL    Injectable 50 MICROGram(s) IV Push once  furosemide   Injectable 20 milliGRAM(s) IV Push once  furosemide   Injectable 20 milliGRAM(s) IV Push once  furosemide   Injectable 20 milliGRAM(s) IV Push once  furosemide   Injectable 20 milliGRAM(s) IV Push once  furosemide   Injectable 20 milliGRAM(s) IV Push once  glucagon  Injectable 1 milliGRAM(s) IntraMuscular once  haloperidol    Injectable 0.5 milliGRAM(s) IV Push once PRN  insulin lispro (ADMELOG) corrective regimen sliding scale   SubCutaneous every 6 hours  lactated ringers Bolus 250 milliLiter(s) IV Bolus once  lactated ringers Bolus 500 milliLiter(s) IV Bolus once  lactated ringers Bolus 500 milliLiter(s) IV Bolus once  lactated ringers Bolus 500 milliLiter(s) IV Bolus once  lactated ringers Bolus 500 milliLiter(s) IV Bolus once  lidocaine   4% Patch 2 Patch Transdermal once  lidocaine   4% Patch 1 Patch Transdermal every 24 hours  lidocaine   4% Patch 1 Patch Transdermal every 24 hours  magnesium hydroxide Suspension 30 milliLiter(s) Oral once  magnesium sulfate  IVPB 1 Gram(s) IV Intermittent once  magnesium sulfate  IVPB 1 Gram(s) IV Intermittent once  magnesium sulfate  IVPB 1 Gram(s) IV Intermittent once  magnesium sulfate  IVPB 1 Gram(s) IV Intermittent once  magnesium sulfate  IVPB 1 Gram(s) IV Intermittent once  meropenem  IVPB      meropenem  IVPB 1000 milliGRAM(s) IV Intermittent once  meropenem  IVPB 1000 milliGRAM(s) IV Intermittent every 12 hours  methylPREDNISolone sodium succinate Injectable 60 milliGRAM(s) IV Push once  metoclopramide Injectable 10 milliGRAM(s) IV Push once  metoprolol tartrate Injectable 2.5 milliGRAM(s) IV Push every 6 hours PRN  midazolam Injectable 2 milliGRAM(s) IntraMuscular once  midazolam Injectable 3 milliGRAM(s) IV Push once  nystatin Powder 1 Application(s) Topical two times a day  ondansetron Injectable 4 milliGRAM(s) IV Push once  ondansetron Injectable 4 milliGRAM(s) IV Push once  ondansetron Injectable 4 milliGRAM(s) IV Push every 6 hours PRN  pantoprazole  Injectable 40 milliGRAM(s) IV Push every 12 hours  Parenteral Nutrition - Adult 1 Each TPN Continuous <Continuous>  Parenteral Nutrition - Adult 1 Each TPN Continuous <Continuous>  Parenteral Nutrition - Adult 1 Each TPN Continuous <Continuous>  Parenteral Nutrition - Adult 1 Each TPN Continuous <Continuous>  Parenteral Nutrition - Adult 1 Each TPN Continuous <Continuous>  Parenteral Nutrition - Adult 1 Each TPN Continuous <Continuous>  Parenteral Nutrition - Adult 1 Each TPN Continuous <Continuous>  Parenteral Nutrition - Adult 1 Each TPN Continuous <Continuous>  Parenteral Nutrition - Adult 1 Each TPN Continuous <Continuous>  Parenteral Nutrition - Adult 1 Each TPN Continuous <Continuous>  Parenteral Nutrition - Adult 1 Each TPN Continuous <Continuous>  piperacillin/tazobactam IVPB. 3.375 Gram(s) IV Intermittent once  piperacillin/tazobactam IVPB... 3.375 Gram(s) IV Intermittent once  potassium chloride   Powder 40 milliEquivalent(s) Oral once  potassium chloride  10 mEq/100 mL IVPB 10 milliEquivalent(s) IV Intermittent every 1 hour  potassium chloride  10 mEq/100 mL IVPB 10 milliEquivalent(s) IV Intermittent every 1 hour  potassium chloride  10 mEq/100 mL IVPB 10 milliEquivalent(s) IV Intermittent every 1 hour  potassium chloride  20 mEq/100 mL IVPB 20 milliEquivalent(s) IV Intermittent once  potassium phosphate / sodium phosphate Powder (PHOS-NaK) 1 Packet(s) Oral once  potassium phosphate IVPB 15 milliMole(s) IV Intermittent once  potassium phosphate IVPB 21 milliMole(s) IV Intermittent once  potassium phosphate IVPB 15 milliMole(s) IV Intermittent once  potassium phosphate IVPB 21 milliMole(s) IV Intermittent once  saline laxative (FLEET) Rectal Enema 1 Enema Rectal once  saline laxative (FLEET) Rectal Enema 1 Enema Rectal once  saline laxative (FLEET) Rectal Enema 1 Enema Rectal once  saline laxative (FLEET) Rectal Enema 1 Enema Rectal once  sodium chloride 0.45%. 1000 milliLiter(s) IV Continuous <Continuous>  sodium chloride 0.9% Bolus 500 milliLiter(s) IV Bolus once  sodium chloride 0.9% Bolus 1500 milliLiter(s) IV Bolus once  sodium chloride 0.9% Bolus 250 milliLiter(s) IV Bolus once  sodium chloride 0.9% Bolus 500 milliLiter(s) IV Bolus once  sodium chloride 0.9% lock flush 10 milliLiter(s) IV Push every 1 hour PRN  sodium phosphate IVPB 15 milliMole(s) IV Intermittent once  sodium phosphate IVPB 15 milliMole(s) IV Intermittent once  sodium phosphate IVPB 15 milliMole(s) IV Intermittent once  sodium phosphate IVPB 21 milliMole(s) IV Intermittent once  sodium phosphate IVPB 15 milliMole(s) IV Intermittent once  sodium phosphate IVPB 15 milliMole(s) IV Intermittent once  sodium phosphate IVPB 15 milliMole(s) IV Intermittent once  tiotropium 18 MICROgram(s) Capsule 1 Capsule(s) Inhalation daily  vancomycin  IVPB 750 milliGRAM(s) IV Intermittent <User Schedule>  vancomycin  IVPB 750 milliGRAM(s) IV Intermittent once    Vitals:  T(C): 37 (09-01-21 @ 05:30), Max: 38.2 (08-31-21 @ 13:07)  HR: 86 (09-01-21 @ 08:55) (84 - 118)  BP: 116/58 (09-01-21 @ 08:00) (105/52 - 134/61)  RR: 25 (09-01-21 @ 08:55) (18 - 28)  SpO2: 100% (09-01-21 @ 08:55) (84% - 100%)    PHYSICAL EXAM:   General: Frail, cachectic elderly female   HEENT: AT/NC. PERRL   Respiratory: On BiPAP   Neck: L anterior neck tenderness with bounding pulse   Neurologic:  -Mental status: Awake, alert, oriented to person, place, and time. Speech is difficult to hear through the BiPAP. Follows commands.  -Cranial nerves:   II: Visual fields are full to confrontation.  III, IV, VI: Extraocular movements are intact without nystagmus. Pupils equally round and reactive to light  V:  Facial sensation V1-V3 equal and intact   VII: Face is symmetric with normal eye closure and smile  VIII: Hearing grossly intact  XII: Tongue protrudes midline  Motor: Diffuse sarcopenia. No pronator drift. Strength bilateral upper extremity 5/5, bilateral lower extremities 5/5.  Sensation: Intact to light touch bilaterally    Labs:                        7.6    16.67 )-----------( 394      ( 01 Sep 2021 03:51 )             24.1     09-01    146<H>  |  110<H>  |  49<H>  ----------------------------<  220<H>  4.5   |  23  |  0.67    Ca    9.4      01 Sep 2021 03:51  Phos  4.0     09-01  Mg     2.3     09-01    TPro  x   /  Alb  4.4  /  TBili  x   /  DBili  x   /  AST  x   /  ALT  x   /  AlkPhos  x   08-31    CAPILLARY BLOOD GLUCOSE      POCT Blood Glucose.: 218 mg/dL (01 Sep 2021 05:41)    LIVER FUNCTIONS - ( 31 Aug 2021 21:17 )  Alb: 4.4 g/dL / Pro: x     / ALK PHOS: x     / ALT: x     / AST: x     / GGT: x             Culture - Blood (collected 31 Aug 2021 15:11)  Source: .Blood Blood-Venous  Preliminary Report (01 Sep 2021 04:01):    No growth at 12 hours    Culture - Blood (collected 31 Aug 2021 15:11)  Source: .Blood Blood-Venous  Preliminary Report (01 Sep 2021 04:01):    No growth at 12 hours      PT/INR - ( 31 Aug 2021 14:28 )   PT: 13.5 sec;   INR: 1.13          PTT - ( 31 Aug 2021 14:28 )  PTT:30.8 sec  CSF: N/A      Radiology: REVIEWED

## 2021-09-01 NOTE — PROVIDER CONTACT NOTE (OTHER) - REASON
Pt coughed after drinking 5cc water
urine output 10 ml and 5 ml for last two hours
Low UO
Pt hypoxic/tachypenic
Pt is hypertensive
left upper arm infiltration
NGT appears to be out of place
TACHY & TACHYPNEIC
Urine output <30 cc s/p bolus
patient vomited moderate amount coffee ground emesis, c/o nausea, difficultly breathing

## 2021-09-01 NOTE — PROGRESS NOTE ADULT - SUBJECTIVE AND OBJECTIVE BOX
SUBJECTIVE: pt seen and examined. comfortable on hiflo. ROS limited dt confusion, mentation.     DNR in chart: No      If  [ ] blank, symptom not present  Dyspnea:                           [x ]Mild [ ]Moderate [ ]Severe  Anxiety:                             [ ]Mild [ ]Moderate [ ]Severe  Fatigue:                             [ ]Mild [x ]Moderate [ ]Severe  Nausea:                             [ ]Mild [ ]Moderate [ ]Severe  Loss of appetite:              [ ]Mild [x ]Moderate [ ]Severe  Constipation:                    [ ]Mild [ ]Moderate [ ]Severe  Grief Present                    [ ] Yes   [x ] No     Pain (Impact on QOL):   denies abdominal pain       Rest of 12 point review of systems negative unless documented otherwise in note.    PEx:  T(C): 36.1 (09-01-21 @ 18:22), Max: 37.4 (09-01-21 @ 00:35)  HR: 81 (09-01-21 @ 18:00) (81 - 94)  BP: 114/55 (09-01-21 @ 18:00) (111/54 - 134/61)  RR: 20 (09-01-21 @ 18:00) (18 - 28)  SpO2: 97% (09-01-21 @ 18:00) (93% - 100%)  Wt(kg): --      GENERAL: thin, tired appearing elderly female, in no acute distress.  HEENT: Temporal wasting MMM.    RESPIRATORY: CTAB. No wheezing, rales or rhonchi. Fair inspiratory effort. +hiflo  CARDIOVASCULAR: RRR. No audible murmurs, rubs or gallops. +tele  GASTROINTESTINAL: Abdomen soft, NT. No arden-incisional drainage or purulence. Ostomy over left abdomen  NEUROLOGIC: alert, oriented to name, year, situation only, unable to name month, or place,  4/5 strength bilat upper extremities   INTEGUMENTARY: thin, pale, Significant ecchymosis over right wrist, forearm, and dorsal aspect of right hand.  MUSCULOSKELETAL: No cyanosis or clubbing. No gross deformities  Psych: calm,  	   ALLERGIES: No Known Allergies      MEDICATIONS: REVIEWED  MEDICATIONS  (STANDING):  ALBUTerol    90 MICROgram(s) HFA Inhaler 1 Puff(s) Inhalation every 4 hours  albuterol/ipratropium for Nebulization 3 milliLiter(s) Nebulizer every 4 hours  chlorhexidine 2% Cloths 1 Application(s) Topical daily  chlorhexidine 4% Liquid 1 Application(s) Topical <User Schedule>  dextrose 40% Gel 15 Gram(s) Oral once  dextrose 5%. 1000 milliLiter(s) (100 mL/Hr) IV Continuous <Continuous>  dextrose 5%. 1000 milliLiter(s) (50 mL/Hr) IV Continuous <Continuous>  dextrose 50% Injectable 25 Gram(s) IV Push once  dextrose 50% Injectable 12.5 Gram(s) IV Push once  dextrose 50% Injectable 25 Gram(s) IV Push once  fat emulsion (Fish Oil and Plant Based) 20% Infusion 0.3759 Gm/kG/Day (8.3 mL/Hr) IV Continuous <Continuous>  glucagon  Injectable 1 milliGRAM(s) IntraMuscular once  insulin lispro (ADMELOG) corrective regimen sliding scale   SubCutaneous every 6 hours  lidocaine   4% Patch 1 Patch Transdermal every 24 hours  lidocaine   4% Patch 1 Patch Transdermal every 24 hours  meropenem  IVPB      meropenem  IVPB 1000 milliGRAM(s) IV Intermittent every 12 hours  nystatin Powder 1 Application(s) Topical two times a day  pantoprazole  Injectable 40 milliGRAM(s) IV Push every 12 hours  Parenteral Nutrition - Adult 1 Each (38 mL/Hr) TPN Continuous <Continuous>  Parenteral Nutrition - Adult 1 Each (46 mL/Hr) TPN Continuous <Continuous>  sodium chloride 0.45%. 1000 milliLiter(s) (80 mL/Hr) IV Continuous <Continuous>  tiotropium 18 MICROgram(s) Capsule 1 Capsule(s) Inhalation daily    MEDICATIONS  (PRN):  ondansetron Injectable 4 milliGRAM(s) IV Push every 6 hours PRN Nausea and/or Vomiting  sodium chloride 0.9% lock flush 10 milliLiter(s) IV Push every 1 hour PRN Pre/post blood products, medications, blood draw, and to maintain line patency      CRITICAL CARE:  [ ]Shock Present  [ ]Septic [ ]Cardiogenic [ ]Neurologic [ ]Hypovolemic    [ ]Vasopressors [ ]Inotropes    [ ]Respiratory failure present   [ ]Mechanical Ventilation   [x  ]Non-invasive ventilatory support   [x ]High-Flow  [ ]Acute  [ ]Chronic   [ ]Hypoxic  [ ]Hypercarbic   [ ]Other    [ ]Other organ failure     LABS: REVIEWED  CBC:                        9.0    16.10 )-----------( 377      ( 01 Sep 2021 10:24 )             28.3     CMP:    09-01    146<H>  |  110<H>  |  49<H>  ----------------------------<  220<H>  4.5   |  23  |  0.67    Ca    9.4      01 Sep 2021 03:51  Phos  4.0     09-01  Mg     2.3     09-01    TPro  x   /  Alb  4.4  /  TBili  x   /  DBili  x   /  AST  x   /  ALT  x   /  AlkPhos  x   08-31    IMAGING: REVIEWED    Decision maker: The patient is able to participate in complex medical decision making conversations.   Legal surrogate: Cynthia Sims # [phone]  Pt's niece also involved in GOC    ADVANCE DIRECTIVES & GOALS OF CARE  - Full Code  - Multiple GOC conversations with palliative care. Prior to visit, pt and HCP wanting to pursue all comprehensive medical intervention. 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 Pt unlikely candidate for disease directed therapy.   -Primary team to discuss pts hospital course with pt's niece at bedside today   -Palliative care info/support provided	        PSYCHOSOCIAL-SPIRITUAL ASSESSMENT: Reviewed, Care plan unchanged    REFERRALS:  [ ] palliative social work [ ]Chaplaincy  [ ]Hospice  [ ]Child Life  [ ]Social Work  [ ]Case management [  ]Holistic Therapy

## 2021-09-01 NOTE — CONSULT NOTE ADULT - SUBJECTIVE AND OBJECTIVE BOX
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 Cardiology consulted for troponin elevation troponin 0.09 -> 0.2. Limited history by pt, who currently denies CP but shakes head yes when asked if she has had CP over past few days.     LHC 4/1:  of RCA/LCx; diffuse 80% mLAD    Lexiscan 3/31: Myocardial perfusion imaging is abnormal. There is a small area of mild ischemia in the basal inferior and basal inferolateral walls. Overall left ventricular systolic function is normal without regional wall motion abnormalities. The EKG stress test is normal.    TTE 3/30/21:   1. Injection with agitated saline reveals late bubbles (after 3 heart beats) - difficult to determine intracardiac shunt vs pulmonary AV malformation.   2. Mild aortic stenosis.   3. No other significant valvular disease.   4. The right ventricle is normal in size. Right ventricular systolic function is normal.   5. The left ventricle cavity size is normal. There is mild concentric left ventricular hypertrophy. Left ventricular ejection fraction is 55-60%. There is basal inferolateral and basal inferior wall hypokinesis.   6. No prior echo is available for comparison.    HPI:  HPI:89F PMHx of chronic back pain 2/2 L3,L4 compression fractures, triple vessel CAD on ASA, plavix, lipitor (04/21) and L CEA 2/2 L ICA stenosis >70% (4/21), recent ED visit on 6/28 for UTI p/w achy non-radiating back pain 5/10 in the L lumbar region. Pain is worsened with movement and improves with rest. Pt tried tylenol without any relief of symptoms. She endorses 1 week of fatigue and unintentional weight loss over last several months despite good appetite.  She denies fever, chills, dysuria, diarrhea, constipation, hematoschezia or hematemesis, dyspnea or chest pain.  Pt states he has never had a colonoscopy. Denies recent travel or sick contacts.      PMHx:   HTN  SBO (2018)  CAD (04/21)  Chronic back pain 2/2 L3/L4 compression fractures  PSHx: L CEA (04/21)   Meds: See med rec  Allergies: NKDA  FHX: no pertinent Fhx of cardiac or pulmonary disease  Social: Lives in an apartment alone. States she has a few friends who visit her regularly. She has a  who visits twice a week but does most household chores herself. She drinks 1 glass of scotch per night, she is a former smoker ( 1ppd quit 25 years ago). She denies illicit drug use.      In the ED:    - VS: Tmax: 100.5 , HR: 90  , BP: 170/70, RR: 18, O2: 97% on RA     - Pertinent Labs: WBC 16.5 Hgb 11.6  lactate 0.7 BUN 26 Cr 0.93 Trop neg Lipase 153 BNP 2649    - Imaging: CXR: CT abd: large necrotic mass in distal sigmoid colon w posterior extension to sacrum and caudal extension to uterus and rectum. Finding consistent with colonic malignancy. Moderate left hydronephrosis and proximal hydroureter extending to the mass lesion. Mild diffuse wall thickening in the stomach. Please correlate clinically for gastritis. CT cervical spine: No spinal fx, chronic degenerative changes EKG: NSR    - Treatment/interventions: Zosyn 3.375g X 1, 1.5L NS bolus       (08 Aug 2021 08:02)    PAST MEDICAL & SURGICAL HISTORY:  SBO (small bowel obstruction)    HTN (hypertension)    Chronic back pain    S/P wrist surgery        ALLERGIES/INTOLERANCES:  No Known Allergies    HOME MEDICATIONS:    INPATIENT MEDICATIONS:  metoprolol tartrate Injectable 2.5 milliGRAM(s) IV Push every 6 hours PRN    enoxaparin Injectable 40 milliGRAM(s) SubCutaneous every 24 hours    ALBUTerol    90 MICROgram(s) HFA Inhaler 1 Puff(s) Inhalation every 4 hours  ALBUTerol   0.042% 1.25 milliGRAM(s) Nebulizer once  albuterol/ipratropium for Nebulization 3 milliLiter(s) Nebulizer every 4 hours  aspirin Suppository 300 milliGRAM(s) Rectal daily  chlorhexidine 2% Cloths 1 Application(s) Topical daily  chlorhexidine 4% Liquid 1 Application(s) Topical <User Schedule>  dextrose 40% Gel 15 Gram(s) Oral once  dextrose 5%. 1000 milliLiter(s) IV Continuous <Continuous>  dextrose 5%. 1000 milliLiter(s) IV Continuous <Continuous>  dextrose 50% Injectable 25 Gram(s) IV Push once  dextrose 50% Injectable 12.5 Gram(s) IV Push once  dextrose 50% Injectable 25 Gram(s) IV Push once  fat emulsion (Fish Oil and Plant Based) 20% Infusion 0.3759 Gm/kG/Day IV Continuous <Continuous>  glucagon  Injectable 1 milliGRAM(s) IntraMuscular once  insulin lispro (ADMELOG) corrective regimen sliding scale   SubCutaneous every 6 hours  lidocaine   4% Patch 1 Patch Transdermal every 24 hours  lidocaine   4% Patch 1 Patch Transdermal every 24 hours  meropenem  IVPB 1000 milliGRAM(s) IV Intermittent every 12 hours  meropenem  IVPB      nystatin Powder 1 Application(s) Topical two times a day  ondansetron Injectable 4 milliGRAM(s) IV Push every 6 hours PRN  pantoprazole  Injectable 40 milliGRAM(s) IV Push every 12 hours  Parenteral Nutrition - Adult 1 Each TPN Continuous <Continuous>  sodium chloride 0.45%. 1000 milliLiter(s) IV Continuous <Continuous>  sodium chloride 0.9% lock flush 10 milliLiter(s) IV Push every 1 hour PRN  tiotropium 18 MICROgram(s) Capsule 1 Capsule(s) Inhalation daily  vancomycin  IVPB 750 milliGRAM(s) IV Intermittent <User Schedule>      REVIEW OF SYSTEMS:    CONSTITUTIONAL: No weakness, F/C, wt loss/gain  EYES: No visual changes/disturbances  ENMT: No dry mouth, no vertigo  NECK: No pain or stiffness  RESPIRATORY: No cough, wheezing, hemoptysis; No shortness of breath  CARDIOVASCULAR: No chest pain, palpitations, lightheadedness/dizziness, LOC, or leg swelling  GASTROINTESTINAL: No abdominal or epigastric pain. No N/V/D/C. No melena, hematochezia, or hematemesis.  GENITOURINARY: No dysuria, increased frequency, hematuria, or incontinence  NEUROLOGICAL: No lightheadedness/dizziness, LOC, headaches, numbness or weakness  MUSCULOSKELETAL: No joint pain or swelling; No muscle, back, or extremity pain  SKIN: No itching, burning, rashes, or lesions   ENDOCRINE: No heat or cold intolerance; No hair loss  HEME/LYMPH: No easy bruising, or bleeding gums  PSYCHIATRIC: No depression, anxiety, mood swings, or difficulty sleeping    [ ] All other review of systems are negative unless indicated above.  [ ] Unable to obtain due to:    PHYSICAL EXAM:    T(C): 37.4 (09-01-21 @ 00:35), Max: 38.2 (08-31-21 @ 13:07)  HR: 84 (08-31-21 @ 23:00) (84 - 118)  BP: 115/56 (08-31-21 @ 23:00) (105/52 - 134/61)  RR: 21 (08-31-21 @ 23:00) (18 - 28)  SpO2: 99% (08-31-21 @ 23:00) (84% - 100%)  Wt(kg): --    I&O's Summary    30 Aug 2021 07:01  -  31 Aug 2021 07:00  --------------------------------------------------------  IN: 526.4 mL / OUT: 1150 mL / NET: -623.6 mL    31 Aug 2021 07:01  -  01 Sep 2021 00:43  --------------------------------------------------------  IN: 1816.9 mL / OUT: 655 mL / NET: 1161.9 mL    GENERAL: NAD, well-developed  HEAD:  NCAT  HEENT: EOMI, PERRL, conjunctiva and sclera clear; moist mucosa; Neck supple, No JVD  CARDIOVASCULAR: RRR, normal S1 S2, no M/R/G, no JVD, neg HJR, nondisplaced PMI, no LE edema  RESPIRATORY: Lungs clear to auscultation b/l, no C/W/R  GASTROINTESTINAL: +BS, soft, non-distended, non-tender, no HSM  VASCULAR: Peripheral pulses palpable 2+ bilaterally  EXTREMITIES: Warm. No clubbing, cyanosis or edema. Normal range of motion.  SKIN: No rashes, lesions, ecchymoses, or cyanosis  NEURO: AAOx3, no focal deficits  PSYCH: Nl behavior, nl affect  LINES:    TELEMETRY: 	      ECG:  	  	  LABS:                        8.0    17.69 )-----------( 443      ( 31 Aug 2021 14:28 )             26.0     08-31    148<H>  |  113<H>  |  43<H>  ----------------------------<  115<H>  4.7   |  24  |  0.70    Ca    9.1      31 Aug 2021 14:28  Phos  4.3     08-31  Mg     2.6     08-31        ASSESSMENT/PLAN: 	  89F PMH HTN, 3V CAD (80 mLAD, RCA , LCx , managed medically), L ICA sp CEA, SBO 2/2 gyn/colorectal malignancy 2018 lost to f/u, chronic back pain/compression fx found with 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 Cardiology consulted for troponin elevation troponin 0.09 -> 0.2.       #Troponin elevation: known severe 3v CAD (80 mLAD, RCA , LCx ) by 4/1 [facility]. Endorses recent CP although currently CP free. ECG: sinus tach, prwp, ST depressions (II/III/avF, V5-V6). Prior lexiscan iwth small area of mild ischemia in basal inf & basal inf-lat walls. Would treat as type I NSTEMI for now.  - trend troponin/ck/ckmb  - continue rectal aspirin  - anticoagulation: was started on therapeutic lovenox which is reasonable alternative to hep gtt  - repeat formal TTE  - may recommend standing IV lopressor pending formal TTE    #Respiratory distress: serial CXRs do not demonstrate worsening of fluid overload although a coronary event leading to worsening LV function and pulmonary edema is not ruled out and formal TTE as above will help rule in/out    Preliminary evaluation by on call cardiology fellow  Chin Da Silva MD PGY5 89F PMH HTN, 3V CAD (managed medically), L ICA sp CEA, SBO 2/2 gyn/colorectal malignancy 2018 lost to f/u, chronic back pain/compression fx found with 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 Cardiology consulted for troponin elevation troponin 0.09 -> 0.2. Limited history by pt, who currently denies CP but shakes head yes when asked if she has had CP over past few days.     LHC 4/1:  of RCA/LCx; diffuse 80% mLAD    Lexiscan 3/31: Myocardial perfusion imaging is abnormal. There is a small area of mild ischemia in the basal inferior and basal inferolateral walls. Overall left ventricular systolic function is normal without regional wall motion abnormalities. The EKG stress test is normal.    TTE 3/30/21:   1. Injection with agitated saline reveals late bubbles (after 3 heart beats) - difficult to determine intracardiac shunt vs pulmonary AV malformation.   2. Mild aortic stenosis.   3. No other significant valvular disease.   4. The right ventricle is normal in size. Right ventricular systolic function is normal.   5. The left ventricle cavity size is normal. There is mild concentric left ventricular hypertrophy. Left ventricular ejection fraction is 55-60%. There is basal inferolateral and basal inferior wall hypokinesis.   6. No prior echo is available for comparison.    HPI:  HPI:89F PMHx of chronic back pain 2/2 L3,L4 compression fractures, triple vessel CAD on ASA, plavix, lipitor (04/21) and L CEA 2/2 L ICA stenosis >70% (4/21), recent ED visit on 6/28 for UTI p/w achy non-radiating back pain 5/10 in the L lumbar region. Pain is worsened with movement and improves with rest. Pt tried tylenol without any relief of symptoms. She endorses 1 week of fatigue and unintentional weight loss over last several months despite good appetite.  She denies fever, chills, dysuria, diarrhea, constipation, hematoschezia or hematemesis, dyspnea or chest pain.  Pt states he has never had a colonoscopy. Denies recent travel or sick contacts.      PMHx:   HTN  SBO (2018)  CAD (04/21)  Chronic back pain 2/2 L3/L4 compression fractures  PSHx: L CEA (04/21)   Meds: See med rec  Allergies: NKDA  FHX: no pertinent Fhx of cardiac or pulmonary disease  Social: Lives in an apartment alone. States she has a few friends who visit her regularly. She has a  who visits twice a week but does most household chores herself. She drinks 1 glass of scotch per night, she is a former smoker ( 1ppd quit 25 years ago). She denies illicit drug use.      In the ED:    - VS: Tmax: 100.5 , HR: 90  , BP: 170/70, RR: 18, O2: 97% on RA     - Pertinent Labs: WBC 16.5 Hgb 11.6  lactate 0.7 BUN 26 Cr 0.93 Trop neg Lipase 153 BNP 2649    - Imaging: CXR: CT abd: large necrotic mass in distal sigmoid colon w posterior extension to sacrum and caudal extension to uterus and rectum. Finding consistent with colonic malignancy. Moderate left hydronephrosis and proximal hydroureter extending to the mass lesion. Mild diffuse wall thickening in the stomach. Please correlate clinically for gastritis. CT cervical spine: No spinal fx, chronic degenerative changes EKG: NSR    - Treatment/interventions: Zosyn 3.375g X 1, 1.5L NS bolus       (08 Aug 2021 08:02)    PAST MEDICAL & SURGICAL HISTORY:  SBO (small bowel obstruction)    HTN (hypertension)    Chronic back pain    S/P wrist surgery        ALLERGIES/INTOLERANCES:  No Known Allergies    HOME MEDICATIONS:    INPATIENT MEDICATIONS:  metoprolol tartrate Injectable 2.5 milliGRAM(s) IV Push every 6 hours PRN    enoxaparin Injectable 40 milliGRAM(s) SubCutaneous every 24 hours    ALBUTerol    90 MICROgram(s) HFA Inhaler 1 Puff(s) Inhalation every 4 hours  ALBUTerol   0.042% 1.25 milliGRAM(s) Nebulizer once  albuterol/ipratropium for Nebulization 3 milliLiter(s) Nebulizer every 4 hours  aspirin Suppository 300 milliGRAM(s) Rectal daily  chlorhexidine 2% Cloths 1 Application(s) Topical daily  chlorhexidine 4% Liquid 1 Application(s) Topical <User Schedule>  dextrose 40% Gel 15 Gram(s) Oral once  dextrose 5%. 1000 milliLiter(s) IV Continuous <Continuous>  dextrose 5%. 1000 milliLiter(s) IV Continuous <Continuous>  dextrose 50% Injectable 25 Gram(s) IV Push once  dextrose 50% Injectable 12.5 Gram(s) IV Push once  dextrose 50% Injectable 25 Gram(s) IV Push once  fat emulsion (Fish Oil and Plant Based) 20% Infusion 0.3759 Gm/kG/Day IV Continuous <Continuous>  glucagon  Injectable 1 milliGRAM(s) IntraMuscular once  insulin lispro (ADMELOG) corrective regimen sliding scale   SubCutaneous every 6 hours  lidocaine   4% Patch 1 Patch Transdermal every 24 hours  lidocaine   4% Patch 1 Patch Transdermal every 24 hours  meropenem  IVPB 1000 milliGRAM(s) IV Intermittent every 12 hours  meropenem  IVPB      nystatin Powder 1 Application(s) Topical two times a day  ondansetron Injectable 4 milliGRAM(s) IV Push every 6 hours PRN  pantoprazole  Injectable 40 milliGRAM(s) IV Push every 12 hours  Parenteral Nutrition - Adult 1 Each TPN Continuous <Continuous>  sodium chloride 0.45%. 1000 milliLiter(s) IV Continuous <Continuous>  sodium chloride 0.9% lock flush 10 milliLiter(s) IV Push every 1 hour PRN  tiotropium 18 MICROgram(s) Capsule 1 Capsule(s) Inhalation daily  vancomycin  IVPB 750 milliGRAM(s) IV Intermittent <User Schedule>      REVIEW OF SYSTEMS:    CONSTITUTIONAL: No weakness, F/C, wt loss/gain  EYES: No visual changes/disturbances  ENMT: No dry mouth, no vertigo  NECK: No pain or stiffness  RESPIRATORY: No cough, wheezing, hemoptysis; No shortness of breath  CARDIOVASCULAR: No chest pain, palpitations, lightheadedness/dizziness, LOC, or leg swelling  GASTROINTESTINAL: No abdominal or epigastric pain. No N/V/D/C. No melena, hematochezia, or hematemesis.  GENITOURINARY: No dysuria, increased frequency, hematuria, or incontinence  NEUROLOGICAL: No lightheadedness/dizziness, LOC, headaches, numbness or weakness  MUSCULOSKELETAL: No joint pain or swelling; No muscle, back, or extremity pain  SKIN: No itching, burning, rashes, or lesions   ENDOCRINE: No heat or cold intolerance; No hair loss  HEME/LYMPH: No easy bruising, or bleeding gums  PSYCHIATRIC: No depression, anxiety, mood swings, or difficulty sleeping    [ ] All other review of systems are negative unless indicated above.  [ ] Unable to obtain due to:    PHYSICAL EXAM:    T(C): 37.4 (09-01-21 @ 00:35), Max: 38.2 (08-31-21 @ 13:07)  HR: 84 (08-31-21 @ 23:00) (84 - 118)  BP: 115/56 (08-31-21 @ 23:00) (105/52 - 134/61)  RR: 21 (08-31-21 @ 23:00) (18 - 28)  SpO2: 99% (08-31-21 @ 23:00) (84% - 100%)  Wt(kg): --    I&O's Summary    30 Aug 2021 07:01  -  31 Aug 2021 07:00  --------------------------------------------------------  IN: 526.4 mL / OUT: 1150 mL / NET: -623.6 mL    31 Aug 2021 07:01  -  01 Sep 2021 00:43  --------------------------------------------------------  IN: 1816.9 mL / OUT: 655 mL / NET: 1161.9 mL    GENERAL: NAD, well-developed  HEAD:  NCAT  HEENT: EOMI, PERRL, conjunctiva and sclera clear; moist mucosa; Neck supple, No JVD  CARDIOVASCULAR: RRR, normal S1 S2, no M/R/G, no JVD, neg HJR, nondisplaced PMI, no LE edema  RESPIRATORY: Lungs clear to auscultation b/l, no C/W/R  GASTROINTESTINAL: +BS, soft, non-distended, non-tender, no HSM  VASCULAR: Peripheral pulses palpable 2+ bilaterally  EXTREMITIES: Warm. No clubbing, cyanosis or edema. Normal range of motion.  SKIN: No rashes, lesions, ecchymoses, or cyanosis  NEURO: AAOx3, no focal deficits  PSYCH: Nl behavior, nl affect  LINES:    TELEMETRY: 	      ECG:  	  	  LABS:                        8.0    17.69 )-----------( 443      ( 31 Aug 2021 14:28 )             26.0     08-31    148<H>  |  113<H>  |  43<H>  ----------------------------<  115<H>  4.7   |  24  |  0.70    Ca    9.1      31 Aug 2021 14:28  Phos  4.3     08-31  Mg     2.6     08-31        ASSESSMENT/PLAN: 	  89F PMH HTN, 3V CAD (80 mLAD, RCA , LCx , managed medically), L ICA sp CEA, SBO 2/2 gyn/colorectal malignancy 2018 lost to f/u, chronic back pain/compression fx found with 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 Cardiology consulted for troponin elevation troponin 0.09 -> 0.2.       #Troponin elevation: known severe 3v CAD (80 mLAD, RCA , LCx ) by 4/1 [facility]. Endorses recent CP although currently CP free. ECG: sinus tach, prwp, ST depressions (II/III/avF, V5-V6). Prior lexiscan iwth small area of mild ischemia in basal inf & basal inf-lat walls. Would treat as type I NSTEMI for now.  - trend troponin/ck/ckmb  - continue rectal aspirin  - anticoagulation: was started on therapeutic lovenox which is reasonable alternative to hep gtt  - repeat formal TTE  - may recommend standing IV lopressor pending formal TTE    #Respiratory distress: serial CXRs do not demonstrate worsening of fluid overload although a coronary event leading to worsening LV function and pulmonary edema is not ruled out and formal TTE as above will help rule in/out    Preliminary evaluation by on call cardiology fellow, recommendations final upon attending attestation  Chin Da Silva MD PGY5

## 2021-09-01 NOTE — CONSULT NOTE ADULT - CONSULT REQUESTED DATE/TIME
08-Aug-2021 14:45
08-Aug-2021 18:34
09-Aug-2021 14:57
18-Aug-2021 07:54
18-Aug-2021 10:44
23-Aug-2021 07:45
01-Sep-2021
08-Aug-2021 11:51
30-Aug-2021 11:57
31-Aug-2021 13:09
31-Aug-2021 16:16
08-Aug-2021 15:48
10-Aug-2021 14:42
08-Aug-2021 12:41
11-Aug-2021 15:10
19-Aug-2021 12:17

## 2021-09-01 NOTE — PROVIDER CONTACT NOTE (OTHER) - DATE AND TIME:
23-Aug-2021 09:06
15-Aug-2021 08:45
21-Aug-2021 19:20
23-Aug-2021 15:10
18-Aug-2021 13:45
18-Aug-2021 15:00
31-Aug-2021 13:00
01-Sep-2021 22:05
18-Aug-2021 22:00
18-Aug-2021 23:15

## 2021-09-01 NOTE — PROGRESS NOTE ADULT - SUBJECTIVE AND OBJECTIVE BOX
**INCOMPLETE NOTE    OVERNIGHT EVENTS:    SUBJECTIVE:  Patient seen and examined at bedside.    Vital Signs Last 12 Hrs  T(F): 98.6 (21 @ 05:30), Max: 99.3 (21 @ 00:35)  HR: 86 (21 @ 08:55) (84 - 93)  BP: 116/58 (21 @ 08:00) (111/54 - 127/60)  BP(mean): 84 (21 @ 08:00) (78 - 87)  RR: 19 (21 @ 08:00) (19 - 25)  SpO2: 100% (21 @ 08:55) (94% - 100%)  I&O's Summary    31 Aug 2021 07:01  -  01 Sep 2021 07:00  --------------------------------------------------------  IN: 3157 mL / OUT: 905 mL / NET: 2252 mL    01 Sep 2021 07:01  -  01 Sep 2021 08:59  --------------------------------------------------------  IN: 134.3 mL / OUT: 35 mL / NET: 99.3 mL        PHYSICAL EXAM:  Constitutional: NAD, comfortable in bed.  HEENT: NC/AT, PERRLA, EOMI, no conjunctival pallor or scleral icterus, MMM  Neck: Supple, no JVD  Respiratory: CTA B/L. No w/r/r.   Cardiovascular: RRR, normal S1 and S2, no m/r/g.   Gastrointestinal: +BS, soft NTND, no guarding or rebound tenderness, no palpable masses   Extremities: wwp; no cyanosis, clubbing or edema.   Vascular: Pulses equal and strong throughout.   Neurological: AAOx3, no CN deficits, strength and sensation intact throughout.   Skin: No gross skin abnormalities or rashes        LABS:                        7.6    16.67 )-----------( 394      ( 01 Sep 2021 03:51 )             24.1     09-    146<H>  |  110<H>  |  49<H>  ----------------------------<  220<H>  4.5   |  23  |  0.67    Ca    9.4      01 Sep 2021 03:51  Phos  4.0       Mg     2.3         TPro  x   /  Alb  4.4  /  TBili  x   /  DBili  x   /  AST  x   /  ALT  x   /  AlkPhos  x       PT/INR - ( 31 Aug 2021 14:28 )   PT: 13.5 sec;   INR: 1.13          PTT - ( 31 Aug 2021 14:28 )  PTT:30.8 sec  Urinalysis Basic - ( 31 Aug 2021 14:28 )    Color: Red / Appearance: Turbid / S.020 / pH: x  Gluc: x / Ketone: NEGATIVE  / Bili: Small / Urobili: 4.0 E.U./dL   Blood: x / Protein: >=300 mg/dL / Nitrite: POSITIVE   Leuk Esterase: Large / RBC: Many /HPF / WBC Many /HPF   Sq Epi: x / Non Sq Epi: 0-5 /HPF / Bacteria: Present /HPF          RADIOLOGY & ADDITIONAL TESTS:    MEDICATIONS  (STANDING):  ALBUTerol    90 MICROgram(s) HFA Inhaler 1 Puff(s) Inhalation every 4 hours  ALBUTerol   0.042% 1.25 milliGRAM(s) Nebulizer once  albuterol/ipratropium for Nebulization 3 milliLiter(s) Nebulizer every 4 hours  aspirin Suppository 300 milliGRAM(s) Rectal daily  chlorhexidine 2% Cloths 1 Application(s) Topical daily  chlorhexidine 4% Liquid 1 Application(s) Topical <User Schedule>  dextrose 40% Gel 15 Gram(s) Oral once  dextrose 5%. 1000 milliLiter(s) (50 mL/Hr) IV Continuous <Continuous>  dextrose 5%. 1000 milliLiter(s) (100 mL/Hr) IV Continuous <Continuous>  dextrose 50% Injectable 25 Gram(s) IV Push once  dextrose 50% Injectable 12.5 Gram(s) IV Push once  dextrose 50% Injectable 25 Gram(s) IV Push once  enoxaparin Injectable 40 milliGRAM(s) SubCutaneous every 24 hours  fat emulsion (Fish Oil and Plant Based) 20% Infusion 0.3759 Gm/kG/Day (8.3 mL/Hr) IV Continuous <Continuous>  fat emulsion (Fish Oil and Plant Based) 20% Infusion 0.3759 Gm/kG/Day (8.3 mL/Hr) IV Continuous <Continuous>  glucagon  Injectable 1 milliGRAM(s) IntraMuscular once  insulin lispro (ADMELOG) corrective regimen sliding scale   SubCutaneous every 6 hours  lidocaine   4% Patch 1 Patch Transdermal every 24 hours  lidocaine   4% Patch 1 Patch Transdermal every 24 hours  meropenem  IVPB      meropenem  IVPB 1000 milliGRAM(s) IV Intermittent every 12 hours  nystatin Powder 1 Application(s) Topical two times a day  pantoprazole  Injectable 40 milliGRAM(s) IV Push every 12 hours  Parenteral Nutrition - Adult 1 Each (38 mL/Hr) TPN Continuous <Continuous>  Parenteral Nutrition - Adult 1 Each (46 mL/Hr) TPN Continuous <Continuous>  sodium chloride 0.45%. 1000 milliLiter(s) (80 mL/Hr) IV Continuous <Continuous>  tiotropium 18 MICROgram(s) Capsule 1 Capsule(s) Inhalation daily  vancomycin  IVPB 750 milliGRAM(s) IV Intermittent <User Schedule>    MEDICATIONS  (PRN):  metoprolol tartrate Injectable 2.5 milliGRAM(s) IV Push every 6 hours PRN SBP > 160, HR > 100  ondansetron Injectable 4 milliGRAM(s) IV Push every 6 hours PRN Nausea and/or Vomiting  sodium chloride 0.9% lock flush 10 milliLiter(s) IV Push every 1 hour PRN Pre/post blood products, medications, blood draw, and to maintain line patency     SUBJECTIVE: BCx  NGTD, MRSA swab neg, O/N 1upRBC for hgb 7.6 with appropriate response, placed back on BiPAP. Last fever on  1pm 100.7. Mild improvement in leukocytosis. Patient seen and examined at bedside. with BiPAP during the interview, complains of pain from the mask, denies any other pain, denies n/v/d, fever, chills, cp or dysuria.     Vital Signs Last 12 Hrs  T(F): 98.6 (21 @ 05:30), Max: 99.3 (21 @ 00:35)  HR: 86 (21 @ 08:55) (84 - 93)  BP: 116/58 (21 @ 08:00) (111/54 - 127/60)  BP(mean): 84 (21 @ 08:00) (78 - 87)  RR: 19 (21 @ 08:00) (19 - 25)  SpO2: 100% (21 @ 08:55) (94% - 100%)  I&O's Summary    31 Aug 2021 07:  -  01 Sep 2021 07:00  --------------------------------------------------------  IN: 3157 mL / OUT: 905 mL / NET: 2252 mL    01 Sep 2021 07:  -  01 Sep 2021 08:59  --------------------------------------------------------  IN: 134.3 mL / OUT: 35 mL / NET: 99.3 mL    PHYSICAL EXAM:  Constitutional: Cachectic elderly and frail women, in bed on BiPAP. Scarlet appreciated   HEENT: NC/AT, PERRLA  Neck: Supple  Respiratory: Decrease BS on the right, BL crackles R>L  Cardiovascular: RRR, normal S1 and S2, no m/r/g.   Gastrointestinal: +BS, soft, mild diffuse tenderness, stoma in place with drain, without erythema/ edema, no guarding or rebound tenderness, no palpable masses   Extremities: wwp; no cyanosis, clubbing or edema.   Vascular: Pulses equal and strong throughout.   Neurological: AAOx3, no CN deficits, strength and sensation intact throughout.  Skin: No gross skin abnormalities or rashes    LABS:                        7.6    16.67 )-----------( 394      ( 01 Sep 2021 03:51 )             24.1         146<H>  |  110<H>  |  49<H>  ----------------------------<  220<H>  4.5   |  23  |  0.67    Ca    9.4      01 Sep 2021 03:51  Phos  4.0       Mg     2.3         TPro  x   /  Alb  4.4  /  TBili  x   /  DBili  x   /  AST  x   /  ALT  x   /  AlkPhos  x       PT/INR - ( 31 Aug 2021 14:28 )   PT: 13.5 sec;   INR: 1.13          PTT - ( 31 Aug 2021 14:28 )  PTT:30.8 sec  Urinalysis Basic - ( 31 Aug 2021 14:28 )    Color: Red / Appearance: Turbid / S.020 / pH: x  Gluc: x / Ketone: NEGATIVE  / Bili: Small / Urobili: 4.0 E.U./dL   Blood: x / Protein: >=300 mg/dL / Nitrite: POSITIVE   Leuk Esterase: Large / RBC: Many /HPF / WBC Many /HPF   Sq Epi: x / Non Sq Epi: 0-5 /HPF / Bacteria: Present /HPF          RADIOLOGY & ADDITIONAL TESTS:    MEDICATIONS  (STANDING):  ALBUTerol    90 MICROgram(s) HFA Inhaler 1 Puff(s) Inhalation every 4 hours  ALBUTerol   0.042% 1.25 milliGRAM(s) Nebulizer once  albuterol/ipratropium for Nebulization 3 milliLiter(s) Nebulizer every 4 hours  aspirin Suppository 300 milliGRAM(s) Rectal daily  chlorhexidine 2% Cloths 1 Application(s) Topical daily  chlorhexidine 4% Liquid 1 Application(s) Topical <User Schedule>  dextrose 40% Gel 15 Gram(s) Oral once  dextrose 5%. 1000 milliLiter(s) (50 mL/Hr) IV Continuous <Continuous>  dextrose 5%. 1000 milliLiter(s) (100 mL/Hr) IV Continuous <Continuous>  dextrose 50% Injectable 25 Gram(s) IV Push once  dextrose 50% Injectable 12.5 Gram(s) IV Push once  dextrose 50% Injectable 25 Gram(s) IV Push once  enoxaparin Injectable 40 milliGRAM(s) SubCutaneous every 24 hours  fat emulsion (Fish Oil and Plant Based) 20% Infusion 0.3759 Gm/kG/Day (8.3 mL/Hr) IV Continuous <Continuous>  fat emulsion (Fish Oil and Plant Based) 20% Infusion 0.3759 Gm/kG/Day (8.3 mL/Hr) IV Continuous <Continuous>  glucagon  Injectable 1 milliGRAM(s) IntraMuscular once  insulin lispro (ADMELOG) corrective regimen sliding scale   SubCutaneous every 6 hours  lidocaine   4% Patch 1 Patch Transdermal every 24 hours  lidocaine   4% Patch 1 Patch Transdermal every 24 hours  meropenem  IVPB      meropenem  IVPB 1000 milliGRAM(s) IV Intermittent every 12 hours  nystatin Powder 1 Application(s) Topical two times a day  pantoprazole  Injectable 40 milliGRAM(s) IV Push every 12 hours  Parenteral Nutrition - Adult 1 Each (38 mL/Hr) TPN Continuous <Continuous>  Parenteral Nutrition - Adult 1 Each (46 mL/Hr) TPN Continuous <Continuous>  sodium chloride 0.45%. 1000 milliLiter(s) (80 mL/Hr) IV Continuous <Continuous>  tiotropium 18 MICROgram(s) Capsule 1 Capsule(s) Inhalation daily  vancomycin  IVPB 750 milliGRAM(s) IV Intermittent <User Schedule>    MEDICATIONS  (PRN):  metoprolol tartrate Injectable 2.5 milliGRAM(s) IV Push every 6 hours PRN SBP > 160, HR > 100  ondansetron Injectable 4 milliGRAM(s) IV Push every 6 hours PRN Nausea and/or Vomiting  sodium chloride 0.9% lock flush 10 milliLiter(s) IV Push every 1 hour PRN Pre/post blood products, medications, blood draw, and to maintain line patency

## 2021-09-01 NOTE — PROGRESS NOTE ADULT - SUBJECTIVE AND OBJECTIVE BOX
In early afternoon with respiratory distress, increasing O2 requirement and hematuria. Transferred to ICU for respiratory management. Placed on BiPap O/N for increased WOB, which is improved. Arousable but sleepy this AM, oriented x 2. Hematuria cleared. Also noted with EKG changes and slightly increased trop so cardiology consulted O/N.  ICU Vital Signs Last 24 Hrs    T(C): 36.7 (01 Sep 2021 09:25), Max: 38.2 (31 Aug 2021 13:07)  T(F): 98 (01 Sep 2021 09:25), Max: 100.7 (31 Aug 2021 13:07)  HR: 90 (01 Sep 2021 10:00) (84 - 118)  BP: 131/66 (01 Sep 2021 10:00) (105/52 - 134/61)  BP(mean): 91 (01 Sep 2021 10:00) (71 - 91)  ABP: --  ABP(mean): --  RR: 21 (01 Sep 2021 10:00) (18 - 28)  SpO2: 100% (01 Sep 2021 10:00) (84% - 100%)    CBC Full  -  ( 01 Sep 2021 10:24 )  WBC Count : 16.10 K/uL  RBC Count : 3.13 M/uL  Hemoglobin : 9.0 g/dL  Hematocrit : 28.3 %  Platelet Count - Automated : 377 K/uL  Mean Cell Volume : 90.4 fl  Mean Cell Hemoglobin : 28.8 pg  Mean Cell Hemoglobin Concentration : 31.8 gm/dL    09-01    146<H>  |  110<H>  |  49<H>  ----------------------------<  220<H>  4.5   |  23  |  0.67    Ca    9.4      01 Sep 2021 03:51  Phos  4.0     09-01  Mg     2.3     09-01    TPro  x   /  Alb  4.4  /  TBili  x   /  DBili  x   /  AST  x   /  ALT  x   /  AlkPhos  x   08-31    CARDIAC MARKERS ( 01 Sep 2021 03:51 )  x     / 0.16 ng/mL / x     / x     / x      CARDIAC MARKERS ( 31 Aug 2021 21:17 )  x     / 0.20 ng/mL / x     / x     / x      CARDIAC MARKERS ( 31 Aug 2021 14:28 )  x     / 0.09 ng/mL / 43 U/L / x     / 4.9 ng/mL

## 2021-09-01 NOTE — PROVIDER CONTACT NOTE (OTHER) - ASSESSMENT
"The most common cause of chronic abdominal pain in children is constipation. Her x-ray showed moderate constipation  Most cases of constipation are \"functional\" meaning it is not due to an underlying medical problem.  Temporary gastrointestinal dysfunction and discomfort can also occur following a viral infection, which may have been an explanation for her vomiting.  1. Keep track of BM frequency, type and amount.  If it appears that most stools are type 1,2 and 3 and/or if BM seems very small we should go ahead and give her a daily stool softener to get her colon back into shape.  Sometimes kids don't let all the poop out (they contract instead of fully relaxing) if defecation is uncomfortable and this can lead to an enlarged rectum.  We use either generic Miralax powder, 3 teaspoons mixed in 6-8 ounces of juice or milk once a day or magnesium hydroxide (milk of magnesia) 400-1200 mg per day.  Of the 2, the Miralax works better.   2. You don't have to ask her if she is having tummy pain.    3. If labs are normal I will send you a message via My Chart     If you have any questions during regular office hours, please contact the nurse line at [phone] (Anisa or An).   If acute concerns arise after hours, you can call [phone] and ask to speak to the pediatric gastroenterologist on call.   If you have clinic scheduling needs, please call the Call Center at [phone].   If you need to schedule Radiology tests, call [phone].  Outside lab and imaging results should be faxed to [phone].  If you go to a lab outside of [facility] we will not automatically get those results. You will need to ask them to send them to us.          "
, /63, SpO2 as low as 84% on 3L O2, temperature 100.7 rectally. Pt urine appears blood tinged.
Pt's morning BP was 170/79, HR 80, RR 18, SpO2 94 on RA, pt denies chest pain, SOB, headaches, MD Junior made aware
1+ weak left radial palpation  LUE e+ pitting edema and weeping   cool to touch   Placed on 2 pillows
AOx3. Sleeping, but arousable.    Colostomy to to LUQ. CORDELL drain to L gluteal area.  Urine clear yellow.  Moves all extremities.
AOx4. Lethargic, but arousable    Pt coughed after drinking 5cc water.  No wheezing, rhonchi, or rales.
Pt hypoxic/tachypenic during procedure to spO2 70%, nonrebreather placed on patient with effect, SPO2 raised to 95%. SBP stable.
bilateral crackles and wheezes noted, with increased work of breathing. O2 sat 91% on 4 L N/c. HOB elevated, suctioning provided
RIGHT NGT appears to be out further than intended
v/s stable

## 2021-09-01 NOTE — PROVIDER CONTACT NOTE (OTHER) - RECOMMENDATIONS
Bolus
zofran, NGT and CXR
Stop feedings and change NGT.
give albumin 5% 250ml
Keep pt NPO
No interventions recommended at this time
Stopped Albumin 5%in 250 ml infusion
Chest xray ordered STAT, titrated to nasal cannula.

## 2021-09-01 NOTE — CONSULT NOTE ADULT - CONSULT REQUESTED BY NAME
Dr. Caro
Dr. Caro
Nicole Caro
Sulaiman Stearns
sicu
surgery
surgery
Dr Stearns
Dr. Stearns
Medicine
Dr ORALIA Stearns
Surgery Team 1
Medicine
SICU

## 2021-09-01 NOTE — PROGRESS NOTE ADULT - ATTENDING COMMENTS
Have switched back to HFNC. No further wheezing and she is tolerating this. Continue IVF with free water for hypernatremia. Meropenem continues.

## 2021-09-01 NOTE — CONSULT NOTE ADULT - PROVIDER SPECIALTY LIST ADULT
Cardiology
Cardiology
Infectious Disease
Intervent Radiology
Infectious Disease
SICU
Surgery
Urology
Vascular Surgery
Gastroenterology
Intervent Radiology
Intervent Radiology
Neurology
Heme/Onc
Palliative Care
Intervent Radiology

## 2021-09-01 NOTE — PROGRESS NOTE ADULT - ASSESSMENT
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 Course c/b dysphagia x1-2 wks. S/p S&S eval, found to have severely impaired pharyngeal swallow. Neurology team consulted for ongoing dysphagia with concern for neurological etiology. Dysphagia is likely multifactorial - patient may have mild chronic dysphagia 2/2 old age, which was likely worsened after recent intubation this hospitalization. Low suspicion for other acute neurological process causing the dysphagia since pt was tolerating a regular diet 2 weeks ago, and there's no other neurological deficit on exam. Bounding pulse and tenderness noted along left anterior neck; Could be benign but would consider further evaluation to r/o obstructing hematoma / aneurysm / pseudoaneurysm. Carotid ultrasound was ordered (pending). Patient was stepped up to SICU on 8/31 for AHRF. Differential includes aspiration PNA vs HAP per ID team. Pt was sedated during colonoscopy via ostomy on 8/31 - possibly aspirated at that time. GOC discussion in progress.     Plan:   - Follow up Carotid Ultrasound to evaluate tenderness and bounding pulse at left anterior neck. May need further imaging depending on findings.   - GOC discussion pending per Primary Team     Neurology team will continue to follow.  See Attending attestation for finalized recommendations.

## 2021-09-01 NOTE — PROGRESS NOTE ADULT - ASSESSMENT
A/P  Cardiac:  troponins noted.  Probable MI.  On heparin, ASA and O2.  EKG changes noted.  Echo planned for today  Pulm: on bipap.  ? PNA (aspiration or other)/ atelectasis.  Large airway secretions been problem x entire hospitalization  On antibiotics for WBC and fever yesterday.  Renal: on dry side.  Getting hydration and numbers are improving.  Outputs are ok. Urine concentrated  Heme: drop in Hgb noted but seems to be stable.  GI: no mechanical obstruction.  Function poor.  Long ileus.    Niece coming today and will speak with [facility] Cynthia Sims as well. To visit the hospital.  Goals of care need to be determined.    Patient is not candidate for RT or chemo.  DNR status needs to be determined  I spoke with patient about her situation and fact that we cannot cure her of the tumor. She took it in but made no comment.  As per ICU team

## 2021-09-01 NOTE — PROGRESS NOTE ADULT - ATTENDING COMMENTS
Interval events reviewed - febrile and tachypneic yesterday, transferred to SICU and put on NIMV   Exam unchanged  No neck imaging performed as of yet  f/u carotid ultrasound  will follow

## 2021-09-01 NOTE — PROGRESS NOTE ADULT - ASSESSMENT
89 YOF with perforated pelvic mass, bx proven adenocarcinoma with perforation. S/p IR drainage of pelvic abscess, and s/p loop transverse colostomy on 8/13, and exlap and STEPHANIE and enterorrhaphy on 8/20 for SBO. Course c/b GIB from esophageal ulcers.    O/N with respiratory decompensation, aspiration vs. cardiac event, and transferred to ICU for Bipap support. Respiratory effort stabilized O/N, cardiac markers slightly uptrended O/N. Abdominal exam remains the same and still without stoma fx.     Family to visit this afternoon, will recall palliative to coordinate meeting to revisit [facility]  Cardiology consulted, appreciate final recs  ID recalled for worsening leukocytosis restarted on Merem and Vanc  Recall urology for episode of hematuria; obtain retroperitoneal US to check stent  Obtain neck US  PICC if able today and dc RIJ CVL  F/u AM CXR  C/w NPO, TPN via CVL, PPI  Routine stoma care  PT/OT, SLP - not cleared for POI  Remainder of care per the ICU team

## 2021-09-01 NOTE — PROGRESS NOTE ADULT - SUBJECTIVE AND OBJECTIVE BOX
S: Pt arousable but not reliably answering questions, ROS deferred.     Vitals: ICU Vital Signs Last 24 Hrs  T(C): 37 (01 Sep 2021 05:30), Max: 38.2 (31 Aug 2021 13:07)  T(F): 98.6 (01 Sep 2021 05:30), Max: 100.7 (31 Aug 2021 13:07)  HR: 90 (01 Sep 2021 08:00) (84 - 118)  BP: 116/58 (01 Sep 2021 08:00) (105/52 - 134/61)  BP(mean): 84 (01 Sep 2021 08:00) (71 - 88)  ABP: --  ABP(mean): --  RR: 19 (01 Sep 2021 08:00) (18 - 28)  SpO2: 100% (01 Sep 2021 08:00) (84% - 100%)            I&O:  I&O's Detail    31 Aug 2021 07:01  -  01 Sep 2021 07:00  --------------------------------------------------------  IN:    Albumin 5% - 50 mL: 50 mL    Fat Emulsion 20%: 83 mL    IV PiggyBack: 500 mL    PRBCs (Packed Red Blood Cells): 300 mL    sodium chloride 0.45%: 1120 mL    TPN (Total Parenteral Nutrition): 1104 mL  Total IN: 3157 mL    OUT:    Colostomy (mL): 0 mL    Indwelling Catheter - Urethral (mL): 905 mL  Total OUT: 905 mL    Total NET: 2252 mL      01 Sep 2021 07:01  -  01 Sep 2021 08:15  --------------------------------------------------------  IN:    Fat Emulsion 20%: 8.3 mL    sodium chloride 0.45%: 80 mL    TPN (Total Parenteral Nutrition): 46 mL  Total IN: 134.3 mL    OUT:    Indwelling Catheter - Urethral (mL): 35 mL  Total OUT: 35 mL    Total NET: 99.3 mL            ABG - ( 31 Aug 2021 14:06 )  pH, Arterial: 7.45  pH, Blood: x     /  pCO2: 36    /  pO2: 89    / HCO3: 25    / Base Excess: 1.3   /  SaO2: 97.9            CAPILLARY BLOOD GLUCOSE      POCT Blood Glucose.: 218 mg/dL (01 Sep 2021 05:41)  POCT Blood Glucose.: 176 mg/dL (01 Sep 2021 00:19)  POCT Blood Glucose.: 149 mg/dL (31 Aug 2021 21:38)  POCT Blood Glucose.: 151 mg/dL (31 Aug 2021 16:54)  POCT Blood Glucose.: 151 mg/dL (31 Aug 2021 12:03)      Labs:                         7.6    16.67 )-----------( 394      ( 01 Sep 2021 03:51 )             24.1       146<H>  |  110<H>  |  49<H>  ----------------------------<  220<H>  4.5   |  23  |  0.67    Ca    9.4      01 Sep 2021 03:51  Phos  4.0       Mg     2.3         TPro  x   /  Alb  4.4  /  TBili  x   /  DBili  x   /  AST  x   /  ALT  x   /  AlkPhos  x     PT/INR - ( 31 Aug 2021 14:28 )   PT: 13.5 sec;   INR: 1.13          PTT - ( 31 Aug 2021 14:28 )  PTT:30.8 secUrinalysis Basic - ( 31 Aug 2021 14:28 )    Color: Red / Appearance: Turbid / S.020 / pH: x  Gluc: x / Ketone: NEGATIVE  / Bili: Small / Urobili: 4.0 E.U./dL   Blood: x / Protein: >=300 mg/dL / Nitrite: POSITIVE   Leuk Esterase: Large / RBC: Many /HPF / WBC Many /HPF   Sq Epi: x / Non Sq Epi: 0-5 /HPF / Bacteria: Present /HPF      Troponin T, Serum: 0.16 ng/mL (21 @ 03:51)  Troponin T, Serum: 0.20 ng/mL (21 @ 21:17)  Troponin T, Serum: 0.09 ng/mL (21 @ 14:28)  ABG - ( 31 Aug 2021 14:06 )  pH, Arterial: 7.45  pH, Blood: x     /  pCO2: 36    /  pO2: 89    / HCO3: 25    / Base Excess: 1.3   /  SaO2: 97.9                Electrolytes repleated to goals as follows: Potassium 4, Phosphorus 3 and Magnesium 2 where appropriate and necessary    Exam:  Gen: Cachectic  Neuro: Arousable, moving all 4 extremities  HEENT: PERRL < EOMI, MMM  Neck: Supple  CV: RRR, no MRGs  Pulm: CTAB, no wheezes, rales, or rhonchi  Abd: Softly distended, NT, no rebound or guarding, stoma with pink mucosa and brown stool and gas in the bag. Drains in place,  incision c/d/i.   : Novak in place  Ext: No edema peripherally or centrally  Vasc: Extremities warm to palpation, 2+ pulses - radial and PT  MSK: no joint swelling  Skin: no rash  Psych: affect appropriate

## 2021-09-01 NOTE — PROVIDER CONTACT NOTE (OTHER) - BACKGROUND
Pt has history of chronic back Pain /HTN /SBO and had generalized edema on assessment- RLE/LLE edema  and placed on elevation and LUE arm edematous and infiltrated IV- +1 weak radial pulse+ bruised
s/p ex jens BROWN
s/p diverting colostomy 8/13
s/p diverting colostomy 8/13
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
Pt is s/p diverting colostomy in LUQ on 8/13, PMH of HTN, chronic back pain, colorectal malignancy, PSH of L CEA

## 2021-09-01 NOTE — PROGRESS NOTE ADULT - ASSESSMENT
89F PMH HTN, chronic back pain 2/2 L3,L4 compression fractures, triple vessel CAD, and SBO due to possible diverticulitis vs. GYN/colorectal malignancy (2018) never followed up and L ICA s/p L CEA (4/21). 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 Left gluteal IR drain placed (8/9). Colonoscopy w/ sigmoid mass bx on 8/11- final path invasive adenocarcinoma moderately differentiated. Taken to OR on 8/13 for diverting colostomy. In SICU for acute blood loss anemia, s/p EGD 8/18 showing duodenal ulceration, erosive esophagitis duodenitis, bx x 2, CT 8/20 w/ closed loop obstxn, s/p OR 8/20 exlap STEPHANIE. 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     Neuro: Tylenol prn  CV: ST changes 8/31 w/ Trop leak- (type 1 nstemi)- Lovenox; ICA s/p L CEA (4/21). ASA, HD stable. Plavix held  Pulm: extubated 8/22; Rescue BiPAP 12/5 for respiratory distress  GI/FEN: Patient demonstrating severe dysphagia per FEES examination; maintain NPO status, TPN; Prior UGIB: EGD on 8/18: Erosive esophagitis and doudenitis with esophageal ulcers. protonix, Carafate per GI recs, Pepcid held for worsening mental status. No leakage surrounding ostomy; minimal-to-no output.1/2NS@80cc/hr  : S/p NM renal function study (wnl). Novak (8/18-). CTA&P showing hydronephrosis; renal U/S 8/26 showing moderate L hydronephrosis, repeat study ordered today; urology exchanged stent 8/27. Urine cx growing E. faecium;  ID: Febrile/tachypneic empiric coverage: Vanc (8/31-), Daina (8/31-) h/o previous ESBL Ecoli in intraabdominal abscess. Buttock rash: Nystatin powder (8/23--) D/c: : Daina (8/13-8/24), Blood cultures taken 8/24 show no growth to date.   Endo: ISS  PPX: SCDs., SQH  Lines: PIVs, RIJ TLC (8/23-), R brachial Bebe (8/22-23)  Wounds: ostomy, red rubber cath removed 8/25, Gluteal CORDELL removal by IR 8/26.     PT/OT: re-ordered 8/22  Dispo: [facility] convo with family 9/1

## 2021-09-01 NOTE — CONSULT NOTE ADULT - ATTENDING COMMENTS
Initial attending contact date 8/10/21     . See fellow note written above for details. I reviewed the fellow documentation. I have personally seen and examined this patient. I reviewed vitals, labs, medications, cardiac studies, and additional imaging. I agree with the above fellow's findings and plans as written above with the following additions/statements.    88 F with HTN s/p L CEA 4/5, 3 V CAD being treated medically, admitted with LBO. Cardiology consulted for pre op cardiac risk assessment for sigmoidectomy      -Doing well without active complaints, able to walk 3 city block with walker without anginal equivalents  -EKG NSR with unchanged nonspecific ST changes  -s/p cath 4/1: with known occluded RCA and Cx with diffuse 80% mLAD  -ASA/plavix on hold. Cont atorva  -Restart  metoprolol tartrate 12.5 mg po qd given known CAD and need to optimize med tx for extensive CAD  -Given stable CAD, pt considered intermediate risk for intermediate risk procedure  -No need for further cardiac work up at this time  -Will fu post op
Initial attending contact date  9/1/21    . See fellow note written above for details. I reviewed the fellow documentation. I have personally seen and examined this patient. I reviewed vitals, labs, medications, cardiac studies, and additional imaging. I agree with the above fellow's findings and plans as written above with the following additions/statements.      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 Cardiology consulted for troponin elevation troponin 0.09 -> 0.2.     -type 2 MI, demand ischemia   -Pt's known CAD with occluded RCA/Cx is consistent with ST depressions inferolaterally last night and ECHO findings which are known  -New mod-severe MR likely in setting of type 2 MI, would repeat echo when improved  -Treat underlying cause and CAD  -ASA, plavix/atorva/metoprolol if able to tolerate po meds. If unable, ASA suppository  -Palliative to consult
As above.  Culture from gluteal drainage is polymicrobial, including ESBL-producing E coli.  Would treat with meropenem for now.  Will follow with you - team 1.
89F PMHx of chronic back pain 2/2 L3,L4 compression fractures, triple vessel CAD on ASA, plavix, lipitor (04/21) and L CEA 2/2 L ICA stenosis >70% (4/21), recent ED visit on 6/28 for UTI p/w achy non-radiating back pain 5/10 in the L lumbar region. GI consulted for sigmoid mass.     #Perforated sigmoid mass vs diverticulitis   - personally reviewed CT imaging  - c/f perforated sigmoid mass w/ presacral/perirectal abscess  - would pursue drainage w/ IR and subsequent surgical planning for resection   - would not pursue colonoscopy w/ tissue sampling given significant area of perforation
Neurology consulted for dysphagia - oral and pharyngeal phases  On exam she has diffuse sarcopenia, however otherwise neurologic exam is unremarkable - no evidence of myasthenia, ALS, stroke, or other neurologic etiology of dysphagia  She has some mild flaccid dysarthria on exam  Most likely dysphagia is due to recent intubation  However given tenderness and bounding pulse at left anterior neck, recommend getting CTA neck to r/o vascular etiology (hematoma, pseudoaneurysm etc)   Will follow
Patient seen and examined at bedside on 8/8/2021 in [address] [place] [address] prior to transfer to [address] [facility].    Denied severe abdominal pain.    Abdomen soft, mildly distended, minimally tender.  No evidence of peritonitis.    Labs, CT reviewed, significant pelvic collection noted.    -Transfer to surgical team, telemetry  -NPO  -IV antibiotics - Zosyn  -IR consult for possible drainage  -Colorectal surgery consult in AM
88 YO F with PMH of chronic back pain 2/2 L3,L4 compression fractures, triple vessel CAD and L CEA 2/2 L ICA stenosis >70% (4/21), recent ED visit on 6/28 for UTI p/w abdominal pain x 1 day, constant associated with weight loss and abdominal distention.     - CT a/p showing large necrotic mass arising from the sigmoid colon with posterior extension to the sacrum and caudal extension to the rectum and the uterus, findings also suspicious for complicated perforated distal sigmoid diverticulitis with irregular large abscess in the presacral/posterior perirectal space and extending into the left gluteal region. Small presacral/pelvic extraluminal air. Left hydronephroureter to the level of left internal iliac artery, may be obstructed by pelvic abscess/pelvic inflammatory process.     A/P Perf diverticulitis with abscess vs abscess/underlying malignancy. On IV Abx, surgery planned. Further recommendations pending results.

## 2021-09-01 NOTE — CONSULT NOTE ADULT - CONSULT REASON
Question regarding stopping DAPT therapy for possible surgical intervention s/p L CEA in 4/21
Preoperative risk stratification
Pelvic/Perirectal Abscess
vascular access
Dysphagia
HAP
Troponin elevation
sigmoid diverticulitis
Management of ESBL E coli
sigmoid mass
vascular access
vascular access
sigmoid colon mass
Left Hydronephrosis
THAI
GOC

## 2021-09-01 NOTE — PROGRESS NOTE ADULT - SUBJECTIVE AND OBJECTIVE BOX
POD 11, 18    In ICU  Events of yesterday followed closely. Patient seen at time of EKG, transfer and also at 7:30 PM with ICU staff  On Bipap and doing bit better.  Awake and alert  No abdominal pain.      Vital Signs Last 24 Hrs  T(C): 36.7 (01 Sep 2021 09:25), Max: 38.2 (31 Aug 2021 13:07)  T(F): 98 (01 Sep 2021 09:25), Max: 100.7 (31 Aug 2021 13:07)  HR: 87 (01 Sep 2021 09:00) (84 - 118)  BP: 128/60 (01 Sep 2021 09:00) (105/52 - 134/61)  BP(mean): 87 (01 Sep 2021 09:00) (71 - 88)  RR: 20 (01 Sep 2021 09:00) (18 - 28)  SpO2: 100% (01 Sep 2021 09:00) (84% - 100%)    lungs: moving air well, rhonci    abd: softly distended, BS+, stoma viable with some stool colored fluid (5 ml)  no peritoneal signs  ext: no edema or focal tenderness     ml/last 12 hrs;  905 ml/last 24 hrs    On TPN and IV hydration                          7.6    16.67 )-----------( 394      ( 01 Sep 2021 03:51 )             24.1   09-01    146<H>  |  110<H>  |  49<H>  ----------------------------<  220<H>  4.5   |  23  |  0.67    Ca    9.4      01 Sep 2021 03:51  Phos  4.0     09-01  Mg     2.3     09-01    TPro  x   /  Alb  4.4  /  TBili  x   /  DBili  x   /  AST  x   /  ALT  x   /  AlkPhos  x   08-31

## 2021-09-01 NOTE — PROGRESS NOTE ADULT - ATTENDING COMMENTS
Still remains on BiPAP and leukocytosis of 16.  Patient denied cough but reports SOB.  CXR somewhat better than prior but with b/l opacity. Procal 0.05.  COVID neg. Possible aspiration PNA but could be PE (patient with malignancy, but per team she was wheezy yesterday). MRSA/MSSA nasal swab neg, ok to stop vanc.  Cont meropenem for PNA coverage.  Obtain duplex leg.  Consider CTPE.    Team will follow you.  Dr. Jacobs will take over the care tomorrow.  Case d/w primary team.    Jazmín Mathis MD, MS  Infectious Disease attending  work cell [phone]

## 2021-09-01 NOTE — PROGRESS NOTE ADULT - ASSESSMENT
89F PMHx back pain 2/2 L3,L4 compression fxr, triple vessel CAD,  L CEA 2/2 L ICA stenosis >70% (4/21) who presented 8/8 with fatigue, unintentional weight loss. Found to have colonic perforation 2/2 pelvic mass, path cw invasive adenocarcinoma. Hospital cb sp diverting colostomy (8/13), sp Left gluteal IR drain for gluteal abscess (8/9), pelvic infection, sp ex lap (8/20). SICU for monitoring suspected acute blood loss 2/2 to duodenal ulceration on EGD. Palliative following for GOC.

## 2021-09-02 DIAGNOSIS — R45.1 RESTLESSNESS AND AGITATION: ICD-10-CM

## 2021-09-02 DIAGNOSIS — F41.9 ANXIETY DISORDER, UNSPECIFIED: ICD-10-CM

## 2021-09-02 DIAGNOSIS — R06.00 DYSPNEA, UNSPECIFIED: ICD-10-CM

## 2021-09-02 LAB
ANION GAP SERPL CALC-SCNC: 12 MMOL/L — SIGNIFICANT CHANGE UP (ref 5–17)
BLD GP AB SCN SERPL QL: NEGATIVE — SIGNIFICANT CHANGE UP
BUN SERPL-MCNC: 49 MG/DL — HIGH (ref 7–23)
CALCIUM SERPL-MCNC: 9.4 MG/DL — SIGNIFICANT CHANGE UP (ref 8.4–10.5)
CHLORIDE SERPL-SCNC: 109 MMOL/L — HIGH (ref 96–108)
CO2 SERPL-SCNC: 25 MMOL/L — SIGNIFICANT CHANGE UP (ref 22–31)
CREAT SERPL-MCNC: 0.61 MG/DL — SIGNIFICANT CHANGE UP (ref 0.5–1.3)
GLUCOSE BLDC GLUCOMTR-MCNC: 137 MG/DL — HIGH (ref 70–99)
GLUCOSE BLDC GLUCOMTR-MCNC: 160 MG/DL — HIGH (ref 70–99)
GLUCOSE SERPL-MCNC: 130 MG/DL — HIGH (ref 70–99)
HCT VFR BLD CALC: 32.2 % — LOW (ref 34.5–45)
HGB BLD-MCNC: 10.2 G/DL — LOW (ref 11.5–15.5)
MAGNESIUM SERPL-MCNC: 2.4 MG/DL — SIGNIFICANT CHANGE UP (ref 1.6–2.6)
MCHC RBC-ENTMCNC: 29.4 PG — SIGNIFICANT CHANGE UP (ref 27–34)
MCHC RBC-ENTMCNC: 31.7 GM/DL — LOW (ref 32–36)
MCV RBC AUTO: 92.8 FL — SIGNIFICANT CHANGE UP (ref 80–100)
NRBC # BLD: 0 /100 WBCS — SIGNIFICANT CHANGE UP (ref 0–0)
PHOSPHATE SERPL-MCNC: 3.7 MG/DL — SIGNIFICANT CHANGE UP (ref 2.5–4.5)
PLATELET # BLD AUTO: 461 K/UL — HIGH (ref 150–400)
POTASSIUM SERPL-MCNC: 4.5 MMOL/L — SIGNIFICANT CHANGE UP (ref 3.5–5.3)
POTASSIUM SERPL-SCNC: 4.5 MMOL/L — SIGNIFICANT CHANGE UP (ref 3.5–5.3)
RBC # BLD: 3.47 M/UL — LOW (ref 3.8–5.2)
RBC # FLD: 17.3 % — HIGH (ref 10.3–14.5)
RH IG SCN BLD-IMP: POSITIVE — SIGNIFICANT CHANGE UP
SODIUM SERPL-SCNC: 146 MMOL/L — HIGH (ref 135–145)
WBC # BLD: 17.93 K/UL — HIGH (ref 3.8–10.5)
WBC # FLD AUTO: 17.93 K/UL — HIGH (ref 3.8–10.5)

## 2021-09-02 PROCEDURE — 99497 ADVNCD CARE PLAN 30 MIN: CPT

## 2021-09-02 PROCEDURE — 99233 SBSQ HOSP IP/OBS HIGH 50: CPT

## 2021-09-02 PROCEDURE — 99232 SBSQ HOSP IP/OBS MODERATE 35: CPT | Mod: GC

## 2021-09-02 PROCEDURE — 99232 SBSQ HOSP IP/OBS MODERATE 35: CPT

## 2021-09-02 PROCEDURE — 71045 X-RAY EXAM CHEST 1 VIEW: CPT | Mod: 26

## 2021-09-02 RX ORDER — I.V. FAT EMULSION 20 G/100ML
0.38 EMULSION INTRAVENOUS
Qty: 20 | Refills: 0 | Status: DISCONTINUED | OUTPATIENT
Start: 2021-09-02 | End: 2021-09-02

## 2021-09-02 RX ORDER — ELECTROLYTE SOLUTION,INJ
1 VIAL (ML) INTRAVENOUS
Refills: 0 | Status: DISCONTINUED | OUTPATIENT
Start: 2021-09-02 | End: 2021-09-02

## 2021-09-02 RX ORDER — HYDROMORPHONE HYDROCHLORIDE 2 MG/ML
0.2 INJECTION INTRAMUSCULAR; INTRAVENOUS; SUBCUTANEOUS EVERY 4 HOURS
Refills: 0 | Status: DISCONTINUED | OUTPATIENT
Start: 2021-09-02 | End: 2021-09-03

## 2021-09-02 RX ORDER — ROBINUL 0.2 MG/ML
0.4 INJECTION INTRAMUSCULAR; INTRAVENOUS EVERY 6 HOURS
Refills: 0 | Status: DISCONTINUED | OUTPATIENT
Start: 2021-09-02 | End: 2021-09-08

## 2021-09-02 RX ORDER — HYDROMORPHONE HYDROCHLORIDE 2 MG/ML
0.5 INJECTION INTRAMUSCULAR; INTRAVENOUS; SUBCUTANEOUS EVERY 4 HOURS
Refills: 0 | Status: DISCONTINUED | OUTPATIENT
Start: 2021-09-02 | End: 2021-09-03

## 2021-09-02 RX ORDER — ALBUMIN HUMAN 25 %
250 VIAL (ML) INTRAVENOUS ONCE
Refills: 0 | Status: COMPLETED | OUTPATIENT
Start: 2021-09-02 | End: 2021-09-02

## 2021-09-02 RX ORDER — ACETAMINOPHEN 500 MG
540 TABLET ORAL ONCE
Refills: 0 | Status: DISCONTINUED | OUTPATIENT
Start: 2021-09-02 | End: 2021-09-08

## 2021-09-02 RX ADMIN — HYDROMORPHONE HYDROCHLORIDE 0.2 MILLIGRAM(S): 2 INJECTION INTRAMUSCULAR; INTRAVENOUS; SUBCUTANEOUS at 22:31

## 2021-09-02 RX ADMIN — Medication 3 MILLILITER(S): at 02:19

## 2021-09-02 RX ADMIN — LIDOCAINE 1 PATCH: 4 CREAM TOPICAL at 21:22

## 2021-09-02 RX ADMIN — HYDROMORPHONE HYDROCHLORIDE 0.2 MILLIGRAM(S): 2 INJECTION INTRAMUSCULAR; INTRAVENOUS; SUBCUTANEOUS at 14:40

## 2021-09-02 RX ADMIN — LIDOCAINE 1 PATCH: 4 CREAM TOPICAL at 17:22

## 2021-09-02 RX ADMIN — HYDROMORPHONE HYDROCHLORIDE 0.2 MILLIGRAM(S): 2 INJECTION INTRAMUSCULAR; INTRAVENOUS; SUBCUTANEOUS at 22:52

## 2021-09-02 RX ADMIN — HYDROMORPHONE HYDROCHLORIDE 0.5 MILLIGRAM(S): 2 INJECTION INTRAMUSCULAR; INTRAVENOUS; SUBCUTANEOUS at 19:20

## 2021-09-02 RX ADMIN — LIDOCAINE 1 PATCH: 4 CREAM TOPICAL at 18:43

## 2021-09-02 RX ADMIN — LIDOCAINE 1 PATCH: 4 CREAM TOPICAL at 10:53

## 2021-09-02 RX ADMIN — HYDROMORPHONE HYDROCHLORIDE 0.5 MILLIGRAM(S): 2 INJECTION INTRAMUSCULAR; INTRAVENOUS; SUBCUTANEOUS at 11:42

## 2021-09-02 RX ADMIN — MEROPENEM 100 MILLIGRAM(S): 1 INJECTION INTRAVENOUS at 05:59

## 2021-09-02 RX ADMIN — NYSTATIN CREAM 1 APPLICATION(S): 100000 CREAM TOPICAL at 06:15

## 2021-09-02 RX ADMIN — HYDROMORPHONE HYDROCHLORIDE 0.2 MILLIGRAM(S): 2 INJECTION INTRAMUSCULAR; INTRAVENOUS; SUBCUTANEOUS at 16:40

## 2021-09-02 RX ADMIN — Medication 150 MILLIGRAM(S): at 10:52

## 2021-09-02 RX ADMIN — HYDROMORPHONE HYDROCHLORIDE 0.2 MILLIGRAM(S): 2 INJECTION INTRAMUSCULAR; INTRAVENOUS; SUBCUTANEOUS at 14:20

## 2021-09-02 RX ADMIN — PANTOPRAZOLE SODIUM 40 MILLIGRAM(S): 20 TABLET, DELAYED RELEASE ORAL at 10:52

## 2021-09-02 RX ADMIN — Medication 3 MILLILITER(S): at 05:50

## 2021-09-02 RX ADMIN — HYDROMORPHONE HYDROCHLORIDE 0.2 MILLIGRAM(S): 2 INJECTION INTRAMUSCULAR; INTRAVENOUS; SUBCUTANEOUS at 20:39

## 2021-09-02 RX ADMIN — ROBINUL 0.4 MILLIGRAM(S): 0.2 INJECTION INTRAMUSCULAR; INTRAVENOUS at 21:22

## 2021-09-02 RX ADMIN — NYSTATIN CREAM 1 APPLICATION(S): 100000 CREAM TOPICAL at 17:24

## 2021-09-02 RX ADMIN — Medication 3 MILLILITER(S): at 14:26

## 2021-09-02 RX ADMIN — HYDROMORPHONE HYDROCHLORIDE 0.5 MILLIGRAM(S): 2 INJECTION INTRAMUSCULAR; INTRAVENOUS; SUBCUTANEOUS at 19:35

## 2021-09-02 RX ADMIN — CHLORHEXIDINE GLUCONATE 1 APPLICATION(S): 213 SOLUTION TOPICAL at 06:00

## 2021-09-02 RX ADMIN — LIDOCAINE 1 PATCH: 4 CREAM TOPICAL at 00:25

## 2021-09-02 RX ADMIN — HYDROMORPHONE HYDROCHLORIDE 0.2 MILLIGRAM(S): 2 INJECTION INTRAMUSCULAR; INTRAVENOUS; SUBCUTANEOUS at 18:42

## 2021-09-02 RX ADMIN — Medication 125 MILLILITER(S): at 07:47

## 2021-09-02 RX ADMIN — LIDOCAINE 1 PATCH: 4 CREAM TOPICAL at 06:15

## 2021-09-02 RX ADMIN — CHLORHEXIDINE GLUCONATE 1 APPLICATION(S): 213 SOLUTION TOPICAL at 05:59

## 2021-09-02 RX ADMIN — HYDROMORPHONE HYDROCHLORIDE 0.5 MILLIGRAM(S): 2 INJECTION INTRAMUSCULAR; INTRAVENOUS; SUBCUTANEOUS at 12:00

## 2021-09-02 RX ADMIN — HYDROMORPHONE HYDROCHLORIDE 0.2 MILLIGRAM(S): 2 INJECTION INTRAMUSCULAR; INTRAVENOUS; SUBCUTANEOUS at 16:18

## 2021-09-02 RX ADMIN — Medication 1 MILLIGRAM(S): at 17:22

## 2021-09-02 NOTE — PROGRESS NOTE ADULT - SUBJECTIVE AND OBJECTIVE BOX
Cardiology Consult    O/N:  Interval History:  Telemetry:    OBJECTIVE  Vitals:  T(C): 36.4 (09-02-21 @ 14:44), Max: 37.4 (09-02-21 @ 09:29)  HR: 85 (09-02-21 @ 16:30) (72 - 102)  BP: 156/73 (09-02-21 @ 11:00) (109/57 - 162/72)  RR: 13 (09-02-21 @ 12:00) (13 - 25)  SpO2: 97% (09-02-21 @ 16:30) (94% - 99%)  Wt(kg): --    I/O:  I&O's Summary    01 Sep 2021 07:01  -  02 Sep 2021 07:00  --------------------------------------------------------  IN: 3413.9 mL / OUT: 751 mL / NET: 2662.9 mL    02 Sep 2021 07:01  -  02 Sep 2021 17:00  --------------------------------------------------------  IN: 556.3 mL / OUT: 309 mL / NET: 247.3 mL        PHYSICAL EXAM:  Appearance: NAD. Speaking in full sentences.   HEENT: No pallor noted.  Conjunctiva clear b/l. Moist oral mucosa.  Cardiovascular: RRR with no murmurs.  Respiratory: Lungs CTAB.   Gastrointestinal:  Soft, nontender. Non-distended. Non-rigid.	  Extremities: No edema b/l. No erythema b/l. LE WWP b/l.  Vascular: DP intact  Neurologic:  Alert and awake. Moving all extremities. Following commands.   	  LABS:                        10.2   17.93 )-----------( 461      ( 02 Sep 2021 05:43 )             32.2     09-02    146<H>  |  109<H>  |  49<H>  ----------------------------<  130<H>  4.5   |  25  |  0.61    Ca    9.4      02 Sep 2021 05:43  Phos  3.7     09-02  Mg     2.4     09-02    TPro  x   /  Alb  4.4  /  TBili  x   /  DBili  x   /  AST  x   /  ALT  x   /  AlkPhos  x   08-31          RADIOLOGY & ADDITIONAL TESTS:  Reviewed .    MEDICATIONS  (STANDING):  chlorhexidine 2% Cloths 1 Application(s) Topical daily  chlorhexidine 4% Liquid 1 Application(s) Topical <User Schedule>  HYDROmorphone  Injectable 0.2 milliGRAM(s) IV Push every 4 hours  lidocaine   4% Patch 1 Patch Transdermal every 24 hours  lidocaine   4% Patch 1 Patch Transdermal every 24 hours  nystatin Powder 1 Application(s) Topical two times a day    MEDICATIONS  (PRN):  acetaminophen  IVPB .. 540 milliGRAM(s) IV Intermittent once PRN Temp greater or equal to 38C (100.4F)  HYDROmorphone  Injectable 0.2 milliGRAM(s) IV Push every 4 hours PRN Mild Pain (1 - 3)  HYDROmorphone  Injectable 0.5 milliGRAM(s) IV Push every 4 hours PRN Moderate Pain (4 - 6)  LORazepam   Injectable 1 milliGRAM(s) IV Push every 4 hours PRN Anxiety  ondansetron Injectable 4 milliGRAM(s) IV Push every 6 hours PRN Nausea and/or Vomiting  sodium chloride 0.9% lock flush 10 milliLiter(s) IV Push every 1 hour PRN Pre/post blood products, medications, blood draw, and to maintain line patency     Cardiology Consult    O/N:  Interval History: patient was seen and examined. On bipap, no complains.   Telemetry:    OBJECTIVE  Vitals:  T(C): 36.4 (09-02-21 @ 14:44), Max: 37.4 (09-02-21 @ 09:29)  HR: 85 (09-02-21 @ 16:30) (72 - 102)  BP: 156/73 (09-02-21 @ 11:00) (109/57 - 162/72)  RR: 13 (09-02-21 @ 12:00) (13 - 25)  SpO2: 97% (09-02-21 @ 16:30) (94% - 99%)  Wt(kg): --    I/O:  I&O's Summary    01 Sep 2021 07:01  -  02 Sep 2021 07:00  --------------------------------------------------------  IN: 3413.9 mL / OUT: 751 mL / NET: 2662.9 mL    02 Sep 2021 07:01  -  02 Sep 2021 17:00  --------------------------------------------------------  IN: 556.3 mL / OUT: 309 mL / NET: 247.3 mL        PHYSICAL EXAM:  Appearance: on bipap, ill appearing   Cardiovascular: RRR   Respiratory: ronchi. decreased breath sounds  Gastrointestinal:  Soft, nontender. Non-distended. Non-rigid.	  MSK: chachectic   LABS:                        10.2   17.93 )-----------( 461      ( 02 Sep 2021 05:43 )             32.2     09-02    146<H>  |  109<H>  |  49<H>  ----------------------------<  130<H>  4.5   |  25  |  0.61    Ca    9.4      02 Sep 2021 05:43  Phos  3.7     09-02  Mg     2.4     09-02    TPro  x   /  Alb  4.4  /  TBili  x   /  DBili  x   /  AST  x   /  ALT  x   /  AlkPhos  x   08-31          RADIOLOGY & ADDITIONAL TESTS:  Reviewed .    MEDICATIONS  (STANDING):  chlorhexidine 2% Cloths 1 Application(s) Topical daily  chlorhexidine 4% Liquid 1 Application(s) Topical <User Schedule>  HYDROmorphone  Injectable 0.2 milliGRAM(s) IV Push every 4 hours  lidocaine   4% Patch 1 Patch Transdermal every 24 hours  lidocaine   4% Patch 1 Patch Transdermal every 24 hours  nystatin Powder 1 Application(s) Topical two times a day    MEDICATIONS  (PRN):  acetaminophen  IVPB .. 540 milliGRAM(s) IV Intermittent once PRN Temp greater or equal to 38C (100.4F)  HYDROmorphone  Injectable 0.2 milliGRAM(s) IV Push every 4 hours PRN Mild Pain (1 - 3)  HYDROmorphone  Injectable 0.5 milliGRAM(s) IV Push every 4 hours PRN Moderate Pain (4 - 6)  LORazepam   Injectable 1 milliGRAM(s) IV Push every 4 hours PRN Anxiety  ondansetron Injectable 4 milliGRAM(s) IV Push every 6 hours PRN Nausea and/or Vomiting  sodium chloride 0.9% lock flush 10 milliLiter(s) IV Push every 1 hour PRN Pre/post blood products, medications, blood draw, and to maintain line patency

## 2021-09-02 NOTE — PROGRESS NOTE ADULT - ASSESSMENT
89F PMH HTN, chronic back pain 2/2 L3,L4 compression fractures, triple vessel CAD, and SBO due to possible diverticulitis vs. GYN/colorectal malignancy (2018) never followed up and L ICA s/p L CEA (4/21). 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 Left gluteal IR drain placed (8/9). Colonoscopy w/ sigmoid mass bx on 8/11- final path invasive adenocarcinoma moderately differentiated. Taken to OR on 8/13 for diverting colostomy. In SICU for acute blood loss anemia, s/p EGD 8/18 showing duodenal ulceration, erosive esophagitis duodenitis, bx x 2, CT 8/20 w/ closed loop obstxn, s/p OR 8/20 exlap STEPHANIE. 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 Currently on aerosolized face mask, respirating well.     Neuro: Tylenol prn  CV: no NSTEMI per cardiology, Lovenox dc'd, ICA s/p L CEA (4/21). ASA, HD stable. Plavix held  Pulm: extubated 8/22; BiPAP 12/5 @nite, HFNC 40/40 during day  GI/FEN: Patient demonstrating severe dysphagia per FEES examination; maintain NPO status, TPN; Prior UGIB: EGD on 8/18: Erosive esophagitis and doudenitis with esophageal ulcers. protonix, Carafate per GI recs, Pepcid held for worsening mental status. No leakage surrounding ostomy; minimal-to-no output (~1cc appreciated today 9/2).1/2NS@80cc/hr  : S/p NM renal function study (wnl). Novak (8/18-). CTA&P showing hydronephrosis; renal U/S 8/26 showing moderate L hydronephrosis, repeat study ordered today; urology exchanged stent 8/27. Urine cx growing E. faecium;  ID: Febrile/tachypneic empiric coverage: Vanc (8/31-), Daina (8/31-) h/o previous ESBL Ecoli in intraabdominal abscess. Buttock rash: Nystatin powder (8/23--) D/c: : Daina (8/13-8/24), Blood cultures taken 8/24 show no growth to date.   Endo: ISS  PPX: SCDs., SQH  Lines: PIVs, RIJ TLC (8/23-), R brachial Sylvester (8/22-23)  Wounds: ostomy, red rubber cath removed 8/25, Gluteal CORDELL removal by IR 8/26.     PT/OT: re-ordered 8/22  Dispo: [facility] convo with family 9/1 - discuss with pall care   89F PMH HTN, chronic back pain 2/2 L3,L4 compression fractures, triple vessel CAD, and SBO due to possible diverticulitis vs. GYN/colorectal malignancy (2018) never followed up and L ICA s/p L CEA (4/21). 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 Left gluteal IR drain placed (8/9). Colonoscopy w/ sigmoid mass bx on 8/11- final path invasive adenocarcinoma moderately differentiated. Taken to OR on 8/13 for diverting colostomy. In SICU for acute blood loss anemia, s/p EGD 8/18 showing duodenal ulceration, erosive esophagitis duodenitis, bx x 2, CT 8/20 w/ closed loop obstxn, s/p OR 8/20 exlap STEPHANIE. 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 Currently on BiPAP. Undergoing GOC discussion with family, HCP, and Palliative.    No acute surgical intervention  Have SLP re-eval pt for dysphagia  F/u GOC with HCP, family, and palliative.   Pt's current hospital course along with prognosis discussed with family on 9/1  ID following- rec meropenem for know E. feacium  Monitor stoma output-given abdominal distention with minimal stoma output, may warrant AXR vs CT this AM  Further management per SICU  Discussed with Dr. Stearns

## 2021-09-02 NOTE — PROGRESS NOTE ADULT - SUBJECTIVE AND OBJECTIVE BOX
SUBJECTIVE: pt seen and examined. dyspneic on bipap. ROS limited dt confusion, dyspnea     DNR in chart: discussed today, placed in physical chart     If  [ ] blank, symptom not present  Dyspnea:                           [ ]Mild [x ]Moderate [ ]Severe  Anxiety:                             [x ]Mild [ ]Moderate [ ]Severe  Fatigue:                             [ ]Mild [ ]Moderate [ ]Severe  Nausea:                             [ ]Mild [ ]Moderate [ ]Severe  Loss of appetite:              [ ]Mild [ ]Moderate [ ]Severe  Constipation:                    [ ]Mild [ ]Moderate [ ]Severe  Grief Present                    [ ] Yes   [ ] No     Pain (Impact on QOL):   generalized discomfort   Location -         Minimal acceptable level (0-10 scale):   Aggravating factors -  Quality -  Radiation -  Severity (0-10 scale) -    Timing -    PEx:  T(C): 37.4 (09-02-21 @ 09:29), Max: 37.4 (09-02-21 @ 09:29)  HR: 102 (09-02-21 @ 11:00) (72 - 102)  BP: 156/73 (09-02-21 @ 11:00) (109/57 - 162/72)  RR: 19 (09-02-21 @ 11:00) (16 - 25)  SpO2: 97% (09-02-21 @ 11:00) (94% - 100%)  Wt(kg): --    GENERAL: thin, tired appearing elderly female, on bipap appears uncomfortable   HEENT: Temporal wasting MMM.    RESPIRATORY: CTAB. No wheezing, rales or rhonchi. Fair inspiratory effort. tachypnea on bipap   CARDIOVASCULAR: RRR. No audible murmurs, rubs or gallops. +tele  GASTROINTESTINAL: Abdomen soft, NT. No arden-incisional drainage or purulence. Ostomy over left abdomen  NEUROLOGIC: lethargic oriented to name  only, 4/5 strength bilat upper extremities   INTEGUMENTARY: thin, pale, Significant ecchymosis over right wrist, forearm, and dorsal aspect of right hand.  MUSCULOSKELETAL: No cyanosis or clubbing. No gross deformities. generalized loss of adipose tissue   Psych: mild anxiety   	    ALLERGIES: No Known Allergies      MEDICATIONS: REVIEWED  MEDICATIONS  (STANDING):  ALBUTerol    90 MICROgram(s) HFA Inhaler 1 Puff(s) Inhalation every 4 hours  albuterol/ipratropium for Nebulization 3 milliLiter(s) Nebulizer every 4 hours  chlorhexidine 2% Cloths 1 Application(s) Topical daily  chlorhexidine 4% Liquid 1 Application(s) Topical <User Schedule>  HYDROmorphone  Injectable 0.2 milliGRAM(s) IV Push every 4 hours  lidocaine   4% Patch 1 Patch Transdermal every 24 hours  lidocaine   4% Patch 1 Patch Transdermal every 24 hours  nystatin Powder 1 Application(s) Topical two times a day  tiotropium 18 MICROgram(s) Capsule 1 Capsule(s) Inhalation daily    MEDICATIONS  (PRN):  HYDROmorphone  Injectable 0.2 milliGRAM(s) IV Push every 4 hours PRN Mild Pain (1 - 3)  HYDROmorphone  Injectable 0.5 milliGRAM(s) IV Push every 4 hours PRN Moderate Pain (4 - 6)  LORazepam   Injectable 1 milliGRAM(s) IV Push every 4 hours PRN Anxiety  ondansetron Injectable 4 milliGRAM(s) IV Push every 6 hours PRN Nausea and/or Vomiting  sodium chloride 0.9% lock flush 10 milliLiter(s) IV Push every 1 hour PRN Pre/post blood products, medications, blood draw, and to maintain line patency    CRITICAL CARE:  [ ]Shock Present  [ ]Septic [ ]Cardiogenic [ ]Neurologic [ ]Hypovolemic    [ ]Vasopressors [ ]Inotropes    [x ]Respiratory failure present   [ ]Mechanical Ventilation   [x  ]Non-invasive ventilatory support   [ ]High-Flow  [ ]Acute  [ ]Chronic   [x ]Hypoxic  [ ]Hypercarbic   [ ]Other    [ ]Other organ failure     LABS: REVIEWED  CBC:                        10.2   17.93 )-----------( 461      ( 02 Sep 2021 05:43 )             32.2     CMP:    09-02    146<H>  |  109<H>  |  49<H>  ----------------------------<  130<H>  4.5   |  25  |  0.61    Ca    9.4      02 Sep 2021 05:43  Phos  3.7     09-02  Mg     2.4     09-02    TPro  x   /  Alb  4.4  /  TBili  x   /  DBili  x   /  AST  x   /  ALT  x   /  AlkPhos  x   08-31      IMAGING: REVIEWED    Decision maker: The patient is able to participate in complex medical decision making conversations.   Legal surrogate: Cynthia Sims # [phone]  Pt's zohra, Umberto Wooten, also involved in C    ADVANCE DIRECTIVES & GOALS OF CARE  -DNR DNI  -comfort care  -discontinue all interventions that prolong dying process     PSYCHOSOCIAL-SPIRITUAL ASSESSMENT: Reviewed, Care plan unchanged    REFERRALS:  [ x] palliative social work [ ]Chaplaincy  [ ]Hospice  [ ]Child Life  [ ]Social Work  [ ]Case management [  ]Holistic Therapy

## 2021-09-02 NOTE — PROVIDER CONTACT NOTE (CHANGE IN STATUS NOTIFICATION) - SITUATION
some dehiscence on upper midline sx site, as seen together by wound care nurse Desirae at bedside.
Patient nasally suctioned on right nostril, immediately dark bilious output came out ~50cc & vomited orally as well.

## 2021-09-02 NOTE — PROGRESS NOTE ADULT - ASSESSMENT
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 Cardiology consulted for troponin elevation troponin 0.09 -> 0.2.       #Troponin elevation: likely type 2 MI, demand ischemia   -Pt's known CAD with occluded RCA/Cx is consistent with ST depressions inferolaterally and ECHO findings which are known  -New mod-severe MR likely in setting of type 2 MI  - Patient is NPO  -c/w rwctal Asa    Patient is Comfort care now

## 2021-09-02 NOTE — PROGRESS NOTE ADULT - ASSESSMENT
89F PMH HTN, chronic back pain 2/2 L3,L4 compression fractures, triple vessel CAD, and SBO due to possible diverticulitis vs. GYN/colorectal malignancy (2018) never followed up and L ICA s/p L CEA (4/21). 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 Left gluteal IR drain placed (8/9). Colonoscopy w/ sigmoid mass bx on 8/11- final path invasive adenocarcinoma moderately differentiated. Taken to OR on 8/13 for diverting colostomy. In SICU for acute blood loss anemia, s/p EGD 8/18 showing duodenal ulceration, erosive esophagitis duodenitis, bx x 2, CT 8/20 w/ closed loop obstxn, s/p OR 8/20 exlap STEPHANIE. 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     Neuro: Tylenol prn  CV: no NSTEMI per cardiology, Lovenox dc'd, ICA s/p L CEA (4/21). ASA, HD stable. Plavix held  Pulm: extubated 8/22; BiPAP 12/5 @nite, HFNC 40/40 during day  GI/FEN: Patient demonstrating severe dysphagia per FEES examination; maintain NPO status, TPN; Prior UGIB: EGD on 8/18: Erosive esophagitis and doudenitis with esophageal ulcers. protonix, Carafate per GI recs, Pepcid held for worsening mental status. No leakage surrounding ostomy; minimal-to-no output.1/2NS@80cc/hr  : S/p NM renal function study (wnl). Novak (8/18-). CTA&P showing hydronephrosis; renal U/S 8/26 showing moderate L hydronephrosis, repeat study ordered today; urology exchanged stent 8/27. Urine cx growing E. faecium;  ID: Febrile/tachypneic empiric coverage: Vanc (8/31-), Daina (8/31-) h/o previous ESBL Ecoli in intraabdominal abscess. Buttock rash: Nystatin powder (8/23--) D/c: : Daina (8/13-8/24), Blood cultures taken 8/24 show no growth to date.   Endo: ISS  PPX: SCDs., SQH  Lines: PIVs, RIJ TLC (8/23-), R brachial [place] (8/22-23)  Wounds: ostomy, red rubber cath removed 8/25, Gluteal CORDELL removal by IR 8/26.     PT/OT: re-ordered 8/22  Dispo: [facility] convo with family 9/1   89F PMH HTN, chronic back pain 2/2 L3,L4 compression fractures, triple vessel CAD, and SBO due to possible diverticulitis vs. GYN/colorectal malignancy (2018) never followed up and L ICA s/p L CEA (4/21). 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 Left gluteal IR drain placed (8/9). Colonoscopy w/ sigmoid mass bx on 8/11- final path invasive adenocarcinoma moderately differentiated. Taken to OR on 8/13 for diverting colostomy. In SICU for acute blood loss anemia, s/p EGD 8/18 showing duodenal ulceration, erosive esophagitis duodenitis, bx x 2, CT 8/20 w/ closed loop obstxn, s/p OR 8/20 exlap STEPHANIE. 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 Family decided to make comfort care, orders adjusted in accordance with those wishes.     Neuro: Tylenol prn, ativan prn, zofran prn, dilaudid prn  CV: HD stable  Pulm: extubated 8/22; BiPAP or HFNC for comfort, duonebs  GI/FEN: Patient demonstrating severe dysphagia per FEES examination; maintain NPO status, no more TPN as he's comfort care only; Prior UGIB: EGD on 8/18: Erosive esophagitis and doudenitis with esophageal ulcers. protonix, No leakage surrounding ostomy; minimal-to-no output. no more IVF  : S/p NM renal function study (wnl). Novak (8/18-). CTA&P showing hydronephrosis; renal U/S 8/26 showing moderate L hydronephrosis, repeat study ordered today; urology exchanged stent 8/27. Urine cx growing E. faecium;  ID: No more antibiotics per family discussion. History: Febrile/tachypneic empiric coverage: Vanc (8/31-9/2), Daina (8/31-9/2) h/o previous ESBL Ecoli in intraabdominal abscess. Buttock rash: Nystatin powder (8/23--) D/c: : Daina (8/13-8/24), Blood cultures taken 8/24 show no growth to date.   Endo: none  PPX: SCDs.  Lines: PIVs, RIJ TLC (8/23-), R brachial [place] (8/22-23)  Wounds: ostomy, red rubber cath removed 8/25, Gluteal CORDELL removal by IR 8/26.     PT/OT: re-ordered 8/22  Dispo: transfer to [facility]

## 2021-09-02 NOTE — PROGRESS NOTE ADULT - SUBJECTIVE AND OBJECTIVE BOX
Renal US done overnight, showing resolution of L hydronephrosis.  Afebrile, intermittently tachy. Adequate UOP.   Urine in urbina rust colored.  WBC 17 from 16  Cr 0.61 from 0.67    Recommendations:  - Goals of care discussion per primary team  - TOV per primary team  - Can see Dr. Kitchen as an outpatient for follow up          Vital Signs Last 24 Hrs  T(C): 37.4 (02 Sep 2021 09:29), Max: 37.4 (02 Sep 2021 09:29)  T(F): 99.4 (02 Sep 2021 09:29), Max: 99.4 (02 Sep 2021 09:29)  HR: 82 (02 Sep 2021 12:00) (72 - 102)  BP: 156/73 (02 Sep 2021 11:00) (109/57 - 162/72)  BP(mean): 105 (02 Sep 2021 11:00) (78 - 105)  RR: 13 (02 Sep 2021 12:00) (13 - 25)  SpO2: 97% (02 Sep 2021 12:00) (94% - 100%)  I&O's Detail    01 Sep 2021 07:01  -  02 Sep 2021 07:00  --------------------------------------------------------  IN:    Albumin 5% - 50 mL: 250 mL    Fat Emulsion 20%: 107.9 mL    IV PiggyBack: 200 mL    sodium chloride 0.45%: 1920 mL    TPN (Total Parenteral Nutrition): 936 mL  Total IN: 3413.9 mL    OUT:    Colostomy (mL): 0 mL    Indwelling Catheter - Urethral (mL): 751 mL  Total OUT: 751 mL    Total NET: 2662.9 mL      02 Sep 2021 07:01  -  02 Sep 2021 13:37  --------------------------------------------------------  IN:    Fat Emulsion 20%: 8.3 mL    sodium chloride 0.45%: 320 mL    TPN (Total Parenteral Nutrition): 228 mL  Total IN: 556.3 mL    OUT:    Colostomy (mL): 0 mL    Indwelling Catheter - Urethral (mL): 149 mL  Total OUT: 149 mL    Total NET: 407.3 mL            LABS:                        10.2   17.93 )-----------( 461      ( 02 Sep 2021 05:43 )             32.2     09-    146<H>  |  109<H>  |  49<H>  ----------------------------<  130<H>  4.5   |  25  |  0.61    Ca    9.4      02 Sep 2021 05:43  Phos  3.7       Mg     2.4         TPro  x   /  Alb  4.4  /  TBili  x   /  DBili  x   /  AST  x   /  ALT  x   /  AlkPhos  x       PT/INR - ( 31 Aug 2021 14:28 )   PT: 13.5 sec;   INR: 1.13          PTT - ( 31 Aug 2021 14:28 )  PTT:30.8 sec  Urinalysis Basic - ( 31 Aug 2021 14:28 )    Color: Red / Appearance: Turbid / S.020 / pH: x  Gluc: x / Ketone: NEGATIVE  / Bili: Small / Urobili: 4.0 E.U./dL   Blood: x / Protein: >=300 mg/dL / Nitrite: POSITIVE   Leuk Esterase: Large / RBC: Many /HPF / WBC Many /HPF   Sq Epi: x / Non Sq Epi: 0-5 /HPF / Bacteria: Present /HPF        RADIOLOGY & ADDITIONAL STUDIES:

## 2021-09-02 NOTE — ADVANCED PRACTICE NURSE CONSULT - REASON FOR CONSULT
WOC nurse follow up to assess ostomy pouching system. 
WOC nurse consult to instruct the patient on ostomy care. The patient is s/p diverting colostomy for unresectable perforated sigmoid mass with pelvic abscess 8/13/21.
ostomy assessment
C nurse follow up to continue ostomy instruction. The patient is s/p expl lap, extensive STEPHANIE and repair of enterotomy and serosal tear on 8/20/21.
Preoperative teaching/counseling 
[facility] nurse follow up to continue ostomy teaching. 
WOC nurse follow up to assess stoma and pouching system. 
WOCN follow up to continue ostomy instructions. 
WOC nurse follow up to continue ostomy instructions.

## 2021-09-02 NOTE — PROGRESS NOTE ADULT - TIME BILLING
hydronephrosis
management of intraabdominal infection
as noted above
management of PNA, severe sepsis
management of PNA, hypoxic respiratory failure

## 2021-09-02 NOTE — PROGRESS NOTE ADULT - ATTENDING SUPERVISION STATEMENT
Resident
ACP/Fellow
Fellow
Resident
ACP
ACP
Fellow
Fellow
Resident
Resident/Fellow
ACP
ACP
Fellow
Resident
ACP/Resident
Resident

## 2021-09-02 NOTE — ADVANCED PRACTICE NURSE CONSULT - RECOMMEDATIONS
Mid abdominal wound - cleanse with normal saline, apply Cavilon skin prep to periwound, fill lightly with Aquacel Extra, cover with 2x2 gauze, secure with paper tape.   NICHOLAS discussed assessment and recommendations with RN.  Mid abdominal wound - cleanse with normal saline, apply Cavilon skin prep to periwound, fill lightly with Aquacel Extra, cover with 2x2 gauze, secure with paper tape.   WOCN discussed assessment and recommendations with ANT Campoverde and SAYDA Grier.

## 2021-09-02 NOTE — ADVANCED PRACTICE NURSE CONSULT - APN SPECIALTY LIST
Wound Ostomy Care

## 2021-09-02 NOTE — PROGRESS NOTE ADULT - ASSESSMENT
89F PMHx back pain 2/2 L3,L4 compression fxr, triple vessel CAD,  L CEA 2/2 L ICA stenosis >70% (4/21) who presented 8/8 with fatigue, unintentional weight loss. Found to have colonic perforation 2/2 pelvic mass, path cw invasive adenocarcinoma, poor prognosis. Hospital cb sp diverting colostomy (8/13), sp Left gluteal IR drain for gluteal abscess (8/9), pelvic infection, sp ex lap (8/20). Palliative following for GOC. Transition to care with exclusive focus on comfort.

## 2021-09-02 NOTE — PROGRESS NOTE ADULT - ATTENDING COMMENTS
No pseudoaneurysm on carotid duplex  still on BIPAP, not able to wean to NC  family agreed to transition to comfort care  no further neurologic workup   call back with further questions

## 2021-09-02 NOTE — PROGRESS NOTE ADULT - SUBJECTIVE AND OBJECTIVE BOX
Placed back on BiPap O/N, and given albumin for oliguria O/N. This AM arousable and follows commands buts somnolent.    ICU Vital Signs Last 24 Hrs  T(C): 37.4 (02 Sep 2021 09:29), Max: 37.4 (02 Sep 2021 09:29)  T(F): 99.4 (02 Sep 2021 09:29), Max: 99.4 (02 Sep 2021 09:29)  HR: 101 (02 Sep 2021 09:00) (72 - 101)  BP: 139/68 (02 Sep 2021 08:00) (109/57 - 162/72)  BP(mean): 97 (02 Sep 2021 08:00) (78 - 104)  ABP: --  ABP(mean): --  RR: 25 (02 Sep 2021 09:00) (16 - 25)  SpO2: 99% (02 Sep 2021 09:00) (93% - 100%)    CBC Full  -  ( 02 Sep 2021 05:43 )  WBC Count : 17.93 K/uL  RBC Count : 3.47 M/uL  Hemoglobin : 10.2 g/dL  Hematocrit : 32.2 %  Platelet Count - Automated : 461 K/uL  Mean Cell Volume : 92.8 fl  Mean Cell Hemoglobin : 29.4 pg  Mean Cell Hemoglobin Concentration : 31.7 gm/dL    09-02    146<H>  |  109<H>  |  49<H>  ----------------------------<  130<H>  4.5   |  25  |  0.61    Ca    9.4      02 Sep 2021 05:43  Phos  3.7     09-02  Mg     2.4     09-02    TPro  x   /  Alb  4.4  /  TBili  x   /  DBili  x   /  AST  x   /  ALT  x   /  AlkPhos  x   08-31

## 2021-09-02 NOTE — PROGRESS NOTE ADULT - SUBJECTIVE AND OBJECTIVE BOX
**INCOMPLETE NOTE    OVERNIGHT EVENTS:    SUBJECTIVE:  Patient seen and examined at bedside.    Vital Signs Last 12 Hrs  T(F): 99.4 (21 @ 09:29), Max: 99.4 (21 @ 09:29)  HR: 101 (21 @ 09:00) (72 - 101)  BP: 139/68 (21 @ 08:00) (115/54 - 162/72)  BP(mean): 97 (21 @ 08:00) (78 - 104)  RR: 25 (21 @ 09:00) (17 - 25)  SpO2: 99% (21 @ 09:00) (96% - 99%)  I&O's Summary    01 Sep 2021 07:  -  02 Sep 2021 07:00  --------------------------------------------------------  IN: 3413.9 mL / OUT: 751 mL / NET: 2662.9 mL    02 Sep 2021 07:01  -  02 Sep 2021 09:52  --------------------------------------------------------  IN: 126.3 mL / OUT: 84 mL / NET: 42.3 mL        PHYSICAL EXAM:  Constitutional: NAD, comfortable in bed.  HEENT: NC/AT, PERRLA, EOMI, no conjunctival pallor or scleral icterus, MMM  Neck: Supple, no JVD  Respiratory: CTA B/L. No w/r/r.   Cardiovascular: RRR, normal S1 and S2, no m/r/g.   Gastrointestinal: +BS, soft NTND, no guarding or rebound tenderness, no palpable masses   Extremities: wwp; no cyanosis, clubbing or edema.   Vascular: Pulses equal and strong throughout.   Neurological: AAOx3, no CN deficits, strength and sensation intact throughout.   Skin: No gross skin abnormalities or rashes        LABS:                        10.2   17.93 )-----------( 461      ( 02 Sep 2021 05:43 )             32.2     09-    146<H>  |  109<H>  |  49<H>  ----------------------------<  130<H>  4.5   |  25  |  0.61    Ca    9.4      02 Sep 2021 05:43  Phos  3.7       Mg     2.4         TPro  x   /  Alb  4.4  /  TBili  x   /  DBili  x   /  AST  x   /  ALT  x   /  AlkPhos  x       PT/INR - ( 31 Aug 2021 14:28 )   PT: 13.5 sec;   INR: 1.13          PTT - ( 31 Aug 2021 14:28 )  PTT:30.8 sec  Urinalysis Basic - ( 31 Aug 2021 14:28 )    Color: Red / Appearance: Turbid / S.020 / pH: x  Gluc: x / Ketone: NEGATIVE  / Bili: Small / Urobili: 4.0 E.U./dL   Blood: x / Protein: >=300 mg/dL / Nitrite: POSITIVE   Leuk Esterase: Large / RBC: Many /HPF / WBC Many /HPF   Sq Epi: x / Non Sq Epi: 0-5 /HPF / Bacteria: Present /HPF          RADIOLOGY & ADDITIONAL TESTS:    MEDICATIONS  (STANDING):  ALBUTerol    90 MICROgram(s) HFA Inhaler 1 Puff(s) Inhalation every 4 hours  albuterol/ipratropium for Nebulization 3 milliLiter(s) Nebulizer every 4 hours  aspirin Suppository 150 milliGRAM(s) Rectal daily  chlorhexidine 2% Cloths 1 Application(s) Topical daily  chlorhexidine 4% Liquid 1 Application(s) Topical <User Schedule>  dextrose 40% Gel 15 Gram(s) Oral once  dextrose 5%. 1000 milliLiter(s) (50 mL/Hr) IV Continuous <Continuous>  dextrose 5%. 1000 milliLiter(s) (100 mL/Hr) IV Continuous <Continuous>  dextrose 50% Injectable 25 Gram(s) IV Push once  dextrose 50% Injectable 12.5 Gram(s) IV Push once  dextrose 50% Injectable 25 Gram(s) IV Push once  fat emulsion (Fish Oil and Plant Based) 20% Infusion 0.3759 Gm/kG/Day (8.3 mL/Hr) IV Continuous <Continuous>  fat emulsion (Fish Oil and Plant Based) 20% Infusion 0.3759 Gm/kG/Day (8.3 mL/Hr) IV Continuous <Continuous>  glucagon  Injectable 1 milliGRAM(s) IntraMuscular once  insulin lispro (ADMELOG) corrective regimen sliding scale   SubCutaneous every 6 hours  lidocaine   4% Patch 1 Patch Transdermal every 24 hours  lidocaine   4% Patch 1 Patch Transdermal every 24 hours  meropenem  IVPB 1000 milliGRAM(s) IV Intermittent every 12 hours  meropenem  IVPB      nystatin Powder 1 Application(s) Topical two times a day  pantoprazole  Injectable 40 milliGRAM(s) IV Push every 12 hours  Parenteral Nutrition - Adult 1 Each (38 mL/Hr) TPN Continuous <Continuous>  Parenteral Nutrition - Adult 1 Each (38 mL/Hr) TPN Continuous <Continuous>  sodium chloride 0.45%. 1000 milliLiter(s) (80 mL/Hr) IV Continuous <Continuous>  tiotropium 18 MICROgram(s) Capsule 1 Capsule(s) Inhalation daily    MEDICATIONS  (PRN):  ondansetron Injectable 4 milliGRAM(s) IV Push every 6 hours PRN Nausea and/or Vomiting  sodium chloride 0.9% lock flush 10 milliLiter(s) IV Push every 1 hour PRN Pre/post blood products, medications, blood draw, and to maintain line patency

## 2021-09-02 NOTE — PROVIDER CONTACT NOTE (CHANGE IN STATUS NOTIFICATION) - ACTION/TREATMENT ORDERED:
Desirae placed powder & used cavilon around sx site. ureter area leaks scant SS output. patient placed on Comfort care now.
Transfer order d'cd. given Zofran 4mg IV for nausea. CT Abd & pelvis was ordered during a.m. rounds.

## 2021-09-02 NOTE — PROGRESS NOTE ADULT - ATTENDING COMMENTS
Initial attending contact date  9/1/21    . See fellow note written above for details. I reviewed the fellow documentation. I have personally seen and examined this patient. I reviewed vitals, labs, medications, cardiac studies, and additional imaging. I agree with the above fellow's findings and plans as written above with the following additions/statements.      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 Cardiology consulted for troponin elevation troponin 0.09 -> 0.2.     -type 2 MI, demand ischemia   -Pt's known CAD with occluded RCA/Cx is consistent with ST depressions inferolaterally  and ECHO findings which are known  -New mod-severe MR likely in setting of type 2 MI  -Unable to tolerate po meds  -Pt now deemed DNR/DNI with comfort care

## 2021-09-02 NOTE — PROGRESS NOTE ADULT - SUBJECTIVE AND OBJECTIVE BOX
INTERVAL HPI/OVERNIGHT EVENTS:   Carotid duplex L w/o pseudoaneurysm, RP US w/ resolution of L hydro, ureteral stent not visualized; LBP- tylenol IV x1; UOP 5ml x1hr- 250ml alb 5% w/ improvement; pharm out of alb 25%, so dc'ed     STATUS POST:   : Left transgluteal IR drain placement   : c-scope with biopsy of mass  : diverting colostomy   : EGD:Erosive esophagitis and doudenitis with esophageal ulcers bx   : OR- Ex lap w/ STEPHANIE and primary repair of tears x2; EBL 25. IVF 1500, 2U pRBC  : OR- L ureteral exchange     SUBJECTIVE:   Patient seen and evaluated. Patient says that she is doing well. She denies any abdominal pain. She denies any nausea or emesis.     meropenem  IVPB 1000 milliGRAM(s) IV Intermittent every 12 hours  meropenem  IVPB          Vital Signs Last 24 Hrs  T(C): 37.4 (02 Sep 2021 09:29), Max: 37.4 (02 Sep 2021 09:29)  T(F): 99.4 (02 Sep 2021 09:29), Max: 99.4 (02 Sep 2021 09:29)  HR: 101 (02 Sep 2021 09:00) (72 - 101)  BP: 139/68 (02 Sep 2021 08:00) (109/57 - 162/72)  BP(mean): 97 (02 Sep 2021 08:00) (78 - 104)  RR: 25 (02 Sep 2021 09:00) (16 - 25)  SpO2: 99% (02 Sep 2021 09:00) (93% - 100%)  I&O's Detail    01 Sep 2021 07:01  -  02 Sep 2021 07:00  --------------------------------------------------------  IN:    Albumin 5% - 50 mL: 250 mL    Fat Emulsion 20%: 107.9 mL    IV PiggyBack: 200 mL    sodium chloride 0.45%: 1920 mL    TPN (Total Parenteral Nutrition): 936 mL  Total IN: 3413.9 mL    OUT:    Colostomy (mL): 0 mL    Indwelling Catheter - Urethral (mL): 751 mL  Total OUT: 751 mL    Total NET: 2662.9 mL      02 Sep 2021 07:01  -  02 Sep 2021 09:42  --------------------------------------------------------  IN:    Fat Emulsion 20%: 8.3 mL    sodium chloride 0.45%: 80 mL    TPN (Total Parenteral Nutrition): 38 mL  Total IN: 126.3 mL    OUT:    Indwelling Catheter - Urethral (mL): 84 mL  Total OUT: 84 mL    Total NET: 42.3 mL          General: Resting comfortably in bed with aerosolized face mask in place; appears to be breathing comfortably.   C/V: Normal rate.   Pulm: Nonlabored breathing, no respiratory distress. Speaking with few words.  Abd: soft, mild abdominal distention on RLQ; unchanged. Abdomen non-tender to palpation in 4 quadrants. Ostomy in place with ~1cc feculent material. Surgical incision sites c/d/i; no erythema, purulence, or focal edema; appropriately TTP.   : [place] in place with ~100cc blood-tinged urine.   Extrem: WWP, no edema, SCDs in place    LABS:                        10.2   17.93 )-----------( 461      ( 02 Sep 2021 05:43 )             32.2     -    146<H>  |  109<H>  |  49<H>  ----------------------------<  130<H>  4.5   |  25  |  0.61    Ca    9.4      02 Sep 2021 05:43  Phos  3.7       Mg     2.4         TPro  x   /  Alb  4.4  /  TBili  x   /  DBili  x   /  AST  x   /  ALT  x   /  AlkPhos  x       PT/INR - ( 31 Aug 2021 14:28 )   PT: 13.5 sec;   INR: 1.13          PTT - ( 31 Aug 2021 14:28 )  PTT:30.8 sec  Urinalysis Basic - ( 31 Aug 2021 14:28 )    Color: Red / Appearance: Turbid / S.020 / pH: x  Gluc: x / Ketone: NEGATIVE  / Bili: Small / Urobili: 4.0 E.U./dL   Blood: x / Protein: >=300 mg/dL / Nitrite: POSITIVE   Leuk Esterase: Large / RBC: Many /HPF / WBC Many /HPF   Sq Epi: x / Non Sq Epi: 0-5 /HPF / Bacteria: Present /HPF           Progress Note: CRS    STATUS POST:   : Left transgluteal IR drain placement   : c-scope with biopsy of mass  : diverting colostomy   : EGD: Erosive esophagitis and doudenitis with esophageal ulcers bx   : OR- Ex lap w/ STEPHANIE and primary repair of tears x2; EBL 25. IVF 1500, 2U pRBC  : OR- L ureteral exchange    O/N: Carotid duplex L w/o pseudoaneurysm, RP US w/ resolution of L hydro, ureteral stent not visualized; LBP- tylenol IV x1; UOP 5ml x1hr- 250ml alb 5% w/ improvement; pharm out of alb 25%, so dc'ed.    SUBJECTIVE: Seen and evaluated in SICU. PIETRO. AVSS. Wore BiPAP overnight. Reports feeling cold this AM. Otherwise has no complaints.     meropenem  IVPB 1000 milliGRAM(s) IV Intertemittent every 12 hours  meropenem  IVPB          Vital Signs Last 24 Hrs  T(C): 37.4 (02 Sep 2021 09:29), Max: 37.4 (02 Sep 2021 09:29)  T(F): 99.4 (02 Sep 2021 09:29), Max: 99.4 (02 Sep 2021 09:29)  HR: 101 (02 Sep 2021 09:00) (72 - 101)  BP: 139/68 (02 Sep 2021 08:00) (109/57 - 162/72)  BP(mean): 97 (02 Sep 2021 08:00) (78 - 104)  RR: 25 (02 Sep 2021 09:00) (16 - 25)  SpO2: 99% (02 Sep 2021 09:00) (93% - 100%)  I&O's Detail    01 Sep 2021 07:01  -  02 Sep 2021 07:00  --------------------------------------------------------  IN:    Albumin 5% - 50 mL: 250 mL    Fat Emulsion 20%: 107.9 mL    IV PiggyBack: 200 mL    sodium chloride 0.45%: 1920 mL    TPN (Total Parenteral Nutrition): 936 mL  Total IN: 3413.9 mL    OUT:    Colostomy (mL): 0 mL    Indwelling Catheter - Urethral (mL): 751 mL  Total OUT: 751 mL    Total NET: 2662.9 mL      02 Sep 2021 07:01  -  02 Sep 2021 09:42  --------------------------------------------------------  IN:    Fat Emulsion 20%: 8.3 mL    sodium chloride 0.45%: 80 mL    TPN (Total Parenteral Nutrition): 38 mL  Total IN: 126.3 mL    OUT:    Indwelling Catheter - Urethral (mL): 84 mL  Total OUT: 84 mL    Total NET: 42.3 mL        PHYSICAL EXAM:  General: Resting comfortably in bed with BiPAP.  C/V: Normal rate. NO m/r/b  Pulm: Nonlabored breathing, no respiratory distress. BiPAP 12/5  Abd: soft, mild abdominal distention, mildly TTP in RLQ and LLQ. Stoma p/p/p with bowel sweat in colostomy. Surgical incision sites c/d/i; no erythema, purulence, or focal edema. Retention suture in place.   : Novak in place with ~100cc blood-tinged urine.   Extrem: WWP, no edema, SCDs in place    LABS:                        10.2   17.93 )-----------( 461      ( 02 Sep 2021 05:43 )             32.2     -    146<H>  |  109<H>  |  49<H>  ----------------------------<  130<H>  4.5   |  25  |  0.61    Ca    9.4      02 Sep 2021 05:43  Phos  3.7       Mg     2.4         TPro  x   /  Alb  4.4  /  TBili  x   /  DBili  x   /  AST  x   /  ALT  x   /  AlkPhos  x       PT/INR - ( 31 Aug 2021 14:28 )   PT: 13.5 sec;   INR: 1.13          PTT - ( 31 Aug 2021 14:28 )  PTT:30.8 sec  Urinalysis Basic - ( 31 Aug 2021 14:28 )    Color: Red / Appearance: Turbid / S.020 / pH: x  Gluc: x / Ketone: NEGATIVE  / Bili: Small / Urobili: 4.0 E.U./dL   Blood: x / Protein: >=300 mg/dL / Nitrite: POSITIVE   Leuk Esterase: Large / RBC: Many /HPF / WBC Many /HPF   Sq Epi: x / Non Sq Epi: 0-5 /HPF / Bacteria: Present /HPF

## 2021-09-02 NOTE — PROGRESS NOTE ADULT - SUBJECTIVE AND OBJECTIVE BOX
S: Pt reports not wanting to go on high flow nasal cannula, she feels better on the bipap mask. No HA/CP/SOB on bipap.     Overnight she received a 250cc bolus of 5% albumin    Vitals: ICU Vital Signs Last 24 Hrs  T(C): 36.1 (02 Sep 2021 05:42), Max: 36.9 (01 Sep 2021 14:10)  T(F): 97 (02 Sep 2021 05:42), Max: 98.4 (01 Sep 2021 14:10)  HR: 93 (02 Sep 2021 07:07) (72 - 93)  BP: 147/70 (02 Sep 2021 07:00) (109/57 - 162/72)  BP(mean): 100 (02 Sep 2021 07:00) (78 - 104)  ABP: --  ABP(mean): --  RR: 20 (02 Sep 2021 07:07) (16 - 25)  SpO2: 96% (02 Sep 2021 07:07) (93% - 100%)            I&O:  I&O's Detail    01 Sep 2021 07:01  -  02 Sep 2021 07:00  --------------------------------------------------------  IN:    Albumin 5% - 50 mL: 250 mL    Fat Emulsion 20%: 107.9 mL    IV PiggyBack: 200 mL    sodium chloride 0.45%: 1920 mL    TPN (Total Parenteral Nutrition): 936 mL  Total IN: 3413.9 mL    OUT:    Indwelling Catheter - Urethral (mL): 751 mL  Total OUT: 751 mL    Total NET: 2662.9 mL      02 Sep 2021 07:01  -  02 Sep 2021 08:06  --------------------------------------------------------  IN:    Fat Emulsion 20%: 8.3 mL    sodium chloride 0.45%: 80 mL    TPN (Total Parenteral Nutrition): 38 mL  Total IN: 126.3 mL    OUT:    Indwelling Catheter - Urethral (mL): 24 mL  Total OUT: 24 mL    Total NET: 102.3 mL            ABG - ( 31 Aug 2021 14:06 )  pH, Arterial: 7.45  pH, Blood: x     /  pCO2: 36    /  pO2: 89    / HCO3: 25    / Base Excess: 1.3   /  SaO2: 97.9            CAPILLARY BLOOD GLUCOSE      POCT Blood Glucose.: 137 mg/dL (02 Sep 2021 06:22)  POCT Blood Glucose.: 136 mg/dL (01 Sep 2021 23:08)  POCT Blood Glucose.: 137 mg/dL (01 Sep 2021 17:07)  POCT Blood Glucose.: 158 mg/dL (01 Sep 2021 12:48)  POCT Blood Glucose.: 172 mg/dL (01 Sep 2021 12:02)      Labs:                         10.2   17.93 )-----------( 461      ( 02 Sep 2021 05:43 )             32.2   09-02    146<H>  |  109<H>  |  49<H>  ----------------------------<  130<H>  4.5   |  25  |  0.61    Ca    9.4      02 Sep 2021 05:43  Phos  3.7     09-  Mg     2.4     09-02    TPro  x   /  Alb  4.4  /  TBili  x   /  DBili  x   /  AST  x   /  ALT  x   /  AlkPhos  x     PT/INR - ( 31 Aug 2021 14:28 )   PT: 13.5 sec;   INR: 1.13          PTT - ( 31 Aug 2021 14:28 )  PTT:30.8 secUrinalysis Basic - ( 31 Aug 2021 14:28 )    Color: Red / Appearance: Turbid / S.020 / pH: x  Gluc: x / Ketone: NEGATIVE  / Bili: Small / Urobili: 4.0 E.U./dL   Blood: x / Protein: >=300 mg/dL / Nitrite: POSITIVE   Leuk Esterase: Large / RBC: Many /HPF / WBC Many /HPF   Sq Epi: x / Non Sq Epi: 0-5 /HPF / Bacteria: Present /HPF      ABG - ( 31 Aug 2021 14:06 )  pH, Arterial: 7.45  pH, Blood: x     /  pCO2: 36    /  pO2: 89    / HCO3: 25    / Base Excess: 1.3   /  SaO2: 97.9                Electrolytes repleated to goals as follows: Potassium 4, Phosphorus 3 and Magnesium 2 where appropriate and necessary    Exam:  Gen: Cachectic  Neuro: A&Ox3, NAD, grossly neurologically intact  HEENT: PERRL < EOMI, MMM  Neck: Supple  CV: RRR, no MRGs  Pulm: CTAB, no wheezes, rales, or rhonchi  Abd: Softly distended, NT, no rebound or guarding, stoma with pink mucosa and brown stool and gas in the bag. Drains in place, incision c/d/i.   : Novak in place  Ext: No edema peripherally or centrally  Vasc: Extremities warm to palpation, 2+ pulses - radial and PT  MSK: no joint swelling  Skin: no rash  Psych: affect appropriate

## 2021-09-02 NOTE — PROGRESS NOTE ADULT - ASSESSMENT
A/P  Agree with DNR and DNI   Comfort care  Palliative team now onboard  MSO4 being given.  Patient comfort is primary goal.  No further blood testing.  O2 as needed  Following closely

## 2021-09-02 NOTE — PROGRESS NOTE ADULT - ASSESSMENT
89 YOF with perforated pelvic mass, bx proven adenocarcinoma with perforation. S/p IR drainage of pelvic abscess, and s/p loop transverse colostomy on 8/13, and exlap and STEPHANIE and enterorrhaphy on 8/20 for SBO. Course c/b GIB from esophageal ulcers.    O/N replaced on Bipap from [facility] and given albumin for low UOP. L neck duplex without extrinsic mass mentioned, R neck unable to be performed. L hydronephrosis improved on renal US. BLE duplexes pending. WBC uptrending however appeares hemoconcentrated on today's CBC.    F/u BLE duplexes  Family meeting on 9/1, niece to discuss situation with remainder of family; will f/u discussion  Cardiology consulted - type II MI (demand ischemia), rec. ASA/metop/atorva/plavix or rectal ASA if unable to tolerate PO; repeat echo when recovered  ID recalled for worsening leukocytosis restarted on Merem and Vanc  PICC if able today and dc RIJ CVL  C/w NPO, TPN via CVL, PPI  Routine stoma care  PT/OT, SLP - not cleared for POI  Remainder of care per the ICU team

## 2021-09-02 NOTE — PROGRESS NOTE ADULT - ASSESSMENT
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 Course c/b dysphagia x1-2 wks. S/p S&S eval, found to have severely impaired pharyngeal swallow. Neurology team consulted for ongoing dysphagia with concern for neurological etiology. Dysphagia is likely multifactorial - patient may have mild chronic dysphagia 2/2 old age, which was likely worsened after recent intubation this hospitalization. Low suspicion for other acute neurological process causing the dysphagia since pt was tolerating a regular diet 2 weeks ago, and there's no other neurological deficit on exam. Carotid US done and unremarkable.  Neurological exam unchanged.     Plan:     **INCOMPLETE**   89F p/w worsening back pain, found to have colonic perforation 2/2 pelvic malignancy (8/8), s/p OR 8/13 for diverting colostomy. Course c/b dysphagia x1-2 wks. S/p S&S eval, found to have severely impaired pharyngeal swallow. Neurology team consulted for ongoing dysphagia with concern for neurological etiology. Dysphagia is likely multifactorial - patient may have mild chronic dysphagia 2/2 old age, which was likely worsened after recent intubation this hospitalization. Low suspicion for other acute neurological process causing the dysphagia since pt was tolerating a regular diet 2 weeks ago, and there's no other neurological deficit on exam. Carotid US done and unremarkable.  Neurological exam unchanged.       - Neurology team will sign off at this time. Please reach out with any further questions.      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 Course c/b dysphagia x1-2 wks. S/p S&S eval, found to have severely impaired pharyngeal swallow. Neurology team consulted for ongoing dysphagia with concern for neurological etiology. Dysphagia is likely multifactorial - patient may have mild chronic dysphagia 2/2 old age, which was likely worsened after recent intubation this hospitalization. Low suspicion for other acute neurological process causing the dysphagia since pt was tolerating a regular diet 2 weeks ago, and there's no other neurological deficit on exam. Carotid US done and unremarkable.  Neurological exam unchanged.     - Neurology team will sign off at this time. Please reach out with any further questions.

## 2021-09-02 NOTE — PROGRESS NOTE ADULT - CONVERSATION DETAILS
Pts chart reviewed. Pt without capacity to make informed health care decisions. Discussion with HCP and Umberto. Understand pt with advanced cancer with limited treatment options and not a candidate for any disease directed therapy dt PS, pelvic infection. Pt with acute respiratory decline in the last 24 hours and appears to be approaching EOL.  Shared decision made to transition to care with exclusive focus on comfort and management of symptoms. All in agreement to discontinue interventions that prolong dying process and or do not contribute to patient comfort.     Code status re-addressed, HCP and niece electing to allow a natural death in the event of Card/resp arrest. Verbal consent obtained to complete MOLST.     Plan:  -DNR DNI, comfort care  -dc TF, IVF, abx, FS  -VS qshfit  -MOLST completed and placed in physical chart

## 2021-09-02 NOTE — PROGRESS NOTE ADULT - SUBJECTIVE AND OBJECTIVE BOX
POD 13, 21    Patient is now DNR, DNI and receiving comfort care.  HCP and niece after seeing the patient and speaking to staff have agreed to these measures.  Patient was on bipap last night and when seen this AM.  Awake and tired from work of breathing.  NO abdominal pain reported. No N/V    Vital Signs Last 24 Hrs  T(C): 36.4 (02 Sep 2021 14:44), Max: 37.4 (02 Sep 2021 09:29)  T(F): 97.5 (02 Sep 2021 14:44), Max: 99.4 (02 Sep 2021 09:29)  HR: 91 (02 Sep 2021 17:33) (72 - 102)  BP: 156/73 (02 Sep 2021 11:00) (109/57 - 162/72)  BP(mean): 105 (02 Sep 2021 11:00) (78 - 105)  RR: 13 (02 Sep 2021 12:00) (13 - 25)  SpO2: 96% (02 Sep 2021 17:33) (94% - 99%)    lungs: moving air reasoanably, some rhonci  abd: + BS low frequency, stoma with 3-5 ml of stool colored fluid, minimal gas, non tender, incision intact  ext: wihtout change    UO  370 ml/shift x 3 shift                          10.2   17.93 )-----------( 461      ( 02 Sep 2021 05:43 )             32.2     09-02    146<H>  |  109<H>  |  49<H>  ----------------------------<  130<H>  4.5   |  25  |  0.61    Ca    9.4      02 Sep 2021 05:43  Phos  3.7     09-02  Mg     2.4     09-02    TPro  x   /  Alb  4.4  /  TBili  x   /  DBili  x   /  AST  x   /  ALT  x   /  AlkPhos  x   08-31

## 2021-09-02 NOTE — PROGRESS NOTE ADULT - SUBJECTIVE AND OBJECTIVE BOX
Neurology  Progress Note  09-02-21    Name:  AJ HAWKINS; 89y    Interval History: No acute overnight events. Patient seen and examined at bedside.       REVIEW OF SYSTEMS:  As states in HPI.    Medications:  acetaminophen   Tablet .. 650 milliGRAM(s) Oral once  acetaminophen  IVPB .. 700 milliGRAM(s) IV Intermittent once  acetaminophen  IVPB .. 700 milliGRAM(s) IV Intermittent once  acetaminophen  IVPB .. 700 milliGRAM(s) IV Intermittent once PRN  acetaminophen  IVPB .. 500 milliGRAM(s) IV Intermittent once  acetaminophen  IVPB .. 1000 milliGRAM(s) IV Intermittent once  acetaminophen  IVPB .. 750 milliGRAM(s) IV Intermittent once  acetaminophen  IVPB .. 620 milliGRAM(s) IV Intermittent once  acetaminophen  IVPB .. 620 milliGRAM(s) IV Intermittent once  acetaminophen  IVPB .. 620 milliGRAM(s) IV Intermittent once  acetaminophen  IVPB .. 620 milliGRAM(s) IV Intermittent once  acetaminophen  IVPB .. 620 milliGRAM(s) IV Intermittent once  acetaminophen  IVPB .. 620 milliGRAM(s) IV Intermittent once  acetaminophen  IVPB .. 620 milliGRAM(s) IV Intermittent once  acetaminophen  IVPB .. 620 milliGRAM(s) IV Intermittent once  acetaminophen  IVPB .. 620 milliGRAM(s) IV Intermittent once  albumin human  5% IVPB 250 milliLiter(s) IV Intermittent once  albumin human  5% IVPB 250 milliLiter(s) IV Intermittent once  albumin human  5% IVPB 250 milliLiter(s) IV Intermittent once  albumin human  5% IVPB 250 milliLiter(s) IV Intermittent once  albumin human 25% IVPB 50 milliLiter(s) IV Intermittent every 8 hours  ALBUTerol    90 MICROgram(s) HFA Inhaler 1 Puff(s) Inhalation every 4 hours  albuterol/ipratropium for Nebulization 3 milliLiter(s) Nebulizer every 4 hours  albuterol/ipratropium for Nebulization. 3 milliLiter(s) Nebulizer once  albuterol/ipratropium for Nebulization. 3 milliLiter(s) Nebulizer once  aspirin Suppository 150 milliGRAM(s) Rectal daily  benzocaine 20% Spray 1 Spray(s) Topical once  chlorhexidine 2% Cloths 1 Application(s) Topical daily  chlorhexidine 4% Liquid 1 Application(s) Topical <User Schedule>  dextrose 40% Gel 15 Gram(s) Oral once  dextrose 5%. 1000 milliLiter(s) IV Continuous <Continuous>  dextrose 5%. 1000 milliLiter(s) IV Continuous <Continuous>  dextrose 50% Injectable 25 Gram(s) IV Push once  dextrose 50% Injectable 12.5 Gram(s) IV Push once  dextrose 50% Injectable 25 Gram(s) IV Push once  ertapenem  IVPB 1000 milliGRAM(s) IV Intermittent once  fat emulsion (Fish Oil and Plant Based) 20% Infusion 1.22 Gm/kG/Day IV Continuous <Continuous>  fat emulsion (Fish Oil and Plant Based) 20% Infusion 1.22 Gm/kG/Day IV Continuous <Continuous>  fat emulsion (Fish Oil and Plant Based) 20% Infusion 0.3759 Gm/kG/Day IV Continuous <Continuous>  fat emulsion (Fish Oil and Plant Based) 20% Infusion 0.3759 Gm/kG/Day IV Continuous <Continuous>  fat emulsion (Fish Oil and Plant Based) 20% Infusion 0.3759 Gm/kG/Day IV Continuous <Continuous>  fat emulsion (Fish Oil and Plant Based) 20% Infusion 0.5 Gm/kG/Day IV Continuous <Continuous>  fat emulsion (Fish Oil and Plant Based) 20% Infusion 0.5 Gm/kG/Day IV Continuous <Continuous>  fat emulsion (Fish Oil and Plant Based) 20% Infusion 0.5 Gm/kG/Day IV Continuous <Continuous>  fat emulsion (Fish Oil and Plant Based) 20% Infusion 0.3759 Gm/kG/Day IV Continuous <Continuous>  fat emulsion (Fish Oil and Plant Based) 20% Infusion 0.5 Gm/kG/Day IV Continuous <Continuous>  fat emulsion (Fish Oil and Plant Based) 20% Infusion 0.5 Gm/kG/Day IV Continuous <Continuous>  fat emulsion (Fish Oil and Plant Based) 20% Infusion 0.5 Gm/kG/Day IV Continuous <Continuous>  fat emulsion (Fish Oil and Plant Based) 20% Infusion 0.5 Gm/kG/Day IV Continuous <Continuous>  fentaNYL    Injectable 50 MICROGram(s) IV Push once  furosemide   Injectable 20 milliGRAM(s) IV Push once  furosemide   Injectable 20 milliGRAM(s) IV Push once  furosemide   Injectable 20 milliGRAM(s) IV Push once  furosemide   Injectable 20 milliGRAM(s) IV Push once  furosemide   Injectable 20 milliGRAM(s) IV Push once  glucagon  Injectable 1 milliGRAM(s) IntraMuscular once  haloperidol    Injectable 0.5 milliGRAM(s) IV Push once PRN  insulin lispro (ADMELOG) corrective regimen sliding scale   SubCutaneous every 6 hours  lactated ringers Bolus 250 milliLiter(s) IV Bolus once  lactated ringers Bolus 500 milliLiter(s) IV Bolus once  lactated ringers Bolus 500 milliLiter(s) IV Bolus once  lactated ringers Bolus 500 milliLiter(s) IV Bolus once  lactated ringers Bolus 500 milliLiter(s) IV Bolus once  lidocaine   4% Patch 2 Patch Transdermal once  lidocaine   4% Patch 1 Patch Transdermal every 24 hours  lidocaine   4% Patch 1 Patch Transdermal every 24 hours  magnesium hydroxide Suspension 30 milliLiter(s) Oral once  magnesium sulfate  IVPB 1 Gram(s) IV Intermittent once  magnesium sulfate  IVPB 1 Gram(s) IV Intermittent once  magnesium sulfate  IVPB 1 Gram(s) IV Intermittent once  magnesium sulfate  IVPB 1 Gram(s) IV Intermittent once  magnesium sulfate  IVPB 1 Gram(s) IV Intermittent once  meropenem  IVPB 1000 milliGRAM(s) IV Intermittent once  meropenem  IVPB 1000 milliGRAM(s) IV Intermittent every 12 hours  meropenem  IVPB      methylPREDNISolone sodium succinate Injectable 60 milliGRAM(s) IV Push once  metoclopramide Injectable 10 milliGRAM(s) IV Push once  midazolam Injectable 2 milliGRAM(s) IntraMuscular once  midazolam Injectable 3 milliGRAM(s) IV Push once  nystatin Powder 1 Application(s) Topical two times a day  ondansetron Injectable 4 milliGRAM(s) IV Push every 6 hours PRN  ondansetron Injectable 4 milliGRAM(s) IV Push once  ondansetron Injectable 4 milliGRAM(s) IV Push once  pantoprazole  Injectable 40 milliGRAM(s) IV Push every 12 hours  Parenteral Nutrition - Adult 1 Each TPN Continuous <Continuous>  Parenteral Nutrition - Adult 1 Each TPN Continuous <Continuous>  Parenteral Nutrition - Adult 1 Each TPN Continuous <Continuous>  Parenteral Nutrition - Adult 1 Each TPN Continuous <Continuous>  Parenteral Nutrition - Adult 1 Each TPN Continuous <Continuous>  Parenteral Nutrition - Adult 1 Each TPN Continuous <Continuous>  Parenteral Nutrition - Adult 1 Each TPN Continuous <Continuous>  Parenteral Nutrition - Adult 1 Each TPN Continuous <Continuous>  Parenteral Nutrition - Adult 1 Each TPN Continuous <Continuous>  Parenteral Nutrition - Adult 1 Each TPN Continuous <Continuous>  Parenteral Nutrition - Adult 1 Each TPN Continuous <Continuous>  Parenteral Nutrition - Adult 1 Each TPN Continuous <Continuous>  piperacillin/tazobactam IVPB. 3.375 Gram(s) IV Intermittent once  piperacillin/tazobactam IVPB... 3.375 Gram(s) IV Intermittent once  potassium chloride   Powder 40 milliEquivalent(s) Oral once  potassium chloride  10 mEq/100 mL IVPB 10 milliEquivalent(s) IV Intermittent every 1 hour  potassium chloride  10 mEq/100 mL IVPB 10 milliEquivalent(s) IV Intermittent every 1 hour  potassium chloride  10 mEq/100 mL IVPB 10 milliEquivalent(s) IV Intermittent every 1 hour  potassium chloride  20 mEq/100 mL IVPB 20 milliEquivalent(s) IV Intermittent once  potassium phosphate / sodium phosphate Powder (PHOS-NaK) 1 Packet(s) Oral once  potassium phosphate IVPB 15 milliMole(s) IV Intermittent once  potassium phosphate IVPB 21 milliMole(s) IV Intermittent once  potassium phosphate IVPB 15 milliMole(s) IV Intermittent once  potassium phosphate IVPB 21 milliMole(s) IV Intermittent once  saline laxative (FLEET) Rectal Enema 1 Enema Rectal once  saline laxative (FLEET) Rectal Enema 1 Enema Rectal once  saline laxative (FLEET) Rectal Enema 1 Enema Rectal once  saline laxative (FLEET) Rectal Enema 1 Enema Rectal once  sodium chloride 0.45%. 1000 milliLiter(s) IV Continuous <Continuous>  sodium chloride 0.9% Bolus 1500 milliLiter(s) IV Bolus once  sodium chloride 0.9% Bolus 500 milliLiter(s) IV Bolus once  sodium chloride 0.9% Bolus 250 milliLiter(s) IV Bolus once  sodium chloride 0.9% Bolus 500 milliLiter(s) IV Bolus once  sodium chloride 0.9% lock flush 10 milliLiter(s) IV Push every 1 hour PRN  sodium phosphate IVPB 15 milliMole(s) IV Intermittent once  sodium phosphate IVPB 15 milliMole(s) IV Intermittent once  sodium phosphate IVPB 15 milliMole(s) IV Intermittent once  sodium phosphate IVPB 21 milliMole(s) IV Intermittent once  sodium phosphate IVPB 15 milliMole(s) IV Intermittent once  sodium phosphate IVPB 15 milliMole(s) IV Intermittent once  sodium phosphate IVPB 15 milliMole(s) IV Intermittent once  tiotropium 18 MICROgram(s) Capsule 1 Capsule(s) Inhalation daily  vancomycin  IVPB 750 milliGRAM(s) IV Intermittent once    Vitals:  T(C): 37.4 (09-02-21 @ 09:29), Max: 37.4 (09-02-21 @ 09:29)  HR: 101 (09-02-21 @ 09:00) (72 - 101)  BP: 139/68 (09-02-21 @ 08:00) (109/57 - 162/72)  RR: 25 (09-02-21 @ 09:00) (16 - 25)  SpO2: 99% (09-02-21 @ 09:00) (93% - 100%)    Labs:                        10.2   17.93 )-----------( 461      ( 02 Sep 2021 05:43 )             32.2     09-02    146<H>  |  109<H>  |  49<H>  ----------------------------<  130<H>  4.5   |  25  |  0.61    Ca    9.4      02 Sep 2021 05:43  Phos  3.7     09-02  Mg     2.4     09-02    TPro  x   /  Alb  4.4  /  TBili  x   /  DBili  x   /  AST  x   /  ALT  x   /  AlkPhos  x   08-31    CAPILLARY BLOOD GLUCOSE      POCT Blood Glucose.: 137 mg/dL (02 Sep 2021 06:22)    LIVER FUNCTIONS - ( 31 Aug 2021 21:17 )  Alb: 4.4 g/dL / Pro: x     / ALK PHOS: x     / ALT: x     / AST: x     / GGT: x             Culture - Blood (collected 31 Aug 2021 15:11)  Source: .Blood Blood-Venous  Preliminary Report (01 Sep 2021 16:00):    No growth at 1 day.    Culture - Blood (collected 31 Aug 2021 15:11)  Source: .Blood Blood-Venous  Preliminary Report (01 Sep 2021 16:00):    No growth at 1 day.      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    FINDINGS:    No elevated velocities or abnormal waveforms are encountered.    Peak systolic velocities are as follows:    RIGHT:  The right side was unable to be visualized secondary to central line and bandages.    LEFT:  PROX CCA = 57 cm/s  DIST CCA = 93 cm/s  PROX ICA = 86 cm/s  DIST ICA = 111 cm/s  ECA = 112 cm/s    Mild calcified plaque in the left proximal and mid common carotid artery, noncalcified plaque in the distal CCA. Mild calcified and noncalcified plaque in the proximal ICA.  There is calcified and noncalcified plaque within the common and internal carotid arteries.    Antegrade flow is noted within the left vertebral artery. Right vertebral artery not assessed.      IMPRESSION:  No significant hemodynamic stenosis of the left carotid artery at the time of this exam. No left carotid artery pseudoaneurysm.  Right carotid artery not visualized.    Measurement of carotid stenosis is based on velocity parameters that correlate the residual internal carotid diameter with that of the more distal vessel in accordance with a method such as the [place] [employer] Symptomatic Carotid Endarterectomy Trial (NASCET).   INCOMPLETE    Neurology  Progress Note  09-02-21    Name:  AJ HAWKINS; 89y    Interval History: No acute overnight events. Patient seen and examined at bedside. Difficult to obtain history since pt is on BiPAP. She denies any pain/discomfort. ROS negative.       REVIEW OF SYSTEMS:  As states in HPI.    Medications:  acetaminophen   Tablet .. 650 milliGRAM(s) Oral once  acetaminophen  IVPB .. 700 milliGRAM(s) IV Intermittent once  acetaminophen  IVPB .. 700 milliGRAM(s) IV Intermittent once  acetaminophen  IVPB .. 700 milliGRAM(s) IV Intermittent once PRN  acetaminophen  IVPB .. 500 milliGRAM(s) IV Intermittent once  acetaminophen  IVPB .. 1000 milliGRAM(s) IV Intermittent once  acetaminophen  IVPB .. 750 milliGRAM(s) IV Intermittent once  acetaminophen  IVPB .. 620 milliGRAM(s) IV Intermittent once  acetaminophen  IVPB .. 620 milliGRAM(s) IV Intermittent once  acetaminophen  IVPB .. 620 milliGRAM(s) IV Intermittent once  acetaminophen  IVPB .. 620 milliGRAM(s) IV Intermittent once  acetaminophen  IVPB .. 620 milliGRAM(s) IV Intermittent once  acetaminophen  IVPB .. 620 milliGRAM(s) IV Intermittent once  acetaminophen  IVPB .. 620 milliGRAM(s) IV Intermittent once  acetaminophen  IVPB .. 620 milliGRAM(s) IV Intermittent once  acetaminophen  IVPB .. 620 milliGRAM(s) IV Intermittent once  albumin human  5% IVPB 250 milliLiter(s) IV Intermittent once  albumin human  5% IVPB 250 milliLiter(s) IV Intermittent once  albumin human  5% IVPB 250 milliLiter(s) IV Intermittent once  albumin human  5% IVPB 250 milliLiter(s) IV Intermittent once  albumin human 25% IVPB 50 milliLiter(s) IV Intermittent every 8 hours  ALBUTerol    90 MICROgram(s) HFA Inhaler 1 Puff(s) Inhalation every 4 hours  albuterol/ipratropium for Nebulization 3 milliLiter(s) Nebulizer every 4 hours  albuterol/ipratropium for Nebulization. 3 milliLiter(s) Nebulizer once  albuterol/ipratropium for Nebulization. 3 milliLiter(s) Nebulizer once  aspirin Suppository 150 milliGRAM(s) Rectal daily  benzocaine 20% Spray 1 Spray(s) Topical once  chlorhexidine 2% Cloths 1 Application(s) Topical daily  chlorhexidine 4% Liquid 1 Application(s) Topical <User Schedule>  dextrose 40% Gel 15 Gram(s) Oral once  dextrose 5%. 1000 milliLiter(s) IV Continuous <Continuous>  dextrose 5%. 1000 milliLiter(s) IV Continuous <Continuous>  dextrose 50% Injectable 25 Gram(s) IV Push once  dextrose 50% Injectable 12.5 Gram(s) IV Push once  dextrose 50% Injectable 25 Gram(s) IV Push once  ertapenem  IVPB 1000 milliGRAM(s) IV Intermittent once  fat emulsion (Fish Oil and Plant Based) 20% Infusion 1.22 Gm/kG/Day IV Continuous <Continuous>  fat emulsion (Fish Oil and Plant Based) 20% Infusion 1.22 Gm/kG/Day IV Continuous <Continuous>  fat emulsion (Fish Oil and Plant Based) 20% Infusion 0.3759 Gm/kG/Day IV Continuous <Continuous>  fat emulsion (Fish Oil and Plant Based) 20% Infusion 0.3759 Gm/kG/Day IV Continuous <Continuous>  fat emulsion (Fish Oil and Plant Based) 20% Infusion 0.3759 Gm/kG/Day IV Continuous <Continuous>  fat emulsion (Fish Oil and Plant Based) 20% Infusion 0.5 Gm/kG/Day IV Continuous <Continuous>  fat emulsion (Fish Oil and Plant Based) 20% Infusion 0.5 Gm/kG/Day IV Continuous <Continuous>  fat emulsion (Fish Oil and Plant Based) 20% Infusion 0.5 Gm/kG/Day IV Continuous <Continuous>  fat emulsion (Fish Oil and Plant Based) 20% Infusion 0.3759 Gm/kG/Day IV Continuous <Continuous>  fat emulsion (Fish Oil and Plant Based) 20% Infusion 0.5 Gm/kG/Day IV Continuous <Continuous>  fat emulsion (Fish Oil and Plant Based) 20% Infusion 0.5 Gm/kG/Day IV Continuous <Continuous>  fat emulsion (Fish Oil and Plant Based) 20% Infusion 0.5 Gm/kG/Day IV Continuous <Continuous>  fat emulsion (Fish Oil and Plant Based) 20% Infusion 0.5 Gm/kG/Day IV Continuous <Continuous>  fentaNYL    Injectable 50 MICROGram(s) IV Push once  furosemide   Injectable 20 milliGRAM(s) IV Push once  furosemide   Injectable 20 milliGRAM(s) IV Push once  furosemide   Injectable 20 milliGRAM(s) IV Push once  furosemide   Injectable 20 milliGRAM(s) IV Push once  furosemide   Injectable 20 milliGRAM(s) IV Push once  glucagon  Injectable 1 milliGRAM(s) IntraMuscular once  haloperidol    Injectable 0.5 milliGRAM(s) IV Push once PRN  insulin lispro (ADMELOG) corrective regimen sliding scale   SubCutaneous every 6 hours  lactated ringers Bolus 250 milliLiter(s) IV Bolus once  lactated ringers Bolus 500 milliLiter(s) IV Bolus once  lactated ringers Bolus 500 milliLiter(s) IV Bolus once  lactated ringers Bolus 500 milliLiter(s) IV Bolus once  lactated ringers Bolus 500 milliLiter(s) IV Bolus once  lidocaine   4% Patch 2 Patch Transdermal once  lidocaine   4% Patch 1 Patch Transdermal every 24 hours  lidocaine   4% Patch 1 Patch Transdermal every 24 hours  magnesium hydroxide Suspension 30 milliLiter(s) Oral once  magnesium sulfate  IVPB 1 Gram(s) IV Intermittent once  magnesium sulfate  IVPB 1 Gram(s) IV Intermittent once  magnesium sulfate  IVPB 1 Gram(s) IV Intermittent once  magnesium sulfate  IVPB 1 Gram(s) IV Intermittent once  magnesium sulfate  IVPB 1 Gram(s) IV Intermittent once  meropenem  IVPB 1000 milliGRAM(s) IV Intermittent once  meropenem  IVPB 1000 milliGRAM(s) IV Intermittent every 12 hours  meropenem  IVPB      methylPREDNISolone sodium succinate Injectable 60 milliGRAM(s) IV Push once  metoclopramide Injectable 10 milliGRAM(s) IV Push once  midazolam Injectable 2 milliGRAM(s) IntraMuscular once  midazolam Injectable 3 milliGRAM(s) IV Push once  nystatin Powder 1 Application(s) Topical two times a day  ondansetron Injectable 4 milliGRAM(s) IV Push every 6 hours PRN  ondansetron Injectable 4 milliGRAM(s) IV Push once  ondansetron Injectable 4 milliGRAM(s) IV Push once  pantoprazole  Injectable 40 milliGRAM(s) IV Push every 12 hours  Parenteral Nutrition - Adult 1 Each TPN Continuous <Continuous>  Parenteral Nutrition - Adult 1 Each TPN Continuous <Continuous>  Parenteral Nutrition - Adult 1 Each TPN Continuous <Continuous>  Parenteral Nutrition - Adult 1 Each TPN Continuous <Continuous>  Parenteral Nutrition - Adult 1 Each TPN Continuous <Continuous>  Parenteral Nutrition - Adult 1 Each TPN Continuous <Continuous>  Parenteral Nutrition - Adult 1 Each TPN Continuous <Continuous>  Parenteral Nutrition - Adult 1 Each TPN Continuous <Continuous>  Parenteral Nutrition - Adult 1 Each TPN Continuous <Continuous>  Parenteral Nutrition - Adult 1 Each TPN Continuous <Continuous>  Parenteral Nutrition - Adult 1 Each TPN Continuous <Continuous>  Parenteral Nutrition - Adult 1 Each TPN Continuous <Continuous>  piperacillin/tazobactam IVPB. 3.375 Gram(s) IV Intermittent once  piperacillin/tazobactam IVPB... 3.375 Gram(s) IV Intermittent once  potassium chloride   Powder 40 milliEquivalent(s) Oral once  potassium chloride  10 mEq/100 mL IVPB 10 milliEquivalent(s) IV Intermittent every 1 hour  potassium chloride  10 mEq/100 mL IVPB 10 milliEquivalent(s) IV Intermittent every 1 hour  potassium chloride  10 mEq/100 mL IVPB 10 milliEquivalent(s) IV Intermittent every 1 hour  potassium chloride  20 mEq/100 mL IVPB 20 milliEquivalent(s) IV Intermittent once  potassium phosphate / sodium phosphate Powder (PHOS-NaK) 1 Packet(s) Oral once  potassium phosphate IVPB 15 milliMole(s) IV Intermittent once  potassium phosphate IVPB 21 milliMole(s) IV Intermittent once  potassium phosphate IVPB 15 milliMole(s) IV Intermittent once  potassium phosphate IVPB 21 milliMole(s) IV Intermittent once  saline laxative (FLEET) Rectal Enema 1 Enema Rectal once  saline laxative (FLEET) Rectal Enema 1 Enema Rectal once  saline laxative (FLEET) Rectal Enema 1 Enema Rectal once  saline laxative (FLEET) Rectal Enema 1 Enema Rectal once  sodium chloride 0.45%. 1000 milliLiter(s) IV Continuous <Continuous>  sodium chloride 0.9% Bolus 1500 milliLiter(s) IV Bolus once  sodium chloride 0.9% Bolus 500 milliLiter(s) IV Bolus once  sodium chloride 0.9% Bolus 250 milliLiter(s) IV Bolus once  sodium chloride 0.9% Bolus 500 milliLiter(s) IV Bolus once  sodium chloride 0.9% lock flush 10 milliLiter(s) IV Push every 1 hour PRN  sodium phosphate IVPB 15 milliMole(s) IV Intermittent once  sodium phosphate IVPB 15 milliMole(s) IV Intermittent once  sodium phosphate IVPB 15 milliMole(s) IV Intermittent once  sodium phosphate IVPB 21 milliMole(s) IV Intermittent once  sodium phosphate IVPB 15 milliMole(s) IV Intermittent once  sodium phosphate IVPB 15 milliMole(s) IV Intermittent once  sodium phosphate IVPB 15 milliMole(s) IV Intermittent once  tiotropium 18 MICROgram(s) Capsule 1 Capsule(s) Inhalation daily  vancomycin  IVPB 750 milliGRAM(s) IV Intermittent once    Vitals:  T(C): 37.4 (09-02-21 @ 09:29), Max: 37.4 (09-02-21 @ 09:29)  HR: 101 (09-02-21 @ 09:00) (72 - 101)  BP: 139/68 (09-02-21 @ 08:00) (109/57 - 162/72)  RR: 25 (09-02-21 @ 09:00) (16 - 25)  SpO2: 99% (09-02-21 @ 09:00) (93% - 100%)    PHYSICAL EXAM:   General: Frail, cachectic elderly female   HEENT: AT/NC. PERRL   Respiratory: On BiPAP   Neck: L anterior neck w/ mild tenderness  Neurologic:  -Mental status: Awake, alert, oriented to person, place, and time. Speech is difficult to hear through the BiPAP. Follows commands.  -Cranial nerves:   II: Visual fields are full to confrontation.  III, IV, VI: Extraocular movements are intact without nystagmus. Pupils equally round and reactive to light  V:  Facial sensation V1-V3 equal and intact   VII: Face is symmetric with normal eye closure and smile  VIII: Hearing grossly intact  XII: Tongue protrudes midline  Motor: Diffuse sarcopenia. No pronator drift. Strength bilateral upper extremity 5/5, bilateral lower extremities 5/5.  Sensation: Intact to light touch bilaterally    Labs:                        10.2   17.93 )-----------( 461      ( 02 Sep 2021 05:43 )             32.2     09-02    146<H>  |  109<H>  |  49<H>  ----------------------------<  130<H>  4.5   |  25  |  0.61    Ca    9.4      02 Sep 2021 05:43  Phos  3.7     09-02  Mg     2.4     09-02    TPro  x   /  Alb  4.4  /  TBili  x   /  DBili  x   /  AST  x   /  ALT  x   /  AlkPhos  x   08-31    CAPILLARY BLOOD GLUCOSE      POCT Blood Glucose.: 137 mg/dL (02 Sep 2021 06:22)    LIVER FUNCTIONS - ( 31 Aug 2021 21:17 )  Alb: 4.4 g/dL / Pro: x     / ALK PHOS: x     / ALT: x     / AST: x     / GGT: x             Culture - Blood (collected 31 Aug 2021 15:11)  Source: .Blood Blood-Venous  Preliminary Report (01 Sep 2021 16:00):    No growth at 1 day.    Culture - Blood (collected 31 Aug 2021 15:11)  Source: .Blood Blood-Venous  Preliminary Report (01 Sep 2021 16:00):    No growth at 1 day.      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    FINDINGS:    No elevated velocities or abnormal waveforms are encountered.    Peak systolic velocities are as follows:    RIGHT:  The right side was unable to be visualized secondary to central line and bandages.    LEFT:  PROX CCA = 57 cm/s  DIST CCA = 93 cm/s  PROX ICA = 86 cm/s  DIST ICA = 111 cm/s  ECA = 112 cm/s    Mild calcified plaque in the left proximal and mid common carotid artery, noncalcified plaque in the distal CCA. Mild calcified and noncalcified plaque in the proximal ICA.  There is calcified and noncalcified plaque within the common and internal carotid arteries.    Antegrade flow is noted within the left vertebral artery. Right vertebral artery not assessed.      IMPRESSION:  No significant hemodynamic stenosis of the left carotid artery at the time of this exam. No left carotid artery pseudoaneurysm.  Right carotid artery not visualized.    Measurement of carotid stenosis is based on velocity parameters that correlate the residual internal carotid diameter with that of the more distal vessel in accordance with a method such as the [place] [employer] Symptomatic Carotid Endarterectomy Trial (NASCET).   Neurology  Progress Note  09-02-21    Name:  AJ HAWKINS; 89y    Interval History: No acute overnight events. Patient seen and examined at bedside. Difficult to obtain history since pt is on BiPAP. She denies any pain/discomfort. ROS negative.       REVIEW OF SYSTEMS:  As states in HPI.    Medications:  acetaminophen   Tablet .. 650 milliGRAM(s) Oral once  acetaminophen  IVPB .. 700 milliGRAM(s) IV Intermittent once  acetaminophen  IVPB .. 700 milliGRAM(s) IV Intermittent once  acetaminophen  IVPB .. 700 milliGRAM(s) IV Intermittent once PRN  acetaminophen  IVPB .. 500 milliGRAM(s) IV Intermittent once  acetaminophen  IVPB .. 1000 milliGRAM(s) IV Intermittent once  acetaminophen  IVPB .. 750 milliGRAM(s) IV Intermittent once  acetaminophen  IVPB .. 620 milliGRAM(s) IV Intermittent once  acetaminophen  IVPB .. 620 milliGRAM(s) IV Intermittent once  acetaminophen  IVPB .. 620 milliGRAM(s) IV Intermittent once  acetaminophen  IVPB .. 620 milliGRAM(s) IV Intermittent once  acetaminophen  IVPB .. 620 milliGRAM(s) IV Intermittent once  acetaminophen  IVPB .. 620 milliGRAM(s) IV Intermittent once  acetaminophen  IVPB .. 620 milliGRAM(s) IV Intermittent once  acetaminophen  IVPB .. 620 milliGRAM(s) IV Intermittent once  acetaminophen  IVPB .. 620 milliGRAM(s) IV Intermittent once  albumin human  5% IVPB 250 milliLiter(s) IV Intermittent once  albumin human  5% IVPB 250 milliLiter(s) IV Intermittent once  albumin human  5% IVPB 250 milliLiter(s) IV Intermittent once  albumin human  5% IVPB 250 milliLiter(s) IV Intermittent once  albumin human 25% IVPB 50 milliLiter(s) IV Intermittent every 8 hours  ALBUTerol    90 MICROgram(s) HFA Inhaler 1 Puff(s) Inhalation every 4 hours  albuterol/ipratropium for Nebulization 3 milliLiter(s) Nebulizer every 4 hours  albuterol/ipratropium for Nebulization. 3 milliLiter(s) Nebulizer once  albuterol/ipratropium for Nebulization. 3 milliLiter(s) Nebulizer once  aspirin Suppository 150 milliGRAM(s) Rectal daily  benzocaine 20% Spray 1 Spray(s) Topical once  chlorhexidine 2% Cloths 1 Application(s) Topical daily  chlorhexidine 4% Liquid 1 Application(s) Topical <User Schedule>  dextrose 40% Gel 15 Gram(s) Oral once  dextrose 5%. 1000 milliLiter(s) IV Continuous <Continuous>  dextrose 5%. 1000 milliLiter(s) IV Continuous <Continuous>  dextrose 50% Injectable 25 Gram(s) IV Push once  dextrose 50% Injectable 12.5 Gram(s) IV Push once  dextrose 50% Injectable 25 Gram(s) IV Push once  ertapenem  IVPB 1000 milliGRAM(s) IV Intermittent once  fat emulsion (Fish Oil and Plant Based) 20% Infusion 1.22 Gm/kG/Day IV Continuous <Continuous>  fat emulsion (Fish Oil and Plant Based) 20% Infusion 1.22 Gm/kG/Day IV Continuous <Continuous>  fat emulsion (Fish Oil and Plant Based) 20% Infusion 0.3759 Gm/kG/Day IV Continuous <Continuous>  fat emulsion (Fish Oil and Plant Based) 20% Infusion 0.3759 Gm/kG/Day IV Continuous <Continuous>  fat emulsion (Fish Oil and Plant Based) 20% Infusion 0.3759 Gm/kG/Day IV Continuous <Continuous>  fat emulsion (Fish Oil and Plant Based) 20% Infusion 0.5 Gm/kG/Day IV Continuous <Continuous>  fat emulsion (Fish Oil and Plant Based) 20% Infusion 0.5 Gm/kG/Day IV Continuous <Continuous>  fat emulsion (Fish Oil and Plant Based) 20% Infusion 0.5 Gm/kG/Day IV Continuous <Continuous>  fat emulsion (Fish Oil and Plant Based) 20% Infusion 0.3759 Gm/kG/Day IV Continuous <Continuous>  fat emulsion (Fish Oil and Plant Based) 20% Infusion 0.5 Gm/kG/Day IV Continuous <Continuous>  fat emulsion (Fish Oil and Plant Based) 20% Infusion 0.5 Gm/kG/Day IV Continuous <Continuous>  fat emulsion (Fish Oil and Plant Based) 20% Infusion 0.5 Gm/kG/Day IV Continuous <Continuous>  fat emulsion (Fish Oil and Plant Based) 20% Infusion 0.5 Gm/kG/Day IV Continuous <Continuous>  fentaNYL    Injectable 50 MICROGram(s) IV Push once  furosemide   Injectable 20 milliGRAM(s) IV Push once  furosemide   Injectable 20 milliGRAM(s) IV Push once  furosemide   Injectable 20 milliGRAM(s) IV Push once  furosemide   Injectable 20 milliGRAM(s) IV Push once  furosemide   Injectable 20 milliGRAM(s) IV Push once  glucagon  Injectable 1 milliGRAM(s) IntraMuscular once  haloperidol    Injectable 0.5 milliGRAM(s) IV Push once PRN  insulin lispro (ADMELOG) corrective regimen sliding scale   SubCutaneous every 6 hours  lactated ringers Bolus 250 milliLiter(s) IV Bolus once  lactated ringers Bolus 500 milliLiter(s) IV Bolus once  lactated ringers Bolus 500 milliLiter(s) IV Bolus once  lactated ringers Bolus 500 milliLiter(s) IV Bolus once  lactated ringers Bolus 500 milliLiter(s) IV Bolus once  lidocaine   4% Patch 2 Patch Transdermal once  lidocaine   4% Patch 1 Patch Transdermal every 24 hours  lidocaine   4% Patch 1 Patch Transdermal every 24 hours  magnesium hydroxide Suspension 30 milliLiter(s) Oral once  magnesium sulfate  IVPB 1 Gram(s) IV Intermittent once  magnesium sulfate  IVPB 1 Gram(s) IV Intermittent once  magnesium sulfate  IVPB 1 Gram(s) IV Intermittent once  magnesium sulfate  IVPB 1 Gram(s) IV Intermittent once  magnesium sulfate  IVPB 1 Gram(s) IV Intermittent once  meropenem  IVPB 1000 milliGRAM(s) IV Intermittent once  meropenem  IVPB 1000 milliGRAM(s) IV Intermittent every 12 hours  meropenem  IVPB      methylPREDNISolone sodium succinate Injectable 60 milliGRAM(s) IV Push once  metoclopramide Injectable 10 milliGRAM(s) IV Push once  midazolam Injectable 2 milliGRAM(s) IntraMuscular once  midazolam Injectable 3 milliGRAM(s) IV Push once  nystatin Powder 1 Application(s) Topical two times a day  ondansetron Injectable 4 milliGRAM(s) IV Push every 6 hours PRN  ondansetron Injectable 4 milliGRAM(s) IV Push once  ondansetron Injectable 4 milliGRAM(s) IV Push once  pantoprazole  Injectable 40 milliGRAM(s) IV Push every 12 hours  Parenteral Nutrition - Adult 1 Each TPN Continuous <Continuous>  Parenteral Nutrition - Adult 1 Each TPN Continuous <Continuous>  Parenteral Nutrition - Adult 1 Each TPN Continuous <Continuous>  Parenteral Nutrition - Adult 1 Each TPN Continuous <Continuous>  Parenteral Nutrition - Adult 1 Each TPN Continuous <Continuous>  Parenteral Nutrition - Adult 1 Each TPN Continuous <Continuous>  Parenteral Nutrition - Adult 1 Each TPN Continuous <Continuous>  Parenteral Nutrition - Adult 1 Each TPN Continuous <Continuous>  Parenteral Nutrition - Adult 1 Each TPN Continuous <Continuous>  Parenteral Nutrition - Adult 1 Each TPN Continuous <Continuous>  Parenteral Nutrition - Adult 1 Each TPN Continuous <Continuous>  Parenteral Nutrition - Adult 1 Each TPN Continuous <Continuous>  piperacillin/tazobactam IVPB. 3.375 Gram(s) IV Intermittent once  piperacillin/tazobactam IVPB... 3.375 Gram(s) IV Intermittent once  potassium chloride   Powder 40 milliEquivalent(s) Oral once  potassium chloride  10 mEq/100 mL IVPB 10 milliEquivalent(s) IV Intermittent every 1 hour  potassium chloride  10 mEq/100 mL IVPB 10 milliEquivalent(s) IV Intermittent every 1 hour  potassium chloride  10 mEq/100 mL IVPB 10 milliEquivalent(s) IV Intermittent every 1 hour  potassium chloride  20 mEq/100 mL IVPB 20 milliEquivalent(s) IV Intermittent once  potassium phosphate / sodium phosphate Powder (PHOS-NaK) 1 Packet(s) Oral once  potassium phosphate IVPB 15 milliMole(s) IV Intermittent once  potassium phosphate IVPB 21 milliMole(s) IV Intermittent once  potassium phosphate IVPB 15 milliMole(s) IV Intermittent once  potassium phosphate IVPB 21 milliMole(s) IV Intermittent once  saline laxative (FLEET) Rectal Enema 1 Enema Rectal once  saline laxative (FLEET) Rectal Enema 1 Enema Rectal once  saline laxative (FLEET) Rectal Enema 1 Enema Rectal once  saline laxative (FLEET) Rectal Enema 1 Enema Rectal once  sodium chloride 0.45%. 1000 milliLiter(s) IV Continuous <Continuous>  sodium chloride 0.9% Bolus 1500 milliLiter(s) IV Bolus once  sodium chloride 0.9% Bolus 500 milliLiter(s) IV Bolus once  sodium chloride 0.9% Bolus 250 milliLiter(s) IV Bolus once  sodium chloride 0.9% Bolus 500 milliLiter(s) IV Bolus once  sodium chloride 0.9% lock flush 10 milliLiter(s) IV Push every 1 hour PRN  sodium phosphate IVPB 15 milliMole(s) IV Intermittent once  sodium phosphate IVPB 15 milliMole(s) IV Intermittent once  sodium phosphate IVPB 15 milliMole(s) IV Intermittent once  sodium phosphate IVPB 21 milliMole(s) IV Intermittent once  sodium phosphate IVPB 15 milliMole(s) IV Intermittent once  sodium phosphate IVPB 15 milliMole(s) IV Intermittent once  sodium phosphate IVPB 15 milliMole(s) IV Intermittent once  tiotropium 18 MICROgram(s) Capsule 1 Capsule(s) Inhalation daily  vancomycin  IVPB 750 milliGRAM(s) IV Intermittent once    Vitals:  T(C): 37.4 (09-02-21 @ 09:29), Max: 37.4 (09-02-21 @ 09:29)  HR: 101 (09-02-21 @ 09:00) (72 - 101)  BP: 139/68 (09-02-21 @ 08:00) (109/57 - 162/72)  RR: 25 (09-02-21 @ 09:00) (16 - 25)  SpO2: 99% (09-02-21 @ 09:00) (93% - 100%)    PHYSICAL EXAM:   General: Frail, cachectic elderly female   HEENT: AT/NC. PERRL   Respiratory: On BiPAP   Neck: L anterior neck w/ mild tenderness  Neurologic:  -Mental status: Awake, alert, oriented to person, place, and time. Speech is difficult to hear through the BiPAP. Follows commands.  -Cranial nerves:   II: Visual fields are full to confrontation.  III, IV, VI: Extraocular movements are intact without nystagmus. Pupils equally round and reactive to light  V:  Facial sensation V1-V3 equal and intact   VII: Face is symmetric with normal eye closure and smile  VIII: Hearing grossly intact  XII: Tongue protrudes midline  Motor: Diffuse sarcopenia. No pronator drift. Strength bilateral upper extremity 5/5, bilateral lower extremities 5/5.  Sensation: Intact to light touch bilaterally    Labs:                        10.2   17.93 )-----------( 461      ( 02 Sep 2021 05:43 )             32.2     09-02    146<H>  |  109<H>  |  49<H>  ----------------------------<  130<H>  4.5   |  25  |  0.61    Ca    9.4      02 Sep 2021 05:43  Phos  3.7     09-02  Mg     2.4     09-02    TPro  x   /  Alb  4.4  /  TBili  x   /  DBili  x   /  AST  x   /  ALT  x   /  AlkPhos  x   08-31    CAPILLARY BLOOD GLUCOSE      POCT Blood Glucose.: 137 mg/dL (02 Sep 2021 06:22)    LIVER FUNCTIONS - ( 31 Aug 2021 21:17 )  Alb: 4.4 g/dL / Pro: x     / ALK PHOS: x     / ALT: x     / AST: x     / GGT: x             Culture - Blood (collected 31 Aug 2021 15:11)  Source: .Blood Blood-Venous  Preliminary Report (01 Sep 2021 16:00):    No growth at 1 day.    Culture - Blood (collected 31 Aug 2021 15:11)  Source: .Blood Blood-Venous  Preliminary Report (01 Sep 2021 16:00):    No growth at 1 day.      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    FINDINGS:    No elevated velocities or abnormal waveforms are encountered.    Peak systolic velocities are as follows:    RIGHT:  The right side was unable to be visualized secondary to central line and bandages.    LEFT:  PROX CCA = 57 cm/s  DIST CCA = 93 cm/s  PROX ICA = 86 cm/s  DIST ICA = 111 cm/s  ECA = 112 cm/s    Mild calcified plaque in the left proximal and mid common carotid artery, noncalcified plaque in the distal CCA. Mild calcified and noncalcified plaque in the proximal ICA.  There is calcified and noncalcified plaque within the common and internal carotid arteries.    Antegrade flow is noted within the left vertebral artery. Right vertebral artery not assessed.      IMPRESSION:  No significant hemodynamic stenosis of the left carotid artery at the time of this exam. No left carotid artery pseudoaneurysm.  Right carotid artery not visualized.    Measurement of carotid stenosis is based on velocity parameters that correlate the residual internal carotid diameter with that of the more distal vessel in accordance with a method such as the [place] [employer] Symptomatic Carotid Endarterectomy Trial (NASCET).

## 2021-09-02 NOTE — ADVANCED PRACTICE NURSE CONSULT - ASSESSMENT
Stoma pink, well budded, no output in ostomy pouch. WOCN changed the pouching system. Applied Cavilon skin prep to periwound, ostomy ring around the stoma, then a Heath 2 3/4 inch, 2 piece, flat, lock and roll pouching system. Ostomy supplies left at the bedside.   Mid abdominal incision dehiscence noted on superior aspect with pale pink tissue and moderate amount of tan colored exudate.

## 2021-09-03 PROCEDURE — 99233 SBSQ HOSP IP/OBS HIGH 50: CPT

## 2021-09-03 RX ORDER — HYDROMORPHONE HYDROCHLORIDE 2 MG/ML
0.2 INJECTION INTRAMUSCULAR; INTRAVENOUS; SUBCUTANEOUS
Refills: 0 | Status: DISCONTINUED | OUTPATIENT
Start: 2021-09-03 | End: 2021-09-08

## 2021-09-03 RX ORDER — HYDROMORPHONE HYDROCHLORIDE 2 MG/ML
0.4 INJECTION INTRAMUSCULAR; INTRAVENOUS; SUBCUTANEOUS
Refills: 0 | Status: DISCONTINUED | OUTPATIENT
Start: 2021-09-03 | End: 2021-09-08

## 2021-09-03 RX ADMIN — HYDROMORPHONE HYDROCHLORIDE 0.2 MILLIGRAM(S): 2 INJECTION INTRAMUSCULAR; INTRAVENOUS; SUBCUTANEOUS at 14:00

## 2021-09-03 RX ADMIN — CHLORHEXIDINE GLUCONATE 1 APPLICATION(S): 213 SOLUTION TOPICAL at 07:13

## 2021-09-03 RX ADMIN — HYDROMORPHONE HYDROCHLORIDE 0.2 MILLIGRAM(S): 2 INJECTION INTRAMUSCULAR; INTRAVENOUS; SUBCUTANEOUS at 07:14

## 2021-09-03 RX ADMIN — HYDROMORPHONE HYDROCHLORIDE 0.4 MILLIGRAM(S): 2 INJECTION INTRAMUSCULAR; INTRAVENOUS; SUBCUTANEOUS at 16:43

## 2021-09-03 RX ADMIN — LIDOCAINE 1 PATCH: 4 CREAM TOPICAL at 18:30

## 2021-09-03 RX ADMIN — ROBINUL 0.4 MILLIGRAM(S): 0.2 INJECTION INTRAMUSCULAR; INTRAVENOUS at 17:49

## 2021-09-03 RX ADMIN — LIDOCAINE 1 PATCH: 4 CREAM TOPICAL at 17:49

## 2021-09-03 RX ADMIN — NYSTATIN CREAM 1 APPLICATION(S): 100000 CREAM TOPICAL at 07:05

## 2021-09-03 RX ADMIN — ROBINUL 0.4 MILLIGRAM(S): 0.2 INJECTION INTRAMUSCULAR; INTRAVENOUS at 11:03

## 2021-09-03 RX ADMIN — HYDROMORPHONE HYDROCHLORIDE 0.2 MILLIGRAM(S): 2 INJECTION INTRAMUSCULAR; INTRAVENOUS; SUBCUTANEOUS at 02:55

## 2021-09-03 RX ADMIN — CHLORHEXIDINE GLUCONATE 1 APPLICATION(S): 213 SOLUTION TOPICAL at 06:40

## 2021-09-03 RX ADMIN — HYDROMORPHONE HYDROCHLORIDE 0.4 MILLIGRAM(S): 2 INJECTION INTRAMUSCULAR; INTRAVENOUS; SUBCUTANEOUS at 17:00

## 2021-09-03 RX ADMIN — ROBINUL 0.4 MILLIGRAM(S): 0.2 INJECTION INTRAMUSCULAR; INTRAVENOUS at 07:02

## 2021-09-03 RX ADMIN — LIDOCAINE 1 PATCH: 4 CREAM TOPICAL at 11:03

## 2021-09-03 RX ADMIN — HYDROMORPHONE HYDROCHLORIDE 0.2 MILLIGRAM(S): 2 INJECTION INTRAMUSCULAR; INTRAVENOUS; SUBCUTANEOUS at 18:10

## 2021-09-03 RX ADMIN — HYDROMORPHONE HYDROCHLORIDE 0.2 MILLIGRAM(S): 2 INJECTION INTRAMUSCULAR; INTRAVENOUS; SUBCUTANEOUS at 15:26

## 2021-09-03 RX ADMIN — HYDROMORPHONE HYDROCHLORIDE 0.2 MILLIGRAM(S): 2 INJECTION INTRAMUSCULAR; INTRAVENOUS; SUBCUTANEOUS at 02:20

## 2021-09-03 RX ADMIN — LIDOCAINE 1 PATCH: 4 CREAM TOPICAL at 06:57

## 2021-09-03 RX ADMIN — HYDROMORPHONE HYDROCHLORIDE 0.2 MILLIGRAM(S): 2 INJECTION INTRAMUSCULAR; INTRAVENOUS; SUBCUTANEOUS at 17:49

## 2021-09-03 RX ADMIN — HYDROMORPHONE HYDROCHLORIDE 0.2 MILLIGRAM(S): 2 INJECTION INTRAMUSCULAR; INTRAVENOUS; SUBCUTANEOUS at 21:56

## 2021-09-03 RX ADMIN — HYDROMORPHONE HYDROCHLORIDE 0.2 MILLIGRAM(S): 2 INJECTION INTRAMUSCULAR; INTRAVENOUS; SUBCUTANEOUS at 12:02

## 2021-09-03 RX ADMIN — HYDROMORPHONE HYDROCHLORIDE 0.2 MILLIGRAM(S): 2 INJECTION INTRAMUSCULAR; INTRAVENOUS; SUBCUTANEOUS at 12:05

## 2021-09-03 RX ADMIN — ROBINUL 0.4 MILLIGRAM(S): 0.2 INJECTION INTRAMUSCULAR; INTRAVENOUS at 02:20

## 2021-09-03 RX ADMIN — HYDROMORPHONE HYDROCHLORIDE 0.2 MILLIGRAM(S): 2 INJECTION INTRAMUSCULAR; INTRAVENOUS; SUBCUTANEOUS at 07:02

## 2021-09-03 RX ADMIN — HYDROMORPHONE HYDROCHLORIDE 0.2 MILLIGRAM(S): 2 INJECTION INTRAMUSCULAR; INTRAVENOUS; SUBCUTANEOUS at 10:32

## 2021-09-03 RX ADMIN — HYDROMORPHONE HYDROCHLORIDE 0.2 MILLIGRAM(S): 2 INJECTION INTRAMUSCULAR; INTRAVENOUS; SUBCUTANEOUS at 22:19

## 2021-09-03 RX ADMIN — HYDROMORPHONE HYDROCHLORIDE 0.2 MILLIGRAM(S): 2 INJECTION INTRAMUSCULAR; INTRAVENOUS; SUBCUTANEOUS at 14:55

## 2021-09-03 RX ADMIN — NYSTATIN CREAM 1 APPLICATION(S): 100000 CREAM TOPICAL at 17:58

## 2021-09-03 RX ADMIN — LIDOCAINE 1 PATCH: 4 CREAM TOPICAL at 18:31

## 2021-09-03 NOTE — PROGRESS NOTE ADULT - ASSESSMENT
A/P Comfortable.  High flow nasal O2 needed  Meds as needed  Appreciate the Palliative Care Services expertise.

## 2021-09-03 NOTE — PROGRESS NOTE ADULT - SUBJECTIVE AND OBJECTIVE BOX
SUBJECTIVE:  Pt has pain that is well controlled. Her breathing is comfortable on high flow NC.    MEDICATIONS  (STANDING):  chlorhexidine 2% Cloths 1 Application(s) Topical daily  chlorhexidine 4% Liquid 1 Application(s) Topical <User Schedule>  glycopyrrolate Injectable 0.4 milliGRAM(s) IV Push every 6 hours  HYDROmorphone  Injectable 0.2 milliGRAM(s) IV Push every 3 hours  lidocaine   4% Patch 1 Patch Transdermal every 24 hours  lidocaine   4% Patch 1 Patch Transdermal every 24 hours  nystatin Powder 1 Application(s) Topical two times a day    MEDICATIONS  (PRN):  acetaminophen  IVPB .. 540 milliGRAM(s) IV Intermittent once PRN Temp greater or equal to 38C (100.4F)  HYDROmorphone  Injectable 0.2 milliGRAM(s) IV Push every 2 hours PRN Mild Pain (1 - 3)  HYDROmorphone  Injectable 0.4 milliGRAM(s) IV Push every 2 hours PRN Moderate Pain (4 - 6)  LORazepam   Injectable 1 milliGRAM(s) IV Push every 4 hours PRN Anxiety  ondansetron Injectable 4 milliGRAM(s) IV Push every 6 hours PRN Nausea and/or Vomiting      Vital Signs Last 24 Hrs  T(C): 36.2 (03 Sep 2021 12:15), Max: 36.4 (02 Sep 2021 14:44)  T(F): 97.1 (03 Sep 2021 12:15), Max: 97.5 (02 Sep 2021 14:44)  HR: 91 (03 Sep 2021 12:15) (83 - 99)  BP: 136/71 (03 Sep 2021 12:15) (135/80 - 136/71)  BP(mean): --  RR: 13 (03 Sep 2021 12:15) (10 - 16)  SpO2: 94% (03 Sep 2021 12:15) (94% - 99%)    Physical Exam:  General: NAD, resting comfortably in bed  Pulmonary: Nonlabored breathing, no respiratory distress  Cardiovascular: NSR  Abdominal: soft, NT/ND, ostomy in place, incisions clean/dry/intact  Extremities: WWP, normal strength  Neuro: A/O x 3, CNs II-XII grossly intact, no focal deficits    I&O's Summary    02 Sep 2021 07:01  -  03 Sep 2021 07:00  --------------------------------------------------------  IN: 556.3 mL / OUT: 384 mL / NET: 172.3 mL        LABS:                        10.2   17.93 )-----------( 461      ( 02 Sep 2021 05:43 )             32.2     09-02    146<H>  |  109<H>  |  49<H>  ----------------------------<  130<H>  4.5   |  25  |  0.61    Ca    9.4      02 Sep 2021 05:43  Phos  3.7     09-02  Mg     2.4     09-02          CAPILLARY BLOOD GLUCOSE            RADIOLOGY & ADDITIONAL STUDIES:

## 2021-09-03 NOTE — PROGRESS NOTE ADULT - ASSESSMENT
89F PMH HTN, chronic back pain 2/2 L3,L4 compression fractures, triple vessel CAD, and SBO due to possible diverticulitis vs. GYN/colorectal malignancy (2018) never followed up and L ICA s/p L CEA (4/21). 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 Left gluteal IR drain placed (8/9). Colonoscopy w/ sigmoid mass bx on 8/11- final path invasive adenocarcinoma moderately differentiated. Taken to OR on 8/13 for diverting colostomy. In SICU for acute blood loss anemia, s/p EGD 8/18 showing duodenal ulceration, erosive esophagitis duodenitis, bx x 2, CT 8/20 w/ closed loop obstxn, s/p OR 8/20 exlap STEPHANIE. 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 Family decided to make comfort care, orders adjusted in accordance with those wishes.     Pain PRN and standing  HFNC for comfort  NPO status, withholding TPN  SCDs  [address] for comfort care

## 2021-09-03 NOTE — PROGRESS NOTE ADULT - SUBJECTIVE AND OBJECTIVE BOX
SUBJECTIVE: pt seen and examined. actively dying, unresponsive. appears comfortable. unable to provide ROS.     OVERNIGHT EVENTS: placed on hiflo per primary team     DNR in chart: Yes    PEx:  T(C): 36.1 (09-02-21 @ 20:47), Max: 36.4 (09-02-21 @ 14:44)  HR: 83 (09-03-21 @ 05:08) (82 - 99)  BP: 135/80 (09-02-21 @ 20:47) (135/80 - 135/80)  RR: 14 (09-03-21 @ 09:57) (10 - 16)  SpO2: 96% (09-03-21 @ 06:22) (95% - 99%)  Wt(kg): --  	       GENERAL: thin, tired appearing elderly female, on hiflo appears comfortable  HEENT: Temporal wasting dry MM.    RESPIRATORY: CTAB. No wheezing, rales or rhonchi. Fair inspiratory effort. tachypnea on bipap   CARDIOVASCULAR: RRR. No audible murmurs, rubs or gallops. +tele  GASTROINTESTINAL: Abdomen soft, NT. No arden-incisional drainage or purulence. Ostomy over left abdomen  NEUROLOGIC: unresponsive   INTEGUMENTARY: thin, pale, Significant ecchymosis over right wrist, forearm, and dorsal aspect of right hand.  MUSCULOSKELETAL: No cyanosis or clubbing. No gross deformities. generalized loss of adipose tissue. functional quad.   Psych: unresponsive       ALLRGIES: No Known Allergies      MEDICATIONS: REVIEWED  MEDICATIONS  (STANDING):  chlorhexidine 2% Cloths 1 Application(s) Topical daily  chlorhexidine 4% Liquid 1 Application(s) Topical <User Schedule>  glycopyrrolate Injectable 0.4 milliGRAM(s) IV Push every 6 hours  HYDROmorphone  Injectable 0.2 milliGRAM(s) IV Push every 3 hours  lidocaine   4% Patch 1 Patch Transdermal every 24 hours  lidocaine   4% Patch 1 Patch Transdermal every 24 hours  nystatin Powder 1 Application(s) Topical two times a day    MEDICATIONS  (PRN):  acetaminophen  IVPB .. 540 milliGRAM(s) IV Intermittent once PRN Temp greater or equal to 38C (100.4F)  HYDROmorphone  Injectable 0.2 milliGRAM(s) IV Push every 2 hours PRN Mild Pain (1 - 3)  HYDROmorphone  Injectable 0.4 milliGRAM(s) IV Push every 2 hours PRN Moderate Pain (4 - 6)  LORazepam   Injectable 1 milliGRAM(s) IV Push every 4 hours PRN Anxiety  ondansetron Injectable 4 milliGRAM(s) IV Push every 6 hours PRN Nausea and/or Vomiting      CRITICAL CARE:  [ ]Shock Present  [ ]Septic [ ]Cardiogenic [ ]Neurologic [ ]Hypovolemic    [ ]Vasopressors [ ]Inotropes    [ x]Respiratory failure present   [ ]Mechanical Ventilation   [  ]Non-invasive ventilatory support   [ x]High-Flow  [x ]Acute  [ ]Chronic   [x ]Hypoxic  [ ]Hypercarbic   [ ]Other    [ ]Other organ failure     LABS: REVIEWED  CBC:                        10.2   17.93 )-----------( 461      ( 02 Sep 2021 05:43 )             32.2     CMP:    09-02    146<H>  |  109<H>  |  49<H>  ----------------------------<  130<H>  4.5   |  25  |  0.61    Ca    9.4      02 Sep 2021 05:43  Phos  3.7     09-02  Mg     2.4     09-02    IMAGING: REVIEWED    Decision maker: The patient is UNable to participate in complex medical decision making conversations.   Legal surrogate: Cynthia Sims # [phone]  Pt's Umberto العلي, also involved in [facility]    ADVANCE DIRECTIVES & GOALS OF CARE  -DNR DNI  -comfort care  -discontinue all interventions that prolong dying process       PSYCHOSOCIAL-SPIRITUAL ASSESSMENT: Reviewed, Care plan unchanged    REFERRALS:  [ x] palliative social work [ ]Chaplaincy  [ ]Hospice  [ ]Child Life  [ ]Social Work  [ ]Case management [  ]Holistic Therapy

## 2021-09-03 NOTE — PROGRESS NOTE ADULT - SUBJECTIVE AND OBJECTIVE BOX
POD14, 21  On 8 [place] Comfort care    Palliative care service is following.   On high flow nasal canula.  Arousable and when awake not c/o pain.  Getting dilaudid periodically.    Vital Signs Last 24 Hrs  T(C): 36.1 (02 Sep 2021 20:47), Max: 37.4 (02 Sep 2021 09:29)  T(F): 97 (02 Sep 2021 20:47), Max: 99.4 (02 Sep 2021 09:29)  HR: 83 (03 Sep 2021 05:08) (82 - 102)  BP: 135/80 (02 Sep 2021 20:47) (135/80 - 156/73)  BP(mean): 105 (02 Sep 2021 11:00) (97 - 105)  RR: 10 (03 Sep 2021 06:22) (10 - 20)  SpO2: 96% (03 Sep 2021 06:22) (95% - 99%)    lungs: shallower breathing  abd: soft distension, incision intact, stoma viable without output.  ext: without change    UO  385 ml/last 24 hours charted                          10.2   17.93 )-----------( 461      ( 02 Sep 2021 05:43 )             32.2   09-02    146<H>  |  109<H>  |  49<H>  ----------------------------<  130<H>  4.5   |  25  |  0.61    Ca    9.4      02 Sep 2021 05:43  Phos  3.7     09-02  Mg     2.4     09-02

## 2021-09-04 RX ADMIN — ROBINUL 0.4 MILLIGRAM(S): 0.2 INJECTION INTRAMUSCULAR; INTRAVENOUS at 11:06

## 2021-09-04 RX ADMIN — ROBINUL 0.4 MILLIGRAM(S): 0.2 INJECTION INTRAMUSCULAR; INTRAVENOUS at 06:42

## 2021-09-04 RX ADMIN — HYDROMORPHONE HYDROCHLORIDE 0.2 MILLIGRAM(S): 2 INJECTION INTRAMUSCULAR; INTRAVENOUS; SUBCUTANEOUS at 23:00

## 2021-09-04 RX ADMIN — HYDROMORPHONE HYDROCHLORIDE 0.2 MILLIGRAM(S): 2 INJECTION INTRAMUSCULAR; INTRAVENOUS; SUBCUTANEOUS at 19:29

## 2021-09-04 RX ADMIN — HYDROMORPHONE HYDROCHLORIDE 0.2 MILLIGRAM(S): 2 INJECTION INTRAMUSCULAR; INTRAVENOUS; SUBCUTANEOUS at 15:37

## 2021-09-04 RX ADMIN — HYDROMORPHONE HYDROCHLORIDE 0.2 MILLIGRAM(S): 2 INJECTION INTRAMUSCULAR; INTRAVENOUS; SUBCUTANEOUS at 09:25

## 2021-09-04 RX ADMIN — HYDROMORPHONE HYDROCHLORIDE 0.2 MILLIGRAM(S): 2 INJECTION INTRAMUSCULAR; INTRAVENOUS; SUBCUTANEOUS at 16:08

## 2021-09-04 RX ADMIN — NYSTATIN CREAM 1 APPLICATION(S): 100000 CREAM TOPICAL at 06:46

## 2021-09-04 RX ADMIN — HYDROMORPHONE HYDROCHLORIDE 0.2 MILLIGRAM(S): 2 INJECTION INTRAMUSCULAR; INTRAVENOUS; SUBCUTANEOUS at 10:00

## 2021-09-04 RX ADMIN — LIDOCAINE 1 PATCH: 4 CREAM TOPICAL at 07:31

## 2021-09-04 RX ADMIN — LIDOCAINE 1 PATCH: 4 CREAM TOPICAL at 00:19

## 2021-09-04 RX ADMIN — HYDROMORPHONE HYDROCHLORIDE 0.2 MILLIGRAM(S): 2 INJECTION INTRAMUSCULAR; INTRAVENOUS; SUBCUTANEOUS at 11:48

## 2021-09-04 RX ADMIN — ROBINUL 0.4 MILLIGRAM(S): 0.2 INJECTION INTRAMUSCULAR; INTRAVENOUS at 19:27

## 2021-09-04 RX ADMIN — CHLORHEXIDINE GLUCONATE 1 APPLICATION(S): 213 SOLUTION TOPICAL at 06:43

## 2021-09-04 RX ADMIN — NYSTATIN CREAM 1 APPLICATION(S): 100000 CREAM TOPICAL at 19:28

## 2021-09-04 RX ADMIN — HYDROMORPHONE HYDROCHLORIDE 0.2 MILLIGRAM(S): 2 INJECTION INTRAMUSCULAR; INTRAVENOUS; SUBCUTANEOUS at 20:25

## 2021-09-04 RX ADMIN — HYDROMORPHONE HYDROCHLORIDE 0.2 MILLIGRAM(S): 2 INJECTION INTRAMUSCULAR; INTRAVENOUS; SUBCUTANEOUS at 01:55

## 2021-09-04 RX ADMIN — HYDROMORPHONE HYDROCHLORIDE 0.2 MILLIGRAM(S): 2 INJECTION INTRAMUSCULAR; INTRAVENOUS; SUBCUTANEOUS at 02:43

## 2021-09-04 RX ADMIN — ROBINUL 0.4 MILLIGRAM(S): 0.2 INJECTION INTRAMUSCULAR; INTRAVENOUS at 01:54

## 2021-09-04 RX ADMIN — HYDROMORPHONE HYDROCHLORIDE 0.2 MILLIGRAM(S): 2 INJECTION INTRAMUSCULAR; INTRAVENOUS; SUBCUTANEOUS at 11:07

## 2021-09-04 RX ADMIN — HYDROMORPHONE HYDROCHLORIDE 0.2 MILLIGRAM(S): 2 INJECTION INTRAMUSCULAR; INTRAVENOUS; SUBCUTANEOUS at 07:31

## 2021-09-04 RX ADMIN — HYDROMORPHONE HYDROCHLORIDE 0.2 MILLIGRAM(S): 2 INJECTION INTRAMUSCULAR; INTRAVENOUS; SUBCUTANEOUS at 22:45

## 2021-09-04 RX ADMIN — HYDROMORPHONE HYDROCHLORIDE 0.2 MILLIGRAM(S): 2 INJECTION INTRAMUSCULAR; INTRAVENOUS; SUBCUTANEOUS at 06:42

## 2021-09-04 NOTE — PROGRESS NOTE ADULT - SUBJECTIVE AND OBJECTIVE BOX
SUBJECTIVE: Patient seen and examined bedside. Pt is comfort care only. Denies pain, nausea, or vomiting.         Vital Signs Last 24 Hrs  T(C): 36.7 (04 Sep 2021 13:32), Max: 36.7 (04 Sep 2021 13:32)  T(F): 98.1 (04 Sep 2021 13:32), Max: 98.1 (04 Sep 2021 13:32)  HR: 79 (04 Sep 2021 13:32) (79 - 83)  BP: 117/78 (04 Sep 2021 13:32) (117/78 - 124/70)  BP(mean): --  RR: 16 (04 Sep 2021 13:32) (14 - 16)  SpO2: 94% (04 Sep 2021 13:32) (90% - 99%)  I&O's Detail    03 Sep 2021 07:01  -  04 Sep 2021 07:00  --------------------------------------------------------  IN:  Total IN: 0 mL    OUT:    Voided (mL): 200 mL  Total OUT: 200 mL    Total NET: -200 mL          General: AAOx3, NAD, resting comfortably in bed  C/V: NSR  Pulm: Nonlabored breathing, no respiratory distress  Abd: soft, NT/ND, no rebound, no guarding  Extrem: WWP, no edema, SCDs in place        LABS:                RADIOLOGY & ADDITIONAL STUDIES:

## 2021-09-04 NOTE — PROGRESS NOTE ADULT - SUBJECTIVE AND OBJECTIVE BOX
POD 15, 22  Patient on 7 Heidi  Getting Diluadid 0.2 mg IVP q 3 hours and then added dilaudid available as prn orders.    This AM patient is sitting up and watching TV.  On nasal O2 (not high flow) and breathing comfortably.  Conversant and comprehending.  NO c/o any pain or discomfort.  No N/V or BM or flatus.    Vital Signs Last 24 Hrs  T(C): 36.3 (04 Sep 2021 05:33), Max: 36.4 (03 Sep 2021 21:55)  T(F): 97.3 (04 Sep 2021 05:33), Max: 97.6 (03 Sep 2021 21:55)  HR: 81 (04 Sep 2021 05:33) (81 - 91)  BP: 122/73 (04 Sep 2021 05:33) (122/73 - 136/71)  BP(mean): --  RR: 16 (04 Sep 2021 10:12) (13 - 16)  SpO2: 98% (04 Sep 2021 10:12) (90% - 99%)    lungs clear but with large airway secretions that she cannot clear (baseline for her)  cor S1S2  abd: soft, non tender, BS scant, incision intact, retention suture removed this AM minus incident  stoma viable, no output.  ext: without calf tenderness, also - edema    UO: 200 ml charted for 24 hours,  about 80-90 ml in bag now (urbina)    No recent labs

## 2021-09-04 NOTE — PROGRESS NOTE ADULT - ASSESSMENT
A/P  Patient looks much improved from 48 and 24 hours ago.  No longer requiring high flow nasal O2.  Work of breathing much less today for her.  Still has large airway secretions and she needs periodic suctioning (she has suction at hand)  Also much improved MS.    Retention suture removed. wound is fine.      She has no GI function.  Recent AXR, colonoscopy, and last weeks CT scan suggest no obstruction yet she has no output nor N/V or distension.  She is not cleared to eat or drink because of swallowing problems.    I had 10 minute conversation about her condition and our options.  She has a non-resectable rectal cancer (longstanding). She is diverted.  Because of her overall debilitated condition and her cachexia she is not presently a candidate for palliative chemo or RT in my opinion.    Unless, she is cleared for po intake I recommend EGD and PEG/PEJ next week.  Hopefully, with enteral feeds via the PEJ her gut will start functioning.  This would provide nutrition.  Perhaps, with time she might be candidate for palliative chemo or RT (I doubt this hightly).  Transfer to hospice with PEG/PEJ and on PEGJ feeds would be an option and, perhaps, her best option.  Overall prognosis is unchanged, unfortunately.     Continue comfort care and IV fluids for time being.  GI consult for ? PEG/PEJ early next week

## 2021-09-05 LAB
CULTURE RESULTS: SIGNIFICANT CHANGE UP
CULTURE RESULTS: SIGNIFICANT CHANGE UP
SARS-COV-2 RNA SPEC QL NAA+PROBE: SIGNIFICANT CHANGE UP
SPECIMEN SOURCE: SIGNIFICANT CHANGE UP
SPECIMEN SOURCE: SIGNIFICANT CHANGE UP

## 2021-09-05 RX ADMIN — HYDROMORPHONE HYDROCHLORIDE 0.2 MILLIGRAM(S): 2 INJECTION INTRAMUSCULAR; INTRAVENOUS; SUBCUTANEOUS at 10:08

## 2021-09-05 RX ADMIN — ROBINUL 0.4 MILLIGRAM(S): 0.2 INJECTION INTRAMUSCULAR; INTRAVENOUS at 17:18

## 2021-09-05 RX ADMIN — HYDROMORPHONE HYDROCHLORIDE 0.2 MILLIGRAM(S): 2 INJECTION INTRAMUSCULAR; INTRAVENOUS; SUBCUTANEOUS at 01:53

## 2021-09-05 RX ADMIN — HYDROMORPHONE HYDROCHLORIDE 0.2 MILLIGRAM(S): 2 INJECTION INTRAMUSCULAR; INTRAVENOUS; SUBCUTANEOUS at 05:18

## 2021-09-05 RX ADMIN — ROBINUL 0.4 MILLIGRAM(S): 0.2 INJECTION INTRAMUSCULAR; INTRAVENOUS at 23:25

## 2021-09-05 RX ADMIN — HYDROMORPHONE HYDROCHLORIDE 0.2 MILLIGRAM(S): 2 INJECTION INTRAMUSCULAR; INTRAVENOUS; SUBCUTANEOUS at 13:22

## 2021-09-05 RX ADMIN — ROBINUL 0.4 MILLIGRAM(S): 0.2 INJECTION INTRAMUSCULAR; INTRAVENOUS at 00:51

## 2021-09-05 RX ADMIN — HYDROMORPHONE HYDROCHLORIDE 0.2 MILLIGRAM(S): 2 INJECTION INTRAMUSCULAR; INTRAVENOUS; SUBCUTANEOUS at 20:00

## 2021-09-05 RX ADMIN — HYDROMORPHONE HYDROCHLORIDE 0.2 MILLIGRAM(S): 2 INJECTION INTRAMUSCULAR; INTRAVENOUS; SUBCUTANEOUS at 17:18

## 2021-09-05 RX ADMIN — ROBINUL 0.4 MILLIGRAM(S): 0.2 INJECTION INTRAMUSCULAR; INTRAVENOUS at 06:35

## 2021-09-05 RX ADMIN — HYDROMORPHONE HYDROCHLORIDE 0.2 MILLIGRAM(S): 2 INJECTION INTRAMUSCULAR; INTRAVENOUS; SUBCUTANEOUS at 02:10

## 2021-09-05 RX ADMIN — HYDROMORPHONE HYDROCHLORIDE 0.2 MILLIGRAM(S): 2 INJECTION INTRAMUSCULAR; INTRAVENOUS; SUBCUTANEOUS at 07:40

## 2021-09-05 RX ADMIN — HYDROMORPHONE HYDROCHLORIDE 0.2 MILLIGRAM(S): 2 INJECTION INTRAMUSCULAR; INTRAVENOUS; SUBCUTANEOUS at 05:35

## 2021-09-05 RX ADMIN — NYSTATIN CREAM 1 APPLICATION(S): 100000 CREAM TOPICAL at 19:39

## 2021-09-05 RX ADMIN — HYDROMORPHONE HYDROCHLORIDE 0.2 MILLIGRAM(S): 2 INJECTION INTRAMUSCULAR; INTRAVENOUS; SUBCUTANEOUS at 09:49

## 2021-09-05 RX ADMIN — NYSTATIN CREAM 1 APPLICATION(S): 100000 CREAM TOPICAL at 06:56

## 2021-09-05 RX ADMIN — HYDROMORPHONE HYDROCHLORIDE 0.2 MILLIGRAM(S): 2 INJECTION INTRAMUSCULAR; INTRAVENOUS; SUBCUTANEOUS at 12:53

## 2021-09-05 RX ADMIN — HYDROMORPHONE HYDROCHLORIDE 0.2 MILLIGRAM(S): 2 INJECTION INTRAMUSCULAR; INTRAVENOUS; SUBCUTANEOUS at 07:55

## 2021-09-05 RX ADMIN — ROBINUL 0.4 MILLIGRAM(S): 0.2 INJECTION INTRAMUSCULAR; INTRAVENOUS at 12:53

## 2021-09-05 RX ADMIN — CHLORHEXIDINE GLUCONATE 1 APPLICATION(S): 213 SOLUTION TOPICAL at 06:35

## 2021-09-05 RX ADMIN — HYDROMORPHONE HYDROCHLORIDE 0.2 MILLIGRAM(S): 2 INJECTION INTRAMUSCULAR; INTRAVENOUS; SUBCUTANEOUS at 21:27

## 2021-09-05 RX ADMIN — HYDROMORPHONE HYDROCHLORIDE 0.2 MILLIGRAM(S): 2 INJECTION INTRAMUSCULAR; INTRAVENOUS; SUBCUTANEOUS at 17:50

## 2021-09-05 RX ADMIN — HYDROMORPHONE HYDROCHLORIDE 0.2 MILLIGRAM(S): 2 INJECTION INTRAMUSCULAR; INTRAVENOUS; SUBCUTANEOUS at 19:38

## 2021-09-05 NOTE — PROGRESS NOTE ADULT - SUBJECTIVE AND OBJECTIVE BOX
[employer]S Attending Coverage for Dr Stearns  Patient seen and examined on morning rounds  No acute events overnight  Patient without complaints    Vital Signs Last 24 Hrs  T(C): 36.3 (05 Sep 2021 06:23), Max: 36.7 (04 Sep 2021 13:32)  T(F): 97.4 (05 Sep 2021 06:23), Max: 98.1 (04 Sep 2021 13:32)  HR: 92 (05 Sep 2021 06:23) (79 - 92)  BP: 142/67 (05 Sep 2021 06:23) (117/78 - 142/67)  BP(mean): --  RR: 17 (05 Sep 2021 06:23) (14 - 18)  SpO2: 96% (05 Sep 2021 06:23) (94% - 100%)    I&O's Detail    04 Sep 2021 07:01  -  05 Sep 2021 07:00  --------------------------------------------------------  IN:  Total IN: 0 mL    OUT:    Indwelling Catheter - Urethral (mL): 400 mL  Total OUT: 400 mL    Total NET: -400 mL      Gen NAD, cachectic appearing  Psych responding appropriately to questions  Nonlabored breathing  Abdomen soft, stoma pink with appliance - no output in appliance at time of examination  Ecchymoses on extremities    No labs

## 2021-09-05 NOTE — PROGRESS NOTE ADULT - ASSESSMENT
89F PMH HTN, chronic back pain 2/2 L3,L4 compression fractures, triple vessel CAD, and SBO due to possible diverticulitis vs. GYN/colorectal malignancy (2018) never followed up and L ICA s/p L CEA (4/21). 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 Left gluteal IR drain placed (8/9). Colonoscopy w/ sigmoid mass bx on 8/11- final path invasive adenocarcinoma moderately differentiated. Taken to OR on 8/13 for diverting colostomy. In SICU for acute blood loss anemia, s/p EGD 8/18 showing duodenal ulceration, erosive esophagitis duodenitis, bx x 2, CT 8/20 w/ closed loop obstxn, s/p OR 8/20 exlap STEPHANIE. 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 Family decided to make comfort care, orders adjusted in accordance with those wishes.     Pain PRN and standing  HFNC for comfort  NPO status, withholding TPN  SCDs  7[place] for comfort care 89F PMH HTN, chronic back pain 2/2 L3,L4 compression fractures, triple vessel CAD, and SBO due to possible diverticulitis vs. GYN/colorectal malignancy (2018) never followed up and L ICA s/p L CEA (4/21). 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 Left gluteal IR drain placed (8/9). Colonoscopy w/ sigmoid mass bx on 8/11- final path invasive adenocarcinoma moderately differentiated. Taken to OR on 8/13 for diverting colostomy. In SICU for acute blood loss anemia, s/p EGD 8/18 showing duodenal ulceration, erosive esophagitis duodenitis, bx x 2, CT 8/20 w/ closed loop obstxn, s/p OR 8/20 exlap STEPHANIE. 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 Family decided to make comfort care, orders adjusted in accordance with those wishes.     Pain PRN and standing  HFNC for comfort  NPO status, withholding TPN  SCDs  7[place] for comfort care  Potentially can place PEG on tuesday w/ Dr. Spicer  Potentially able to go to hospice

## 2021-09-05 NOTE — PROGRESS NOTE ADULT - SUBJECTIVE AND OBJECTIVE BOX
STATUS POST:      POST OPERATIVE DAY #:     SUBJECTIVE: Pt seen and examined at bedside this am by surgery team. Tolerating diet, pain well controlled. Denies f/n/v/cp/sob.    MEDICATIONS  (STANDING):  chlorhexidine 2% Cloths 1 Application(s) Topical daily  chlorhexidine 4% Liquid 1 Application(s) Topical <User Schedule>  glycopyrrolate Injectable 0.4 milliGRAM(s) IV Push every 6 hours  HYDROmorphone  Injectable 0.2 milliGRAM(s) IV Push every 3 hours  lidocaine   4% Patch 1 Patch Transdermal every 24 hours  lidocaine   4% Patch 1 Patch Transdermal every 24 hours  nystatin Powder 1 Application(s) Topical two times a day    MEDICATIONS  (PRN):  acetaminophen  IVPB .. 540 milliGRAM(s) IV Intermittent once PRN Temp greater or equal to 38C (100.4F)  HYDROmorphone  Injectable 0.2 milliGRAM(s) IV Push every 2 hours PRN Mild Pain (1 - 3)  HYDROmorphone  Injectable 0.4 milliGRAM(s) IV Push every 2 hours PRN Moderate Pain (4 - 6)  LORazepam   Injectable 1 milliGRAM(s) IV Push every 4 hours PRN Anxiety  ondansetron Injectable 4 milliGRAM(s) IV Push every 6 hours PRN Nausea and/or Vomiting      Vital Signs Last 24 Hrs  T(C): 36.4 (04 Sep 2021 22:30), Max: 36.7 (04 Sep 2021 13:32)  T(F): 97.6 (04 Sep 2021 22:30), Max: 98.1 (04 Sep 2021 13:32)  HR: 86 (04 Sep 2021 23:26) (79 - 86)  BP: 137/55 (04 Sep 2021 22:30) (117/78 - 137/55)  BP(mean): --  RR: 18 (04 Sep 2021 23:26) (14 - 18)  SpO2: 100% (04 Sep 2021 23:26) (94% - 100%)    Physical Exam  General: NAD, resting comfortably in bed  Pulmonary: Nonlabored breathing, no respiratory distress  CV: NSR  Abd: soft, NT/ND, no guarding, incisions c/d/i  Extremities: (-) enema, warm, well-perfused      I&O's Detail    03 Sep 2021 07:01  -  04 Sep 2021 07:00  --------------------------------------------------------  IN:  Total IN: 0 mL    OUT:    Voided (mL): 200 mL  Total OUT: 200 mL    Total NET: -200 mL      04 Sep 2021 07:01  -  05 Sep 2021 05:30  --------------------------------------------------------  IN:  Total IN: 0 mL    OUT:    Indwelling Catheter - Urethral (mL): 150 mL  Total OUT: 150 mL    Total NET: -150 mL          LABS:                RADIOLOGY & ADDITIONAL STUDIES:   SUBJECTIVE: Pt seen and examined at bedside this am by surgery team. Comfort care, pain well controlled.     MEDICATIONS  (STANDING):  chlorhexidine 2% Cloths 1 Application(s) Topical daily  chlorhexidine 4% Liquid 1 Application(s) Topical <User Schedule>  glycopyrrolate Injectable 0.4 milliGRAM(s) IV Push every 6 hours  HYDROmorphone  Injectable 0.2 milliGRAM(s) IV Push every 3 hours  lidocaine   4% Patch 1 Patch Transdermal every 24 hours  lidocaine   4% Patch 1 Patch Transdermal every 24 hours  nystatin Powder 1 Application(s) Topical two times a day    MEDICATIONS  (PRN):  acetaminophen  IVPB .. 540 milliGRAM(s) IV Intermittent once PRN Temp greater or equal to 38C (100.4F)  HYDROmorphone  Injectable 0.2 milliGRAM(s) IV Push every 2 hours PRN Mild Pain (1 - 3)  HYDROmorphone  Injectable 0.4 milliGRAM(s) IV Push every 2 hours PRN Moderate Pain (4 - 6)  LORazepam   Injectable 1 milliGRAM(s) IV Push every 4 hours PRN Anxiety  ondansetron Injectable 4 milliGRAM(s) IV Push every 6 hours PRN Nausea and/or Vomiting      Vital Signs Last 24 Hrs  T(C): 36.4 (04 Sep 2021 22:30), Max: 36.7 (04 Sep 2021 13:32)  T(F): 97.6 (04 Sep 2021 22:30), Max: 98.1 (04 Sep 2021 13:32)  HR: 86 (04 Sep 2021 23:26) (79 - 86)  BP: 137/55 (04 Sep 2021 22:30) (117/78 - 137/55)  BP(mean): --  RR: 18 (04 Sep 2021 23:26) (14 - 18)  SpO2: 100% (04 Sep 2021 23:26) (94% - 100%)    Physical Exam  General: NAD, resting comfortably in bed  Pulmonary: Nonlabored breathing, no respiratory distress  CV: NSR  Abd: soft, NT/ND, no guarding  Extremities: (-) enema, warm, well-perfused      I&O's Detail    03 Sep 2021 07:01  -  04 Sep 2021 07:00  --------------------------------------------------------  IN:  Total IN: 0 mL    OUT:    Voided (mL): 200 mL  Total OUT: 200 mL    Total NET: -200 mL      04 Sep 2021 07:01  -  05 Sep 2021 05:30  --------------------------------------------------------  IN:  Total IN: 0 mL    OUT:    Indwelling Catheter - Urethral (mL): 150 mL  Total OUT: 150 mL    Total NET: -150 mL          LABS:                RADIOLOGY & ADDITIONAL STUDIES:

## 2021-09-05 NOTE — PROGRESS NOTE ADULT - ASSESSMENT
Plan discussed with Dr Stearns - continue comfort care, continue NPO due to history of aspiration, possible PEG this upcoming week  Discussed with senior surgical resident Dr Smith

## 2021-09-06 RX ADMIN — HYDROMORPHONE HYDROCHLORIDE 0.2 MILLIGRAM(S): 2 INJECTION INTRAMUSCULAR; INTRAVENOUS; SUBCUTANEOUS at 04:23

## 2021-09-06 RX ADMIN — ROBINUL 0.4 MILLIGRAM(S): 0.2 INJECTION INTRAMUSCULAR; INTRAVENOUS at 07:09

## 2021-09-06 RX ADMIN — HYDROMORPHONE HYDROCHLORIDE 0.2 MILLIGRAM(S): 2 INJECTION INTRAMUSCULAR; INTRAVENOUS; SUBCUTANEOUS at 02:21

## 2021-09-06 RX ADMIN — HYDROMORPHONE HYDROCHLORIDE 0.2 MILLIGRAM(S): 2 INJECTION INTRAMUSCULAR; INTRAVENOUS; SUBCUTANEOUS at 10:13

## 2021-09-06 RX ADMIN — HYDROMORPHONE HYDROCHLORIDE 0.2 MILLIGRAM(S): 2 INJECTION INTRAMUSCULAR; INTRAVENOUS; SUBCUTANEOUS at 07:09

## 2021-09-06 RX ADMIN — ROBINUL 0.4 MILLIGRAM(S): 0.2 INJECTION INTRAMUSCULAR; INTRAVENOUS at 23:44

## 2021-09-06 RX ADMIN — HYDROMORPHONE HYDROCHLORIDE 0.2 MILLIGRAM(S): 2 INJECTION INTRAMUSCULAR; INTRAVENOUS; SUBCUTANEOUS at 04:14

## 2021-09-06 RX ADMIN — ROBINUL 0.4 MILLIGRAM(S): 0.2 INJECTION INTRAMUSCULAR; INTRAVENOUS at 18:14

## 2021-09-06 RX ADMIN — HYDROMORPHONE HYDROCHLORIDE 0.2 MILLIGRAM(S): 2 INJECTION INTRAMUSCULAR; INTRAVENOUS; SUBCUTANEOUS at 18:47

## 2021-09-06 RX ADMIN — HYDROMORPHONE HYDROCHLORIDE 0.2 MILLIGRAM(S): 2 INJECTION INTRAMUSCULAR; INTRAVENOUS; SUBCUTANEOUS at 10:54

## 2021-09-06 RX ADMIN — HYDROMORPHONE HYDROCHLORIDE 0.2 MILLIGRAM(S): 2 INJECTION INTRAMUSCULAR; INTRAVENOUS; SUBCUTANEOUS at 01:30

## 2021-09-06 RX ADMIN — HYDROMORPHONE HYDROCHLORIDE 0.2 MILLIGRAM(S): 2 INJECTION INTRAMUSCULAR; INTRAVENOUS; SUBCUTANEOUS at 13:00

## 2021-09-06 RX ADMIN — HYDROMORPHONE HYDROCHLORIDE 0.2 MILLIGRAM(S): 2 INJECTION INTRAMUSCULAR; INTRAVENOUS; SUBCUTANEOUS at 16:36

## 2021-09-06 RX ADMIN — HYDROMORPHONE HYDROCHLORIDE 0.2 MILLIGRAM(S): 2 INJECTION INTRAMUSCULAR; INTRAVENOUS; SUBCUTANEOUS at 18:15

## 2021-09-06 RX ADMIN — NYSTATIN CREAM 1 APPLICATION(S): 100000 CREAM TOPICAL at 18:26

## 2021-09-06 RX ADMIN — HYDROMORPHONE HYDROCHLORIDE 0.2 MILLIGRAM(S): 2 INJECTION INTRAMUSCULAR; INTRAVENOUS; SUBCUTANEOUS at 22:04

## 2021-09-06 RX ADMIN — HYDROMORPHONE HYDROCHLORIDE 0.2 MILLIGRAM(S): 2 INJECTION INTRAMUSCULAR; INTRAVENOUS; SUBCUTANEOUS at 12:35

## 2021-09-06 RX ADMIN — HYDROMORPHONE HYDROCHLORIDE 0.2 MILLIGRAM(S): 2 INJECTION INTRAMUSCULAR; INTRAVENOUS; SUBCUTANEOUS at 07:55

## 2021-09-06 RX ADMIN — HYDROMORPHONE HYDROCHLORIDE 0.2 MILLIGRAM(S): 2 INJECTION INTRAMUSCULAR; INTRAVENOUS; SUBCUTANEOUS at 16:17

## 2021-09-06 RX ADMIN — NYSTATIN CREAM 1 APPLICATION(S): 100000 CREAM TOPICAL at 07:09

## 2021-09-06 RX ADMIN — ROBINUL 0.4 MILLIGRAM(S): 0.2 INJECTION INTRAMUSCULAR; INTRAVENOUS at 12:36

## 2021-09-06 RX ADMIN — CHLORHEXIDINE GLUCONATE 1 APPLICATION(S): 213 SOLUTION TOPICAL at 07:08

## 2021-09-06 NOTE — SWALLOW BEDSIDE ASSESSMENT ADULT - SWALLOW EVAL: DIAGNOSIS
Suspect limited change in pt's swallow function compared to most recent FEES. However, given patient/family goal for comfort, Instrumental swallow study unlikely to alter POC. Consequently, recommend PO comfort feedings, despite known aspiration/malnutrition/dehydration risk. Advise full liquid diet, with liquids by tsp, in upright position
Clinical signs of pharyngeal dysphagia which is likely acute in nature 2/2 recent endotracheal intubations, recent surgery and overall deconditioned state. However, it is possible that dysphagia may also be chronic s/p prior L CEA 2/2 L ICA in 4/21. Recommend instrumental swallow assessment to further assess swallow function.
Pt continues to present with s/s of pharyngeal dysphagia. However, given improving mental status and ?improving secretion management, pt would benefit from repeat FEES to assess readiness for a PO diet.
Mild oral and suspected pharyngeal dysphagia

## 2021-09-06 NOTE — SWALLOW BEDSIDE ASSESSMENT ADULT - MODE OF PRESENTATION
cup/spoon/fed by clinician
feeding assistance/cup/spoon/fed by clinician
cup/spoon
cup/spoon/self fed

## 2021-09-06 NOTE — SWALLOW BEDSIDE ASSESSMENT ADULT - SLP PERTINENT HISTORY OF CURRENT PROBLEM
PMHx back pain 2/2 L3,L4 compression fxr, triple vessel CAD,  L CEA 2/2 L ICA stenosis >70% (4/21) who presented 8/8 with fatigue, unintentional weight loss. Found to have colonic perforation 2/2 pelvic mass, path cw invasive adenocarcinoma. Hospital course c/b p diverting colostomy (8/13), sp Left gluteal IR drain for gluteal abscess (8/9), pelvic infection, sp ex lap (8/20).
PMHx of chronic back pain 2/2 L3, L4 compression fractures, triple vessel CAD,  L CEA 2/2 L ICA stenosis >70% (4/21) who presented 8/8 with fatigue and unintentional weight loss over last several months.  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 Hospital cb sp diverting colostomy (8/13), sp Left gluteal IR drain placed for gluteal abscess (8/9), pelvic infection ID following. SICU for monitoring suspected acute blood loss 2/2 to duodenal ulceration on EGD. sp exlap 8/20. extubated 8/22
PMH HTN, chronic back pain 2/2 L3,L4 compression fractures, triple vessel CAD, and SBO due to possible diverticulitis vs. GYN/colorectal malignancy (2018) never followed up and L ICA s/p L CEA (4/21). 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 Left gluteal IR drain placed (8/9). Colonoscopy w/ sigmoid mass bx on 8/11- final path invasive adenocarcinoma moderately differentiated. Taken to OR on 8/13 for diverting colostomy. In SICU for acute blood loss anemia, s/p EGD 8/18 showing duodenal ulceration, erosive esophagitis duodenitis, bx x 2, CT 8/20 w/ closed loop obstxn, s/p OR 8/20 exlap STEPHANIE. Now s/p cystocopy with L ureteral stent replacement
2 episodes of vomiting, unrelated to PO intake

## 2021-09-06 NOTE — PROGRESS NOTE ADULT - SUBJECTIVE AND OBJECTIVE BOX
INTERVAL HPI/OVERNIGHT EVENTS: PIETRO    8/9: Left transgluteal IR drain placement   8/11: c-scope with biopsy of mass  8/13: diverting colostomy   8/18: EGD:Erosive esophagitis and doudenitis with esophageal ulcers bx   8/20: OR- Ex lap w/ STEPHANIE and primary repair of tears x2; EBL 25. IVF 1500, 2U pRBC  8/27: OR- L ureteral exchange      SUBJECTIVE:   Patient seen and evaluated. Patient says that she is doing well. She denies any abdominal discomfort.       Vital Signs Last 24 Hrs  T(C): 36.3 (05 Sep 2021 21:39), Max: 36.7 (05 Sep 2021 13:37)  T(F): 97.3 (05 Sep 2021 21:39), Max: 98.1 (05 Sep 2021 13:37)  HR: 116 (06 Sep 2021 00:00) (86 - 120)  BP: 124/70 (05 Sep 2021 21:39) (124/70 - 129/71)  BP(mean): --  RR: 18 (05 Sep 2021 21:39) (16 - 18)  SpO2: 94% (06 Sep 2021 00:00) (94% - 98%)  I&O's Detail      General: NAD, resting comfortably in bed  C/V: Normal rate.   Pulm: Nonlabored breathing, no respiratory distress. Speaking in complete sentences. Not requiring nasal cannula.   Abd: soft, no abdominal distention. Ostomy in place with no gas or air; ostomy is pink, patent, and well perfused. Jory-ostomy site c/d/i; no erythema, purulence, or focal edema; appropriately TTP.   Extrem: WWP, no edema, SCDs in place        LABS:

## 2021-09-06 NOTE — PROGRESS NOTE ADULT - SUBJECTIVE AND OBJECTIVE BOX
POD 16, 23  On 8 Heidi  Awake and alert sitting in bed watching TV  Wants to eat and doesnt fully understand why she cant eat.  No pain in abdomen or elsewhere.  No N/V but also no stomal function.  Been in bed x weeks with only 1 trip to chair, I believe.    Vital Signs Last 24 Hrs  T(C): 36.3 (05 Sep 2021 21:39), Max: 36.7 (05 Sep 2021 13:37)  T(F): 97.3 (05 Sep 2021 21:39), Max: 98.1 (05 Sep 2021 13:37)  HR: 116 (06 Sep 2021 00:00) (86 - 120)  BP: 124/70 (05 Sep 2021 21:39) (124/70 - 129/71)  BP(mean): --  RR: 18 (05 Sep 2021 21:39) (16 - 18)  SpO2: 94% (06 Sep 2021 00:00) (94% - 98%)    lungs clear  abd: soft, mildly distended, has some BS, normal pitched, decreased frequency  stoma viable with minimal to no output  ext: without calf tenderness, moving all 4's    No Urine output recorded for yesterday    No recent labs

## 2021-09-06 NOTE — PROGRESS NOTE ADULT - ASSESSMENT
A/P  On comfort care.  Has improved over the last 3 days.  On nasal O2 only and breathing well.   Need repeat swallowing evaluation to see if she can eat.    If not, thenPEG/PEJ is best long term option.  Team to ask Dr. Quintanilla if she would be willing to place the PEG.   Plan will be to find way to aliment her via the gut and to send her to either hospice or SNF.  She is not presently a candidate for chemotherapy, RT, or cancer resection.  If her performance status improved with nutrition to point where she becomes a candidate for palliative treatment then she could go that route.  I suspect that she may refuse since, to d ate, she had not pursued cancer Rx.   ROM and in bed exercises should be encouraged.  Residents to see if speech and swallow team can evaluate her today.

## 2021-09-06 NOTE — SWALLOW BEDSIDE ASSESSMENT ADULT - SLP GENERAL OBSERVATIONS
Received awake in bed, breathing comfortably on RA, A&Ox3
Pt with expiratory wheeze at rest
Received awake in bed, A&O, conversive, receiving 6L supplemental O2 via NCL.
Received sleeping in bed, A&O to self, year, and place (knew she was in the hospital but unable to recall which hospital), receiving 2L supplemental O2 via NCL

## 2021-09-06 NOTE — PROGRESS NOTE ADULT - ASSESSMENT
89F PMH HTN, chronic back pain 2/2 L3,L4 compression fractures, triple vessel CAD, and SBO due to possible diverticulitis vs. GYN/colorectal malignancy (2018) never followed up and L ICA s/p L CEA (4/21). 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 Left gluteal IR drain placed (8/9). Colonoscopy w/ sigmoid mass bx on 8/11- final path invasive adenocarcinoma moderately differentiated. Taken to OR on 8/13 for diverting colostomy. In SICU for acute blood loss anemia, s/p EGD 8/18 showing duodenal ulceration, erosive esophagitis duodenitis, bx x 2, CT 8/20 w/ closed loop obstxn, s/p OR 8/20 exlap STEPHANIE. 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 Family decided to make comfort care, orders adjusted in accordance with those wishes. Patient doing well.     - Pain PRN and standing  - NPO status, withholding TPN, considering J tube   - Dispo: possible discharge to hospice

## 2021-09-06 NOTE — SWALLOW BEDSIDE ASSESSMENT ADULT - NS SPL SWALLOW CLINIC TRIAL FT
PO trials limited to thin liquids and jello; pt refused all additional trials. Able to self-feed without difficulties. Oral phase was unremarkable. Multiple swallows per single small bolus suggestive of pharyngeal clearance deficits which is consistent with FEES findings. Immediate wet non-productive coughing noted with cup sips of thin liquid. Pt refused oropharyngeal suctioning. Aspiration appeared to be somewhat volume dependent as no clinical overt s/s of aspiration were appreciated with spoon presentations. Delayed coughing noted upon completion of consecutive jello trials likely aspiration of puree residue. Pt refused liquid washes in between trials.

## 2021-09-06 NOTE — SWALLOW BEDSIDE ASSESSMENT ADULT - SWALLOW EVAL: RECOMMENDED FEEDING/EATING TECHNIQUES
liquid via tsp, encourage pt to alternate bites of food with sips of liquid liquid via tsp, encourage pt to alternate bites of food with sips of liquid, encourage pt to cough/throat clear + reswallow throughout meal

## 2021-09-07 PROCEDURE — 99233 SBSQ HOSP IP/OBS HIGH 50: CPT

## 2021-09-07 RX ADMIN — NYSTATIN CREAM 1 APPLICATION(S): 100000 CREAM TOPICAL at 06:31

## 2021-09-07 RX ADMIN — HYDROMORPHONE HYDROCHLORIDE 0.2 MILLIGRAM(S): 2 INJECTION INTRAMUSCULAR; INTRAVENOUS; SUBCUTANEOUS at 18:41

## 2021-09-07 RX ADMIN — HYDROMORPHONE HYDROCHLORIDE 0.2 MILLIGRAM(S): 2 INJECTION INTRAMUSCULAR; INTRAVENOUS; SUBCUTANEOUS at 17:20

## 2021-09-07 RX ADMIN — HYDROMORPHONE HYDROCHLORIDE 0.2 MILLIGRAM(S): 2 INJECTION INTRAMUSCULAR; INTRAVENOUS; SUBCUTANEOUS at 23:50

## 2021-09-07 RX ADMIN — HYDROMORPHONE HYDROCHLORIDE 0.2 MILLIGRAM(S): 2 INJECTION INTRAMUSCULAR; INTRAVENOUS; SUBCUTANEOUS at 04:20

## 2021-09-07 RX ADMIN — HYDROMORPHONE HYDROCHLORIDE 0.2 MILLIGRAM(S): 2 INJECTION INTRAMUSCULAR; INTRAVENOUS; SUBCUTANEOUS at 06:14

## 2021-09-07 RX ADMIN — NYSTATIN CREAM 1 APPLICATION(S): 100000 CREAM TOPICAL at 17:00

## 2021-09-07 RX ADMIN — HYDROMORPHONE HYDROCHLORIDE 0.2 MILLIGRAM(S): 2 INJECTION INTRAMUSCULAR; INTRAVENOUS; SUBCUTANEOUS at 14:55

## 2021-09-07 RX ADMIN — HYDROMORPHONE HYDROCHLORIDE 0.2 MILLIGRAM(S): 2 INJECTION INTRAMUSCULAR; INTRAVENOUS; SUBCUTANEOUS at 18:26

## 2021-09-07 RX ADMIN — HYDROMORPHONE HYDROCHLORIDE 0.2 MILLIGRAM(S): 2 INJECTION INTRAMUSCULAR; INTRAVENOUS; SUBCUTANEOUS at 01:01

## 2021-09-07 RX ADMIN — HYDROMORPHONE HYDROCHLORIDE 0.2 MILLIGRAM(S): 2 INJECTION INTRAMUSCULAR; INTRAVENOUS; SUBCUTANEOUS at 07:33

## 2021-09-07 RX ADMIN — HYDROMORPHONE HYDROCHLORIDE 0.2 MILLIGRAM(S): 2 INJECTION INTRAMUSCULAR; INTRAVENOUS; SUBCUTANEOUS at 11:05

## 2021-09-07 RX ADMIN — HYDROMORPHONE HYDROCHLORIDE 0.4 MILLIGRAM(S): 2 INJECTION INTRAMUSCULAR; INTRAVENOUS; SUBCUTANEOUS at 09:10

## 2021-09-07 RX ADMIN — HYDROMORPHONE HYDROCHLORIDE 0.2 MILLIGRAM(S): 2 INJECTION INTRAMUSCULAR; INTRAVENOUS; SUBCUTANEOUS at 11:20

## 2021-09-07 RX ADMIN — ROBINUL 0.4 MILLIGRAM(S): 0.2 INJECTION INTRAMUSCULAR; INTRAVENOUS at 06:31

## 2021-09-07 RX ADMIN — ROBINUL 0.4 MILLIGRAM(S): 0.2 INJECTION INTRAMUSCULAR; INTRAVENOUS at 23:55

## 2021-09-07 RX ADMIN — HYDROMORPHONE HYDROCHLORIDE 0.4 MILLIGRAM(S): 2 INJECTION INTRAMUSCULAR; INTRAVENOUS; SUBCUTANEOUS at 08:55

## 2021-09-07 RX ADMIN — HYDROMORPHONE HYDROCHLORIDE 0.2 MILLIGRAM(S): 2 INJECTION INTRAMUSCULAR; INTRAVENOUS; SUBCUTANEOUS at 17:05

## 2021-09-07 RX ADMIN — ROBINUL 0.4 MILLIGRAM(S): 0.2 INJECTION INTRAMUSCULAR; INTRAVENOUS at 17:04

## 2021-09-07 RX ADMIN — CHLORHEXIDINE GLUCONATE 1 APPLICATION(S): 213 SOLUTION TOPICAL at 06:33

## 2021-09-07 RX ADMIN — ROBINUL 0.4 MILLIGRAM(S): 0.2 INJECTION INTRAMUSCULAR; INTRAVENOUS at 11:05

## 2021-09-07 RX ADMIN — HYDROMORPHONE HYDROCHLORIDE 0.2 MILLIGRAM(S): 2 INJECTION INTRAMUSCULAR; INTRAVENOUS; SUBCUTANEOUS at 14:40

## 2021-09-07 RX ADMIN — Medication 1 MILLIGRAM(S): at 09:24

## 2021-09-07 RX ADMIN — HYDROMORPHONE HYDROCHLORIDE 0.2 MILLIGRAM(S): 2 INJECTION INTRAMUSCULAR; INTRAVENOUS; SUBCUTANEOUS at 04:50

## 2021-09-07 NOTE — PROGRESS NOTE ADULT - SUBJECTIVE AND OBJECTIVE BOX
SUBJECTIVE: pt seen and examined. unresponsive during exam. appears comfortable. restless and agitated this morning, given ativan 1 mg IV PRN with effect. pt's niece at bedside grieving appropriately.     DNR in chart: Yes    PEx:  T(C): 36.8 (09-07-21 @ 06:12), Max: 36.8 (09-07-21 @ 06:12)  HR: 75 (09-07-21 @ 06:12) (73 - 99)  BP: 136/72 (09-07-21 @ 06:12) (132/69 - 148/67)  RR: 16 (09-07-21 @ 06:12) (16 - 18)  SpO2: 100% (09-07-21 @ 06:12) (96% - 100%)  Wt(kg): --    GENERAL: thin, tired appearing elderly female lying in bed  HEENT: Temporal wasting dry MM.    RESPIRATORY: CTAB. No wheezing, rales or rhonchi. Fair inspiratory effort.   CARDIOVASCULAR: RRR. No audible murmurs, rubs or gallops  GASTROINTESTINAL: Abdomen flat, soft, NT. No arden-incisional drainage or purulence. Ostomy over left abdomen  NEUROLOGIC: unresponsive   INTEGUMENTARY: thin, pale, Significant ecchymosis over right wrist, forearm, and dorsal aspect of right hand.  MUSCULOSKELETAL: No cyanosis or clubbing. No gross deformities. generalized loss of adipose tissue. functional quad.   EXTR: mottling toes bilaterally, cool hands and feet   Psych: unresponsive   	   ALLERGIES: No Known Allergies      MEDICATIONS: REVIEWED  MEDICATIONS  (STANDING):  chlorhexidine 2% Cloths 1 Application(s) Topical daily  glycopyrrolate Injectable 0.4 milliGRAM(s) IV Push every 6 hours  HYDROmorphone  Injectable 0.2 milliGRAM(s) IV Push every 3 hours  lidocaine   4% Patch 1 Patch Transdermal every 24 hours  lidocaine   4% Patch 1 Patch Transdermal every 24 hours  nystatin Powder 1 Application(s) Topical two times a day    MEDICATIONS  (PRN):  acetaminophen  IVPB .. 540 milliGRAM(s) IV Intermittent once PRN Temp greater or equal to 38C (100.4F)  HYDROmorphone  Injectable 0.2 milliGRAM(s) IV Push every 2 hours PRN Mild Pain (1 - 3)  HYDROmorphone  Injectable 0.4 milliGRAM(s) IV Push every 2 hours PRN Moderate Pain (4 - 6)  LORazepam   Injectable 1 milliGRAM(s) IV Push every 4 hours PRN Anxiety  ondansetron Injectable 4 milliGRAM(s) IV Push every 6 hours PRN Nausea and/or Vomiting      CRITICAL CARE:  [ ]Shock Present  [ ]Septic [ ]Cardiogenic [ ]Neurologic [ ]Hypovolemic    [ ]Vasopressors [ ]Inotropes    [ x]Respiratory failure present   [ ]Mechanical Ventilation   [x ]Non-invasive ventilatory support   [ ]High-Flow  [x ]Acute  [ ]Chronic   [ ]Hypoxic  [ ]Hypercarbic   [ ]Other    [ ]Other organ failure     LABS: REVIEWED  CBC:    CMP:        IMAGING: REVIEWED    Decision maker: The patient is UNable to participate in complex medical decision making conversations.   Legal surrogate: Cynthia Levi # [phone]  Pt's niece, Umberto Wooten, also involved in [facility]    ADVANCE DIRECTIVES & GOALS OF CARE  -DNR DNI  -comfort care  -discontinue all interventions that prolong dying process   -referral to inpatient hospice     PSYCHOSOCIAL-SPIRITUAL ASSESSMENT: Reviewed, Care plan unchanged    REFERRALS:  [x] palliative social work [ ]Chaplaincy  [x ]Hospice  [ ]Child Life  [ ]Social Work  [ ]Case management [  ]Holistic Therapy

## 2021-09-07 NOTE — PROGRESS NOTE ADULT - SUBJECTIVE AND OBJECTIVE BOX
POD 18/25    Was wide awake yesterday and over weekend.  At 12:30 PM she was sleeping soundly.  As per RN, patient has been sleeping all day.  Niece is visiting and also says she is sleeping all day today.  Spech/Swallow team cleared patient for puree diet if given by aide/RN/assistant with patient sitting up.       Vital Signs Last 24 Hrs  T(C): 36.8 (07 Sep 2021 06:12), Max: 36.8 (07 Sep 2021 06:12)  T(F): 98.3 (07 Sep 2021 06:12), Max: 98.3 (07 Sep 2021 06:12)  HR: 75 (07 Sep 2021 06:12) (73 - 94)  BP: 136/72 (07 Sep 2021 06:12) (136/72 - 148/67)  BP(mean): --  RR: 16 (07 Sep 2021 06:12) (16 - 18)  SpO2: 100% (07 Sep 2021 06:12) (97% - 100%)    abd: soft, non tender, stoma viable, gas in the bag today, no stool  ext: no change in exam    UO  475 ml/24 hours;  about 125 ml in the bag now.

## 2021-09-07 NOTE — PROGRESS NOTE ADULT - GASTROINTESTINAL DETAILS
soft/nontender
mildly distended, mild hemiabdominal TTP; midline incision CDI with penrose and inferior retention suture in situ; L hemiabdominal stome P&V with sweat and trace gas in the bag; transgluteal drain with SSF/soft
soft/nontender

## 2021-09-07 NOTE — PROGRESS NOTE ADULT - SUBJECTIVE AND OBJECTIVE BOX
AYESHAEON O/N, re-evaluated by speech yesterday. Sleepy this AM but arousable.     Vital Signs Last 24 Hrs  T(C): 36.8 (07 Sep 2021 06:12), Max: 36.8 (07 Sep 2021 06:12)  T(F): 98.3 (07 Sep 2021 06:12), Max: 98.3 (07 Sep 2021 06:12)  HR: 75 (07 Sep 2021 06:12) (73 - 112)  BP: 136/72 (07 Sep 2021 06:12) (132/69 - 148/67)  BP(mean): --  RR: 16 (07 Sep 2021 06:12) (16 - 18)  SpO2: 100% (07 Sep 2021 06:12) (95% - 100%)    No new pertinent recent labs.

## 2021-09-07 NOTE — PROGRESS NOTE ADULT - ASSESSMENT
89 YOF with perforated pelvic mass, bx proven adenocarcinoma with perforation. S/p IR drainage of pelvic abscess, and s/p loop transverse colostomy on 8/13, and exlap and STEPHANIE and enterorrhaphy on 8/20 for SBO. Course c/b GIB from esophageal ulcers. With respiratory insufficiency and prior intubation this admission, most recently admitted to ICU for increasing WOB and O2 requirement on 8/30.    RODRIGUEZ ONEIL. At neurologic baseline. With some stoma fx. Worked with SLP and recommended thick liquids with aspiration precautions, still likely having aspiration.    F/u SW for hospice placement  PT/OT, SLP - Advise full liquid diet, with liquids by tsp, in upright position  Routine stoma care  Palliative team following

## 2021-09-07 NOTE — PROGRESS NOTE ADULT - ASSESSMENT
A/P  Sleeping today.    Prior to PEG/PEJ decision we want to see if she can tolerated po diet (she needs to be fed while sitting up).  Thus far, she has not taken an significant po diet.   Last night was coughing and today is sleeping.    I spoke with floor RN who will try to feed her when she wakes up.    Otherwise, care as before.  Once diet issue settled then transfer to hospice makes most sense.    Follow exam and po intake.

## 2021-09-08 ENCOUNTER — TRANSCRIPTION ENCOUNTER (OUTPATIENT)
Age: 86
End: 2021-09-08

## 2021-09-08 VITALS
RESPIRATION RATE: 16 BRPM | DIASTOLIC BLOOD PRESSURE: 76 MMHG | HEART RATE: 90 BPM | SYSTOLIC BLOOD PRESSURE: 137 MMHG | OXYGEN SATURATION: 98 % | TEMPERATURE: 98 F

## 2021-09-08 PROCEDURE — 99233 SBSQ HOSP IP/OBS HIGH 50: CPT

## 2021-09-08 RX ORDER — HYDROMORPHONE HYDROCHLORIDE 2 MG/ML
1 INJECTION INTRAMUSCULAR; INTRAVENOUS; SUBCUTANEOUS
Qty: 0 | Refills: 0 | DISCHARGE
Start: 2021-09-08

## 2021-09-08 RX ORDER — CHLORHEXIDINE GLUCONATE 213 G/1000ML
1 SOLUTION TOPICAL
Qty: 0 | Refills: 0 | DISCHARGE
Start: 2021-09-08

## 2021-09-08 RX ORDER — ROBINUL 0.2 MG/ML
0 INJECTION INTRAMUSCULAR; INTRAVENOUS
Qty: 0 | Refills: 0 | DISCHARGE
Start: 2021-09-08

## 2021-09-08 RX ORDER — NYSTATIN CREAM 100000 [USP'U]/G
1 CREAM TOPICAL
Qty: 0 | Refills: 0 | DISCHARGE
Start: 2021-09-08

## 2021-09-08 RX ORDER — LIDOCAINE 4 G/100G
0 CREAM TOPICAL
Qty: 0 | Refills: 0 | DISCHARGE
Start: 2021-09-08

## 2021-09-08 RX ADMIN — ROBINUL 0.4 MILLIGRAM(S): 0.2 INJECTION INTRAMUSCULAR; INTRAVENOUS at 11:00

## 2021-09-08 RX ADMIN — HYDROMORPHONE HYDROCHLORIDE 0.2 MILLIGRAM(S): 2 INJECTION INTRAMUSCULAR; INTRAVENOUS; SUBCUTANEOUS at 00:30

## 2021-09-08 RX ADMIN — HYDROMORPHONE HYDROCHLORIDE 0.2 MILLIGRAM(S): 2 INJECTION INTRAMUSCULAR; INTRAVENOUS; SUBCUTANEOUS at 11:15

## 2021-09-08 RX ADMIN — HYDROMORPHONE HYDROCHLORIDE 0.2 MILLIGRAM(S): 2 INJECTION INTRAMUSCULAR; INTRAVENOUS; SUBCUTANEOUS at 11:00

## 2021-09-08 RX ADMIN — ROBINUL 0.4 MILLIGRAM(S): 0.2 INJECTION INTRAMUSCULAR; INTRAVENOUS at 05:15

## 2021-09-08 RX ADMIN — HYDROMORPHONE HYDROCHLORIDE 0.2 MILLIGRAM(S): 2 INJECTION INTRAMUSCULAR; INTRAVENOUS; SUBCUTANEOUS at 05:15

## 2021-09-08 RX ADMIN — HYDROMORPHONE HYDROCHLORIDE 0.2 MILLIGRAM(S): 2 INJECTION INTRAMUSCULAR; INTRAVENOUS; SUBCUTANEOUS at 01:58

## 2021-09-08 RX ADMIN — HYDROMORPHONE HYDROCHLORIDE 0.2 MILLIGRAM(S): 2 INJECTION INTRAMUSCULAR; INTRAVENOUS; SUBCUTANEOUS at 08:16

## 2021-09-08 RX ADMIN — HYDROMORPHONE HYDROCHLORIDE 0.2 MILLIGRAM(S): 2 INJECTION INTRAMUSCULAR; INTRAVENOUS; SUBCUTANEOUS at 06:09

## 2021-09-08 RX ADMIN — HYDROMORPHONE HYDROCHLORIDE 0.2 MILLIGRAM(S): 2 INJECTION INTRAMUSCULAR; INTRAVENOUS; SUBCUTANEOUS at 08:31

## 2021-09-08 RX ADMIN — CHLORHEXIDINE GLUCONATE 1 APPLICATION(S): 213 SOLUTION TOPICAL at 05:16

## 2021-09-08 RX ADMIN — NYSTATIN CREAM 1 APPLICATION(S): 100000 CREAM TOPICAL at 05:15

## 2021-09-08 RX ADMIN — Medication 1 MILLIGRAM(S): at 09:38

## 2021-09-08 NOTE — PROGRESS NOTE ADULT - PROBLEM SELECTOR PLAN 5
rectal primary. poor prognosis. cancer is inoperable. pt not candidate for disease directed therapy chemo/XRT iso poor performance status ECOG 4, active infection without source control.   -comfort care  -awaiting dc to inpatient hospice
rectal primary. poor prognosis. cancer is inoperable. pt unlikely candidate for disease directed therapy chemo/XRT iso poor performance status EGOG 4, active infection without source control.   -comfort care
rectal primary. poor prognosis. cancer is inoperable. pt not candidate for disease directed therapy chemo/XRT iso poor performance status ECOG 4, active infection without source control.   -comfort care
rectal primary. poor prognosis. cancer is inoperable. pt unlikely candidate for disease directed therapy chemo/XRT iso poor performance status, active infection without source control  -ECOG 3/4  -comfort care

## 2021-09-08 NOTE — PROGRESS NOTE ADULT - CVS HE PE MLT D E PC
regular rate and rhythm
regular rate and rhythm
peripheral edema resolved/regular rate and rhythm
regular rate and rhythm
regular rate and rhythm
trace peripheral edema, improved/regular rate and rhythm

## 2021-09-08 NOTE — PROGRESS NOTE ADULT - GENITOURINARY COMMENTS
urbina in situ draining cloudy, gerber colored urine with sediment
urbina in situ with cloudy sedimentary urine in tubing and bag
urbina in situ with gerber colored cloudy urine with sediment in the tubing
urbina in situ draining cloudy yellow urine

## 2021-09-08 NOTE — PROGRESS NOTE ADULT - SUBJECTIVE AND OBJECTIVE BOX
AYESHAEON, pain controlled and comfortable this AM. Did not want to open eyes this AM but answering yes or no questions.    Vital Signs Last 24 Hrs  T(C): 36.8 (08 Sep 2021 05:36), Max: 36.8 (08 Sep 2021 05:36)  T(F): 98.3 (08 Sep 2021 05:36), Max: 98.3 (08 Sep 2021 05:36)  HR: 90 (08 Sep 2021 05:36) (90 - 90)  BP: 137/76 (08 Sep 2021 05:36) (137/76 - 137/76)  BP(mean): --  RR: 16 (08 Sep 2021 05:36) (16 - 16)  SpO2: 98% (08 Sep 2021 05:36) (98% - 98%)    No new relevant labs.

## 2021-09-08 NOTE — PROGRESS NOTE ADULT - PROBLEM SELECTOR PROBLEM 3
Agitation
Agitation
Goals of care, counseling/discussion
Agitation
Goals of care, counseling/discussion
Agitation

## 2021-09-08 NOTE — DISCHARGE NOTE PROVIDER - CARE PROVIDER_API CALL
Sulaiman Stearns)  ColonRectal Surgery  [address], [address] [facility] [address] [facility], [place] [address]  Phone: [phone]  Fax: [phone]  Follow Up Time:

## 2021-09-08 NOTE — PROGRESS NOTE ADULT - GASTROINTESTINAL COMMENTS
mild abdominal distension, soft, non-tender; L hemiabdominal stoma in situ P&V with no gas or stool in the bag; retention suture in situ w/o evidence of complication; midline incision CDI
soft, mild distension, mild TTP; midline incision C/D/I; L hemiabdominal stoma P&V with scant sweat in the bag
soft, nontender, nondistended, midline incision CDI; L hemiabdominal stoma P&V with gas and sweat in the bag
midline incision CDI; L hemiabdominal stoma P&V with gas in the bag
L hemiabdominal stoma P&V with sweat but no gas in the bag; midline incision intact with penrose drain and retention suture in situ

## 2021-09-08 NOTE — PROGRESS NOTE ADULT - RS GEN PE MLT RESP DETAILS PC
mild wheezing/airway patent/breath sounds equal/respirations non-labored/no rhonchi
respirations non-labored/no subcutaneous emphysema
respirations non-labored/no wheezes
on Bipap Fio2 50% 12/5/good air movement/respirations non-labored
symmetrical chest expansion, on nasal cannula/respirations non-labored
respirations non-labored/no subcutaneous emphysema/no wheezes

## 2021-09-08 NOTE — PROGRESS NOTE ADULT - SUBJECTIVE AND OBJECTIVE BOX
INTERVAL HPI/OVERNIGHT EVENTS: No acute events overnight, has been sleeping since last dose of ativan (~48 hours)    SUBJECTIVE:  Patient seen at bedside this morning with chief, no arousable, o sign, currently comfort care    MEDICATIONS  (STANDING):  chlorhexidine 2% Cloths 1 Application(s) Topical daily  glycopyrrolate Injectable 0.4 milliGRAM(s) IV Push every 6 hours  HYDROmorphone  Injectable 0.2 milliGRAM(s) IV Push every 3 hours  lidocaine   4% Patch 1 Patch Transdermal every 24 hours  lidocaine   4% Patch 1 Patch Transdermal every 24 hours  nystatin Powder 1 Application(s) Topical two times a day    MEDICATIONS  (PRN):  acetaminophen  IVPB .. 540 milliGRAM(s) IV Intermittent once PRN Temp greater or equal to 38C (100.4F)  HYDROmorphone  Injectable 0.2 milliGRAM(s) IV Push every 2 hours PRN Mild Pain (1 - 3)  HYDROmorphone  Injectable 0.4 milliGRAM(s) IV Push every 2 hours PRN Moderate Pain (4 - 6)  LORazepam   Injectable 1 milliGRAM(s) IV Push every 4 hours PRN Anxiety  ondansetron Injectable 4 milliGRAM(s) IV Push every 6 hours PRN Nausea and/or Vomiting      Vital Signs Last 24 Hrs  T(C): 36.8 (08 Sep 2021 05:36), Max: 36.8 (08 Sep 2021 05:36)  T(F): 98.3 (08 Sep 2021 05:36), Max: 98.3 (08 Sep 2021 05:36)  HR: 90 (08 Sep 2021 05:36) (90 - 90)  BP: 137/76 (08 Sep 2021 05:36) (137/76 - 137/76)  BP(mean): --  RR: 16 (08 Sep 2021 05:36) (16 - 16)  SpO2: 98% (08 Sep 2021 05:36) (98% - 98%)    PHYSICAL EXAM:  Constitutional: actively dying     Respiratory: non labored breathing, no respiratory distress    Cardiovascular: NSR, RRR    Gastrointestinal: abdomen soft, nontender, nondistended, stoma pink, patent perfused.     Extremities: (-) edema                  I&O's Detail    07 Sep 2021 07:01  -  08 Sep 2021 07:00  --------------------------------------------------------  IN:  Total IN: 0 mL    OUT:    Indwelling Catheter - Urethral (mL): 300 mL  Total OUT: 300 mL    Total NET: -300 mL          LABS:                RADIOLOGY & ADDITIONAL STUDIES:

## 2021-09-08 NOTE — PROGRESS NOTE ADULT - PROBLEM SELECTOR PLAN 4
deconditioned in setting of anemia, rectal cancer, poor po intake, complicated hospital course, now approaching EOL  -pps 30%  -skin care, repositioning  -TF, discontinue, not within GOC   -Oral care q8
deconditioned in setting of anemia, rectal cancer, poor po intake, complicated hospital course, now at EOL  -pps 20%  -skin care, repositioning  -Oral care q8
deconditioned in setting of anemia, rectal cancer, poor po intake, complicated hospital course, now at EOL  -pps 20%  -skin care, repositioning  -Oral care q8
88 yo f with rectal adenocarcinoma and poor prognosis.  complicated hospital course, poor PS, active infection, cachexia. pt is unlikely candidate for any disease directed therapy. pall med following for GOC.  -would recommend DNR DNI, hospice   -follow up discussion with Umberto   -all questions answered, emotional support provided  - primary team   -please contact Palliative Medicine 24/7 at [phone] for any acute symptoms or further questions  -will continue to follow with you
deconditioned in setting of anemia, rectal cancer, poor po intake, complicated hospital course, now at EOL  -pps 20%  -skin care, repositioning  -Oral care q8
89 yo f with rectal adenocarcinoma complicated hospital course, poor PS, active infection, and unlikely candidate for disease directed therapy.   -would recommend DNR DNI in setting of cachexia  -Multiple GOC conversations with palliative care and primary team: pt wanting to pursue all comprehensive medical interventions  -Thank you for this consult. Palliative medicine will sign off.   -Please reconsult if the patient's symptoms and/or goals of care change, need to be readdressed, or if patient is readmitted in the future.

## 2021-09-08 NOTE — DISCHARGE NOTE NURSING/CASE MANAGEMENT/SOCIAL WORK - PATIENT PORTAL LINK FT
You can access the FollowMyHealth Patient Portal offered by [facility] by registering at the following website: [email or web address]. By joining [employer]’s FollowMyHealth portal, you will also be able to view your health information using other applications (apps) compatible with our system.

## 2021-09-08 NOTE — DISCHARGE NOTE PROVIDER - HOSPITAL COURSE
89F PMH HTN, chronic back pain 2/2 L3,L4 compression fractures, triple vessel CAD, and SBO 2/2 possible diverticulitis vs. GYN/colorectal malignancy (2018) never followed up and PSH L CEA 2/2 L ICA stenosis >70% (4/21) who presented with worsening back pain x1 week. Patient reports that she has had chronic back pain for many years, worse with movement. However, for the last week the pain has been progressively worsening and limiting her movement which is why she presented to the ED today. Describes the pain as a constant ache, nonradiating, severe. Denies abdominal pain, endorses some increased distension without feeling bloated. Denies N/V, tolerating a diet at home with normal appetite. Endorses persistent flatus and BMs, last BM this AM, brown, nonbloody, slightly loose compared to normal. Denies CP, SOB, fevers, chills. Never had colonoscopy. Noncompliant with all medication and most of follow-up after prior admissions. She had a scope done on 8/11 which showed a mass, it was biopsied. She also had a collection that was growing ESBL, treatement appropriately started by ID. On 8/13 she underwent a diverting colostomy, the procedure was uncomplicated. Pathology from the biopsy showed invasive adenocarcinoma. On 8/18, she had bowel function, however she was found choking by her nurse and she likely aspirated and desated down to the 80s, she failed a speech and swallow eval after. She then had an episode of brown emesis and was transferred to the SICU. In the SICU, she was transfused, started on tpn and found to have a UTI. She had her ureteral stent replaced and an NGT placed for coffee ground emesis. On 8/30 she was scoped again, no obstruction was found. On 9/1 her work of breathing increased and she was actively decompensating, a discussion was has to place her in palliative care, on 9/2 her family made the decision to make her comfort care only and she was transferred subsequently. At this time she is lethargic, unarousable, has not been amenable to eating and remains asleep. She will be discharged to hospice care.

## 2021-09-08 NOTE — DISCHARGE NOTE PROVIDER - DETAILS OF MALNUTRITION DIAGNOSIS/DIAGNOSES
This patient has been assessed with a concern for Malnutrition and was treated during this hospitalization for the following Nutrition diagnosis/diagnoses:     -  08/09/2021: Severe protein-calorie malnutrition   -  08/09/2021: Underweight (BMI < 19)

## 2021-09-08 NOTE — PROGRESS NOTE ADULT - ASSESSMENT
89 YOF with perforated pelvic mass, bx proven adenocarcinoma with perforation. S/p IR drainage of pelvic abscess, and s/p loop transverse colostomy on 8/13, and exlap and STEPHANIE and enterorrhaphy on 8/20 for SBO. Course c/b GIB from esophageal ulcers. With respiratory insufficiency and prior intubation this admission, most recently admitted to ICU for increasing WOB and O2 requirement on 8/30.    RODRIGUEZ ONEIL. At neurologic baseline. With some stoma fx. Worked with SLP and recommended puree with aspiration precautions, still likely having aspiration.    PT/OT, SLP - puree diet, with liquids by tsp, in upright position  SW following - referral pending for [facility] hospice  PRN tylenol/ativan/haldol/dilaudid for fever/anxiety/pain  Routine stoma care  Palliative team following

## 2021-09-08 NOTE — PROGRESS NOTE ADULT - PROBLEM SELECTOR PLAN 1
respiratory failure now comfort care. on hiflo.   -dc current dilaudid orders   -start Dilaudid 0.2 mg IV q3 ATC  -start Dilaudid 0.2 mg IV q2 PRN mild pain or RR>22  -start Dilaudid 0.4 mg IV q2 PRN moderate pain or RR>26   -cw glyco 0.2 mg IV q6 ATC for secretions  -transition to NC if able and does not compromise pt comfort  -low threshold to repeat dose or increase PRN frequency to maximize comfort at EOL
respiratory failure now comfort care.   -cw Dilaudid 0.2 mg IV q3 ATC  -cw Dilaudid 0.2 mg IV q2 PRN mild pain or RR>22  -cw Dilaudid 0.4 mg IV q2 PRN moderate pain or RR>26   -cw glyco 0.2 mg IV q6 ATC for secretions  -if dyspneic do NOT escalate to hiflo   -low threshold to repeat dose or increase PRN frequency to maximize comfort at EOL
respiratory failure, on bipap. now comfort care  -start Dilaudid 0.2 mg IV q4 ATC  -start Dilaudid 0.2 mg IV q4 PRN mild pain or RR>22  -start Dilaudid 0.4 mg IV q4 PRN moderate pain or RR>26   -start glyco 0.2 mg IV q6 ATC for secretions  -transition to NC if able and does not compromise pt comfort
respiratory failure now comfort care.   -please give dilaudid 0.5 mg IV prior to transfer  -cw Dilaudid 0.2 mg IV q3 ATC  -cw Dilaudid 0.2 mg IV q2 PRN mild pain or RR>22  -cw Dilaudid 0.4 mg IV q2 PRN moderate pain or RR>26   -cw glyco 0.2 mg IV q6 ATC for secretions  -if dyspneic do NOT escalate to hiflo   -low threshold to repeat dose or increase PRN frequency to maximize comfort at EOL
deconditioned in setting of anemia, rectal cancer, poor po intake, complicated hospital course   -pps 30%  -assistance with ADLs, skin care, repositioning  -GI not recommending NGT given scattered erythema from previous NGT trauma
deconditioned in setting of anemia, rectal cancer, poor po intake, complicated hospital course   -pps 30%  -assistance with ADLs, skin care, repositioning  -NPO  -TPN with central line

## 2021-09-08 NOTE — PROGRESS NOTE ADULT - PROBLEM SELECTOR PLAN 6
-HCP Cynthia Sims #[phone]   -niece, Umberto Wooten, #[phone]  -DNR DNI, comfort care  -no further TF, IVF, abx, FS  -VS qshift
-HCP Cynthia Sims #[phone]   -niece, Umberto Je, #[phone]  -DNR DNI, comfort care  -no further TF, IVF, abx, FS  -dc all medications that do not contribute to comfort and or prolong dying process   -VS qshift
-HCP Cynthia Sims #[phone]   -niece, Umberto Je, #[phone]  -DNR DNI, comfort care  -no further TF, IVF, abx, FS  -dc all medications that do not contribute to comfort and or prolong dying process   -VS qshift
-HCP Cynthia Sims #[phone]   -niece, Aleciadarrick Wooten, #[phone]  -Discussion as above  -DNR DNI, comfort care  -dc TF, IVF, abx, FS  -dc all medications that do not contribute to comfort and or prolong dying process   -[facility]

## 2021-09-08 NOTE — PROGRESS NOTE ADULT - PROBLEM SELECTOR PLAN 2
calm  -cw ativan 1 mg IV q4 PRN
restless this morning. received ativan 1 mg IV PRN x1  -cw ativan 1 mg IV q4 PRN
rectal primary. cancer is inoperable. pt unlikely candidate for disease directed therapy chemo/XRT iso poor performance status, active infection without source control  -ECOG 3/4  -poor prognosis  -if within GOC, pt would qualify for hospice
rectal primary  -ECOG 3/4  -cancer is inoperable. pt unlikely candidate for disease directed therapy chemo/XRT iso poor performance status, active infection without source control  -poor prognosis  -goc ongoing: HCP and pt with poor insight  -if not disease directed therapy offered, pt would qualify for hospice
comfortable, calm this morning   -cw ativan 1 mg IV q4 PRN
-ativan 1 mg IV q4 PRN

## 2021-09-08 NOTE — PROGRESS NOTE ADULT - SUBJECTIVE AND OBJECTIVE BOX
SUBJECTIVE: pt seen and examined. unresponsive to verbal stimuli. actively dying appears comfortable. discharge today to [facility] inpatient hospice       DNR in chart: Yes  UNABLE TO OBTAIN  due to: pt mentation     PEx:  T(C): 36.8 (09-08-21 @ 05:36), Max: 36.8 (09-08-21 @ 05:36)  HR: 90 (09-08-21 @ 05:36) (90 - 90)  BP: 137/76 (09-08-21 @ 05:36) (137/76 - 137/76)  RR: 16 (09-08-21 @ 05:36) (16 - 16)  SpO2: 98% (09-08-21 @ 05:36) (98% - 98%)  Wt(kg): --    GENERAL: thin ill appearing elderly female lying in bed  HEENT: Temporal wasting dry MM.    RESPIRATORY: rhonchi upper fields bilat. No wheezing, rales or rhonchi. poor inspiratory effort.   CARDIOVASCULAR: RRR. No audible murmurs, rubs or gallops  GASTROINTESTINAL: Abdomen flat, soft, NT. No arden-incisional drainage or purulence. Ostomy over left abdomen  NEUROLOGIC: unresponsive   INTEGUMENTARY: thin, pale, Significant ecchymosis over right wrist, forearm, and dorsal aspect of right hand.  MUSCULOSKELETAL: No cyanosis or clubbing. No gross deformities. generalized loss of adipose tissue. functional quad.   EXTR: mottling toes bilaterally, cool hands and feet   Psych: unresponsive     	   ALLERGIES: No Known Allergies      MEDICATIONS: REVIEWED  MEDICATIONS  (STANDING):  chlorhexidine 2% Cloths 1 Application(s) Topical daily  glycopyrrolate Injectable 0.4 milliGRAM(s) IV Push every 6 hours  HYDROmorphone  Injectable 0.2 milliGRAM(s) IV Push every 3 hours  lidocaine   4% Patch 1 Patch Transdermal every 24 hours  lidocaine   4% Patch 1 Patch Transdermal every 24 hours  nystatin Powder 1 Application(s) Topical two times a day    MEDICATIONS  (PRN):  acetaminophen  IVPB .. 540 milliGRAM(s) IV Intermittent once PRN Temp greater or equal to 38C (100.4F)  HYDROmorphone  Injectable 0.2 milliGRAM(s) IV Push every 2 hours PRN Mild Pain (1 - 3)  HYDROmorphone  Injectable 0.4 milliGRAM(s) IV Push every 2 hours PRN Moderate Pain (4 - 6)  LORazepam   Injectable 1 milliGRAM(s) IV Push every 4 hours PRN Anxiety  ondansetron Injectable 4 milliGRAM(s) IV Push every 6 hours PRN Nausea and/or Vomiting      CRITICAL CARE:  [ ]Shock Present  [ ]Septic [ ]Cardiogenic [ ]Neurologic [ ]Hypovolemic    [ ]Vasopressors [ ]Inotropes    [x ]Respiratory failure present   [ ]Mechanical Ventilation   [ x ]Non-invasive ventilatory support   [ ]High-Flow  [x ]Acute  [ ]Chronic   [ ]Hypoxic  [ ]Hypercarbic   [ ]Other    [ ]Other organ failure     LABS: REVIEWED  CBC:    CMP:          IMAGING: REVIEWED    Decision maker: The patient is UNable to participate in complex medical decision making conversations.   Legal surrogate: Cynthia Sims # [phone]  Pt's nieceUmberto, also involved in [facility]    ADVANCE DIRECTIVES & GOALS OF CARE  -DNR DNI  -comfort care  -discharge to [facility] inpatient hospice today    PSYCHOSOCIAL-SPIRITUAL ASSESSMENT: Reviewed, Care plan unchanged    REFERRALS:  [x] palliative social work [ ]Chaplaincy  [x ]Hospice  [ ]Child Life  [ ]Social Work  [ ]Case management [  ]Holistic Therapy

## 2021-09-08 NOTE — PROGRESS NOTE ADULT - NEUROLOGICAL DETAILS
alert, oriented x 2
alert and oriented x 3/responds to pain/responds to verbal commands
responds to pain/responds to verbal commands
responds to pain/responds to verbal commands

## 2021-09-08 NOTE — DISCHARGE NOTE NURSING/CASE MANAGEMENT/SOCIAL WORK - NSDCVIVACCINE_GEN_ALL_CORE_FT
Tdap; 28-Sep-2020 13:41; Prudence Gallegos (ANT); [employer]; [id number] (Exp. Date: 04-Apr-2022); IntraMuscular; Deltoid Right.; 0.5 milliLiter(s); VIS (VIS Published: 09-May-2013, VIS Presented: 28-Sep-2020);

## 2021-09-08 NOTE — PROGRESS NOTE ADULT - NUTRITIONAL ASSESSMENT
This patient has been assessed with a concern for Malnutrition and has been determined to have a diagnosis/diagnoses of Severe protein-calorie malnutrition and Underweight (BMI < 19).    This patient is being managed with:   Diet Clear Liquid-  Supplement Feeding Modality:  Oral  Ensure Clear Cans or Servings Per Day:  1       Frequency:  Three Times a day  Entered: Aug 13 2021  9:05PM    
This patient has been assessed with a concern for Malnutrition and has been determined to have a diagnosis/diagnoses of Severe protein-calorie malnutrition and Underweight (BMI < 19).    This patient is being managed with:   Diet NPO after Midnight-     NPO Start Date: 10-Aug-2021   NPO Start Time: 23:59  Entered: Aug 10 2021  6:19PM    Diet NPO after Midnight-     NPO Start Date: 10-Aug-2021   NPO Start Time: 23:59  Entered: Aug 10 2021  5:26PM    Diet Clear Liquid-  Supplement Feeding Modality:  Oral  Ensure Clear Cans or Servings Per Day:  1       Frequency:  Three Times a day  Entered: Aug 10 2021 10:05AM    
This patient has been assessed with a concern for Malnutrition and has been determined to have a diagnosis/diagnoses of Severe protein-calorie malnutrition and Underweight (BMI < 19).    This patient is being managed with:   Diet NPO after Midnight-     NPO Start Date: 12-Aug-2021   NPO Start Time: 23:59  Entered: Aug 12 2021  7:29AM    Diet Clear Liquid-  Supplement Feeding Modality:  Oral  Ensure Clear Cans or Servings Per Day:  1       Frequency:  Three Times a day  Entered: Aug 10 2021 10:05AM    
This patient has been assessed with a concern for Malnutrition and has been determined to have a diagnosis/diagnoses of Severe protein-calorie malnutrition and Underweight (BMI < 19).    This patient is being managed with:   Diet NPO-  Entered: Sep  1 2021  3:29PM    
This patient has been assessed with a concern for Malnutrition and has been determined to have a diagnosis/diagnoses of Severe protein-calorie malnutrition and Underweight (BMI < 19).    This patient is being managed with:   Parenteral Nutrition - Adult-  Entered: Aug 21 2021  5:00PM    fat emulsion (Fish Oil and Plant Based) 20% Infusion-[Known as SMOFLIPID 20% Infusion]  49.78 Gram(s) in IV Solution 248.9 milliLiter(s) infuse at 20.7 mL/Hr  Dose Rate: 1.22 Gm/kG/Day Infuse Over: 12 Hours; Stop After 12 Hours  Administration Instructions: Use 1.2 micron in-line filter  Entered: Aug 21 2021  5:00PM    Diet NPO-  Entered: Aug 20 2021  3:12PM    
This patient has been assessed with a concern for Malnutrition and has been determined to have a diagnosis/diagnoses of Severe protein-calorie malnutrition and Underweight (BMI < 19).    This patient is being managed with:   Parenteral Nutrition - Adult-  Entered: Aug 23 2021  5:00PM    fat emulsion (Fish Oil and Plant Based) 20% Infusion-[Known as SMOFLIPID 20% Infusion]  50 Gram(s) in IV Solution 250 milliLiter(s) infuse at 20.8 mL/Hr  Dose Rate: 1.22 Gm/kG/Day Infuse Over: 12 Hours; Stop After 12 Hours  Administration Instructions: Use 1.2 micron in-line filter  Entered: Aug 22 2021 10:00PM    Parenteral Nutrition - Adult-  Entered: Aug 22 2021  5:00PM    Diet NPO-  Entered: Aug 20 2021  3:12PM      This patient has been assessed with a concern for Malnutrition and has been determined to have a diagnosis/diagnoses of Severe protein-calorie malnutrition and Underweight (BMI < 19).    This patient is being managed with:   Parenteral Nutrition - Adult-  Entered: Aug 23 2021  5:00PM    fat emulsion (Fish Oil and Plant Based) 20% Infusion-[Known as SMOFLIPID 20% Infusion]  50 Gram(s) in IV Solution 250 milliLiter(s) infuse at 20.8 mL/Hr  Dose Rate: 1.22 Gm/kG/Day Infuse Over: 12 Hours; Stop After 12 Hours  Administration Instructions: Use 1.2 micron in-line filter  Entered: Aug 22 2021 10:00PM    Parenteral Nutrition - Adult-  Entered: Aug 22 2021  5:00PM    Diet NPO-  Entered: Aug 20 2021  3:12PM    
This patient has been assessed with a concern for Malnutrition and has been determined to have a diagnosis/diagnoses of Severe protein-calorie malnutrition and Underweight (BMI < 19).    This patient is being managed with:   Parenteral Nutrition - Adult-  Entered: Aug 28 2021  5:00PM    fat emulsion (Fish Oil and Plant Based) 20% Infusion-[Known as SMOFLIPID 20% Infusion]  20 Gram(s) in IV Solution 100 milliLiter(s) infuse at 8.33 mL/Hr  Dose Rate: 0.5 Gm/kG/Day Infuse Over: 12 Hours; Stop After 12 Hours  Administration Instructions: Use 1.2 micron in-line filter  Run at 8.3ml/hr for 12 hours only then Stop!!  Infuse at 8.3ml/hr for 12 hours only then Stop!!  Entered: Aug 28 2021 10:00AM    fat emulsion (Fish Oil and Plant Based) 20% Infusion-[Known as SMOFLIPID 20% Infusion]  20 Gram(s) in IV Solution 100 milliLiter(s) infuse at 8.33 mL/Hr  Dose Rate: 0.5 Gm/kG/Day Infuse Over: 12 Hours; Stop After 12 Hours  Administration Instructions: Use 1.2 micron in-line filter  Run at 8.3ml/hr for 12 hours only then Stop!!  Infuse at 8.3ml/hr for 12 hours only then Stop!!  Entered: Aug 27 2021 10:00PM    Parenteral Nutrition - Adult-  Entered: Aug 27 2021  5:00PM    Diet NPO-  Entered: Aug 20 2021  3:12PM    
This patient has been assessed with a concern for Malnutrition and has been determined to have a diagnosis/diagnoses of Severe protein-calorie malnutrition and Underweight (BMI < 19).    This patient is being managed with:   fat emulsion (Fish Oil and Plant Based) 20% Infusion-[Known as SMOFLIPID 20% Infusion]  20 Gram(s) in IV Solution 100 milliLiter(s) infuse at 8.3 mL/Hr  Dose Rate: 0.5 Gm/kG/Day Infuse Over: 12 Hours; Stop After 12 Hours  Administration Instructions: Use 1.2 micron in-line filter  Run at 8.3ml/hr for 12 hours only then Stop!!  Entered: Aug 28 2021 10:00PM    Parenteral Nutrition - Adult-  Entered: Aug 28 2021  5:00PM    Diet NPO-  Entered: Aug 20 2021  3:12PM    
This patient has been assessed with a concern for Malnutrition and has been determined to have a diagnosis/diagnoses of Severe protein-calorie malnutrition and Underweight (BMI < 19).    This patient is being managed with:   fat emulsion (Fish Oil and Plant Based) 20% Infusion-[Known as SMOFLIPID 20% Infusion]  20 Gram(s) in IV Solution 100 milliLiter(s) infuse at 8.3 mL/Hr  Dose Rate: 0.5 Gm/kG/Day Infuse Over: 12 Hours; Stop After 12 Hours  Administration Instructions: Use 1.2 micron in-line filter  Run at 8.3ml/hr for 12 hours only then Stop!!  Entered: Aug 28 2021 10:00PM    Parenteral Nutrition - Adult-  Entered: Aug 28 2021  5:00PM    Diet NPO-  Entered: Aug 20 2021  3:12PM    
This patient has been assessed with a concern for Malnutrition and has been determined to have a diagnosis/diagnoses of Severe protein-calorie malnutrition and Underweight (BMI < 19).    This patient is being managed with:   fat emulsion (Fish Oil and Plant Based) 20% Infusion-[Known as SMOFLIPID 20% Infusion]  20 Gram(s) in IV Solution 100 milliLiter(s) infuse at 8.3 mL/Hr  Dose Rate: 0.5 Gm/kG/Day Infuse Over: 12 Hours; Stop After 12 Hours  Administration Instructions: Use 1.2 micron in-line filter  Run at 8.3ml/hr for 12 hours only then Stop!!  Entered: Aug 29 2021 10:00PM    Parenteral Nutrition - Adult-  Entered: Aug 29 2021  5:00PM    Diet NPO-  With Ice Chips/Sips of Water  Entered: Aug 29 2021 11:05AM    
This patient has been assessed with a concern for Malnutrition and has been determined to have a diagnosis/diagnoses of Severe protein-calorie malnutrition and Underweight (BMI < 19).    This patient is being managed with:   Diet Clear Liquid-  Supplement Feeding Modality:  Oral  Ensure Clear Cans or Servings Per Day:  1       Frequency:  Three Times a day  Entered: Aug 13 2021  9:05PM    
This patient has been assessed with a concern for Malnutrition and has been determined to have a diagnosis/diagnoses of Severe protein-calorie malnutrition and Underweight (BMI < 19).    This patient is being managed with:   Diet NPO-  Entered: Sep  1 2021  3:29PM    
This patient has been assessed with a concern for Malnutrition and has been determined to have a diagnosis/diagnoses of Severe protein-calorie malnutrition and Underweight (BMI < 19).    This patient is being managed with:   Diet NPO-  Except Medications  Entered: Aug 18 2021  5:18AM    
This patient has been assessed with a concern for Malnutrition and has been determined to have a diagnosis/diagnoses of Severe protein-calorie malnutrition and Underweight (BMI < 19).    This patient is being managed with:   Parenteral Nutrition - Adult-  Entered: Aug 22 2021  5:00PM    fat emulsion (Fish Oil and Plant Based) 20% Infusion-[Known as SMOFLIPID 20% Infusion]  50 Gram(s) in IV Solution 250 milliLiter(s) infuse at 20.83 mL/Hr  Dose Rate: 1.22 Gm/kG/Day Infuse Over: 12 Hours; Stop After 12 Hours  Administration Instructions: Use 1.2 micron in-line filter  Entered: Aug 21 2021 10:00PM    Diet NPO-  Entered: Aug 20 2021  3:12PM    
This patient has been assessed with a concern for Malnutrition and has been determined to have a diagnosis/diagnoses of Severe protein-calorie malnutrition and Underweight (BMI < 19).    This patient is being managed with:   Parenteral Nutrition - Adult-  Entered: Aug 25 2021  5:00PM    fat emulsion (Fish Oil and Plant Based) 20% Infusion-[Known as SMOFLIPID 20% Infusion]  20 Gram(s) in IV Solution 100 milliLiter(s) infuse at 8.5 mL/Hr  Dose Rate: 0.5 Gm/kG/Day Infuse Over: 12 Hours; Stop After 12 Hours  Administration Instructions: Use 1.2 micron in-line filter  Entered: Aug 25 2021  5:00PM    fat emulsion (Fish Oil and Plant Based) 20% Infusion-[Known as SMOFLIPID 20% Infusion]  20 Gram(s) in IV Solution 100 milliLiter(s) infuse at 8.5 mL/Hr  Dose Rate: 0.5 Gm/kG/Day Infuse Over: 12 Hours; Stop After 12 Hours  Administration Instructions: Use 1.2 micron in-line filter  Entered: Aug 24 2021 10:00PM    Parenteral Nutrition - Adult-  Entered: Aug 24 2021  5:00PM    Diet NPO-  Entered: Aug 20 2021  3:12PM    
This patient has been assessed with a concern for Malnutrition and has been determined to have a diagnosis/diagnoses of Severe protein-calorie malnutrition and Underweight (BMI < 19).    This patient is being managed with:   Parenteral Nutrition - Adult-  Entered: Aug 26 2021  5:00PM    fat emulsion (Fish Oil and Plant Based) 20% Infusion-[Known as SMOFLIPID 20% Infusion]  20 Gram(s) in IV Solution 100 milliLiter(s) infuse at 8.3 mL/Hr  Dose Rate: 0.5 Gm/kG/Day Infuse Over: 12 Hours; Stop After 12 Hours  Administration Instructions: Use 1.2 micron in-line filter  Run at 8.3ml/hr for 12 hours only then Stop!!  Entered: Aug 26 2021  5:00PM    fat emulsion (Fish Oil and Plant Based) 20% Infusion-[Known as SMOFLIPID 20% Infusion]  20 Gram(s) in IV Solution 100 milliLiter(s) infuse at 8.3 mL/Hr  Dose Rate: 0.5 Gm/kG/Day Infuse Over: 12 Hours; Stop After 12 Hours  Administration Instructions: Use 1.2 micron in-line filter  Run at 8.3ml/hr for 12 hours only then Stop!!  Entered: Aug 25 2021 10:00PM    Parenteral Nutrition - Adult-  Entered: Aug 25 2021  5:00PM    Diet NPO-  Entered: Aug 20 2021  3:12PM    
This patient has been assessed with a concern for Malnutrition and has been determined to have a diagnosis/diagnoses of Severe protein-calorie malnutrition and Underweight (BMI < 19).    This patient is being managed with:   Parenteral Nutrition - Adult-  Entered: Aug 28 2021  5:00PM    fat emulsion (Fish Oil and Plant Based) 20% Infusion-[Known as SMOFLIPID 20% Infusion]  20 Gram(s) in IV Solution 100 milliLiter(s) infuse at 8.33 mL/Hr  Dose Rate: 0.5 Gm/kG/Day Infuse Over: 12 Hours; Stop After 12 Hours  Administration Instructions: Use 1.2 micron in-line filter  Run at 8.3ml/hr for 12 hours only then Stop!!  Infuse at 8.3ml/hr for 12 hours only then Stop!!  Entered: Aug 28 2021 10:00AM    fat emulsion (Fish Oil and Plant Based) 20% Infusion-[Known as SMOFLIPID 20% Infusion]  20 Gram(s) in IV Solution 100 milliLiter(s) infuse at 8.33 mL/Hr  Dose Rate: 0.5 Gm/kG/Day Infuse Over: 12 Hours; Stop After 12 Hours  Administration Instructions: Use 1.2 micron in-line filter  Run at 8.3ml/hr for 12 hours only then Stop!!  Infuse at 8.3ml/hr for 12 hours only then Stop!!  Entered: Aug 27 2021 10:00PM    Parenteral Nutrition - Adult-  Entered: Aug 27 2021  5:00PM    Diet NPO-  Entered: Aug 20 2021  3:12PM    
This patient has been assessed with a concern for Malnutrition and has been determined to have a diagnosis/diagnoses of Severe protein-calorie malnutrition and Underweight (BMI < 19).    This patient is being managed with:   fat emulsion (Fish Oil and Plant Based) 20% Infusion-[Known as SMOFLIPID 20% Infusion]  20 Gram(s) in IV Solution 100 milliLiter(s) infuse at 8.3 mL/Hr  Dose Rate: 0.5 Gm/kG/Day Infuse Over: 12 Hours; Stop After 12 Hours  Administration Instructions: Use 1.2 micron in-line filter  Run at 8.3ml/hr for 12 hours only then Stop!!  Entered: Aug 29 2021 10:00PM    Parenteral Nutrition - Adult-  Entered: Aug 29 2021  5:00PM    Diet NPO-  With Ice Chips/Sips of Water  Entered: Aug 29 2021 11:05AM    
This patient has been assessed with a concern for Malnutrition and has been determined to have a diagnosis/diagnoses of Severe protein-calorie malnutrition and Underweight (BMI < 19).    This patient is being managed with:   fat emulsion (Fish Oil and Plant Based) 20% Infusion-[Known as SMOFLIPID 20% Infusion]  20 Gram(s) in IV Solution 100 milliLiter(s) infuse at 8.5 mL/Hr  Dose Rate: 0.5 Gm/kG/Day Infuse Over: 12 Hours; Stop After 12 Hours  Administration Instructions: Use 1.2 micron in-line filter  Entered: Aug 23 2021 10:00PM    Parenteral Nutrition - Adult-  Entered: Aug 23 2021  5:00PM    Diet NPO-  Entered: Aug 20 2021  3:12PM    
This patient has been assessed with a concern for Malnutrition and has been determined to have a diagnosis/diagnoses of Severe protein-calorie malnutrition and Underweight (BMI < 19).    This patient is being managed with:   fat emulsion (Fish Oil and Plant Based) 20% Infusion-[Known as SMOFLIPID 20% Infusion]  20 Gram(s) in IV Solution 100 milliLiter(s) infuse at 8.5 mL/Hr  Dose Rate: 0.5 Gm/kG/Day Infuse Over: 12 Hours; Stop After 12 Hours  Administration Instructions: Use 1.2 micron in-line filter  Entered: Aug 24 2021 10:00PM    Parenteral Nutrition - Adult-  Entered: Aug 24 2021  5:00PM    fat emulsion (Fish Oil and Plant Based) 20% Infusion-[Known as SMOFLIPID 20% Infusion]  20 Gram(s) in IV Solution 100 milliLiter(s) infuse at 8.5 mL/Hr  Dose Rate: 0.5 Gm/kG/Day Infuse Over: 12 Hours; Stop After 12 Hours  Administration Instructions: Use 1.2 micron in-line filter  Entered: Aug 23 2021 10:00PM    Parenteral Nutrition - Adult-  Entered: Aug 23 2021  5:00PM    Diet NPO-  Entered: Aug 20 2021  3:12PM    
This patient has been assessed with a concern for Malnutrition and has been determined to have a diagnosis/diagnoses of Severe protein-calorie malnutrition and Underweight (BMI < 19).    This patient is being managed with:   Diet Clear Liquid-  Supplement Feeding Modality:  Oral  Ensure Clear Cans or Servings Per Day:  1       Frequency:  Two Times a day  Entered: Aug  9 2021  7:37PM    
This patient has been assessed with a concern for Malnutrition and has been determined to have a diagnosis/diagnoses of Severe protein-calorie malnutrition and Underweight (BMI < 19).    This patient is being managed with:   Diet Dysphagia 1 Pureed-Thin Liquids-  Entered: Sep  7 2021  5:50AM    
This patient has been assessed with a concern for Malnutrition and has been determined to have a diagnosis/diagnoses of Severe protein-calorie malnutrition and Underweight (BMI < 19).    This patient is being managed with:   Diet NPO after Midnight-     NPO Start Date: 12-Aug-2021   NPO Start Time: 23:59  Entered: Aug 12 2021  7:29AM    Diet Clear Liquid-  Supplement Feeding Modality:  Oral  Ensure Clear Cans or Servings Per Day:  1       Frequency:  Three Times a day  Entered: Aug 10 2021 10:05AM    
This patient has been assessed with a concern for Malnutrition and has been determined to have a diagnosis/diagnoses of Severe protein-calorie malnutrition and Underweight (BMI < 19).    This patient is being managed with:   Parenteral Nutrition - Adult-  Entered: Aug 22 2021  5:00PM    fat emulsion (Fish Oil and Plant Based) 20% Infusion-[Known as SMOFLIPID 20% Infusion]  50 Gram(s) in IV Solution 250 milliLiter(s) infuse at 20.83 mL/Hr  Dose Rate: 1.22 Gm/kG/Day Infuse Over: 12 Hours; Stop After 12 Hours  Administration Instructions: Use 1.2 micron in-line filter  Entered: Aug 21 2021 10:00PM    Diet NPO-  Entered: Aug 20 2021  3:12PM    
This patient has been assessed with a concern for Malnutrition and has been determined to have a diagnosis/diagnoses of Severe protein-calorie malnutrition and Underweight (BMI < 19).    This patient is being managed with:   Parenteral Nutrition - Adult-  Entered: Aug 23 2021  5:00PM    fat emulsion (Fish Oil and Plant Based) 20% Infusion-[Known as SMOFLIPID 20% Infusion]  50 Gram(s) in IV Solution 250 milliLiter(s) infuse at 20.8 mL/Hr  Dose Rate: 1.22 Gm/kG/Day Infuse Over: 12 Hours; Stop After 12 Hours  Administration Instructions: Use 1.2 micron in-line filter  Entered: Aug 22 2021 10:00PM    Parenteral Nutrition - Adult-  Entered: Aug 22 2021  5:00PM    Diet NPO-  Entered: Aug 20 2021  3:12PM    
This patient has been assessed with a concern for Malnutrition and has been determined to have a diagnosis/diagnoses of Severe protein-calorie malnutrition and Underweight (BMI < 19).    This patient is being managed with:   Parenteral Nutrition - Adult-  Entered: Sep  1 2021  5:00PM    fat emulsion (Fish Oil and Plant Based) 20% Infusion-[Known as SMOFLIPID 20% Infusion]  20 Gram(s) in IV Solution 100 milliLiter(s) infuse at 8.3 mL/Hr  Dose Rate: 0.3759 Gm/kG/Day Infuse Over: 12 Hours; Stop After 12 Hours  Administration Instructions: Use 1.2 micron in-line filter  infuse at 8.3ml/hr for 12 hours only then Stop!!  Entered: Sep  1 2021 10:00AM    fat emulsion (Fish Oil and Plant Based) 20% Infusion-[Known as SMOFLIPID 20% Infusion]  20 Gram(s) in IV Solution 100 milliLiter(s) infuse at 8.3 mL/Hr  Dose Rate: 0.3759 Gm/kG/Day Infuse Over: 12 Hours; Stop After 12 Hours  Administration Instructions: Use 1.2 micron in-line filter  infuse at 8.3ml/hr for 12 hours only then Stop!!  Entered: Aug 31 2021 10:00PM    Parenteral Nutrition - Adult-  Entered: Aug 31 2021  5:00PM    Diet NPO-  With Ice Chips/Sips of Water  Entered: Aug 29 2021 11:05AM    
This patient has been assessed with a concern for Malnutrition and has been determined to have a diagnosis/diagnoses of Severe protein-calorie malnutrition and Underweight (BMI < 19).    This patient is being managed with:   Parenteral Nutrition - Adult-  Entered: Sep  1 2021  5:00PM    fat emulsion (Fish Oil and Plant Based) 20% Infusion-[Known as SMOFLIPID 20% Infusion]  20 Gram(s) in IV Solution 100 milliLiter(s) infuse at 8.3 mL/Hr  Dose Rate: 0.3759 Gm/kG/Day Infuse Over: 12 Hours; Stop After 12 Hours  Administration Instructions: Use 1.2 micron in-line filter  infuse at 8.3ml/hr for 12 hours only then Stop!!  Entered: Sep  1 2021 10:00AM    fat emulsion (Fish Oil and Plant Based) 20% Infusion-[Known as SMOFLIPID 20% Infusion]  20 Gram(s) in IV Solution 100 milliLiter(s) infuse at 8.3 mL/Hr  Dose Rate: 0.3759 Gm/kG/Day Infuse Over: 12 Hours; Stop After 12 Hours  Administration Instructions: Use 1.2 micron in-line filter  infuse at 8.3ml/hr for 12 hours only then Stop!!  Entered: Aug 31 2021 10:00PM    Parenteral Nutrition - Adult-  Entered: Aug 31 2021  5:00PM    Diet NPO-  With Ice Chips/Sips of Water  Entered: Aug 29 2021 11:05AM    
This patient has been assessed with a concern for Malnutrition and has been determined to have a diagnosis/diagnoses of Severe protein-calorie malnutrition and Underweight (BMI < 19).    This patient is being managed with:   fat emulsion (Fish Oil and Plant Based) 20% Infusion-[Known as SMOFLIPID 20% Infusion]  20 Gram(s) in IV Solution 100 milliLiter(s) infuse at 8.3 mL/Hr  Dose Rate: 0.3759 Gm/kG/Day Infuse Over: 12 Hours; Stop After 12 Hours  Administration Instructions: Use 1.2 micron in-line filter  infuse at 8.3ml/hr for 12 hours only then Stop!!  Entered: Aug 31 2021 10:00PM    Parenteral Nutrition - Adult-  Entered: Aug 31 2021  5:00PM    Parenteral Nutrition - Adult-  Entered: Aug 30 2021  5:00PM    Diet NPO-  With Ice Chips/Sips of Water  Entered: Aug 29 2021 11:05AM    
This patient has been assessed with a concern for Malnutrition and has been determined to have a diagnosis/diagnoses of Severe protein-calorie malnutrition and Underweight (BMI < 19).    This patient is being managed with:   fat emulsion (Fish Oil and Plant Based) 20% Infusion-[Known as SMOFLIPID 20% Infusion]  20 Gram(s) in IV Solution 100 milliLiter(s) infuse at 8.5 mL/Hr  Dose Rate: 0.5 Gm/kG/Day Infuse Over: 12 Hours; Stop After 12 Hours  Administration Instructions: Use 1.2 micron in-line filter  Entered: Aug 23 2021 10:00PM    Parenteral Nutrition - Adult-  Entered: Aug 23 2021  5:00PM    Diet NPO-  Entered: Aug 20 2021  3:12PM    
This patient has been assessed with a concern for Malnutrition and has been determined to have a diagnosis/diagnoses of Severe protein-calorie malnutrition and Underweight (BMI < 19).    This patient is being managed with:   fat emulsion (Fish Oil and Plant Based) 20% Infusion-[Known as SMOFLIPID 20% Infusion]  50 Gram(s) in IV Solution 250 milliLiter(s) infuse at 20.8 mL/Hr  Dose Rate: 1.22 Gm/kG/Day Infuse Over: 12 Hours; Stop After 12 Hours  Administration Instructions: Use 1.2 micron in-line filter  Entered: Aug 22 2021 10:00PM    Parenteral Nutrition - Adult-  Entered: Aug 22 2021  5:00PM    Diet NPO-  Entered: Aug 20 2021  3:12PM    
This patient has been assessed with a concern for Malnutrition and has been determined to have a diagnosis/diagnoses of Severe protein-calorie malnutrition and Underweight (BMI < 19).      
This patient has been assessed with a concern for Malnutrition and has been determined to have a diagnosis/diagnoses of Severe protein-calorie malnutrition and Underweight (BMI < 19).    This patient is being managed with:   Diet Clear Liquid-  Supplement Feeding Modality:  Oral  Ensure Clear Cans or Servings Per Day:  1       Frequency:  Three Times a day  Entered: Aug 13 2021  9:05PM    
This patient has been assessed with a concern for Malnutrition and has been determined to have a diagnosis/diagnoses of Severe protein-calorie malnutrition and Underweight (BMI < 19).    This patient is being managed with:   Diet Dysphagia 1 Pureed-Thin Liquids-  Entered: Sep  7 2021  5:50AM    
This patient has been assessed with a concern for Malnutrition and has been determined to have a diagnosis/diagnoses of Severe protein-calorie malnutrition and Underweight (BMI < 19).    This patient is being managed with:   Diet Dysphagia 1 Pureed-Thin Liquids-  Entered: Sep  7 2021  5:50AM    
This patient has been assessed with a concern for Malnutrition and has been determined to have a diagnosis/diagnoses of Severe protein-calorie malnutrition and Underweight (BMI < 19).    This patient is being managed with:   Diet Mechanical Soft-  Tube Feed Start Time: 12:00  Entered: Aug 19 2021 11:31AM    
This patient has been assessed with a concern for Malnutrition and has been determined to have a diagnosis/diagnoses of Severe protein-calorie malnutrition and Underweight (BMI < 19).    This patient is being managed with:   Diet NPO after Midnight-     NPO Start Date: 10-Aug-2021   NPO Start Time: 23:59  Entered: Aug 10 2021  6:19PM    Diet NPO after Midnight-     NPO Start Date: 10-Aug-2021   NPO Start Time: 23:59  Entered: Aug 10 2021  5:26PM    Diet Clear Liquid-  Supplement Feeding Modality:  Oral  Ensure Clear Cans or Servings Per Day:  1       Frequency:  Three Times a day  Entered: Aug 10 2021 10:05AM    
This patient has been assessed with a concern for Malnutrition and has been determined to have a diagnosis/diagnoses of Severe protein-calorie malnutrition and Underweight (BMI < 19).    This patient is being managed with:   Diet NPO-  Entered: Aug 20 2021  3:12PM    
This patient has been assessed with a concern for Malnutrition and has been determined to have a diagnosis/diagnoses of Severe protein-calorie malnutrition and Underweight (BMI < 19).    This patient is being managed with:   Diet NPO-  Except Medications  Entered: Aug 18 2021  5:18AM    
This patient has been assessed with a concern for Malnutrition and has been determined to have a diagnosis/diagnoses of Severe protein-calorie malnutrition and Underweight (BMI < 19).    This patient is being managed with:   Parenteral Nutrition - Adult-  Entered: Aug 22 2021  5:00PM    fat emulsion (Fish Oil and Plant Based) 20% Infusion-[Known as SMOFLIPID 20% Infusion]  50 Gram(s) in IV Solution 250 milliLiter(s) infuse at 20.83 mL/Hr  Dose Rate: 1.22 Gm/kG/Day Infuse Over: 12 Hours; Stop After 12 Hours  Administration Instructions: Use 1.2 micron in-line filter  Entered: Aug 21 2021 10:00PM    Diet NPO-  Entered: Aug 20 2021  3:12PM    
This patient has been assessed with a concern for Malnutrition and has been determined to have a diagnosis/diagnoses of Severe protein-calorie malnutrition and Underweight (BMI < 19).    This patient is being managed with:   Parenteral Nutrition - Adult-  Entered: Aug 26 2021  5:00PM    fat emulsion (Fish Oil and Plant Based) 20% Infusion-[Known as SMOFLIPID 20% Infusion]  20 Gram(s) in IV Solution 100 milliLiter(s) infuse at 8.3 mL/Hr  Dose Rate: 0.5 Gm/kG/Day Infuse Over: 12 Hours; Stop After 12 Hours  Administration Instructions: Use 1.2 micron in-line filter  Run at 8.3ml/hr for 12 hours only then Stop!!  Entered: Aug 26 2021  5:00PM    fat emulsion (Fish Oil and Plant Based) 20% Infusion-[Known as SMOFLIPID 20% Infusion]  20 Gram(s) in IV Solution 100 milliLiter(s) infuse at 8.3 mL/Hr  Dose Rate: 0.5 Gm/kG/Day Infuse Over: 12 Hours; Stop After 12 Hours  Administration Instructions: Use 1.2 micron in-line filter  Run at 8.3ml/hr for 12 hours only then Stop!!  Entered: Aug 25 2021 10:00PM    Parenteral Nutrition - Adult-  Entered: Aug 25 2021  5:00PM    Diet NPO-  Entered: Aug 20 2021  3:12PM    
This patient has been assessed with a concern for Malnutrition and has been determined to have a diagnosis/diagnoses of Severe protein-calorie malnutrition and Underweight (BMI < 19).    This patient is being managed with:   Parenteral Nutrition - Adult-  Entered: Aug 30 2021  5:00PM    fat emulsion (Fish Oil and Plant Based) 20% Infusion-[Known as SMOFLIPID 20% Infusion]  20 Gram(s) in IV Solution 100 milliLiter(s) infuse at 8.33 mL/Hr  Dose Rate: 0.3759 Gm/kG/Day Infuse Over: 12 Hours; Stop After 12 Hours  Administration Instructions: Use 1.2 micron in-line filter  Entered: Aug 30 2021  5:00PM    Diet NPO-  With Ice Chips/Sips of Water  Entered: Aug 29 2021 11:05AM    
This patient has been assessed with a concern for Malnutrition and has been determined to have a diagnosis/diagnoses of Severe protein-calorie malnutrition and Underweight (BMI < 19).    This patient is being managed with:   Parenteral Nutrition - Adult-  Entered: Sep  2 2021  5:00PM    fat emulsion (Fish Oil and Plant Based) 20% Infusion-[Known as SMOFLIPID 20% Infusion]  20 Gram(s) in IV Solution 100 milliLiter(s) infuse at 8.3 mL/Hr  Dose Rate: 0.3759 Gm/kG/Day Infuse Over: 12 Hours; Stop After 12 Hours  Administration Instructions: Use 1.2 micron in-line filter  infuse at 8.3ml/hr for 12 hours only then Stop!!  Entered: Sep  2 2021 10:00AM    fat emulsion (Fish Oil and Plant Based) 20% Infusion-[Known as SMOFLIPID 20% Infusion]  20 Gram(s) in IV Solution 100 milliLiter(s) infuse at 8.3 mL/Hr  Dose Rate: 0.3759 Gm/kG/Day Infuse Over: 12 Hours; Stop After 12 Hours  Administration Instructions: Use 1.2 micron in-line filter  infuse at 8.3ml/hr for 12 hours only then Stop!!  Entered: Sep  1 2021 10:00PM    Parenteral Nutrition - Adult-  Entered: Sep  1 2021  5:00PM    Diet NPO-  Entered: Sep  1 2021  3:29PM    
This patient has been assessed with a concern for Malnutrition and has been determined to have a diagnosis/diagnoses of Severe protein-calorie malnutrition and Underweight (BMI < 19).    This patient is being managed with:   fat emulsion (Fish Oil and Plant Based) 20% Infusion-[Known as SMOFLIPID 20% Infusion]  20 Gram(s) in IV Solution 100 milliLiter(s) infuse at 8.3 mL/Hr  Dose Rate: 0.5 Gm/kG/Day Infuse Over: 12 Hours; Stop After 12 Hours  Administration Instructions: Use 1.2 micron in-line filter  Run at 8.3ml/hr for 12 hours only then Stop!!  Entered: Aug 25 2021 10:00PM    Parenteral Nutrition - Adult-  Entered: Aug 25 2021  5:00PM    Diet NPO-  Entered: Aug 20 2021  3:12PM    
This patient has been assessed with a concern for Malnutrition and has been determined to have a diagnosis/diagnoses of Severe protein-calorie malnutrition and Underweight (BMI < 19).    This patient is being managed with:   fat emulsion (Fish Oil and Plant Based) 20% Infusion-[Known as SMOFLIPID 20% Infusion]  20 Gram(s) in IV Solution 100 milliLiter(s) infuse at 8.3 mL/Hr  Dose Rate: 0.5 Gm/kG/Day Infuse Over: 12 Hours; Stop After 12 Hours  Administration Instructions: Use 1.2 micron in-line filter  Run at 8.3ml/hr for 12 hours only then Stop!!  Infuse at 8.3ml/hr for 12 hours only then Stop!!  Entered: Aug 26 2021 10:00PM    Parenteral Nutrition - Adult-  Entered: Aug 26 2021  5:00PM    Diet NPO-  Entered: Aug 20 2021  3:12PM    
This patient has been assessed with a concern for Malnutrition and has been determined to have a diagnosis/diagnoses of Severe protein-calorie malnutrition and Underweight (BMI < 19).    This patient is being managed with:   fat emulsion (Fish Oil and Plant Based) 20% Infusion-[Known as SMOFLIPID 20% Infusion]  20 Gram(s) in IV Solution 100 milliLiter(s) infuse at 8.33 mL/Hr  Dose Rate: 0.5 Gm/kG/Day Infuse Over: 12 Hours; Stop After 12 Hours  Administration Instructions: Use 1.2 micron in-line filter  Run at 8.3ml/hr for 12 hours only then Stop!!  Infuse at 8.3ml/hr for 12 hours only then Stop!!  Entered: Aug 27 2021 10:00PM    Parenteral Nutrition - Adult-  Entered: Aug 27 2021  5:00PM    fat emulsion (Fish Oil and Plant Based) 20% Infusion-[Known as SMOFLIPID 20% Infusion]  20 Gram(s) in IV Solution 100 milliLiter(s) infuse at 8.3 mL/Hr  Dose Rate: 0.5 Gm/kG/Day Infuse Over: 12 Hours; Stop After 12 Hours  Administration Instructions: Use 1.2 micron in-line filter  Run at 8.3ml/hr for 12 hours only then Stop!!  Infuse at 8.3ml/hr for 12 hours only then Stop!!  Entered: Aug 26 2021 10:00PM    Parenteral Nutrition - Adult-  Entered: Aug 26 2021  5:00PM    Diet NPO-  Entered: Aug 20 2021  3:12PM    
This patient has been assessed with a concern for Malnutrition and has been determined to have a diagnosis/diagnoses of Severe protein-calorie malnutrition and Underweight (BMI < 19).    This patient is being managed with:   fat emulsion (Fish Oil and Plant Based) 20% Infusion-[Known as SMOFLIPID 20% Infusion]  20 Gram(s) in IV Solution 100 milliLiter(s) infuse at 8.5 mL/Hr  Dose Rate: 0.5 Gm/kG/Day Infuse Over: 12 Hours; Stop After 12 Hours  Administration Instructions: Use 1.2 micron in-line filter  Entered: Aug 23 2021 10:00PM    Parenteral Nutrition - Adult-  Entered: Aug 23 2021  5:00PM    Parenteral Nutrition - Adult-  Entered: Aug 22 2021  5:00PM    Diet NPO-  Entered: Aug 20 2021  3:12PM    
This patient has been assessed with a concern for Malnutrition and has been determined to have a diagnosis/diagnoses of Severe protein-calorie malnutrition and Underweight (BMI < 19).    This patient is being managed with:   fat emulsion (Fish Oil and Plant Based) 20% Infusion-[Known as SMOFLIPID 20% Infusion]  20 Gram(s) in IV Solution 100 milliLiter(s) infuse at 8.5 mL/Hr  Dose Rate: 0.5 Gm/kG/Day Infuse Over: 12 Hours; Stop After 12 Hours  Administration Instructions: Use 1.2 micron in-line filter  Entered: Aug 24 2021 10:00PM    Parenteral Nutrition - Adult-  Entered: Aug 24 2021  5:00PM    Diet NPO-  Entered: Aug 20 2021  3:12PM    
This patient has been assessed with a concern for Malnutrition and has been determined to have a diagnosis/diagnoses of Severe protein-calorie malnutrition and Underweight (BMI < 19).      
This patient has been assessed with a concern for Malnutrition and has been determined to have a diagnosis/diagnoses of Severe protein-calorie malnutrition and Underweight (BMI < 19).    This patient is being managed with:   Diet Clear Liquid-  Supplement Feeding Modality:  Oral  Ensure Clear Cans or Servings Per Day:  1       Frequency:  Three Times a day  Entered: Aug 13 2021  9:05PM    
This patient has been assessed with a concern for Malnutrition and has been determined to have a diagnosis/diagnoses of Severe protein-calorie malnutrition and Underweight (BMI < 19).    This patient is being managed with:   Diet Clear Liquid-  Supplement Feeding Modality:  Oral  Ensure Clear Cans or Servings Per Day:  1       Frequency:  Three Times a day  Entered: Aug 13 2021  9:05PM    
This patient has been assessed with a concern for Malnutrition and has been determined to have a diagnosis/diagnoses of Severe protein-calorie malnutrition and Underweight (BMI < 19).    This patient is being managed with:   Diet Clear Liquid-  Supplement Feeding Modality:  Oral  Ensure Clear Cans or Servings Per Day:  1       Frequency:  Three Times a day  Entered: Aug 20 2021 10:25AM    
This patient has been assessed with a concern for Malnutrition and has been determined to have a diagnosis/diagnoses of Severe protein-calorie malnutrition and Underweight (BMI < 19).    This patient is being managed with:   Diet Clear Liquid-  Supplement Feeding Modality:  Oral  Ensure Clear Cans or Servings Per Day:  1       Frequency:  Two Times a day  Entered: Aug  9 2021  7:37PM    
This patient has been assessed with a concern for Malnutrition and has been determined to have a diagnosis/diagnoses of Severe protein-calorie malnutrition and Underweight (BMI < 19).    This patient is being managed with:   Diet Dysphagia 1 Pureed-Thin Liquids-  Entered: Sep  7 2021  5:50AM    
This patient has been assessed with a concern for Malnutrition and has been determined to have a diagnosis/diagnoses of Severe protein-calorie malnutrition and Underweight (BMI < 19).    This patient is being managed with:   Diet Mechanical Soft-  Tube Feed Start Time: 12:00  Entered: Aug 19 2021 11:31AM    
This patient has been assessed with a concern for Malnutrition and has been determined to have a diagnosis/diagnoses of Severe protein-calorie malnutrition and Underweight (BMI < 19).    This patient is being managed with:   Diet Mechanical Soft-  Tube Feed Start Time: 12:00  Entered: Aug 19 2021 11:31AM    
This patient has been assessed with a concern for Malnutrition and has been determined to have a diagnosis/diagnoses of Severe protein-calorie malnutrition and Underweight (BMI < 19).    This patient is being managed with:   Diet NPO after Midnight-     NPO Start Date: 12-Aug-2021   NPO Start Time: 23:59  Entered: Aug 12 2021  7:29AM    Diet Clear Liquid-  Supplement Feeding Modality:  Oral  Ensure Clear Cans or Servings Per Day:  1       Frequency:  Three Times a day  Entered: Aug 10 2021 10:05AM    
This patient has been assessed with a concern for Malnutrition and has been determined to have a diagnosis/diagnoses of Severe protein-calorie malnutrition and Underweight (BMI < 19).    This patient is being managed with:   Diet NPO-  Entered: Sep  1 2021  3:29PM    
This patient has been assessed with a concern for Malnutrition and has been determined to have a diagnosis/diagnoses of Severe protein-calorie malnutrition and Underweight (BMI < 19).    This patient is being managed with:   Diet NPO-  Except Medications  Entered: Aug 18 2021  5:18AM    
This patient has been assessed with a concern for Malnutrition and has been determined to have a diagnosis/diagnoses of Severe protein-calorie malnutrition and Underweight (BMI < 19).    This patient is being managed with:   Parenteral Nutrition - Adult-  Entered: Aug 21 2021  5:00PM    fat emulsion (Fish Oil and Plant Based) 20% Infusion-[Known as SMOFLIPID 20% Infusion]  49.78 Gram(s) in IV Solution 248.9 milliLiter(s) infuse at 20.7 mL/Hr  Dose Rate: 1.22 Gm/kG/Day Infuse Over: 12 Hours; Stop After 12 Hours  Administration Instructions: Use 1.2 micron in-line filter  Entered: Aug 21 2021  5:00PM    Diet NPO-  Entered: Aug 20 2021  3:12PM    
This patient has been assessed with a concern for Malnutrition and has been determined to have a diagnosis/diagnoses of Severe protein-calorie malnutrition and Underweight (BMI < 19).    This patient is being managed with:   Parenteral Nutrition - Adult-  Entered: Aug 27 2021  5:00PM    fat emulsion (Fish Oil and Plant Based) 20% Infusion-[Known as SMOFLIPID 20% Infusion]  20 Gram(s) in IV Solution 100 milliLiter(s) infuse at 8.3 mL/Hr  Dose Rate: 0.5 Gm/kG/Day Infuse Over: 12 Hours; Stop After 12 Hours  Administration Instructions: Use 1.2 micron in-line filter  Run at 8.3ml/hr for 12 hours only then Stop!!  Infuse at 8.3ml/hr for 12 hours only then Stop!!  Entered: Aug 27 2021  5:00PM    fat emulsion (Fish Oil and Plant Based) 20% Infusion-[Known as SMOFLIPID 20% Infusion]  20 Gram(s) in IV Solution 100 milliLiter(s) infuse at 8.3 mL/Hr  Dose Rate: 0.5 Gm/kG/Day Infuse Over: 12 Hours; Stop After 12 Hours  Administration Instructions: Use 1.2 micron in-line filter  Run at 8.3ml/hr for 12 hours only then Stop!!  Infuse at 8.3ml/hr for 12 hours only then Stop!!  Entered: Aug 26 2021 10:00PM    Parenteral Nutrition - Adult-  Entered: Aug 26 2021  5:00PM    Diet NPO-  Entered: Aug 20 2021  3:12PM    
This patient has been assessed with a concern for Malnutrition and has been determined to have a diagnosis/diagnoses of Severe protein-calorie malnutrition and Underweight (BMI < 19).    This patient is being managed with:   Parenteral Nutrition - Adult-  Entered: Aug 30 2021  5:00PM    fat emulsion (Fish Oil and Plant Based) 20% Infusion-[Known as SMOFLIPID 20% Infusion]  20 Gram(s) in IV Solution 100 milliLiter(s) infuse at 8.33 mL/Hr  Dose Rate: 0.3759 Gm/kG/Day Infuse Over: 12 Hours; Stop After 12 Hours  Administration Instructions: Use 1.2 micron in-line filter  Entered: Aug 30 2021  5:00PM    Diet NPO-  With Ice Chips/Sips of Water  Entered: Aug 29 2021 11:05AM    
This patient has been assessed with a concern for Malnutrition and has been determined to have a diagnosis/diagnoses of Severe protein-calorie malnutrition and Underweight (BMI < 19).    This patient is being managed with:   Parenteral Nutrition - Adult-  Entered: Sep  2 2021  5:00PM    fat emulsion (Fish Oil and Plant Based) 20% Infusion-[Known as SMOFLIPID 20% Infusion]  20 Gram(s) in IV Solution 100 milliLiter(s) infuse at 8.3 mL/Hr  Dose Rate: 0.3759 Gm/kG/Day Infuse Over: 12 Hours; Stop After 12 Hours  Administration Instructions: Use 1.2 micron in-line filter  infuse at 8.3ml/hr for 12 hours only then Stop!!  Entered: Sep  2 2021 10:00AM    fat emulsion (Fish Oil and Plant Based) 20% Infusion-[Known as SMOFLIPID 20% Infusion]  20 Gram(s) in IV Solution 100 milliLiter(s) infuse at 8.3 mL/Hr  Dose Rate: 0.3759 Gm/kG/Day Infuse Over: 12 Hours; Stop After 12 Hours  Administration Instructions: Use 1.2 micron in-line filter  infuse at 8.3ml/hr for 12 hours only then Stop!!  Entered: Sep  1 2021 10:00PM    Parenteral Nutrition - Adult-  Entered: Sep  1 2021  5:00PM    Diet NPO-  Entered: Sep  1 2021  3:29PM    
This patient has been assessed with a concern for Malnutrition and has been determined to have a diagnosis/diagnoses of Severe protein-calorie malnutrition and Underweight (BMI < 19).    This patient is being managed with:   fat emulsion (Fish Oil and Plant Based) 20% Infusion-[Known as SMOFLIPID 20% Infusion]  20 Gram(s) in IV Solution 100 milliLiter(s) infuse at 8.3 mL/Hr  Dose Rate: 0.3759 Gm/kG/Day Infuse Over: 12 Hours; Stop After 12 Hours  Administration Instructions: Use 1.2 micron in-line filter  infuse at 8.3ml/hr for 12 hours only then Stop!!  Entered: Aug 31 2021 10:00PM    Parenteral Nutrition - Adult-  Entered: Aug 31 2021  5:00PM    Parenteral Nutrition - Adult-  Entered: Aug 30 2021  5:00PM    Diet NPO-  With Ice Chips/Sips of Water  Entered: Aug 29 2021 11:05AM    
This patient has been assessed with a concern for Malnutrition and has been determined to have a diagnosis/diagnoses of Severe protein-calorie malnutrition and Underweight (BMI < 19).    This patient is being managed with:   fat emulsion (Fish Oil and Plant Based) 20% Infusion-[Known as SMOFLIPID 20% Infusion]  20 Gram(s) in IV Solution 100 milliLiter(s) infuse at 8.5 mL/Hr  Dose Rate: 0.5 Gm/kG/Day Infuse Over: 12 Hours; Stop After 12 Hours  Administration Instructions: Use 1.2 micron in-line filter  Entered: Aug 24 2021 10:00PM    Parenteral Nutrition - Adult-  Entered: Aug 24 2021  5:00PM    Diet NPO-  Entered: Aug 20 2021  3:12PM    

## 2021-09-08 NOTE — PROGRESS NOTE ADULT - SUBJECTIVE AND OBJECTIVE BOX
POD 19, 26  8 Heidi    Patient has been sleeping and partly arousable for last 2 days after having 3 days of clarity and ability to talk and converse.  Attempts to feed her full liquid diet have not been successful.  Last night she clamped her mouth closed and refused to eat/drink.  Not c/o pain.  Bedbound, as before.    Vital Signs Last 24 Hrs  T(C): 36.8 (08 Sep 2021 05:36), Max: 36.8 (08 Sep 2021 05:36)  T(F): 98.3 (08 Sep 2021 05:36), Max: 98.3 (08 Sep 2021 05:36)  HR: 90 (08 Sep 2021 05:36) (90 - 90)  BP: 137/76 (08 Sep 2021 05:36) (137/76 - 137/76)  BP(mean): --  RR: 16 (08 Sep 2021 05:36) (16 - 16)  SpO2: 98% (08 Sep 2021 05:36) (98% - 98%)    Not talking but can nod her head.  Not in discomfort  lungs: clear  cor: S1S2  abd: soft, non tender, stoma viable with gas in the appliance.    ext: without change    UO  300 ml charted x last 24 hours

## 2021-09-08 NOTE — PROGRESS NOTE ADULT - PROBLEM SELECTOR PLAN 3
-haldol 0.5 mg IV q6 PRN  -If agitation unrelieved by above, assess for terminal fevers  -Given Tylenol IV/TX q8h PRN terminal fevers, temp >38c  -If severe symptoms or paradoxical worsening of agitation call HEAL line [phone]
-cw haldol 0.5 mg IV q6 PRN  -If agitation unrelieved by above, assess for terminal fevers  -Given Tylenol IV/OK q8h PRN terminal fevers, temp >38c  -If severe symptoms or paradoxical worsening of agitation call HEAL line [phone]
-cw haldol 0.5 mg IV q6 PRN  -If agitation unrelieved by above, assess for terminal fevers  -Given Tylenol IV/ID q8h PRN terminal fevers, temp >38c  -If severe symptoms or paradoxical worsening of agitation call HEAL line [phone]
-HCP Cynthia Sims #[phone]   -Full Code  -8/23 Discussion with Cynthia: understands pt with advanced cancer with limited treatment options and not a candidate for any disease directed therapy dt PS, pelvic infection. Her ECOG 3/4 would not permit major elective surgery nor allow her to tolerate chemotherapy.  -9/1 pt's niece, Umberto, at bedside, newly involved in pt's care. Umberto awaiting discussion with SICU team on pt's hospital course. Palliative conversation to follow.
-cw haldol 0.5 mg IV q6 PRN  -If agitation unrelieved by above, assess for terminal fevers  -Given Tylenol IV/PA q8h PRN terminal fevers, temp >38c  -If severe symptoms or paradoxical worsening of agitation call HEAL line [phone]
-HCP Cynthia Sims #[phone]   -Pt wishes to continue all aggressive measures to prolong life   -Full Code  -8/23 Discussion with Cynthia: understands pt with advanced cancer with limited treatment options. Pt not a candidate for any disease directed therapy at this time dt her pelvic infection. Her ECOG 3/4 would not permit major elective surgery nor allow her to tolerate chemotherapy.

## 2021-09-08 NOTE — PROGRESS NOTE ADULT - ASSESSMENT
A/P  Patients MS has deteriorated back to where it was before the weekend.  She is not talking and sleeps most of the time.  She will not take po diet.  Given her prognosis and her debilitated state I do not think that a PEG/PEJ is indicated.  Her QOL is very poor and her prognosis extremely poor.    Agree with plans to transfer patient to Hospice Care at [facility] this morning.  I spoke with the patients niece who is on board as regards comfort care measures and this plan.  HO's on Surgery service aware.

## 2021-09-08 NOTE — PROGRESS NOTE ADULT - PROBLEM SELECTOR PROBLEM 4
Encounter for palliative care
Functional quadriplegia
Encounter for palliative care

## 2021-09-08 NOTE — PROGRESS NOTE ADULT - REASON FOR ADMISSION
Neck pain
Colorectal cancer
Malaise
Neck pain
Malaise, pelvic mass
Neck pain

## 2021-09-08 NOTE — PROGRESS NOTE ADULT - PROBLEM SELECTOR PLAN 7
89 yo f with rectal adenocarcinoma and poor prognosis.  complicated hospital course, poor PS, active infection, cachexia. pt not candidate for any disease directed therapy. now at EOL, comfort care.   -appreciate palliative BRODERICK Cantu assistance with discharge   -discharge to inpatient hospice at [facility] today   -all questions answered, emotional support provided  - primary team   -please contact Palliative Medicine 24/7 at [phone] for any acute symptoms or further questions
88 yo f with rectal adenocarcinoma and poor prognosis.  complicated hospital course, poor PS, active infection, cachexia. pt not candidate for any disease directed therapy. now actively dying, comfort care.   -all questions answered, emotional support provided  - primary team   -will not see over the weekend, please contact Palliative Medicine 24/7 at [phone] for any acute symptoms or further questions  -will continue to follow with you
88 yo f with rectal adenocarcinoma and poor prognosis.  complicated hospital course, poor PS, active infection, cachexia. pt not candidate for any disease directed therapy. now at EOL, comfort care.   -appreciate palliative SW Armand Cantu assistance with discharge   -referral placed for inpatient hospice at [facility]   -*******pt needs COVID test prior to dc  -all questions answered, emotional support provided  - primary team   -please contact Palliative Medicine 24/7 at [phone] for any acute symptoms or further questions  -will continue to follow with you
5
88 yo f with rectal adenocarcinoma and poor prognosis.  complicated hospital course, poor PS, active infection, cachexia. pt is unlikely candidate for any disease directed therapy. pall med following for GOC.  -would recommend DNR DNI, hospice   -follow up discussion with Umberto   -all questions answered, emotional support provided  - primary team   -please contact Palliative Medicine 24/7 at [phone] for any acute symptoms or further questions  -will continue to follow with you

## 2021-09-08 NOTE — DISCHARGE NOTE PROVIDER - NSDCMRMEDTOKEN_GEN_ALL_CORE_FT
amLODIPine 5 mg oral tablet: 1 tab(s) orally once a day  aspirin 81 mg oral tablet, chewable: 1 tab(s) orally every 24 hours  atorvastatin 80 mg oral tablet: 1 tab(s) orally once a day (at bedtime)  clopidogrel 75 mg oral tablet: 1 tab(s) orally every 24 hours  metoprolol: 12.5 milligram(s) orally once a day  Tylenol 325 mg oral capsule: 1 cap(s) orally 2 times a day   amLODIPine 5 mg oral tablet: 1 tab(s) orally once a day  aspirin 81 mg oral tablet, chewable: 1 tab(s) orally every 24 hours  atorvastatin 80 mg oral tablet: 1 tab(s) orally once a day (at bedtime)  chlorhexidine 2% topical pad: 1 application topically once a day  clopidogrel 75 mg oral tablet: 1 tab(s) orally every 24 hours  glycopyrrolate 0.2 mg/mL injectable solution:  injectable   HYDROmorphone 0.4 mg/mL-NaCl 0.9% intravenous solution: 1 milliliter(s) intravenous every 2 hours, As needed, Moderate Pain (4 - 6)  lidocaine 4% topical film:  topically   LORazepam:   metoprolol: 12.5 milligram(s) orally once a day  nystatin 100,000 units/g topical powder: 1 application topically 2 times a day  Tylenol 325 mg oral capsule: 1 cap(s) orally 2 times a day

## 2021-09-13 PROCEDURE — 82803 BLOOD GASES ANY COMBINATION: CPT

## 2021-09-13 PROCEDURE — 81001 URINALYSIS AUTO W/SCOPE: CPT

## 2021-09-13 PROCEDURE — 82553 CREATINE MB FRACTION: CPT

## 2021-09-13 PROCEDURE — 82378 CARCINOEMBRYONIC ANTIGEN: CPT

## 2021-09-13 PROCEDURE — 93970 EXTREMITY STUDY: CPT

## 2021-09-13 PROCEDURE — 96374 THER/PROPH/DIAG INJ IV PUSH: CPT | Mod: XU

## 2021-09-13 PROCEDURE — 86900 BLOOD TYPING SEROLOGIC ABO: CPT

## 2021-09-13 PROCEDURE — P9016: CPT

## 2021-09-13 PROCEDURE — 85025 COMPLETE CBC W/AUTO DIFF WBC: CPT

## 2021-09-13 PROCEDURE — 93005 ELECTROCARDIOGRAM TRACING: CPT

## 2021-09-13 PROCEDURE — A9562: CPT

## 2021-09-13 PROCEDURE — 81003 URINALYSIS AUTO W/O SCOPE: CPT

## 2021-09-13 PROCEDURE — 97530 THERAPEUTIC ACTIVITIES: CPT

## 2021-09-13 PROCEDURE — 92610 EVALUATE SWALLOWING FUNCTION: CPT

## 2021-09-13 PROCEDURE — 84132 ASSAY OF SERUM POTASSIUM: CPT

## 2021-09-13 PROCEDURE — 87640 STAPH A DNA AMP PROBE: CPT

## 2021-09-13 PROCEDURE — 83036 HEMOGLOBIN GLYCOSYLATED A1C: CPT

## 2021-09-13 PROCEDURE — 94640 AIRWAY INHALATION TREATMENT: CPT

## 2021-09-13 PROCEDURE — 87070 CULTURE OTHR SPECIMN AEROBIC: CPT

## 2021-09-13 PROCEDURE — 86644 CMV ANTIBODY: CPT

## 2021-09-13 PROCEDURE — 87186 SC STD MICRODIL/AGAR DIL: CPT

## 2021-09-13 PROCEDURE — 86769 SARS-COV-2 COVID-19 ANTIBODY: CPT

## 2021-09-13 PROCEDURE — 97110 THERAPEUTIC EXERCISES: CPT

## 2021-09-13 PROCEDURE — 82330 ASSAY OF CALCIUM: CPT

## 2021-09-13 PROCEDURE — 76770 US EXAM ABDO BACK WALL COMP: CPT

## 2021-09-13 PROCEDURE — 88341 IMHCHEM/IMCYTCHM EA ADD ANTB: CPT

## 2021-09-13 PROCEDURE — 74019 RADEX ABDOMEN 2 VIEWS: CPT

## 2021-09-13 PROCEDURE — 36430 TRANSFUSION BLD/BLD COMPNT: CPT

## 2021-09-13 PROCEDURE — 83690 ASSAY OF LIPASE: CPT

## 2021-09-13 PROCEDURE — 84300 ASSAY OF URINE SODIUM: CPT

## 2021-09-13 PROCEDURE — P9047: CPT

## 2021-09-13 PROCEDURE — 85610 PROTHROMBIN TIME: CPT

## 2021-09-13 PROCEDURE — C1729: CPT

## 2021-09-13 PROCEDURE — 86645 CMV ANTIBODY IGM: CPT

## 2021-09-13 PROCEDURE — 74018 RADEX ABDOMEN 1 VIEW: CPT

## 2021-09-13 PROCEDURE — 85018 HEMOGLOBIN: CPT

## 2021-09-13 PROCEDURE — 99153 MOD SED SAME PHYS/QHP EA: CPT

## 2021-09-13 PROCEDURE — 10030 IMG GID FLU COLL DRG SFT TIS: CPT

## 2021-09-13 PROCEDURE — 87086 URINE CULTURE/COLONY COUNT: CPT

## 2021-09-13 PROCEDURE — 84478 ASSAY OF TRIGLYCERIDES: CPT

## 2021-09-13 PROCEDURE — 80053 COMPREHEN METABOLIC PANEL: CPT

## 2021-09-13 PROCEDURE — P9045: CPT

## 2021-09-13 PROCEDURE — 80076 HEPATIC FUNCTION PANEL: CPT

## 2021-09-13 PROCEDURE — 87075 CULTR BACTERIA EXCEPT BLOOD: CPT

## 2021-09-13 PROCEDURE — 87641 MR-STAPH DNA AMP PROBE: CPT

## 2021-09-13 PROCEDURE — 87040 BLOOD CULTURE FOR BACTERIA: CPT

## 2021-09-13 PROCEDURE — 85027 COMPLETE CBC AUTOMATED: CPT

## 2021-09-13 PROCEDURE — 88305 TISSUE EXAM BY PATHOLOGIST: CPT

## 2021-09-13 PROCEDURE — 74177 CT ABD & PELVIS W/CONTRAST: CPT

## 2021-09-13 PROCEDURE — 84145 PROCALCITONIN (PCT): CPT

## 2021-09-13 PROCEDURE — 99285 EMERGENCY DEPT VISIT HI MDM: CPT | Mod: 25

## 2021-09-13 PROCEDURE — 83935 ASSAY OF URINE OSMOLALITY: CPT

## 2021-09-13 PROCEDURE — 83880 ASSAY OF NATRIURETIC PEPTIDE: CPT

## 2021-09-13 PROCEDURE — C1769: CPT

## 2021-09-13 PROCEDURE — 83735 ASSAY OF MAGNESIUM: CPT

## 2021-09-13 PROCEDURE — 87205 SMEAR GRAM STAIN: CPT

## 2021-09-13 PROCEDURE — 93880 EXTRACRANIAL BILAT STUDY: CPT

## 2021-09-13 PROCEDURE — 84484 ASSAY OF TROPONIN QUANT: CPT

## 2021-09-13 PROCEDURE — 97164 PT RE-EVAL EST PLAN CARE: CPT

## 2021-09-13 PROCEDURE — 71045 X-RAY EXAM CHEST 1 VIEW: CPT

## 2021-09-13 PROCEDURE — 36415 COLL VENOUS BLD VENIPUNCTURE: CPT

## 2021-09-13 PROCEDURE — 86901 BLOOD TYPING SEROLOGIC RH(D): CPT

## 2021-09-13 PROCEDURE — 82570 ASSAY OF URINE CREATININE: CPT

## 2021-09-13 PROCEDURE — 73600 X-RAY EXAM OF ANKLE: CPT

## 2021-09-13 PROCEDURE — 84295 ASSAY OF SERUM SODIUM: CPT

## 2021-09-13 PROCEDURE — C2617: CPT

## 2021-09-13 PROCEDURE — 82550 ASSAY OF CK (CPK): CPT

## 2021-09-13 PROCEDURE — 78708 K FLOW/FUNCT IMAGE W/DRUG: CPT

## 2021-09-13 PROCEDURE — 97161 PT EVAL LOW COMPLEX 20 MIN: CPT

## 2021-09-13 PROCEDURE — 82962 GLUCOSE BLOOD TEST: CPT

## 2021-09-13 PROCEDURE — 86923 COMPATIBILITY TEST ELECTRIC: CPT

## 2021-09-13 PROCEDURE — 80048 BASIC METABOLIC PNL TOTAL CA: CPT

## 2021-09-13 PROCEDURE — 93306 TTE W/DOPPLER COMPLETE: CPT

## 2021-09-13 PROCEDURE — 85730 THROMBOPLASTIN TIME PARTIAL: CPT

## 2021-09-13 PROCEDURE — 86850 RBC ANTIBODY SCREEN: CPT

## 2021-09-13 PROCEDURE — 76000 FLUOROSCOPY <1 HR PHYS/QHP: CPT

## 2021-09-13 PROCEDURE — 83605 ASSAY OF LACTIC ACID: CPT

## 2021-09-13 PROCEDURE — 99152 MOD SED SAME PHYS/QHP 5/>YRS: CPT

## 2021-09-13 PROCEDURE — U0003: CPT

## 2021-09-13 PROCEDURE — 72125 CT NECK SPINE W/O DYE: CPT

## 2021-09-13 PROCEDURE — 85007 BL SMEAR W/DIFF WBC COUNT: CPT

## 2021-09-13 PROCEDURE — 92612 ENDOSCOPY SWALLOW (FEES) VID: CPT

## 2021-09-13 PROCEDURE — 84100 ASSAY OF PHOSPHORUS: CPT

## 2021-09-13 PROCEDURE — 74176 CT ABD & PELVIS W/O CONTRAST: CPT

## 2021-09-13 PROCEDURE — U0005: CPT

## 2021-09-13 PROCEDURE — 71250 CT THORAX DX C-: CPT

## 2021-09-13 PROCEDURE — 87635 SARS-COV-2 COVID-19 AMP PRB: CPT

## 2021-09-13 PROCEDURE — 82040 ASSAY OF SERUM ALBUMIN: CPT

## 2021-09-13 PROCEDURE — 97116 GAIT TRAINING THERAPY: CPT

## 2021-09-13 PROCEDURE — 88342 IMHCHEM/IMCYTCHM 1ST ANTB: CPT

## 2021-09-13 PROCEDURE — 93971 EXTREMITY STUDY: CPT

## 2021-09-13 PROCEDURE — C1758: CPT

## 2021-09-15 DIAGNOSIS — R15.9 FULL INCONTINENCE OF FECES: ICD-10-CM

## 2021-09-15 DIAGNOSIS — Z95.5 PRESENCE OF CORONARY ANGIOPLASTY IMPLANT AND GRAFT: ICD-10-CM

## 2021-09-15 DIAGNOSIS — L02.31 CUTANEOUS ABSCESS OF BUTTOCK: ICD-10-CM

## 2021-09-15 DIAGNOSIS — D63.0 ANEMIA IN NEOPLASTIC DISEASE: ICD-10-CM

## 2021-09-15 DIAGNOSIS — B96.20 UNSPECIFIED ESCHERICHIA COLI [E. COLI] AS THE CAUSE OF DISEASES CLASSIFIED ELSEWHERE: ICD-10-CM

## 2021-09-15 DIAGNOSIS — M54.5 LOW BACK PAIN: ICD-10-CM

## 2021-09-15 DIAGNOSIS — K61.1 RECTAL ABSCESS: ICD-10-CM

## 2021-09-15 DIAGNOSIS — I95.9 HYPOTENSION, UNSPECIFIED: ICD-10-CM

## 2021-09-15 DIAGNOSIS — Z51.5 ENCOUNTER FOR PALLIATIVE CARE: ICD-10-CM

## 2021-09-15 DIAGNOSIS — K63.1 PERFORATION OF INTESTINE (NONTRAUMATIC): ICD-10-CM
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
## 2021-09-15 DIAGNOSIS — K63.2 FISTULA OF INTESTINE: ICD-10-CM
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
## 2021-09-15 DIAGNOSIS — Z87.891 PERSONAL HISTORY OF NICOTINE DEPENDENCE: ICD-10-CM

## 2021-09-15 DIAGNOSIS — B95.62 METHICILLIN RESISTANT STAPHYLOCOCCUS AUREUS INFECTION AS THE CAUSE OF DISEASES CLASSIFIED ELSEWHERE: ICD-10-CM

## 2021-09-15 DIAGNOSIS — I25.10 ATHEROSCLEROTIC HEART DISEASE OF NATIVE CORONARY ARTERY WITHOUT ANGINA PECTORIS: ICD-10-CM

## 2021-09-15 DIAGNOSIS — E78.5 HYPERLIPIDEMIA, UNSPECIFIED: ICD-10-CM

## 2021-09-15 DIAGNOSIS — K66.0 PERITONEAL ADHESIONS (POSTPROCEDURAL) (POSTINFECTION): ICD-10-CM

## 2021-09-15 DIAGNOSIS — N13.1 HYDRONEPHROSIS WITH URETERAL STRICTURE, NOT ELSEWHERE CLASSIFIED: ICD-10-CM

## 2021-09-15 DIAGNOSIS — J69.0 PNEUMONITIS DUE TO INHALATION OF FOOD AND VOMIT: ICD-10-CM

## 2021-09-15 DIAGNOSIS — E43 UNSPECIFIED SEVERE PROTEIN-CALORIE MALNUTRITION: ICD-10-CM
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
## 2021-09-15 DIAGNOSIS — Z79.82 LONG TERM (CURRENT) USE OF ASPIRIN: ICD-10-CM

## 2021-09-15 DIAGNOSIS — Z91.19 PATIENT'S NONCOMPLIANCE WITH OTHER MEDICAL TREATMENT AND REGIMEN: ICD-10-CM

## 2021-09-15 DIAGNOSIS — R13.10 DYSPHAGIA, UNSPECIFIED: ICD-10-CM

## 2021-09-15 DIAGNOSIS — C20 MALIGNANT NEOPLASM OF RECTUM: ICD-10-CM

## 2021-09-15 DIAGNOSIS — J96.01 ACUTE RESPIRATORY FAILURE WITH HYPOXIA: ICD-10-CM

## 2021-09-15 DIAGNOSIS — K91.31 POSTPROCEDURAL PARTIAL INTESTINAL OBSTRUCTION: ICD-10-CM

## 2021-09-15 DIAGNOSIS — Z16.12 EXTENDED SPECTRUM BETA LACTAMASE (ESBL) RESISTANCE: ICD-10-CM

## 2021-09-15 DIAGNOSIS — D62 ACUTE POSTHEMORRHAGIC ANEMIA: ICD-10-CM

## 2021-09-15 DIAGNOSIS — Z79.02 LONG TERM (CURRENT) USE OF ANTITHROMBOTICS/ANTIPLATELETS: ICD-10-CM

## 2021-09-15 DIAGNOSIS — K22.11 ULCER OF ESOPHAGUS WITH BLEEDING: ICD-10-CM

## 2021-09-15 DIAGNOSIS — R53.2 FUNCTIONAL QUADRIPLEGIA: ICD-10-CM

## 2021-09-15 DIAGNOSIS — M48.56XA COLLAPSED VERTEBRA, NOT ELSEWHERE CLASSIFIED, LUMBAR REGION, INITIAL ENCOUNTER FOR FRACTURE: ICD-10-CM

## 2021-09-15 DIAGNOSIS — I10 ESSENTIAL (PRIMARY) HYPERTENSION: ICD-10-CM

## 2021-09-15 DIAGNOSIS — R32 UNSPECIFIED URINARY INCONTINENCE: ICD-10-CM

## 2021-09-15 DIAGNOSIS — K20.90 ESOPHAGITIS, UNSPECIFIED WITHOUT BLEEDING: ICD-10-CM

## 2021-10-10 NOTE — PROGRESS NOTE ADULT - NUTRITIONAL ASSESSMENT
This patient has been assessed with a concern for Malnutrition and has been determined to have a diagnosis/diagnoses of Moderate protein-calorie malnutrition and Underweight (BMI < 19).    This patient is being managed with:   Diet NPO after Midnight-     NPO Start Date: 01-Apr-2021   NPO Start Time: 23:59  Entered: Apr 1 2021  7:11PM    Diet DASH/TLC-  Sodium & Cholesterol Restricted  Entered: Mar 29 2021  5:55AM     normal bilaterally

## 2021-12-05 NOTE — SWALLOW BEDSIDE ASSESSMENT ADULT - COMMENTS
Pt known to our services during current inpatient stay. FEES X2 (8/25 and 8/28). Limited improvement in swallow function during 2 assessments. Pt with severe pharyngeal dysphagia marked by delayed swallow trigger and severely reduced pharyngeal squeeze. This resulted in reduced bolus clearance which ultimately led to aspiration of all consistencies. Etiology of dysphagia is unknown. Consequently, prognosis is uncertain. Pt has remained NPO with TPN. Palliative care following. Pt known to our services during current inpatient stay. FEES X2 (8/25 and 8/28). Limited improvement in swallow function during 2 assessments. Pt with severe pharyngeal dysphagia marked by delayed swallow trigger and severely reduced pharyngeal squeeze. This resulted in reduced bolus clearance which ultimately led to aspiration of all consistencies. Etiology of dysphagia is unknown. Consequently, prognosis is uncertain. Pt has remained NPO with TPN. Palliative care following. Per recent notes, family decided to make comfort care. However, pt's presentation has been improving over the last 3 days. As a result, our service was reconsulted for assistance in determining safest diet. No

## 2022-01-01 NOTE — ED ADULT NURSE NOTE - NS PRO AD ANY ON CHART
Called by PC-RN. Infant going home today and RN reports mother would like to try breastfeeding prior to discharge.   Infant sucking on pacifier, held by mother.   Reviewed pump schedule with mother. Encouraged pumping during the night, not going longer than 4 hours between pump sessions.   Infant sleepy at breast. Attempted cross cradle and football hold. Breasts soft, and milk easily expresses.   Infant fussy at breast and tires easily. No latch achieved, despite multiple attempts using proper latch technique.   Taught proper positioning and breast support. Discussed benefits of skin to skin and encouraged prior to breast feeding attempts.   Outpatient lactation appointment made for 10/28 at 2pm with Yesika.information provided.   Reviewed formula preparation, and EBM safe handling and storage guidelines.  Written discharge feeding plan provided:  1. Breastfeed infant 8-12 times each day, observing for infant feeding cues, waking infant by three hours as needed  2. Breastfeed infant for 10 minutes maximum on one or both sides  3. If infant is too sleepy, or not latching well, move to next step  4. Supplement each feeding with 45-50 ml, expressed breastmilk or formula as ordered- or more as infant demands.  5. Increase supplement volume 5ml per day for each feeding or increase volume as infant tolerates  5. Supplement at breast using paced-bottle feeding  6. Pump breasts for 15 minutes after each feeding  7. Continue feeding plan until breastmilk production is adequate and infant is breastfeeding effectively.  Lactation number given and encouraged to call for support as needed.            No

## 2022-04-28 NOTE — DISCHARGE NOTE PROVIDER - NSDCQMSTAIRS_GEN_ALL_CORE
See follow up visit from  4/22/2022   .  brDate / Results of last TB test  9/24/2021   -   Negative  brLabs and/or Additional orders: CBC and CMP due in September brInsurance authorization:BCTN Approved PA for continuation of Entyvio-12/11/21-06/08/22 (SP)brPharmacy:  Specialty  brRate of Infusion:  Standard   
br   Infusion order placed on:  1/6/2022   
br  Yes

## 2022-09-08 NOTE — ED ADULT NURSE NOTE - NS PRO AD BILL OF RIGHTS
Yes O-L Flap Text: The defect edges were debeveled with a #15 scalpel blade.  Given the location of the defect, shape of the defect and the proximity to free margins an O-L flap was deemed most appropriate.  Using a sterile surgical marker, an appropriate advancement flap was drawn incorporating the defect and placing the expected incisions within the relaxed skin tension lines where possible.    The area thus outlined was incised deep to adipose tissue with a #15 scalpel blade.  The skin margins were undermined to an appropriate distance in all directions utilizing iris scissors.

## 2023-01-13 NOTE — ED PROCEDURE NOTE - NS_EDPROVIDERDISPOUSERTYPE_ED_A_ED
Attending Attestation (For Attendings USE Only)... Simple: Patient demonstrates quick and easy understanding

## 2023-02-09 NOTE — PRE-OP CHECKLIST - HEART RATE (BEATS/MIN)
Physical Therapy Progress Note    Visit Type: Physical Therapy -  Daily Treatment Note  Visit: Visit count could not be calculated. Make sure you are using a visit which is associated with an episode.  Referring Provider: No ref. provider found  Medical Diagnosis (from order): [unfilled]   Patient alert and oriented X3.  Chart reviewed at time of initial evaluation (relevant co-morbidities, allergies, tests and medications listed):   Patient Active Problem List:     Sprain of lumbar region     Thoracic or lumbosacral neuritis or radiculitis, unspecified     Right lumbar radiculopathy    Current Outpatient Medications:  tizanidine (ZANAFLEX) 4 MG capsule, Take 1 capsule by mouth in the morning and 1 capsule at noon and 1 capsule in the evening., Disp: 30 capsule, Rfl: 0  ergocalciferol (DRISDOL) 1.25 mg (50,000 units) capsule, Take 1 capsule by mouth once weekly for 12 weeks, then take over the counter 5,000 IU by mouth once daily, Disp: 12 capsule, Rfl: 1  amlodipine-benazepril (LOTREL) 10-20 MG per capsule, Take 1 capsule by mouth daily., Disp: 30 capsule, Rfl: 1    No current facility-administered medications for this encounter.          SUBJECTIVE                                                                                                               Patient presents to Physical Therapy Department for pre-operative fitness evaluation with intention to receive bariatric surgery. Patient reports that she is looking forward to surgery.    Weight loss history: Has tries various diets and exercise programs but has struggled to stay with them.    Current weight loss: None at this time    Current activity: Minimal.  Some walking at work but right knee pain prevents her from being more active.    Current exercise regime: None at this time    Occupation: Resident care supervisor   Full Time ; Demands: Desk job    Anticipated bariatric surgery date: Not scheduled at this time    Laparoscopic Surgery type (if known):   Sleeve Gastrectomy    Goals:  1)  Become more healthy  2)  Become more active w/ decreased joint pain  3)  Improved diet        Pain / Symptoms  - Patient denies pain / symptoms.      OBJECTIVE                                                                                                                                       Treatment     Pt provided with Bariatric Physical Activity Packet. Education provided on purpose of  bariatric PT program for pre/post-surgical patient to address impairments and limitations to increase and gradually return patient to daily physical activity/exercise program with  education on frequency/duration of activity and strengthening. Recommendations  provided to help prepare body for surgery, improve recovery from surgery, assist with long  term weight control, strengthen bones and heart, improve balance and flexibly, improve  sleep and mood, and burn calories. Discussed pt goals and ways to incorporate increased physical activity into daily routine as well as scheduled exercise sessions.    · Activity Guidelines provided including:  - Beginning a walking program of 150 minutes/week of moderate intensity exercises  and building up to 300 minutes. Pt provided with handout of progressive increase in  walking time every 1-2 weeks and exercise log.  - Muscle strengthening program 2-3 days/week  - Gentle stretching as cool down.    · Instructed in use of Rate Perceived Exertion RPE 1 -10 scale with patient working at moderate 4-6/10. Patient provided with visual graphic of the RPE scale. Provided patient with written exercise log and resources for fitness apps and pedometers. Discussed how  to stay safe with activity and knowing the signs and symptoms of when to stop and seek  help. Reminded patient to listen to their body and be aware of their limits.    · Bariatric Post-Op Education and Restrictions  - Education provided on lifting restrictions (no lifting >15-20 pounds for 4 weeks)  -  Log rolling for bed mobility  - Discussed potential use of adaptive equipment such as long handle shoe horn and  reacher  - Sit <> stand transfers with emphasis of using legs and not holding their breath in  preparation for bariatric surgery      Skilled input: verbal instruction/cues and tactile instruction/cues    Writer verbally educated and received verbal consent for hand placement, positioning of patient, and techniques to be performed today from patient         ASSESSMENT                                                                                                            Patient presents with deconditioning associated with morbid obesity. Patient evaluated and placed on a progressive aerobic and strengthening exercise program to optimize physical condition in preparation for bariatric surgery and post surgical recovery. Patient provided with pre-surgical education for increasing physical activity with home exercise program provided.     Education:   - Results of above outlined education: Verbalizes understanding and Demonstrates understanding    PLAN                                                                                                                           Suggestions for next session as indicated: No Follow up scheduled.      Goals  Short Term Goals  Verbalize understanding of importance of activity for preparing for weight loss surgery and restrictions post op.          Therapy procedure time and total treatment time can be found documented on the Time Entry flowsheet     76

## 2023-03-27 NOTE — PROGRESS NOTE ADULT - REASON FOR ADMISSION
uti Elidel Counseling: Patient may experience a mild burning sensation during topical application. Elidel is not approved in children less than 2 years of age. There have been case reports of hematologic and skin malignancies in patients using topical calcineurin inhibitors although causality is questionable.

## 2023-05-21 NOTE — PROVIDER CONTACT NOTE (OTHER) - SITUATION
Pt hypoxic/tachypenic during procedure to spO2 70%, nonrebreather placed on patient with effect, SPO2 raised to 95%. SBP stable. Speaking Coherently

## 2023-06-14 NOTE — PATIENT PROFILE ADULT - NSPROPTRIGHTSUPPORTPERSON_GEN_A_NUR
Established patient needing pre-op clearance  Surgery: ARTHROPLASTY SHOULDER REVERSE- Left reverse shoulder arthroplasty (Left: Shoulder)  Surgery date: 7/19/23  Last seen by us: 7/28/21  On oxygen: No  Kind of Sedation: General with Interscalene block  Length of surgery: 2 5 hours  Surgeon: Ryder Feldman  Office contact: [address] [place] [address]  Fax(To send office note): [phone]    Would you like to clear patient via a phone call from last visit or would like us to schedule them with you or an AP? PT ON CPAP  PLEASE CALL BETSY FRAGA WITH APPT @ [phone] 
declines

## 2023-11-16 NOTE — DISCHARGE NOTE NURSING/CASE MANAGEMENT/SOCIAL WORK - NSDCPETBCESMAN_GEN_ALL_CORE
If you are a smoker, it is important for your health to stop smoking. Please be aware that second hand smoke is also harmful. 16-Nov-2023 16:07

## 2024-01-19 NOTE — PROGRESS NOTE ADULT - SUBJECTIVE AND OBJECTIVE BOX
no lesions, no deformities, no traumatic injuries, no significant scars are present, chest wall non-tender, no masses present, breathing is unlabored without accessory muscle use,normal breath sounds NAEON. AVSS. Good UOP  Resting, no acute distress. Abd soft. Novak in place draining clear yellow urine.  WBC 10.4 from 8.9  Cr 0.55 from 0.49  Urine Cx E faecium w/ sensitivities present  Recs:  - Repeat Renal Bladder US on Monday 8/30  - TOV after US on monday  - Tx of UTI per primary team        heparin   Injectable 5000 Unit(s) SubCutaneous every 12 hours  metoprolol tartrate Injectable 2.5 milliGRAM(s) IV Push every 6 hours PRN      Vital Signs Last 24 Hrs  T(C): 35.7 (28 Aug 2021 09:07), Max: 36.4 (28 Aug 2021 05:16)  T(F): 96.3 (28 Aug 2021 09:07), Max: 97.6 (28 Aug 2021 05:16)  HR: 78 (28 Aug 2021 11:00) (78 - 93)  BP: 119/59 (28 Aug 2021 11:00) (110/52 - 153/70)  BP(mean): 85 (28 Aug 2021 11:00) (75 - 100)  RR: 18 (28 Aug 2021 11:00) (12 - 18)  SpO2: 93% (28 Aug 2021 11:00) (90% - 96%)  I&O's Detail    27 Aug 2021 07:01  -  28 Aug 2021 07:00  --------------------------------------------------------  IN:    Albumin 5%  - 250 mL: 150 mL    Fat Emulsion 20%: 107.9 mL    IV PiggyBack: 100 mL    PPN (Peripheral Parenteral Nutrition): 1104 mL  Total IN: 1461.9 mL    OUT:    Colostomy (mL): 0 mL    Indwelling Catheter - Urethral (mL): 2765 mL  Total OUT: 2765 mL    Total NET: -1303.1 mL      28 Aug 2021 07:01  -  28 Aug 2021 11:11  --------------------------------------------------------  IN:    Fat Emulsion 20%: 16.6 mL    TPN (Total Parenteral Nutrition): 138 mL  Total IN: 154.6 mL    OUT:    Indwelling Catheter - Urethral (mL): 975 mL  Total OUT: 975 mL    Total NET: -820.4 mL          Physical Exam:  General: No acute distress, resting comfortably in bed  C/V: normal sinus rhythm  Pulm: Nonlabored breathing, no respiratory distress  Abd: soft, non-tender, non-distended, incisions clean/dry/intact.  Extrem: warm and well perfused, no edema, SCDs in place    LABS:                        8.8    10.39 )-----------( 429      ( 28 Aug 2021 05:24 )             27.6     08-28    140  |  104  |  22  ----------------------------<  105<H>  4.4   |  27  |  0.55    Ca    8.4      28 Aug 2021 05:24  Phos  4.6     08-28  Mg     2.2     08-28      PT/INR - ( 27 Aug 2021 03:45 )   PT: 11.9 sec;   INR: 0.99          PTT - ( 27 Aug 2021 03:45 )  PTT:31.1 sec      RADIOLOGY & ADDITIONAL STUDIES:

## 2024-03-05 NOTE — OCCUPATIONAL THERAPY INITIAL EVALUATION ADULT - ASSISTIVE DEVICE FOR TRANSFER: SIT/STAND, REHAB EVAL
Printed and placed in Michelle's in-basket.   Routing encounter to provider as an FYI.     Kerrie Bradley MA     rollator

## 2024-05-22 NOTE — PROGRESS NOTE ADULT - PROBLEM SELECTOR PLAN 2
Patient hypertensive to 180s systolic/ 80s diastolic  -lisinopril 10mg   -will consider adding amlodipine 2.5 mg if pressure remains elevated
Patient hypertensive to 180s systolic/ 80s diastolic  -started on lisinopril 10mg   -will consider adding amlodipine 2.5 mg if pressure remains elevated
Negative

## 2024-06-03 NOTE — PROGRESS NOTE ADULT - SUBJECTIVE AND OBJECTIVE BOX
20 INTERVAL HPI/OVERNIGHT EVENTS: patient pulled out her IJ overnight, no acute complaints     SUBJECTIVE:  Patient is currently comfort care, reports that she is doing well. Denies any abdominal pain, chest pain, shortness of breath, nausea or vomiting.    MEDICATIONS  (STANDING):  chlorhexidine 2% Cloths 1 Application(s) Topical daily  glycopyrrolate Injectable 0.4 milliGRAM(s) IV Push every 6 hours  HYDROmorphone  Injectable 0.2 milliGRAM(s) IV Push every 3 hours  lidocaine   4% Patch 1 Patch Transdermal every 24 hours  lidocaine   4% Patch 1 Patch Transdermal every 24 hours  nystatin Powder 1 Application(s) Topical two times a day    MEDICATIONS  (PRN):  acetaminophen  IVPB .. 540 milliGRAM(s) IV Intermittent once PRN Temp greater or equal to 38C (100.4F)  HYDROmorphone  Injectable 0.2 milliGRAM(s) IV Push every 2 hours PRN Mild Pain (1 - 3)  HYDROmorphone  Injectable 0.4 milliGRAM(s) IV Push every 2 hours PRN Moderate Pain (4 - 6)  LORazepam   Injectable 1 milliGRAM(s) IV Push every 4 hours PRN Anxiety  ondansetron Injectable 4 milliGRAM(s) IV Push every 6 hours PRN Nausea and/or Vomiting      Vital Signs Last 24 Hrs  T(C): 36.8 (07 Sep 2021 06:12), Max: 36.8 (07 Sep 2021 06:12)  T(F): 98.3 (07 Sep 2021 06:12), Max: 98.3 (07 Sep 2021 06:12)  HR: 75 (07 Sep 2021 06:12) (73 - 112)  BP: 136/72 (07 Sep 2021 06:12) (132/69 - 148/67)  BP(mean): --  RR: 16 (07 Sep 2021 06:12) (16 - 18)  SpO2: 100% (07 Sep 2021 06:12) (95% - 100%)    PHYSICAL EXAM:  Constitutional: A&Ox3, AVSS, resting comfortably in bed, sleepy but arousable this morning     Respiratory: non labored breathing, no respiratory distress    Cardiovascular: NSR, RRR    Gastrointestinal: abdomen is soft, nontender, nondistended, stoma is pink and patent, dressing is clean dry and intact.     Extremities: (-) edema, SCDs                  I&O's Detail    06 Sep 2021 07:01  -  07 Sep 2021 07:00  --------------------------------------------------------  IN:  Total IN: 0 mL    OUT:    Indwelling Catheter - Urethral (mL): 400 mL    Voided (mL): 475 mL  Total OUT: 875 mL    Total NET: -875 mL          LABS:                RADIOLOGY & ADDITIONAL STUDIES:

## 2024-09-12 NOTE — ED ADULT NURSE NOTE - NS ED NURSE REPORT GIVEN DT
Mehreen confirmed that the Braftovi did arrive. She will start taking the meds tomorrow.   07-Nov-2019 00:41

## 2024-10-02 NOTE — OCCUPATIONAL THERAPY INITIAL EVALUATION ADULT - EATING, PREVIOUS LEVEL OF FUNCTION, OT EVAL
independent
How Severe Is Your Acne?: moderate
Is This A New Presentation, Or A Follow-Up?: Acne
What Type Of Note Output Would You Prefer (Optional)?: Bullet Format

## 2025-05-23 NOTE — ED ADULT NURSE NOTE - NS ED NOTE ABUSE RESPONSE YN
PAST SURGICAL HISTORY:  Cataract extraction status, left 2012    H/O nasal polypectomy     H/O: hysterectomy     
The patient is a 4y10m Female complaining of fever.
Yes